# Patient Record
Sex: MALE | Race: WHITE | NOT HISPANIC OR LATINO | URBAN - METROPOLITAN AREA
[De-identification: names, ages, dates, MRNs, and addresses within clinical notes are randomized per-mention and may not be internally consistent; named-entity substitution may affect disease eponyms.]

---

## 2023-06-05 LAB — B. BURGDORFERI VLSE1/PEPC10 ABS, ELISA: 0.28 IV

## 2023-12-26 ENCOUNTER — LAB (OUTPATIENT)
Dept: LAB | Facility: LAB | Age: 48
End: 2023-12-26
Payer: COMMERCIAL

## 2023-12-26 DIAGNOSIS — M06.9 RHEUMATOID ARTHRITIS, UNSPECIFIED (MULTI): Primary | ICD-10-CM

## 2023-12-26 LAB
ALBUMIN SERPL BCP-MCNC: 4.6 G/DL (ref 3.4–5)
ALP SERPL-CCNC: 85 U/L (ref 33–120)
ALT SERPL W P-5'-P-CCNC: 10 U/L (ref 10–52)
ANION GAP SERPL CALC-SCNC: 12 MMOL/L (ref 10–20)
AST SERPL W P-5'-P-CCNC: 17 U/L (ref 9–39)
BILIRUB SERPL-MCNC: 0.4 MG/DL (ref 0–1.2)
BUN SERPL-MCNC: 13 MG/DL (ref 6–23)
CALCIUM SERPL-MCNC: 9.5 MG/DL (ref 8.6–10.3)
CHLORIDE SERPL-SCNC: 101 MMOL/L (ref 98–107)
CO2 SERPL-SCNC: 28 MMOL/L (ref 21–32)
CREAT SERPL-MCNC: 0.75 MG/DL (ref 0.5–1.3)
CRP SERPL-MCNC: 0.34 MG/DL
ERYTHROCYTE [DISTWIDTH] IN BLOOD BY AUTOMATED COUNT: 13.8 % (ref 11.5–14.5)
ERYTHROCYTE [SEDIMENTATION RATE] IN BLOOD BY WESTERGREN METHOD: 14 MM/H (ref 0–15)
GFR SERPL CREATININE-BSD FRML MDRD: >90 ML/MIN/1.73M*2
GLUCOSE SERPL-MCNC: 84 MG/DL (ref 74–99)
HCT VFR BLD AUTO: 42.7 % (ref 41–52)
HGB BLD-MCNC: 14.3 G/DL (ref 13.5–17.5)
MCH RBC QN AUTO: 32.1 PG (ref 26–34)
MCHC RBC AUTO-ENTMCNC: 33.5 G/DL (ref 32–36)
MCV RBC AUTO: 96 FL (ref 80–100)
NRBC BLD-RTO: 0 /100 WBCS (ref 0–0)
PLATELET # BLD AUTO: 294 X10*3/UL (ref 150–450)
POTASSIUM SERPL-SCNC: 4.7 MMOL/L (ref 3.5–5.3)
PROT SERPL-MCNC: 7.8 G/DL (ref 6.4–8.2)
RBC # BLD AUTO: 4.45 X10*6/UL (ref 4.5–5.9)
SODIUM SERPL-SCNC: 136 MMOL/L (ref 136–145)
WBC # BLD AUTO: 8.8 X10*3/UL (ref 4.4–11.3)

## 2023-12-26 PROCEDURE — 85027 COMPLETE CBC AUTOMATED: CPT

## 2023-12-26 PROCEDURE — 86140 C-REACTIVE PROTEIN: CPT

## 2023-12-26 PROCEDURE — 80053 COMPREHEN METABOLIC PANEL: CPT

## 2023-12-26 PROCEDURE — 85652 RBC SED RATE AUTOMATED: CPT

## 2023-12-26 PROCEDURE — 36415 COLL VENOUS BLD VENIPUNCTURE: CPT

## 2024-09-09 ENCOUNTER — HOSPITAL ENCOUNTER (INPATIENT)
Facility: HOSPITAL | Age: 49
LOS: 4 days | Discharge: ANOTHER HOSPITAL | End: 2024-09-14
Attending: STUDENT IN AN ORGANIZED HEALTH CARE EDUCATION/TRAINING PROGRAM | Admitting: STUDENT IN AN ORGANIZED HEALTH CARE EDUCATION/TRAINING PROGRAM
Payer: COMMERCIAL

## 2024-09-09 ENCOUNTER — APPOINTMENT (OUTPATIENT)
Dept: RADIOLOGY | Facility: HOSPITAL | Age: 49
End: 2024-09-09
Payer: COMMERCIAL

## 2024-09-09 DIAGNOSIS — M89.9 BONE LESION: Primary | ICD-10-CM

## 2024-09-09 DIAGNOSIS — G89.29 CHRONIC INTRACTABLE HEADACHE, UNSPECIFIED HEADACHE TYPE: ICD-10-CM

## 2024-09-09 DIAGNOSIS — R51.9 CHRONIC INTRACTABLE HEADACHE, UNSPECIFIED HEADACHE TYPE: ICD-10-CM

## 2024-09-09 DIAGNOSIS — R20.2 PARESTHESIA: ICD-10-CM

## 2024-09-09 LAB
ALBUMIN SERPL BCP-MCNC: 4 G/DL (ref 3.4–5)
ALP SERPL-CCNC: 100 U/L (ref 33–120)
ALT SERPL W P-5'-P-CCNC: 12 U/L (ref 10–52)
ANION GAP SERPL CALC-SCNC: 13 MMOL/L (ref 10–20)
APPEARANCE UR: CLEAR
AST SERPL W P-5'-P-CCNC: 11 U/L (ref 9–39)
BASOPHILS # BLD AUTO: 0.06 X10*3/UL (ref 0–0.1)
BASOPHILS NFR BLD AUTO: 0.5 %
BILIRUB SERPL-MCNC: 0.6 MG/DL (ref 0–1.2)
BILIRUB UR STRIP.AUTO-MCNC: NEGATIVE MG/DL
BUN SERPL-MCNC: 16 MG/DL (ref 6–23)
CALCIUM SERPL-MCNC: 9.6 MG/DL (ref 8.6–10.3)
CHLORIDE SERPL-SCNC: 99 MMOL/L (ref 98–107)
CO2 SERPL-SCNC: 26 MMOL/L (ref 21–32)
COLOR UR: YELLOW
CREAT SERPL-MCNC: 0.55 MG/DL (ref 0.5–1.3)
EGFRCR SERPLBLD CKD-EPI 2021: >90 ML/MIN/1.73M*2
EOSINOPHIL # BLD AUTO: 0.01 X10*3/UL (ref 0–0.7)
EOSINOPHIL NFR BLD AUTO: 0.1 %
ERYTHROCYTE [DISTWIDTH] IN BLOOD BY AUTOMATED COUNT: 14.6 % (ref 11.5–14.5)
ERYTHROCYTE [SEDIMENTATION RATE] IN BLOOD BY WESTERGREN METHOD: 18 MM/H (ref 0–15)
GLUCOSE SERPL-MCNC: 128 MG/DL (ref 74–99)
GLUCOSE UR STRIP.AUTO-MCNC: NORMAL MG/DL
HCT VFR BLD AUTO: 37.4 % (ref 41–52)
HGB BLD-MCNC: 13.1 G/DL (ref 13.5–17.5)
HOLD SPECIMEN: NORMAL
HOLD SPECIMEN: NORMAL
IMM GRANULOCYTES # BLD AUTO: 0.08 X10*3/UL (ref 0–0.7)
IMM GRANULOCYTES NFR BLD AUTO: 0.6 % (ref 0–0.9)
KETONES UR STRIP.AUTO-MCNC: NEGATIVE MG/DL
LACTATE SERPL-SCNC: 1.5 MMOL/L (ref 0.4–2)
LEUKOCYTE ESTERASE UR QL STRIP.AUTO: NEGATIVE
LIPASE SERPL-CCNC: 36 U/L (ref 9–82)
LYMPHOCYTES # BLD AUTO: 1.83 X10*3/UL (ref 1.2–4.8)
LYMPHOCYTES NFR BLD AUTO: 13.9 %
MCH RBC QN AUTO: 32 PG (ref 26–34)
MCHC RBC AUTO-ENTMCNC: 35 G/DL (ref 32–36)
MCV RBC AUTO: 91 FL (ref 80–100)
MONOCYTES # BLD AUTO: 0.73 X10*3/UL (ref 0.1–1)
MONOCYTES NFR BLD AUTO: 5.5 %
NEUTROPHILS # BLD AUTO: 10.5 X10*3/UL (ref 1.2–7.7)
NEUTROPHILS NFR BLD AUTO: 79.4 %
NITRITE UR QL STRIP.AUTO: NEGATIVE
NRBC BLD-RTO: 0 /100 WBCS (ref 0–0)
PH UR STRIP.AUTO: 6.5 [PH]
PLATELET # BLD AUTO: 324 X10*3/UL (ref 150–450)
POTASSIUM SERPL-SCNC: 3.6 MMOL/L (ref 3.5–5.3)
PROT SERPL-MCNC: 7.7 G/DL (ref 6.4–8.2)
PROT UR STRIP.AUTO-MCNC: NEGATIVE MG/DL
RBC # BLD AUTO: 4.1 X10*6/UL (ref 4.5–5.9)
RBC # UR STRIP.AUTO: NEGATIVE /UL
SARS-COV-2 RNA RESP QL NAA+PROBE: NOT DETECTED
SODIUM SERPL-SCNC: 134 MMOL/L (ref 136–145)
SP GR UR STRIP.AUTO: 1.02
UROBILINOGEN UR STRIP.AUTO-MCNC: NORMAL MG/DL
WBC # BLD AUTO: 13.2 X10*3/UL (ref 4.4–11.3)

## 2024-09-09 PROCEDURE — 70450 CT HEAD/BRAIN W/O DYE: CPT

## 2024-09-09 PROCEDURE — 85025 COMPLETE CBC W/AUTO DIFF WBC: CPT | Performed by: PHYSICIAN ASSISTANT

## 2024-09-09 PROCEDURE — 70450 CT HEAD/BRAIN W/O DYE: CPT | Performed by: RADIOLOGY

## 2024-09-09 PROCEDURE — 84132 ASSAY OF SERUM POTASSIUM: CPT | Performed by: PHYSICIAN ASSISTANT

## 2024-09-09 PROCEDURE — 85652 RBC SED RATE AUTOMATED: CPT | Performed by: PHYSICIAN ASSISTANT

## 2024-09-09 PROCEDURE — 84166 PROTEIN E-PHORESIS/URINE/CSF: CPT | Mod: ELYLAB | Performed by: STUDENT IN AN ORGANIZED HEALTH CARE EDUCATION/TRAINING PROGRAM

## 2024-09-09 PROCEDURE — 99285 EMERGENCY DEPT VISIT HI MDM: CPT

## 2024-09-09 PROCEDURE — S4991 NICOTINE PATCH NONLEGEND: HCPCS | Performed by: STUDENT IN AN ORGANIZED HEALTH CARE EDUCATION/TRAINING PROGRAM

## 2024-09-09 PROCEDURE — 81003 URINALYSIS AUTO W/O SCOPE: CPT | Performed by: PHYSICIAN ASSISTANT

## 2024-09-09 PROCEDURE — 2500000004 HC RX 250 GENERAL PHARMACY W/ HCPCS (ALT 636 FOR OP/ED): Performed by: STUDENT IN AN ORGANIZED HEALTH CARE EDUCATION/TRAINING PROGRAM

## 2024-09-09 PROCEDURE — 96365 THER/PROPH/DIAG IV INF INIT: CPT | Mod: 59

## 2024-09-09 PROCEDURE — 96375 TX/PRO/DX INJ NEW DRUG ADDON: CPT

## 2024-09-09 PROCEDURE — 2500000002 HC RX 250 W HCPCS SELF ADMINISTERED DRUGS (ALT 637 FOR MEDICARE OP, ALT 636 FOR OP/ED): Performed by: STUDENT IN AN ORGANIZED HEALTH CARE EDUCATION/TRAINING PROGRAM

## 2024-09-09 PROCEDURE — 87476 LYME DIS DNA AMP PROBE: CPT | Performed by: PHYSICIAN ASSISTANT

## 2024-09-09 PROCEDURE — 72125 CT NECK SPINE W/O DYE: CPT

## 2024-09-09 PROCEDURE — 72156 MRI NECK SPINE W/O & W/DYE: CPT | Performed by: RADIOLOGY

## 2024-09-09 PROCEDURE — 83690 ASSAY OF LIPASE: CPT | Performed by: PHYSICIAN ASSISTANT

## 2024-09-09 PROCEDURE — A9575 INJ GADOTERATE MEGLUMI 0.1ML: HCPCS | Performed by: PHYSICIAN ASSISTANT

## 2024-09-09 PROCEDURE — 83605 ASSAY OF LACTIC ACID: CPT | Performed by: PHYSICIAN ASSISTANT

## 2024-09-09 PROCEDURE — 36415 COLL VENOUS BLD VENIPUNCTURE: CPT | Performed by: PHYSICIAN ASSISTANT

## 2024-09-09 PROCEDURE — 70553 MRI BRAIN STEM W/O & W/DYE: CPT

## 2024-09-09 PROCEDURE — 87635 SARS-COV-2 COVID-19 AMP PRB: CPT | Performed by: PHYSICIAN ASSISTANT

## 2024-09-09 PROCEDURE — 70553 MRI BRAIN STEM W/O & W/DYE: CPT | Performed by: RADIOLOGY

## 2024-09-09 PROCEDURE — 2500000004 HC RX 250 GENERAL PHARMACY W/ HCPCS (ALT 636 FOR OP/ED): Performed by: PHYSICIAN ASSISTANT

## 2024-09-09 PROCEDURE — 72125 CT NECK SPINE W/O DYE: CPT | Performed by: RADIOLOGY

## 2024-09-09 PROCEDURE — 2550000001 HC RX 255 CONTRASTS: Performed by: PHYSICIAN ASSISTANT

## 2024-09-09 PROCEDURE — 72156 MRI NECK SPINE W/O & W/DYE: CPT

## 2024-09-09 PROCEDURE — 96374 THER/PROPH/DIAG INJ IV PUSH: CPT

## 2024-09-09 RX ORDER — DIPHENHYDRAMINE HYDROCHLORIDE 50 MG/ML
25 INJECTION INTRAMUSCULAR; INTRAVENOUS ONCE
Status: COMPLETED | OUTPATIENT
Start: 2024-09-09 | End: 2024-09-09

## 2024-09-09 RX ORDER — MORPHINE SULFATE 4 MG/ML
4 INJECTION, SOLUTION INTRAMUSCULAR; INTRAVENOUS ONCE
Status: COMPLETED | OUTPATIENT
Start: 2024-09-09 | End: 2024-09-09

## 2024-09-09 RX ORDER — GADOTERATE MEGLUMINE 376.9 MG/ML
0.2 INJECTION INTRAVENOUS
Status: COMPLETED | OUTPATIENT
Start: 2024-09-09 | End: 2024-09-09

## 2024-09-09 RX ORDER — METOCLOPRAMIDE HYDROCHLORIDE 5 MG/ML
10 INJECTION INTRAMUSCULAR; INTRAVENOUS ONCE
Status: COMPLETED | OUTPATIENT
Start: 2024-09-09 | End: 2024-09-09

## 2024-09-09 RX ORDER — MAGNESIUM SULFATE HEPTAHYDRATE 40 MG/ML
2 INJECTION, SOLUTION INTRAVENOUS ONCE
Status: COMPLETED | OUTPATIENT
Start: 2024-09-09 | End: 2024-09-09

## 2024-09-09 RX ORDER — HYDROMORPHONE HYDROCHLORIDE 1 MG/ML
1 INJECTION, SOLUTION INTRAMUSCULAR; INTRAVENOUS; SUBCUTANEOUS ONCE
Status: COMPLETED | OUTPATIENT
Start: 2024-09-09 | End: 2024-09-09

## 2024-09-09 RX ORDER — FENTANYL CITRATE 50 UG/ML
50 INJECTION, SOLUTION INTRAMUSCULAR; INTRAVENOUS ONCE
Status: COMPLETED | OUTPATIENT
Start: 2024-09-09 | End: 2024-09-09

## 2024-09-09 RX ORDER — ONDANSETRON HYDROCHLORIDE 2 MG/ML
4 INJECTION, SOLUTION INTRAVENOUS ONCE
Status: COMPLETED | OUTPATIENT
Start: 2024-09-09 | End: 2024-09-09

## 2024-09-09 RX ORDER — KETOROLAC TROMETHAMINE 30 MG/ML
15 INJECTION, SOLUTION INTRAMUSCULAR; INTRAVENOUS ONCE
Status: DISCONTINUED | OUTPATIENT
Start: 2024-09-09 | End: 2024-09-09

## 2024-09-09 RX ORDER — IBUPROFEN 200 MG
1 TABLET ORAL ONCE
Status: COMPLETED | OUTPATIENT
Start: 2024-09-09 | End: 2024-09-10

## 2024-09-09 ASSESSMENT — PAIN SCALES - GENERAL
PAINLEVEL_OUTOF10: 8
PAINLEVEL_OUTOF10: 5 - MODERATE PAIN
PAINLEVEL_OUTOF10: 7
PAINLEVEL_OUTOF10: 7
PAINLEVEL_OUTOF10: 10 - WORST POSSIBLE PAIN
PAINLEVEL_OUTOF10: 5 - MODERATE PAIN
PAINLEVEL_OUTOF10: 7

## 2024-09-09 ASSESSMENT — LIFESTYLE VARIABLES
EVER FELT BAD OR GUILTY ABOUT YOUR DRINKING: NO
HAVE YOU EVER FELT YOU SHOULD CUT DOWN ON YOUR DRINKING: NO
HAVE PEOPLE ANNOYED YOU BY CRITICIZING YOUR DRINKING: NO
TOTAL SCORE: 0
EVER HAD A DRINK FIRST THING IN THE MORNING TO STEADY YOUR NERVES TO GET RID OF A HANGOVER: NO

## 2024-09-09 ASSESSMENT — PAIN DESCRIPTION - PROGRESSION: CLINICAL_PROGRESSION: NOT CHANGED

## 2024-09-09 ASSESSMENT — PAIN DESCRIPTION - LOCATION
LOCATION: HEAD

## 2024-09-09 ASSESSMENT — COLUMBIA-SUICIDE SEVERITY RATING SCALE - C-SSRS
2. HAVE YOU ACTUALLY HAD ANY THOUGHTS OF KILLING YOURSELF?: NO
6. HAVE YOU EVER DONE ANYTHING, STARTED TO DO ANYTHING, OR PREPARED TO DO ANYTHING TO END YOUR LIFE?: NO
1. IN THE PAST MONTH, HAVE YOU WISHED YOU WERE DEAD OR WISHED YOU COULD GO TO SLEEP AND NOT WAKE UP?: NO

## 2024-09-09 ASSESSMENT — PAIN DESCRIPTION - DESCRIPTORS: DESCRIPTORS: ACHING

## 2024-09-09 ASSESSMENT — PAIN - FUNCTIONAL ASSESSMENT
PAIN_FUNCTIONAL_ASSESSMENT: 0-10
PAIN_FUNCTIONAL_ASSESSMENT: 0-10

## 2024-09-09 ASSESSMENT — PAIN DESCRIPTION - PAIN TYPE: TYPE: ACUTE PAIN

## 2024-09-09 NOTE — PROGRESS NOTES
Emergency Medicine Transition of Care Note.    I received Jovon Mccartney in signout from Dr. Gordon.  Please see the previous ED provider note for all HPI, PE and MDM up to the time of signout at 1930. This is in addition to the primary record.    In brief Jovon Mccartney is an 49 y.o. male presenting for   Chief Complaint   Patient presents with    Headache     Worsening headaches [id number]     At the time of signout we were awaiting: Call back from med/onc    ED Course as of 09/10/24 0753   Mon Sep 09, 2024   2015 Received callback from the transfer center.  Awaiting response from general medicine for report and possible acceptance for transfer. [NW]   2128 Patient accepted for transfer to [facility] under Dr. Madan Finch.  Patient updated.  Awaiting transport. [NW]      ED Course User Index  [NW] Carlos Ashford, DO         Diagnoses as of 09/10/24 0753   Bone lesion   Chronic intractable headache, unspecified headache type   Paresthesia       Medical Decision Making  Patient evaluated without concerning findings on neuroexam.  Patient did have some additional complaints of pain and fentanyl was ordered.  Patient was ultimately accepted for transfer to [facility] however no beds currently available.  Given the patient was currently ordered in the emergency department for 20 hours without a definitive ETA for bed availability at [facility] service was contacted for admission for further care.  Case discussed.  Patient was accepted for admission.  Patient was in agreement with his current care plan and all questions were answered.        Final diagnoses:   [M89.9] Bone lesion   [R51.9, G89.29] Chronic intractable headache, unspecified headache type   [R20.2] Paresthesia     Labs Reviewed   CBC WITH AUTO DIFFERENTIAL - Abnormal       Result Value    WBC 13.2 (*)     nRBC 0.0      RBC 4.10 (*)     Hemoglobin 13.1 (*)     Hematocrit 37.4 (*)     MCV 91      MCH 32.0      MCHC 35.0      RDW 14.6 (*)     Platelets 324       Neutrophils % 79.4      Immature Granulocytes %, Automated 0.6      Lymphocytes % 13.9      Monocytes % 5.5      Eosinophils % 0.1      Basophils % 0.5      Neutrophils Absolute 10.50 (*)     Immature Granulocytes Absolute, Automated 0.08      Lymphocytes Absolute 1.83      Monocytes Absolute 0.73      Eosinophils Absolute 0.01      Basophils Absolute 0.06     COMPREHENSIVE METABOLIC PANEL - Abnormal    Glucose 128 (*)     Sodium 134 (*)     Potassium 3.6      Chloride 99      Bicarbonate 26      Anion Gap 13      Urea Nitrogen 16      Creatinine 0.55      eGFR >90      Calcium 9.6      Albumin 4.0      Alkaline Phosphatase 100      Total Protein 7.7      AST 11      Bilirubin, Total 0.6      ALT 12     SEDIMENTATION RATE, AUTOMATED - Abnormal    Sedimentation Rate 18 (*)    LIPASE - Normal    Lipase 36      Narrative:     Venipuncture immediately after or during the administration of Metamizole may lead to falsely low results. Testing should be performed immediately prior to Metamizole dosing.   LACTATE - Normal    Lactate 1.5      Narrative:     Venipuncture immediately after or during the administration of Metamizole may lead to falsely low results. Testing should be performed immediately  prior to Metamizole dosing.   SARS-COV-2 PCR - Normal    Coronavirus 2019, PCR Not Detected      Narrative:     This assay has received FDA Emergency Use Authorization (EUA) and is only authorized for the duration of time that circumstances exist to justify the authorization of the emergency use of in vitro diagnostic tests for the detection of SARS-CoV-2 virus and/or diagnosis of COVID-19 infection under section 564(b)(1) of the Act, 21 U.S.C. 360bbb-3(b)(1). This assay is an in vitro diagnostic nucleic acid amplification test for the qualitative detection of SARS-CoV-2 from nasopharyngeal specimens and has been validated for use at [facility]. Negative results do not preclude COVID-19 infections  and should not be used as the sole basis for diagnosis, treatment, or other management decisions.     URINALYSIS WITH REFLEX CULTURE AND MICROSCOPIC - Normal    Color, Urine Yellow      Appearance, Urine Clear      Specific Gravity, Urine 1.022      pH, Urine 6.5      Protein, Urine NEGATIVE      Glucose, Urine Normal      Blood, Urine NEGATIVE      Ketones, Urine NEGATIVE      Bilirubin, Urine NEGATIVE      Urobilinogen, Urine Normal      Nitrite, Urine NEGATIVE      Leukocyte Esterase, Urine NEGATIVE     URINALYSIS WITH REFLEX CULTURE AND MICROSCOPIC    Narrative:     The following orders were created for panel order Urinalysis with Reflex Culture and Microscopic.  Procedure                               Abnormality         Status                     ---------                               -----------         ------                     Urinalysis with Reflex C...[[id number]]  Normal              Final result               Extra Urine Gray Tube[[id number]]                            Final result                 Please view results for these tests on the individual orders.   EXTRA URINE GRAY TUBE    Extra Tube Hold for add-ons.     LYME DISEASE (BORRELIA BURGDORFERI), PCR   PROTEIN ELECTROPHORESIS, URINE    Narrative:     The following orders were created for panel order Urine Protein Electrophoresis.  Procedure                               Abnormality         Status                     ---------                               -----------         ------                     Protein, Urine Random[[id number]]                            In process                 Urine Protein Electropho...[[id number]]                      In process                   Please view results for these tests on the individual orders.   PROTEIN ELECTROPHORESIS + IMMUNOFIXATION, SERUM    Narrative:     The following orders were created for panel order Serum Protein Electrophoresis + Immunofixation.  Procedure                                Abnormality         Status                     ---------                               -----------         ------                     Protein, Total[[id number]]                                   In process                 Serum Protein Electropho...[[id number]]                      In process                   Please view results for these tests on the individual orders.   KAPPA/LAMBDA FREE LIGHT CHAIN, SERUM   IGM   IGG   IGA   PROTEIN, TOTAL SPE   SERUM PROTEIN ELECTROPHORESIS + IMMUNOFIXATION   PROTEIN, URINE RANDOM UPE   URINE PROTEIN ELECTROPHORESIS     CT chest abdomen pelvis wo IV contrast   Final Result   There is evidence of stage IV malignancy.  Amorphous soft tissue   centered at the right hilum encompassing the right upper lobe bronchus   and inseparable from the right main pulmonary artery is suspicious for   bronchogenic carcinoma.  To confirm this impression, consider further   evaluation with PET CT and/or bronchoscopy.  There are mildly enlarged   mediastinal lymph nodes suspicious for metastatic disease.  Multiple   lytic lesions of the right scapula are likely metastatic.  There is   also likely involvement of each humeral head.   Spiculated opacities at each lung apex may represent scars.  These   could be further evaluated at the time of follow-up PET.   Bullous emphysema.   Moderate-sized pericardial effusion.   Nonspecific thickening of both adrenal glands suspicious for   metastatic disease.   There is a 3.5 x 3 x 1.6 cm fluid density mass centered in the lateral   aspect of the left gluteus lmain muscle.  This could simply be   sequela from a recent injection.  There is no internal air bubble.    Clinical correlation is recommended as regards to signs or symptoms of   infection/abscess.   No ascites, free air or bowel obstruction.   Signed by Narciso Alaniz MD      MR brain w and wo IV contrast   Final Result   MRI brain:        1. There is an ill-defined enhancing lesion centered within  the right   parieto-occipital bone, concerning for multiple myeloma or metastatic   disease. There is abutment and anterior displacement of the   underlying dura with mass effect upon the right occipital lobe. There   is no midline shift. There is thickening and enhancement of the   underlying and surrounding dura, pachymeningeal involvement can not   be entirely excluded. Otherwise, no evidence of abnormal   intraparenchymal metastatic disease to suggest neoplastic involvement.   2. Nonspecific moderate supratentorial and infratentorial white   matter signal abnormalities. Differential considerations include   demyelination or chronic microvascular ischemic disease amongst   others. Note, however this pattern is not specific for demyelination.        MRI cervical spine:        1. Ill-defined marrow replacing lesions within the C2 and C3   vertebral bodies corresponding to the lytic foci seen on the same-day   CT cervical spine. The constellation of findings are suggestive of   metastatic disease or multiple myeloma. There is no evidence of an   extraosseous soft tissue component.   2. Multilevel degenerative changes of the cervical spine.        I personally reviewed the images/study and I agree with the findings   as stated by Resident Niki Osorio. This study was interpreted at   [facility], [place], [place].        MACRO:   None        Signed by: Melinda Bernstein 9/9/2024 4:05 PM   Dictation workstation:   [id number]      MR cervical spine w and wo IV contrast   Final Result   MRI brain:        1. There is an ill-defined enhancing lesion centered within the right   parieto-occipital bone, concerning for multiple myeloma or metastatic   disease. There is abutment and anterior displacement of the   underlying dura with mass effect upon the right occipital lobe. There   is no midline shift. There is thickening and enhancement of the   underlying and surrounding dura, pachymeningeal  involvement can not   be entirely excluded. Otherwise, no evidence of abnormal   intraparenchymal metastatic disease to suggest neoplastic involvement.   2. Nonspecific moderate supratentorial and infratentorial white   matter signal abnormalities. Differential considerations include   demyelination or chronic microvascular ischemic disease amongst   others. Note, however this pattern is not specific for demyelination.        MRI cervical spine:        1. Ill-defined marrow replacing lesions within the C2 and C3   vertebral bodies corresponding to the lytic foci seen on the same-day   CT cervical spine. The constellation of findings are suggestive of   metastatic disease or multiple myeloma. There is no evidence of an   extraosseous soft tissue component.   2. Multilevel degenerative changes of the cervical spine.        I personally reviewed the images/study and I agree with the findings   as stated by Resident Niki Osorio. This study was interpreted at   [place], [place].        MACRO:   None        Signed by: Melinda Bernstein 9/9/2024 4:05 PM   Dictation workstation:   [id number]      CT head wo IV contrast   Final Result   No evidence of an acute intracranial process. Nonspecific white   matter disease. Lytic lesion involving the right parietal bone which   is nonspecific but malignancy is to be excluded (for example,   multiple myeloma or metastatic disease).        MACRO:   None.        Signed by: Brent Julian 9/9/2024 10:20 AM   Dictation workstation:   [id number]      CT cervical spine wo IV contrast   Final Result   No evidence of an acute fracture. Degenerative changes. Lucent   lesions within the spine which are suspicious for multiple myeloma or   metastatic disease. Emphysematous changes.        MACRO:   Critical Finding:  See findings. Notification was initiated on   9/9/2024 at 10:23 am by  Brent Julian.  (**-OCF-**)        Signed by: Brent Julian 9/9/2024 10:23  AM   Dictation workstation:   [id number]              Procedure  Procedures    Carlos Ashford, DO

## 2024-09-09 NOTE — ED PROVIDER NOTES
HPI   Chief Complaint   Patient presents with    Headache     Worsening headaches [id number]       49-year-old male patient comes in the emergency department today with complaints of headaches as well as intermittent neck pain over the last 2 months.  States is gradually been increasing in severity over that period of time.  He does get radiating pain into the right and left arms intermittently.  He states that sometimes he gets some tingling in the left hand.  He otherwise denies any other complaints this present time.  Current rates his head pain a 10 out of 10 on the pain scale.  Denies any associated fevers, chills, nausea, vomiting.  Does state that he is a smoker.  States he did see his PCP within the last week.  Was post to have some outpatient imaging done tomorrow but was told that his headache got worse to go ahead and go to the emergency department he states it did last night therefore he came to the ER today.              Patient History   No past medical history on file.  No past surgical history on file.  No family history on file.  Social History     Tobacco Use    Smoking status: Not on file    Smokeless tobacco: Not on file   Substance Use Topics    Alcohol use: Not on file    Drug use: Not on file       Physical Exam   ED Triage Vitals   Temperature Heart Rate Respirations BP   09/09/24 0825 09/09/24 0826 09/09/24 0826 09/09/24 0826   36.5 °C (97.7 °F) 99 18 139/87      Pulse Ox Temp Source Heart Rate Source Patient Position   09/09/24 0826 09/09/24 0825 -- 09/09/24 0826   98 % Temporal  Sitting      BP Location FiO2 (%)     09/09/24 0826 --     Right arm        Physical Exam  Constitutional:       General: He is in acute distress.      Appearance: Normal appearance. He is not ill-appearing.   HENT:      Head: Normocephalic and atraumatic.      Nose: Nose normal.   Eyes:      Extraocular Movements: Extraocular movements intact.      Conjunctiva/sclera: Conjunctivae normal.      Pupils: Pupils are  equal, round, and reactive to light.   Cardiovascular:      Rate and Rhythm: Normal rate and regular rhythm.   Pulmonary:      Effort: Pulmonary effort is normal. No respiratory distress.      Breath sounds: Normal breath sounds. No stridor. No wheezing.   Abdominal:      General: Bowel sounds are normal.      Palpations: Abdomen is soft.      Tenderness: There is no abdominal tenderness.   Musculoskeletal:         General: Normal range of motion.      Cervical back: Normal range of motion.   Skin:     General: Skin is warm and dry.   Neurological:      General: No focal deficit present.      Mental Status: He is alert and oriented to person, place, and time. Mental status is at baseline.      Sensory: No sensory deficit.      Motor: No weakness.   Psychiatric:         Mood and Affect: Mood normal.         Speech: Speech normal.           ED Course & MDM   Diagnoses as of 09/09/24 1809   Bone lesion   Chronic intractable headache, unspecified headache type   Paresthesia                 No data recorded     Hilda Coma Scale Score: 15 (09/09/24 1215 : Lennie Nugent RN)       NIH Stroke Scale: 0 (09/09/24 1215 : Lennie Nugent RN)                   Medical Decision Making  49-year-old male patient comes in the emergency department today with complaints of headaches as well as intermittent neck pain over the last 2 months.  States is gradually been increasing in severity over that period of time.  He does get radiating pain into the right and left arms intermittently.  He states that sometimes he gets some tingling in the left hand.  He otherwise denies any other complaints this present time.  Current rates his head pain a 10 out of 10 on the pain scale.  Denies any associated fevers, chills, nausea, vomiting.  Does state that he is a smoker.  States he did see his PCP within the last week.  Was post to have some outpatient imaging done tomorrow but was told that his headache got worse to go ahead and go to the emergency  department he states it did last night therefore he came to the ER today.    CT head, C-spine ordered to rule out any acute intracranial bleed, mass, edema or cervical spine injury.  Migraine cocktail ordered for the patient including IV magnesium dexamethasone, Benadryl, Reglan and IV fluids.    Basic laboratory studies ordered to rule out leukocytosis, left shift, electrolyte abnormality.    Patient and COVID-19 negative lactate negative lipase negative metabolic panel most within normal limits.  Sed rate elevated at 18 urinalysis negative.  Patient does have a mild leukocytosis at 13.2 absolute neutrophil count 10.5.  Unfortunate patient CT study of the head shows some lytic lesions involving the right parietal bone which could be nonspecific but malignancy is not excluded or multiple myeloma.  Patient also has lucent lesions within the spine which are suspicious for multiple myeloma or metastatic disease.    Attending evaluated the patient Dr. Gordon and is recommending we order an MRI of the brain and C-spine after he personally evaluated the patient..  These tests are ordered.  Reached out to radiologist Dr. Motta who approves getting MRI performed    Patient has lesions to the right parietal occipital region with some mass effect on the dura as well as lesions of the vertebral bodies of C2-C3.  I reached out and spoke to rad oncology Dr. Mcgrath who recommends the patient be transferred to [facility] to medical oncology for an expedited workup including a CT of the chest abdomen pelvis and tissue biopsy performed.    Handoff to attending Dr. Gordon to take this phone call        Labs Reviewed   CBC WITH AUTO DIFFERENTIAL - Abnormal       Result Value    WBC 13.2 (*)     nRBC 0.0      RBC 4.10 (*)     Hemoglobin 13.1 (*)     Hematocrit 37.4 (*)     MCV 91      MCH 32.0      MCHC 35.0      RDW 14.6 (*)     Platelets 324      Neutrophils % 79.4      Immature Granulocytes %, Automated 0.6      Lymphocytes % 13.9       Monocytes % 5.5      Eosinophils % 0.1      Basophils % 0.5      Neutrophils Absolute 10.50 (*)     Immature Granulocytes Absolute, Automated 0.08      Lymphocytes Absolute 1.83      Monocytes Absolute 0.73      Eosinophils Absolute 0.01      Basophils Absolute 0.06     COMPREHENSIVE METABOLIC PANEL - Abnormal    Glucose 128 (*)     Sodium 134 (*)     Potassium 3.6      Chloride 99      Bicarbonate 26      Anion Gap 13      Urea Nitrogen 16      Creatinine 0.55      eGFR >90      Calcium 9.6      Albumin 4.0      Alkaline Phosphatase 100      Total Protein 7.7      AST 11      Bilirubin, Total 0.6      ALT 12     SEDIMENTATION RATE, AUTOMATED - Abnormal    Sedimentation Rate 18 (*)    LIPASE - Normal    Lipase 36      Narrative:     Venipuncture immediately after or during the administration of Metamizole may lead to falsely low results. Testing should be performed immediately prior to Metamizole dosing.   LACTATE - Normal    Lactate 1.5      Narrative:     Venipuncture immediately after or during the administration of Metamizole may lead to falsely low results. Testing should be performed immediately  prior to Metamizole dosing.   SARS-COV-2 PCR - Normal    Coronavirus 2019, PCR Not Detected      Narrative:     This assay has received FDA Emergency Use Authorization (EUA) and is only authorized for the duration of time that circumstances exist to justify the authorization of the emergency use of in vitro diagnostic tests for the detection of SARS-CoV-2 virus and/or diagnosis of COVID-19 infection under section 564(b)(1) of the Act, 21 U.S.C. 360bbb-3(b)(1). This assay is an in vitro diagnostic nucleic acid amplification test for the qualitative detection of SARS-CoV-2 from nasopharyngeal specimens and has been validated for use at [facility]. Negative results do not preclude COVID-19 infections and should not be used as the sole basis for diagnosis, treatment, or other management  decisions.     URINALYSIS WITH REFLEX CULTURE AND MICROSCOPIC - Normal    Color, Urine Yellow      Appearance, Urine Clear      Specific Gravity, Urine 1.022      pH, Urine 6.5      Protein, Urine NEGATIVE      Glucose, Urine Normal      Blood, Urine NEGATIVE      Ketones, Urine NEGATIVE      Bilirubin, Urine NEGATIVE      Urobilinogen, Urine Normal      Nitrite, Urine NEGATIVE      Leukocyte Esterase, Urine NEGATIVE     URINALYSIS WITH REFLEX CULTURE AND MICROSCOPIC    Narrative:     The following orders were created for panel order Urinalysis with Reflex Culture and Microscopic.  Procedure                               Abnormality         Status                     ---------                               -----------         ------                     Urinalysis with Reflex C...[[id number]]  Normal              Final result               Extra Urine Gray Tube[[id number]]                            In process                   Please view results for these tests on the individual orders.   LYME DISEASE (BORRELIA BURGDORFERI), PCR   EXTRA URINE GRAY TUBE        MR brain w and wo IV contrast   Final Result   MRI brain:        1. There is an ill-defined enhancing lesion centered within the right   parieto-occipital bone, concerning for multiple myeloma or metastatic   disease. There is abutment and anterior displacement of the   underlying dura with mass effect upon the right occipital lobe. There   is no midline shift. There is thickening and enhancement of the   underlying and surrounding dura, pachymeningeal involvement can not   be entirely excluded. Otherwise, no evidence of abnormal   intraparenchymal metastatic disease to suggest neoplastic involvement.   2. Nonspecific moderate supratentorial and infratentorial white   matter signal abnormalities. Differential considerations include   demyelination or chronic microvascular ischemic disease amongst   others. Note, however this pattern is not specific for  demyelination.        MRI cervical spine:        1. Ill-defined marrow replacing lesions within the C2 and C3   vertebral bodies corresponding to the lytic foci seen on the same-day   CT cervical spine. The constellation of findings are suggestive of   metastatic disease or multiple myeloma. There is no evidence of an   extraosseous soft tissue component.   2. Multilevel degenerative changes of the cervical spine.        I personally reviewed the images/study and I agree with the findings   as stated by Resident Niki Osorio. This study was interpreted at   [facility], [place], [place].        MACRO:   None        Signed by: Melinda Bernstein 9/9/2024 4:05 PM   Dictation workstation:   [id number]      MR cervical spine w and wo IV contrast   Final Result   MRI brain:        1. There is an ill-defined enhancing lesion centered within the right   parieto-occipital bone, concerning for multiple myeloma or metastatic   disease. There is abutment and anterior displacement of the   underlying dura with mass effect upon the right occipital lobe. There   is no midline shift. There is thickening and enhancement of the   underlying and surrounding dura, pachymeningeal involvement can not   be entirely excluded. Otherwise, no evidence of abnormal   intraparenchymal metastatic disease to suggest neoplastic involvement.   2. Nonspecific moderate supratentorial and infratentorial white   matter signal abnormalities. Differential considerations include   demyelination or chronic microvascular ischemic disease amongst   others. Note, however this pattern is not specific for demyelination.        MRI cervical spine:        1. Ill-defined marrow replacing lesions within the C2 and C3   vertebral bodies corresponding to the lytic foci seen on the same-day   CT cervical spine. The constellation of findings are suggestive of   metastatic disease or multiple myeloma. There is no evidence of an   extraosseous  soft tissue component.   2. Multilevel degenerative changes of the cervical spine.        I personally reviewed the images/study and I agree with the findings   as stated by Resident Niki Osorio. This study was interpreted at   [place], [place].        MACRO:   None        Signed by: Melinda Bernstein 9/9/2024 4:05 PM   Dictation workstation:   [id number]      CT head wo IV contrast   Final Result   No evidence of an acute intracranial process. Nonspecific white   matter disease. Lytic lesion involving the right parietal bone which   is nonspecific but malignancy is to be excluded (for example,   multiple myeloma or metastatic disease).        MACRO:   None.        Signed by: Brent Julian 9/9/2024 10:20 AM   Dictation workstation:   [id number]      CT cervical spine wo IV contrast   Final Result   No evidence of an acute fracture. Degenerative changes. Lucent   lesions within the spine which are suspicious for multiple myeloma or   metastatic disease. Emphysematous changes.        MACRO:   Critical Finding:  See findings. Notification was initiated on   9/9/2024 at 10:23 am by  Brent Julian.  (**-OCF-**)        Signed by: Brent Julian 9/9/2024 10:23 AM   Dictation workstation:   [id number]          Procedure  Procedures     Nirmal Williamson PA-C  09/09/24 1811

## 2024-09-10 ENCOUNTER — APPOINTMENT (OUTPATIENT)
Dept: RADIOLOGY | Facility: HOSPITAL | Age: 49
End: 2024-09-10
Payer: COMMERCIAL

## 2024-09-10 PROBLEM — M89.9 BONE LESION: Status: ACTIVE | Noted: 2024-09-10

## 2024-09-10 PROBLEM — M89.9: Status: ACTIVE | Noted: 2024-09-10

## 2024-09-10 PROBLEM — R51.9 INTRACTABLE HEADACHE: Status: ACTIVE | Noted: 2024-09-10

## 2024-09-10 PROBLEM — M89.9 LESION OF BONE OF CERVICAL SPINE: Status: ACTIVE | Noted: 2024-09-10

## 2024-09-10 LAB
IGA SERPL-MCNC: 220 MG/DL (ref 70–400)
IGG SERPL-MCNC: 1280 MG/DL (ref 700–1600)
IGM SERPL-MCNC: 264 MG/DL (ref 40–230)
PROT SERPL-MCNC: 6.8 G/DL (ref 6.4–8.2)
PROT UR-ACNC: 16 MG/DL (ref 5–25)

## 2024-09-10 PROCEDURE — 71250 CT THORAX DX C-: CPT | Mod: FOREIGN READ | Performed by: RADIOLOGY

## 2024-09-10 PROCEDURE — 82784 ASSAY IGA/IGD/IGG/IGM EACH: CPT | Mod: ELYLAB | Performed by: STUDENT IN AN ORGANIZED HEALTH CARE EDUCATION/TRAINING PROGRAM

## 2024-09-10 PROCEDURE — 36415 COLL VENOUS BLD VENIPUNCTURE: CPT | Performed by: STUDENT IN AN ORGANIZED HEALTH CARE EDUCATION/TRAINING PROGRAM

## 2024-09-10 PROCEDURE — 2500000004 HC RX 250 GENERAL PHARMACY W/ HCPCS (ALT 636 FOR OP/ED): Performed by: STUDENT IN AN ORGANIZED HEALTH CARE EDUCATION/TRAINING PROGRAM

## 2024-09-10 PROCEDURE — 1210000001 HC SEMI-PRIVATE ROOM DAILY

## 2024-09-10 PROCEDURE — 99239 HOSP IP/OBS DSCHRG MGMT >30: CPT | Performed by: STUDENT IN AN ORGANIZED HEALTH CARE EDUCATION/TRAINING PROGRAM

## 2024-09-10 PROCEDURE — 74176 CT ABD & PELVIS W/O CONTRAST: CPT | Mod: FOREIGN READ | Performed by: RADIOLOGY

## 2024-09-10 PROCEDURE — 86334 IMMUNOFIX E-PHORESIS SERUM: CPT | Mod: ELYLAB | Performed by: STUDENT IN AN ORGANIZED HEALTH CARE EDUCATION/TRAINING PROGRAM

## 2024-09-10 PROCEDURE — 2500000001 HC RX 250 WO HCPCS SELF ADMINISTERED DRUGS (ALT 637 FOR MEDICARE OP): Performed by: NURSE PRACTITIONER

## 2024-09-10 PROCEDURE — 71250 CT THORAX DX C-: CPT

## 2024-09-10 PROCEDURE — 84155 ASSAY OF PROTEIN SERUM: CPT | Mod: ELYLAB | Performed by: STUDENT IN AN ORGANIZED HEALTH CARE EDUCATION/TRAINING PROGRAM

## 2024-09-10 PROCEDURE — 83521 IG LIGHT CHAINS FREE EACH: CPT | Mod: ELYLAB | Performed by: STUDENT IN AN ORGANIZED HEALTH CARE EDUCATION/TRAINING PROGRAM

## 2024-09-10 PROCEDURE — 96376 TX/PRO/DX INJ SAME DRUG ADON: CPT

## 2024-09-10 PROCEDURE — 99223 1ST HOSP IP/OBS HIGH 75: CPT | Performed by: STUDENT IN AN ORGANIZED HEALTH CARE EDUCATION/TRAINING PROGRAM

## 2024-09-10 RX ORDER — FENTANYL CITRATE 50 UG/ML
50 INJECTION, SOLUTION INTRAMUSCULAR; INTRAVENOUS ONCE
Status: COMPLETED | OUTPATIENT
Start: 2024-09-10 | End: 2024-09-10

## 2024-09-10 RX ORDER — HYDROMORPHONE HYDROCHLORIDE 1 MG/ML
0.6 INJECTION, SOLUTION INTRAMUSCULAR; INTRAVENOUS; SUBCUTANEOUS
Status: DISCONTINUED | OUTPATIENT
Start: 2024-09-10 | End: 2024-09-14 | Stop reason: HOSPADM

## 2024-09-10 RX ORDER — IBUPROFEN 400 MG/1
400 TABLET ORAL EVERY 8 HOURS PRN
Status: DISCONTINUED | OUTPATIENT
Start: 2024-09-10 | End: 2024-09-13

## 2024-09-10 RX ORDER — ACETAMINOPHEN 325 MG/1
650 TABLET ORAL EVERY 4 HOURS PRN
Status: DISCONTINUED | OUTPATIENT
Start: 2024-09-10 | End: 2024-09-12

## 2024-09-10 RX ORDER — ACETAMINOPHEN 160 MG/5ML
650 SOLUTION ORAL EVERY 4 HOURS PRN
Status: DISCONTINUED | OUTPATIENT
Start: 2024-09-10 | End: 2024-09-12

## 2024-09-10 RX ORDER — POLYETHYLENE GLYCOL 3350 17 G/17G
17 POWDER, FOR SOLUTION ORAL DAILY
Status: DISCONTINUED | OUTPATIENT
Start: 2024-09-10 | End: 2024-09-14 | Stop reason: HOSPADM

## 2024-09-10 RX ORDER — ONDANSETRON HYDROCHLORIDE 2 MG/ML
4 INJECTION, SOLUTION INTRAVENOUS EVERY 8 HOURS PRN
Status: DISCONTINUED | OUTPATIENT
Start: 2024-09-10 | End: 2024-09-14 | Stop reason: HOSPADM

## 2024-09-10 RX ORDER — ACETAMINOPHEN 650 MG/1
650 SUPPOSITORY RECTAL EVERY 4 HOURS PRN
Status: DISCONTINUED | OUTPATIENT
Start: 2024-09-10 | End: 2024-09-12

## 2024-09-10 RX ORDER — ONDANSETRON 4 MG/1
4 TABLET, FILM COATED ORAL EVERY 8 HOURS PRN
Status: DISCONTINUED | OUTPATIENT
Start: 2024-09-10 | End: 2024-09-14 | Stop reason: HOSPADM

## 2024-09-10 RX ORDER — TALC
3 POWDER (GRAM) TOPICAL NIGHTLY PRN
Status: DISCONTINUED | OUTPATIENT
Start: 2024-09-10 | End: 2024-09-14 | Stop reason: HOSPADM

## 2024-09-10 SDOH — SOCIAL STABILITY: SOCIAL INSECURITY: HAS ANYONE EVER THREATENED TO HURT YOUR FAMILY OR YOUR PETS?: NO

## 2024-09-10 SDOH — SOCIAL STABILITY: SOCIAL INSECURITY: DO YOU FEEL ANYONE HAS EXPLOITED OR TAKEN ADVANTAGE OF YOU FINANCIALLY OR OF YOUR PERSONAL PROPERTY?: NO

## 2024-09-10 SDOH — SOCIAL STABILITY: SOCIAL INSECURITY: ARE YOU OR HAVE YOU BEEN THREATENED OR ABUSED PHYSICALLY, EMOTIONALLY, OR SEXUALLY BY ANYONE?: NO

## 2024-09-10 SDOH — SOCIAL STABILITY: SOCIAL INSECURITY: ARE THERE ANY APPARENT SIGNS OF INJURIES/BEHAVIORS THAT COULD BE RELATED TO ABUSE/NEGLECT?: NO

## 2024-09-10 SDOH — SOCIAL STABILITY: SOCIAL INSECURITY: DO YOU FEEL UNSAFE GOING BACK TO THE PLACE WHERE YOU ARE LIVING?: NO

## 2024-09-10 SDOH — SOCIAL STABILITY: SOCIAL INSECURITY: ABUSE: ADULT

## 2024-09-10 SDOH — SOCIAL STABILITY: SOCIAL INSECURITY: DOES ANYONE TRY TO KEEP YOU FROM HAVING/CONTACTING OTHER FRIENDS OR DOING THINGS OUTSIDE YOUR HOME?: NO

## 2024-09-10 SDOH — SOCIAL STABILITY: SOCIAL INSECURITY: HAVE YOU HAD THOUGHTS OF HARMING ANYONE ELSE?: NO

## 2024-09-10 ASSESSMENT — LIFESTYLE VARIABLES
SKIP TO QUESTIONS 9-10: 1
AUDIT-C TOTAL SCORE: 1
HOW MANY STANDARD DRINKS CONTAINING ALCOHOL DO YOU HAVE ON A TYPICAL DAY: 1 OR 2
HOW OFTEN DO YOU HAVE A DRINK CONTAINING ALCOHOL: MONTHLY OR LESS
HOW OFTEN DO YOU HAVE 6 OR MORE DRINKS ON ONE OCCASION: NEVER
AUDIT-C TOTAL SCORE: 1

## 2024-09-10 ASSESSMENT — PATIENT HEALTH QUESTIONNAIRE - PHQ9
SUM OF ALL RESPONSES TO PHQ9 QUESTIONS 1 & 2: 0
2. FEELING DOWN, DEPRESSED OR HOPELESS: NOT AT ALL
1. LITTLE INTEREST OR PLEASURE IN DOING THINGS: NOT AT ALL

## 2024-09-10 ASSESSMENT — COGNITIVE AND FUNCTIONAL STATUS - GENERAL
MOBILITY SCORE: 24
PATIENT BASELINE BEDBOUND: NO
DAILY ACTIVITIY SCORE: 24

## 2024-09-10 ASSESSMENT — ACTIVITIES OF DAILY LIVING (ADL)
PATIENT'S MEMORY ADEQUATE TO SAFELY COMPLETE DAILY ACTIVITIES?: YES
HEARING - RIGHT EAR: FUNCTIONAL
GROOMING: INDEPENDENT
FEEDING YOURSELF: INDEPENDENT
DRESSING YOURSELF: INDEPENDENT
LACK_OF_TRANSPORTATION: NO
JUDGMENT_ADEQUATE_SAFELY_COMPLETE_DAILY_ACTIVITIES: YES
ADEQUATE_TO_COMPLETE_ADL: YES
HEARING - LEFT EAR: FUNCTIONAL
BATHING: INDEPENDENT
WALKS IN HOME: INDEPENDENT
TOILETING: INDEPENDENT

## 2024-09-10 ASSESSMENT — PAIN SCALES - GENERAL
PAINLEVEL_OUTOF10: 3
PAINLEVEL_OUTOF10: 3
PAINLEVEL_OUTOF10: 9
PAINLEVEL_OUTOF10: 10 - WORST POSSIBLE PAIN
PAINLEVEL_OUTOF10: 1
PAINLEVEL_OUTOF10: 3
PAINLEVEL_OUTOF10: 10 - WORST POSSIBLE PAIN
PAINLEVEL_OUTOF10: 3

## 2024-09-10 ASSESSMENT — PAIN DESCRIPTION - LOCATION
LOCATION: HEAD
LOCATION: HEAD

## 2024-09-10 NOTE — DISCHARGE SUMMARY
Discharge Diagnosis  Lesion of parietal bone    Issues Requiring Follow-Up      Discharge Meds     Medication List      You have not been prescribed any medications.       Test Results Pending At Discharge  Pending Labs       Order Current Status    IgA In process    IgG In process    IgM In process    Immunoglobulin free LT chains blood In process    Lyme disease, PCR In process    Protein, Total In process    Protein, Urine Random In process    Serum Protein Electrophoresis + Immunofixation In process    Serum Protein Electrophoresis + Immunofixation In process    Urine Protein Electrophoresis In process    Urine Protein Electrophoresis In process            Hospital Course   49-year-old male with past medical history of juvenile RA admitted with intractable headache, MRI showed parietal lytic bone lesions along with cervical lytic lesions.  These lesions are suspicious for multiple myeloma.  Case was discussed and patient was accepted at [facility] will be transferred as soon as a bed becomes available.  No evidence of hypercalcemia.  Continue pain control, supportive care.  CT abdomen pelvis was suggestive of stage IV malignancy soft tissue at the right heel region and right upper lobe bronchus suspicious for bronchogenic carcinoma.    Pertinent Physical Exam At Time of Discharge  Physical Exam  Gen: NAD, appears stated age  HEENT: EOM, MMM  CV: RRR, no murmurs rubs or gallops  Resp: Clear to auscultation bilaterally, normal effort  Abdomen: soft, NT,+BS  LE: No edema, no deformity  Neuro: A&Ox4, moving all extremities  Outpatient Follow-Up  Future Appointments   Date Time Provider Department Center   9/16/2024 10:15 AM [facility] CT 2 BETI Rowell MD

## 2024-09-10 NOTE — PROGRESS NOTES
Notified by hospitalist, Dr Rowell plan for pt to transfer to [facility] when bed available. + parietal lytic bone lesions along with cervical lytic lesions. These lesions are suspicious for multiple myeloma.

## 2024-09-10 NOTE — H&P
Medical Group History and Physical      ASSESSMENT & PLAN:     #.  Parietal lytic bone lesion  #.  Multiple cervical lytic lesions  #.  Intractable headache  -MRI brain/C-spine showed ill-defined enhancing lesion within right parietal occipital bone with dural thickening and mass effect upon right occipital lobe without midline shift as well as ill-defined marrow replacing lesions within C2 and C3 vertebral bodies, constellation of findings suggestive of metastatic disease or multiple myeloma  -He is mildly anemic however this dates back to 2019, no evidence of renal impairment or hypercalcemia  -Patient has been accepted for transfer by general medicine at [facility] however is pending bed availability  -Will initiate workup with CT C/A/P to evaluate for further lytic lesions or evidence of metastatic disease  -Will also send SPEP, UPEP, serum FLC, quantitative immunoglobulins  -If prolonged transfer time, could consider reaching out to IR to see if they are able to perform tissue biopsy    VTE PPX: Low risk, not indicated      Pradip Son MD    --Of note, this documentation is completed using the Dragon Dictation system (voice recognition software). There may be spelling and/or grammatical errors that were not corrected prior to final submission.--    HISTORY OF PRESENT ILLNESS:   Chief Complaint: Headache    Jovon Mccartney is a 49 y.o. male presenting with headache and neck pain.  Patient states his symptoms have been present for the last 2 months and have been gradually worsening.  Today his headache went up to a 10 out of 10 pain and he decided to come to the ER.  He denies any nausea or vomiting.  He does endorse intermittent tingling and radiating pain down his right and left arm.  Denies any muscle weakness.     ER Course: /87, HR 99, RR 18, T36.5.  Labs essentially unremarkable aside from WBC of 13.2 and hemoglobin of 13.1.  Urinalysis negative. MRI brain/C-spine showed ill-defined enhancing  lesion within right parietal occipital bone with dural thickening and mass effect upon right occipital lobe without midline shift as well as ill-defined marrow replacing lesions within C2 and C3 vertebral bodies, constellation of findings suggestive of metastatic disease or multiple myeloma.  [facility] [place] [facility] was contacted and patient was accepted for transfer however is pending bed availability.    ROS  10 point review of systems negative except per HPI     PAST HISTORIES:     Past Medical History  He has no past medical history on file.    Surgical History  He has no past surgical history on file.     Social History  He reports that he has been smoking cigarettes. He has never used smokeless tobacco. No history on file for alcohol use and drug use.    Family History  No family history on file.    Allergies:  Patient has no known allergies.      OBJECTIVE:      Last Recorded Vitals  /75   Pulse 81   Temp 36.9 °C (98.4 °F) (Temporal)   Resp 18   Wt 58.5 kg (129 lb)   SpO2 95%     Last I/O:  No intake/output data recorded.    Physical Exam   Gen: NAD, appears stated age  HEENT: EOM, MMM  CV: RRR, no murmurs rubs or gallops  Resp: Clear to auscultation bilaterally, normal effort  Abdomen: soft, NT,+BS  LE: No edema, no deformity  Neuro: A&Ox4, moving all extremities    LABS AND IMAGING:       Relevant Results  Labs Reviewed   CBC WITH AUTO DIFFERENTIAL - Abnormal       Result Value    WBC 13.2 (*)     nRBC 0.0      RBC 4.10 (*)     Hemoglobin 13.1 (*)     Hematocrit 37.4 (*)     MCV 91      MCH 32.0      MCHC 35.0      RDW 14.6 (*)     Platelets 324      Neutrophils % 79.4      Immature Granulocytes %, Automated 0.6      Lymphocytes % 13.9      Monocytes % 5.5      Eosinophils % 0.1      Basophils % 0.5      Neutrophils Absolute 10.50 (*)     Immature Granulocytes Absolute, Automated 0.08      Lymphocytes Absolute 1.83      Monocytes Absolute 0.73      Eosinophils Absolute 0.01      Basophils Absolute 0.06      COMPREHENSIVE METABOLIC PANEL - Abnormal    Glucose 128 (*)     Sodium 134 (*)     Potassium 3.6      Chloride 99      Bicarbonate 26      Anion Gap 13      Urea Nitrogen 16      Creatinine 0.55      eGFR >90      Calcium 9.6      Albumin 4.0      Alkaline Phosphatase 100      Total Protein 7.7      AST 11      Bilirubin, Total 0.6      ALT 12     SEDIMENTATION RATE, AUTOMATED - Abnormal    Sedimentation Rate 18 (*)    LIPASE - Normal    Lipase 36      Narrative:     Venipuncture immediately after or during the administration of Metamizole may lead to falsely low results. Testing should be performed immediately prior to Metamizole dosing.   LACTATE - Normal    Lactate 1.5      Narrative:     Venipuncture immediately after or during the administration of Metamizole may lead to falsely low results. Testing should be performed immediately  prior to Metamizole dosing.   SARS-COV-2 PCR - Normal    Coronavirus 2019, PCR Not Detected      Narrative:     This assay has received FDA Emergency Use Authorization (EUA) and is only authorized for the duration of time that circumstances exist to justify the authorization of the emergency use of in vitro diagnostic tests for the detection of SARS-CoV-2 virus and/or diagnosis of COVID-19 infection under section 564(b)(1) of the Act, 21 U.S.C. 360bbb-3(b)(1). This assay is an in vitro diagnostic nucleic acid amplification test for the qualitative detection of SARS-CoV-2 from nasopharyngeal specimens and has been validated for use at [facility]. Negative results do not preclude COVID-19 infections and should not be used as the sole basis for diagnosis, treatment, or other management decisions.     URINALYSIS WITH REFLEX CULTURE AND MICROSCOPIC - Normal    Color, Urine Yellow      Appearance, Urine Clear      Specific Gravity, Urine 1.022      pH, Urine 6.5      Protein, Urine NEGATIVE      Glucose, Urine Normal      Blood, Urine NEGATIVE      Ketones, Urine  NEGATIVE      Bilirubin, Urine NEGATIVE      Urobilinogen, Urine Normal      Nitrite, Urine NEGATIVE      Leukocyte Esterase, Urine NEGATIVE     URINALYSIS WITH REFLEX CULTURE AND MICROSCOPIC    Narrative:     The following orders were created for panel order Urinalysis with Reflex Culture and Microscopic.  Procedure                               Abnormality         Status                     ---------                               -----------         ------                     Urinalysis with Reflex C...[[id number]]  Normal              Final result               Extra Urine Gray Tube[[id number]]                            Final result                 Please view results for these tests on the individual orders.   EXTRA URINE GRAY TUBE    Extra Tube Hold for add-ons.     LYME DISEASE (BORRELIA BURGDORFERI), PCR     MR brain w and wo IV contrast   Final Result   MRI brain:        1. There is an ill-defined enhancing lesion centered within the right   parieto-occipital bone, concerning for multiple myeloma or metastatic   disease. There is abutment and anterior displacement of the   underlying dura with mass effect upon the right occipital lobe. There   is no midline shift. There is thickening and enhancement of the   underlying and surrounding dura, pachymeningeal involvement can not   be entirely excluded. Otherwise, no evidence of abnormal   intraparenchymal metastatic disease to suggest neoplastic involvement.   2. Nonspecific moderate supratentorial and infratentorial white   matter signal abnormalities. Differential considerations include   demyelination or chronic microvascular ischemic disease amongst   others. Note, however this pattern is not specific for demyelination.        MRI cervical spine:        1. Ill-defined marrow replacing lesions within the C2 and C3   vertebral bodies corresponding to the lytic foci seen on the same-day   CT cervical spine. The constellation of findings are suggestive of    metastatic disease or multiple myeloma. There is no evidence of an   extraosseous soft tissue component.   2. Multilevel degenerative changes of the cervical spine.        I personally reviewed the images/study and I agree with the findings   as stated by Resident Niki Osorio. This study was interpreted at   [facility], [place], [place].        MACRO:   None        Signed by: Melinda Bernstein 9/9/2024 4:05 PM   Dictation workstation:   [id number]      MR cervical spine w and wo IV contrast   Final Result   MRI brain:        1. There is an ill-defined enhancing lesion centered within the right   parieto-occipital bone, concerning for multiple myeloma or metastatic   disease. There is abutment and anterior displacement of the   underlying dura with mass effect upon the right occipital lobe. There   is no midline shift. There is thickening and enhancement of the   underlying and surrounding dura, pachymeningeal involvement can not   be entirely excluded. Otherwise, no evidence of abnormal   intraparenchymal metastatic disease to suggest neoplastic involvement.   2. Nonspecific moderate supratentorial and infratentorial white   matter signal abnormalities. Differential considerations include   demyelination or chronic microvascular ischemic disease amongst   others. Note, however this pattern is not specific for demyelination.        MRI cervical spine:        1. Ill-defined marrow replacing lesions within the C2 and C3   vertebral bodies corresponding to the lytic foci seen on the same-day   CT cervical spine. The constellation of findings are suggestive of   metastatic disease or multiple myeloma. There is no evidence of an   extraosseous soft tissue component.   2. Multilevel degenerative changes of the cervical spine.        I personally reviewed the images/study and I agree with the findings   as stated by Resident Niki Osorio. This study was interpreted at   [facility]  [place], [place].        MACRO:   None        Signed by: Melinda Bernstein 9/9/2024 4:05 PM   Dictation workstation:   [id number]      CT head wo IV contrast   Final Result   No evidence of an acute intracranial process. Nonspecific white   matter disease. Lytic lesion involving the right parietal bone which   is nonspecific but malignancy is to be excluded (for example,   multiple myeloma or metastatic disease).        MACRO:   None.        Signed by: Brent Julian 9/9/2024 10:20 AM   Dictation workstation:   [id number]      CT cervical spine wo IV contrast   Final Result   No evidence of an acute fracture. Degenerative changes. Lucent   lesions within the spine which are suspicious for multiple myeloma or   metastatic disease. Emphysematous changes.        MACRO:   Critical Finding:  See findings. Notification was initiated on   9/9/2024 at 10:23 am by  Brent Julian.  (**-OCF-**)        Signed by: Brent Julian 9/9/2024 10:23 AM   Dictation workstation:   [id number]

## 2024-09-11 LAB
KAPPA LC SERPL-MCNC: 2.41 MG/DL (ref 0.33–1.94)
KAPPA LC/LAMBDA SER: 1.18 {RATIO} (ref 0.26–1.65)
LAMBDA LC SERPL-MCNC: 2.04 MG/DL (ref 0.57–2.63)

## 2024-09-11 PROCEDURE — 2500000001 HC RX 250 WO HCPCS SELF ADMINISTERED DRUGS (ALT 637 FOR MEDICARE OP): Performed by: STUDENT IN AN ORGANIZED HEALTH CARE EDUCATION/TRAINING PROGRAM

## 2024-09-11 PROCEDURE — 99232 SBSQ HOSP IP/OBS MODERATE 35: CPT | Performed by: STUDENT IN AN ORGANIZED HEALTH CARE EDUCATION/TRAINING PROGRAM

## 2024-09-11 PROCEDURE — 1210000001 HC SEMI-PRIVATE ROOM DAILY

## 2024-09-11 PROCEDURE — 2500000001 HC RX 250 WO HCPCS SELF ADMINISTERED DRUGS (ALT 637 FOR MEDICARE OP): Performed by: NURSE PRACTITIONER

## 2024-09-11 RX ORDER — OXYCODONE HYDROCHLORIDE 5 MG/1
10 TABLET ORAL EVERY 6 HOURS PRN
Status: DISCONTINUED | OUTPATIENT
Start: 2024-09-11 | End: 2024-09-14 | Stop reason: HOSPADM

## 2024-09-11 RX ORDER — PREGABALIN 25 MG/1
25 CAPSULE ORAL 2 TIMES DAILY
Status: DISCONTINUED | OUTPATIENT
Start: 2024-09-11 | End: 2024-09-14 | Stop reason: HOSPADM

## 2024-09-11 ASSESSMENT — COGNITIVE AND FUNCTIONAL STATUS - GENERAL
MOBILITY SCORE: 24
DAILY ACTIVITIY SCORE: 24

## 2024-09-11 ASSESSMENT — PAIN SCALES - GENERAL
PAINLEVEL_OUTOF10: 3
PAINLEVEL_OUTOF10: 8
PAINLEVEL_OUTOF10: 3
PAINLEVEL_OUTOF10: 5 - MODERATE PAIN
PAINLEVEL_OUTOF10: 7
PAINLEVEL_OUTOF10: 4
PAINLEVEL_OUTOF10: 3
PAINLEVEL_OUTOF10: 3
PAINLEVEL_OUTOF10: 5 - MODERATE PAIN

## 2024-09-11 ASSESSMENT — PAIN DESCRIPTION - LOCATION
LOCATION: NECK
LOCATION: HEAD
LOCATION: OTHER (COMMENT)
LOCATION: HEAD

## 2024-09-11 ASSESSMENT — PAIN - FUNCTIONAL ASSESSMENT
PAIN_FUNCTIONAL_ASSESSMENT: 0-10

## 2024-09-11 NOTE — CARE PLAN
The patient's goals for the shift include Pain control and rest    The clinical goals for the shift include Pain control      Problem: Pain - Adult  Goal: Verbalizes/displays adequate comfort level or baseline comfort level  Outcome: Progressing     Problem: Safety - Adult  Goal: Free from fall injury  Outcome: Progressing     Problem: Discharge Planning  Goal: Discharge to home or other facility with appropriate resources  Outcome: Progressing     Problem: Chronic Conditions and Co-morbidities  Goal: Patient's chronic conditions and co-morbidity symptoms are monitored and maintained or improved  Outcome: Progressing

## 2024-09-11 NOTE — CARE PLAN
Problem: Pain - Adult  Goal: Verbalizes/displays adequate comfort level or baseline comfort level  Outcome: Progressing     Problem: Safety - Adult  Goal: Free from fall injury  Outcome: Progressing     Problem: Discharge Planning  Goal: Discharge to home or other facility with appropriate resources  Outcome: Progressing     Problem: Chronic Conditions and Co-morbidities  Goal: Patient's chronic conditions and co-morbidity symptoms are monitored and maintained or improved  Outcome: Progressing   The patient's goals for the shift include Pain control and rest    The clinical goals for the shift include pain will be controlled for shift

## 2024-09-11 NOTE — PROGRESS NOTES
"Jovon Mccartney is a 49 y.o. male on day 1 of admission presenting with Lesion of parietal bone.    Subjective   Patient seen and examined.  Questions answered regarding CAT scan findings.  Does complain of pain in his arms and shoulders which which is bothering him.  No shortness of breath no fevers or chills.       Objective     Physical Exam  Gen: NAD, appears stated age  HEENT: EOM, MMM  CV: RRR, no murmurs rubs or gallops  Resp: Clear to auscultation bilaterally, normal effort  Abdomen: soft, NT,+BS  LE: No edema, no deformity  Neuro: A&Ox4, moving all extremities  Last Recorded Vitals  Blood pressure 119/76, pulse 86, temperature 37 °C (98.6 °F), resp. rate 18, height 1.676 m (5' 6\"), weight 58.5 kg (129 lb), SpO2 95%.  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

## 2024-09-12 LAB
B BURGDOR DNA SPEC QL NAA+PROBE: NOT DETECTED
SPECIMEN SOURCE: NORMAL

## 2024-09-12 PROCEDURE — 2500000004 HC RX 250 GENERAL PHARMACY W/ HCPCS (ALT 636 FOR OP/ED): Performed by: STUDENT IN AN ORGANIZED HEALTH CARE EDUCATION/TRAINING PROGRAM

## 2024-09-12 PROCEDURE — 2500000001 HC RX 250 WO HCPCS SELF ADMINISTERED DRUGS (ALT 637 FOR MEDICARE OP): Performed by: NURSE PRACTITIONER

## 2024-09-12 PROCEDURE — 1210000001 HC SEMI-PRIVATE ROOM DAILY

## 2024-09-12 PROCEDURE — 2500000001 HC RX 250 WO HCPCS SELF ADMINISTERED DRUGS (ALT 637 FOR MEDICARE OP): Performed by: STUDENT IN AN ORGANIZED HEALTH CARE EDUCATION/TRAINING PROGRAM

## 2024-09-12 PROCEDURE — 99232 SBSQ HOSP IP/OBS MODERATE 35: CPT | Performed by: STUDENT IN AN ORGANIZED HEALTH CARE EDUCATION/TRAINING PROGRAM

## 2024-09-12 RX ORDER — ACETAMINOPHEN 325 MG/1
650 TABLET ORAL 4 TIMES DAILY
Status: DISCONTINUED | OUTPATIENT
Start: 2024-09-12 | End: 2024-09-13

## 2024-09-12 ASSESSMENT — COGNITIVE AND FUNCTIONAL STATUS - GENERAL
MOBILITY SCORE: 24
DAILY ACTIVITIY SCORE: 24
MOBILITY SCORE: 24
DAILY ACTIVITIY SCORE: 24

## 2024-09-12 ASSESSMENT — PAIN - FUNCTIONAL ASSESSMENT
PAIN_FUNCTIONAL_ASSESSMENT: 0-10

## 2024-09-12 ASSESSMENT — PAIN SCALES - GENERAL
PAINLEVEL_OUTOF10: 2
PAINLEVEL_OUTOF10: 4
PAINLEVEL_OUTOF10: 7
PAINLEVEL_OUTOF10: 5 - MODERATE PAIN
PAINLEVEL_OUTOF10: 6
PAINLEVEL_OUTOF10: 4
PAINLEVEL_OUTOF10: 7

## 2024-09-12 ASSESSMENT — PAIN DESCRIPTION - LOCATION
LOCATION: HEAD
LOCATION: HEAD

## 2024-09-12 NOTE — CARE PLAN
The patient's goals for the shift include no pain    The clinical goals for the shift include patients pain will be tolerable throughout shift    Over the shift, the patient did not make progress toward the following goals. Barriers to progression include; none. Recommendations to address these barriers include; continue current plan of care.      Problem: Pain - Adult  Goal: Verbalizes/displays adequate comfort level or baseline comfort level  Outcome: Progressing  Flowsheets (Taken 9/12/2024 1012)  Verbalizes/displays adequate comfort level or baseline comfort level:   Encourage patient to monitor pain and request assistance   Administer analgesics based on type and severity of pain and evaluate response   Consider cultural and social influences on pain and pain management   Assess pain using appropriate pain scale   Implement non-pharmacological measures as appropriate and evaluate response   Notify Licensed Independent Practitioner if interventions unsuccessful or patient reports new pain     Problem: Chronic Conditions and Co-morbidities  Goal: Patient's chronic conditions and co-morbidity symptoms are monitored and maintained or improved  Outcome: Progressing  Flowsheets (Taken 9/12/2024 1012)  Care Plan - Patient's Chronic Conditions and Co-Morbidity Symptoms are Monitored and Maintained or Improved:   Monitor and assess patient's chronic conditions and comorbid symptoms for stability, deterioration, or improvement   Collaborate with multidisciplinary team to address chronic and comorbid conditions and prevent exacerbation or deterioration   Update acute care plan with appropriate goals if chronic or comorbid symptoms are exacerbated and prevent overall improvement and discharge        79

## 2024-09-12 NOTE — PROGRESS NOTES
"Jovon Mccartney is a 49 y.o. male on day 2 of admission presenting with Lesion of parietal bone.    Subjective   Patient seen and examined.  Patient says that the oxycodone is not really helping the medication that are helping with his pain are Tylenol and ibuprofen I would like them to be standing if possible.     Objective     Physical Exam  Gen: NAD, appears stated age  HEENT: EOM, MMM  CV: RRR, no murmurs rubs or gallops  Resp: Clear to auscultation bilaterally, normal effort  Abdomen: soft, NT,+BS  LE: No edema, no deformity  Neuro: A&Ox4, moving all extremities  Last Recorded Vitals  Blood pressure 129/68, pulse 87, temperature 36.7 °C (98.1 °F), resp. rate 16, height 1.676 m (5' 6\"), weight 58.5 kg (129 lb), SpO2 95%.  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

## 2024-09-12 NOTE — CARE PLAN
The patient's goals for the shift include Pain control and rest    The clinical goals for the shift include Patient pain will be well-controlled throughout this shift    Problem: Pain - Adult  Goal: Verbalizes/displays adequate comfort level or baseline comfort level  Outcome: Progressing  Flowsheets (Taken 9/11/2024 2217)  Verbalizes/displays adequate comfort level or baseline comfort level:   Assess pain using appropriate pain scale   Administer analgesics based on type and severity of pain and evaluate response   Implement non-pharmacological measures as appropriate and evaluate response     Problem: Safety - Adult  Goal: Free from fall injury  Outcome: Progressing  Flowsheets (Taken 9/11/2024 2217)  Free from fall injury: Based on caregiver fall risk screen, instruct family/caregiver to ask for assistance with transferring infant if caregiver noted to have fall risk factors

## 2024-09-13 PROCEDURE — 1210000001 HC SEMI-PRIVATE ROOM DAILY

## 2024-09-13 PROCEDURE — 2500000001 HC RX 250 WO HCPCS SELF ADMINISTERED DRUGS (ALT 637 FOR MEDICARE OP): Performed by: STUDENT IN AN ORGANIZED HEALTH CARE EDUCATION/TRAINING PROGRAM

## 2024-09-13 PROCEDURE — 2500000001 HC RX 250 WO HCPCS SELF ADMINISTERED DRUGS (ALT 637 FOR MEDICARE OP): Performed by: NURSE PRACTITIONER

## 2024-09-13 PROCEDURE — 2500000004 HC RX 250 GENERAL PHARMACY W/ HCPCS (ALT 636 FOR OP/ED): Performed by: STUDENT IN AN ORGANIZED HEALTH CARE EDUCATION/TRAINING PROGRAM

## 2024-09-13 PROCEDURE — 99232 SBSQ HOSP IP/OBS MODERATE 35: CPT | Performed by: STUDENT IN AN ORGANIZED HEALTH CARE EDUCATION/TRAINING PROGRAM

## 2024-09-13 RX ORDER — IBUPROFEN 800 MG/1
800 TABLET ORAL EVERY 8 HOURS SCHEDULED
Status: DISCONTINUED | OUTPATIENT
Start: 2024-09-13 | End: 2024-09-14 | Stop reason: HOSPADM

## 2024-09-13 RX ORDER — ACETAMINOPHEN 325 MG/1
975 TABLET ORAL 4 TIMES DAILY
Status: DISCONTINUED | OUTPATIENT
Start: 2024-09-13 | End: 2024-09-14 | Stop reason: HOSPADM

## 2024-09-13 RX ORDER — IBUPROFEN 800 MG/1
800 TABLET ORAL EVERY 8 HOURS PRN
Status: DISCONTINUED | OUTPATIENT
Start: 2024-09-13 | End: 2024-09-13

## 2024-09-13 ASSESSMENT — PAIN - FUNCTIONAL ASSESSMENT
PAIN_FUNCTIONAL_ASSESSMENT: 0-10

## 2024-09-13 ASSESSMENT — PAIN SCALES - GENERAL
PAINLEVEL_OUTOF10: 6
PAINLEVEL_OUTOF10: 4
PAINLEVEL_OUTOF10: 7
PAINLEVEL_OUTOF10: 4
PAINLEVEL_OUTOF10: 5 - MODERATE PAIN
PAINLEVEL_OUTOF10: 3
PAINLEVEL_OUTOF10: 2

## 2024-09-13 ASSESSMENT — COGNITIVE AND FUNCTIONAL STATUS - GENERAL
MOBILITY SCORE: 24
DAILY ACTIVITIY SCORE: 24

## 2024-09-13 ASSESSMENT — PAIN DESCRIPTION - LOCATION: LOCATION: HEAD

## 2024-09-13 NOTE — CARE PLAN
The patient's goals for the shift include no pain    The clinical goals for the shift include Pts ppain will be tolerable throughout this shift.    Over the shift, the patient did not make progress toward the following goals. Barriers to progression include . Recommendations to address these barriers include .

## 2024-09-13 NOTE — CARE PLAN
The patient's goals for the shift include Pt wants to rest/sleep during this shift.    The clinical goals for the shift include patients pain will be tolerable throughout shift

## 2024-09-13 NOTE — PROGRESS NOTES
"Jovon Mccartney is a 49 y.o. male on day 3 of admission presenting with Lesion of parietal bone.    Subjective   Patient seen and examined.  Patient says that he is making Tylenol standing help, he was on buprenorphine to be metastatic as well, wants to take a look at his image findings.  Oxycodone is not working for him  Objective     Physical Exam  Gen: NAD, appears stated age  HEENT: EOM, MMM  CV: RRR, no murmurs rubs or gallops  Resp: Clear to auscultation bilaterally, normal effort  Abdomen: soft, NT,+BS  LE: No edema, no deformity  Neuro: A&Ox4, moving all extremities  Last Recorded Vitals  Blood pressure 130/71, pulse 76, temperature 36.4 °C (97.5 °F), resp. rate 20, height 1.676 m (5' 6\"), weight 58.5 kg (129 lb), SpO2 92%.  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, intractable pain    CT head and cervical spine showed lytic lesions  CT abdomen chest and pelvis showed a bronchogenic mass with lymphadenopathy which is most likely the primary  Patient is awaiting transfer to [facility]  Will be transferred as soon as a bed becomes available  Continues to complain of pain,   Make Tylenol and ibuprofen as standing doses also on oxycodone as needed, continue Lyrica   Patient is still here we will consult pulmonology for the bronchogenic mass  Continue rest of the medical management as he is on  DVT prophylaxis           Yves Rowell MD      "

## 2024-09-13 NOTE — CONSULTS
Reason For Consult  Evaluation of abnormal CT scan of the chest, bullous emphysema, COPD and nicotine dependency     Chief Complaint: Headache     Jovon Mccartney is a 49 y.o. male presenting with headache and neck pain.  Patient states his symptoms have been present for the last 2 months and have been gradually worsening.  Today his headache went up to a 10 out of 10 pain and he decided to come to the ER.  He denies any nausea or vomiting.  He does endorse intermittent tingling and radiating pain down his right and left arm.  Denies any muscle weakness.     ER Course: /87, HR 99, RR 18, T36.5.  Labs essentially unremarkable aside from WBC of 13.2 and hemoglobin of 13.1.  Urinalysis negative. MRI brain/C-spine showed ill-defined enhancing lesion within right parietal occipital bone with dural thickening and mass effect upon right occipital lobe without midline shift as well as ill-defined marrow replacing lesions within C2 and C3 vertebral bodies, constellation of findings suggestive of metastatic disease or multiple myeloma.  [facility] was contacted and patient was accepted for transfer however is pending bed availability.    I was asked to evaluate and follow from a pulmonary perspective.  Patient apparently smokes 1 to 2 pack a day most of his life and lately cut down to 1 pack a day.  He does not have any known COPD.  He denies any history of known COPD.  He denies significant exertional dyspnea chronic cough or sputum production wheezing or hemoptysis.  He comes mainly with headache pain over the left side of the scalp and tingling and radiation of the pain is down his right and left arm.     ROS  10 point review of systems negative except per HPI      Past Medical History  He has no past medical history on file.     Surgical History  He has no past surgical history on file.     Social History  He reports that he has been smoking cigarettes. He has never used smokeless tobacco. No history on file for alcohol use  and drug use.     Family History  Family History   No family history on file.        Allergies:  Patient has no known allergies.      Last Recorded Vitals  /75   Pulse 81   Temp 36.9 °C (98.4 °F) (Temporal)   Resp 18   Wt 58.5 kg (129 lb)   SpO2 95%      Last I/O:  No intake/output data recorded.     Physical Exam   Gen: NAD, appears stated age  HEENT: EOM, MMM  CV: RRR, no murmurs rubs or gallops  Resp: Clear to auscultation bilaterally, normal effort  Abdomen: soft, NT,+BS  LE: No edema, no deformity  Neuro: A&Ox4, moving all extremities        Relevant Results        Labs Reviewed   CBC WITH AUTO DIFFERENTIAL - Abnormal       Result Value      WBC 13.2 (*)       nRBC 0.0        RBC 4.10 (*)       Hemoglobin 13.1 (*)       Hematocrit 37.4 (*)       MCV 91        MCH 32.0        MCHC 35.0        RDW 14.6 (*)       Platelets 324        Neutrophils % 79.4        Immature Granulocytes %, Automated 0.6        Lymphocytes % 13.9        Monocytes % 5.5        Eosinophils % 0.1        Basophils % 0.5        Neutrophils Absolute 10.50 (*)       Immature Granulocytes Absolute, Automated 0.08        Lymphocytes Absolute 1.83        Monocytes Absolute 0.73        Eosinophils Absolute 0.01        Basophils Absolute 0.06      COMPREHENSIVE METABOLIC PANEL - Abnormal     Glucose 128 (*)       Sodium 134 (*)       Potassium 3.6        Chloride 99        Bicarbonate 26        Anion Gap 13        Urea Nitrogen 16        Creatinine 0.55        eGFR >90        Calcium 9.6        Albumin 4.0        Alkaline Phosphatase 100        Total Protein 7.7        AST 11        Bilirubin, Total 0.6        ALT 12      SEDIMENTATION RATE, AUTOMATED - Abnormal     Sedimentation Rate 18 (*)     LIPASE - Normal     Lipase 36        Narrative:      Venipuncture immediately after or during the administration of Metamizole may lead to falsely low results. Testing should be performed immediately prior to Metamizole dosing.   LACTATE - Normal      Lactate 1.5        Narrative:      Venipuncture immediately after or during the administration of Metamizole may lead to falsely low results. Testing should be performed immediately  prior to Metamizole dosing.   SARS-COV-2 PCR - Normal     Coronavirus 2019, PCR Not Detected        Narrative:      This assay has received FDA Emergency Use Authorization (EUA) and is only authorized for the duration of time that circumstances exist to justify the authorization of the emergency use of in vitro diagnostic tests for the detection of SARS-CoV-2 virus and/or diagnosis of COVID-19 infection under section 564(b)(1) of the Act, 21 U.S.C. 360bbb-3(b)(1). This assay is an in vitro diagnostic nucleic acid amplification test for the qualitative detection of SARS-CoV-2 from nasopharyngeal specimens and has been validated for use at [facility]. Negative results do not preclude COVID-19 infections and should not be used as the sole basis for diagnosis, treatment, or other management decisions.      URINALYSIS WITH REFLEX CULTURE AND MICROSCOPIC - Normal     Color, Urine Yellow        Appearance, Urine Clear        Specific Gravity, Urine 1.022        pH, Urine 6.5        Protein, Urine NEGATIVE        Glucose, Urine Normal        Blood, Urine NEGATIVE        Ketones, Urine NEGATIVE        Bilirubin, Urine NEGATIVE        Urobilinogen, Urine Normal        Nitrite, Urine NEGATIVE        Leukocyte Esterase, Urine NEGATIVE      URINALYSIS WITH REFLEX CULTURE AND MICROSCOPIC     Narrative:      The following orders were created for panel order Urinalysis with Reflex Culture and Microscopic.  Procedure                               Abnormality         Status                     ---------                               -----------         ------                     Urinalysis with Reflex C...[[id number]]  Normal              Final result               Extra Urine Gray Tube[[id number]]                             Final result                  Please view results for these tests on the individual orders.   EXTRA URINE GRAY TUBE     Extra Tube Hold for add-ons.      LYME DISEASE (BORRELIA BURGDORFERI), PCR      MR brain w and wo IV contrast   Final Result   MRI brain:        1. There is an ill-defined enhancing lesion centered within the right   parieto-occipital bone, concerning for multiple myeloma or metastatic   disease. There is abutment and anterior displacement of the   underlying dura with mass effect upon the right occipital lobe. There   is no midline shift. There is thickening and enhancement of the   underlying and surrounding dura, pachymeningeal involvement can not   be entirely excluded. Otherwise, no evidence of abnormal   intraparenchymal metastatic disease to suggest neoplastic involvement.   2. Nonspecific moderate supratentorial and infratentorial white   matter signal abnormalities. Differential considerations include   demyelination or chronic microvascular ischemic disease amongst   others. Note, however this pattern is not specific for demyelination.        MRI cervical spine:        1. Ill-defined marrow replacing lesions within the C2 and C3   vertebral bodies corresponding to the lytic foci seen on the same-day   CT cervical spine. The constellation of findings are suggestive of   metastatic disease or multiple myeloma. There is no evidence of an   extraosseous soft tissue component.   2. Multilevel degenerative changes of the cervical spine.        I personally reviewed the images/study and I agree with the findings   as stated by Resident Niki Osorio. This study was interpreted at   [facility], [place], [place].        MACRO:   None        Signed by: Melinda Bernstein 9/9/2024 4:05 PM   Dictation workstation:   [id number]       MR cervical spine w and wo IV contrast   Final Result   MRI brain:        1. There is an ill-defined enhancing lesion centered within the  right   parieto-occipital bone, concerning for multiple myeloma or metastatic   disease. There is abutment and anterior displacement of the   underlying dura with mass effect upon the right occipital lobe. There   is no midline shift. There is thickening and enhancement of the   underlying and surrounding dura, pachymeningeal involvement can not   be entirely excluded. Otherwise, no evidence of abnormal   intraparenchymal metastatic disease to suggest neoplastic involvement.   2. Nonspecific moderate supratentorial and infratentorial white   matter signal abnormalities. Differential considerations include   demyelination or chronic microvascular ischemic disease amongst   others. Note, however this pattern is not specific for demyelination.        MRI cervical spine:        1. Ill-defined marrow replacing lesions within the C2 and C3   vertebral bodies corresponding to the lytic foci seen on the same-day   CT cervical spine. The constellation of findings are suggestive of   metastatic disease or multiple myeloma. There is no evidence of an   extraosseous soft tissue component.   2. Multilevel degenerative changes of the cervical spine.        I personally reviewed the images/study and I agree with the findings   as stated by Resident Niki Osorio. This study was interpreted at   [facility], [place], [place].        MACRO:   None        Signed by: Melinda Bernstein 9/9/2024 4:05 PM   Dictation workstation:   [id number]       CT head wo IV contrast   Final Result   No evidence of an acute intracranial process. Nonspecific white   matter disease. Lytic lesion involving the right parietal bone which   is nonspecific but malignancy is to be excluded (for example,   multiple myeloma or metastatic disease).        MACRO:   None.        Signed by: Brent Julian 9/9/2024 10:20 AM   Dictation workstation:   [id number]       CT cervical spine wo IV contrast   Final Result   No evidence of an acute  fracture. Degenerative changes. Lucent   lesions within the spine which are suspicious for multiple myeloma or   metastatic disease. Emphysematous changes.        #.  Abnormal CT scan of the chest suggestive of possible lung mass in the mediastinum not well-seen on noncontrast CT.  #. Severe bullous/paraseptal emphysema/COPD without acute exacerbation.  #.  Parietal lytic bone lesion  #.  Multiple cervical lytic lesions  #.  Intractable headache  #.  Nicotine dependency  -MRI brain/C-spine showed ill-defined enhancing lesion within right parietal occipital bone with dural thickening and mass effect upon right occipital lobe without midline shift as well as ill-defined marrow replacing lesions within C2 and C3 vertebral bodies, constellation of findings suggestive of metastatic disease or multiple myeloma  -He is mildly anemic however this dates back to 2019, no evidence of renal impairment or hypercalcemia  -Patient has been accepted for transfer by general medicine at [facility] however is pending bed availability  -Will initiate workup with CT C/A/P to evaluate for further lytic lesions or evidence of metastatic disease  -Will also send SPEP, UPEP, serum FLC, quantitative immunoglobulins  -If prolonged transfer time, could consider reaching out to IR to see if they are able to perform tissue biopsy     VTE PPX: Low risk, not indicated     Pulmonary comments:-Patient seen, obtained further history from patient, chart reviewed, various labs x-rays and CT/MRI imaging were also reviewed.  Also reviewed CT chest as well as head CT with [facility] radiologist Dr. Mendoza.  Patient has mediastinal fullness suggestive of malignancy although it is not very clear due to non-contrast nature of the study.  There is no definite endobronchial lesion seen on the CT scan of the chest.  Patient will need repeat imaging after transfer to [facility] and might need an EBUS bronchoscopy which can be done at [facility] as well.  Patient has been  counseled to quit smoking cigarettes and educated on COPD.  ATS pamphlets on COPD and inhaler use were given.  At this time patient is not sufficiently symptomatic for COPD treatment.  Agree with transfer to [facility] as a skull biopsy/cervical spine biopsy cannot be done by our radiologist here.    I spent 55 minutes in the professional and overall care of this patient.      Alexei Lee MD

## 2024-09-14 ENCOUNTER — HOSPITAL ENCOUNTER (INPATIENT)
Facility: HOSPITAL | Age: 49
DRG: 181 | End: 2024-09-14
Attending: STUDENT IN AN ORGANIZED HEALTH CARE EDUCATION/TRAINING PROGRAM | Admitting: INTERNAL MEDICINE
Payer: COMMERCIAL

## 2024-09-14 ENCOUNTER — APPOINTMENT (OUTPATIENT)
Dept: RADIOLOGY | Facility: HOSPITAL | Age: 49
DRG: 181 | End: 2024-09-14
Payer: COMMERCIAL

## 2024-09-14 ENCOUNTER — APPOINTMENT (OUTPATIENT)
Dept: CARDIOLOGY | Facility: HOSPITAL | Age: 49
DRG: 181 | End: 2024-09-14
Payer: COMMERCIAL

## 2024-09-14 VITALS
RESPIRATION RATE: 18 BRPM | OXYGEN SATURATION: 96 % | TEMPERATURE: 97.9 F | WEIGHT: 129 LBS | BODY MASS INDEX: 20.73 KG/M2 | HEIGHT: 66 IN | HEART RATE: 81 BPM | DIASTOLIC BLOOD PRESSURE: 82 MMHG | SYSTOLIC BLOOD PRESSURE: 128 MMHG

## 2024-09-14 DIAGNOSIS — C79.31 LUNG CANCER METASTATIC TO BRAIN (MULTI): Primary | ICD-10-CM

## 2024-09-14 DIAGNOSIS — M08.00 JUVENILE RHEUMATOID ARTHRITIS (MULTI): ICD-10-CM

## 2024-09-14 DIAGNOSIS — M89.9 LESION OF BONE OF CERVICAL SPINE: ICD-10-CM

## 2024-09-14 DIAGNOSIS — Z79.620 ADALIMUMAB (HUMIRA) LONG-TERM USE: ICD-10-CM

## 2024-09-14 DIAGNOSIS — C34.90 LUNG CANCER METASTATIC TO BRAIN (MULTI): Primary | ICD-10-CM

## 2024-09-14 LAB
ABO GROUP (TYPE) IN BLOOD: NORMAL
ALBUMIN MFR UR ELPH: 22.2 %
ALBUMIN SERPL BCP-MCNC: 4.2 G/DL (ref 3.4–5)
ALBUMIN: 3.4 G/DL (ref 3.4–5)
ALP SERPL-CCNC: 114 U/L (ref 33–120)
ALPHA 1 GLOBULIN: 0.4 G/DL (ref 0.2–0.6)
ALPHA 2 GLOBULIN: 0.8 G/DL (ref 0.4–1.1)
ALPHA1 GLOB MFR UR ELPH: 15.4 %
ALPHA2 GLOB MFR UR ELPH: 24.8 %
ALT SERPL W P-5'-P-CCNC: 14 U/L (ref 10–52)
ANION GAP SERPL CALC-SCNC: 12 MMOL/L (ref 10–20)
ANTIBODY SCREEN: NORMAL
APTT PPP: 29 SECONDS (ref 27–38)
AST SERPL W P-5'-P-CCNC: 13 U/L (ref 9–39)
B-GLOBULIN MFR UR ELPH: 18.8 %
BASOPHILS # BLD AUTO: 0.1 X10*3/UL (ref 0–0.1)
BASOPHILS NFR BLD AUTO: 1.1 %
BETA GLOBULIN: 0.9 G/DL (ref 0.5–1.2)
BILIRUB DIRECT SERPL-MCNC: 0.1 MG/DL (ref 0–0.3)
BILIRUB SERPL-MCNC: 0.7 MG/DL (ref 0–1.2)
BUN SERPL-MCNC: 20 MG/DL (ref 6–23)
CALCIUM SERPL-MCNC: 9.5 MG/DL (ref 8.6–10.6)
CHLORIDE SERPL-SCNC: 100 MMOL/L (ref 98–107)
CO2 SERPL-SCNC: 25 MMOL/L (ref 21–32)
CREAT SERPL-MCNC: 0.46 MG/DL (ref 0.5–1.3)
EGFRCR SERPLBLD CKD-EPI 2021: >90 ML/MIN/1.73M*2
EOSINOPHIL # BLD AUTO: 0.13 X10*3/UL (ref 0–0.7)
EOSINOPHIL NFR BLD AUTO: 1.4 %
ERYTHROCYTE [DISTWIDTH] IN BLOOD BY AUTOMATED COUNT: 14.4 % (ref 11.5–14.5)
GAMMA GLOB MFR UR ELPH: 18.8 %
GAMMA GLOBULIN: 1.2 G/DL (ref 0.5–1.4)
GLUCOSE SERPL-MCNC: 89 MG/DL (ref 74–99)
HCT VFR BLD AUTO: 35.4 % (ref 41–52)
HGB BLD-MCNC: 12.2 G/DL (ref 13.5–17.5)
IMM GRANULOCYTES # BLD AUTO: 0.02 X10*3/UL (ref 0–0.7)
IMM GRANULOCYTES NFR BLD AUTO: 0.2 % (ref 0–0.9)
IMMUNOFIXATION COMMENT: NORMAL
INR PPP: 1 (ref 0.9–1.1)
LYMPHOCYTES # BLD AUTO: 2.98 X10*3/UL (ref 1.2–4.8)
LYMPHOCYTES NFR BLD AUTO: 32.6 %
MAGNESIUM SERPL-MCNC: 2.19 MG/DL (ref 1.6–2.4)
MCH RBC QN AUTO: 31.2 PG (ref 26–34)
MCHC RBC AUTO-ENTMCNC: 34.5 G/DL (ref 32–36)
MCV RBC AUTO: 91 FL (ref 80–100)
MONOCYTES # BLD AUTO: 0.91 X10*3/UL (ref 0.1–1)
MONOCYTES NFR BLD AUTO: 9.9 %
NEUTROPHILS # BLD AUTO: 5.01 X10*3/UL (ref 1.2–7.7)
NEUTROPHILS NFR BLD AUTO: 54.8 %
NRBC BLD-RTO: 0 /100 WBCS (ref 0–0)
PATH REVIEW - SERUM IMMUNOFIXATION: NORMAL
PATH REVIEW-SERUM PROTEIN ELECTROPHORESIS: NORMAL
PATH REVIEW-URINE PROTEIN ELECTROPHORESIS: NORMAL
PHOSPHATE SERPL-MCNC: 4.2 MG/DL (ref 2.5–4.9)
PLATELET # BLD AUTO: 257 X10*3/UL (ref 150–450)
POTASSIUM SERPL-SCNC: 4.3 MMOL/L (ref 3.5–5.3)
PROT SERPL-MCNC: 7.6 G/DL (ref 6.4–8.2)
PROTEIN ELECTROPHORESIS COMMENT: NORMAL
PROTHROMBIN TIME: 11.6 SECONDS (ref 9.8–12.8)
RBC # BLD AUTO: 3.91 X10*6/UL (ref 4.5–5.9)
RH FACTOR (ANTIGEN D): NORMAL
SODIUM SERPL-SCNC: 133 MMOL/L (ref 136–145)
URINE ELECTROPHORESIS COMMENT: NORMAL
WBC # BLD AUTO: 9.2 X10*3/UL (ref 4.4–11.3)

## 2024-09-14 PROCEDURE — 85025 COMPLETE CBC W/AUTO DIFF WBC: CPT

## 2024-09-14 PROCEDURE — 99222 1ST HOSP IP/OBS MODERATE 55: CPT | Performed by: NEUROLOGICAL SURGERY

## 2024-09-14 PROCEDURE — 80053 COMPREHEN METABOLIC PANEL: CPT

## 2024-09-14 PROCEDURE — 82248 BILIRUBIN DIRECT: CPT

## 2024-09-14 PROCEDURE — 2500000001 HC RX 250 WO HCPCS SELF ADMINISTERED DRUGS (ALT 637 FOR MEDICARE OP): Performed by: STUDENT IN AN ORGANIZED HEALTH CARE EDUCATION/TRAINING PROGRAM

## 2024-09-14 PROCEDURE — 36415 COLL VENOUS BLD VENIPUNCTURE: CPT

## 2024-09-14 PROCEDURE — 93005 ELECTROCARDIOGRAM TRACING: CPT

## 2024-09-14 PROCEDURE — 2500000004 HC RX 250 GENERAL PHARMACY W/ HCPCS (ALT 636 FOR OP/ED): Performed by: STUDENT IN AN ORGANIZED HEALTH CARE EDUCATION/TRAINING PROGRAM

## 2024-09-14 PROCEDURE — 86901 BLOOD TYPING SEROLOGIC RH(D): CPT

## 2024-09-14 PROCEDURE — 99222 1ST HOSP IP/OBS MODERATE 55: CPT | Performed by: INTERNAL MEDICINE

## 2024-09-14 PROCEDURE — 1210000001 HC SEMI-PRIVATE ROOM DAILY

## 2024-09-14 PROCEDURE — 99222 1ST HOSP IP/OBS MODERATE 55: CPT

## 2024-09-14 PROCEDURE — 2500000001 HC RX 250 WO HCPCS SELF ADMINISTERED DRUGS (ALT 637 FOR MEDICARE OP)

## 2024-09-14 PROCEDURE — 84100 ASSAY OF PHOSPHORUS: CPT

## 2024-09-14 PROCEDURE — 83735 ASSAY OF MAGNESIUM: CPT

## 2024-09-14 PROCEDURE — 2500000004 HC RX 250 GENERAL PHARMACY W/ HCPCS (ALT 636 FOR OP/ED)

## 2024-09-14 PROCEDURE — 85610 PROTHROMBIN TIME: CPT

## 2024-09-14 RX ORDER — IBUPROFEN 600 MG/1
600 TABLET ORAL EVERY 6 HOURS PRN
Status: DISCONTINUED | OUTPATIENT
Start: 2024-09-14 | End: 2024-09-15

## 2024-09-14 RX ORDER — POLYETHYLENE GLYCOL 3350 17 G/17G
17 POWDER, FOR SOLUTION ORAL DAILY
Status: DISCONTINUED | OUTPATIENT
Start: 2024-09-15 | End: 2024-09-15

## 2024-09-14 RX ORDER — ACETAMINOPHEN 325 MG/1
650 TABLET ORAL EVERY 6 HOURS PRN
Status: DISCONTINUED | OUTPATIENT
Start: 2024-09-14 | End: 2024-09-14

## 2024-09-14 RX ORDER — ENOXAPARIN SODIUM 100 MG/ML
40 INJECTION SUBCUTANEOUS EVERY 24 HOURS
Status: DISCONTINUED | OUTPATIENT
Start: 2024-09-14 | End: 2024-09-15

## 2024-09-14 RX ORDER — ACETAMINOPHEN 325 MG/1
975 TABLET ORAL EVERY 6 HOURS PRN
Status: DISCONTINUED | OUTPATIENT
Start: 2024-09-14 | End: 2024-09-15

## 2024-09-14 RX ORDER — KETOROLAC TROMETHAMINE 30 MG/ML
15 INJECTION, SOLUTION INTRAMUSCULAR; INTRAVENOUS EVERY 6 HOURS PRN
Status: DISCONTINUED | OUTPATIENT
Start: 2024-09-14 | End: 2024-09-14 | Stop reason: HOSPADM

## 2024-09-14 RX ADMIN — ENOXAPARIN SODIUM 40 MG: 100 INJECTION SUBCUTANEOUS at 16:24

## 2024-09-14 RX ADMIN — ACETAMINOPHEN 650 MG: 325 TABLET ORAL at 13:40

## 2024-09-14 RX ADMIN — ACETAMINOPHEN 650 MG: 325 TABLET ORAL at 20:06

## 2024-09-14 RX ADMIN — IBUPROFEN 600 MG: 600 TABLET, FILM COATED ORAL at 23:14

## 2024-09-14 RX ADMIN — IBUPROFEN 600 MG: 600 TABLET, FILM COATED ORAL at 16:24

## 2024-09-14 SDOH — SOCIAL STABILITY: SOCIAL INSECURITY: DO YOU FEEL ANYONE HAS EXPLOITED OR TAKEN ADVANTAGE OF YOU FINANCIALLY OR OF YOUR PERSONAL PROPERTY?: NO

## 2024-09-14 SDOH — SOCIAL STABILITY: SOCIAL INSECURITY: HAVE YOU HAD THOUGHTS OF HARMING ANYONE ELSE?: NO

## 2024-09-14 SDOH — SOCIAL STABILITY: SOCIAL INSECURITY: HAVE YOU HAD ANY THOUGHTS OF HARMING ANYONE ELSE?: NO

## 2024-09-14 SDOH — SOCIAL STABILITY: SOCIAL INSECURITY: HAS ANYONE EVER THREATENED TO HURT YOUR FAMILY OR YOUR PETS?: NO

## 2024-09-14 SDOH — SOCIAL STABILITY: SOCIAL INSECURITY: ABUSE: ADULT

## 2024-09-14 SDOH — SOCIAL STABILITY: SOCIAL INSECURITY: ARE YOU OR HAVE YOU BEEN THREATENED OR ABUSED PHYSICALLY, EMOTIONALLY, OR SEXUALLY BY ANYONE?: NO

## 2024-09-14 SDOH — SOCIAL STABILITY: SOCIAL INSECURITY: DOES ANYONE TRY TO KEEP YOU FROM HAVING/CONTACTING OTHER FRIENDS OR DOING THINGS OUTSIDE YOUR HOME?: NO

## 2024-09-14 SDOH — SOCIAL STABILITY: SOCIAL INSECURITY: DO YOU FEEL UNSAFE GOING BACK TO THE PLACE WHERE YOU ARE LIVING?: NO

## 2024-09-14 SDOH — SOCIAL STABILITY: SOCIAL INSECURITY: WERE YOU ABLE TO COMPLETE ALL THE BEHAVIORAL HEALTH SCREENINGS?: YES

## 2024-09-14 SDOH — SOCIAL STABILITY: SOCIAL INSECURITY: ARE THERE ANY APPARENT SIGNS OF INJURIES/BEHAVIORS THAT COULD BE RELATED TO ABUSE/NEGLECT?: NO

## 2024-09-14 ASSESSMENT — PAIN - FUNCTIONAL ASSESSMENT
PAIN_FUNCTIONAL_ASSESSMENT: 0-10

## 2024-09-14 ASSESSMENT — COGNITIVE AND FUNCTIONAL STATUS - GENERAL
DAILY ACTIVITIY SCORE: 24
DAILY ACTIVITIY SCORE: 24
MOBILITY SCORE: 24
DAILY ACTIVITIY SCORE: 24
PATIENT BASELINE BEDBOUND: NO
DAILY ACTIVITIY SCORE: 24
MOBILITY SCORE: 24

## 2024-09-14 ASSESSMENT — ENCOUNTER SYMPTOMS
FEVER: 0
CONSTIPATION: 1
NUMBNESS: 1
TREMORS: 0
POLYDIPSIA: 0
ABDOMINAL PAIN: 0
POLYPHAGIA: 0
SORE THROAT: 0
SEIZURES: 0
DIZZINESS: 0
RHINORRHEA: 0
WHEEZING: 0
COUGH: 1
LIGHT-HEADEDNESS: 0
NECK PAIN: 1
APPETITE CHANGE: 0
PALPITATIONS: 0
ABDOMINAL DISTENTION: 0
DIARRHEA: 0
ARTHRALGIAS: 0
SHORTNESS OF BREATH: 0
EYE PAIN: 0
CHILLS: 0
FATIGUE: 0
NECK STIFFNESS: 0
DYSURIA: 0
PHOTOPHOBIA: 1
VOMITING: 0

## 2024-09-14 ASSESSMENT — PAIN SCALES - PAIN ASSESSMENT IN ADVANCED DEMENTIA (PAINAD): TOTALSCORE: MEDICATION (SEE MAR)

## 2024-09-14 ASSESSMENT — COLUMBIA-SUICIDE SEVERITY RATING SCALE - C-SSRS
6. HAVE YOU EVER DONE ANYTHING, STARTED TO DO ANYTHING, OR PREPARED TO DO ANYTHING TO END YOUR LIFE?: NO
1. IN THE PAST MONTH, HAVE YOU WISHED YOU WERE DEAD OR WISHED YOU COULD GO TO SLEEP AND NOT WAKE UP?: NO
2. HAVE YOU ACTUALLY HAD ANY THOUGHTS OF KILLING YOURSELF?: NO

## 2024-09-14 ASSESSMENT — PATIENT HEALTH QUESTIONNAIRE - PHQ9
1. LITTLE INTEREST OR PLEASURE IN DOING THINGS: NOT AT ALL
2. FEELING DOWN, DEPRESSED OR HOPELESS: NOT AT ALL
SUM OF ALL RESPONSES TO PHQ9 QUESTIONS 1 & 2: 0

## 2024-09-14 ASSESSMENT — ACTIVITIES OF DAILY LIVING (ADL)
BATHING: INDEPENDENT
WALKS IN HOME: INDEPENDENT
ASSISTIVE_DEVICE: BRACE LLE;BRACE RLE
HEARING - LEFT EAR: FUNCTIONAL
FEEDING YOURSELF: INDEPENDENT
PATIENT'S MEMORY ADEQUATE TO SAFELY COMPLETE DAILY ACTIVITIES?: YES
HEARING - RIGHT EAR: FUNCTIONAL
JUDGMENT_ADEQUATE_SAFELY_COMPLETE_DAILY_ACTIVITIES: YES
TOILETING: INDEPENDENT
DRESSING YOURSELF: INDEPENDENT
GROOMING: INDEPENDENT
LACK_OF_TRANSPORTATION: NO
ADEQUATE_TO_COMPLETE_ADL: YES

## 2024-09-14 ASSESSMENT — PAIN SCALES - GENERAL
PAINLEVEL_OUTOF10: 5 - MODERATE PAIN
PAINLEVEL_OUTOF10: 8
PAINLEVEL_OUTOF10: 2
PAINLEVEL_OUTOF10: 7
PAINLEVEL_OUTOF10: 4
PAINLEVEL_OUTOF10: 7
PAINLEVEL_OUTOF10: 5 - MODERATE PAIN

## 2024-09-14 ASSESSMENT — PAIN DESCRIPTION - LOCATION
LOCATION: HEAD

## 2024-09-14 ASSESSMENT — PAIN DESCRIPTION - DESCRIPTORS: DESCRIPTORS: ACHING

## 2024-09-14 ASSESSMENT — LIFESTYLE VARIABLES
HOW OFTEN DO YOU HAVE 6 OR MORE DRINKS ON ONE OCCASION: NEVER
SKIP TO QUESTIONS 9-10: 1
HOW MANY STANDARD DRINKS CONTAINING ALCOHOL DO YOU HAVE ON A TYPICAL DAY: PATIENT DOES NOT DRINK
AUDIT-C TOTAL SCORE: 0
HOW OFTEN DO YOU HAVE A DRINK CONTAINING ALCOHOL: NEVER
AUDIT-C TOTAL SCORE: 0

## 2024-09-14 NOTE — H&P (VIEW-ONLY)
Reason For Consult  R-sided lung mass    History Of Present Illness  Mr. 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 Pulmonology consulted for findings on CT chest.    The patient initially had a headache for the past two months that is gradually worsening a/w tingling and radiating pains down his arms. He finally presented to the ED on 9/10, where MRI brain/c-spine showed ill-defined enhancing lesion within right parietal occipital bone with dural thickening and mass effect upon right occipital lobe without midline shift as well as ill-defined marrow replacing lesions within C2 and C3 vertebral bodies. CT C/A/P revealed possible bronchogenic mass w/ LAD. He was thus transferred to [facility] for further workup.    On interview, the patient is doing okay. He complains of a chronic nonproductive cough over the past couple years. He also confirms the presence of a headache. Denies fevers/chills, SOB, chest pain, TB exposures, special inhalational exposures. VS are stable. Labs are pending. Imaging as above.     39 pack-year smoker, quit this past week. On humira for JRA     Past Medical History  He has no past medical history on file.    Surgical History  He has no past surgical history on file.     Social History  He reports that he has been smoking cigarettes. He has never used smokeless tobacco. No history on file for alcohol use and drug use.  Smoking 39 pack-years    Family History  No family history on file.     Allergies  Patient has no known allergies.    Review of Systems  Please refer to HPI    Physical Exam  Gen: thin, alert and oriented  CV: no MRG  Pulm: CTA b/l, mild RLL insp crackles  Extremities: joint deformities  Neuro: AAOx3, no FNDs     Last Recorded Vitals  Blood pressure 129/70, pulse 85, temperature 37 °C (98.6 °F), SpO2  96%.    Relevant Results  CT CAP 9/10/2024: IMPRESSION:  There is evidence of stage IV malignancy.  Amorphous soft tissue  centered at the right hilum encompassing the right upper lobe bronchus  and inseparable from the right main pulmonary artery is suspicious for  bronchogenic carcinoma.  To confirm this impression, consider further  evaluation with PET CT and/or bronchoscopy.  There are mildly enlarged  mediastinal lymph nodes suspicious for metastatic disease.  Multiple  lytic lesions of the right scapula are likely metastatic.  There is  also likely involvement of each humeral head.  Spiculated opacities at each lung apex may represent scars.  These  could be further evaluated at the time of follow-up PET.  Bullous emphysema.  Moderate-sized pericardial effusion.  Nonspecific thickening of both adrenal glands suspicious for  metastatic disease.  There is a 3.5 x 3 x 1.6 cm fluid density mass centered in the lateral  aspect of the left gluteus lamin muscle.  This could simply be  sequela from a recent injection.  There is no internal air bubble.     MRI cervical spine 9/9/24:  1. Ill-defined marrow replacing lesions within the C2 and C3  vertebral bodies corresponding to the lytic foci seen on the same-day  CT cervical spine. The constellation of findings are suggestive of  metastatic disease or multiple myeloma. There is no evidence of an  extraosseous soft tissue component.  2. Multilevel degenerative changes of the cervical spine.    MRI brain 9/9/24:  1. There is an ill-defined enhancing lesion centered within the right  parieto-occipital bone, concerning for multiple myeloma or metastatic  disease. There is abutment and anterior displacement of the  underlying dura with mass effect upon the right occipital lobe. There  is no midline shift. There is thickening and enhancement of the  underlying and surrounding dura, pachymeningeal involvement can not  be entirely excluded. Otherwise, no evidence of  abnormal  intraparenchymal metastatic disease to suggest neoplastic involvement.  2. Nonspecific moderate supratentorial and infratentorial white  matter signal abnormalities. Differential considerations include  demyelination or chronic microvascular ischemic disease amongst  others. Note, however this pattern is not specific for demyelination.       Assessment/Plan     Mr. 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 Pulmonology consulted for findings on CT chest.    #R hilar LAD / Mass  Concerning for carcinoma, lymphoma. Still, multiple myeloma is in the differential and will await some of the pending workup; he has multiple bone lesions though they are not described to be lytic. Before pursuing bronchoscopy with biopsy, recommend PET scan.    UPEP wnl, SPEP wnl, K/L ratio wnl, Abs mostly normal    Recommendations:  - PET scan  - possible bronch after PET, pending results  - we will see the repeat CT chest w/ contrast    Discussed with attending physician, Dr. Biggs, who is in agreement with the plan.    Jaron Mims MD

## 2024-09-14 NOTE — CONSULTS
Date of Service:  9/14/2024 Attending Provider:  Toni Pickering MD MPH     Reason for Consultation:  Jovon Mccartney is being seen today for a consult requested by Toni Pickering MD MPH for bony lesions of skull and vertebral column.    Subjective   History of Present Illness:  Jovon is a 49 y.o. male with past medical history of Juvenile RA on Humera for 25 years who presented to [facility] on 9/9 with 10/10 headache and neck pain. Patient states that headache began 2 months ago as an area of focal tenderness in the R posterior region of the head. 1 month ago patient began having focal cervical neck pain with bilateral shooting pain to the level of his biceps. Patient also endorses paresthesias of the L forearm. Denies any significant extremity weakness or bowel/bladder incontinence.    MRI brain/C-spine showed ill-defined enhancing lesion within the R parieto-occipital bone and vertebral lesions of C2 and C3. Additional imaging included CT C/A/P showing possible bronchogenic mass. Differential includes bronchogenic carcinoma vs lymphoma vs multiple myeloma. Neurosurgery consulted upon admission to [facility].       Review of Systems   10 point ROS is obtained and negative except the ones mentioned in the HPI    Social History  He reports that he has been smoking cigarettes. He has never used smokeless tobacco. No history on file for alcohol use and drug use.    Medical History  JRA    Surgical History  No past surgical history on file.     Objective     Vitals:  Vitals:    09/14/24 1037   BP: 129/70   Pulse: 85   Temp: 37 °C (98.6 °F)   SpO2: 96%         Exam:  Constitutional: No acute distress, notable deformities of hands and feet  Resp: breathing comfortably  Cardio: well perfused  GI: nondistended  MSK: Range of motion mildly limited by deformity  Neuro:  Awake, A&Ox3  Cranial Nerves II-XII: PERRL, EOMI, Face symmetric, Facial SILT, Palate/Tongue midline and symmetric, shoulder shrugs symmetric, hearing  "intact to finger rubs bilaterally  Motor: 5/5, no dysmetria on finger to nose, no pronator drift  Sensation: SILT throughout all extremities  DTRS: No Hoffmans or Clonus  Psych: appropriate  Skin: no obvious lesions    Medications  No current outpatient medications     Diagnostic Results:    Lab Results   Component Value Date    WBC 13.2 (H) 09/09/2024    HGB 13.1 (L) 09/09/2024    HCT 37.4 (L) 09/09/2024    MCV 91 09/09/2024     09/09/2024     Lab Results   Component Value Date    CREATININE 0.55 09/09/2024    BUN 16 09/09/2024     (L) 09/09/2024    K 3.6 09/09/2024    CL 99 09/09/2024    CO2 26 09/09/2024     No results found for: \"INR\", \"PROTIME\"    Imaging Results:    No orders to display             Assessment/Plan   Assessment:  Jovon is a 49 y.o. male with past medical history of Juvenile RA on Humera for 25 years who presents with a bronchogenic mass and R parieto-occipital bone lesion and vertebral lesions of C2 and C3. Patient is currently neurologically intact.     Plan:  - Further recommendations pending biopsy of pulmonary mass  - Recommend med oncology and rad oncology consultation  - Please document RCRI and prognosis when able    Kehinde Terrazas MD    Note authored by resident on neurosurgery team, with all questions or to contact team please page at [phone]  Plan not finalized until note signed by attending    "

## 2024-09-14 NOTE — HOSPITAL COURSE
Jovon Mccartney is a 49-year-old male with PMHx of tobacco use (1-2 PPD/ and juvenile RA who presented to [place] ED on 9/9/24 with worsening headache and intermittent neck pain for 2 months, rated 10/10 at worst, radiating to both arms with left-hand tingling. Denies muscle weakness, fever, chills, nausea, cough, dyspnea, or hemoptysis. Recently saw PCP for this. Planned outpatient imaging was postponed due to worsening pain, leading to ED evaluation. Admitted to [place]. MRI brain/C-spine showed ill-defined enhancing lesion within right parietal occipital bone with dural thickening and mass effect upon right occipital lobe without midline shift as well as ill-defined marrow replacing lesions within C2 and C3 vertebral bodies, constellation of findings suggestive of metastatic disease or multiple myeloma. Evaluated by pulmonology, initiated workup with CT C/A/ that showed amorphous soft tissue centered at the right hilum encompassing the RUL bronchus and inseparable from the right main pulmonary artery suspicious for bronchogenic carcinoma, with mildly enlarged mediastinal lymph nodes, multiple lytic lesions of R scapula and bilateral humeral head. Pulmonologist noted that the mediastinal fullness was suggestive of malignancy although it was not very clear due to non-contrast nature of the study, and noted there was no definite endobronchial lesion seen on the CT chest. Thus pulmonology recommended repeat imaging after transfer to [facility] with possible EBUS bronchoscopy.    At [place] ED:  /87, HR 99, RR 18, T36.5. Labs essentially unremarkable aside from WBC of 13.2 (ANC 12.5) and hemoglobin of 13.1. Urinalysis negative. ESR 18 Lactate 1.5. UA WNL. Migraine cocktail including IV magnesium dexamethasone, Benadryl, Reglan and IV fluids were given.    Since then, he was transferred to [facility] for possible bronchoscopy versus PET scan. Heme-onc, neurosurgery, and pulmonology were consulted. A pain regimen was started  with Tylenol 975 mg 3 times a day, Toradol 15 mg every 6 hours as needed, and tramadol 15 mg every 6 hours as needed. Per pulmonology, a bronchoscopy with biopsy was also performed. Radiation oncology was also consulted. Patient continued to report the pain was not well-controlled by tramadol overnight, it was replaced by oxycodone 5 mg every 6 hours as needed. Will also held your Humira during your hospital stay. The biopsy yesterday was consistent with malignancy. Neurosurgery tumor board recommended no urgent radiation therapy, instead, you will have outpatient follow-up with possible palliative radiation therapy. Pulmonology recommended outpatient consultation to medical oncology. Supportive oncology recommended change of the pain regimen to Tylenol 975 mg 3 times daily, Toradol 50 mg every 6 hours as needed oxycodone 10 mg every 3 hour as needed and gabapentin 300 mg nightly. Upon discharge, pain control regimen will be gabapentin, oxycodone, acetaminophen, and ibuprofen.  Patient will be follow-up with rad oncology, pulmonology, rheumatology, and PCP as outpatient.

## 2024-09-14 NOTE — CONSULTS
Reason For Consult  R-sided lung mass    History Of Present Illness  Mr. 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 Pulmonology consulted for findings on CT chest.    The patient initially had a headache for the past two months that is gradually worsening a/w tingling and radiating pains down his arms. He finally presented to the ED on 9/10, where MRI brain/c-spine showed ill-defined enhancing lesion within right parietal occipital bone with dural thickening and mass effect upon right occipital lobe without midline shift as well as ill-defined marrow replacing lesions within C2 and C3 vertebral bodies. CT C/A/P revealed possible bronchogenic mass w/ LAD. He was thus transferred to [facility] for further workup.    On interview, the patient is doing okay. He complains of a chronic nonproductive cough over the past couple years. He also confirms the presence of a headache. Denies fevers/chills, SOB, chest pain, TB exposures, special inhalational exposures. VS are stable. Labs are pending. Imaging as above.     39 pack-year smoker, quit this past week. On humira for JRA     Past Medical History  He has no past medical history on file.    Surgical History  He has no past surgical history on file.     Social History  He reports that he has been smoking cigarettes. He has never used smokeless tobacco. No history on file for alcohol use and drug use.  Smoking 39 pack-years    Family History  No family history on file.     Allergies  Patient has no known allergies.    Review of Systems  Please refer to HPI    Physical Exam  Gen: thin, alert and oriented  CV: no MRG  Pulm: CTA b/l, mild RLL insp crackles  Extremities: joint deformities  Neuro: AAOx3, no FNDs     Last Recorded Vitals  Blood pressure 129/70, pulse 85, temperature 37 °C (98.6 °F), SpO2  96%.    Relevant Results  CT CAP 9/10/2024: IMPRESSION:  There is evidence of stage IV malignancy.  Amorphous soft tissue  centered at the right hilum encompassing the right upper lobe bronchus  and inseparable from the right main pulmonary artery is suspicious for  bronchogenic carcinoma.  To confirm this impression, consider further  evaluation with PET CT and/or bronchoscopy.  There are mildly enlarged  mediastinal lymph nodes suspicious for metastatic disease.  Multiple  lytic lesions of the right scapula are likely metastatic.  There is  also likely involvement of each humeral head.  Spiculated opacities at each lung apex may represent scars.  These  could be further evaluated at the time of follow-up PET.  Bullous emphysema.  Moderate-sized pericardial effusion.  Nonspecific thickening of both adrenal glands suspicious for  metastatic disease.  There is a 3.5 x 3 x 1.6 cm fluid density mass centered in the lateral  aspect of the left gluteus lamin muscle.  This could simply be  sequela from a recent injection.  There is no internal air bubble.     MRI cervical spine 9/9/24:  1. Ill-defined marrow replacing lesions within the C2 and C3  vertebral bodies corresponding to the lytic foci seen on the same-day  CT cervical spine. The constellation of findings are suggestive of  metastatic disease or multiple myeloma. There is no evidence of an  extraosseous soft tissue component.  2. Multilevel degenerative changes of the cervical spine.    MRI brain 9/9/24:  1. There is an ill-defined enhancing lesion centered within the right  parieto-occipital bone, concerning for multiple myeloma or metastatic  disease. There is abutment and anterior displacement of the  underlying dura with mass effect upon the right occipital lobe. There  is no midline shift. There is thickening and enhancement of the  underlying and surrounding dura, pachymeningeal involvement can not  be entirely excluded. Otherwise, no evidence of  abnormal  intraparenchymal metastatic disease to suggest neoplastic involvement.  2. Nonspecific moderate supratentorial and infratentorial white  matter signal abnormalities. Differential considerations include  demyelination or chronic microvascular ischemic disease amongst  others. Note, however this pattern is not specific for demyelination.       Assessment/Plan     Mr. 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 Pulmonology consulted for findings on CT chest.    #R hilar LAD / Mass  Concerning for carcinoma, lymphoma. Still, multiple myeloma is in the differential and will await some of the pending workup; he has multiple bone lesions though they are not described to be lytic. Before pursuing bronchoscopy with biopsy, recommend PET scan.    UPEP wnl, SPEP wnl, K/L ratio wnl, Abs mostly normal    Recommendations:  - PET scan  - possible bronch after PET, pending results  - we will see the repeat CT chest w/ contrast    Discussed with attending physician, Dr. Biggs, who is in agreement with the plan.    Jaron Mims MD

## 2024-09-14 NOTE — CARE PLAN
Problem: Pain - Adult  Goal: Verbalizes/displays adequate comfort level or baseline comfort level  9/14/2024 0921 by Doan Delgado RN  Outcome: Adequate for Discharge  9/14/2024 0833 by Dona Delgado RN  Flowsheets (Taken 9/14/2024 0833)  Verbalizes/displays adequate comfort level or baseline comfort level:   Encourage patient to monitor pain and request assistance   Assess pain using appropriate pain scale   Administer analgesics based on type and severity of pain and evaluate response   Implement non-pharmacological measures as appropriate and evaluate response   Consider cultural and social influences on pain and pain management   Notify Licensed Independent Practitioner if interventions unsuccessful or patient reports new pain     Problem: Safety - Adult  Goal: Free from fall injury  9/14/2024 0921 by Dona Delgado RN  Outcome: Adequate for Discharge  9/14/2024 0833 by Dona Delgado RN  Flowsheets (Taken 9/14/2024 0833)  Free from fall injury: Instruct family/caregiver on patient safety     Problem: Discharge Planning  Goal: Discharge to home or other facility with appropriate resources  9/14/2024 0921 by Dona Delgado RN  Outcome: Adequate for Discharge  9/14/2024 0833 by Dona Delgado RN  Flowsheets (Taken 9/14/2024 0833)  Discharge to home or other facility with appropriate resources:   Identify barriers to discharge with patient and caregiver   Arrange for needed discharge resources and transportation as appropriate   Identify discharge learning needs (meds, wound care, etc)   Refer to discharge planning if patient needs post-hospital services based on physician order or complex needs related to functional status, cognitive ability or social support system     Problem: Chronic Conditions and Co-morbidities  Goal: Patient's chronic conditions and co-morbidity symptoms are monitored and maintained or improved  9/14/2024 0921 by Dona Delgado RN  Outcome: Adequate for Discharge  9/14/2024  0833 by Dona Delgado RN  Flowsheets (Taken 9/14/2024 0833)  Care Plan - Patient's Chronic Conditions and Co-Morbidity Symptoms are Monitored and Maintained or Improved:   Monitor and assess patient's chronic conditions and comorbid symptoms for stability, deterioration, or improvement   Collaborate with multidisciplinary team to address chronic and comorbid conditions and prevent exacerbation or deterioration   Update acute care plan with appropriate goals if chronic or comorbid symptoms are exacerbated and prevent overall improvement and discharge     Problem: Pain  Goal: Takes deep breaths with improved pain control throughout the shift  Outcome: Adequate for Discharge  Goal: Turns in bed with improved pain control throughout the shift  Outcome: Adequate for Discharge  Goal: Walks with improved pain control throughout the shift  Outcome: Adequate for Discharge  Goal: Performs ADL's with improved pain control throughout shift  Outcome: Adequate for Discharge  Goal: Participates in PT with improved pain control throughout the shift  Outcome: Adequate for Discharge  Goal: Free from opioid side effects throughout the shift  Outcome: Adequate for Discharge  Goal: Free from acute confusion related to pain meds throughout the shift  Outcome: Adequate for Discharge     Problem: Fall/Injury  Goal: Not fall by end of shift  Outcome: Adequate for Discharge  Goal: Be free from injury by end of the shift  Outcome: Adequate for Discharge  Goal: Verbalize understanding of personal risk factors for fall in the hospital  Outcome: Adequate for Discharge  Goal: Verbalize understanding of risk factor reduction measures to prevent injury from fall in the home  Outcome: Adequate for Discharge  Goal: Use assistive devices by end of the shift  Outcome: Adequate for Discharge  Goal: Pace activities to prevent fatigue by end of the shift  Outcome: Adequate for Discharge   The patient's goals for the shift include no pain    The  clinical goals for the shift include Patient's pain will be managed to a tolerable level throughout this shift.    Over the shift, the patient did make progress adequate for transfer to [facility] toward care plan goals.

## 2024-09-14 NOTE — CARE PLAN
The patient's goals for the shift include no pain    The clinical goals for the shift include Safety    Problem: Pain - Adult  Goal: Verbalizes/displays adequate comfort level or baseline comfort level  Outcome: Progressing     Problem: Safety - Adult  Goal: Free from fall injury  Outcome: Progressing     Problem: Discharge Planning  Goal: Discharge to home or other facility with appropriate resources  Outcome: Progressing     Problem: Chronic Conditions and Co-morbidities  Goal: Patient's chronic conditions and co-morbidity symptoms are monitored and maintained or improved  Outcome: Progressing     Problem: Pain  Goal: Takes deep breaths with improved pain control throughout the shift  Outcome: Progressing  Goal: Turns in bed with improved pain control throughout the shift  Outcome: Progressing  Goal: Walks with improved pain control throughout the shift  Outcome: Progressing  Goal: Performs ADL's with improved pain control throughout shift  Outcome: Progressing  Goal: Participates in PT with improved pain control throughout the shift  Outcome: Progressing  Goal: Free from opioid side effects throughout the shift  Outcome: Progressing  Goal: Free from acute confusion related to pain meds throughout the shift  Outcome: Progressing     Problem: Fall/Injury  Goal: Not fall by end of shift  Outcome: Progressing  Goal: Be free from injury by end of the shift  Outcome: Progressing  Goal: Verbalize understanding of personal risk factors for fall in the hospital  Outcome: Progressing  Goal: Verbalize understanding of risk factor reduction measures to prevent injury from fall in the home  Outcome: Progressing  Goal: Use assistive devices by end of the shift  Outcome: Progressing  Goal: Pace activities to prevent fatigue by end of the shift  Outcome: Progressing

## 2024-09-14 NOTE — H&P
History Of Present Illness  Jovon Mccartney is a 49-year-old male with PMHx of tobacco use (1-2 PPD/ and juvenile RA (well controlled on humira) who presented to [facility] ED on 9/9/24 with worsening daily headache (pain level 10 out of 10) and intermittent neck pain for 2 months, radiating to both arms with left-hand numbness and tingling.  Patient reported his pain can be managed with Tylenol and ibuprofen.  He also complained photosensitivity secondary to headache, and intermittent shortness of breath.  Patient reported he has a chronic smoker's dry cough, and he denied hemoptysis.  He denied muscle weakness, night sweats, fever, chills, nausea, dyspnea, weight changes or hemoptysis. Recently saw PCP for this. Planned outpatient imaging was canceled due to worsening pain, instead, he came to the ED for evaluation.     Admitted to [facility]. MRI brain/C-spine showed ill-defined enhancing lesion within right parietal occipital bone with dural thickening and mass effect upon right occipital lobe without midline shift as well as ill-defined marrow replacing lesions within C2 and C3 vertebral bodies, constellation of findings suggestive of metastatic disease or multiple myeloma. Evaluated by pulmonology, initiated workup with CT C/A/ that showed amorphous soft tissue centered at the right hilum encompassing the RUL bronchus and inseparable from the right main pulmonary artery suspicious for bronchogenic carcinoma, with mildly enlarged mediastinal lymph nodes, multiple lytic lesions of R scapula and bilateral humeral head. Pulmonologist noted that the mediastinal fullness was suggestive of malignancy although it was not very clear due to non-contrast nature of the study, and noted there was no definite endobronchial lesion seen on the CT chest. Thus pulmonology recommended repeat imaging after transfer to [facility] with possible EBUS bronchoscopy.    At [facility] ED:  /87, HR 99, RR 18, T36.5. Labs essentially unremarkable aside  from WBC of 13.2 (ANC 12.5) and hemoglobin of 13.1. Urinalysis negative. ESR 18 Lactate 1.5. UA WNL. Migraine cocktail including IV magnesium dexamethasone, Benadryl, Reglan and IV fluids were given.     Past Medical History  Juvenile rheumatoid arthritis on Humira.    Surgical History  None      Social History  Patient smokes 1 pack a day for the past 39 years, drinks socially, and also uses medical marijuana depending on the pain level.  He declines nicotine patch.    Family History  Maternal grandmother has bone cancer, mother has breast cancer in remission, paternal grandfather had some kind of cancer patient does not recall, and his cousin also has bone cancer.     Allergies  NKDA    Review of Systems   Constitutional:  Negative for appetite change, chills, fatigue and fever.   HENT:  Negative for congestion, rhinorrhea and sore throat.    Eyes:  Positive for photophobia. Negative for pain and visual disturbance.   Respiratory:  Positive for cough. Negative for shortness of breath and wheezing.    Cardiovascular:  Negative for chest pain, palpitations and leg swelling.   Gastrointestinal:  Positive for constipation. Negative for abdominal distention, abdominal pain, diarrhea and vomiting.   Endocrine: Negative for polydipsia and polyphagia.   Genitourinary:  Negative for dysuria.   Musculoskeletal:  Positive for neck pain. Negative for arthralgias and neck stiffness.   Neurological:  Positive for numbness. Negative for dizziness, tremors, seizures and light-headedness.          Physical Exam  Constitutional:       General: He is not in acute distress.     Appearance: Normal appearance. He is normal weight.   HENT:      Head: Normocephalic and atraumatic.   Eyes:      Extraocular Movements: Extraocular movements intact.      Right eye: Nystagmus (Saccadic overshoot) present.      Left eye: Nystagmus (Saccadic overshoot) present.      Pupils: Pupils are equal, round, and reactive to light.   Cardiovascular:       Rate and Rhythm: Normal rate and regular rhythm.   Pulmonary:      Effort: No respiratory distress.      Breath sounds: Examination of the left-upper field reveals decreased breath sounds. Decreased breath sounds present. No wheezing or rales.   Chest:      Chest wall: No tenderness.   Abdominal:      General: Bowel sounds are normal. There is no distension.      Palpations: Abdomen is soft. There is no mass.      Tenderness: There is no abdominal tenderness. There is no guarding.   Musculoskeletal:         General: Deformity present.      Cervical back: Tenderness (worse on palpation) present.      Right lower leg: No edema.      Left lower leg: No edema.   Skin:     General: Skin is warm and dry.      Capillary Refill: Capillary refill takes less than 2 seconds.   Neurological:      General: No focal deficit present.      Mental Status: He is alert and oriented to person, place, and time.   Psychiatric:         Mood and Affect: Mood normal.         Behavior: Behavior normal.         Relevant Results      Scheduled medications  enoxaparin, 40 mg, subcutaneous, q24h  [START ON 9/15/2024] polyethylene glycol, 17 g, oral, Daily      Scheduled medications  enoxaparin, 40 mg, subcutaneous, q24h  [START ON 9/15/2024] polyethylene glycol, 17 g, oral, Daily      Continuous medications     PRN medications  PRN medications: acetaminophen       Assessment/Plan   Assessment & Plan      Jovon Mccartney is a 49-year-old male with PMHx of tobacco use (1 PPD x 39 years) and juvenile RA who presented to [place] ED on 9/9/24 with subacute intractable headaches with associated cervical pain who is found to have parietal lytic bone lesion and multiple cervical and appendicular lytic lesions on MRI with such findings concerning for multiple myeloma vs metastasis from unknown primary. CT chest for evaluation of lytic lesions and metastatic disease showed a RUL hilar/mediastinal mass not well visualized on noncontrasted imaging but suspicious  for possible bronchogenic carcinoma. Patient is transferred here for further evaluation including EBUS to evaluate lung mass.    # Possible RUL hilar mass not well-seen on noncontrast CT. Suspicious for bronchogenic carcinoma  # R Parietal lytic bone lesion c/f multiple myeloma  # C2-C3 and appendicular lytic lesions  # Intractable headache w/cervical pain  # Nicotine dependency  -He is mildly anemic however this dates back to 2019, no evidence of renal impairment or hypercalcemia. OSH sent for SPEP, UPEP, serum FLC, quantitative immunoglobulins  PLAN  -Consulted heme /onc, appreciate recs  -Consulted NSGY, appreciate recs  -Consulted pulmonology for Bronch w/biopsy, appreciate recs  -repeat CT Chest w/contrast, depending on Cr.  -Pain control: Tylenol and ibuprofen, pending Cr  -Follow up SPEP, UPEP, serum FLC, quantitative immunoglobulins  - on smoking cessation    # Incidental moderate-sized pericardial effusion on CT  HDS. Asymptomatic. Low threshold to obtain TTE if develops a change in clinical status suggestive of tamponade physiology (muffled heart sounds, distended neck veins, hypotension) or new onset dyspnea.  PLAN  -Monitor for now. Low threshold to obtain TTE.    # Severe bullous/paraseptal emphysema on CT/COPD without acute exacerbation.  -No PMHx of COPD or history of exacerbations. Active smoker.   PLAN  -Refer for OP pulmonology for COPD maintenance therapy  -Smoking cessation counseling    #Juvenile RA  -cont Humira home dose.     Fluids: Replete PRN  Electrolytes: Keep mg >2, phos >3  and K >4  Nutrition:  regular  Antimicrobials: n/a  DVT Prophylaxis: Lovenox   GI ppx: PPI  Bowel care: miralax  Code Status: Full code, confirmed on admission  NOK: Judith (wife) [phone]     Patient and plan discussed with attending physician Dr. Pickering.        Thom Corado MD

## 2024-09-14 NOTE — SIGNIFICANT EVENT
Senior Staffing Note  Mr. Mccartney is a 50 y/o man with PMH significant for 39 pack year smoking and RA on humira who initially presented after a 2month history of progressively worsening headache for which he initially sought care in [place]. OSH work up was remarkable for:    MRI brain/C-spine showed ill-defined enhancing lesion within right parietal occipital bone with dural thickening and mass effect upon right occipital lobe without midline shift as well as ill-defined marrow replacing lesions within C2 and C3 vertebral bodies, constellation of findings suggestive of metastatic disease or multiple myeloma. Evaluated by OSH pulmonology, initiated workup with CT C/A/P without contast that showed amorphous soft tissue centered at the right hilum encompassing the RUL bronchus and inseparable from the right main pulmonary artery suspicious for bronchogenic carcinoma, with mildly enlarged mediastinal lymph nodes, multiple lytic lesions of R scapula and bilateral humeral head.     Further testing at OSH including SPEP and UPEP returned unremarkable. Free light chains showed elevated Ig Kappa, but normal Ig lambda, and normal kappa/lambda ratio. IgM returned elevated at 264, IgG and IgA returned wnl.     Patient transferred to [facility] for further care given c/f bronchogenic carcinoma with multiple lytic bone lesions, and consideration of EBUS bronchoscopy. NSGY and pulmonology consulted on admission.    Further NSGY recs pending biopsy of pulmonary mass, recommended hemonc and rad onc consultations as well as RCRI. Pulm recs pending.    Touched base with hem onc and recommended CT CAP with IV contrast to better characterize the non-bone disease (mediastinal LAD, bronchus mass, adrenals), which has been ordered. Hem onc agrees biopsy of pulm mass would be best vs parietal bone lesion.     For now will treat headaches with alternating prn Tylenol and ibuprofen (patient reports this has worked well for him at home). Only home med  is humira which will be due this upcoming Tuesday. Will continue to follow consultant recs closely and await results of contrasted CT. Made NPO at [facility] and holding DVT ppx at [facility] in case of procedure tomorrow.    Please see H&P by Dr. Corado for further details.    Jennifer Fong MD

## 2024-09-15 ENCOUNTER — APPOINTMENT (OUTPATIENT)
Dept: RADIOLOGY | Facility: HOSPITAL | Age: 49
DRG: 181 | End: 2024-09-15
Payer: COMMERCIAL

## 2024-09-15 VITALS
TEMPERATURE: 99.1 F | HEIGHT: 66 IN | RESPIRATION RATE: 16 BRPM | HEART RATE: 105 BPM | SYSTOLIC BLOOD PRESSURE: 119 MMHG | OXYGEN SATURATION: 96 % | DIASTOLIC BLOOD PRESSURE: 71 MMHG | BODY MASS INDEX: 19.44 KG/M2 | WEIGHT: 121 LBS

## 2024-09-15 PROBLEM — M08.00 JUVENILE RHEUMATOID ARTHRITIS (MULTI): Status: ACTIVE | Noted: 2024-09-15

## 2024-09-15 PROBLEM — R76.8 HIGH TOTAL SERUM IGM: Status: ACTIVE | Noted: 2024-09-15

## 2024-09-15 PROBLEM — Z79.620 ADALIMUMAB (HUMIRA) LONG-TERM USE: Status: ACTIVE | Noted: 2024-09-15

## 2024-09-15 PROBLEM — R91.8 HILAR MASS: Status: ACTIVE | Noted: 2024-09-15

## 2024-09-15 LAB
ALBUMIN SERPL BCP-MCNC: 3.6 G/DL (ref 3.4–5)
ANION GAP SERPL CALC-SCNC: 11 MMOL/L (ref 10–20)
BASOPHILS # BLD AUTO: 0.08 X10*3/UL (ref 0–0.1)
BASOPHILS NFR BLD AUTO: 1.1 %
BUN SERPL-MCNC: 17 MG/DL (ref 6–23)
CALCIUM SERPL-MCNC: 9 MG/DL (ref 8.6–10.6)
CHLORIDE SERPL-SCNC: 100 MMOL/L (ref 98–107)
CO2 SERPL-SCNC: 28 MMOL/L (ref 21–32)
CREAT SERPL-MCNC: 0.64 MG/DL (ref 0.5–1.3)
EGFRCR SERPLBLD CKD-EPI 2021: >90 ML/MIN/1.73M*2
EOSINOPHIL # BLD AUTO: 0.11 X10*3/UL (ref 0–0.7)
EOSINOPHIL NFR BLD AUTO: 1.5 %
ERYTHROCYTE [DISTWIDTH] IN BLOOD BY AUTOMATED COUNT: 14.4 % (ref 11.5–14.5)
FOLATE SERPL-MCNC: 12 NG/ML
GLUCOSE SERPL-MCNC: 82 MG/DL (ref 74–99)
HCT VFR BLD AUTO: 31.5 % (ref 41–52)
HGB BLD-MCNC: 10.8 G/DL (ref 13.5–17.5)
IMM GRANULOCYTES # BLD AUTO: 0.03 X10*3/UL (ref 0–0.7)
IMM GRANULOCYTES NFR BLD AUTO: 0.4 % (ref 0–0.9)
IRON SATN MFR SERPL: 39 % (ref 25–45)
IRON SERPL-MCNC: 111 UG/DL (ref 35–150)
LYMPHOCYTES # BLD AUTO: 2.45 X10*3/UL (ref 1.2–4.8)
LYMPHOCYTES NFR BLD AUTO: 32.8 %
MAGNESIUM SERPL-MCNC: 2.15 MG/DL (ref 1.6–2.4)
MCH RBC QN AUTO: 30.8 PG (ref 26–34)
MCHC RBC AUTO-ENTMCNC: 34.3 G/DL (ref 32–36)
MCV RBC AUTO: 90 FL (ref 80–100)
MONOCYTES # BLD AUTO: 0.84 X10*3/UL (ref 0.1–1)
MONOCYTES NFR BLD AUTO: 11.2 %
NEUTROPHILS # BLD AUTO: 3.97 X10*3/UL (ref 1.2–7.7)
NEUTROPHILS NFR BLD AUTO: 53 %
NRBC BLD-RTO: 0 /100 WBCS (ref 0–0)
PHOSPHATE SERPL-MCNC: 3.7 MG/DL (ref 2.5–4.9)
PLATELET # BLD AUTO: 233 X10*3/UL (ref 150–450)
POTASSIUM SERPL-SCNC: 4.3 MMOL/L (ref 3.5–5.3)
RBC # BLD AUTO: 3.51 X10*6/UL (ref 4.5–5.9)
SODIUM SERPL-SCNC: 135 MMOL/L (ref 136–145)
TIBC SERPL-MCNC: 283 UG/DL (ref 240–445)
UIBC SERPL-MCNC: 172 UG/DL (ref 110–370)
VIT B12 SERPL-MCNC: 481 PG/ML (ref 211–911)
WBC # BLD AUTO: 7.5 X10*3/UL (ref 4.4–11.3)

## 2024-09-15 PROCEDURE — 2500000004 HC RX 250 GENERAL PHARMACY W/ HCPCS (ALT 636 FOR OP/ED)

## 2024-09-15 PROCEDURE — 71260 CT THORAX DX C+: CPT | Performed by: RADIOLOGY

## 2024-09-15 PROCEDURE — 83540 ASSAY OF IRON: CPT

## 2024-09-15 PROCEDURE — 82746 ASSAY OF FOLIC ACID SERUM: CPT

## 2024-09-15 PROCEDURE — 2550000001 HC RX 255 CONTRASTS: Performed by: INTERNAL MEDICINE

## 2024-09-15 PROCEDURE — 85025 COMPLETE CBC W/AUTO DIFF WBC: CPT

## 2024-09-15 PROCEDURE — 74177 CT ABD & PELVIS W/CONTRAST: CPT | Performed by: RADIOLOGY

## 2024-09-15 PROCEDURE — 36415 COLL VENOUS BLD VENIPUNCTURE: CPT

## 2024-09-15 PROCEDURE — 83735 ASSAY OF MAGNESIUM: CPT

## 2024-09-15 PROCEDURE — 82607 VITAMIN B-12: CPT

## 2024-09-15 PROCEDURE — 80069 RENAL FUNCTION PANEL: CPT

## 2024-09-15 PROCEDURE — 74177 CT ABD & PELVIS W/CONTRAST: CPT

## 2024-09-15 PROCEDURE — 2500000001 HC RX 250 WO HCPCS SELF ADMINISTERED DRUGS (ALT 637 FOR MEDICARE OP)

## 2024-09-15 PROCEDURE — 1210000001 HC SEMI-PRIVATE ROOM DAILY

## 2024-09-15 RX ORDER — KETOROLAC TROMETHAMINE 15 MG/ML
15 INJECTION, SOLUTION INTRAMUSCULAR; INTRAVENOUS EVERY 8 HOURS PRN
Status: DISCONTINUED | OUTPATIENT
Start: 2024-09-15 | End: 2024-09-15

## 2024-09-15 RX ORDER — KETOROLAC TROMETHAMINE 15 MG/ML
15 INJECTION, SOLUTION INTRAMUSCULAR; INTRAVENOUS EVERY 6 HOURS PRN
Status: COMPLETED | OUTPATIENT
Start: 2024-09-15 | End: 2024-09-16

## 2024-09-15 RX ORDER — ACETAMINOPHEN 325 MG/1
975 TABLET ORAL 3 TIMES DAILY
Status: DISCONTINUED | OUTPATIENT
Start: 2024-09-15 | End: 2024-09-19 | Stop reason: HOSPADM

## 2024-09-15 RX ORDER — TRAMADOL HYDROCHLORIDE 50 MG/1
50 TABLET ORAL EVERY 6 HOURS PRN
Status: DISCONTINUED | OUTPATIENT
Start: 2024-09-15 | End: 2024-09-17

## 2024-09-15 RX ORDER — AMOXICILLIN 250 MG
1 CAPSULE ORAL 2 TIMES DAILY
Status: DISCONTINUED | OUTPATIENT
Start: 2024-09-15 | End: 2024-09-17

## 2024-09-15 RX ORDER — ENOXAPARIN SODIUM 100 MG/ML
40 INJECTION SUBCUTANEOUS EVERY 24 HOURS
Status: COMPLETED | OUTPATIENT
Start: 2024-09-15 | End: 2024-09-16

## 2024-09-15 RX ORDER — ENOXAPARIN SODIUM 100 MG/ML
40 INJECTION SUBCUTANEOUS EVERY 24 HOURS
Status: DISCONTINUED | OUTPATIENT
Start: 2024-09-15 | End: 2024-09-15

## 2024-09-15 RX ORDER — POLYETHYLENE GLYCOL 3350 17 G/17G
17 POWDER, FOR SOLUTION ORAL 2 TIMES DAILY
Status: DISCONTINUED | OUTPATIENT
Start: 2024-09-15 | End: 2024-09-15

## 2024-09-15 RX ORDER — POLYETHYLENE GLYCOL 3350 17 G/17G
34 POWDER, FOR SOLUTION ORAL 2 TIMES DAILY
Status: DISCONTINUED | OUTPATIENT
Start: 2024-09-15 | End: 2024-09-17

## 2024-09-15 RX ORDER — PANTOPRAZOLE SODIUM 40 MG/1
40 TABLET, DELAYED RELEASE ORAL
Status: DISCONTINUED | OUTPATIENT
Start: 2024-09-16 | End: 2024-09-19 | Stop reason: HOSPADM

## 2024-09-15 RX ADMIN — POLYETHYLENE GLYCOL 3350 34 G: 17 POWDER, FOR SOLUTION ORAL at 21:21

## 2024-09-15 RX ADMIN — KETOROLAC TROMETHAMINE 15 MG: 15 INJECTION, SOLUTION INTRAMUSCULAR; INTRAVENOUS at 11:16

## 2024-09-15 RX ADMIN — SENNOSIDES AND DOCUSATE SODIUM 1 TABLET: 50; 8.6 TABLET ORAL at 21:22

## 2024-09-15 RX ADMIN — IOHEXOL 75 ML: 350 INJECTION, SOLUTION INTRAVENOUS at 05:32

## 2024-09-15 RX ADMIN — ACETAMINOPHEN 975 MG: 325 TABLET ORAL at 21:22

## 2024-09-15 RX ADMIN — ACETAMINOPHEN 975 MG: 325 TABLET ORAL at 07:57

## 2024-09-15 RX ADMIN — ACETAMINOPHEN 975 MG: 325 TABLET ORAL at 02:16

## 2024-09-15 RX ADMIN — KETOROLAC TROMETHAMINE 15 MG: 15 INJECTION, SOLUTION INTRAMUSCULAR; INTRAVENOUS at 19:12

## 2024-09-15 RX ADMIN — IBUPROFEN 600 MG: 600 TABLET, FILM COATED ORAL at 05:05

## 2024-09-15 RX ADMIN — SENNOSIDES AND DOCUSATE SODIUM 1 TABLET: 50; 8.6 TABLET ORAL at 11:17

## 2024-09-15 RX ADMIN — ACETAMINOPHEN 975 MG: 325 TABLET ORAL at 15:53

## 2024-09-15 RX ADMIN — ENOXAPARIN SODIUM 40 MG: 100 INJECTION SUBCUTANEOUS at 15:53

## 2024-09-15 ASSESSMENT — COGNITIVE AND FUNCTIONAL STATUS - GENERAL
DAILY ACTIVITIY SCORE: 24
MOBILITY SCORE: 24

## 2024-09-15 ASSESSMENT — PAIN SCALES - GENERAL
PAINLEVEL_OUTOF10: 5 - MODERATE PAIN
PAINLEVEL_OUTOF10: 6
PAINLEVEL_OUTOF10: 4
PAINLEVEL_OUTOF10: 0 - NO PAIN
PAINLEVEL_OUTOF10: 5 - MODERATE PAIN

## 2024-09-15 ASSESSMENT — PAIN DESCRIPTION - LOCATION
LOCATION: HEAD

## 2024-09-15 ASSESSMENT — PAIN DESCRIPTION - DESCRIPTORS
DESCRIPTORS: ACHING

## 2024-09-15 ASSESSMENT — PAIN - FUNCTIONAL ASSESSMENT
PAIN_FUNCTIONAL_ASSESSMENT: 0-10

## 2024-09-15 ASSESSMENT — PAIN SCALES - PAIN ASSESSMENT IN ADVANCED DEMENTIA (PAINAD)
TOTALSCORE: MEDICATION (SEE MAR)
TOTALSCORE: MEDICATION (SEE MAR)

## 2024-09-15 ASSESSMENT — ACTIVITIES OF DAILY LIVING (ADL): LACK_OF_TRANSPORTATION: NO

## 2024-09-15 NOTE — PROGRESS NOTES
Jovon Mccartney is a 49-year-old male with PMHx of tobacco use (1 PPD x 39 years) and juvenile RA who presented to [facility] ED on 9/9/24 with subacute intractable headaches with associated cervical pain who is found to have parietal lytic bone lesion and multiple cervical and appendicular lytic lesions on MRI with such findings concerning for multiple myeloma vs metastasis from unknown primary. CT chest for evaluation of lytic lesions and metastatic disease showed a RUL hilar/mediastinal mass not well visualized on noncontrasted imaging but suspicious for possible bronchogenic carcinoma. Patient is transferred here for further evaluation including EBUS to evaluate lung mass.       Subjective   No acute events overnight.  Patient request change of pain regimen overnight.  1 bowel movement overnight, patient denied chest pain, shortness of breath, abdominal pain.  Patient also reported that his headache and the neck pain is well-controlled with the current pain regimen.       Objective     Vital stable overnight.    Intake/Output Summary (Last 24 hours) at 9/15/2024 1215  Last data filed at 9/14/2024 1930  Gross per 24 hour   Intake 240 ml   Output --   Net 240 ml     Chest and abdomen CT with contrast    IMPRESSION:  1. Masslike enhancing soft tissue thickening within the right hilar  region which encases the right mainstem bronchus and abuts the right  main pulmonary artery, suspicious for neoplasm. This soft tissue  thickening additionally extends along the interlobular bronchials.  Further evaluation with tissue sampling or PET-CT is recommended.  2. Nodular thickening of the right pleura is suspicious for disease  with possible direct lymphangitic carcinomatosis as well in the right  infrahilar region.  3. Few prominent mediastinal lymph nodes are described above.  4. Permeative appearing lesion involving the right scapula is  suspicious for metastatic disease.  5. Abdominal findings include too small to characterize  hypodense  lesions in the liver as well as adrenal thickening, potentially  adenomas. Further characterization by abdominal MRI is recommended  given the thoracic findings.  6. Moderate pericardial effusion.  7. Severe paraseptal emphysematous changes predominantly affecting  the lung apices.    Physical Exam      Gen: NAD, alert, interactive  HEENT: NC/AT PERRL, MMM, saccadic overshoot nystagmus OU  Resp: normal respiratory effort, no wheezing or rhonchi, no rales, decreased breath sounds bilaterally.   CV: RRR, normal S1/S2  no m/r/g   Abdomen: Soft non distended, non tender, normal bowel sounds, no masses  Extremities: Normal tone, and ROM, lower extremity strength 5/5, no lower extremity edema, deformities present in both hands and feet due to RA  Neuro: Alert and Oriented x 3     Assessment/Plan          9/15 update  -pain regimen is Tylenol 976mg TID, Toradol 15mg q6 prn, tramadol 50mg 16h prn, can spot dose oxy 5mg for breakthrough pain.  -humira on hold  -Family updated by the bedside  -PET scan tomorrow for further workup for the lung mass.  -Chest and abdomen CT with contrast done yesterday, see results above.  # Possible RUL hilar mass not well-seen on noncontrast CT. Suspicious for bronchogenic carcinoma  # R Parietal lytic bone lesion c/f multiple myeloma  # C2-C3 and appendicular lytic lesions  # Intractable headache w/cervical pain  # Nicotine dependency  -He is mildly anemic however this dates back to 2019, no evidence of renal impairment or hypercalcemia. OSH sent for SPEP, UPEP, serum FLC, quantitative immunoglobulins  PLAN  -Consulted heme /onc, appreciate recs  -Consulted NSGY, appreciate recs  -Consulted pulmonology for Bronch w/biopsy, appreciate recs  -Follow up SPEP, UPEP, serum FLC, quantitative immunoglobulins  - on smoking cessation     # Incidental moderate-sized pericardial effusion on CT  HDS. Asymptomatic. Low threshold to obtain TTE if develops a change in clinical status  suggestive of tamponade physiology (muffled heart sounds, distended neck veins, hypotension) or new onset dyspnea.  PLAN  -Monitor for now. Low threshold to obtain TTE.     # Severe bullous/paraseptal emphysema on CT/COPD without acute exacerbation.  -No PMHx of COPD or history of exacerbations. Active smoker.   PLAN  -Refer for OP pulmonology for COPD maintenance therapy  -Smoking cessation counseling     #Juvenile RA  -cont Humira home dose.      Fluids: Replete PRN  Electrolytes: Keep mg >2, phos >3  and K >4  Nutrition:  regular  Antimicrobials: n/a  DVT Prophylaxis: Lovenox   GI ppx: PPI  Bowel care: miralax  Code Status: Full code, confirmed on admission  NOK: Judith (wife) [phone]     Patient and plan discussed with attending physician Dr. Pickering.

## 2024-09-15 NOTE — PROGRESS NOTES
09/15/24 1150   Discharge Planning   Living Arrangements Spouse/significant other   Support Systems Spouse/significant other   Assistance Needed Independent for adls   Type of Residence Private residence   Number of Stairs to Enter Residence 3   Number of Stairs Within Residence 11   Home or Post Acute Services None   Expected Discharge Disposition Home   Does the patient need discharge transport arranged? No   RoundTrip coordination needed? No   Financial Resource Strain   How hard is it for you to pay for the very basics like food, housing, medical care, and heating? Not hard   Housing Stability   In the last 12 months, was there a time when you were not able to pay the mortgage or rent on time? N   At any time in the past 12 months, were you homeless or living in a shelter (including now)? N   Transportation Needs   In the past 12 months, has lack of transportation kept you from medical appointments or from getting medications? no   In the past 12 months, has lack of transportation kept you from meetings, work, or from getting things needed for daily living? No     Transitional Care Coordination Progress Note:  Patient discussed during interdisciplinary rounds.   Team members present: MD, TCC  Plan per Medical/Surgical team: Lung CA with mets  Payor: [employer]  Discharge disposition: Home  Potential Barriers: none  ADOD: 3-4 days    Previous Home Care: NA  DME: [facility]  Pharmacy: [facility]  Falls: Denies  PCP:  Dr Manolo Ross; last visit 1-2 weeks ago  Met with patient at bedside, provided introduction of self and role. Patient states he lives at home with wife. Independent for adls; safe at home. Patient states he normally drives self to appointments. Patient states no concerns obtaining/affording medications; states no social/financial concerns. Patient states family to provide transport home at time of discharge. Will continue to monitor for discharge planning needs.     Malissa WOODSONN,  RN  Transitional Care Coordinator (TCC)  [phone]

## 2024-09-16 ENCOUNTER — APPOINTMENT (OUTPATIENT)
Dept: RADIOLOGY | Facility: HOSPITAL | Age: 49
End: 2024-09-16
Payer: COMMERCIAL

## 2024-09-16 LAB
ALBUMIN SERPL BCP-MCNC: 3.7 G/DL (ref 3.4–5)
ANION GAP SERPL CALC-SCNC: 14 MMOL/L (ref 10–20)
BASOPHILS # BLD AUTO: 0.12 X10*3/UL (ref 0–0.1)
BASOPHILS NFR BLD AUTO: 1.4 %
BUN SERPL-MCNC: 25 MG/DL (ref 6–23)
CALCIUM SERPL-MCNC: 8.9 MG/DL (ref 8.6–10.6)
CHLORIDE SERPL-SCNC: 102 MMOL/L (ref 98–107)
CO2 SERPL-SCNC: 25 MMOL/L (ref 21–32)
CREAT SERPL-MCNC: 0.56 MG/DL (ref 0.5–1.3)
EGFRCR SERPLBLD CKD-EPI 2021: >90 ML/MIN/1.73M*2
EOSINOPHIL # BLD AUTO: 0.14 X10*3/UL (ref 0–0.7)
EOSINOPHIL NFR BLD AUTO: 1.7 %
ERYTHROCYTE [DISTWIDTH] IN BLOOD BY AUTOMATED COUNT: 14 % (ref 11.5–14.5)
GLUCOSE SERPL-MCNC: 84 MG/DL (ref 74–99)
HCT VFR BLD AUTO: 31.6 % (ref 41–52)
HGB BLD-MCNC: 11.3 G/DL (ref 13.5–17.5)
IMM GRANULOCYTES # BLD AUTO: 0.03 X10*3/UL (ref 0–0.7)
IMM GRANULOCYTES NFR BLD AUTO: 0.4 % (ref 0–0.9)
LYMPHOCYTES # BLD AUTO: 3.21 X10*3/UL (ref 1.2–4.8)
LYMPHOCYTES NFR BLD AUTO: 38.3 %
MAGNESIUM SERPL-MCNC: 2.18 MG/DL (ref 1.6–2.4)
MCH RBC QN AUTO: 31.4 PG (ref 26–34)
MCHC RBC AUTO-ENTMCNC: 35.8 G/DL (ref 32–36)
MCV RBC AUTO: 88 FL (ref 80–100)
MONOCYTES # BLD AUTO: 0.78 X10*3/UL (ref 0.1–1)
MONOCYTES NFR BLD AUTO: 9.3 %
NEUTROPHILS # BLD AUTO: 4.11 X10*3/UL (ref 1.2–7.7)
NEUTROPHILS NFR BLD AUTO: 48.9 %
NRBC BLD-RTO: 0 /100 WBCS (ref 0–0)
PHOSPHATE SERPL-MCNC: 3.7 MG/DL (ref 2.5–4.9)
PLATELET # BLD AUTO: 256 X10*3/UL (ref 150–450)
POTASSIUM SERPL-SCNC: 4.5 MMOL/L (ref 3.5–5.3)
RBC # BLD AUTO: 3.6 X10*6/UL (ref 4.5–5.9)
SODIUM SERPL-SCNC: 136 MMOL/L (ref 136–145)
WBC # BLD AUTO: 8.4 X10*3/UL (ref 4.4–11.3)

## 2024-09-16 PROCEDURE — 85025 COMPLETE CBC W/AUTO DIFF WBC: CPT

## 2024-09-16 PROCEDURE — 36415 COLL VENOUS BLD VENIPUNCTURE: CPT

## 2024-09-16 PROCEDURE — 2500000001 HC RX 250 WO HCPCS SELF ADMINISTERED DRUGS (ALT 637 FOR MEDICARE OP)

## 2024-09-16 PROCEDURE — 99232 SBSQ HOSP IP/OBS MODERATE 35: CPT | Performed by: INTERNAL MEDICINE

## 2024-09-16 PROCEDURE — 99232 SBSQ HOSP IP/OBS MODERATE 35: CPT

## 2024-09-16 PROCEDURE — 83735 ASSAY OF MAGNESIUM: CPT

## 2024-09-16 PROCEDURE — 1210000001 HC SEMI-PRIVATE ROOM DAILY

## 2024-09-16 PROCEDURE — 2500000004 HC RX 250 GENERAL PHARMACY W/ HCPCS (ALT 636 FOR OP/ED)

## 2024-09-16 PROCEDURE — 80069 RENAL FUNCTION PANEL: CPT

## 2024-09-16 RX ORDER — IBUPROFEN 200 MG
200 TABLET ORAL EVERY 6 HOURS PRN
COMMUNITY

## 2024-09-16 RX ORDER — ACETAMINOPHEN 500 MG
500 TABLET ORAL EVERY 6 HOURS PRN
COMMUNITY

## 2024-09-16 RX ADMIN — ACETAMINOPHEN 975 MG: 325 TABLET ORAL at 08:57

## 2024-09-16 RX ADMIN — SENNOSIDES AND DOCUSATE SODIUM 1 TABLET: 50; 8.6 TABLET ORAL at 08:57

## 2024-09-16 RX ADMIN — POLYETHYLENE GLYCOL 3350 34 G: 17 POWDER, FOR SOLUTION ORAL at 08:57

## 2024-09-16 RX ADMIN — ENOXAPARIN SODIUM 40 MG: 100 INJECTION SUBCUTANEOUS at 15:33

## 2024-09-16 RX ADMIN — PANTOPRAZOLE SODIUM 40 MG: 40 TABLET, DELAYED RELEASE ORAL at 06:57

## 2024-09-16 RX ADMIN — KETOROLAC TROMETHAMINE 15 MG: 15 INJECTION, SOLUTION INTRAMUSCULAR; INTRAVENOUS at 03:21

## 2024-09-16 RX ADMIN — ACETAMINOPHEN 975 MG: 325 TABLET ORAL at 15:33

## 2024-09-16 RX ADMIN — KETOROLAC TROMETHAMINE 15 MG: 15 INJECTION, SOLUTION INTRAMUSCULAR; INTRAVENOUS at 18:38

## 2024-09-16 RX ADMIN — SENNOSIDES AND DOCUSATE SODIUM 1 TABLET: 50; 8.6 TABLET ORAL at 21:09

## 2024-09-16 RX ADMIN — TRAMADOL HYDROCHLORIDE 50 MG: 50 TABLET, COATED ORAL at 00:13

## 2024-09-16 RX ADMIN — TRAMADOL HYDROCHLORIDE 50 MG: 50 TABLET, COATED ORAL at 06:57

## 2024-09-16 RX ADMIN — ACETAMINOPHEN 975 MG: 325 TABLET ORAL at 21:09

## 2024-09-16 RX ADMIN — KETOROLAC TROMETHAMINE 15 MG: 15 INJECTION, SOLUTION INTRAMUSCULAR; INTRAVENOUS at 12:42

## 2024-09-16 ASSESSMENT — PAIN DESCRIPTION - DESCRIPTORS: DESCRIPTORS: ACHING

## 2024-09-16 ASSESSMENT — PAIN - FUNCTIONAL ASSESSMENT
PAIN_FUNCTIONAL_ASSESSMENT: 0-10

## 2024-09-16 ASSESSMENT — PAIN SCALES - GENERAL
PAINLEVEL_OUTOF10: 5 - MODERATE PAIN
PAINLEVEL_OUTOF10: 4
PAINLEVEL_OUTOF10: 0 - NO PAIN
PAINLEVEL_OUTOF10: 5 - MODERATE PAIN
PAINLEVEL_OUTOF10: 4
PAINLEVEL_OUTOF10: 2
PAINLEVEL_OUTOF10: 5 - MODERATE PAIN
PAINLEVEL_OUTOF10: 4
PAINLEVEL_OUTOF10: 6

## 2024-09-16 ASSESSMENT — PAIN DESCRIPTION - LOCATION
LOCATION: HEAD
LOCATION: HEAD

## 2024-09-16 NOTE — PROGRESS NOTES
"Pharmacy Medication History Review    Jovon Mccartney is a 49 y.o. male admitted for [facility]. Pharmacy reviewed the patient's [id number] medications and allergies for accuracy.    Medications ADDED:  Humira, tylenol, motrin  Medications CHANGED:  none  Medications REMOVED:   none     The list below reflects the updated PTA list.   Prior to Admission Medications   Prescriptions Last Dose Informant   acetaminophen (Tylenol) 500 mg tablet  Self   Sig: Take 1 tablet (500 mg) by mouth every 6 hours if needed for mild pain (1 - 3).   adalimumab (Humira Pen) 40 mg/0.8 mL pen injector kit pen-injector 9/3/2024 Self   Sig: Inject 1 Pen (40 mg) under the skin every 14 (fourteen) days.   ibuprofen 200 mg tablet  Self   Sig: Take 1 tablet (200 mg) by mouth every 6 hours if needed for mild pain (1 - 3).      Facility-Administered Medications: None        The list below reflects the updated allergy list. Please review each documented allergy for additional clarification and justification.  Allergies  Reviewed by Laura Ruiz RN on 9/14/2024   No Known Allergies         Patient declines M2B at discharge.     Sources:   -patient interview (good historian)  -outpatient pharmacy dispense history  -Care Everywhere medication lists  -OARRS        Additional Comments:  Prednisone 10mg taper-   12 day taper dispense on 9/3/24 to take: 4 tablets daily x 3 days, then 3 tablets daily x 3 days, then 2 tablets daily x 3 days, then 1 tablet daily x 3 days, then stop.  Patient states he was due to take his last dose (dose 3/3) of the 3 tablets daily before admission to [facility] on 9/9      Antolin Herbert, PharmD  Transitions of Care Pharmacist  09/16/24     Secure Chat preferred   If no response call [phone] or Vocera \"Med Rec\"    "

## 2024-09-16 NOTE — PROGRESS NOTES
"Jovon Mccartney is a 49 y.o. male on day 2 of admission presenting with Hilar mass.    Subjective   - HDS, no pulmonary symptoms       Objective     Physical Exam  Gen: thin, alert and oriented  CV: no MRG  Pulm: CTA b/l, mild RLL insp crackles  Extremities: joint deformities  Neuro: AAOx3, no FNDs  Last Recorded Vitals  Blood pressure 117/71, pulse 77, temperature 37 °C (98.6 °F), resp. rate 16, height 1.676 m (5' 6\"), weight 54.9 kg (121 lb), SpO2 96%.  Intake/Output last 3 Shifts:  I/O last 3 completed shifts:  In: 240 (4.4 mL/kg) [P.O.:240]  Out: 1 (0 mL/kg) [Stool:1]  Weight: 54.9 kg     Relevant Results                              Assessment/Plan   Assessment & Plan  Hilar mass    Lesion of parietal bone    Lesion of bone of cervical spine    Adalimumab (Humira) long-term use    Juvenile rheumatoid arthritis (Multi)    High total serum IgM    Mr. Mccartney is a 48 y/o male with a history of juvenile RA (on Humira) who initially presented to [facility] for HA/neck pain f/t/h multiple vertebral masses, enhancing lesion in R parietal/occipital bone, and R-sided lung mass. Pulmonology consulted for findings on CT chest.     Mr. Mccartney lung mass is highly concerning for malignancy. Will plan on bronchoscopy with transbronchial biopsy +/- lymph node staging while he is inpatient.    Recommendations:  - Patient is an add on for 9/17/2024 for bronchoscopy, please ensure he is NPO and hold all therapeutic anticoagulation. (Please hold enoxaparin ppx on 9/17)  - Will follow for the PET scan.             I spent 30 minutes in the professional and overall care of this patient.    Case discussed and seen with Dr. Mark Bell MD      "

## 2024-09-16 NOTE — PROGRESS NOTES
Transitional Care Coordination Progress Note:  Patient discussed during interdisciplinary rounds.   Team members present: DM, TCC  Plan per Medical/Surgical team: Juan Ramon Douglass  Payor: [facility]  Discharge disposition: home  Potential Barriers: none   ADOD: 1-2 days  Will continue to monitor for discharge planning needs.     Malissa HUDSON, RN  Transitional Care Coordinator (TCC)  [phone]

## 2024-09-16 NOTE — PROGRESS NOTES
Jovon Mccartney is a 49-year-old male with PMHx of tobacco use (1 PPD x 39 years) and juvenile RA who presented to [facility] ED on 9/9/24 with subacute intractable headaches with associated cervical pain who is found to have parietal lytic bone lesion and multiple cervical and appendicular lytic lesions on MRI with such findings concerning for multiple myeloma vs metastasis from unknown primary. CT chest for evaluation of lytic lesions and metastatic disease showed a RUL hilar/mediastinal mass not well visualized on noncontrasted imaging but suspicious for possible bronchogenic carcinoma. Patient is transferred here for further evaluation including EBUS to evaluate lung mass.       Subjective   No acute events overnight.  Patient request change of pain regimen overnight.  1 bowel movement overnight, patient denied chest pain, shortness of breath, abdominal pain.  Patient also reported that his headache and the neck pain is well-controlled with the current pain regimen.       Objective     Vital stable overnight.    Intake/Output Summary (Last 24 hours) at 9/16/2024 1310  Last data filed at 9/16/2024 0657  Gross per 24 hour   Intake --   Output 1 ml   Net -1 ml       Revised Cardiac Risk Index for Pre-Operative Risk    3.9 %  30-day risk of death, MI, or cardiac arrest.    Duke Activity Status Index (DASI)    25.2 points  The higher the score (maximum 58.2), the higher the functional status.  5.84 METs    Physical Exam      Gen: NAD, alert, interactive  HEENT: NC/AT PERRL, MMM, saccadic overshoot nystagmus OU  Resp: normal respiratory effort, no wheezing or rhonchi, no rales, decreased breath sounds bilaterally.   CV: RRR, normal S1/S2  no m/r/g   Abdomen: Soft non distended, non tender, normal bowel sounds, no masses  Extremities: Normal tone, and ROM, lower extremity strength 5/5, no lower extremity edema, deformities present in both hands and feet due to RA  Neuro: Alert and Oriented x 3     Assessment/Plan          9/16  update  -Patient is scheduled for PET scan tomorrow, possible lung biopsy afterwards.  -Consulted rad oncology today, appreciate recs.  -RCRI and DACI done per neurosurgery   -pain regimen is Tylenol 976mg TID, Toradol 15mg q6 prn, tramadol 50mg 16h prn, can spot dose oxy 5mg for breakthrough pain.  -humira on hold    # Possible RUL hilar mass not well-seen on noncontrast CT. Suspicious for bronchogenic carcinoma  # R Parietal lytic bone lesion c/f multiple myeloma  # C2-C3 and appendicular lytic lesions  # Intractable headache w/cervical pain  # Nicotine dependency  -He is mildly anemic however this dates back to 2019, no evidence of renal impairment or hypercalcemia. OSH sent for SPEP, UPEP, serum FLC, quantitative immunoglobulins  PLAN  -Consulted heme /onc, appreciate recs  -Consulted NSGY, appreciate recs  -Consulted pulmonology for Bronch w/biopsy, appreciate recs  -SPEP, UPEP, serum FLC, quantitative immunoglobulins all wnl except IgM and IgG Kappa FLC  - on smoking cessation     # Incidental moderate-sized pericardial effusion on CT  HDS. Asymptomatic. Low threshold to obtain TTE if develops a change in clinical status suggestive of tamponade physiology (muffled heart sounds, distended neck veins, hypotension) or new onset dyspnea.  PLAN  -Monitor for now. Low threshold to obtain TTE.     # Severe bullous/paraseptal emphysema on CT/COPD without acute exacerbation.  -No PMHx of COPD or history of exacerbations. Active smoker.   PLAN  -Refer for OP pulmonology for COPD maintenance therapy  -Smoking cessation counseling     #Juvenile RA  -cont Humira home dose, on hold      Fluids: Replete PRN  Electrolytes: Keep mg >2, phos >3  and K >4  Nutrition:  regular  Antimicrobials: n/a  DVT Prophylaxis: Lovenox   GI ppx: PPI  Bowel care: miralax  Code Status: Full code, confirmed on admission  NOK: Judith (wife) [phone]     Patient and plan discussed with attending physician Dr. Pickering.

## 2024-09-17 ENCOUNTER — ANESTHESIA (OUTPATIENT)
Dept: GASTROENTEROLOGY | Facility: HOSPITAL | Age: 49
End: 2024-09-17
Payer: COMMERCIAL

## 2024-09-17 ENCOUNTER — APPOINTMENT (OUTPATIENT)
Dept: GASTROENTEROLOGY | Facility: HOSPITAL | Age: 49
DRG: 181 | End: 2024-09-17
Payer: COMMERCIAL

## 2024-09-17 ENCOUNTER — ANESTHESIA EVENT (OUTPATIENT)
Dept: GASTROENTEROLOGY | Facility: HOSPITAL | Age: 49
End: 2024-09-17
Payer: COMMERCIAL

## 2024-09-17 LAB
ALBUMIN SERPL BCP-MCNC: 3.5 G/DL (ref 3.4–5)
ANION GAP SERPL CALC-SCNC: 13 MMOL/L (ref 10–20)
BASOPHILS # BLD AUTO: 0.11 X10*3/UL (ref 0–0.1)
BASOPHILS NFR BLD AUTO: 1.2 %
BUN SERPL-MCNC: 27 MG/DL (ref 6–23)
CALCIUM SERPL-MCNC: 9.1 MG/DL (ref 8.6–10.6)
CHLORIDE SERPL-SCNC: 103 MMOL/L (ref 98–107)
CO2 SERPL-SCNC: 26 MMOL/L (ref 21–32)
CREAT SERPL-MCNC: 0.64 MG/DL (ref 0.5–1.3)
EGFRCR SERPLBLD CKD-EPI 2021: >90 ML/MIN/1.73M*2
EOSINOPHIL # BLD AUTO: 0.09 X10*3/UL (ref 0–0.7)
EOSINOPHIL NFR BLD AUTO: 1 %
ERYTHROCYTE [DISTWIDTH] IN BLOOD BY AUTOMATED COUNT: 14.6 % (ref 11.5–14.5)
GLUCOSE SERPL-MCNC: 88 MG/DL (ref 74–99)
HCT VFR BLD AUTO: 31.1 % (ref 41–52)
HGB BLD-MCNC: 10.7 G/DL (ref 13.5–17.5)
IMM GRANULOCYTES # BLD AUTO: 0.02 X10*3/UL (ref 0–0.7)
IMM GRANULOCYTES NFR BLD AUTO: 0.2 % (ref 0–0.9)
LYMPHOCYTES # BLD AUTO: 2.79 X10*3/UL (ref 1.2–4.8)
LYMPHOCYTES NFR BLD AUTO: 29.6 %
MAGNESIUM SERPL-MCNC: 2.19 MG/DL (ref 1.6–2.4)
MCH RBC QN AUTO: 31.4 PG (ref 26–34)
MCHC RBC AUTO-ENTMCNC: 34.4 G/DL (ref 32–36)
MCV RBC AUTO: 91 FL (ref 80–100)
MONOCYTES # BLD AUTO: 0.89 X10*3/UL (ref 0.1–1)
MONOCYTES NFR BLD AUTO: 9.4 %
NEUTROPHILS # BLD AUTO: 5.54 X10*3/UL (ref 1.2–7.7)
NEUTROPHILS NFR BLD AUTO: 58.6 %
NRBC BLD-RTO: 0 /100 WBCS (ref 0–0)
PHOSPHATE SERPL-MCNC: 4.3 MG/DL (ref 2.5–4.9)
PLATELET # BLD AUTO: 241 X10*3/UL (ref 150–450)
POTASSIUM SERPL-SCNC: 4.6 MMOL/L (ref 3.5–5.3)
RBC # BLD AUTO: 3.41 X10*6/UL (ref 4.5–5.9)
SODIUM SERPL-SCNC: 137 MMOL/L (ref 136–145)
WBC # BLD AUTO: 9.4 X10*3/UL (ref 4.4–11.3)

## 2024-09-17 PROCEDURE — 88305 TISSUE EXAM BY PATHOLOGIST: CPT | Mod: TC,MCY | Performed by: STUDENT IN AN ORGANIZED HEALTH CARE EDUCATION/TRAINING PROGRAM

## 2024-09-17 PROCEDURE — 3700000002 HC GENERAL ANESTHESIA TIME - EACH INCREMENTAL 1 MINUTE

## 2024-09-17 PROCEDURE — 07D78ZX EXTRACTION OF THORAX LYMPHATIC, VIA NATURAL OR ARTIFICIAL OPENING ENDOSCOPIC, DIAGNOSTIC: ICD-10-PCS | Performed by: STUDENT IN AN ORGANIZED HEALTH CARE EDUCATION/TRAINING PROGRAM

## 2024-09-17 PROCEDURE — 31653 BRONCH EBUS SAMPLNG 3/> NODE: CPT | Performed by: STUDENT IN AN ORGANIZED HEALTH CARE EDUCATION/TRAINING PROGRAM

## 2024-09-17 PROCEDURE — 2500000005 HC RX 250 GENERAL PHARMACY W/O HCPCS: Performed by: ANESTHESIOLOGIST ASSISTANT

## 2024-09-17 PROCEDURE — 36415 COLL VENOUS BLD VENIPUNCTURE: CPT

## 2024-09-17 PROCEDURE — 2500000005 HC RX 250 GENERAL PHARMACY W/O HCPCS

## 2024-09-17 PROCEDURE — 83735 ASSAY OF MAGNESIUM: CPT

## 2024-09-17 PROCEDURE — 1210000001 HC SEMI-PRIVATE ROOM DAILY

## 2024-09-17 PROCEDURE — 2500000004 HC RX 250 GENERAL PHARMACY W/ HCPCS (ALT 636 FOR OP/ED): Performed by: ANESTHESIOLOGIST ASSISTANT

## 2024-09-17 PROCEDURE — 85025 COMPLETE CBC W/AUTO DIFF WBC: CPT

## 2024-09-17 PROCEDURE — 2500000004 HC RX 250 GENERAL PHARMACY W/ HCPCS (ALT 636 FOR OP/ED): Performed by: STUDENT IN AN ORGANIZED HEALTH CARE EDUCATION/TRAINING PROGRAM

## 2024-09-17 PROCEDURE — 31622 DX BRONCHOSCOPE/WASH: CPT | Performed by: STUDENT IN AN ORGANIZED HEALTH CARE EDUCATION/TRAINING PROGRAM

## 2024-09-17 PROCEDURE — 81458 SO GSAP DNA CPY NMBR&MCRSTL: CPT | Performed by: STUDENT IN AN ORGANIZED HEALTH CARE EDUCATION/TRAINING PROGRAM

## 2024-09-17 PROCEDURE — 2500000001 HC RX 250 WO HCPCS SELF ADMINISTERED DRUGS (ALT 637 FOR MEDICARE OP)

## 2024-09-17 PROCEDURE — 0BDK8ZX EXTRACTION OF RIGHT LUNG, VIA NATURAL OR ARTIFICIAL OPENING ENDOSCOPIC, DIAGNOSTIC: ICD-10-PCS | Performed by: STUDENT IN AN ORGANIZED HEALTH CARE EDUCATION/TRAINING PROGRAM

## 2024-09-17 PROCEDURE — 3700000001 HC GENERAL ANESTHESIA TIME - INITIAL BASE CHARGE

## 2024-09-17 PROCEDURE — 99232 SBSQ HOSP IP/OBS MODERATE 35: CPT

## 2024-09-17 PROCEDURE — 2720000007 HC OR 272 NO HCPCS

## 2024-09-17 PROCEDURE — 7100000002 HC RECOVERY ROOM TIME - EACH INCREMENTAL 1 MINUTE

## 2024-09-17 PROCEDURE — 7100000001 HC RECOVERY ROOM TIME - INITIAL BASE CHARGE

## 2024-09-17 PROCEDURE — 84100 ASSAY OF PHOSPHORUS: CPT

## 2024-09-17 PROCEDURE — 2500000004 HC RX 250 GENERAL PHARMACY W/ HCPCS (ALT 636 FOR OP/ED)

## 2024-09-17 RX ORDER — AMOXICILLIN 250 MG
1 CAPSULE ORAL NIGHTLY PRN
Status: DISCONTINUED | OUTPATIENT
Start: 2024-09-17 | End: 2024-09-19 | Stop reason: HOSPADM

## 2024-09-17 RX ORDER — SODIUM CHLORIDE, SODIUM LACTATE, POTASSIUM CHLORIDE, CALCIUM CHLORIDE 600; 310; 30; 20 MG/100ML; MG/100ML; MG/100ML; MG/100ML
INJECTION, SOLUTION INTRAVENOUS CONTINUOUS PRN
Status: DISCONTINUED | OUTPATIENT
Start: 2024-09-17 | End: 2024-09-17

## 2024-09-17 RX ORDER — SODIUM CHLORIDE, SODIUM LACTATE, POTASSIUM CHLORIDE, CALCIUM CHLORIDE 600; 310; 30; 20 MG/100ML; MG/100ML; MG/100ML; MG/100ML
100 INJECTION, SOLUTION INTRAVENOUS CONTINUOUS
Status: DISCONTINUED | OUTPATIENT
Start: 2024-09-17 | End: 2024-09-19 | Stop reason: HOSPADM

## 2024-09-17 RX ORDER — KETOROLAC TROMETHAMINE 15 MG/ML
15 INJECTION, SOLUTION INTRAMUSCULAR; INTRAVENOUS EVERY 6 HOURS PRN
Status: COMPLETED | OUTPATIENT
Start: 2024-09-17 | End: 2024-09-18

## 2024-09-17 RX ORDER — PROPOFOL 10 MG/ML
INJECTION, EMULSION INTRAVENOUS AS NEEDED
Status: DISCONTINUED | OUTPATIENT
Start: 2024-09-17 | End: 2024-09-17

## 2024-09-17 RX ORDER — ROCURONIUM BROMIDE 10 MG/ML
INJECTION, SOLUTION INTRAVENOUS AS NEEDED
Status: DISCONTINUED | OUTPATIENT
Start: 2024-09-17 | End: 2024-09-17

## 2024-09-17 RX ORDER — LIDOCAINE 560 MG/1
1 PATCH PERCUTANEOUS; TOPICAL; TRANSDERMAL DAILY
Status: DISCONTINUED | OUTPATIENT
Start: 2024-09-17 | End: 2024-09-19 | Stop reason: HOSPADM

## 2024-09-17 RX ORDER — LIDOCAINE 560 MG/1
1 PATCH PERCUTANEOUS; TOPICAL; TRANSDERMAL DAILY
Status: DISCONTINUED | OUTPATIENT
Start: 2024-09-17 | End: 2024-09-17

## 2024-09-17 RX ORDER — POLYETHYLENE GLYCOL 3350 17 G/17G
34 POWDER, FOR SOLUTION ORAL DAILY PRN
Status: DISCONTINUED | OUTPATIENT
Start: 2024-09-17 | End: 2024-09-19 | Stop reason: HOSPADM

## 2024-09-17 RX ORDER — ONDANSETRON HYDROCHLORIDE 2 MG/ML
4 INJECTION, SOLUTION INTRAVENOUS ONCE AS NEEDED
Status: DISCONTINUED | OUTPATIENT
Start: 2024-09-17 | End: 2024-09-19 | Stop reason: HOSPADM

## 2024-09-17 RX ORDER — OXYCODONE HYDROCHLORIDE 5 MG/1
5 TABLET ORAL EVERY 6 HOURS PRN
Status: DISCONTINUED | OUTPATIENT
Start: 2024-09-17 | End: 2024-09-18

## 2024-09-17 RX ORDER — MIDAZOLAM HYDROCHLORIDE 1 MG/ML
INJECTION INTRAMUSCULAR; INTRAVENOUS AS NEEDED
Status: DISCONTINUED | OUTPATIENT
Start: 2024-09-17 | End: 2024-09-17

## 2024-09-17 RX ORDER — ONDANSETRON HYDROCHLORIDE 2 MG/ML
INJECTION, SOLUTION INTRAVENOUS AS NEEDED
Status: DISCONTINUED | OUTPATIENT
Start: 2024-09-17 | End: 2024-09-17

## 2024-09-17 RX ADMIN — ACETAMINOPHEN 975 MG: 325 TABLET ORAL at 21:04

## 2024-09-17 RX ADMIN — KETOROLAC TROMETHAMINE 15 MG: 15 INJECTION, SOLUTION INTRAMUSCULAR; INTRAVENOUS at 18:01

## 2024-09-17 RX ADMIN — SODIUM CHLORIDE, POTASSIUM CHLORIDE, SODIUM LACTATE AND CALCIUM CHLORIDE 100 ML/HR: 600; 310; 30; 20 INJECTION, SOLUTION INTRAVENOUS at 14:08

## 2024-09-17 RX ADMIN — ACETAMINOPHEN 975 MG: 325 TABLET ORAL at 14:22

## 2024-09-17 RX ADMIN — TRAMADOL HYDROCHLORIDE 50 MG: 50 TABLET, COATED ORAL at 00:45

## 2024-09-17 RX ADMIN — LIDOCAINE 1 PATCH: 4 PATCH TOPICAL at 01:21

## 2024-09-17 RX ADMIN — PANTOPRAZOLE SODIUM 40 MG: 40 TABLET, DELAYED RELEASE ORAL at 06:49

## 2024-09-17 RX ADMIN — TRAMADOL HYDROCHLORIDE 50 MG: 50 TABLET, COATED ORAL at 06:50

## 2024-09-17 RX ADMIN — KETOROLAC TROMETHAMINE 15 MG: 15 INJECTION, SOLUTION INTRAMUSCULAR; INTRAVENOUS at 03:15

## 2024-09-17 ASSESSMENT — COGNITIVE AND FUNCTIONAL STATUS - GENERAL
DAILY ACTIVITIY SCORE: 24
MOBILITY SCORE: 23
CLIMB 3 TO 5 STEPS WITH RAILING: A LITTLE

## 2024-09-17 ASSESSMENT — PAIN DESCRIPTION - LOCATION
LOCATION: HEAD
LOCATION: HEAD

## 2024-09-17 ASSESSMENT — PAIN - FUNCTIONAL ASSESSMENT
PAIN_FUNCTIONAL_ASSESSMENT: 0-10

## 2024-09-17 ASSESSMENT — PAIN SCALES - GENERAL
PAINLEVEL_OUTOF10: 5 - MODERATE PAIN
PAINLEVEL_OUTOF10: 4
PAINLEVEL_OUTOF10: 0 - NO PAIN
PAINLEVEL_OUTOF10: 2
PAINLEVEL_OUTOF10: 0 - NO PAIN
PAINLEVEL_OUTOF10: 0 - NO PAIN
PAINLEVEL_OUTOF10: 4
PAIN_LEVEL: 0
PAINLEVEL_OUTOF10: 5 - MODERATE PAIN
PAINLEVEL_OUTOF10: 0 - NO PAIN
PAINLEVEL_OUTOF10: 6
PAINLEVEL_OUTOF10: 4

## 2024-09-17 NOTE — ANESTHESIA PROCEDURE NOTES
Airway  Date/Time: 9/17/2024 10:41 AM  Urgency: elective    Airway not difficult    Staffing  Performed: FERYN   Authorized by: Edmund Narvaez DO    Performed by: FERNY Serrato  Patient location during procedure: OR    Indications and Patient Condition  Indications for airway management: anesthesia  Spontaneous Ventilation: absent  Sedation level: deep  Preoxygenated: yes  MILS not maintained throughout  Mask difficulty assessment: 1 - vent by mask    Final Airway Details  Final airway type: supraglottic airway      Successful airway: Size 4     Number of attempts at approach: 1  Number of other approaches attempted: 0    Additional Comments  Igel 4

## 2024-09-17 NOTE — PROGRESS NOTES
Jovon Mccartney is a 49-year-old male with PMHx of tobacco use (1 PPD x 39 years) and juvenile RA who presented to [place] [facility] ED on 9/9/24 with subacute intractable headaches with associated cervical pain who is found to have parietal lytic bone lesion and multiple cervical and appendicular lytic lesions on MRI with such findings concerning for multiple myeloma vs metastasis from unknown primary. CT chest for evaluation of lytic lesions and metastatic disease showed a RUL hilar/mediastinal mass not well visualized on noncontrasted imaging but suspicious for possible bronchogenic carcinoma. Patient is transferred here for further evaluation including EBUS to evaluate lung mass.       Subjective     No acute events overnight.  Patient complained of right shoulder pain, which was resolved after lidocaine patch.  Patient also felt the tramadol did not help, and would like to switch to a different pain medication.  Patient decided to episodes of watery stool, and refused MiraLAX.  Denied chest pain, shortness of breath, abdominal pain.        Objective     Vital stable overnight.  1 episode of elevated pressure, 132/68.     No intake or output data in the 24 hours ending 09/17/24 0906      Revised Cardiac Risk Index for Pre-Operative Risk    3.9 %  30-day risk of death, MI, or cardiac arrest.    Duke Activity Status Index (DASI)    25.2 points  The higher the score (maximum 58.2), the higher the functional status.  5.84 METs    Physical Exam      Gen: NAD, alert, interactive  HEENT: NC/AT PERRL, MMM, saccadic overshoot nystagmus OU  Resp: normal respiratory effort, no wheezing or rhonchi, no rales, decreased breath sounds bilaterally.   CV: RRR, normal S1/S2  no m/r/g   Abdomen: Soft non distended, non tender, normal bowel sounds, no masses  Extremities: Normal tone, and ROM, lower extremity strength 5/5, no lower extremity edema, deformities present in both hands and feet due to RA  Neuro: Alert and Oriented x 3      Assessment/Plan          9/17 update  -Patient is scheduled for bronchoscopy today, lung biopsy is positive for malignancy, and canceled PET per pulm.   -Consulted rad oncology yesterday, recommended malignant hem consult, hold for now per attending.   -pending path result before hem/onc consult and recs  -pain regimen is adjusted to Tylenol 976mg TID, Toradol 15mg q6 prn, oxy 5mg q6hr prn.   -RCRI and DACI done per neurosurgery   -humira on hold    # Possible RUL hilar mass not well-seen on noncontrast CT. Suspicious for bronchogenic carcinoma  # R Parietal lytic bone lesion c/f multiple myeloma  # C2-C3 and appendicular lytic lesions  # Intractable headache w/cervical pain  # Nicotine dependency  -He is mildly anemic however this dates back to 2019, no evidence of renal impairment or hypercalcemia. OSH sent for SPEP, UPEP, serum FLC, quantitative immunoglobulins  PLAN  -Consulted heme /onc, appreciate recs  -Consulted NSGY, appreciate recs  -Consulted pulmonology for Bronch w/biopsy, appreciate recs  -SPEP, UPEP, serum FLC, quantitative immunoglobulins all wnl except IgM and IgG Kappa FLC  - on smoking cessation     # Incidental moderate-sized pericardial effusion on CT  HDS. Asymptomatic. Low threshold to obtain TTE if develops a change in clinical status suggestive of tamponade physiology (muffled heart sounds, distended neck veins, hypotension) or new onset dyspnea.  PLAN  -Monitor for now. Low threshold to obtain TTE.     # Severe bullous/paraseptal emphysema on CT/COPD without acute exacerbation.  -No PMHx of COPD or history of exacerbations. Active smoker.   PLAN  -Refer for OP pulmonology for COPD maintenance therapy  -Smoking cessation counseling     #Juvenile RA  -cont Humira home dose, on hold      Fluids: Replete PRN  Electrolytes: Keep mg >2, phos >3  and K >4  Nutrition:  regular  Antimicrobials: n/a  DVT Prophylaxis: Lovenox   GI ppx: PPI  Bowel care: miralax  Code Status: Full code,  confirmed on admission  NOK: Judith (wife) [phone]     Patient and plan discussed with attending physician Dr. Pickering.

## 2024-09-17 NOTE — DISCHARGE INSTRUCTIONS
Patient Name: Jovon Mccartney     REASON FOR ADMISSION & BRIEF DESCRIPTION OF HOSPITAL STAY:   Dear Mr. Mccartney,     You initially presented to [facility] with a chief complaint of worsening daily headache and intermittent neck pain radiating to both shoulders and left hand numbness and tingling. At [place] ED, brain and the C-spine MRI showed right parietal occipital bone with dural thickening that depressed occipital lobe.  There were also lesions on C2 and the C3 vertebral bodies, concerning for multiple myeloma versus metastatic cancer of unknown origin.  Chest/abdominal/pelvis CT showed a mass around right lung upper lobe.  You are then transferred to [facility] for possible bronchoscopy.  We consulted heme-onc, neurosurgery, and pulmonology.  Lab work was also done to rule out multiple myeloma.  We started a pain regimen of Tylenol 975 mg 3 times a day, Toradol 15 mg every 6 hours as needed, and tramadol 15 mg every 6 hours as needed.  Per pulmonology, a bronchoscopy with biopsy was also performed.  Radiation oncology was also consulted.  Because you reported the pain was not well-controlled by tramadol overnight, it was replaced by oxycodone 5 mg every 6 hours as needed.  Also held your Humira during your hospital stay.  The biopsy yesterday was consistent with malignancy.  Neurosurgery tumor board recommended no urgent radiation therapy, instead, you will have outpatient follow-up with possible palliative radiation therapy.  Pulmonology recommended outpatient consultation to medical oncology. Supportive oncology recommended change of the pain regimen to Tylenol 975 mg 3 times daily, Toradol 50 mg every 6 hours as needed, oxycodone 10 mg every 3 hour as needed and gabapentin 300 mg nightly during your inpatient stay.  Upon discharge, pain control regimen will be gabapentin, oxycodone, acetaminophen, and ibuprofen.    HOW TO TAKE CARE OF YOURSELF AFTER DISCHARGE:  -Please follow-up with all the doctors  appointment, and to take all medications as prescribed.      Medication Changes:   Medications started: Gabapentin, DuoNeb, oxycodone, pantoprazole, Miralax, sennosides-docusate  Medications stopped: Humira on hold  Medications continued: Tylenol, ibuprofen.      Appointments/Follow-up:  Future Appointments   Date Time Provider Department Center   9/23/2024  8:40 AM Melina Chacko, APRN-CNP [id number] [place]   9/26/2024 10:00 AM Anton Mcgrath MD PhD [id number] [place]   9/27/2024 10:30 AM Matilda Garcia APRN-CNP [id number] [place]   10/8/2024  3:40 PM Luis Miguel Krause MD [id number] None      Please follow up with:  -[facility] will reach out to you within a week regarding biopsy result and next steps.   -Rad oncology regarding possible radiation therapy, pending cytology result.  -Pulmonology regarding wheezing and emphysema noted on recent CT.   -PCP  -Rheumatology, Please call Dr. Ross's office regarding whether to hold or continue humira       The  Care Team!

## 2024-09-17 NOTE — ANESTHESIA PREPROCEDURE EVALUATION
Patient: Jovon Mccartney    Procedure Information       Anesthesia Start Date/Time: 09/17/24 1028    Scheduled providers: Laura Maldonado MD    Procedure: BRONCHOSCOPY    Location: [facility]            Relevant Problems   Anesthesia (within normal limits)       Clinical information reviewed:    Allergies  Meds               NPO/Void Status  Date of Last Liquid: 09/16/24  Time of Last Liquid: 0600 (sips with meds)  Date of Last Solid: 09/16/24  Time of Last Solid: 2000  Last Intake Type: Food           Past Medical History:   Diagnosis Date    Adalimumab (Humira) long-term use 09/15/2024    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  Social History     Tobacco Use    Smoking status: Every Day     Types: Cigarettes    Smokeless tobacco: Never      Current Outpatient Medications   Medication Instructions    acetaminophen (TYLENOL) 500 mg, oral, Every 6 hours PRN    adalimumab (HUMIRA PEN) 40 mg, subcutaneous, Every 14 days    ibuprofen 200 mg, oral, Every 6 hours PRN      No Known Allergies     Chemistry    Lab Results   Component Value Date/Time     09/17/2024 0548    K 4.6 09/17/2024 0548     09/17/2024 0548    CO2 26 09/17/2024 0548    BUN 27 (H) 09/17/2024 0548    CREATININE 0.64 09/17/2024 0548    Lab Results   Component Value Date/Time    CALCIUM 9.1 09/17/2024 0548    ALKPHOS 114 09/14/2024 1331    AST 13 09/14/2024 1331    ALT 14 09/14/2024 1331    BILITOT 0.7 09/14/2024 1331          Lab Results   Component Value Date/Time    WBC 9.4 09/17/2024 0548    HGB 10.7 (L) 09/17/2024 0548    HCT 31.1 (L) 09/17/2024 0548     09/17/2024 0548     Lab Results   Component Value Date/Time    PROTIME 11.6 09/14/2024 1331    INR 1.0 09/14/2024 1331     No results found for this or any previous visit (from the past 4464 hour(s)).  No results found for this or any previous visit from  "the past 1095 days.       Visit Vitals  /75   Pulse 81   Temp 36.7 °C (98.1 °F) (Temporal)   Resp 17   Ht 1.676 m (5' 6\")   Wt 54.9 kg (121 lb)   SpO2 94%   BMI 19.53 kg/m²   Smoking Status Every Day   BSA 1.6 m²        Anesthesia Evaluation      Airway   Mallampati: III  TM distance: >3 FB  Neck ROM: full  Dental      Pulmonary - normal exam   Cardiovascular - normal exam    Neuro/Psych      GI/Hepatic/Renal      Endo/Other    Abdominal  - normal exam                      Physical Exam    Airway  Mallampati: III  TM distance: >3 FB  Neck ROM: full     Cardiovascular - normal exam     Dental    Pulmonary - normal exam     Abdominal - normal exam              Anesthesia Plan    History of general anesthesia?: yes  History of complications of general anesthesia?: no    ASA 3     general     intravenous induction   Postoperative administration of opioids is intended.  Trial extubation is planned.  Anesthetic plan and risks discussed with patient.    Plan discussed with CAA.       "

## 2024-09-17 NOTE — INTERVAL H&P NOTE
H&P reviewed. The patient was examined and there are no changes to the H&P.  Denies dyspnea or other respiratory symptoms, has a history of a smokers cough that is typically dry and non productive, denies hemoptysis.

## 2024-09-17 NOTE — ANESTHESIA POSTPROCEDURE EVALUATION
Patient: Jovon Mccartney    Procedure Summary       Date: 09/17/24 Room / Location: [facility]    Anesthesia Start: 1028 Anesthesia Stop: 1221    Procedure: BRONCHOSCOPY Diagnosis: Lung cancer metastatic to brain (Multi)    Scheduled Providers: Laura Maldonado MD Responsible Provider: Edmund Narvaez DO    Anesthesia Type: general ASA Status: 3            Anesthesia Type: general    Vitals Value Taken Time   /64 09/17/24 1221   Temp 36.5 09/17/24 1221   Pulse 86 09/17/24 1221   Resp 16 09/17/24 1221   SpO2 99 09/17/24 1221       Anesthesia Post Evaluation    Patient location during evaluation: PACU  Patient participation: complete - patient participated  Level of consciousness: awake  Pain score: 0  Pain management: adequate  Airway patency: patent  Cardiovascular status: acceptable  Respiratory status: acceptable, face mask and spontaneous ventilation  Hydration status: acceptable  Postoperative Nausea and Vomiting: none        There were no known notable events for this encounter.

## 2024-09-18 ENCOUNTER — TUMOR BOARD CONFERENCE (OUTPATIENT)
Dept: HEMATOLOGY/ONCOLOGY | Facility: HOSPITAL | Age: 49
End: 2024-09-18
Payer: COMMERCIAL

## 2024-09-18 LAB
ALBUMIN SERPL BCP-MCNC: 3.4 G/DL (ref 3.4–5)
ANION GAP SERPL CALC-SCNC: 11 MMOL/L (ref 10–20)
BASOPHILS # BLD AUTO: 0.09 X10*3/UL (ref 0–0.1)
BASOPHILS NFR BLD AUTO: 0.8 %
BUN SERPL-MCNC: 24 MG/DL (ref 6–23)
CALCIUM SERPL-MCNC: 8.8 MG/DL (ref 8.6–10.6)
CHLORIDE SERPL-SCNC: 104 MMOL/L (ref 98–107)
CO2 SERPL-SCNC: 27 MMOL/L (ref 21–32)
CREAT SERPL-MCNC: 0.62 MG/DL (ref 0.5–1.3)
EGFRCR SERPLBLD CKD-EPI 2021: >90 ML/MIN/1.73M*2
EOSINOPHIL # BLD AUTO: 0.05 X10*3/UL (ref 0–0.7)
EOSINOPHIL NFR BLD AUTO: 0.4 %
ERYTHROCYTE [DISTWIDTH] IN BLOOD BY AUTOMATED COUNT: 14.6 % (ref 11.5–14.5)
GLUCOSE SERPL-MCNC: 84 MG/DL (ref 74–99)
HCT VFR BLD AUTO: 28.2 % (ref 41–52)
HGB BLD-MCNC: 9.5 G/DL (ref 13.5–17.5)
IMM GRANULOCYTES # BLD AUTO: 0.04 X10*3/UL (ref 0–0.7)
IMM GRANULOCYTES NFR BLD AUTO: 0.3 % (ref 0–0.9)
LYMPHOCYTES # BLD AUTO: 3.96 X10*3/UL (ref 1.2–4.8)
LYMPHOCYTES NFR BLD AUTO: 33.4 %
MAGNESIUM SERPL-MCNC: 2.07 MG/DL (ref 1.6–2.4)
MCH RBC QN AUTO: 31.5 PG (ref 26–34)
MCHC RBC AUTO-ENTMCNC: 33.7 G/DL (ref 32–36)
MCV RBC AUTO: 93 FL (ref 80–100)
MONOCYTES # BLD AUTO: 1.14 X10*3/UL (ref 0.1–1)
MONOCYTES NFR BLD AUTO: 9.6 %
NEUTROPHILS # BLD AUTO: 6.59 X10*3/UL (ref 1.2–7.7)
NEUTROPHILS NFR BLD AUTO: 55.5 %
NRBC BLD-RTO: 0 /100 WBCS (ref 0–0)
PHOSPHATE SERPL-MCNC: 3.3 MG/DL (ref 2.5–4.9)
PLATELET # BLD AUTO: 233 X10*3/UL (ref 150–450)
POTASSIUM SERPL-SCNC: 4.5 MMOL/L (ref 3.5–5.3)
RBC # BLD AUTO: 3.02 X10*6/UL (ref 4.5–5.9)
SODIUM SERPL-SCNC: 137 MMOL/L (ref 136–145)
WBC # BLD AUTO: 11.9 X10*3/UL (ref 4.4–11.3)

## 2024-09-18 PROCEDURE — 99497 ADVNCD CARE PLAN 30 MIN: CPT | Performed by: INTERNAL MEDICINE

## 2024-09-18 PROCEDURE — 2500000004 HC RX 250 GENERAL PHARMACY W/ HCPCS (ALT 636 FOR OP/ED): Performed by: STUDENT IN AN ORGANIZED HEALTH CARE EDUCATION/TRAINING PROGRAM

## 2024-09-18 PROCEDURE — 99232 SBSQ HOSP IP/OBS MODERATE 35: CPT

## 2024-09-18 PROCEDURE — 1210000001 HC SEMI-PRIVATE ROOM DAILY

## 2024-09-18 PROCEDURE — 36415 COLL VENOUS BLD VENIPUNCTURE: CPT

## 2024-09-18 PROCEDURE — 83735 ASSAY OF MAGNESIUM: CPT

## 2024-09-18 PROCEDURE — 2500000001 HC RX 250 WO HCPCS SELF ADMINISTERED DRUGS (ALT 637 FOR MEDICARE OP)

## 2024-09-18 PROCEDURE — 80069 RENAL FUNCTION PANEL: CPT

## 2024-09-18 PROCEDURE — 2500000004 HC RX 250 GENERAL PHARMACY W/ HCPCS (ALT 636 FOR OP/ED)

## 2024-09-18 PROCEDURE — 85025 COMPLETE CBC W/AUTO DIFF WBC: CPT

## 2024-09-18 PROCEDURE — 99222 1ST HOSP IP/OBS MODERATE 55: CPT | Performed by: PHYSICIAN ASSISTANT

## 2024-09-18 PROCEDURE — 2500000005 HC RX 250 GENERAL PHARMACY W/O HCPCS

## 2024-09-18 PROCEDURE — 99223 1ST HOSP IP/OBS HIGH 75: CPT | Performed by: INTERNAL MEDICINE

## 2024-09-18 PROCEDURE — 99232 SBSQ HOSP IP/OBS MODERATE 35: CPT | Performed by: INTERNAL MEDICINE

## 2024-09-18 RX ORDER — KETOROLAC TROMETHAMINE 15 MG/ML
15 INJECTION, SOLUTION INTRAMUSCULAR; INTRAVENOUS EVERY 6 HOURS PRN
Status: DISCONTINUED | OUTPATIENT
Start: 2024-09-18 | End: 2024-09-19 | Stop reason: HOSPADM

## 2024-09-18 RX ORDER — OXYCODONE HYDROCHLORIDE 5 MG/1
10 TABLET ORAL
Status: DISCONTINUED | OUTPATIENT
Start: 2024-09-18 | End: 2024-09-19 | Stop reason: HOSPADM

## 2024-09-18 RX ORDER — ENOXAPARIN SODIUM 100 MG/ML
40 INJECTION SUBCUTANEOUS DAILY
Status: DISCONTINUED | OUTPATIENT
Start: 2024-09-18 | End: 2024-09-19 | Stop reason: HOSPADM

## 2024-09-18 RX ORDER — GABAPENTIN 300 MG/1
300 CAPSULE ORAL NIGHTLY
Status: DISCONTINUED | OUTPATIENT
Start: 2024-09-18 | End: 2024-09-19 | Stop reason: HOSPADM

## 2024-09-18 RX ORDER — OXYCODONE HYDROCHLORIDE 5 MG/1
10 TABLET ORAL
Status: DISCONTINUED | OUTPATIENT
Start: 2024-09-18 | End: 2024-09-18

## 2024-09-18 RX ADMIN — KETOROLAC TROMETHAMINE 15 MG: 15 INJECTION, SOLUTION INTRAMUSCULAR; INTRAVENOUS at 03:34

## 2024-09-18 RX ADMIN — LIDOCAINE 1 PATCH: 4 PATCH TOPICAL at 09:36

## 2024-09-18 RX ADMIN — PANTOPRAZOLE SODIUM 40 MG: 40 TABLET, DELAYED RELEASE ORAL at 06:29

## 2024-09-18 RX ADMIN — OXYCODONE HYDROCHLORIDE 5 MG: 5 TABLET ORAL at 12:30

## 2024-09-18 RX ADMIN — KETOROLAC TROMETHAMINE 15 MG: 15 INJECTION, SOLUTION INTRAMUSCULAR; INTRAVENOUS at 09:38

## 2024-09-18 RX ADMIN — KETOROLAC TROMETHAMINE 15 MG: 15 INJECTION, SOLUTION INTRAMUSCULAR; INTRAVENOUS at 16:23

## 2024-09-18 RX ADMIN — ACETAMINOPHEN 975 MG: 325 TABLET ORAL at 12:30

## 2024-09-18 RX ADMIN — OXYCODONE HYDROCHLORIDE 5 MG: 5 TABLET ORAL at 00:10

## 2024-09-18 RX ADMIN — ENOXAPARIN SODIUM 40 MG: 100 INJECTION SUBCUTANEOUS at 18:25

## 2024-09-18 RX ADMIN — KETOROLAC TROMETHAMINE 15 MG: 15 INJECTION, SOLUTION INTRAMUSCULAR; INTRAVENOUS at 20:58

## 2024-09-18 RX ADMIN — GABAPENTIN 300 MG: 300 CAPSULE ORAL at 20:58

## 2024-09-18 RX ADMIN — SODIUM CHLORIDE, POTASSIUM CHLORIDE, SODIUM LACTATE AND CALCIUM CHLORIDE 100 ML/HR: 600; 310; 30; 20 INJECTION, SOLUTION INTRAVENOUS at 12:31

## 2024-09-18 RX ADMIN — OXYCODONE HYDROCHLORIDE 10 MG: 5 TABLET ORAL at 18:25

## 2024-09-18 RX ADMIN — OXYCODONE HYDROCHLORIDE 5 MG: 5 TABLET ORAL at 06:29

## 2024-09-18 RX ADMIN — ACETAMINOPHEN 975 MG: 325 TABLET ORAL at 20:58

## 2024-09-18 RX ADMIN — ACETAMINOPHEN 975 MG: 325 TABLET ORAL at 06:29

## 2024-09-18 RX ADMIN — SODIUM CHLORIDE, POTASSIUM CHLORIDE, SODIUM LACTATE AND CALCIUM CHLORIDE 100 ML/HR: 600; 310; 30; 20 INJECTION, SOLUTION INTRAVENOUS at 00:10

## 2024-09-18 RX ADMIN — SODIUM CHLORIDE, POTASSIUM CHLORIDE, SODIUM LACTATE AND CALCIUM CHLORIDE 100 ML/HR: 600; 310; 30; 20 INJECTION, SOLUTION INTRAVENOUS at 19:42

## 2024-09-18 ASSESSMENT — PAIN DESCRIPTION - ORIENTATION
ORIENTATION: POSTERIOR
ORIENTATION: MID;POSTERIOR

## 2024-09-18 ASSESSMENT — PAIN SCALES - GENERAL
PAINLEVEL_OUTOF10: 2
PAINLEVEL_OUTOF10: 0 - NO PAIN
PAINLEVEL_OUTOF10: 5 - MODERATE PAIN
PAINLEVEL_OUTOF10: 0 - NO PAIN
PAINLEVEL_OUTOF10: 4
PAINLEVEL_OUTOF10: 3
PAINLEVEL_OUTOF10: 6
PAINLEVEL_OUTOF10: 2
PAINLEVEL_OUTOF10: 6
PAINLEVEL_OUTOF10: 3
PAINLEVEL_OUTOF10: 5 - MODERATE PAIN
PAINLEVEL_OUTOF10: 4

## 2024-09-18 ASSESSMENT — PAIN SCALES - PAIN ASSESSMENT IN ADVANCED DEMENTIA (PAINAD)
TOTALSCORE: MEDICATION (SEE MAR)

## 2024-09-18 ASSESSMENT — PAIN - FUNCTIONAL ASSESSMENT
PAIN_FUNCTIONAL_ASSESSMENT: 0-10

## 2024-09-18 ASSESSMENT — PAIN DESCRIPTION - DESCRIPTORS: DESCRIPTORS: TIGHTNESS

## 2024-09-18 ASSESSMENT — COGNITIVE AND FUNCTIONAL STATUS - GENERAL
DAILY ACTIVITIY SCORE: 24
MOBILITY SCORE: 24

## 2024-09-18 ASSESSMENT — PAIN DESCRIPTION - LOCATION
LOCATION: NECK

## 2024-09-18 NOTE — CONSULTS
Radiation Oncology Inpatient Consult    Patient Name:  Jovon Mccartney  MRN:  [id number]  :  1975    Referring Provider: Toni Pickering MD  Primary Care Provider: Manolo Ross MD  Care Team: Patient Care Team:  Manolo Ross MD as PCP - General    Date of Service: 2024    SUBJECTIVE  History of Present Illness:  This is a 49-year-old male with a history of rheumatoid arthritis and tobacco dependence, that presents with 2-month history of progressive right occipital pain with radiation into the neck and shoulders.  MRI of the brain and cervical spine no findings of an ill-defined enhancing lesion centered in the right parieto-occipital bone, with mass effect on the right supra lobe.  There are ill-defined marrow replacing lesions within C2 and C3.  Follow-up imaging of the chest and abdomen identify additional abnormalities, including a right hilar soft tissue mass, encasing the right mainstem bronchus extending along the interlobular bronchioles.  Mediastinal adenopathy possible liver and adrenal metastases.  On  the patient underwent bronchoscopy with EBUS.  Pathology remains pending.  Radiation oncology has been asked to assist with potential care coordination.    Today, the patient reports an improvement in headaches with pain medications.  He again endorses pain over a period of 2, possibly 3 months.  He denies any focal deficits.  He has had some tingling in his fingers, more so on the right.  He has had some bicep pain on the right additionally has improved with pain medications.  No chest pain, no cough or difficulty with breathing.  No hemoptysis.  No abdominal pain.  No arrhythmias or dyspnea.  He is currently being considered for discharge.    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     Family History:  Cancer-related family history is not on file.    Social History:    Social History     Tobacco Use    Smoking status: Every Day     Types: Cigarettes    Smokeless tobacco: Never     Is smoked for 30 years, averaging 1 to 2 packs/day.  He is , has adult children, receives disability for rheumatoid arthritis, lives in [place]    Allergies:  No Known Allergies     Medications:    Current Facility-Administered Medications:     acetaminophen (Tylenol) tablet 975 mg, 975 mg, oral, TID, Jennifer Fong MD, 975 mg at 09/18/24 1230    ketorolac (Toradol) injection 15 mg, 15 mg, intravenous, q6h PRN, Nicole Romo MD, 15 mg at 09/18/24 0938    lactated Ringer's infusion, 100 mL/hr, intravenous, Continuous, Edmund Narvaez DO, Last Rate: 100 mL/hr at 09/18/24 1231, 100 mL/hr at 09/18/24 1231    lidocaine 4 % patch 1 patch, 1 patch, transdermal, Daily, Nicole Romo MD, 1 patch at 09/18/24 0936    ondansetron (Zofran) injection 4 mg, 4 mg, intravenous, Once PRN, Edmund Narvaez DO    oxyCODONE (Roxicodone) immediate release tablet 5 mg, 5 mg, oral, q6h PRN, Thom Corado MD, 5 mg at 09/18/24 1230    pantoprazole (ProtoNix) EC tablet 40 mg, 40 mg, oral, Daily before breakfast, Jennifer Fong MD, 40 mg at 09/18/24 0629    polyethylene glycol (Glycolax, Miralax) packet 34 g, 34 g, oral, Daily PRN, Thom Corado MD    sennosides-docusate sodium (Yoselin-Colace) 8.6-50 mg per tablet 1 tablet, 1 tablet, oral, Nightly PRN, Thom Corado MD      Review of Systems:    As per HPI, additionally negative for seizures or history of CVA CAD or cardiac disease, longtime smoker history of COPD, no abdominal complaints, no specific changes in bowel or urinary function, no abnormal bleeding.    Performance Status:  The Karnofsky performance scale today is 90, Able to carry on normal  "activity; minor signs or symptoms of disease (ECOG equivalent 0).        OBJECTIVE  Physical Exam:  /72   Pulse 79   Temp 36.8 °C (98.2 °F)   Resp 17   Ht 1.676 m (5' 6\")   Wt 54.9 kg (121 lb)   SpO2 94%   BMI 19.53 kg/m²    General: awake, alert, resting comfortably, well developed and well nourished in appearance  HEENT: pupils equal and round, no scleral icterus, firm right occipital skull mass without tenderness, skin intact  Pulmonary: Breathing comfortably at rest   Cardiac: regular rate  Abdomen: soft, nondistended, non tender to palpation   EXT: no peripheral edema, no asymmetry noted  MSK: Chronic rheumatoid changes to hands and fingers  Neuro: A&O x 3, cranial nerves II through XII grossly intact, sensation and strength intact  Psych: Normal affect     Laboratory Review:         9/18/24 0734  CBC and Auto Differential  Collected: 09/18/24 0542  Final result  Specimen: Blood, Venous    WBC 11.9 High  x10*3/uL Immature Granulocytes %, Automated 0.3 %    nRBC 0.0 /100 WBCs Lymphocytes % 33.4 %   RBC 3.02 Low  x10*6/uL Monocytes % 9.6 %   Hemoglobin 9.5 Low  g/dL Eosinophils % 0.4 %   Hematocrit 28.2 Low  % Basophils % 0.8 %   MCV 93 fL Neutrophils Absolute 6.59 x10*3/uL    MCH 31.5 pg Immature Granulocytes Absolute, Automated 0.04 x10*3/uL   MCHC 33.7 g/dL Lymphocytes Absolute 3.96 x10*3/uL   RDW 14.6 High  % Monocytes Absolute 1.14 High  x10*3/uL   Platelets 233 x10*3/uL Eosinophils Absolute 0.05 x10*3/uL   Neutrophils % 55.5 % Basophils Absolute 0.09 x10*3/uL              9/18/24 0725  Renal Function Panel  Collected: 09/18/24 0542  Final result  Specimen: Blood, Venous    Glucose 84 mg/dL Urea Nitrogen 24 High  mg/dL   Sodium 137 mmol/L Creatinine 0.62 mg/dL   Potassium 4.5 mmol/L eGFR >90 mL/min/1.73m*2    Chloride 104 mmol/L Calcium 8.8 mg/dL   Bicarbonate 27 mmol/L Phosphorus 3.3 mg/dL    Anion Gap 11 mmol/L Albumin 3.4 g/dL        Serum Protein Electrophoresis + Immunofixation  Order: " [id number] - Part of Panel Order [id number]   Status: Final result       Visible to patient: No (inaccessible in  MyChart)    0 Result Notes      Component  Ref Range & Units 8 d ago   Albumin  3.4 - 5.0 g/dL 3.4   Alpha 1 Globulin  0.2 - 0.6 g/dL 0.4   Alpha 2 Globulin  0.4 - 1.1 g/dL 0.8   Beta Globulin  0.5 - 1.2 g/dL 0.9   Gamma  0.5 - 1.4 g/dL 1.2   Protein Electrophoresis Comment Normal.   Immunofixation Comment No monoclonal protein detected by immunofixation.   Path Review - Serum Protein Electrophoresis Reviewed and approved by WILLA LÓPEZ on 9/14/24 at 1:07 PM.     Path Review - Serum Immunofixation Reviewed and approved by WILLA LÓPEZ on 9/14/24 at 1:07 PM.            Pathology Review:   Pathology from bronchoscopy on 9/17 is pending    Imaging:    IMPRESSION:  MRI brain:      1. There is an ill-defined enhancing lesion centered within the right  parieto-occipital bone, concerning for multiple myeloma or metastatic  disease. There is abutment and anterior displacement of the  underlying dura with mass effect upon the right occipital lobe. There  is no midline shift. There is thickening and enhancement of the  underlying and surrounding dura, pachymeningeal involvement can not  be entirely excluded. Otherwise, no evidence of abnormal  intraparenchymal metastatic disease to suggest neoplastic involvement.  2. Nonspecific moderate supratentorial and infratentorial white  matter signal abnormalities. Differential considerations include  demyelination or chronic microvascular ischemic disease amongst  others. Note, however this pattern is not specific for demyelination.      MRI cervical spine:      1. Ill-defined marrow replacing lesions within the C2 and C3  vertebral bodies corresponding to the lytic foci seen on the same-day  CT cervical spine. The constellation of findings are suggestive of  metastatic disease or multiple myeloma. There is no evidence of an  extraosseous soft tissue component.  2.  Multilevel degenerative changes of the cervical spine.    CT CAP w/ contrast  IMPRESSION:  1. Masslike enhancing soft tissue thickening within the right hilar  region which encases the right mainstem bronchus and abuts the right  main pulmonary artery, suspicious for neoplasm. This soft tissue  thickening additionally extends along the interlobular bronchials.  Further evaluation with tissue sampling or PET-CT is recommended.  2. Nodular thickening of the right pleura is suspicious for disease  with possible direct lymphangitic carcinomatosis as well in the right  infrahilar region.  3. Few prominent mediastinal lymph nodes are described above.  4. Permeative appearing lesion involving the right scapula is  suspicious for metastatic disease.  5. Abdominal findings include too small to characterize hypodense  lesions in the liver as well as adrenal thickening, potentially  adenomas. Further characterization by abdominal MRI is recommended  given the thoracic findings.  6. Moderate pericardial effusion.  7. Severe paraseptal emphysematous changes predominantly affecting  the lung apices.         ASSESSMENT:  This is a 49-year-old male with a history of rheumatoid arthritis and tobacco dependence, that presents with 2-month history of progressive right occipital pain with radiation into the neck and shoulders.  MRI of the brain and cervical spine no findings of an ill-defined enhancing lesion centered in the right parieto-occipital bone, with mass effect on the right supra lobe.  There are ill-defined marrow replacing lesions within C2 and C3.  Follow-up imaging of the chest and abdomen identify additional abnormalities, including a right hilar soft tissue mass, encasing the right mainstem bronchus extending along the interlobular bronchioles.  Mediastinal adenopathy possible liver and adrenal metastases.  On 9/17 the patient underwent bronchoscopy with EBUS.  Pathology remains pending.  Radiation oncology has been  asked to assist with potential care coordination.    PLAN:    The patient was discussed with my attending physician, Dr. Mcgrath.     Imaging results and indications for palliative radiotherapy were discussed with the patient.  The patient's pain has currently improved with pain medications.  He does not report experiencing any specific neurologic deficits.  No complaints of chest pain or difficulty breathing.  No hemoptysis.  Pathology is currently pending, and the patient is being considered for discharge to home.    MRI brain and spine were additionally reviewed at CNS tumor board earlier this morning.  The patient has been recommended for simple palliative radiotherapy to right occipital mass and possibly cervical spine.  He has requested follow-up at [facility].    -No plans for emergent RT  -Tentative plans for palliative RT to RO calvarial mass, +/- Cervical spine, possibly with SBRT  -Follow-up visit will be arranged with Dr. Mcgrath at [facility], in 1 to 2 weeks to review      I spent 65 minutes in the professional and overall care of this patient.

## 2024-09-18 NOTE — TUMOR BOARD NOTE
CNS Tumor Board Recommendations       Patient was presented by Dr. Evert Esparza at our CNS Tumor Board on 09/18/24 which included representatives from Radiation oncology, Surgical oncology, Neuro-oncology, Pathology, Radiology, Research, Neurosurgery, Social Work (Neurosurgery).     Current patient presents with history of the following treatment history: PMH Juvenile RA on Humera who presented with headache and neck pain. MRI brain and C spine with enhancing lesion within R parieto-occipital bone and vertebral lesion of C2 and C3 concerning for metastatic disease. CT chest with RUL hilar mass, mediastinal lymph node 1.1cm, subcarinal node 1.1cm, left axillary lymph node 1.4cm now s/p bronchoscopy with pathology pending.     The CNS Tumor Board tumor board considered available treatment options and made the following recommendations: Pending cytology but may be considered for palliative RT.     Clinical Trial Status: N/A     National site-specific guidelines were discussed with respect to the case.

## 2024-09-18 NOTE — PROGRESS NOTES
"Jovon Mccartney is a 49 y.o. male on day 4 of admission presenting with Hilar mass.    Subjective   - No current complaints or shortness of breath  - S/p EBUS on 9/17 with SHILOH concerning for malignancy       Objective     Physical Exam  Gen: thin, alert and oriented  CV: no MRG  Pulm: CTA b/l, mild RLL insp crackles  Extremities: joint deformities  Neuro: AAOx3, no FNDs  Last Recorded Vitals  Blood pressure 109/72, pulse 79, temperature 36.8 °C (98.2 °F), resp. rate 17, height 1.676 m (5' 6\"), weight 54.9 kg (121 lb), SpO2 94%.  Intake/Output last 3 Shifts:  I/O last 3 completed shifts:  In: 2203.3 (40.1 mL/kg) [P.O.:400; I.V.:1803.3 (32.9 mL/kg)]  Out: - (0 mL/kg)   Weight: 54.9 kg     Relevant Results                              Assessment/Plan   Assessment & Plan  Hilar mass    Lesion of parietal bone    Lesion of bone of cervical spine    Adalimumab (Humira) long-term use    Juvenile rheumatoid arthritis (Multi)    High total serum IgM    Mr. Mccartney is a 50 y/o male with a history of juvenile RA (on Humira) who initially presented to [facility] for HA/neck pain f/t/h multiple vertebral masses, enhancing lesion in R parietal/occipital bone, and R-sided lung mass. Pulmonology consulted for findings on CT chest.      Mr. Mccartney lung mass is highly concerning for malignancy. He is now s/p bronchoscopy with transbronchial FNA of R hilar mass and lymph node staging w/ preliminary results concerning for malignancy in 4L and 11L.      Recommendations:  - Okay for discharge from pulmonary stand point.  - Would recommend outpatient consultation to medical oncology  - Pulmonary will sign off, please reach back with any questions or concerns.           I spent 30 minutes in the professional and overall care of this patient.    Case seen and discussed with Dr. Mark Bell MD      "

## 2024-09-18 NOTE — PROGRESS NOTES
Jovon Mccartney is a 49-year-old male with PMHx of tobacco use (1 PPD x 39 years) and juvenile RA who presented to [facility] ED on 9/9/24 with subacute intractable headaches with associated cervical pain who is found to have parietal lytic bone lesion and multiple cervical and appendicular lytic lesions on MRI with such findings concerning for multiple myeloma vs metastasis from unknown primary. CT chest for evaluation of lytic lesions and metastatic disease showed a RUL hilar/mediastinal mass not well visualized on noncontrasted imaging but suspicious for possible bronchogenic carcinoma. Patient is transferred here for further evaluation including bronchoscopy to evaluate lung mass.       Subjective     No acute events overnight.  Patient complained of headache and radiating shoulder pain. Patient also felt oxy did not help either, and would like to switch to a different pain medication.  Patient had one BM overnight. Denied chest pain, shortness of breath, abdominal pain, diarrhea or constipation.         Objective     Vital stable overnight.        Intake/Output Summary (Last 24 hours) at 9/18/2024 0921  Last data filed at 9/18/2024 0010  Gross per 24 hour   Intake 2203.33 ml   Output --   Net 2203.33 ml         Revised Cardiac Risk Index for Pre-Operative Risk    3.9 %  30-day risk of death, MI, or cardiac arrest.    [facility] Activity Status Index (DASI)    25.2 points  The higher the score (maximum 58.2), the higher the functional status.  5.84 METs    Physical Exam      Gen: NAD, alert, interactive  HEENT: NC/AT PERRL, MMM, saccadic overshoot nystagmus both eyes, right parietal pain.   Resp: normal respiratory effort, no wheezing or rhonchi, no rales, decreased breath sounds bilaterally.   CV: RRR, normal S1/S2  no m/r/g   Abdomen: Soft non distended, non tender, normal bowel sounds, no masses  Extremities: Normal tone, and ROM, lower extremity strength 5/5, no lower extremity edema, deformities present in both hands and  feet due to RA  Neuro: Alert and Oriented x 3     Assessment/Plan          9/18 update    -Patient had bronchoscopy/biopsy yesterday, and the tissue was consistent with malignancy.  -consulted NSGY, tumor board meeting is okay with outpatient follow-up, pending cytology, can consider palliative radiation therapy.  -Consulted pulm, okay to discharge and recommend outpatient consultation to medical oncology.  -Consult supportive onc for pain management, recs oxy 10 mg Q3 hr, also add gabapentin 300 mg POQ HS, cont tylenol and Toradol   -Hgb (9.5) today, downtrending, CTM  -Consulted rad oncology, recommended malignant hem consult, hold for now per attending.   -current pain regimen is adjusted to Tylenol 975mg TID, Toradol 15mg q6 prn, oxy 10 mg Q3 hr prn, gabapentin 300 mg PO qhs  -RCRI and DACI done per neurosurgery   -humira on hold    # Possible RUL hilar mass not well-seen on noncontrast CT. Suspicious for bronchogenic carcinoma  # R Parietal lytic bone lesion c/f multiple myeloma  # C2-C3 and appendicular lytic lesions  # Intractable headache w/cervical pain  # Nicotine dependency  -He is mildly anemic however this dates back to 2019, no evidence of renal impairment or hypercalcemia. OSH sent for SPEP, UPEP, serum FLC, quantitative immunoglobulins  PLAN  -Consulted heme /onc, appreciate recs  -Consulted NSGY, appreciate recs  -Consulted pulmonology for Bronch w/biopsy, appreciate recs  -SPEP, UPEP, serum FLC, quantitative immunoglobulins all wnl except IgM and IgG Kappa FLC  - on smoking cessation     # Incidental moderate-sized pericardial effusion on CT  HDS. Asymptomatic. Low threshold to obtain TTE if develops a change in clinical status suggestive of tamponade physiology (muffled heart sounds, distended neck veins, hypotension) or new onset dyspnea.  PLAN  -Monitor for now. Low threshold to obtain TTE.     # Severe bullous/paraseptal emphysema on CT/COPD without acute exacerbation.  -No PMHx of COPD  or history of exacerbations. Active smoker.   PLAN  -Refer for OP pulmonology for COPD maintenance therapy  -Smoking cessation counseling     #Juvenile RA  -cont Humira home dose, on hold      Fluids: Replete PRN  Electrolytes: Keep mg >2, phos >3  and K >4  Nutrition:  regular  Antimicrobials: n/a  DVT Prophylaxis: Lovenox   GI ppx: PPI  Bowel care: miralax  Code Status: Full code, confirmed on admission  NOK: Judith (wife) [phone]     Patient and plan discussed with attending physician Dr. Pickering.

## 2024-09-18 NOTE — SIGNIFICANT EVENT
Neurosurgery will sign off at this time. Tumor board recommended radiation pending pathology. No neurosurgical intervention. Please page [phone] with any questions or concerns

## 2024-09-18 NOTE — CONSULTS
SUPPORTIVE AND PALLIATIVE ONCOLOGY CONSULT    Inpatient consult to [facility] Adult Supportive Oncology  Consult performed by: Parisa Holguin MD  Consult ordered by: Toni Pickering MD MPH        SERVICE DATE: 9/18/2024      PALLIATIVE MEDICINE OUTPATIENT PROVIDER:  None  CURRENT ATTENDING PROVIDER: Toni Pickering MD *     Medical Oncologist: No care team member to display   Radiation Oncologist: No care team member to display  Primary Physician: Manolo Ross  [phone]    REASON FOR CONSULT/CHIEF CONSULT COMPLAINT: pain management    Subjective   HISTORY OF PRESENT ILLNESS: Jovon Mccartney is a 49 y.o. male with history of juvenile rheumatoid arthritis, tobacco use presented to [facility] ED on 9/9/24 with intractable headaches with associated cervical pain and found to have parietal lytic bone lesion and multiple cervical and appendicular lytic lesions, right upper lung hilar/mediastinal mass suspicious for bronchogenic carcinoma and patient transferred to [facility] for further evaluation including bronchoscopy.  Patient had bronchoscopy with biopsy on 9/17 with preliminary consistent with malignancy pending pathology.  Plan outpatient oncology and radiation oncology visit.  Supportive oncology consulted for pain management    Pain Assessment:  Complaints of pain upper neck X 2 months 10/10 sharp radiating to bilateral shoulders right upper arm left lower forearm with numbness tingling in the forearm.  At home he was taking ibuprofen and Tylenol with partial relief.  In the hospital Toradol and Tylenol alternately helped and decreased to 5-6/10 and feels oxycodone 5 mg IR dose does not help at all.  Onset: Months  Location:  As above  Duration: Constant  Characteristics:   Rating: 10 without medications   descriptors: sharp   Aggravating: movement    Relieving:  Meds as above   Intolerances:Jovon Mccartney has No Known Allergies.   Personal Pain Goal: 4    Interference with Function: A little   Coping Strategies:  none   Emotional Response: None   Barriers to Pain Management: None    Opioid Use  Past 24 h opioid use:   Oxycodone IR 5 mg p.o. every 3 hours as needed.  Used 2 doses/24 hours= 12.5 mg OME  Total 24h OME use: 12.5 mg OME    Note: OME calculations based on equianalgesic table below. Please note this table is based on best available evidence but conversions are still approximate. These are NOT opioid DOSES for individual patient use; this is equivalency information.  Drug Parenteral Enteral   Morphine 10 25   Oxycodone N/A 20   Hydromorphone 2 5   Fentanyl 0.15 N/A   Tramadol N/A 120   Citation: Lucia BRYANT. Demystifying opioid conversion calculations: A guide for effective dosing, Second edition. [place]: American Society of Health-System Pharmacists, 2018.    OARRS/PDMP reviewed  no aberrant behavior noted.    Symptom Assessment:  Pain: As above  Headache: somewhat  Dizziness:none  Lack of energy: a little  Difficulty sleeping: none  Worrying: none  Anxiety: a little  Depression: none  Pain in mouth/swallowing: none  Dry mouth: none  Taste changes: none  Shortness of breath: none  Lack of appetite: a little   Nausea: none  Vomiting: none  Constipation: none  Diarrhea: none  Sore muscles: none  Numbness or tingling in hands/feet/other: a little  Weight loss: a little  Other: none        Information obtained from: chart review, interview of patient, and discussion with primary team  ______________________________________________________________________     Oncology History    No history exists.       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  No family history on file.     SOCIAL HISTORY:  Social History:  reports that he has been smoking cigarettes. He has never used smokeless tobacco.    REVIEW OF SYSTEMS:  Review of  systems negative unless noted in HPI.       Objective       Lab Results   Component Value Date    WBC 11.9 (H) 09/18/2024    HGB 9.5 (L) 09/18/2024    HCT 28.2 (L) 09/18/2024    MCV 93 09/18/2024     09/18/2024      Lab Results   Component Value Date    GLUCOSE 84 09/18/2024    CALCIUM 8.8 09/18/2024     09/18/2024    K 4.5 09/18/2024    CO2 27 09/18/2024     09/18/2024    BUN 24 (H) 09/18/2024    CREATININE 0.62 09/18/2024     Lab Results   Component Value Date    ALT 14 09/14/2024    AST 13 09/14/2024    ALKPHOS 114 09/14/2024    BILITOT 0.7 09/14/2024     Estimated Creatinine Clearance: 110.7 mL/min (by C-G formula based on SCr of 0.62 mg/dL).     CT chest abdomen pelvis w IV contrast   Final Result   1. Masslike enhancing soft tissue thickening within the right hilar   region which encases the right mainstem bronchus and abuts the right   main pulmonary artery, suspicious for neoplasm. This soft tissue   thickening additionally extends along the interlobular bronchials.   Further evaluation with tissue sampling or PET-CT is recommended.   2. Nodular thickening of the right pleura is suspicious for disease   with possible direct lymphangitic carcinomatosis as well in the right   infrahilar region.   3. Few prominent mediastinal lymph nodes are described above.   4. Permeative appearing lesion involving the right scapula is   suspicious for metastatic disease.   5. Abdominal findings include too small to characterize hypodense   lesions in the liver as well as adrenal thickening, potentially   adenomas. Further characterization by abdominal MRI is recommended   given the thoracic findings.   6. Moderate pericardial effusion.   7. Severe paraseptal emphysematous changes predominantly affecting   the lung apices.             I personally reviewed the image(s)/study and resident interpretation.   I agree with the findings as stated by resident Jer Tamez.   Data analyzed and images  interpreted at [facility], [place], [place].        MACRO:   None        Signed by: Chicho Paige 9/15/2024 8:42 AM   Dictation workstation:   [id number]            No results found for this or any previous visit (from the past 4464 hour(s)).  Wt Readings from Last 5 Encounters:   09/14/24 54.9 kg (121 lb)   09/09/24 58.5 kg (129 lb)       Current Outpatient Medications   Medication Instructions    acetaminophen (TYLENOL) 500 mg, oral, Every 6 hours PRN    adalimumab (HUMIRA PEN) 40 mg, subcutaneous, Every 14 days    ibuprofen 200 mg, oral, Every 6 hours PRN     Scheduled medications   acetaminophen, 975 mg, oral, TID  lidocaine, 1 patch, transdermal, Daily  pantoprazole, 40 mg, oral, Daily before breakfast      Continuous medications  lactated Ringer's, 100 mL/hr, Last Rate: 100 mL/hr (09/18/24 1231)      PRN medications  ketorolac, 15 mg, q6h PRN  ondansetron, 4 mg, Once PRN  oxyCODONE, 5 mg, q6h PRN  polyethylene glycol, 34 g, Daily PRN  sennosides-docusate sodium, 1 tablet, Nightly PRN         Allergies: No Known Allergies             PHYSICAL EXAMINATION:  Vital Signs:   Vital signs reviewed  Vitals:    09/18/24 1434   BP: 118/74   Pulse: 75   Resp:    Temp: 37 °C (98.6 °F)   SpO2: 95%     Pain Score: 5 - Moderate pain     Physical Exam  Constitutional:       Appearance: Normal appearance.   HENT:      Head: Normocephalic and atraumatic.      Right Ear: Tympanic membrane normal.      Left Ear: Tympanic membrane normal.      Nose: Nose normal.      Mouth/Throat:      Mouth: Mucous membranes are moist.   Eyes:      Extraocular Movements: Extraocular movements intact.      Conjunctiva/sclera: Conjunctivae normal.      Pupils: Pupils are equal, round, and reactive to light.   Cardiovascular:      Rate and Rhythm: Normal rate and regular rhythm.      Heart sounds: Normal heart sounds.   Pulmonary:      Effort: Pulmonary effort is normal.      Breath sounds: Normal breath  sounds.   Abdominal:      General: Abdomen is flat.      Palpations: Abdomen is soft.   Musculoskeletal:         General: Normal range of motion.      Cervical back: Normal range of motion and neck supple.      Comments: RA deformity hands   Skin:     General: Skin is warm and dry.   Neurological:      General: No focal deficit present.      Mental Status: He is alert.   Psychiatric:         Mood and Affect: Mood normal.       ASSESSMENT/PLAN:  49 y.o. male with history of juvenile rheumatoid arthritis, tobacco use presented to [facility] ED on 9/9/24 with intractable headaches with associated cervical pain and found to have parietal lytic bone lesion and multiple cervical and appendicular lytic lesions, right upper lung hilar/mediastinal mass suspicious for bronchogenic carcinoma and patient transferred to [facility] for further evaluation including bronchoscopy.  Patient had bronchoscopy with biopsy on 9/17 with preliminary consistent with malignancy pending pathology.  Plan outpatient oncology and radiation oncology visit.  Supportive oncology consulted for pain management.    Chronic neoplasm related pain.  Somatic, neuropathic, visceral.  Uncontrolled  CT chest abdomen: Mass right hilar region encasing the right mainstem bronchus and extends along the interlobular bronchioles suspicious for neoplasm, nodular thickening right pleura with possible lymphangitic carcinomatosis, mediastinal lymphadenopathy right scapula mets, possible liver and adrenal adenomas, moderate pericardial effusion.    MRI brain and cervical spine: Right parieto-occipital bone lesion, C2-C3 mets, multilevel DJD cervical spine  OARRS/PDMP reviewed no aberrant behavior noted.consistent with  prescriptions/records and patient history   Pain is: cancer related pain  Type: visceral, somatic, and neuropathic  Pain control: sub-optimally controlled  Home regimen:    Tylenol and ibuprofen alternate  Intolerances/previously tried: None  Personalized pain  goal: 3  Total OME usage for the past 24 hours: 12.5    Recommend gabapentin 300 mg p.o. nightly starting tonight.  Creatinine clearance 98 ml/min  Recommend increasing oxycodone IR from 5 mg to 10 mg every 3 hours as needed for pain since oxycodone 5 mg is not helping at all.  Received 2 doses of oxycodone IR 5 mg / 24 hours  Recommend ibuprofen 600 mg p.o. 3 times daily scheduled in anticipation of discharge  Continue lidocaine patch  Continue ketorolac 15 mg IV every 6 hours as needed.  Can be stopped tomorrow  Continue to monitor pain scores and administer PRN medications as appropriate  Continue/initiate nonpharmacologic pain management strategies including ice/heat therapy, distraction techniques, deep breathing/relaxation techniques, calming music, and repositioning  Continue to monitor for signs of opioid efficacy (pain scores, improved functionality) and toxicity (pinpoint pupils, excess sedation/drowsiness/confusion, respiratory depression, etc.)    At risk for nausea without vomiting related to opioids Well-controlled  Home regimen: None  Continue PPI  Monitor    At risk for constipation related to opioids, currently not constipated  Usual bowel pattern: every other day  Home regimen: none  LBM today  Continue MiraLAX 34 g p.o. daily as needed  Continue senna 1 tab p.o. nightly as needed  Warm water  Prune juice  Encourage mobility as tolerated, PT/OT following   Monitor BM frequency, adjust regimen as needed  Goal to have BM without straining q48-72h    Medical Decision Making/Goals of Care/Advance Care Planning:  Introduction to Supportive and Palliative Oncology:  Introduced the role and philosophy of Supportive and Palliative oncology in the evaluation and management of symptoms during cancer treatment  Palliative care was introduced as a service for patients with serious illness to help with symptoms, assist with goals of care conversations, navigate complex decision making, improve quality of life  for patients, and provide support both patients and families.  Patient seemed to appreciate the extra layer of support.     The patient and/or family consented to a voluntary Advance Care Planning conversation. Individuals present for the conversation: Patient    Summary of the conversation: Patient's current clinical condition, including diagnosis, prognosis, and management plan, and goals of care were discussed.     Life limiting disease: metastatic malignancy  Family: Has 2 adult children  Performance status: Independent with ADLs and IADLs  Joys/meaning/strength: Family and outdoors  Understanding of health: Understands that he was diagnosed with cancer and plan is to follow-up with oncology as outpatient and receive further cancer directed treatment  Information:Wants full disclosure  Goals: symptom control and cancer directed therapy  Worries and fears now and future: Many things  Minimum acceptable outcome/QOL: Intact cognition, communication, independence  Code status discussion:  We specifically discussed code status.  I explained that I have this conversation with all my patients so that their wishes can be respected in case of emergency and also to ease the burden on caregivers to make those decisions in the emergency.I explained that since he is in the hospital, it is important to discuss wishes for care during times when he is unable to tell us, so that we can provide the care that he would want geno in the circumstance that he were to become very sick and his heart were to stop.  We discussed his wishes regarding intubation/CPR in case of cardiopulmonary arrest. We discussed risks of CPR.  He stated that she wanted to be full code for now but doesn't want to be in a vegetative state with brain not functioning, not being aware of surroundings and not being able to communicate. This wouldn't be quality of life and would only cause suffering. Pt agreed to undergo short term ventilation and  expressed that  he would not want long term mechanical ventilation as this would greatly affect his quality of life.    Advance Directives  Existence of Advance Directives:No - not interested  Decision maker: Surrogate decision maker is wife  Code Status: Full code with limitations as above    Outcome of the conversation and documents completed: Full code with limitations established as above    Supportive Interventions: Will involve interdisciplinary team as needed    Disposition:  Please  start the process of having prior authorization with meds to beds deliver medications to patient prior to discharge via [facility] pharmacy. Prescriptions will need to be sent 48-72 hours prior to discharge so that a prior authorization can be completed.     Discharge date: unknown pending acute issues and pain control  Will request an appointment with Outpatient Supportive Oncology as appropriate    SIGNATURE AND BILLING:    High Complexity   Today, I am treating the patient for acute on chronic pain which is in severe exacerbation    Extensive DATA   Reviewed records from the following unique sources:  providers from oncology services  Diagnostic tests and information reviewed for today's visit:  Most recent labs and imaging results, Medications  Independently interpreted test CBC, CMP, CT chest abdomen, MRI brain and cervical spine which shows leukocytosis, anemia.,  Mass right hilar region encasing the right mainstem bronchus and extends along the interlobular bronchioles suspicious for neoplasm, nodular thickening right pleura with possible lymphangitic carcinomatosis, mediastinal lymphadenopathy right scapula mets, possible liver and adrenal adenomas, moderate pericardial effusion.  Right parieto-occipital bone lesion, C2-C3 mets, multilevel DJD cervical spine  Discussed plan of Care/management of pain with: Provider and Patient via shared electronic medical record/secure chat/email or face-to-face.    Changes to plan indicated in bold.      Thank you for asking Supportive and Palliative Oncology to assist with care of this patient. Recommendations will be communicated back to the consulting service by way of shared electronic medical record/secure chat/email or face-to-face.  We will continue to follow.  Please contact us for additional questions or concerns.    Medical complexity was high level due to due to complexity of problems, extensive data review, and high risk of management/treatment.     I spent 30 minutes providing separately identifiable ACP services/goals of care discussion with the patient and/or surrogate decision maker in a voluntary, in-person conversation discussing the patient's wishes and goals (discussing pt's beliefs, values and goals/wishes for aggressive medical care, desire for hospice vs palliative care), counseling including explainaition as detailed in the above note.    SIGNATURE: Parisa Holguin MD   PAGER/CONTACT:  Contact information:  Supportive and Palliative Oncology  Monday-Friday 8 AM-5 PM  Epic Secure chat or pager [phone].  After hours and weekends:  pager [phone]

## 2024-09-19 ENCOUNTER — PHARMACY VISIT (OUTPATIENT)
Dept: PHARMACY | Facility: CLINIC | Age: 49
End: 2024-09-19
Payer: COMMERCIAL

## 2024-09-19 VITALS
RESPIRATION RATE: 16 BRPM | BODY MASS INDEX: 19.44 KG/M2 | HEIGHT: 66 IN | TEMPERATURE: 98.2 F | HEART RATE: 94 BPM | OXYGEN SATURATION: 94 % | WEIGHT: 121 LBS | DIASTOLIC BLOOD PRESSURE: 74 MMHG | SYSTOLIC BLOOD PRESSURE: 129 MMHG

## 2024-09-19 LAB
BASOPHILS # BLD AUTO: 0.12 X10*3/UL (ref 0–0.1)
BASOPHILS NFR BLD AUTO: 1.1 %
EOSINOPHIL # BLD AUTO: 0.11 X10*3/UL (ref 0–0.7)
EOSINOPHIL NFR BLD AUTO: 1 %
ERYTHROCYTE [DISTWIDTH] IN BLOOD BY AUTOMATED COUNT: 14.8 % (ref 11.5–14.5)
HCT VFR BLD AUTO: 28.2 % (ref 41–52)
HGB BLD-MCNC: 9.6 G/DL (ref 13.5–17.5)
IMM GRANULOCYTES # BLD AUTO: 0.03 X10*3/UL (ref 0–0.7)
IMM GRANULOCYTES NFR BLD AUTO: 0.3 % (ref 0–0.9)
LYMPHOCYTES # BLD AUTO: 3.05 X10*3/UL (ref 1.2–4.8)
LYMPHOCYTES NFR BLD AUTO: 27 %
MCH RBC QN AUTO: 31.5 PG (ref 26–34)
MCHC RBC AUTO-ENTMCNC: 34 G/DL (ref 32–36)
MCV RBC AUTO: 93 FL (ref 80–100)
MONOCYTES # BLD AUTO: 0.98 X10*3/UL (ref 0.1–1)
MONOCYTES NFR BLD AUTO: 8.7 %
NEUTROPHILS # BLD AUTO: 7.02 X10*3/UL (ref 1.2–7.7)
NEUTROPHILS NFR BLD AUTO: 61.9 %
NRBC BLD-RTO: 0 /100 WBCS (ref 0–0)
PLATELET # BLD AUTO: 241 X10*3/UL (ref 150–450)
RBC # BLD AUTO: 3.05 X10*6/UL (ref 4.5–5.9)
WBC # BLD AUTO: 11.3 X10*3/UL (ref 4.4–11.3)

## 2024-09-19 PROCEDURE — 2500000001 HC RX 250 WO HCPCS SELF ADMINISTERED DRUGS (ALT 637 FOR MEDICARE OP)

## 2024-09-19 PROCEDURE — RXMED WILLOW AMBULATORY MEDICATION CHARGE

## 2024-09-19 PROCEDURE — 99239 HOSP IP/OBS DSCHRG MGMT >30: CPT

## 2024-09-19 PROCEDURE — 2500000004 HC RX 250 GENERAL PHARMACY W/ HCPCS (ALT 636 FOR OP/ED)

## 2024-09-19 PROCEDURE — 36415 COLL VENOUS BLD VENIPUNCTURE: CPT

## 2024-09-19 PROCEDURE — 2500000004 HC RX 250 GENERAL PHARMACY W/ HCPCS (ALT 636 FOR OP/ED): Performed by: STUDENT IN AN ORGANIZED HEALTH CARE EDUCATION/TRAINING PROGRAM

## 2024-09-19 PROCEDURE — 2500000005 HC RX 250 GENERAL PHARMACY W/O HCPCS

## 2024-09-19 PROCEDURE — 85025 COMPLETE CBC W/AUTO DIFF WBC: CPT

## 2024-09-19 RX ORDER — IPRATROPIUM BROMIDE AND ALBUTEROL SULFATE 2.5; .5 MG/3ML; MG/3ML
3 SOLUTION RESPIRATORY (INHALATION) EVERY 6 HOURS PRN
Qty: 90 ML | Refills: 11 | Status: SHIPPED | OUTPATIENT
Start: 2024-09-19 | End: 2024-09-19 | Stop reason: HOSPADM

## 2024-09-19 RX ORDER — ALBUTEROL SULFATE 90 UG/1
2 INHALANT RESPIRATORY (INHALATION) EVERY 6 HOURS PRN
Qty: 18 G | Refills: 3 | Status: CANCELLED | OUTPATIENT
Start: 2024-09-19 | End: 2024-10-19

## 2024-09-19 RX ORDER — AMOXICILLIN 250 MG
1 CAPSULE ORAL NIGHTLY PRN
Qty: 30 TABLET | Refills: 3 | Status: SHIPPED | OUTPATIENT
Start: 2024-09-19

## 2024-09-19 RX ORDER — GABAPENTIN 300 MG/1
300 CAPSULE ORAL NIGHTLY
Qty: 30 CAPSULE | Refills: 3 | Status: SHIPPED | OUTPATIENT
Start: 2024-09-19

## 2024-09-19 RX ORDER — ALBUTEROL SULFATE 90 UG/1
2 INHALANT RESPIRATORY (INHALATION) EVERY 6 HOURS PRN
Status: DISCONTINUED | OUTPATIENT
Start: 2024-09-19 | End: 2024-09-19 | Stop reason: HOSPADM

## 2024-09-19 RX ORDER — POLYETHYLENE GLYCOL 3350 17 G/17G
34 POWDER, FOR SOLUTION ORAL DAILY
Qty: 3060 G | Refills: 0 | Status: SHIPPED | OUTPATIENT
Start: 2024-09-19

## 2024-09-19 RX ORDER — PANTOPRAZOLE SODIUM 40 MG/1
40 TABLET, DELAYED RELEASE ORAL
Qty: 30 TABLET | Refills: 2 | Status: SHIPPED | OUTPATIENT
Start: 2024-09-20

## 2024-09-19 RX ORDER — OXYCODONE HYDROCHLORIDE 5 MG/1
5 TABLET ORAL
Qty: 42 TABLET | Refills: 0 | Status: SHIPPED | OUTPATIENT
Start: 2024-09-19 | End: 2024-09-27 | Stop reason: SDUPTHER

## 2024-09-19 RX ORDER — ACETAMINOPHEN 325 MG/1
975 TABLET ORAL 3 TIMES DAILY
Qty: 90 TABLET | Refills: 2 | Status: CANCELLED | OUTPATIENT
Start: 2024-09-19

## 2024-09-19 RX ORDER — OXYCODONE HYDROCHLORIDE 5 MG/1
5 TABLET ORAL
Qty: 80 TABLET | Refills: 0 | Status: SHIPPED | OUTPATIENT
Start: 2024-09-19 | End: 2024-09-19

## 2024-09-19 RX ADMIN — ACETAMINOPHEN 975 MG: 325 TABLET ORAL at 12:44

## 2024-09-19 RX ADMIN — ACETAMINOPHEN 975 MG: 325 TABLET ORAL at 06:04

## 2024-09-19 RX ADMIN — OXYCODONE HYDROCHLORIDE 10 MG: 5 TABLET ORAL at 09:15

## 2024-09-19 RX ADMIN — ENOXAPARIN SODIUM 40 MG: 100 INJECTION SUBCUTANEOUS at 09:10

## 2024-09-19 RX ADMIN — LIDOCAINE 1 PATCH: 4 PATCH TOPICAL at 09:10

## 2024-09-19 RX ADMIN — KETOROLAC TROMETHAMINE 15 MG: 15 INJECTION, SOLUTION INTRAMUSCULAR; INTRAVENOUS at 17:00

## 2024-09-19 RX ADMIN — KETOROLAC TROMETHAMINE 15 MG: 15 INJECTION, SOLUTION INTRAMUSCULAR; INTRAVENOUS at 10:51

## 2024-09-19 RX ADMIN — OXYCODONE HYDROCHLORIDE 10 MG: 5 TABLET ORAL at 16:07

## 2024-09-19 RX ADMIN — KETOROLAC TROMETHAMINE 15 MG: 15 INJECTION, SOLUTION INTRAMUSCULAR; INTRAVENOUS at 03:44

## 2024-09-19 RX ADMIN — PANTOPRAZOLE SODIUM 40 MG: 40 TABLET, DELAYED RELEASE ORAL at 06:04

## 2024-09-19 RX ADMIN — SODIUM CHLORIDE, POTASSIUM CHLORIDE, SODIUM LACTATE AND CALCIUM CHLORIDE 100 ML/HR: 600; 310; 30; 20 INJECTION, SOLUTION INTRAVENOUS at 06:04

## 2024-09-19 RX ADMIN — OXYCODONE HYDROCHLORIDE 10 MG: 5 TABLET ORAL at 00:23

## 2024-09-19 ASSESSMENT — PAIN SCALES - GENERAL
PAINLEVEL_OUTOF10: 6
PAINLEVEL_OUTOF10: 7
PAINLEVEL_OUTOF10: 3
PAINLEVEL_OUTOF10: 7
PAINLEVEL_OUTOF10: 7
PAINLEVEL_OUTOF10: 10 - WORST POSSIBLE PAIN
PAINLEVEL_OUTOF10: 0 - NO PAIN
PAINLEVEL_OUTOF10: 7

## 2024-09-19 ASSESSMENT — COGNITIVE AND FUNCTIONAL STATUS - GENERAL
MOBILITY SCORE: 24
DAILY ACTIVITIY SCORE: 24

## 2024-09-19 ASSESSMENT — PAIN DESCRIPTION - ORIENTATION
ORIENTATION: MID;POSTERIOR
ORIENTATION: MID;POSTERIOR

## 2024-09-19 ASSESSMENT — PAIN DESCRIPTION - LOCATION
LOCATION: NECK

## 2024-09-19 ASSESSMENT — PAIN SCALES - WONG BAKER
WONGBAKER_NUMERICALRESPONSE: HURTS LITTLE MORE
WONGBAKER_NUMERICALRESPONSE: NO HURT
WONGBAKER_NUMERICALRESPONSE: HURTS LITTLE MORE

## 2024-09-19 ASSESSMENT — PAIN - FUNCTIONAL ASSESSMENT
PAIN_FUNCTIONAL_ASSESSMENT: 0-10

## 2024-09-19 NOTE — PROGRESS NOTES
Medication Education     Medication education for Jovon Mccartney was provided to the patient  for the following medication(s):  Combivent Respimat       Medication education provided by a Pharmacist:  How to take and what to do if a dose is missed Refilling the medication     Identified potential barriers to education:  None    Method(s) of Education:  Verbal Demonstration    An opportunity to ask questions and receive answers was provided.     Assessment of understanding the patient :  Teach back and Return demonstration    Additional Notes (if applicable):   Patient was shown how to use inhaler using a Demo-inhaler. He was provided with instructions on assembling the inhaler, how/when to prime the inhaler, expiration dating, administration (TOP acronym), and inhaler care. The Teachback method was used to assess the patient's understanding; he used to the Demo-inhaler.     AUR[place]A HILL, Pharmacy Student

## 2024-09-19 NOTE — DISCHARGE SUMMARY
Discharge Diagnosis  Metastatic malignancy, mediastinal mass. Concern for bronchogenic carcinoma.  Severe emphysema  RA    Issues Requiring Follow-Up  Rheumatology regarding whether to continue Humira for juvenile RA  radiation oncology pending cytology  pulmonology regarding recent lung biopsy malignancy and emphysema  PCP    Discharge Meds     Medication List      START taking these medications     gabapentin 300 mg capsule; Commonly known as: Neurontin; Take 1 capsule   (300 mg) by mouth once daily at bedtime.   ipratropium-albuteroL  mcg/actuation inhaler; Commonly known as:   Combivent Respimat; Inhale 2 puffs 4 times a day.   oxyCODONE 5 mg immediate release tablet; Commonly known as: Roxicodone;   Take 1 tablet (5 mg) by mouth every 3 hours if needed for moderate pain (4   - 6) (1-2 tablet) for up to 7 days.   pantoprazole 40 mg EC tablet; Commonly known as: ProtoNix; Take 1 tablet   (40 mg) by mouth once daily in the morning. Take before meals. Do not   crush, chew, or split.; Start taking on: September 20, 2024   polyethylene glycol 17 gram/dose powder; Commonly known as: Glycolax,   Miralax; Take 34 g by mouth once daily.   sennosides-docusate sodium 8.6-50 mg tablet; Commonly known as:   Yoselin-Colace; Take 1 tablet by mouth as needed at bedtime for constipation.     CONTINUE taking these medications     acetaminophen 500 mg tablet; Commonly known as: Tylenol   adalimumab 40 mg/0.8 mL pen injector kit pen-injector; Commonly known   as: Humira Pen   ibuprofen 200 mg tablet       Test Results Pending At Discharge  Pending Labs       Order Current Status    Magnesium Collected (09/19/24 0553)    Renal Function Panel Collected (09/19/24 0553)    Cytology Consultation (Non-Gynecologic) In process            Hospital Course  Jovon Mccartney is a 49-year-old male with PMHx of tobacco use (1-2 PPD/ and juvenile RA who presented to [place] ED on 9/9/24 with worsening headache and intermittent neck pain for 2  months, rated 10/10 at worst, radiating to both arms with left-hand tingling. Denies muscle weakness, fever, chills, nausea, cough, dyspnea, or hemoptysis. Recently saw PCP for this. Planned outpatient imaging was postponed due to worsening pain, leading to ED evaluation. Admitted to [facility]. MRI brain/C-spine showed ill-defined enhancing lesion within right parietal occipital bone with dural thickening and mass effect upon right occipital lobe without midline shift as well as ill-defined marrow replacing lesions within C2 and C3 vertebral bodies, constellation of findings suggestive of metastatic disease or multiple myeloma. Evaluated by pulmonology, initiated workup with CT C/A/ that showed amorphous soft tissue centered at the right hilum encompassing the RUL bronchus and inseparable from the right main pulmonary artery suspicious for bronchogenic carcinoma, with mildly enlarged mediastinal lymph nodes, multiple lytic lesions of R scapula and bilateral humeral head. Pulmonologist noted that the mediastinal fullness was suggestive of malignancy although it was not very clear due to non-contrast nature of the study, and noted there was no definite endobronchial lesion seen on the CT chest. Thus pulmonology recommended repeat imaging after transfer to [facility] with possible EBUS bronchoscopy.    At [facility] ED:  /87, HR 99, RR 18, T36.5. Labs essentially unremarkable aside from WBC of 13.2 (ANC 12.5) and hemoglobin of 13.1. Urinalysis negative. ESR 18 Lactate 1.5. UA WNL. Migraine cocktail including IV magnesium dexamethasone, Benadryl, Reglan and IV fluids were given.    Since then, he was transferred to [facility] for possible bronchoscopy versus PET scan. Heme-onc, neurosurgery, and pulmonology were consulted. A pain regimen was started with Tylenol 975 mg 3 times a day, Toradol 15 mg every 6 hours as needed, and tramadol 15 mg every 6 hours as needed. Per pulmonology, a bronchoscopy with biopsy was also performed.  Radiation oncology was also consulted. Patient continued to report the pain was not well-controlled by tramadol overnight, it was replaced by oxycodone 5 mg every 6 hours as needed. Will also held your Humira during your hospital stay. The biopsy yesterday was consistent with malignancy. Neurosurgery tumor board recommended no urgent radiation therapy, instead, you will have outpatient follow-up with possible palliative radiation therapy. Pulmonology recommended outpatient consultation to medical oncology. Supportive oncology recommended change of the pain regimen to Tylenol 975 mg 3 times daily, Toradol 50 mg every 6 hours as needed oxycodone 10 mg every 3 hour as needed and gabapentin 300 mg nightly. Upon discharge, pain control regimen will be gabapentin, oxycodone, acetaminophen, and ibuprofen.  Patient will be follow-up with rad oncology, pulmonology, rheumatology, and PCP as outpatient.        Pertinent Physical Exam At Time of Discharge    Gen: NAD, alert, interactive  HEENT: NC/AT PERRL, MMM, saccadic overshoot nystagmus both eyes, right parietal pain.  Bilateral shoulder/arm pain.   Resp: normal respiratory effort, mild wheezing on right lung LL, decreased breath sounds bilaterally.   CV: RRR, normal S1/S2  no m/r/g   Abdomen: Soft non distended, non tender, normal bowel sounds, no masses  Extremities: Normal tone, and ROM, lower extremity strength 5/5, no lower extremity edema, deformities present in both hands and feet due to RA  Neuro: Alert and Oriented x 3     Outpatient Follow-Up  Future Appointments   Date Time Provider Department Center   9/23/2024  8:40 AM MUNDO Whitehead-CNP [id number] [place]   9/26/2024 10:00 AM Anton Mcgrath MD PhD [id number] [place]   9/27/2024 10:30 AM MUNDO Hernandez-CNP [id number] [place]   10/8/2024  3:40 PM Luis Miguel Krause MD [id number] None         Thom Corado MD

## 2024-09-19 NOTE — NURSING NOTE
Patient discharging home w/o HHC, JAIROS reviewed with patient as well as all follow up  appointments, patient in stable condition, transported by friend in private vehicle.

## 2024-09-19 NOTE — CARE PLAN
Problem: Pain  Goal: Takes deep breaths with improved pain control throughout the shift  Outcome: Progressing  Goal: Turns in bed with improved pain control throughout the shift  Outcome: Progressing  Goal: Walks with improved pain control throughout the shift  Outcome: Progressing  Goal: Performs ADL's with improved pain control throughout shift  Outcome: Progressing  Goal: Free from opioid side effects throughout the shift  Outcome: Progressing  Goal: Free from acute confusion related to pain meds throughout the shift  Outcome: Progressing     Problem: Pain - Adult  Goal: Verbalizes/displays adequate comfort level or baseline comfort level  Outcome: Progressing     Problem: Safety - Adult  Goal: Free from fall injury  Outcome: Progressing     Problem: Discharge Planning  Goal: Discharge to home or other facility with appropriate resources  Outcome: Progressing     Problem: Chronic Conditions and Co-morbidities  Goal: Patient's chronic conditions and co-morbidity symptoms are monitored and maintained or improved  Outcome: Progressing   The patient's goals for the shift include      The clinical goals for the shift include pts pain will be controlled through shift      
The clinical goals for the shift include Pain will be managed throughout shift    Problem: Pain  Goal: Takes deep breaths with improved pain control throughout the shift  Outcome: Progressing  Goal: Turns in bed with improved pain control throughout the shift  Outcome: Progressing  Goal: Walks with improved pain control throughout the shift  Outcome: Progressing  Goal: Performs ADL's with improved pain control throughout shift  Outcome: Progressing  Goal: Participates in PT with improved pain control throughout the shift  Outcome: Progressing  Goal: Free from opioid side effects throughout the shift  Outcome: Progressing  Goal: Free from acute confusion related to pain meds throughout the shift  Outcome: Progressing     Problem: Pain - Adult  Goal: Verbalizes/displays adequate comfort level or baseline comfort level  Outcome: Progressing     Problem: Safety - Adult  Goal: Free from fall injury  Outcome: Progressing     Problem: Discharge Planning  Goal: Discharge to home or other facility with appropriate resources  Outcome: Progressing     Problem: Chronic Conditions and Co-morbidities  Goal: Patient's chronic conditions and co-morbidity symptoms are monitored and maintained or improved  Outcome: Progressing     Problem: Acute Head Injury  Goal: Ansence or control of seizures  Outcome: Progressing  Goal: Absence or control of storming symptoms  Outcome: Progressing  Goal: ICP/CPP within goal  Outcome: Progressing  Goal: Neuro status stable or improved  Outcome: Progressing  Goal: No signs of respiratory compromise  Outcome: Progressing  Goal: Stabilization of hemodynamic status  Outcome: Progressing  Goal: Tolerates invasive procedures w/o compromise  Outcome: Progressing  Goal: Tolerates osmotherapy  Outcome: Progressing     
The clinical goals for the shift include Pt's pain will be managed throughout shift    Problem: Pain  Goal: Takes deep breaths with improved pain control throughout the shift  Outcome: Progressing  Goal: Turns in bed with improved pain control throughout the shift  Outcome: Progressing  Goal: Walks with improved pain control throughout the shift  Outcome: Progressing  Goal: Performs ADL's with improved pain control throughout shift  Outcome: Progressing  Goal: Participates in PT with improved pain control throughout the shift  Outcome: Progressing  Goal: Free from opioid side effects throughout the shift  Outcome: Progressing  Goal: Free from acute confusion related to pain meds throughout the shift  Outcome: Progressing     Problem: Pain - Adult  Goal: Verbalizes/displays adequate comfort level or baseline comfort level  Outcome: Progressing     Problem: Safety - Adult  Goal: Free from fall injury  Outcome: Progressing     Problem: Discharge Planning  Goal: Discharge to home or other facility with appropriate resources  Outcome: Progressing     Problem: Chronic Conditions and Co-morbidities  Goal: Patient's chronic conditions and co-morbidity symptoms are monitored and maintained or improved  Outcome: Progressing     Problem: Acute Head Injury  Goal: Ansence or control of seizures  Outcome: Progressing  Goal: Absence or control of storming symptoms  Outcome: Progressing  Goal: ICP/CPP within goal  Outcome: Progressing  Goal: Neuro status stable or improved  Outcome: Progressing  Goal: No signs of respiratory compromise  Outcome: Progressing  Goal: Stabilization of hemodynamic status  Outcome: Progressing  Goal: Tolerates invasive procedures w/o compromise  Outcome: Progressing  Goal: Tolerates osmotherapy  Outcome: Progressing     
The patient's goals for the shift include      The clinical goals for the shift include Pain will be managed throughout shift      Problem: Pain  Goal: Takes deep breaths with improved pain control throughout the shift  Outcome: Progressing  Goal: Turns in bed with improved pain control throughout the shift  Outcome: Progressing  Goal: Walks with improved pain control throughout the shift  Outcome: Progressing  Goal: Performs ADL's with improved pain control throughout shift  Outcome: Progressing  Goal: Participates in PT with improved pain control throughout the shift  Outcome: Progressing  Goal: Free from opioid side effects throughout the shift  Outcome: Progressing  Goal: Free from acute confusion related to pain meds throughout the shift  Outcome: Progressing     Problem: Pain - Adult  Goal: Verbalizes/displays adequate comfort level or baseline comfort level  Outcome: Progressing     Problem: Safety - Adult  Goal: Free from fall injury  Outcome: Progressing     Problem: Discharge Planning  Goal: Discharge to home or other facility with appropriate resources  Outcome: Progressing     Problem: Chronic Conditions and Co-morbidities  Goal: Patient's chronic conditions and co-morbidity symptoms are monitored and maintained or improved  Outcome: Progressing     Problem: Acute Head Injury  Goal: Ansence or control of seizures  Outcome: Progressing  Goal: Absence or control of storming symptoms  Outcome: Progressing  Goal: ICP/CPP within goal  Outcome: Progressing  Goal: Neuro status stable or improved  Outcome: Progressing  Goal: No signs of respiratory compromise  Outcome: Progressing  Goal: Stabilization of hemodynamic status  Outcome: Progressing  Goal: Tolerates invasive procedures w/o compromise  Outcome: Progressing  Goal: Tolerates osmotherapy  Outcome: Progressing       
The patient's goals for the shift include      The clinical goals for the shift include Pt's pain will be managed throughout shift      Problem: Pain  Goal: Takes deep breaths with improved pain control throughout the shift  Outcome: Progressing  Goal: Turns in bed with improved pain control throughout the shift  Outcome: Not Progressing  Goal: Walks with improved pain control throughout the shift  Outcome: Progressing  Goal: Performs ADL's with improved pain control throughout shift  Outcome: Progressing  Goal: Participates in PT with improved pain control throughout the shift  Outcome: Progressing  Goal: Free from opioid side effects throughout the shift  Outcome: Progressing  Goal: Free from acute confusion related to pain meds throughout the shift  Outcome: Progressing     Problem: Pain - Adult  Goal: Verbalizes/displays adequate comfort level or baseline comfort level  Outcome: Progressing     Problem: Safety - Adult  Goal: Free from fall injury  Outcome: Progressing     Problem: Safety - Adult  Goal: Free from fall injury  Outcome: Progressing     Problem: Discharge Planning  Goal: Discharge to home or other facility with appropriate resources  Outcome: Progressing       Problem: Pain  Goal: Turns in bed with improved pain control throughout the shift  Outcome: Not Progressing     
The patient's goals for the shift include      The clinical goals for the shift include pain management      Problem: Pain  Goal: Takes deep breaths with improved pain control throughout the shift  Outcome: Progressing  Goal: Turns in bed with improved pain control throughout the shift  Outcome: Progressing  Goal: Walks with improved pain control throughout the shift  Outcome: Progressing  Goal: Performs ADL's with improved pain control throughout shift  Outcome: Progressing  Goal: Participates in PT with improved pain control throughout the shift  Outcome: Progressing  Goal: Free from opioid side effects throughout the shift  Outcome: Progressing  Goal: Free from acute confusion related to pain meds throughout the shift  Outcome: Progressing       
The patient's goals for the shift include      The clinical goals for the shift include safe and fall free      Problem: Pain  Goal: Takes deep breaths with improved pain control throughout the shift  Outcome: Progressing  Goal: Turns in bed with improved pain control throughout the shift  Outcome: Progressing  Goal: Walks with improved pain control throughout the shift  Outcome: Progressing  Goal: Performs ADL's with improved pain control throughout shift  Outcome: Progressing  Goal: Participates in PT with improved pain control throughout the shift  Outcome: Progressing  Goal: Free from opioid side effects throughout the shift  Outcome: Progressing  Goal: Free from acute confusion related to pain meds throughout the shift  Outcome: Progressing     Problem: Pain - Adult  Goal: Verbalizes/displays adequate comfort level or baseline comfort level  Outcome: Progressing     Problem: Safety - Adult  Goal: Free from fall injury  Outcome: Progressing     Problem: Discharge Planning  Goal: Discharge to home or other facility with appropriate resources  Outcome: Progressing     Problem: Chronic Conditions and Co-morbidities  Goal: Patient's chronic conditions and co-morbidity symptoms are monitored and maintained or improved  Outcome: Progressing     Problem: Acute Head Injury  Goal: Ansence or control of seizures  Outcome: Progressing  Goal: Absence or control of storming symptoms  Outcome: Progressing  Goal: ICP/CPP within goal  Outcome: Progressing  Goal: Neuro status stable or improved  Outcome: Progressing  Goal: No signs of respiratory compromise  Outcome: Progressing  Goal: Stabilization of hemodynamic status  Outcome: Progressing  Goal: Tolerates invasive procedures w/o compromise  Outcome: Progressing  Goal: Tolerates osmotherapy  Outcome: Progressing     
The patient's goals for the shift include Patient will remain safe and free.    The clinical goals for the shift include manage pain        
The patient's goals for the shift include Patient will remain safe and free.    The clinical goals for the shift include patient pain will be managed    Problem: Pain  Goal: Takes deep breaths with improved pain control throughout the shift  Outcome: Progressing  Goal: Turns in bed with improved pain control throughout the shift  Outcome: Progressing  Goal: Walks with improved pain control throughout the shift  Outcome: Progressing  Goal: Performs ADL's with improved pain control throughout shift  Outcome: Progressing  Goal: Participates in PT with improved pain control throughout the shift  Outcome: Progressing  Goal: Free from opioid side effects throughout the shift  Outcome: Progressing  Goal: Free from acute confusion related to pain meds throughout the shift  Outcome: Progressing     Problem: Pain - Adult  Goal: Verbalizes/displays adequate comfort level or baseline comfort level  Outcome: Progressing     Problem: Safety - Adult  Goal: Free from fall injury  Outcome: Progressing     Problem: Discharge Planning  Goal: Discharge to home or other facility with appropriate resources  Outcome: Progressing     Problem: Chronic Conditions and Co-morbidities  Goal: Patient's chronic conditions and co-morbidity symptoms are monitored and maintained or improved  Outcome: Progressing     Problem: Acute Head Injury  Goal: Ansence or control of seizures  Outcome: Progressing  Goal: Absence or control of storming symptoms  Outcome: Progressing  Goal: ICP/CPP within goal  Outcome: Progressing  Goal: Neuro status stable or improved  Outcome: Progressing  Goal: No signs of respiratory compromise  Outcome: Progressing  Goal: Stabilization of hemodynamic status  Outcome: Progressing  Goal: Tolerates invasive procedures w/o compromise  Outcome: Progressing  Goal: Tolerates osmotherapy  Outcome: Progressing     
The patient's goals for the shift include pain management.    The clinical goals for the shift include pain management.    Over the shift, the patient did not make progress toward the following goals. Barriers to progression include n/a. Recommendations to address these barriers include n/a.    
Patient

## 2024-09-20 ENCOUNTER — TELEPHONE (OUTPATIENT)
Dept: HEMATOLOGY/ONCOLOGY | Facility: HOSPITAL | Age: 49
End: 2024-09-20
Payer: COMMERCIAL

## 2024-09-20 DIAGNOSIS — R91.8 LUNG MASS: Primary | ICD-10-CM

## 2024-09-20 DIAGNOSIS — C79.51 MALIGNANT NEOPLASM METASTATIC TO BONE (MULTI): ICD-10-CM

## 2024-09-20 LAB
LAB AP ASR DISCLAIMER: NORMAL
LABORATORY COMMENT REPORT: NORMAL
LABORATORY COMMENT REPORT: NORMAL
PATH REPORT.FINAL DX SPEC: NORMAL
PATH REPORT.GROSS SPEC: NORMAL
PATH REPORT.INTRAOP OBS SPEC DOC: NORMAL
PATH REPORT.RELEVANT HX SPEC: NORMAL
PATH REPORT.TOTAL CANCER: NORMAL
RESIDENT REVIEW: NORMAL

## 2024-09-20 NOTE — TELEPHONE ENCOUNTER
Referral placed to [facility] by inpatient medical team, to follow-up pathology results & arrange oncology care. I called the patient to discuss the referral and arrange VV next week. No answer, I left  requesting return call to office.  Helen Fleming APRN.CNP  [facility]     9/23/2024 @ 1035:  Patient called, stating he's returning call from ADELINA Fajardo from Fri., 9/20/2024.   Advised that Helen is currently seeing patients.  Explained purpose of Helen's call to the patient, per his request, was to discuss next steps r/t referral placed, to our [facility], by the patient's inpatient team, for follow up on path results and to assist with facilitating oncology care.  Patient states he met with ADELINA Varma this morning; she explained his pathology results to him, as well as the plan for upcoming radiation oncology and medical oncology consultations.  Patient inquired about having his med onc NPV at [facility] vs at  [facility], as currently scheduled, r/t proximity to his home.  States he prefers to avoid driving to our [facility] location.  Advised patient that Dr. Vargas also sees patients at our [facility] location; however, her soonest NPV appointment was at our [facility] location.  Explained that our goal is to have patients see our medical oncology and radiation oncology providers as soon as possible, which is why his NPV was scheduled at our [facility] location; he should be able to arrange his future appts at our [facility] location.   Advised patient that we will monitor for potential appointment openings with Dr. Vargas; we will also reach out to her to ask if he could move to the [place] location, should she learn of a cancellation at [facility].  Patient voiced appreciation  and agreement with this plan.  Advised that I will contact him if we can move his NPV to the [facility] location and that I will update ADELINA Fajardo re: his return call  today.  Patient denies further questions at this time and voiced understanding to contact us for any additional questions and concerns; our contact information was provided.    Update sent to Dr. Vargas and to ADELINA Fajardo.    Natalie Garcia RN  [facility] - RN Care Coordinator  Office:  [phone]    9/23/2024:  Received message from Dr. Alicia craig to move patient's NPV to Mon. 9/30/2024 @ 2:40p at [facility].  Updated orders placed; STAT request sent to our [facility] Scheduling Team.

## 2024-09-21 NOTE — PROGRESS NOTES
Patient: Jovon Mccartney    [id number]  : 1975 -- AGE 49 y.o.    Provider: ADELINA Whitehead     Location [facility]   Service Date: 2024              [facility]  New Visit Note      HISTORY OF PRESENT ILLNESS     The patient's referring provider is: Ines Miles APRN-CNP    HISTORY OF PRESENT ILLNESS   Jovon Mccartney is a 49 y.o. male who presents to a [facility] for a pulmonary evaluation with NSCLC with bone involvement.  He is a former smoker. I have independently interviewed and examined the patient in the office and reviewed available records.    Current History        Patient was recently admitted early this month with worsening headache and intermittent neck pain for 2 months. , MRI brain/C-spine showed ill-defined enhancing lesion within right parietal occipital bone with dural thickening and mass effect upon right occipital lobe without midline shift as well as ill-defined marrow replacing lesions within C2 and C3 vertebral bodies, constellation of findings suggestive of metastatic disease or multiple myeloma.  CT  showed amorphous soft tissue centered at the right hilum encompassing the RUL bronchus and inseparable from the right main pulmonary artery suspicious for bronchogenic carcinoma, with mildly enlarged mediastinal lymph nodes, multiple lytic lesions of R scapula and bilateral humeral head. S/p bronchoscopy on 2024 for Lung Mass with brain and bone metastasis which revealed Airway stenosis, of bronchus intermedius and RLL and RML segments. Pathology positive for adenocarcinoma with the malignant cells are positive for TTF-1 and napsin A, and negative for p40     Smoked 1 ppd x 39 years, stopped when he was admitted 2024     On today's visit, the patient reports has dyspnea on exertion, but none at rest. Symptoms started many years ago, but has mostly been stable. He is only troubled  by breathlessness except on strenuous exercise (mMRC 0).  He is active as he can with juvenile arthritis, since discharge he is feeling weaker. Has shoulder and bicep pain.    Denies orthopnea, pnd, or mat.  Weight has been mostly stable.  Relates  chronic dry cough, C/o wheezing, and no sputum. No night cough. No hemoptysis. No fever or shivering chills. Has throat clearing. Denies chest pain or heartburn.     Previous pulmonary history:  no history of recurrent infections, or lung disease as a child.  No previous lung hx, never on oxygen or inhaler therapy. He was hospitalized with asthmatic bronchitis as a child, then resolved     Inhalers/nebulized medications: combivent - has used twice     Hospitalization History: Not been hospitalized over the last year for breathing related problem.    Sleep history:  Complains of snoring, apnea, feeling tired during the day, and taking naps during the day.     ALLERGIES AND MEDICATIONS     ALLERGIES  No Known Allergies    MEDICATIONS  Current Outpatient Medications   Medication Sig Dispense Refill    acetaminophen (Tylenol) 500 mg tablet Take 1 tablet (500 mg) by mouth every 6 hours if needed for mild pain (1 - 3).      adalimumab (Humira Pen) 40 mg/0.8 mL pen injector kit pen-injector Inject 1 Pen (40 mg) under the skin every 14 (fourteen) days.      gabapentin (Neurontin) 300 mg capsule Take 1 capsule (300 mg) by mouth once daily at bedtime. 30 capsule 3    ibuprofen 200 mg tablet Take 1 tablet (200 mg) by mouth every 6 hours if needed for mild pain (1 - 3).      ipratropium-albuteroL (Combivent Respimat)  mcg/actuation inhaler Inhale 2 puffs 4 times a day. 12 g 11    oxyCODONE (Roxicodone) 5 mg immediate release tablet Take 1 tablet (5 mg) by mouth every 3 hours if needed for moderate pain (4 - 6) (1-2 tablet) for up to 7 days. 42 tablet 0    pantoprazole (ProtoNix) 40 mg EC tablet Take 1 tablet (40 mg) by mouth once daily in the morning. Take before meals. Do not  crush, chew, or split. 30 tablet 2    polyethylene glycol (Glycolax, Miralax) 17 gram/dose powder Take 34 g by mouth once daily. 3060 g 0    sennosides-docusate sodium (Yoselin-Colace) 8.6-50 mg tablet Take 1 tablet by mouth as needed at bedtime for constipation. 30 tablet 3     No current facility-administered medications for this visit.         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     PAST SURGICAL HISTORY  No past surgical history on file.    IMMUNIZATION HISTORY    There is no immunization history on file for this patient.    SOCIAL HISTORY  He  reports that he has been smoking cigarettes. He has never used smokeless tobacco. No history on file for alcohol use and drug use. He Patient  Smoked 1 ppd x 39 years, stopped when he was admitted 9/9/2024     OCCUPATIONAL/ENVIRONMENTAL HISTORY  Currently works as: disabled   DOES/DOES NOT EC: does have possible known exposure to asbestos, but not to silica, beryllium or inhaled metals. Exposure to a board that had asbestos   DOES/DOES NOT EC: does have exposure to birds or exotic animals. Had  cockatoo x 14 years     FAMILY HISTORY  No family history on file.  DOES/DOES NOT EC: does not have a family history of pulmonary disease.  DOES/DOES NOT EC: does have a family history of cancer. Maternal grandmother - bone cancer, mother breast cancer, paternal cousin lung cancer   DOES/DOES NOT EC: does have a family history of autoimmune disorders. He has RA    RESULTS/DATA     Pulmonary Function Test Results     Failed to redirect to the Timeline version of the REVFS SmartLink.      Chest Radiograph     No results found for this or any previous visit from the past 2000 days.      Chest CT Scan     9/15/2024 CT chest    IMPRESSION:  1. Masslike enhancing soft tissue thickening within  "the right hilar  region which encases the right mainstem bronchus and abuts the right  main pulmonary artery, suspicious for neoplasm. This soft tissue  thickening additionally extends along the interlobular bronchials.  Further evaluation with tissue sampling or PET-CT is recommended.  2. Nodular thickening of the right pleura is suspicious for disease  with possible direct lymphangitic carcinomatosis as well in the right  infrahilar region.  3. Few prominent mediastinal lymph nodes are described above.  4. Permeative appearing lesion involving the right scapula is  suspicious for metastatic disease.  5. Abdominal findings include too small to characterize hypodense  lesions in the liver as well as adrenal thickening, potentially  adenomas. Further characterization by abdominal MRI is recommended  given the thoracic findings.  6. Moderate pericardial effusion.  7. Severe paraseptal emphysematous changes predominantly affecting  the lung apices.        Echocardiogram     No results found for this or any previous visit from the past 365 days.         REVIEW OF SYSTEMS     REVIEW OF SYSTEMS  Review of Systems   Respiratory:  Positive for shortness of breath and wheezing.    Musculoskeletal:  Positive for arthralgias, gait problem and myalgias.         PHYSICAL EXAM     VITAL SIGNS: /75   Pulse 95   Temp 37.4 °C (99.3 °F)   Resp 18   Ht 1.676 m (5' 6\")   Wt 57 kg (125 lb 9.6 oz)   SpO2 (!) 89%   BMI 20.27 kg/m²      CURRENT WEIGHT: [unfilled]  BMI: [unfilled]  PREVIOUS WEIGHTS:  Wt Readings from Last 3 Encounters:   09/23/24 57 kg (125 lb 9.6 oz)   09/14/24 54.9 kg (121 lb)   09/09/24 58.5 kg (129 lb)       Physical Exam  Constitutional:       Appearance: Normal appearance.   HENT:      Head: Normocephalic and atraumatic.      Right Ear: External ear normal.      Left Ear: External ear normal.      Nose: Nose normal.      Mouth/Throat:      Mouth: Mucous membranes are moist.      Pharynx: Oropharynx is clear. "   Eyes:      Extraocular Movements: Extraocular movements intact.      Conjunctiva/sclera: Conjunctivae normal.      Pupils: Pupils are equal, round, and reactive to light.   Cardiovascular:      Rate and Rhythm: Normal rate and regular rhythm.      Pulses: Normal pulses.      Heart sounds: Normal heart sounds.   Pulmonary:      Effort: Pulmonary effort is normal.      Breath sounds: Normal breath sounds.   Abdominal:      General: Bowel sounds are normal.      Palpations: Abdomen is soft.   Musculoskeletal:         General: Normal range of motion.      Cervical back: Normal range of motion and neck supple.      Comments: Bilateral lower leg braces    Skin:     General: Skin is warm and dry.   Neurological:      General: No focal deficit present.      Mental Status: He is alert and oriented to person, place, and time. Mental status is at baseline.   Psychiatric:         Mood and Affect: Mood normal.         Behavior: Behavior normal.         Thought Content: Thought content normal.         Judgment: Judgment normal.         ASSESSMENT/PLAN     Mr. Mccartney is a 49 y.o. 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 He was referred to the [facility] for evaluation of NSCLC with bone involve[ment     Problem List and Orders      Assessment and Plan / Recommendations:  Problem List Items Addressed This Visit    None  Visit Diagnoses       Lung cancer metastatic to brain (Multi)                    COPD likely with complaints of wheezing   - Obtain pulmonary function test, FENO and 6 minute walk test   - start ICS/LABA/LAMA breztri 2 puffs twice a day, rinse after use   - cont combivent   - albuterol hfa 2 puffs or albuterol nebs every 4-6 hours as needed      Hypoxia: Presents to clinic today with O2 sat __92_% on room air.  Oxywalk  completed. _93___% ambulating on room air    Oncology - NSCLC - has followup with Oncology and Radiation Oncology     Thank you for visiting the Pulmonary clinic today!     Return to clinic after 4-6 weeks and after PFTs  or sooner if needed   Melina Chacko CNP  My office number is [phone] -     Call to schedule  for radiology - CT scans/PFTs etc at  [phone]  General scheduling  [phone]     Best way to get a hold of me is to call my office --> Please do not send me follow my health messages  Any test results will be discussed at next visit -- please make sure to make a follow up appt after testing.

## 2024-09-23 ENCOUNTER — APPOINTMENT (OUTPATIENT)
Dept: PULMONOLOGY | Facility: CLINIC | Age: 49
End: 2024-09-23
Payer: COMMERCIAL

## 2024-09-23 VITALS
BODY MASS INDEX: 20.18 KG/M2 | SYSTOLIC BLOOD PRESSURE: 126 MMHG | WEIGHT: 125.6 LBS | HEART RATE: 95 BPM | OXYGEN SATURATION: 89 % | RESPIRATION RATE: 18 BRPM | DIASTOLIC BLOOD PRESSURE: 75 MMHG | TEMPERATURE: 99.3 F | HEIGHT: 66 IN

## 2024-09-23 DIAGNOSIS — C79.31 LUNG CANCER METASTATIC TO BRAIN (MULTI): ICD-10-CM

## 2024-09-23 DIAGNOSIS — J44.9 CHRONIC OBSTRUCTIVE PULMONARY DISEASE, UNSPECIFIED COPD TYPE (MULTI): ICD-10-CM

## 2024-09-23 DIAGNOSIS — C34.90 NSCLC METASTATIC TO BRAIN (MULTI): Primary | ICD-10-CM

## 2024-09-23 DIAGNOSIS — C34.90 LUNG CANCER METASTATIC TO BRAIN (MULTI): ICD-10-CM

## 2024-09-23 DIAGNOSIS — C79.31 NSCLC METASTATIC TO BRAIN (MULTI): Primary | ICD-10-CM

## 2024-09-23 PROCEDURE — 3008F BODY MASS INDEX DOCD: CPT | Performed by: NURSE PRACTITIONER

## 2024-09-23 PROCEDURE — 99215 OFFICE O/P EST HI 40 MIN: CPT | Performed by: NURSE PRACTITIONER

## 2024-09-23 ASSESSMENT — ENCOUNTER SYMPTOMS
MYALGIAS: 1
SHORTNESS OF BREATH: 1
OCCASIONAL FEELINGS OF UNSTEADINESS: 0
LOSS OF SENSATION IN FEET: 0
DEPRESSION: 0
WHEEZING: 1
ARTHRALGIAS: 1

## 2024-09-23 ASSESSMENT — PATIENT HEALTH QUESTIONNAIRE - PHQ9
SUM OF ALL RESPONSES TO PHQ9 QUESTIONS 1 AND 2: 0
2. FEELING DOWN, DEPRESSED OR HOPELESS: NOT AT ALL
1. LITTLE INTEREST OR PLEASURE IN DOING THINGS: NOT AT ALL

## 2024-09-23 ASSESSMENT — COLUMBIA-SUICIDE SEVERITY RATING SCALE - C-SSRS
2. HAVE YOU ACTUALLY HAD ANY THOUGHTS OF KILLING YOURSELF?: NO
1. IN THE PAST MONTH, HAVE YOU WISHED YOU WERE DEAD OR WISHED YOU COULD GO TO SLEEP AND NOT WAKE UP?: NO
6. HAVE YOU EVER DONE ANYTHING, STARTED TO DO ANYTHING, OR PREPARED TO DO ANYTHING TO END YOUR LIFE?: NO

## 2024-09-23 NOTE — PATIENT INSTRUCTIONS
COPD likely   - Obtain pulmonary function test, FENO and 6 minute walk test   - start ICS/LABA/LAMA breztri 2 puffs twice a day, rinse after use   - albuterol hfa 2 puffs or albuterol nebs every 4-6 hours as needed      Hypoxia: Presents to clinic today with O2 sat __92_% on room air.  Oxywalk completed. _93___% ambulating on room air    Thank you for visiting the Pulmonary clinic today!     Return to clinic after 4-6 weeks and after PFTs  or sooner if needed   Melina Chacko CNP  My office number is [phone] -     Call to schedule  for radiology - CT scans/PFTs etc at  [phone]  General scheduling  [phone]     Best way to get a hold of me is to call my office --> Please do not send me follow my health messages  Any test results will be discussed at next visit -- please make sure to make a follow up appt after testing.

## 2024-09-25 NOTE — PROGRESS NOTES
Radiation Oncology Outpatient Consult    Patient Name:  Jovon Mccartney  MRN:  [id number]  :  1975    Referring Provider: No ref. provider found  Primary Care Provider: Manolo Ross MD  Care Team: Patient Care Team:  Manolo Ross MD as PCP - General    Date of Service: 2024     SUBJECTIVE  History of Present Illness:  Jovon Mccartney is a 49 y.o. male with a PMHx of tobacco use (1-2 PPD/ and juvenile RA) and a recent diagnosis of Stage IVB (cT4: great vessels involvement, pN3: 4L and 11 L involvement, M1c), NSCLC, adenocarcinoma, who is being referred to the radiation oncology clinic for consideration of palliative RT.    Per chart review, the patient presented to [facility] emergency department on 2024 with worsening headache associated with intermittent neck pain of 2-month duration.  The patient underwent a CT of the spine and of the head without contrast that revealed a lytic lesion involving the right parietal bone, and lucent lesions within the cervical spine which were suspicious for multiple myeloma or metastatic disease.  The patient got admitted to the inpatient service at [facility] and then underwent on the same day and MRI of the brain and of the cervical spine with and without IV gadolinium.  MRI of the brain revealed an ill-defined enhancing lesion centered within the right parieto-occipital bone, with abutment and anterior displacement of the underlying dura, and the mass effect upon the right occipital lobe without mediastinal shift.  As per the MRI of the cervical spine, it showed an ill-defined marrow replacing lesion within the C2 and C3 vertebral bodies concerning for lytic foci.  The patient then underwent a CT of the chest abdomen and pelvis on 2024 that showed an amorphous soft tissue lesion centered around the right helium, encompassing the right upper lobe bronchus and inseparable from the right main pulmonary artery.  There was also enlarged lymph nodes,  and multiple lytic lesions involving the right scapula and the bilateral humeral head.  As such patient was transferred for [facility] for further evaluation.    At [facility].  Pulmonology were consulted and the patient underwent an EBUS bronze Koska P with sampling of the hilar mass, 4L and 11L stations the primary pathology of which came back with adenocarcinoma.  The service of neurosurgery was also consulted and no neurosurgical intervention was recommended.  The patient was planned in the CNS tumor board, and consensus was to consider palliative radiation.    During today's interview, patient reported increased fatigue over the last couple of weeks.  He has been on adalimumab for around 25 days for his juvenile rheumatoid arthritis, and he has stopped it recently anticipating the fact that he will need to start systemic therapy.  He has some stable shortness of breath, and he denies any shortness of breath at rest.  He denies any chest pain there is no fever or chills.  He does report pain in the bilateral shoulders, that is more severe on the right side.  He also reports the appearance of a new lump at the mid lateral aspect of the right arm.       Prior Radiotherapy:  No radiation treatments to show. 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     Family History:  Cancer-related family history is not on file.    Social History:    Social History     Tobacco Use    Smoking status: Every Day     Types: Cigarettes    Smokeless tobacco: Never       Allergies:  No Known Allergies     Medications:    Current Outpatient Medications:     acetaminophen (Tylenol) 500 mg tablet, Take 1 tablet (500 mg) by mouth every 6  hours if needed for mild pain (1 - 3)., Disp: , Rfl:     adalimumab (Humira Pen) 40 mg/0.8 mL pen injector kit pen-injector, Inject 1 Pen (40 mg) under the skin every 14 (fourteen) days., Disp: , Rfl:     budesonide-glycopyr-formoterol (BREZTRI) 160-9-4.8 mcg/actuation HFA aerosol inhaler, Inhale 2 puffs 2 times a day., Disp: 10.7 g, Rfl: 3    gabapentin (Neurontin) 300 mg capsule, Take 1 capsule (300 mg) by mouth once daily at bedtime., Disp: 30 capsule, Rfl: 3    ibuprofen 200 mg tablet, Take 1 tablet (200 mg) by mouth every 6 hours if needed for mild pain (1 - 3)., Disp: , Rfl:     ipratropium-albuteroL (Combivent Respimat)  mcg/actuation inhaler, Inhale 2 puffs 4 times a day., Disp: 12 g, Rfl: 11    oxyCODONE (Roxicodone) 5 mg immediate release tablet, Take 1 tablet (5 mg) by mouth every 3 hours if needed for moderate pain (4 - 6) (1-2 tablet) for up to 7 days., Disp: 42 tablet, Rfl: 0    pantoprazole (ProtoNix) 40 mg EC tablet, Take 1 tablet (40 mg) by mouth once daily in the morning. Take before meals. Do not crush, chew, or split., Disp: 30 tablet, Rfl: 2    polyethylene glycol (Glycolax, Miralax) 17 gram/dose powder, Take 34 g by mouth once daily., Disp: 3060 g, Rfl: 0    sennosides-docusate sodium (Yoselin-Colace) 8.6-50 mg tablet, Take 1 tablet by mouth as needed at bedtime for constipation., Disp: 30 tablet, Rfl: 3      Review of Systems:      History of Autoimmune or Connective Tissue Disorders:  Yes, describe: Juvenile RA     Pain: The patient's current pain level was assessed.  They report currently having a pain of 8 out of 10.  They feel their pain is not under control with the use of pain medications of 10mg oxycodone, ibuprofen and gabapentin.     Review of Systems:  Review of Systems   Constitutional:  Positive for fatigue. Negative for appetite change, chills, diaphoresis, fever and unexpected weight change.   HENT:   Positive for lump/mass, nosebleeds and tinnitus. Negative for hearing loss,  mouth sores, sore throat, trouble swallowing and voice change.         Right upper arm new lump felt starting last week.  Nosebleed x1 this week and able to stop on his own.   Eyes: Negative.    Respiratory:  Positive for cough, shortness of breath and wheezing. Negative for chest tightness and hemoptysis.         Clear productive intermittent cough. SOB with exertion and wheezing in the mornings.  Has inhalers, using with relief.   Cardiovascular:  Positive for leg swelling. Negative for chest pain and palpitations.   Gastrointestinal:  Positive for constipation. Negative for abdominal distention, abdominal pain, blood in stool, diarrhea, nausea, rectal pain and vomiting.        Taking miralax. Last BM 9/25   Endocrine: Negative.    Genitourinary:  Positive for frequency and nocturia. Negative for bladder incontinence, difficulty urinating, dyspareunia, dysuria, hematuria, pelvic pain and penile discharge.         Nocturia 3x/night.   Musculoskeletal:  Positive for arthralgias, back pain, gait problem, neck pain and neck stiffness. Negative for flank pain and myalgias.        Pain 8/10 from right posterior mid head down thru right neck and right upper arm.   Skin: Negative.    Neurological:  Positive for extremity weakness, gait problem, headaches and numbness. Negative for dizziness, light-headedness, seizures and speech difficulty.        Intermittent numbness and tingling to left arm.   Hematological: Negative.    Psychiatric/Behavioral:  Positive for confusion and decreased concentration. Negative for depression, sleep disturbance and suicidal ideas. The patient is nervous/anxious.         Patient states dealing with a lot from a medical stand point producing anxiety and stress related confusion and decreased concentration.        Performance Status:  The Karnofsky performance scale today is 80, Normal activity with effort; some signs or symptoms of disease (ECOG equivalent 1).        OBJECTIVE  There were no  vitals taken for this visit.   Physical Exam  HENT:      Head: Normocephalic and atraumatic.   Eyes:      Pupils: Pupils are equal, round, and reactive to light.   Cardiovascular:      Rate and Rhythm: Normal rate and regular rhythm.   Pulmonary:      Effort: Pulmonary effort is normal.      Breath sounds: Wheezing and rhonchi present.   Abdominal:      General: Abdomen is flat.      Palpations: Abdomen is soft.   Musculoskeletal:         General: Swelling, tenderness and deformity present.      Cervical back: Rigidity present.      Comments: Braces in bilateral lower extremities, with external rotation.  Boutonniere, ulnar deviation, and swan-neck deformities in the bilateral hands.  Right shoulder tenderness.  Lump over the mid-lateral forearm.     Skin:     General: Skin is warm.   Neurological:      General: No focal deficit present.      Mental Status: He is alert. Mental status is at baseline.          Laboratory Review:  There are no laboratory contraindications to radiation therapy.    The pertinent lab results were reviewed and discussed with the patient.  Notably,     Lab Results   Component Value Date    WBC 11.3 09/19/2024    HGB 9.6 (L) 09/19/2024    HCT 28.2 (L) 09/19/2024    MCV 93 09/19/2024     09/19/2024      Lab Results   Component Value Date    GLUCOSE 84 09/18/2024    CALCIUM 8.8 09/18/2024     09/18/2024    K 4.5 09/18/2024    CO2 27 09/18/2024     09/18/2024    BUN 24 (H) 09/18/2024    CREATININE 0.62 09/18/2024          Pathology Review:  The pertinent pathology results were reviewed and discussed with the patient.  Notably,       Cytology Consultation (Non-Gynecologic): [id number]  Order: [id number]   Collected 9/17/2024 10:56       Status: Final result       Visible to patient: Yes (not seen)       Dx: Lung cancer metastatic to brain (Multi)    0 Result Notes      Component    Final Cytological Interpretation      A. LUNG FINE NEEDLE ASPIRATION RIGHT UPPER LOBE- right hilar  mass                                                Malignant cells derived from adenocarcinoma, see note.     Note: The malignant cells are positive for TTF-1 and napsin A, and negative for p40     Molecular testing has been ordered and results will be issued in a separate report.  The cell block contains low tumor cellularity representing roughly 20% of all nucleated cells.                                                      B. LYMPH NODE 11 L PULMONARY FINE NEEDLE ASPIRATION                                                 Malignant cells derived from adenocarcinoma                                                        C. LYMPH NODE 4 L PULMONARY FINE NEEDLE ASPIRATION                                                 Malignant cells derived from adenocarcinoma          Imaging:  The pertinent imaging results were reviewed and discussed with the patient.  Notably,    Bronchoscopy Diagnostic, w EBUS  Table formatting from the original result was not included.  Images from the original result were not included.    Bronchoscopy Operative Report  [facility]    Date of procedure: 09/17/24  Patient: Jovon Mccartney  MRN: [id number]   YOB: 1975  Gender: male  Referring provider/physician:     PRE-PROCEDURE DIAGNOSIS:  Lung Mass with brain and bone metastasis       PRE-PROCEDURE EVALUATION: A history and physical has been performed,   and patient medication allergies have been reviewed. The patient's   tolerance of previous anesthesia has been reviewed. The risks and benefits   of the procedure and the sedation options and risks were discussed with   the patient or their designee. All questions were answered and informed   consent obtained.     INDICATION: Obtain diagnosis and Staging    BRONCHOSCOPIST:                Laura Maldonado MD  First Assistant:  Ofe Cowan MD    PROCEDURES PERFORMED:  Flexible Bronchoscopy    POST-PROCEDURE DIAGNOSIS:  Airway stenosis, of bronchus  intermedius and RLL and RML segments  Same as pre-operative diagnoses    ANESTHESIA:  GETA. See separate anesthesia provider documentation. This procedure was   performed using standard monitoring procedures in [facility]'s   [facility].    PROCEDURE SUMMARY:    After adequate local and intravenous anesthesia, bronchoscopy was   performed via an LMA placed by anesthesia. The epiglottis, supraglottic   structures, and vocal cords appeared normal. The posterior commissure   appeared normal. The subglottic space and trachea appeared normal.   Inspection of RIGHT bronchial tree to the segmental level appeared  abnormal: bronchus intermedius with stenosis due to external compression   for the hilar mass, RML and RLL were not able to be traversed due to   erythematous and edematous mucosa . Inspection of LEFT bronchial tree to   the segmental level appeared normal. Copious thick, tenacious secretions   were present throughout the large airways, left-sided large airways, and   right-sided large airways.    After the airway examination was completed, the scope was then replaced by   EBUS scope. Lymph node sizing was performed via endobronchial ultrasound.   Sampling by transbronchial needle   aspiration was also performed using an Olympus ViziShot 22 gauge needle   and sent for routine cytology.     Rapid On-Site Evaluation (SHILOH) was available               - The 4R (lower paratracheal) node was  6.8 mm in size. Sampling was   not done as it was not clinically indicated.     -  The 7 (subcarinal) node was  9.2 mm in size. Sampling was not done as   it was not clinically indicated.        - The 4L (lower paratracheal) node was  9 mm in size. Sampling was   done, 7 samples with the needle were obtained.       - The 11L (interlobar) node was  11.7 mm in size. Sampling was done, 4   samples with the needle were obtained.       - The right hilar mass was  18.4 mm in size. Sampling was done, 8   samples with  the needle were obtained.     After diagnostic/therapeutic maneuvers, the airway was examined for   evidence of bleeding. None was noted. The bronchoscope was removed from   the patient's airway and the airway was handed back over to my colleagues   from anesthesiology.     SPECIMENS:   ID Type Source Tests Collected by Time   A : right hilar mass Non-Gynecologic Cytology LUNG FINE NEEDLE ASPIRATION   RIGHT UPPER LOBE CYTOLOGY CONSULTATION (NON-GYNECOLOGIC) Ofe Cowan MD   9/17/2024 1056   B :  Non-Gynecologic Cytology LYMPH NODE 11 L PULMONARY FINE NEEDLE   ASPIRATION CYTOLOGY CONSULTATION (NON-GYNECOLOGIC) Laura Maldonado MD   9/17/2024 1120   C :  Non-Gynecologic Cytology LYMPH NODE 4 L PULMONARY FINE NEEDLE   ASPIRATION CYTOLOGY CONSULTATION (NON-GYNECOLOGIC) Laura Maldonado MD   9/17/2024 1144     RAPID ONSITE EVALUATION (R.O.S.E.):  Right hilar mass TBNA inadequate for immediate malignant diagnosis  11L  TBNA showed lymphocytes to be present confirming ceferino sampling  4L  TBNA adequate for immediate malignant diagnosis    COMPLICATIONS: None.       EBL: Minimal, <5 ml       POST PROCEDURE CHEST RADIOGRAPH: Not needed       FINDINGS:  Malignancy in the 4L station lymph node  Hilar Mass also sampled, SHILOH did not call malignancy but sent for further   review  11L is suspicious for malignancy     RECOMMENDATIONS:  Follow up with pathology results  Recommend oncology and radiation oncology consultation    The patient was transported to the recovery area/PACU in stable condition.    ILaura MD, was personally present throughout this procedure,   including all key and non-key portions.    Laura Maldonado MD    Interventional Pulmonology    Date: 09/17/24 Time: 12:55 PM  (Images obtained during this procedure, including ultrasonographic images   if performed, can be found in within the electronic medical record and/or   PACS.)    Events  Procedure Events   Event Event Time   ENDO SCOPE IN TIME  9/17/2024 10:47 AM   ENDO SCOPE OUT TIME 9/17/2024 12:07 PM     Procedure Location  [facility] [address]  [address]  [facility] [address]  [phone]    Referring Provider  Ofe Cowan MD    Procedure Provider  Laura Maldonado MD        IMPRESSION:  MRI brain:      1. There is an ill-defined enhancing lesion centered within the right  parieto-occipital bone, concerning for multiple myeloma or metastatic  disease. There is abutment and anterior displacement of the  underlying dura with mass effect upon the right occipital lobe. There  is no midline shift. There is thickening and enhancement of the  underlying and surrounding dura, pachymeningeal involvement can not  be entirely excluded. Otherwise, no evidence of abnormal  intraparenchymal metastatic disease to suggest neoplastic involvement.  2. Nonspecific moderate supratentorial and infratentorial white  matter signal abnormalities. Differential considerations include  demyelination or chronic microvascular ischemic disease amongst  others. Note, however this pattern is not specific for demyelination.      MRI cervical spine:      1. Ill-defined marrow replacing lesions within the C2 and C3  vertebral bodies corresponding to the lytic foci seen on the same-day  CT cervical spine. The constellation of findings are suggestive of  metastatic disease or multiple myeloma. There is no evidence of an  extraosseous soft tissue component.  2. Multilevel degenerative changes of the cervical spine.      I personally reviewed the images/study and I agree with the findings  as stated by Resident Niki Osorio. This study was interpreted at  [facility], [place], [place].     MACRO:  None      Signed by: Melinda Bernstein 9/9/2024 4:05 PM    ASSESSMENT:   Jovon Mccartney is a 49 y.o. male with a PMHx of tobacco use (1-2 PPD/ and juvenile RA) and a recent diagnosis of Stage IVB (cT4: great vessels  involvement, pN3: 4L and 11 L involvement, M1c), NSCLC, adenocarcinoma, who is being referred to the radiation oncology clinic for consideration of palliative RT.    The patient over his current presentation and I reviewed the radiology images with him.  I explained that given the presence of widespread bone metastases, the patient is considered as stage IVb, and the mainstay of treatment in the situation is systemic treatment, and the role of RT is for palliation of symptoms.  We then discussed the fact that he has metastasis related pain mainly in the occipital region, cervical spine, and right shoulder.  While there is evidence of airway involvemen on the CT of the chest, the patient did not report any recent increase in his dose of breath above his usual baseline.  I then explained that we would need to complete the staging with PET/CT which was ordered to be done next Tuesday, October 1, 2024.    Given the above, I have recommended palliative RT to the occipital lesion given the associated mass effect on the brain and the pain, the cervical spine given the associated pain and the risk of progression and possible impingement on the spinal cord, and to the right shoulder given the tenderness on physical exam.  Since the disease  seems to be widespread, and given the patient's comorbidities, and the need to initiate systemic therapy within 2 weeks we would consider palliative RT with 20 Gray in 5 fractions to each of the sites.  Meanwhile, we will wait on the results of the PET/CT, to have an accurate delineation of the disease sites and to finalize our radiation the plan according.    I then went in general over the logistics of radiation therapy. These would include the acquisition of a CT simulation for treatment planning.  We then went over the side effects of radiation therapy.  These can include acutely fatigue, nausea, sore throat, and the risk of esophagitis, and increased cough, shortness of breath, and  hemoptysis.  We also went over the long term side effects of radiation therapy that can include chest wall pain, damage to underlying lung including radiation pneumonitis, hemoptysis, rib, or heart, spinal cord neuritis, bone fracture, brain necrosis, and secondary malignancies in the long-term. All questions were answered to the best of our ability and informed consent was obtained.        IDENTIFYING DATA:  Cancer Staging   NSCLC metastatic to bone (Multi)  Staging form: Lung, AJCC 8th Edition  - Clinical stage from 9/26/2024: Stage IVB (cT4, cN3, cM1c) - Signed by Anton Mcgrath MD PhD on 9/26/2024    Problem List Items Addressed This Visit       Lesion of parietal bone    Relevant Orders    NM PET CT lung CA initial diagnosis    Referral to Palliative Care    Lesion of bone of cervical spine - Primary    Relevant Orders    NM PET CT lung CA initial diagnosis    Referral to Palliative Care    NSCLC metastatic to bone (Multi)    Relevant Orders    NM PET CT lung CA initial diagnosis    Referral to Palliative Care    Acute pain of both shoulders         PLAN:       [ ] Workup complete by ordering a PET CT.  [ ] Consent obtained today for RT to the brain, cervical spine, and right shoulder.  [ ] Plan for CT SIM next week.  [ ] If PET/CT is suggestive of other foci of spine metastatic involvement, then we will obtain a dedicated MRI of the thoracolumbar sacral spine.  [ ] Will coordinate the above plan with medical oncology.  [  ] We will keep on following the mass of the chest since for now it is not causing an acute increase in shortness of breath.  We can consider palliative RT to the chest as needed depending on the patient's response to systemic therapy and/or his clinical symptoms  [  ] Refer to palliative care  [  ] Smoking Cessation    NCCN Guidelines were applicable to guide this patients treatment plan.   Anton Mcgrath MD PhD

## 2024-09-25 NOTE — DOCUMENTATION CLARIFICATION NOTE
"    PATIENT:               YADIEL SHEPHERD  ACCT #:                  [id number]  MRN:                       [id number]  :                       1975  ADMIT DATE:       2024 10:21 AM  DISCH DATE:        2024 5:30 PM  RESPONDING PROVIDER #:        [id number]          PROVIDER RESPONSE TEXT:    I concur with the pathology report findings and they are clinically significant    CDI QUERY TEXT:    Clarification    Instruction:    Based on your assessment of the patient and the clinical information, please provide the requested documentation by clicking on the appropriate radio button and enter any additional information if prompted.    Question: Please document whether you concur or do not concur with the pathology report findings on 24 cytological interpretation    When answering this query, please exercise your independent professional judgment. The fact that a question is being asked, does not imply that any particular answer is desired or expected.    The patient's clinical indicators include:  Clinical Information: 49 YOM w/PMH s/f tobacco use, juvenile RA, and emphysema presented to OSH w/HA and neck pain. Transferred to [facility]  for possible EBUS bronchoscopy to investigate OSH imaging suspicious for bronchogenic carcinoma w/mildly enlarged mediastinal LNs. Underwent EBUS bronchoscopy on .    Clinical Indicators and Pathology Findings:   10:56 - \"Final Cytological Interpretation    A. LUNG FINE NEEDLE ASPIRATION RIGHT UPPER LOBE- right hilar mass  Malignant cells derived from adenocarcinoma, see note.    Note: The malignant cells are positive for TTF-1 and napsin A, and negative for p40  Molecular testing has been ordered and results will be issued in a separate report.  The cell block contains low tumor cellularity representing roughly 20 percent of all nucleated cells.    B. LYMPH NODE 11 L PULMONARY FINE NEEDLE ASPIRATION  Malignant cells derived from adenocarcinoma    C. LYMPH NODE 4 L " "PULMONARY FINE NEEDLE ASPIRATION  Malignant cells derived from adenocarcinoma\"    Treatment: EBUS bronchoscopy on 9/17. Consulted pulmonology, neurosurgery, radiation oncology, and palliative oncology.    Risk Factors: Smoker.  Options provided:  -- I concur with the pathology report findings and they are clinically significant  -- I do not concur with the pathology report findings  -- Other - I will add my own diagnosis  -- Refer to Clinical Documentation Reviewer    Query created by: Samantha Metz on 9/25/2024 9:51 AM      Electronically signed by:  KAMILLA PIERRE MD 9/25/2024 10:00 AM          "

## 2024-09-26 ENCOUNTER — HOSPITAL ENCOUNTER (OUTPATIENT)
Dept: RADIATION ONCOLOGY | Facility: CLINIC | Age: 49
Setting detail: RADIATION/ONCOLOGY SERIES
Discharge: HOME | End: 2024-09-26
Payer: COMMERCIAL

## 2024-09-26 VITALS
SYSTOLIC BLOOD PRESSURE: 146 MMHG | DIASTOLIC BLOOD PRESSURE: 77 MMHG | WEIGHT: 117.28 LBS | HEIGHT: 65 IN | HEART RATE: 86 BPM | BODY MASS INDEX: 19.54 KG/M2 | RESPIRATION RATE: 18 BRPM | OXYGEN SATURATION: 96 % | TEMPERATURE: 97.5 F

## 2024-09-26 DIAGNOSIS — C79.51 NSCLC METASTATIC TO BONE (MULTI): ICD-10-CM

## 2024-09-26 DIAGNOSIS — M89.9 LESION OF PARIETAL BONE: ICD-10-CM

## 2024-09-26 DIAGNOSIS — C79.51 NSCLC METASTATIC TO BONE (MULTI): Primary | ICD-10-CM

## 2024-09-26 DIAGNOSIS — M25.511 ACUTE PAIN OF BOTH SHOULDERS: ICD-10-CM

## 2024-09-26 DIAGNOSIS — C34.90 NSCLC METASTATIC TO BONE (MULTI): Primary | ICD-10-CM

## 2024-09-26 DIAGNOSIS — C34.90 NSCLC METASTATIC TO BONE (MULTI): ICD-10-CM

## 2024-09-26 DIAGNOSIS — M25.512 ACUTE PAIN OF BOTH SHOULDERS: ICD-10-CM

## 2024-09-26 DIAGNOSIS — M89.9 LESION OF BONE OF CERVICAL SPINE: Primary | ICD-10-CM

## 2024-09-26 LAB — TEST COMMENT - SURGICAL SENDOUT REQUEST: NORMAL

## 2024-09-26 PROCEDURE — 99205 OFFICE O/P NEW HI 60 MIN: CPT | Performed by: INTERNAL MEDICINE

## 2024-09-26 PROCEDURE — 99215 OFFICE O/P EST HI 40 MIN: CPT | Performed by: INTERNAL MEDICINE

## 2024-09-26 PROCEDURE — 99417 PROLNG OP E/M EACH 15 MIN: CPT | Performed by: INTERNAL MEDICINE

## 2024-09-26 ASSESSMENT — ENCOUNTER SYMPTOMS
RECTAL PAIN: 0
HEADACHES: 1
SEIZURES: 0
TROUBLE SWALLOWING: 0
DECREASED CONCENTRATION: 1
WHEEZING: 1
NUMBNESS: 1
HEMOPTYSIS: 0
UNEXPECTED WEIGHT CHANGE: 0
SHORTNESS OF BREATH: 1
VOMITING: 0
CHEST TIGHTNESS: 0
VOICE CHANGE: 0
HEMATURIA: 0
CHILLS: 0
NECK PAIN: 1
ABDOMINAL DISTENTION: 0
LOSS OF SENSATION IN FEET: 0
APPETITE CHANGE: 0
NECK STIFFNESS: 1
COUGH: 1
LIGHT-HEADEDNESS: 0
PALPITATIONS: 0
DIZZINESS: 0
ARTHRALGIAS: 1
DYSURIA: 0
SLEEP DISTURBANCE: 0
FLANK PAIN: 0
NERVOUS/ANXIOUS: 1
DIFFICULTY URINATING: 0
NAUSEA: 0
DEPRESSION: 0
DIARRHEA: 0
CONFUSION: 1
CONSTIPATION: 1
SORE THROAT: 0
FEVER: 0
LEG SWELLING: 1
ENDOCRINE NEGATIVE: 1
MYALGIAS: 0
BACK PAIN: 1
FATIGUE: 1
EYES NEGATIVE: 1
EXTREMITY WEAKNESS: 1
OCCASIONAL FEELINGS OF UNSTEADINESS: 1
SPEECH DIFFICULTY: 0
ABDOMINAL PAIN: 0
DIAPHORESIS: 0
BLOOD IN STOOL: 0
FREQUENCY: 1
HEMATOLOGIC/LYMPHATIC NEGATIVE: 1

## 2024-09-26 ASSESSMENT — PAIN SCALES - GENERAL: PAINLEVEL: 8

## 2024-09-26 ASSESSMENT — PATIENT HEALTH QUESTIONNAIRE - PHQ9
SUM OF ALL RESPONSES TO PHQ9 QUESTIONS 1 AND 2: 0
1. LITTLE INTEREST OR PLEASURE IN DOING THINGS: NOT AT ALL
2. FEELING DOWN, DEPRESSED OR HOPELESS: NOT AT ALL

## 2024-09-26 NOTE — PROGRESS NOTES
Radiation Oncology Nursing Note    Prior Radiotherapy:  No  No radiation treatments to show. (Treatments may have been administered in another system.)     Current Systemic Treatment:  No     Presence of Pacemaker or ICD:  No    History of Autoimmune or Connective Tissue Disorders:  Yes, describe: Juvenile RA    Pain: The patient's current pain level was assessed.  They report currently having a pain of 8 out of 10.  They feel their pain is not under control with the use of pain medications of 10mg oxycodone, ibuprofen and gabapentin.    Review of Systems:  Review of Systems   Constitutional:  Positive for fatigue. Negative for appetite change, chills, diaphoresis, fever and unexpected weight change.   HENT:   Positive for lump/mass, nosebleeds and tinnitus. Negative for hearing loss, mouth sores, sore throat, trouble swallowing and voice change.         Right upper arm new lump felt starting last week.  Nosebleed x1 this week and able to stop on his own.   Eyes: Negative.    Respiratory:  Positive for cough, shortness of breath and wheezing. Negative for chest tightness and hemoptysis.         Clear productive intermittent cough. SOB with exertion and wheezing in the mornings.  Has inhalers, using with relief.   Cardiovascular:  Positive for leg swelling. Negative for chest pain and palpitations.   Gastrointestinal:  Positive for constipation. Negative for abdominal distention, abdominal pain, blood in stool, diarrhea, nausea, rectal pain and vomiting.        Taking miralax. Last BM 9/25   Endocrine: Negative.    Genitourinary:  Positive for frequency and nocturia. Negative for bladder incontinence, difficulty urinating, dyspareunia, dysuria, hematuria, pelvic pain and penile discharge.         Nocturia 3x/night.   Musculoskeletal:  Positive for arthralgias, back pain, gait problem, neck pain and neck stiffness. Negative for flank pain and myalgias.        Pain 8/10 from right posterior mid head down thru right  neck and right upper arm.   Skin: Negative.    Neurological:  Positive for extremity weakness, gait problem, headaches and numbness. Negative for dizziness, light-headedness, seizures and speech difficulty.        Intermittent numbness and tingling to left arm.   Hematological: Negative.    Psychiatric/Behavioral:  Positive for confusion and decreased concentration. Negative for depression, sleep disturbance and suicidal ideas. The patient is nervous/anxious.         Patient states dealing with a lot from a medical stand point producing anxiety and stress related confusion and decreased concentration.

## 2024-09-27 ENCOUNTER — APPOINTMENT (OUTPATIENT)
Dept: PRIMARY CARE | Facility: CLINIC | Age: 49
End: 2024-09-27
Payer: COMMERCIAL

## 2024-09-27 VITALS
HEART RATE: 70 BPM | WEIGHT: 125.4 LBS | OXYGEN SATURATION: 90 % | DIASTOLIC BLOOD PRESSURE: 64 MMHG | RESPIRATION RATE: 18 BRPM | SYSTOLIC BLOOD PRESSURE: 120 MMHG | BODY MASS INDEX: 20.89 KG/M2 | HEIGHT: 65 IN

## 2024-09-27 DIAGNOSIS — C79.51 NSCLC METASTATIC TO BONE (MULTI): ICD-10-CM

## 2024-09-27 DIAGNOSIS — G89.29 CHRONIC PAIN OF BOTH SHOULDERS: ICD-10-CM

## 2024-09-27 DIAGNOSIS — F11.90 OPIATE USE: ICD-10-CM

## 2024-09-27 DIAGNOSIS — M89.9 LESION OF PARIETAL BONE: ICD-10-CM

## 2024-09-27 DIAGNOSIS — C34.90 NSCLC METASTATIC TO BONE (MULTI): ICD-10-CM

## 2024-09-27 DIAGNOSIS — M54.9 CHRONIC UPPER BACK PAIN: ICD-10-CM

## 2024-09-27 DIAGNOSIS — M25.511 CHRONIC PAIN OF BOTH SHOULDERS: ICD-10-CM

## 2024-09-27 DIAGNOSIS — R91.8 HILAR MASS: ICD-10-CM

## 2024-09-27 DIAGNOSIS — C34.90 NSCLC METASTATIC TO BONE (MULTI): Primary | ICD-10-CM

## 2024-09-27 DIAGNOSIS — Z79.620 ADALIMUMAB (HUMIRA) LONG-TERM USE: ICD-10-CM

## 2024-09-27 DIAGNOSIS — M25.512 ACUTE PAIN OF BOTH SHOULDERS: ICD-10-CM

## 2024-09-27 DIAGNOSIS — M25.511 ACUTE PAIN OF BOTH SHOULDERS: ICD-10-CM

## 2024-09-27 DIAGNOSIS — C79.51 NSCLC METASTATIC TO BONE (MULTI): Primary | ICD-10-CM

## 2024-09-27 DIAGNOSIS — M08.00 JUVENILE RHEUMATOID ARTHRITIS (MULTI): ICD-10-CM

## 2024-09-27 DIAGNOSIS — M89.9 LESION OF BONE OF CERVICAL SPINE: ICD-10-CM

## 2024-09-27 DIAGNOSIS — M25.512 CHRONIC PAIN OF BOTH SHOULDERS: ICD-10-CM

## 2024-09-27 DIAGNOSIS — J43.8 OTHER EMPHYSEMA (MULTI): ICD-10-CM

## 2024-09-27 DIAGNOSIS — M89.9 LESION OF PARIETAL BONE: Primary | ICD-10-CM

## 2024-09-27 DIAGNOSIS — G89.29 CHRONIC UPPER BACK PAIN: ICD-10-CM

## 2024-09-27 PROBLEM — C79.31 NSCLC METASTATIC TO BRAIN (MULTI): Status: ACTIVE | Noted: 2024-09-27

## 2024-09-27 PROBLEM — Z87.891 FORMER SMOKER: Status: ACTIVE | Noted: 2024-09-27

## 2024-09-27 LAB
ELECTRONICALLY SIGNED BY: NORMAL
FOCUSED SOLID TUMOR DNA/RNA RESULTS: NORMAL

## 2024-09-27 PROCEDURE — 99214 OFFICE O/P EST MOD 30 MIN: CPT | Performed by: NURSE PRACTITIONER

## 2024-09-27 RX ORDER — OXYCODONE HYDROCHLORIDE 5 MG/1
TABLET ORAL
Qty: 240 TABLET | Refills: 0 | Status: SHIPPED | OUTPATIENT
Start: 2024-09-27 | End: 2024-10-01 | Stop reason: SDUPTHER

## 2024-09-27 RX ORDER — OXYCODONE HYDROCHLORIDE 5 MG/1
TABLET ORAL
Qty: 240 TABLET | Refills: 0 | Status: SHIPPED | OUTPATIENT
Start: 2024-09-27 | End: 2024-09-27

## 2024-09-27 RX ORDER — NALOXONE HYDROCHLORIDE 4 MG/.1ML
4 SPRAY NASAL AS NEEDED
Qty: 2 EACH | Refills: 0 | Status: SHIPPED | OUTPATIENT
Start: 2024-09-27 | End: 2024-09-27

## 2024-09-27 RX ORDER — NALOXONE HYDROCHLORIDE 4 MG/.1ML
4 SPRAY NASAL AS NEEDED
Qty: 2 EACH | Refills: 3 | Status: SHIPPED | OUTPATIENT
Start: 2024-09-27

## 2024-09-27 RX ORDER — PREDNISONE 10 MG/1
10 TABLET ORAL DAILY
COMMUNITY
Start: 2024-09-03 | End: 2024-09-27 | Stop reason: ALTCHOICE

## 2024-09-27 ASSESSMENT — ENCOUNTER SYMPTOMS
HEADACHES: 1
SLEEP DISTURBANCE: 1

## 2024-09-27 NOTE — PROGRESS NOTES
Subjective   Jovon Mccartney is a 49 y.o. male who presents for Hospital Follow-up (Patient at [facility], 9/14/2024-09/19/2024 for c/o intractable headache and intermittent pain in back of neck radiating to both arms with numbness in left hand. CT C/A/P and CT head concerning for lung CA metastatic to bone, including skull, right scapula, C2 and C3 vertebrae, hilar mass, and lymphadenopathy. Biopsy shows adenocarcinoma of lung. Pt. Had consultation with radiation oncology 9/27 and will see hem/onc Monday. ) and Establish Care (Pt. Wants to establish with a new PCP.).  HPI The patient's wife, Judith, accompanied him to the appointment.  Review of Systems   Neurological:  Positive for headaches (severe, intractable).   Psychiatric/Behavioral:  Positive for sleep disturbance (wakes around midnight d/t severe pain, usually falls  aslep after about 3 hours).      Objective   Physical Exam  Vitals reviewed.   Constitutional:       General: He is not in acute distress.     Appearance: Normal appearance. He is underweight. He is ill-appearing.   HENT:      Head: Normocephalic.   Eyes:      Conjunctiva/sclera: Conjunctivae normal.   Cardiovascular:      Rate and Rhythm: Normal rate and regular rhythm.      Pulses: Normal pulses.      Heart sounds: No murmur heard.  Pulmonary:      Effort: Pulmonary effort is normal.      Breath sounds: Normal breath sounds.   Abdominal:      General: Bowel sounds are normal.      Palpations: Abdomen is soft.   Musculoskeletal:      Cervical back: Neck supple.      Right lower leg: No edema.      Left lower leg: No edema.      Comments: Wears braces on parminder lower legs    Skin:     General: Skin is warm and dry.   Neurological:      General: No focal deficit present.      Mental Status: He is alert and oriented to person, place, and time.   Psychiatric:         Mood and Affect: Mood normal.         Thought Content: Thought content normal.       /64   Pulse 70   Resp 18   " Ht 1.651 m (5' 5\")   Wt 56.9 kg (125 lb 6.4 oz)   SpO2 90%   BMI 20.87 kg/m²   Assessment/Plan   Problem List Items Addressed This Visit       Lesion of parietal bone    Overview     Right parietal bone lytic lesion noted on 9/9/24 CT head.         Lesion of bone of cervical spine    Relevant Medications    oxyCODONE (Roxicodone) 5 mg immediate release tablet    Hilar mass    Adalimumab (Humira) long-term use    Juvenile rheumatoid arthritis (Multi)    Overview     Wears braces to BLE.  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 Will follow with Dr. Liset Vargas as outpatient on 9/30/2024  Pain meds to include gabapentin, Tylenol, ibuprofen, and oxycodone.         Relevant Orders    Follow Up In [facility] - PCP - Established    Chronic pain of both shoulders    Relevant Orders    Follow Up In [facility] - PCP - Established    Chronic upper back pain    Centrilobular emphysema (Multi)    Overview     Severe, parminder apices.  On Breztri and PRN Combivent.          Other Visit Diagnoses       Opiate use        Relevant Medications    naloxone (Narcan) 4 mg/0.1 mL nasal spray    Other Relevant Orders    Follow Up In [facility] - PCP - Established                  "

## 2024-09-28 NOTE — PROGRESS NOTES
[facility] - Medical Oncology New Patient Visit    Patient ID: Jovon Mccartney is a 49 y.o. male.  Referring Physician: Helen Fleming, APRN-CNP  [address] [place] [address]  Primary Care Provider: Matilda Garcia, MUNDO-CNP       DIAGNOSIS  NSCLC adenocarcinoma     STAGING  cT4 pN3 M1c      CURRENT SITES OF DISEASE  R hilar mass, 4L, 11L, scapula,  right  parieto-occipital bone, C2, C3, ?lymphatic carcinomatosis     MOLECULAR GENOMICS  KRAS p.G12C ([id number] c.34G>T)      PRIOR THERAPY        CURRENT THERAPY           CURRENT ONCOLOGICAL PROBLEMS           HISTORY OF PRESENT ILLNESS  Mr. Mccartney is a 48 yo with PMH significant for RA who present to the [place] ER on 9/9/24 with 2 months of progressively worsening headaches and neck pain with occasional radiation to the R and L arms. A lytic lesion was noted on CT head 9/9/24 of the parietal bone, and CT cervical spine was concerning for lucent lesions C2 and C3. Brain MRI and MR cervical spine confirmed marrow-replacing lesions fo the C2 and C3 vertebral bodies. CT C/A/P w/o contrast on 9/10/24 was concerning for soft tissue mass of the R hilum, lytic lesions of the R scapula, and 3.5 cm mass in the L gluteus. [place] [facility] [place] C/A/P w/IV contrast on 9/15/24 confirmed the lung mass encasing the R mainstem bronchus abutting the R main pulmonary artery and extending to the lung periphery, as well as R perihilar lesion and lymphangitic carcinomatosis, prominent mediastinal nodes, L axillary node, nodular thickening of bilateral adrenal glands, likely lesion of the R scapula, small hepatic lesions, moderate pericardial effusion. EBUS on 9/17/24 with pathology of R hilar mass with adenocarcinoma, KRAS G12C, PD-L1 pending, and positive lymph nodes 11L and 4L. He met Dr. Mcgrath and discussed palliative radiation to the occipital lesion and cervical spine. PET/CT scheduled for 10/1/24.     He is here today with  his wife. He continues to have pain and soreness of the headaches, neck, mainly the back R side of his skull into his hsoulder and bicep, the R am is worse but still on the left as well. He is oxycodone and gabapentin right now. He is not sleeping through the night, his worst hours are over night midnight to 3 am. The oxycodone of 5 mg is not helping much, his PCP increased his oxy to 10 mg. He has stuff for constipation, already takes a pill daily for that and a powder like miralax. He has been losing weight - at least recently, but typically averages around 125-130 lbs. His energy level has gone really down since being in the hospital. Before, he was fine. If his arms weren't hurting as much, he thinks he'd be all right, but the headaches and the pain is terrible. He has a little bit of a cough and wheeze, and some dyspnea on exertion which is progressing. All these symptoms started with the bronchoscopy. No pain with breathing. Has not been doing much of anything because of the pain    He was born with juvenile RA, usually his feet and knees feel good compared to everything. He used to walk with a crutch, since the hospital he has been walking without it. He follows with a rheumatologist at [facility]. It's fairly managed he says, has  been on humira for 20-25 years, right when it first came out. Kept the fluid off his knees and helped him better than anything else. He was on all kinds of drugs he said before including steroids, methotrexate, others. Typically with the humira he doesn't really get flares, sometimes in his wrists. He's been off for about a month, so thinks he'd have a flare in about a month.      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 Maybe had one exposure to asbestos. 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 Lives at home with his wife and a dog, 3 cats, a bird and fish. They have 2  sons and 3 grandchildren.  Tob: quit smoking 9/9/24, smoked 39 years, 1-1.5 ppd  EtOH: occasional  Illicits: previous smoking marijuana, only edibles now for pain     FAMILY HISTORY  Mother - breast cancer dx 56 yo  Maternal grandmother - bone cancer  Paternal cousin - bone and/or lung cancer  Paternal grandmother - unknown cancer  No other family members with autoimmune diseases    Meds (Current):    Current Outpatient Medications:     acetaminophen (Tylenol) 500 mg tablet, Take 1 tablet (500 mg) by mouth every 6 hours if needed for mild pain (1 - 3)., Disp: , Rfl:     adalimumab (Humira Pen) 40 mg/0.8 mL pen injector kit pen-injector, Inject 1 Pen (40 mg) under the skin every 14 (fourteen) days., Disp: , Rfl:     budesonide-glycopyr-formoterol (BREZTRI) 160-9-4.8 mcg/actuation HFA aerosol inhaler, Inhale 2 puffs 2 times a day., Disp: 10.7 g, Rfl: 3    gabapentin (Neurontin) 300 mg capsule, Take 1 capsule (300 mg) by mouth once daily at bedtime., Disp: 30 capsule, Rfl: 3    ibuprofen 200 mg tablet, Take 1 tablet (200 mg) by mouth every 6 hours if needed for mild pain (1 - 3)., Disp: , Rfl:     ipratropium-albuteroL (Combivent Respimat)  mcg/actuation inhaler, Inhale 2 puffs 4 times a day., Disp: 12 g, Rfl: 11    naloxone (Narcan) 4 mg/0.1 mL nasal spray, Administer 1 spray (4 mg) into affected nostril(s) if needed for opioid reversal. May repeat every 2-3 minutes if needed, alternating nostrils, until medical assistance becomes available., Disp: 2 each, Rfl: 3    oxyCODONE (Roxicodone) 5 mg immediate release tablet, May take 1 tablet (5 mg) by mouth every 6 hours if needed for moderate pain (4 - 6) or severe pain (7 - 10) (1-2 tablet). May also take 2 tablets (10 mg) every 6 hours if needed for moderate pain (4 - 6) or severe pain (7 - 10) (1-2 tablet)., Disp: 240 tablet, Rfl: 0    pantoprazole (ProtoNix) 40 mg EC tablet, Take 1 tablet (40 mg) by mouth once daily in the morning. Take before meals. Do not  crush, chew, or split., Disp: 30 tablet, Rfl: 2    polyethylene glycol (Glycolax, Miralax) 17 gram/dose powder, Take 34 g by mouth once daily., Disp: 3060 g, Rfl: 0    sennosides-docusate sodium (Yoselin-Colace) 8.6-50 mg tablet, Take 1 tablet by mouth as needed at bedtime for constipation. (Patient taking differently: Take 1 tablet by mouth once daily.), Disp: 30 tablet, Rfl: 3    No Known Allergies    Review of Systems   All other systems reviewed and are negative.       Objective   BSA: There is no height or weight on file to calculate BSA.  Wt Readings from Last 5 Encounters:   09/27/24 56.9 kg (125 lb 6.4 oz)   09/26/24 53.2 kg (117 lb 4.6 oz)   09/23/24 57 kg (125 lb 9.6 oz)   09/14/24 54.9 kg (121 lb)   09/09/24 58.5 kg (129 lb)     /81 (BP Location: Left arm, Patient Position: Sitting)   Pulse 86   Temp 36.4 °C (97.5 °F) (Temporal)   Resp 16   Wt 55.7 kg (122 lb 12.8 oz)   SpO2 94%   BMI 20.43 kg/m²     ECOG Score: 1- Restricted in physically strenuous activity.  Carries out light duty.      Physical Exam  Vitals reviewed.   Constitutional:       General: He is not in acute distress.  HENT:      Head: Normocephalic and atraumatic.      Mouth/Throat:      Mouth: Mucous membranes are moist.   Eyes:      Pupils: Pupils are equal, round, and reactive to light.   Cardiovascular:      Rate and Rhythm: Normal rate and regular rhythm.      Heart sounds: No murmur heard.  Pulmonary:      Effort: Pulmonary effort is normal. No respiratory distress.      Breath sounds: Normal breath sounds.   Abdominal:      General: Bowel sounds are normal.      Palpations: Abdomen is soft.   Skin:     General: Skin is warm and dry.   Neurological:      General: No focal deficit present.      Mental Status: He is alert and oriented to person, place, and time.   Psychiatric:         Mood and Affect: Mood normal.         Behavior: Behavior normal.         Thought Content: Thought content normal.          Results:  Labs:  Lab  Results   Component Value Date    WBC 11.3 09/19/2024    HGB 9.6 (L) 09/19/2024    HCT 28.2 (L) 09/19/2024    MCV 93 09/19/2024     09/19/2024      Lab Results   Component Value Date    NEUTROABS 7.02 09/19/2024      Lab Results   Component Value Date    GLUCOSE 84 09/18/2024    CALCIUM 8.8 09/18/2024     09/18/2024    K 4.5 09/18/2024    CO2 27 09/18/2024     09/18/2024    BUN 24 (H) 09/18/2024    CREATININE 0.62 09/18/2024     Lab Results   Component Value Date    ALT 14 09/14/2024    AST 13 09/14/2024    ALKPHOS 114 09/14/2024    BILITOT 0.7 09/14/2024        Imaging:  I have personally reviewed the below imaging and concur with the reported findings unless otherwise stated:    === Results for orders placed during the hospital encounter of 09/14/24 ===    CT chest abdomen pelvis w IV contrast [[id number]] 09/15/2024    Status: Normal  1. Masslike enhancing soft tissue thickening within the right hilar  region which encases the right mainstem bronchus and abuts the right  main pulmonary artery, suspicious for neoplasm. This soft tissue  thickening additionally extends along the interlobular bronchials.  Further evaluation with tissue sampling or PET-CT is recommended.  2. Nodular thickening of the right pleura is suspicious for disease  with possible direct lymphangitic carcinomatosis as well in the right  infrahilar region.  3. Few prominent mediastinal lymph nodes are described above.  4. Permeative appearing lesion involving the right scapula is  suspicious for metastatic disease.  5. Abdominal findings include too small to characterize hypodense  lesions in the liver as well as adrenal thickening, potentially  adenomas. Further characterization by abdominal MRI is recommended  given the thoracic findings.  6. Moderate pericardial effusion.  7. Severe paraseptal emphysematous changes predominantly affecting  the lung apices.      I personally reviewed the image(s)/study and resident  interpretation.  I agree with the findings as stated by resident Jer Tamez.  Data analyzed and images interpreted at [facility], [place], [place].    MACRO:  None    Signed by: Chicho Gibson 9/15/2024 8:42 AM  Dictation workstation:   [id number]      === Results for orders placed during the hospital encounter of 09/09/24 ===    CT chest abdomen pelvis wo IV contrast [[id number]] 09/10/2024    Status: Normal  There is evidence of stage IV malignancy.  Amorphous soft tissue  centered at the right hilum encompassing the right upper lobe bronchus  and inseparable from the right main pulmonary artery is suspicious for  bronchogenic carcinoma.  To confirm this impression, consider further  evaluation with PET CT and/or bronchoscopy.  There are mildly enlarged  mediastinal lymph nodes suspicious for metastatic disease.  Multiple  lytic lesions of the right scapula are likely metastatic.  There is  also likely involvement of each humeral head.  Spiculated opacities at each lung apex may represent scars.  These  could be further evaluated at the time of follow-up PET.  Bullous emphysema.  Moderate-sized pericardial effusion.  Nonspecific thickening of both adrenal glands suspicious for  metastatic disease.  There is a 3.5 x 3 x 1.6 cm fluid density mass centered in the lateral  aspect of the left gluteus lamin muscle.  This could simply be  sequela from a recent injection.  There is no internal air bubble.  Clinical correlation is recommended as regards to signs or symptoms of  infection/abscess.  No ascites, free air or bowel obstruction.  Signed by Narciso Alaniz MD    ___________________________________________________________________________    CT cervical spine wo IV contrast [[id number]] 09/09/2024    Status: Normal  No evidence of an acute fracture. Degenerative changes. Lucent  lesions within the spine which are suspicious for multiple myeloma or  metastatic disease.  Emphysematous changes.    MACRO:  Critical Finding:  See findings. Notification was initiated on  9/9/2024 at 10:23 am by  Brent Julian.  (**-OCF-**)    Signed by: Brent Julian 9/9/2024 10:23 AM  Dictation workstation:   [id number]    ___________________________________________________________________________    CT head wo IV contrast [[id number]] 09/09/2024    Status: Normal  No evidence of an acute intracranial process. Nonspecific white  matter disease. Lytic lesion involving the right parietal bone which  is nonspecific but malignancy is to be excluded (for example,  multiple myeloma or metastatic disease).    MACRO:  None.    Signed by: Brent Julian 9/9/2024 10:20 AM  Dictation workstation:   [id number]  === Results for orders placed during the hospital encounter of 09/09/24 ===    MR cervical spine w and wo IV contrast [[id number]] 09/09/2024    Status: Normal  MRI brain:    1. There is an ill-defined enhancing lesion centered within the right  parieto-occipital bone, concerning for multiple myeloma or metastatic  disease. There is abutment and anterior displacement of the  underlying dura with mass effect upon the right occipital lobe. There  is no midline shift. There is thickening and enhancement of the  underlying and surrounding dura, pachymeningeal involvement can not  be entirely excluded. Otherwise, no evidence of abnormal  intraparenchymal metastatic disease to suggest neoplastic involvement.  2. Nonspecific moderate supratentorial and infratentorial white  matter signal abnormalities. Differential considerations include  demyelination or chronic microvascular ischemic disease amongst  others. Note, however this pattern is not specific for demyelination.    MRI cervical spine:    1. Ill-defined marrow replacing lesions within the C2 and C3  vertebral bodies corresponding to the lytic foci seen on the same-day  CT cervical spine. The constellation of findings are suggestive of  metastatic disease or multiple  myeloma. There is no evidence of an  extraosseous soft tissue component.  2. Multilevel degenerative changes of the cervical spine.    I personally reviewed the images/study and I agree with the findings  as stated by Resident Niki Osorio. This study was interpreted at  [facility], [place], [place].    MACRO:  None    Signed by: Melinda Bernstein 9/9/2024 4:05 PM  Dictation workstation:   [id number]    ___________________________________________________________________________    MR brain w and wo IV contrast [[id number]] 09/09/2024    Status: Normal  MRI brain:    1. There is an ill-defined enhancing lesion centered within the right  parieto-occipital bone, concerning for multiple myeloma or metastatic  disease. There is abutment and anterior displacement of the  underlying dura with mass effect upon the right occipital lobe. There  is no midline shift. There is thickening and enhancement of the  underlying and surrounding dura, pachymeningeal involvement can not  be entirely excluded. Otherwise, no evidence of abnormal  intraparenchymal metastatic disease to suggest neoplastic involvement.  2. Nonspecific moderate supratentorial and infratentorial white  matter signal abnormalities. Differential considerations include  demyelination or chronic microvascular ischemic disease amongst  others. Note, however this pattern is not specific for demyelination.    MRI cervical spine:    1. Ill-defined marrow replacing lesions within the C2 and C3  vertebral bodies corresponding to the lytic foci seen on the same-day  CT cervical spine. The constellation of findings are suggestive of  metastatic disease or multiple myeloma. There is no evidence of an  extraosseous soft tissue component.  2. Multilevel degenerative changes of the cervical spine.    I personally reviewed the images/study and I agree with the findings  as stated by Resident Niki Osorio. This study was interpreted at  [facility]  [place], [place].    MACRO:  None    Signed by: Melinda Bernstein 9/9/2024 4:05 PM  Dictation workstation:   [id number]  No results found for this or any previous visit.  No results found for this or any previous visit.  No results found for this or any previous visit.  No results found for this or any previous visit.    Pathology:    Lab Results   Component Value Date    FINALINTERP  09/17/2024       A. LUNG FINE NEEDLE ASPIRATION RIGHT UPPER LOBE- right hilar mass         Malignant cells derived from adenocarcinoma, see note.    Note: The malignant cells are positive for TTF-1 and napsin A, and negative for p40    Molecular testing has been ordered and results will be issued in a separate report.  The cell block contains low tumor cellularity representing roughly 20% of all nucleated cells.              B. LYMPH NODE 11 L PULMONARY FINE NEEDLE ASPIRATION          Malignant cells derived from adenocarcinoma               C. LYMPH NODE 4 L PULMONARY FINE NEEDLE ASPIRATION          Malignant cells derived from adenocarcinoma                  Assessment/Plan      Jovon Mccartney is a 49 y.o. male here for recommendations and to establish care for NSCLC adenocarcinoma.    # NSCLC adenocarcinoma  - [id number]  - pending PET/CT  - KRAS G12C, pending PD-L1  - will send liquid tempus today as well  - discussed that SoC therapies include chemotherapy+ immunotherapy vs immunotherapy alone, pending PD-L1. Given longstanding juvenile RA, would like input from his rheumatologist regarding safety of immunotherapy in this setting. Discussed if no immunotherapy, would recommend chemotherapy alone.    - discussed KRAS G12C targeted therapies in the second-line setting  - will await PD-L1 and patient will see rheumatologist ideally week of 10/14 when he returns    # Neoplasm related pain  - already saw Dr. Mcgrath and plan for palliative radiation  - will uptitrate gabapentin to 300 TID and his PCP  already increased oxycodone to 10 mg PRN; he sees supportive oncology next week for further titration    # Advanced care planning  - discussed incurable but treatable nature of metastatic disease, with goal of treatment to improve or maintain quality of life and prolong life.    Liset Vargas MD

## 2024-09-30 ENCOUNTER — LAB (OUTPATIENT)
Dept: LAB | Facility: CLINIC | Age: 49
End: 2024-09-30
Payer: COMMERCIAL

## 2024-09-30 ENCOUNTER — OFFICE VISIT (OUTPATIENT)
Dept: HEMATOLOGY/ONCOLOGY | Facility: CLINIC | Age: 49
End: 2024-09-30
Payer: COMMERCIAL

## 2024-09-30 VITALS
DIASTOLIC BLOOD PRESSURE: 81 MMHG | OXYGEN SATURATION: 94 % | SYSTOLIC BLOOD PRESSURE: 124 MMHG | RESPIRATION RATE: 16 BRPM | WEIGHT: 122.8 LBS | BODY MASS INDEX: 20.43 KG/M2 | TEMPERATURE: 97.5 F | HEART RATE: 86 BPM

## 2024-09-30 DIAGNOSIS — C34.90 LUNG CANCER METASTATIC TO BRAIN (MULTI): ICD-10-CM

## 2024-09-30 DIAGNOSIS — C79.51 MALIGNANT NEOPLASM METASTATIC TO BONE (MULTI): ICD-10-CM

## 2024-09-30 DIAGNOSIS — C79.31 LUNG CANCER METASTATIC TO BRAIN (MULTI): ICD-10-CM

## 2024-09-30 DIAGNOSIS — C34.90 NSCLC METASTATIC TO BONE (MULTI): ICD-10-CM

## 2024-09-30 DIAGNOSIS — M89.9 LESION OF BONE OF CERVICAL SPINE: ICD-10-CM

## 2024-09-30 DIAGNOSIS — C79.51 NSCLC METASTATIC TO BONE (MULTI): ICD-10-CM

## 2024-09-30 DIAGNOSIS — R91.8 LUNG MASS: ICD-10-CM

## 2024-09-30 PROBLEM — J43.2 CENTRILOBULAR EMPHYSEMA (MULTI): Status: ACTIVE | Noted: 2024-09-30

## 2024-09-30 PROBLEM — J43.8 OTHER EMPHYSEMA (MULTI): Status: ACTIVE | Noted: 2024-09-30

## 2024-09-30 PROCEDURE — 36415 COLL VENOUS BLD VENIPUNCTURE: CPT

## 2024-09-30 PROCEDURE — 99215 OFFICE O/P EST HI 40 MIN: CPT | Performed by: STUDENT IN AN ORGANIZED HEALTH CARE EDUCATION/TRAINING PROGRAM

## 2024-09-30 PROCEDURE — 84075 ASSAY ALKALINE PHOSPHATASE: CPT

## 2024-09-30 RX ORDER — GABAPENTIN 300 MG/1
300 CAPSULE ORAL 3 TIMES DAILY
Qty: 30 CAPSULE | Refills: 0 | Status: SHIPPED | OUTPATIENT
Start: 2024-09-30

## 2024-09-30 ASSESSMENT — ENCOUNTER SYMPTOMS
DEPRESSION: 0
LOSS OF SENSATION IN FEET: 0
OCCASIONAL FEELINGS OF UNSTEADINESS: 0

## 2024-09-30 ASSESSMENT — PAIN SCALES - GENERAL: PAINLEVEL: 0-NO PAIN

## 2024-09-30 NOTE — ASSESSMENT & PLAN NOTE
Pt. Will follow with Dr. Liset Vargas as outpatient on 9/30/2024  Pain meds to include gabapentin, Tylenol, ibuprofen, and oxycodone.

## 2024-10-01 ENCOUNTER — HOSPITAL ENCOUNTER (OUTPATIENT)
Dept: RADIOLOGY | Facility: CLINIC | Age: 49
Discharge: HOME | End: 2024-10-01
Payer: COMMERCIAL

## 2024-10-01 ENCOUNTER — APPOINTMENT (OUTPATIENT)
Dept: HEMATOLOGY/ONCOLOGY | Facility: HOSPITAL | Age: 49
End: 2024-10-01
Payer: COMMERCIAL

## 2024-10-01 ENCOUNTER — TELEPHONE (OUTPATIENT)
Dept: PRIMARY CARE | Facility: CLINIC | Age: 49
End: 2024-10-01
Payer: COMMERCIAL

## 2024-10-01 ENCOUNTER — APPOINTMENT (OUTPATIENT)
Dept: RADIATION ONCOLOGY | Facility: CLINIC | Age: 49
End: 2024-10-01
Payer: COMMERCIAL

## 2024-10-01 DIAGNOSIS — C34.90 NSCLC METASTATIC TO BONE (MULTI): ICD-10-CM

## 2024-10-01 DIAGNOSIS — C79.51 NSCLC METASTATIC TO BONE (MULTI): ICD-10-CM

## 2024-10-01 DIAGNOSIS — M89.9 LESION OF BONE OF CERVICAL SPINE: ICD-10-CM

## 2024-10-01 DIAGNOSIS — M89.9 LESION OF PARIETAL BONE: ICD-10-CM

## 2024-10-01 DIAGNOSIS — C79.51 NSCLC METASTATIC TO BONE (MULTI): Primary | ICD-10-CM

## 2024-10-01 DIAGNOSIS — C34.90 NSCLC METASTATIC TO BONE (MULTI): Primary | ICD-10-CM

## 2024-10-01 LAB
ALBUMIN SERPL BCP-MCNC: 3.8 G/DL (ref 3.4–5)
ALP SERPL-CCNC: 115 U/L (ref 33–120)
ALT SERPL W P-5'-P-CCNC: 17 U/L (ref 10–52)
ANION GAP SERPL CALC-SCNC: 17 MMOL/L (ref 10–20)
AST SERPL W P-5'-P-CCNC: 16 U/L (ref 9–39)
BILIRUB SERPL-MCNC: 0.6 MG/DL (ref 0–1.2)
BUN SERPL-MCNC: 20 MG/DL (ref 6–23)
CALCIUM SERPL-MCNC: 9.5 MG/DL (ref 8.6–10.6)
CHLORIDE SERPL-SCNC: 102 MMOL/L (ref 98–107)
CO2 SERPL-SCNC: 23 MMOL/L (ref 21–32)
CREAT SERPL-MCNC: 0.57 MG/DL (ref 0.5–1.3)
EGFRCR SERPLBLD CKD-EPI 2021: >90 ML/MIN/1.73M*2
GLUCOSE SERPL-MCNC: 95 MG/DL (ref 74–99)
LAB AP ASR DISCLAIMER: NORMAL
LABORATORY COMMENT REPORT: NORMAL
LABORATORY COMMENT REPORT: NORMAL
PATH REPORT.ADDENDUM SPEC: NORMAL
PATH REPORT.FINAL DX SPEC: NORMAL
PATH REPORT.GROSS SPEC: NORMAL
PATH REPORT.INTRAOP OBS SPEC DOC: NORMAL
PATH REPORT.RELEVANT HX SPEC: NORMAL
PATH REPORT.TOTAL CANCER: NORMAL
POTASSIUM SERPL-SCNC: 4.6 MMOL/L (ref 3.5–5.3)
PROT SERPL-MCNC: 7.5 G/DL (ref 6.4–8.2)
RESIDENT REVIEW: NORMAL
SODIUM SERPL-SCNC: 137 MMOL/L (ref 136–145)

## 2024-10-01 PROCEDURE — A9552 F18 FDG: HCPCS | Performed by: INTERNAL MEDICINE

## 2024-10-01 PROCEDURE — 3430000001 HC RX 343 DIAGNOSTIC RADIOPHARMACEUTICALS: Performed by: INTERNAL MEDICINE

## 2024-10-01 PROCEDURE — 78815 PET IMAGE W/CT SKULL-THIGH: CPT | Mod: PET TUMOR INIT TX STRAT | Performed by: NUCLEAR MEDICINE

## 2024-10-01 PROCEDURE — 78815 PET IMAGE W/CT SKULL-THIGH: CPT | Mod: PI

## 2024-10-01 RX ORDER — OXYCODONE HYDROCHLORIDE 5 MG/1
TABLET ORAL
Qty: 180 TABLET | Refills: 0 | Status: SHIPPED | OUTPATIENT
Start: 2024-10-01 | End: 2024-10-31

## 2024-10-01 RX ORDER — FLUDEOXYGLUCOSE F 18 200 MCI/ML
11.7 INJECTION, SOLUTION INTRAVENOUS
Status: COMPLETED | OUTPATIENT
Start: 2024-10-01 | End: 2024-10-01

## 2024-10-01 NOTE — TELEPHONE ENCOUNTER
Pharmacy calling about the Oxy 5. You sent for #[id number] which would be 8 a day. Ins will only cover 6 a day. Pharm thinking if you dont want to change to 6 a day you can try sending in again with a cancer diagnosis instead of just the disorder of bone diagnosis to see if it will be covered at 8 a day.. Pt only has 1 pill left though.

## 2024-10-03 ENCOUNTER — HOSPITAL ENCOUNTER (OUTPATIENT)
Dept: RADIATION ONCOLOGY | Facility: CLINIC | Age: 49
Setting detail: RADIATION/ONCOLOGY SERIES
Discharge: HOME | End: 2024-10-03
Payer: COMMERCIAL

## 2024-10-03 ENCOUNTER — HOSPITAL ENCOUNTER (OUTPATIENT)
Dept: RADIOLOGY | Facility: EXTERNAL LOCATION | Age: 49
Discharge: HOME | End: 2024-10-03

## 2024-10-03 VITALS
TEMPERATURE: 97.5 F | WEIGHT: 124.78 LBS | BODY MASS INDEX: 20.76 KG/M2 | OXYGEN SATURATION: 95 % | RESPIRATION RATE: 18 BRPM | SYSTOLIC BLOOD PRESSURE: 122 MMHG | DIASTOLIC BLOOD PRESSURE: 69 MMHG | HEART RATE: 83 BPM

## 2024-10-03 DIAGNOSIS — C79.51 METASTASIS TO BONE (MULTI): ICD-10-CM

## 2024-10-03 PROCEDURE — 77334 RADIATION TREATMENT AID(S): CPT | Performed by: INTERNAL MEDICINE

## 2024-10-03 PROCEDURE — 99213 OFFICE O/P EST LOW 20 MIN: CPT | Performed by: INTERNAL MEDICINE

## 2024-10-03 PROCEDURE — 77290 THER RAD SIMULAJ FIELD CPLX: CPT | Performed by: INTERNAL MEDICINE

## 2024-10-03 PROCEDURE — 99213 OFFICE O/P EST LOW 20 MIN: CPT | Mod: 25 | Performed by: INTERNAL MEDICINE

## 2024-10-03 ASSESSMENT — ENCOUNTER SYMPTOMS
DYSURIA: 0
BACK PAIN: 0
VOICE CHANGE: 0
DIAPHORESIS: 0
DIFFICULTY URINATING: 0
HOT FLASHES: 0
SLEEP DISTURBANCE: 1
DIARRHEA: 0
SPEECH DIFFICULTY: 0
CONFUSION: 1
LOSS OF SENSATION IN FEET: 0
FREQUENCY: 0
NECK STIFFNESS: 0
DECREASED CONCENTRATION: 1
EYES NEGATIVE: 1
NUMBNESS: 1
COUGH: 0
HEADACHES: 0
CHEST TIGHTNESS: 0
HEMOPTYSIS: 0
VOMITING: 0
ARTHRALGIAS: 1
SEIZURES: 0
ABDOMINAL PAIN: 0
APPETITE CHANGE: 1
ABDOMINAL DISTENTION: 0
HEMATURIA: 0
NAUSEA: 0
HEMATOLOGIC/LYMPHATIC NEGATIVE: 1
NECK PAIN: 1
FEVER: 0
SORE THROAT: 0
TROUBLE SWALLOWING: 0
DIZZINESS: 0
FLANK PAIN: 0
CONSTIPATION: 1
OCCASIONAL FEELINGS OF UNSTEADINESS: 1
FATIGUE: 0
DEPRESSION: 0
CARDIOVASCULAR NEGATIVE: 1
UNEXPECTED WEIGHT CHANGE: 0
BLOOD IN STOOL: 0
RECTAL PAIN: 0
SHORTNESS OF BREATH: 0
WHEEZING: 1
NERVOUS/ANXIOUS: 1
MYALGIAS: 0
EXTREMITY WEAKNESS: 0
CHILLS: 0
LIGHT-HEADEDNESS: 0

## 2024-10-03 ASSESSMENT — PAIN SCALES - GENERAL: PAINLEVEL: 7

## 2024-10-03 NOTE — PROGRESS NOTES
I have met with the patient and his son today prior to the CT SIM.  We went over the PET CT findings.  I have explained that the PET/CT shows in addition to the skull C-spine and right shoulder area, and uptake in the left shoulder a symmetrical uptake in the bilateral humerus, and uptakes in various other areas.  The patient during this visit reported more pain over the right shoulder than the left shoulder.  He denied any new weakness, numbness, urinary or stool incontinence.    We will proceed therefore by palliative radiation to the skull, to the C-spine, and to the right shoulder.  We will keep on monitoring the other areas of uptake after the start of the systemic therapy.    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

## 2024-10-03 NOTE — PROGRESS NOTES
Radiation Oncology Nursing Note    Pain: The patient's current pain level was assessed.  They report currently having a pain of 6 out of 10.  They feel their pain is under control with the use of pain medications on Oxycodone and Gabapentin.    Review of Systems:  Review of Systems   Constitutional:  Positive for appetite change (easy to be full). Negative for chills, diaphoresis, fatigue, fever and unexpected weight change.   HENT:   Positive for nosebleeds. Negative for hearing loss, lump/mass, mouth sores, sore throat, tinnitus, trouble swallowing and voice change.    Eyes: Negative.    Respiratory:  Positive for wheezing. Negative for chest tightness, cough, hemoptysis and shortness of breath.    Cardiovascular: Negative.    Gastrointestinal:  Positive for constipation (right now every 2-3 days, on regimen). Negative for abdominal distention, abdominal pain, blood in stool, diarrhea, nausea, rectal pain and vomiting.   Endocrine: Negative for hot flashes.   Genitourinary:  Positive for nocturia (every 3 hrs). Negative for bladder incontinence, difficulty urinating, dyspareunia, dysuria, frequency, hematuria, pelvic pain and penile discharge.    Musculoskeletal:  Positive for arthralgias (yes with JRA), gait problem (bilat LE braces) and neck pain (back head and radiates down arms/bilat). Negative for back pain, flank pain, myalgias and neck stiffness.   Skin: Negative.    Neurological:  Positive for gait problem (bilat LE braces) and numbness (L arm between elbow and wrist). Negative for dizziness, extremity weakness, headaches, light-headedness, seizures and speech difficulty.   Hematological: Negative.    Psychiatric/Behavioral:  Positive for confusion, decreased concentration and sleep disturbance (with pain and urination). Negative for depression and suicidal ideas. The patient is nervous/anxious.         Overall situation and so much information

## 2024-10-07 ENCOUNTER — TUMOR BOARD CONFERENCE (OUTPATIENT)
Dept: HEMATOLOGY/ONCOLOGY | Facility: CLINIC | Age: 49
End: 2024-10-07
Payer: COMMERCIAL

## 2024-10-07 NOTE — TUMOR BOARD NOTE
Patient Name: YADIEL SHEPHERD  MRN: [id number]  Physician: Liset Vargas  Date of Collection: 09-  Report Date: 10.1.2024  Primary Location of Tumor: Right Hilar  Histology: Adenocarcinoma  Stage: IV  Location of Metastasis: Bone  PDL 1: <1%  Actionable Alteration:  KRAS p.G12C    Disease Relevant Alterations: KRAS p.G12C ([id number] c.34G>T)      Recommendations:  adagrasib,sotorasib    Standard of Care:Chemotherapy+Immunotherapy    Clinical Trials (First line):   [id number]):A Phase 1b, Multicenter, 2-Part, Open-Label Study of Datopotamab Deruxtecan (Data-DXd) in Combination With Durvalumab With or Without Council Chemotherapy in Subjects With Advanced or Metastatic Non-Small Cell Lung Cancer (TROPION-Lung04)

## 2024-10-08 ENCOUNTER — OFFICE VISIT (OUTPATIENT)
Dept: PALLIATIVE MEDICINE | Facility: CLINIC | Age: 49
End: 2024-10-08
Payer: COMMERCIAL

## 2024-10-08 ENCOUNTER — HOSPITAL ENCOUNTER (OUTPATIENT)
Dept: RADIATION ONCOLOGY | Facility: CLINIC | Age: 49
Setting detail: RADIATION/ONCOLOGY SERIES
Discharge: HOME | End: 2024-10-08
Payer: COMMERCIAL

## 2024-10-08 ENCOUNTER — DOCUMENTATION (OUTPATIENT)
Dept: PALLIATIVE MEDICINE | Facility: HOSPITAL | Age: 49
End: 2024-10-08

## 2024-10-08 ENCOUNTER — APPOINTMENT (OUTPATIENT)
Dept: RHEUMATOLOGY | Facility: CLINIC | Age: 49
End: 2024-10-08
Payer: COMMERCIAL

## 2024-10-08 VITALS
SYSTOLIC BLOOD PRESSURE: 128 MMHG | HEART RATE: 83 BPM | BODY MASS INDEX: 20.62 KG/M2 | WEIGHT: 123.9 LBS | TEMPERATURE: 98.1 F | RESPIRATION RATE: 16 BRPM | DIASTOLIC BLOOD PRESSURE: 75 MMHG | OXYGEN SATURATION: 96 %

## 2024-10-08 DIAGNOSIS — Z51.81 ENCOUNTER FOR MONITORING OPIOID MAINTENANCE THERAPY: Primary | ICD-10-CM

## 2024-10-08 DIAGNOSIS — G89.3 CANCER RELATED PAIN: ICD-10-CM

## 2024-10-08 DIAGNOSIS — M89.9 LESION OF BONE OF CERVICAL SPINE: ICD-10-CM

## 2024-10-08 DIAGNOSIS — M89.9 LESION OF PARIETAL BONE: ICD-10-CM

## 2024-10-08 DIAGNOSIS — C34.90 NSCLC METASTATIC TO BONE (MULTI): ICD-10-CM

## 2024-10-08 DIAGNOSIS — R63.0 DECREASED APPETITE: ICD-10-CM

## 2024-10-08 DIAGNOSIS — K59.00 CONSTIPATION, UNSPECIFIED CONSTIPATION TYPE: ICD-10-CM

## 2024-10-08 DIAGNOSIS — Z79.891 ENCOUNTER FOR MONITORING OPIOID MAINTENANCE THERAPY: Primary | ICD-10-CM

## 2024-10-08 DIAGNOSIS — C79.51 NSCLC METASTATIC TO BONE (MULTI): ICD-10-CM

## 2024-10-08 LAB
DNA RANGE(S) EXAMINED NAR: NORMAL
GENE DIS ANL INTERP-IMP: POSITIVE
GENE DIS ASSESSED: NORMAL
REASON FOR STUDY: NORMAL
TEMPUS BLOOD TUMOR MUTATIONAL BURDEN: 13 M/MB
TEMPUS LCA: NORMAL
TEMPUS MSI NOTE: NORMAL
TEMPUS PORTAL: NORMAL
TEMPUS THERAPY1: NORMAL
TEMPUS THERAPY2: NORMAL
TEMPUS THERAPYCOUNT: 2
TEMPUS TRIALCOUNT: 3
TEMPUS TRIALMATCHES1: NORMAL
TEMPUS TRIALMATCHES2: NORMAL
TEMPUS TRIALMATCHES3: NORMAL

## 2024-10-08 PROCEDURE — 77334 RADIATION TREATMENT AID(S): CPT | Performed by: INTERNAL MEDICINE

## 2024-10-08 PROCEDURE — 77300 RADIATION THERAPY DOSE PLAN: CPT | Performed by: INTERNAL MEDICINE

## 2024-10-08 PROCEDURE — 99215 OFFICE O/P EST HI 40 MIN: CPT

## 2024-10-08 PROCEDURE — 99417 PROLNG OP E/M EACH 15 MIN: CPT

## 2024-10-08 PROCEDURE — 80307 DRUG TEST PRSMV CHEM ANLYZR: CPT

## 2024-10-08 PROCEDURE — 77295 3-D RADIOTHERAPY PLAN: CPT | Performed by: INTERNAL MEDICINE

## 2024-10-08 PROCEDURE — 80349 CANNABINOIDS NATURAL: CPT

## 2024-10-08 PROCEDURE — 80365 DRUG SCREENING OXYCODONE: CPT

## 2024-10-08 ASSESSMENT — PAIN SCALES - GENERAL: PAINLEVEL: 0-NO PAIN

## 2024-10-08 NOTE — PROGRESS NOTES
SUPPORTIVE AND PALLIATIVE ONCOLOGY CONSULT - OUTPATIENT      SERVICE DATE: 10/8/2024    Referred by:  Anton Mcgrath MD  Medical Oncologist: No care team member to display   Radiation Oncologist: No care team member to display  Primary Physician: Matilda Garcia  [phone]    REASON FOR CONSULT/CHIEF CONSULT COMPLAINT: pain management and Introduction to Supportive and Palliative Oncology Services    Subjective   HISTORY OF PRESENT ILLNESS: Jovon Mccartney is a 49 y.o. male who presents with a PMH of juvenile RA and newly diagnosed NSCLC with metastatic disease to the right scapula, parieto-occipital bone, C2, C3, bilateral humeral and right anterior acetabular osteolytic lesions. Plan for RT to the skull, c-spine and right shoulder. Awaiting further treatment planning.     Pain Assessment:  Pain Score: 8/10  Location: cervical spine, right arm    Symptom Assessment:  Pain:very much  constant, aching pain in his neck, right shoulder, and arm. The pain is a 10/10 at its worst and rarely improves to a 5/10. His goal is for a pain level below 5/10. He has been taking oxycodone 10 mg 3 times per day. This provides minimal relief. It does take the edge off.   Headache: none  Dizziness:none  Lack of energy: somewhat due to pain   Difficulty sleeping: somewhat due to pain  Worrying: none  Anxiety: none  Depression: a little  Pain in mouth/swallowing: none  Dry mouth: none  Taste changes: none  Shortness of breath: none  Lack of appetite: a little   Nausea: none  Vomiting: none  Constipation: none  Diarrhea: none  Sore muscles: none  Numbness or tingling in hands/feet/other: none  Weight loss: a little  Other: none      Information obtained from: chart review, interview of patient, and interview of family  ______________________________________________________________________     Oncology History   NSCLC metastatic to bone (Multi)   9/26/2024 Initial Diagnosis    NSCLC metastatic to bone (Multi)     9/26/2024 Cancer  Staged    Staging form: Lung, AJCC 8th Edition, Clinical stage from 9/26/2024: Stage IVB (cT4, cN3, cM1c) - Signed by Anton Mcgrath MD PhD on 9/26/2024         Past Medical History:   Diagnosis Date    Adalimumab (Humira) long-term use 09/15/2024    Arthritis 1974    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    Hilar mass 09/15/2024    Juvenile rheumatoid arthritis (Multi) 09/15/2024    Lung cancer (Multi) 09 17 2024    Rheumatoid arthritis 10 1974     Past Surgical History:   Procedure Laterality Date    BRONCHOSCOPY  9 16 2024    LUNG BIOPSY  9 16 2024     Family History   Problem Relation Name Age of Onset    Breast cancer Mother JORDY CHURCH     Cancer Mother JORDY CHURCH     Miscarriages / Stillbirths Mother JORDY CHURCH     Cancer Maternal Grandfather HUBER PAL     Stroke Maternal Grandfather HUBER PAL         SOCIAL HISTORY  Children 2, Grandchildren 3, Support system wife and kids, Employment on disability since 17 years old, and Hobbies working on cars and 4 wheeling.    Social History:  reports that he quit smoking about 4 weeks ago. His smoking use included cigarettes. He has a 58.5 pack-year smoking history. He has been exposed to tobacco smoke. He has never used smokeless tobacco. He reports that he does not currently use alcohol. He reports that he does not currently use drugs.  Quit smoking September 2024.     REVIEW OF SYSTEMS  Review of systems negative unless noted in HPI.       Objective     Current Outpatient Medications   Medication Instructions    acetaminophen (TYLENOL) 500 mg, oral, Every 6 hours PRN    adalimumab (HUMIRA PEN) 40 mg, subcutaneous, Every 14 days    budesonide-glycopyr-formoterol (BREZTRI) 160-9-4.8 mcg/actuation HFA aerosol inhaler 2 puffs, inhalation, 2 times daily    gabapentin (NEURONTIN) 300 mg, oral, 3 times daily    ibuprofen 200 mg, oral, Every 6 hours PRN    ipratropium-albuteroL (Combivent Respimat)  mcg/actuation inhaler 2 puffs,  inhalation, 4 times daily RT    naloxone (NARCAN) 4 mg, nasal, As needed, May repeat every 2-3 minutes if needed, alternating nostrils, until medical assistance becomes available.    oxyCODONE (Roxicodone) 5 mg immediate release tablet May take 1 tablet (5 mg) by mouth every 6 hours if needed for moderate pain (4 - 6) or severe pain (7 - 10) (1-2 tablet). May also take 2 tablets (10 mg) every 6 hours if needed for moderate pain (4 - 6) or severe pain (7 - 10) (1-2 tablet).    pantoprazole (PROTONIX) 40 mg, oral, Daily before breakfast, Do not crush, chew, or split.    polyethylene glycol (GLYCOLAX, MIRALAX) 34 g, oral, Daily    sennosides-docusate sodium (Yoselin-Colace) 8.6-50 mg tablet 1 tablet, oral, Nightly PRN       Allergies: No Known Allergies    NM PET CT lung CA initial diagnosis    Result Date: 10/1/2024  Interpreted By:  Reggie Rodas and Dervishi Mario STUDY: NM PET CT LUNG CA  INITIAL DIAGNOSIS;  10/1/2024 12:10 pm   INDICATION: Signs/Symptoms:Newly Diagnosed NSCLC, PET/CT needed for staging. 49-year-old male with newly diagnosed stage IV non-small-cell lung cancer. PET-CT for further evaluation   COMPARISON: MRI brain and cervical spine: 09/09/2024. CT chest abdomen and pelvis: 09/15/2024..   ACCESSION NUMBER(S): [id number]   ORDERING CLINICIAN: ADÁN CHUNG   TECHNIQUE: DIVISION OF NUCLEAR MEDICINE POSITRON EMISSION TOMOGRAPHY (PET-CT)   The patient received an intravenous dose of 11.7 mCi of Fluorine-18 fluorodeoxyglucose (FDG). Positron emission tomographic (PET) images from mid-thigh to skull base were then acquired after a one hour delay. Also acquired was a contemporaneous low dose non-contrast CT scan performed for attenuation correction of PET images and anatomic localization.  The PET and CT images were digitally fused for display.  All images were acquired on a combined PET-CT scanner unit. Some areas of FDG accumulation may be described in standardized uptake value (SUV) units.    CODING: Initial Treatment Strategy (PI)   CALIBRATION: Dose Injection-to-Scan Interval (mins): 93 min Mediastinal bloodpool SUV (normal 1.5-2.5): 1.7 Blood glucose: 1.2 mg/dL   FINDINGS: NECK: No focal hypermetabolic soft tissue lesion is seen in the neck. No hypermetabolic cervical lymphadenopathy is present.   CHEST: A confluence right hilar/central mediastinal soft tissue mass is noted corresponding to findings seen on dedicated cross-sectional imaging demonstrating mild hypermetabolic activity (max SUV of 4). Corresponding to known lung cancer lesion. Additionally there is diffuse mild confluent hypermetabolic activity along side the upper and middle lobe bronchopulmonary vasculature/airways which is nonspecific and may represent inflammatory changes versus lymphangitic carcinomatosis. There is severe bilateral upper lung predominant emphysematous changes of lung with a spiculated mildly FDG avid nodularity noted in the right upper lobe (max SUV of 2). Mild FDG-avid right hilar lymph nodes are noted (max SUV of 3). Significant left hilar or mediastinal hypermetabolic lymphadenopathy. A moderate size pericardial effusion is again noted with metabolic activity.   ABDOMEN AND PELVIS: No hypermetabolic soft tissue lesion is present in the abdomen and pelvis. No evidence of hypermetabolic lymphadenopathy. Physiologic radiotracer uptake is present in the liver and spleen with excretion into the bowel and the urinary tract.   MUSCULOSKELETAL: Partially visualized skull base demonstrates a lytic lesions involving the right occipital parietal region which is not covered on PET imaging and better assessed on MRI of the brain. Abnormal hypermetabolic osseous lesions are also noted at the dense and C3 vertebral. Permeative markedly hypermetabolic osteolytic lesion is noted predominantly involving the coracoid process, and the glenoid with smaller additional osteolytic lesions in the scapular body (max SUV of 11). Additional  abnormal hypermetabolic lesions are also noted in the bilateral mid humeri in the left glenoid. Furthermore, an abnormal hypermetabolic lytic lesion is also seen in the anterior right acetabulum.       1. Abnormally hypermetabolic confluent, right hilar/middle mediastinal soft tissue mass with right hilar hypermetabolic activity consistent with known lung cancer. 2. Mildly FDG-avid, nonspecific activity along the upper and middle lobe segmental bronchopulmonary bronchi/vasculature which may represent inflammatory changes versus lymphangitic carcinomatosis. 3. Severe bilateral panlobular upper lung predominant emphysematous changes with a nonspecific mildly FDG-avid distorted nodularity in the right upper lobe. 4. Markedly hypermetabolic bilateral scapular, bilateral humeral and right anterior acetabular osteolytic lesions consistent with metastatic disease. Additionally the lytic lesions involving the right occipital parietal calvarium are also noted however not covered by PET imaging and are better assessed on dedicated MRI of the brain.     I personally reviewed the image(s) / study and agree with the findings and interpretation as stated. This study was interpreted at [facility].   MACRO: None.   Signed by: Reggie Rodas 10/1/2024 4:21 PM Dictation workstation:   [id number]    MR brain w and wo IV contrast    Result Date: 9/9/2024  Interpreted By:  Melinda Bernstein and Ebai Jerky STUDY: MR BRAIN W AND WO IV CONTRAST; MR CERVICAL SPINE W AND WO IV CONTRAST;  9/9/2024 3:02 pm   INDICATION: Signs/Symptoms:Concerning lesions, headaches x 2 months; Signs/Symptoms:Concerning lesions, paresthesias upper extremities.     COMPARISON: CT head and cervical spine 09/09/2024.   ACCESSION NUMBER(S): [id number]; [id number]   ORDERING CLINICIAN: MARGA GRESHAM   TECHNIQUE: Axial T2, FLAIR, DWI, gradient echo T2 and sagittal and coronal T1 weighted images of brain were acquired. Post  contrast T1 weighted images were acquired. Sagittal T1, T2, STIR, axial T1 and gradient echo weighted images of the cervical spine were acquired. Postcontrast T1 weighted images were obtained.   Contrast: 11 mL of intravenous gadolinium was administered.   FINDINGS: MRI head:   There is an ill-defined T1 and T2 intermediate, enhancing ill-lesion centered within the right parieto-occipital bone. The largest component measures 2.1 x 1.7 x 3.5 cm (series 26, image 44). There is abutment and anterior displacement of the underlying dura with mass effect upon the right occipital lobe. The underlying and surrounding dura appears asymmetrically thickened measuring up to 2-3 mm in maximum diameter. There are several smaller scattered lesions within the right parietal and right occipital lobe. These lesions corresponds to the lytic lesion seen on the same-day CT head.   The gray-white differentiation is intact without evidence of an acute large territory infarct. Moderate periventricular, subcortical and pontine FLAIR hyperintensities are nonspecific. There is no abnormal intracranial hemorrhage. Mild mass effect upon the right occipital lobe with sulci effacement as described above. No abnormal intracranial enhancement.   The ventricles, sulci and basal cisterns are otherwise within normal limits. No evidence of hydrocephalus. No extra-axial fluid collection.   Mild scattered ethmoid mucosal thickening. Otherwise, the visualized paranasal sinuses are clear. Trace nonspecific fluid within the inferior right mastoid. The left mastoid is clear.     MRI CERVICAL SPINE:   Alignment: The vertebral alignment is within normal limits.   Vertebrae/Intervertebral Discs:   The marrow signal is heterogeneous with with predominant T1 hypointensity enhancement throughout majority of the cervical and visualized upper thoracic vertebral bodies. There are multiple ill-defined marrow replacing enhancing lesions which corresponds to the lytic  lesion seen on the corresponding CT cervical spine. The lesions are noted to involve the majority of the C2 and C3 vertebral body.   There is ill-defined STIR hyperintense signal and enhancement within the C4, C5, C6, C7 T1 and partially visualized T2 vertebral bodies without a focal lesion.   The vertebral bodies demonstrate expected height. No evidence of an acute fracture. Mild to moderate C6-C7 disc desiccation and disc height loss with endplate degenerative changes and osteophytic spurring. There is a most mild disc height loss at the other levels.   Cord: Mild flattening of the spinal cord at C6-C7. No abnormal spinal cord signal or enhancement.   C1-C2: The cervicomedullary junction appears unremarkable. Moderate degenerative changes of the atlantodental interval. There is no central canal stenosis.   C2-C3: There is no posterior disc contour abnormality. Uncovertebral joint hypertrophy and facet osteoarthrosis results in mild right neural foraminal stenosis no significant left neural foraminal stenosis.   C3-C4: There is no posterior disc contour abnormality. Uncovertebral joint hypertrophy and facet osteoarthrosis results in mild right neural foraminal stenosis.   C4-C5: Mild central disc herniation effaces the ventral CSF space without significant spinal canal stenosis. No significant neural foraminal narrowing.   C5-C6: Small central disc herniation mildly effaces the ventral CSF space without significant spinal canal stenosis. Uncovertebral joint hypertrophy and facet osteoarthrosis results in mild right neural foraminal narrowing.   C6-C7: Disc osteophyte complex contributes to effacement of the ventral and dorsal CSF space with flattening of the ventral and dorsal aspect of the spinal cord. There is moderate spinal canal stenosis. There is no abnormal spinal cord signal at this level. Uncovertebral joint hypertrophy and facet osteoarthrosis results in moderate bilateral neural foraminal stenosis    C7-T1: There is no posterior disc contour abnormality. There is no significant central canal or neural foraminal stenosis.   The prevertebral and posterior paraspinous soft tissues are within normal limits.       MRI brain:   1. There is an ill-defined enhancing lesion centered within the right parieto-occipital bone, concerning for multiple myeloma or metastatic disease. There is abutment and anterior displacement of the underlying dura with mass effect upon the right occipital lobe. There is no midline shift. There is thickening and enhancement of the underlying and surrounding dura, pachymeningeal involvement can not be entirely excluded. Otherwise, no evidence of abnormal intraparenchymal metastatic disease to suggest neoplastic involvement. 2. Nonspecific moderate supratentorial and infratentorial white matter signal abnormalities. Differential considerations include demyelination or chronic microvascular ischemic disease amongst others. Note, however this pattern is not specific for demyelination.   MRI cervical spine:   1. Ill-defined marrow replacing lesions within the C2 and C3 vertebral bodies corresponding to the lytic foci seen on the same-day CT cervical spine. The constellation of findings are suggestive of metastatic disease or multiple myeloma. There is no evidence of an extraosseous soft tissue component. 2. Multilevel degenerative changes of the cervical spine.   I personally reviewed the images/study and I agree with the findings as stated by Resident Niki Osorio. This study was interpreted at [facility], [place], [place].   MACRO: None   Signed by: Melinda Bernstein 9/9/2024 4:05 PM Dictation workstation:   [id number]    Lab on 09/30/2024   Component Date Value Ref Range Status    Glucose 09/30/2024 95  74 - 99 mg/dL Final    Sodium 09/30/2024 137  136 - 145 mmol/L Final    Potassium 09/30/2024 4.6  3.5 - 5.3 mmol/L Final    Chloride 09/30/2024 102  98 - 107 mmol/L  Final    Bicarbonate 09/30/2024 23  21 - 32 mmol/L Final    Anion Gap 09/30/2024 17  10 - 20 mmol/L Final    Urea Nitrogen 09/30/2024 20  6 - 23 mg/dL Final    Creatinine 09/30/2024 0.57  0.50 - 1.30 mg/dL Final    eGFR 09/30/2024 >90  >60 mL/min/1.73m*2 Final    Calculations of estimated GFR are performed using the 2021 CKD-EPI Study Refit equation without the race variable for the IDMS-Traceable creatinine methods.  https://jasn.asnjournals.org/content/early/2021/09/22/ASN.5165397789    Calcium 09/30/2024 9.5  8.6 - 10.6 mg/dL Final    Albumin 09/30/2024 3.8  3.4 - 5.0 g/dL Final    Alkaline Phosphatase 09/30/2024 115  33 - 120 U/L Final    Total Protein 09/30/2024 7.5  6.4 - 8.2 g/dL Final    AST 09/30/2024 16  9 - 39 U/L Final    Bilirubin, Total 09/30/2024 0.6  0.0 - 1.2 mg/dL Final    ALT 09/30/2024 17  10 - 52 U/L Final    Patients treated with Sulfasalazine may generate falsely decreased results for ALT.   Orders Only on 09/26/2024   Component Date Value Ref Range Status    Test Comment 09/26/2024 The test request has been received by the lab and will be reviewed.   Final   No results displayed because visit has over 200 results.      Admission on 09/09/2024, Discharged on 09/14/2024   Component Date Value Ref Range Status    WBC 09/09/2024 13.2 (H)  4.4 - 11.3 x10*3/uL Final    nRBC 09/09/2024 0.0  0.0 - 0.0 /100 WBCs Final    RBC 09/09/2024 4.10 (L)  4.50 - 5.90 x10*6/uL Final    Hemoglobin 09/09/2024 13.1 (L)  13.5 - 17.5 g/dL Final    Hematocrit 09/09/2024 37.4 (L)  41.0 - 52.0 % Final    MCV 09/09/2024 91  80 - 100 fL Final    MCH 09/09/2024 32.0  26.0 - 34.0 pg Final    MCHC 09/09/2024 35.0  32.0 - 36.0 g/dL Final    RDW 09/09/2024 14.6 (H)  11.5 - 14.5 % Final    Platelets 09/09/2024 324  150 - 450 x10*3/uL Final    Neutrophils % 09/09/2024 79.4  40.0 - 80.0 % Final    Immature Granulocytes %, Automated 09/09/2024 0.6  0.0 - 0.9 % Final    Immature Granulocyte Count (IG) includes promyelocytes,  myelocytes and metamyelocytes but does not include bands. Percent differential counts (%) should be interpreted in the context of the absolute cell counts (cells/UL).    Lymphocytes % 09/09/2024 13.9  13.0 - 44.0 % Final    Monocytes % 09/09/2024 5.5  2.0 - 10.0 % Final    Eosinophils % 09/09/2024 0.1  0.0 - 6.0 % Final    Basophils % 09/09/2024 0.5  0.0 - 2.0 % Final    Neutrophils Absolute 09/09/2024 10.50 (H)  1.20 - 7.70 x10*3/uL Final    Percent differential counts (%) should be interpreted in the context of the absolute cell counts (cells/uL).    Immature Granulocytes Absolute, Au* 09/09/2024 0.08  0.00 - 0.70 x10*3/uL Final    Lymphocytes Absolute 09/09/2024 1.83  1.20 - 4.80 x10*3/uL Final    Monocytes Absolute 09/09/2024 0.73  0.10 - 1.00 x10*3/uL Final    Eosinophils Absolute 09/09/2024 0.01  0.00 - 0.70 x10*3/uL Final    Basophils Absolute 09/09/2024 0.06  0.00 - 0.10 x10*3/uL Final    Glucose 09/09/2024 128 (H)  74 - 99 mg/dL Final    Sodium 09/09/2024 134 (L)  136 - 145 mmol/L Final    Potassium 09/09/2024 3.6  3.5 - 5.3 mmol/L Final    Chloride 09/09/2024 99  98 - 107 mmol/L Final    Bicarbonate 09/09/2024 26  21 - 32 mmol/L Final    Anion Gap 09/09/2024 13  10 - 20 mmol/L Final    Urea Nitrogen 09/09/2024 16  6 - 23 mg/dL Final    Creatinine 09/09/2024 0.55  0.50 - 1.30 mg/dL Final    eGFR 09/09/2024 >90  >60 mL/min/1.73m*2 Final    Calculations of estimated GFR are performed using the 2021 CKD-EPI Study Refit equation without the race variable for the IDMS-Traceable creatinine methods.  https://jasn.asnjournals.org/content/early/2021/09/22/ASN.0113087896    Calcium 09/09/2024 9.6  8.6 - 10.3 mg/dL Final    Albumin 09/09/2024 4.0  3.4 - 5.0 g/dL Final    Alkaline Phosphatase 09/09/2024 100  33 - 120 U/L Final    Total Protein 09/09/2024 7.7  6.4 - 8.2 g/dL Final    AST 09/09/2024 11  9 - 39 U/L Final    Bilirubin, Total 09/09/2024 0.6  0.0 - 1.2 mg/dL Final    ALT 09/09/2024 12  10 - 52 U/L Final     Patients treated with Sulfasalazine may generate falsely decreased results for ALT.    Lipase 09/09/2024 36  9 - 82 U/L Final    Lactate 09/09/2024 1.5  0.4 - 2.0 mmol/L Final    Sedimentation Rate 09/09/2024 18 (H)  0 - 15 mm/h Final    Coronavirus 2019, PCR 09/09/2024 Not Detected  Not Detected Final    Lyme Disease (Borrelia burgdorferi* 09/09/2024 Not Detected   Final    NOT DETECTED - A negative result does not rule out the   presence of PCR inhibitors in the patient specimen or   assay specific nucleic acid in concentrations below the   level of detection by the assay.     Blood and CSF specimens have poor clinical sensitivity for   detection of Borrelia burgdorferi by PCR.  INTERPRETIVE INFORMATION: Borrelia Species DNA Detection by PCR    This test was developed and its performance characteristics   determined by [facility] It has not been cleared or   approved by the US Food and Drug Administration. This test was   performed in a CLIA certified laboratory and is intended for   clinical purposes.  Performed By: [facility]  [address] [place] [address]  : Umang Huggins MD, PhD  CLIA Number: [id number]    Lyme Source 09/09/2024 Blood   Final    Color, Urine 09/09/2024 Yellow  Light-Yellow, Yellow, Dark-Yellow Final    Appearance, Urine 09/09/2024 Clear  Clear Final    Specific Gravity, Urine 09/09/2024 1.022  1.005 - 1.035 Final    pH, Urine 09/09/2024 6.5  5.0, 5.5, 6.0, 6.5, 7.0, 7.5, 8.0 Final    Protein, Urine 09/09/2024 NEGATIVE  NEGATIVE, 10 (TRACE), 20 (TRACE) mg/dL Final    Glucose, Urine 09/09/2024 Normal  Normal mg/dL Final    Blood, Urine 09/09/2024 NEGATIVE  NEGATIVE Final    Ketones, Urine 09/09/2024 NEGATIVE  NEGATIVE mg/dL Final    Bilirubin, Urine 09/09/2024 NEGATIVE  NEGATIVE Final    Urobilinogen, Urine 09/09/2024 Normal  Normal mg/dL Final    Nitrite, Urine 09/09/2024 NEGATIVE  NEGATIVE Final    Leukocyte Esterase, Urine 09/09/2024  NEGATIVE  NEGATIVE Final    Extra Tube 09/09/2024 Hold for add-ons.   Final    Auto resulted.    Extra Tube 09/09/2024 Hold for add-ons.   Final    Auto resulted.    Ig [place] Free Light Chain 09/10/2024 2.41 (H)  0.33 - 1.94 mg/dL Final    Ig Lambda Free Light Chain 09/10/2024 2.04  0.57 - 2.63 mg/dL Final    Kappa/Lambda Ratio 09/10/2024 1.18  0.26 - 1.65 Final    IgM 09/10/2024 264 (H)  40 - 230 mg/dL Final    MONOCLONAL PROTEINS MAY CAUSE FALSELY LOW  RESULTS IN THIS ASSAY. SERUM PROTEIN  ELECTROPHORESIS SHOULD BE DONE AS THE  FIRST TEST TO EVALUATE MONOCLONAL GAMMOPATHY.      IgG 09/10/2024 1,280  700 - 1,600 mg/dL Final    MONOCLONAL PROTEINS MAY CAUSE FALSELY LOW  RESULTS IN THIS ASSAY. SERUM PROTEIN  ELECTROPHORESIS SHOULD BE DONE AS THE  FIRST TEST TO EVALUATE MONOCLONAL GAMMOPATHY.      IgA 09/10/2024 220  70 - 400 mg/dL Final    MONOCLONAL PROTEINS MAY CAUSE FALSELY LOW  RESULTS IN THIS ASSAY. SERUM PROTEIN  ELECTROPHORESIS SHOULD BE DONE AS THE  FIRST TEST TO EVALUATE MONOCLONAL GAMMOPATHY.      Total Protein 09/10/2024 6.8  6.4 - 8.2 g/dL Final    Albumin 09/10/2024 3.4  3.4 - 5.0 g/dL Final    Alpha 1 Globulin 09/10/2024 0.4  0.2 - 0.6 g/dL Final    Alpha 2 Globulin 09/10/2024 0.8  0.4 - 1.1 g/dL Final    Beta Globulin 09/10/2024 0.9  0.5 - 1.2 g/dL Final    Gamma 09/10/2024 1.2  0.5 - 1.4 g/dL Final    Protein Electrophoresis Comment 09/10/2024 Normal.   Final    Immunofixation Comment 09/10/2024 No monoclonal protein detected by immunofixation.   Final    Path Review - Serum Protein Electr* 09/10/2024 Reviewed and approved by WILLA LÓPEZ on 9/14/24 at 1:07 PM.       Final    Path Review - Serum Immunofixation 09/10/2024 Reviewed and approved by WILLA LÓPEZ on 9/14/24 at 1:07 PM.       Final    Total Protein, Urine Random 09/09/2024 16  5 - 25 mg/dL Final    Albumin % 09/09/2024 22.2  % Final    Alpha 1 Globulin % 09/09/2024 15.4  % Final    Alpha 2 Globulin % 09/09/2024 24.8  % Final    Beta  Globulin % 09/09/2024 18.8  % Final    Gamma Globulin % 09/09/2024 18.8  % Final    Urine Electrophoresis Comment 09/09/2024 Normal.      Final    Path Review-Urine Protein Electrop* 09/09/2024 Reviewed and approved by WILLA LÓPEZ on 9/14/24 at 10:01 AM.       Final        PHYSICAL EXAMINATION  Vital Signs:   Vitals:    10/08/24 1327   BP: 128/75   Pulse: 83   Resp: 16   Temp: 36.7 °C (98.1 °F)   SpO2: 96%   Vital signs reviewed       Physical Exam  Constitutional:       General: He is in acute distress.   HENT:      Head: Normocephalic.   Eyes:      Pupils: Pupils are equal, round, and reactive to light.   Pulmonary:      Effort: Pulmonary effort is normal.   Neurological:      Mental Status: He is alert and oriented to person, place, and time.   Psychiatric:         Mood and Affect: Mood normal.         Behavior: Behavior normal.       ASSESSMENT/PLAN    Pain  Pain is: cancer related pain  Type: somatic  Pain control: sub-optimally controlled  Home regimen:   - Increase Oxycodone to 15 mg every 4 hours as needed  - Continue Tylenol 1000 mg every 8 hours as needed  - Continue Ibuprofen 600 mg every 6 hours as needed   - May consider MSContin in the future  - Plans to start RT in the next ~1 week - this may also assist with pain    Opioid Use  Medication Management:   - OARRS report reviewed with no aberrant behavior; consistent with  prescriptions/records and patient history  - MED 45.  Overdose Risk Score 430.   This has been discussed with patient.   - We will continue to closely monitor the patient for signs of prescription misuse including UDS, OARRS review and subjective reports at each visit.  - No concurrent benzodiazepine use   - I am a provider who either is or has consulted and collaborated with a provider certified in Hospice and Palliative Medicine and have conducted a face-face visit and examination for this patient.  - Routine Urine Drug Screen complete 10/8/24 appropriately positive for opioids  and negative for illicit substances  - Controlled Substance Agreement completed 10/8/24  - Specifically discussed that controlled substance prescriptions will only be provided by our group as outlined in the completed agreement  - Prescribed naloxone prescribed 9/27/24 - patient has at home  - Red Flags: none    Constipation   At risk for constipation related to opioids,  currently not constipated   Usual bowel pattern: every 1-2 days   Current regimen:   - Increase Senna to 2 tabs BID  - Start Miralax 17 g daily if needed  - If no BM within 48-72 hours, start MOM 8% every 6 hours     Sleeping Difficulty:  Impaired sleep related to pain  Current regimen:    - Increase pain medication as described above    Decreased appetite  Related to malignancy  Nutrition consult - may consider at next visit  Current regimen:    - Encouraged smaller, more frequent meals  - encouraged ensure/boost 1-2 times per day    Advance Directives  Existence of Advance Directives:Yes, documentation or copy in medical record  Decision maker: PADMINI Mccartney  Code Status: Full code    Next Follow-Up Visit:  Return to clinic in 1 week     Signature and billing  Thank you for allowing us to participate in the care of this patient. Recommendations will be communicated back to the consulting service by way of shared electronic medical record or face-to-face.    Medical complexity was high level due to due to complexity of problems, extensive data review, and high risk of management/treatment.  Time was spent on the following: Prep Time, Time Directly with Patient/Family/Caregiver, Documentation Time. Total time spent: 60      DATA   Diagnostic tests and information reviewed for today's visit:  Most recent labs, Most recent imaging, Medications       Some elements copied from Oncology note on 9/30/24, the elements have been updated and all reflect current decision making from today, 10/8/2024.      Plan of Care discussed with: Patient and  Family/Significant Other: wife      SIGNATURE: MUNDO Jacboo-CNP    Contact information:  [facility] Pall[facility]  Monday-Friday 8 AM-5 PM  Phone:  [phone], press option #5, then option #1.   Or Epic Secure Chat

## 2024-10-08 NOTE — PROGRESS NOTES
Controlled Substance Agreement for Palliative Care reviewed and completed with patient. Education provided to outline both the expectations of the provider and the patient about the proper and safe use of controlled substances.

## 2024-10-09 DIAGNOSIS — C79.51 NSCLC METASTATIC TO BONE (MULTI): Primary | ICD-10-CM

## 2024-10-09 DIAGNOSIS — C34.90 NSCLC METASTATIC TO BONE (MULTI): Primary | ICD-10-CM

## 2024-10-09 LAB
AMPHETAMINES UR QL SCN: ABNORMAL
BARBITURATES UR QL SCN: ABNORMAL
BENZODIAZ UR QL SCN: ABNORMAL
BZE UR QL SCN: ABNORMAL
CANNABINOIDS UR QL SCN: ABNORMAL
FENTANYL+NORFENTANYL UR QL SCN: ABNORMAL
METHADONE UR QL SCN: ABNORMAL
OPIATES UR QL SCN: ABNORMAL
OXYCODONE+OXYMORPHONE UR QL SCN: ABNORMAL
PCP UR QL SCN: ABNORMAL

## 2024-10-10 ENCOUNTER — HOSPITAL ENCOUNTER (OUTPATIENT)
Dept: RADIATION ONCOLOGY | Facility: CLINIC | Age: 49
Setting detail: RADIATION/ONCOLOGY SERIES
Discharge: HOME | End: 2024-10-10
Payer: COMMERCIAL

## 2024-10-10 ENCOUNTER — RADIATION ONCOLOGY OTV (OUTPATIENT)
Dept: RADIATION ONCOLOGY | Facility: CLINIC | Age: 49
End: 2024-10-10
Payer: COMMERCIAL

## 2024-10-10 ENCOUNTER — TELEPHONE (OUTPATIENT)
Dept: PALLIATIVE MEDICINE | Facility: HOSPITAL | Age: 49
End: 2024-10-10
Payer: COMMERCIAL

## 2024-10-10 VITALS — WEIGHT: 125 LBS | BODY MASS INDEX: 20.8 KG/M2 | TEMPERATURE: 97.3 F

## 2024-10-10 DIAGNOSIS — C79.51 SECONDARY MALIGNANT NEOPLASM OF BONE (MULTI): Primary | ICD-10-CM

## 2024-10-10 DIAGNOSIS — C79.51 SECONDARY MALIGNANT NEOPLASM OF BONE (MULTI): ICD-10-CM

## 2024-10-10 LAB
RAD ONC MSQ ACTUAL FRACTIONS DELIVERED: 1
RAD ONC MSQ ACTUAL SESSION DELIVERED DOSE: 400 CGRAY
RAD ONC MSQ ACTUAL TOTAL DOSE: 400 CGRAY
RAD ONC MSQ ELAPSED DAYS: 0
RAD ONC MSQ LAST DATE: NORMAL
RAD ONC MSQ PRESCRIBED FRACTIONAL DOSE: 400 CGRAY
RAD ONC MSQ PRESCRIBED NUMBER OF FRACTIONS: 5
RAD ONC MSQ PRESCRIBED TECHNIQUE: NORMAL
RAD ONC MSQ PRESCRIBED TOTAL DOSE: 2000 CGRAY
RAD ONC MSQ PRESCRIPTION PATTERN COMMENT: NORMAL
RAD ONC MSQ START DATE: NORMAL
RAD ONC MSQ TREATMENT COURSE NUMBER: 1
RAD ONC MSQ TREATMENT SITE: NORMAL

## 2024-10-10 PROCEDURE — 77412 RADIATION TX DELIVERY LVL 3: CPT | Performed by: INTERNAL MEDICINE

## 2024-10-10 PROCEDURE — 77280 THER RAD SIMULAJ FIELD SMPL: CPT | Performed by: INTERNAL MEDICINE

## 2024-10-10 NOTE — TELEPHONE ENCOUNTER
Mr. Mccartney returned my call. He states that he has been taking 15 mg of Oxycodone 4 times per day. Reviewed that per Provider  Shelley's notes from  his visit on Tuesday, he can take 15 mg every 4 hours which is 6 times per day.  He states that he is keeping a journal. He will consider taking the Oxycodone more frequently. He states that prior to Tuesday his pain was 7-8/10 and with increase in Oxycodone dose/frequency it is 5-6/10. He is aware of the phone appointment with Tati Rosa NP on 10/15/24. He moved his bowels last yesterday and is aware to  be mindful of   the risk of constipation.

## 2024-10-10 NOTE — PROGRESS NOTES
Radiation Oncology On Treatment Visit    Patient Name:  Jovon Mccartney  MRN:  [id number]  :  1975    Referring Provider: No ref. 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 Baseline fatigue and decreased appeitite. Right sided pain rating 6/10. Taking oxycodone 15 mg with relief.      OBJECTIVE:   Vital Signs:  Temp 36.3 °C (97.3 °F) (Temporal)   Wt 56.7 kg (125 lb)   BMI 20.80 kg/m²     Other Pertinent Findings:     Toxicity Assessment          10/10/2024    16:43   Toxicity Assessment   Adverse Events Reviewed (WDL) Yes (Within Defined Limits)   Treatment Site Bone   Anorexia Grade 1       decreased appeitite.   Dehydration Grade 0   Dermatitis Radiation Grade 0   Fatigue Grade 1       baseline fatigue   Pain Grade 1       right sided pain 6/10. Oxycodone 15mg.   Bone Pain Grade 1   Pain of Skin Grade 0   Pruritus Grade 0        Assessment / Plan:  The patient is tolerating radiation therapy as anticipated.  Continue per current treatment plan.       Patient will finish his palliative RT sessions on next Wednesday, 10/15/2024.  He will follow-up in the medical oncology clinic for initiation of chemotherapy and consideration of immunotherapy.  The patient will follow-up in addition oncology clinic in 2 to 3  months with a repeat MRI of the C-spine and of the brain.    Anton Mcgrath MD PhD

## 2024-10-10 NOTE — TELEPHONE ENCOUNTER
Planned phone follow up to assess Oxycodone use and effectiveness.   Recall that Provider Shelley increased dose and frequency of Oxycodone to 15 mg every 4 hours as needed for pain. Mr. Mccartney did  not answer. Requested that he return the call .

## 2024-10-11 ENCOUNTER — HOSPITAL ENCOUNTER (OUTPATIENT)
Dept: RADIATION ONCOLOGY | Facility: CLINIC | Age: 49
Setting detail: RADIATION/ONCOLOGY SERIES
Discharge: HOME | End: 2024-10-11
Payer: COMMERCIAL

## 2024-10-11 DIAGNOSIS — C79.51 SECONDARY MALIGNANT NEOPLASM OF BONE (MULTI): ICD-10-CM

## 2024-10-11 DIAGNOSIS — Z51.0 ENCOUNTER FOR ANTINEOPLASTIC RADIATION THERAPY: ICD-10-CM

## 2024-10-11 LAB
CARBOXYTHC UR-MCNC: >500 NG/ML
RAD ONC MSQ ACTUAL FRACTIONS DELIVERED: 2
RAD ONC MSQ ACTUAL SESSION DELIVERED DOSE: 400 CGRAY
RAD ONC MSQ ACTUAL TOTAL DOSE: 800 CGRAY
RAD ONC MSQ ELAPSED DAYS: 1
RAD ONC MSQ LAST DATE: NORMAL
RAD ONC MSQ PRESCRIBED FRACTIONAL DOSE: 400 CGRAY
RAD ONC MSQ PRESCRIBED NUMBER OF FRACTIONS: 5
RAD ONC MSQ PRESCRIBED TECHNIQUE: NORMAL
RAD ONC MSQ PRESCRIBED TOTAL DOSE: 2000 CGRAY
RAD ONC MSQ PRESCRIPTION PATTERN COMMENT: NORMAL
RAD ONC MSQ START DATE: NORMAL
RAD ONC MSQ TREATMENT COURSE NUMBER: 1
RAD ONC MSQ TREATMENT SITE: NORMAL

## 2024-10-11 PROCEDURE — 77412 RADIATION TX DELIVERY LVL 3: CPT | Performed by: INTERNAL MEDICINE

## 2024-10-11 PROCEDURE — 77387 GUIDANCE FOR RADJ TX DLVR: CPT | Performed by: INTERNAL MEDICINE

## 2024-10-12 LAB
6MAM UR CFM-MCNC: <25 NG/ML
CODEINE UR CFM-MCNC: <50 NG/ML
HYDROCODONE CTO UR CFM-MCNC: <25 NG/ML
HYDROMORPHONE UR CFM-MCNC: <25 NG/ML
MORPHINE UR CFM-MCNC: <50 NG/ML
NORHYDROCODONE UR CFM-MCNC: <25 NG/ML
NOROXYCODONE UR CFM-MCNC: >1000 NG/ML
OXYCODONE UR CFM-MCNC: 1041 NG/ML
OXYMORPHONE UR CFM-MCNC: 979 NG/ML

## 2024-10-14 ENCOUNTER — OFFICE VISIT (OUTPATIENT)
Dept: HEMATOLOGY/ONCOLOGY | Facility: CLINIC | Age: 49
End: 2024-10-14
Payer: COMMERCIAL

## 2024-10-14 ENCOUNTER — HOSPITAL ENCOUNTER (OUTPATIENT)
Dept: RADIATION ONCOLOGY | Facility: CLINIC | Age: 49
Setting detail: RADIATION/ONCOLOGY SERIES
Discharge: HOME | End: 2024-10-14
Payer: COMMERCIAL

## 2024-10-14 VITALS
DIASTOLIC BLOOD PRESSURE: 93 MMHG | RESPIRATION RATE: 16 BRPM | BODY MASS INDEX: 20.55 KG/M2 | OXYGEN SATURATION: 97 % | TEMPERATURE: 98.4 F | WEIGHT: 123.5 LBS | HEART RATE: 89 BPM | SYSTOLIC BLOOD PRESSURE: 149 MMHG

## 2024-10-14 DIAGNOSIS — C79.51 NSCLC METASTATIC TO BONE (MULTI): ICD-10-CM

## 2024-10-14 DIAGNOSIS — M89.9 LESION OF BONE OF CERVICAL SPINE: ICD-10-CM

## 2024-10-14 DIAGNOSIS — Z51.0 ENCOUNTER FOR ANTINEOPLASTIC RADIATION THERAPY: ICD-10-CM

## 2024-10-14 DIAGNOSIS — C34.90 NSCLC METASTATIC TO BONE (MULTI): ICD-10-CM

## 2024-10-14 DIAGNOSIS — C79.51 SECONDARY MALIGNANT NEOPLASM OF BONE (MULTI): ICD-10-CM

## 2024-10-14 LAB
RAD ONC MSQ ACTUAL FRACTIONS DELIVERED: 3
RAD ONC MSQ ACTUAL SESSION DELIVERED DOSE: 400 CGRAY
RAD ONC MSQ ACTUAL TOTAL DOSE: 1200 CGRAY
RAD ONC MSQ ELAPSED DAYS: 4
RAD ONC MSQ LAST DATE: NORMAL
RAD ONC MSQ PRESCRIBED FRACTIONAL DOSE: 400 CGRAY
RAD ONC MSQ PRESCRIBED NUMBER OF FRACTIONS: 5
RAD ONC MSQ PRESCRIBED TECHNIQUE: NORMAL
RAD ONC MSQ PRESCRIBED TOTAL DOSE: 2000 CGRAY
RAD ONC MSQ PRESCRIPTION PATTERN COMMENT: NORMAL
RAD ONC MSQ START DATE: NORMAL
RAD ONC MSQ TREATMENT COURSE NUMBER: 1
RAD ONC MSQ TREATMENT SITE: NORMAL

## 2024-10-14 PROCEDURE — 99215 OFFICE O/P EST HI 40 MIN: CPT | Performed by: STUDENT IN AN ORGANIZED HEALTH CARE EDUCATION/TRAINING PROGRAM

## 2024-10-14 PROCEDURE — 77412 RADIATION TX DELIVERY LVL 3: CPT | Performed by: INTERNAL MEDICINE

## 2024-10-14 PROCEDURE — 77387 GUIDANCE FOR RADJ TX DLVR: CPT | Performed by: INTERNAL MEDICINE

## 2024-10-14 RX ORDER — AMOXICILLIN 250 MG
1 CAPSULE ORAL NIGHTLY PRN
Qty: 30 TABLET | Refills: 3 | Status: SHIPPED | OUTPATIENT
Start: 2024-10-14

## 2024-10-14 ASSESSMENT — PAIN SCALES - GENERAL: PAINLEVEL: 7

## 2024-10-14 NOTE — PROGRESS NOTES
[facility] - Medical Oncology Follow-Up Visit    Patient ID: Jovon Mccartney is a 49 y.o. male with NSCLC adenocarcinoma     Current therapy: [No matching plan found]     Chief Concern: next steps    Oncologic History:     DIAGNOSIS  NSCLC adenocarcinoma      STAGING  cT4 pN3 M1c      CURRENT SITES OF DISEASE  R hilar mass, 4L, 11L, scapula,  right  parieto-occipital bone, C2, C3, ?lymphatic carcinomatosis     MOLECULAR GENOMICS  KRAS p.G12C ([id number] c.34G>T)   PD-L1 <1%     PRIOR THERAPY        CURRENT THERAPY  Palliative XRT skull, C-spine, R shoulder 10/10/24 - 10/16/24        CURRENT ONCOLOGICAL PROBLEMS           HISTORY OF PRESENT ILLNESS  Mr. Mccartney is a 50 yo with PMH significant for RA who present to the [facility] on 9/9/24 with 2 months of progressively worsening headaches and neck pain with occasional radiation to the R and L arms. A lytic lesion was noted on CT head 9/9/24 of the parietal bone, and CT cervical spine was concerning for lucent lesions C2 and C3. Brain MRI and MR cervical spine confirmed marrow-replacing lesions fo the C2 and C3 vertebral bodies. CT C/A/P w/o contrast on 9/10/24 was concerning for soft tissue mass of the R hilum, lytic lesions of the R scapula, and 3.5 cm mass in the L gluteus. He was transferred to [facility] on 9/10/24 CT C/A/P w/IV contrast on 9/15/24 confirmed the lung mass encasing the R mainstem bronchus abutting the R main pulmonary artery and extending to the lung periphery, as well as R perihilar lesion and lymphangitic carcinomatosis, prominent mediastinal nodes, L axillary node, nodular thickening of bilateral adrenal glands, likely lesion of the R scapula, small hepatic lesions, moderate pericardial effusion. EBUS on 9/17/24 with pathology of R hilar mass with adenocarcinoma, KRAS G12C, PD-L1 pending, and positive lymph nodes 11L and 4L. He met Dr. Mcgrath and discussed palliative radiation to the occipital lesion and cervical spine.  PET/CT on 10/1/24 with FDG avid confluent R hilar/mediastinal mass, mild FDG avidity along upper and middle lobe, bilateral scapular, humeral, R anterior acetabular osseous metastases. He started palliative radiation 10/10/24-10/16/24.      He was born with juvenile RA, usually his feet and knees feel good compared to everything. He used to walk with a crutch, since the hospital he has been walking without it. He follows with a rheumatologist at [facility]. It's fairly managed he says, has  been on humira for 20-25 years, right when it first came out. Kept the fluid off his knees and helped him better than anything else. He was on all kinds of drugs he said before including steroids, methotrexate, others. Typically with the humira he doesn't really get flares, sometimes in his wrists. He's been off for about a month, so thinks he'd have a flare in about a month.      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 Maybe had one exposure to asbestos. 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 Lives at home with his wife and a dog, 3 cats, a bird and fish. They have 2 sons and 3 grandchildren.  Tob: quit smoking 9/9/24, smoked 39 years, 1-1.5 ppd  EtOH: occasional  Illicits: previous smoking marijuana, only edibles now for pain     FAMILY HISTORY  Mother - breast cancer dx 56 yo  Maternal grandmother - bone cancer  Paternal cousin - bone and/or lung cancer  Paternal grandmother - unknown cancer  No other family members with autoimmune diseases    HPI   He is here today with his wife. He started radiation and is tolerating well. Is feeling tired. He will reach out to rheumatology today.      Meds (Current):    Current Outpatient Medications:     acetaminophen (Tylenol) 500 mg tablet, Take 1 tablet (500 mg) by mouth every 6 hours if needed for mild pain (1 - 3)., Disp: , Rfl:     adalimumab (Humira Pen) 40 mg/0.8 mL  pen injector kit pen-injector, Inject 1 Pen (40 mg) under the skin every 14 (fourteen) days., Disp: , Rfl:     budesonide-glycopyr-formoterol (BREZTRI) 160-9-4.8 mcg/actuation HFA aerosol inhaler, Inhale 2 puffs 2 times a day., Disp: 10.7 g, Rfl: 3    gabapentin (Neurontin) 300 mg capsule, Take 1 capsule (300 mg) by mouth 3 times a day., Disp: 30 capsule, Rfl: 0    ibuprofen 200 mg tablet, Take 1 tablet (200 mg) by mouth every 6 hours if needed for mild pain (1 - 3)., Disp: , Rfl:     ipratropium-albuteroL (Combivent Respimat)  mcg/actuation inhaler, Inhale 2 puffs 4 times a day., Disp: 12 g, Rfl: 11    naloxone (Narcan) 4 mg/0.1 mL nasal spray, Administer 1 spray (4 mg) into affected nostril(s) if needed for opioid reversal. May repeat every 2-3 minutes if needed, alternating nostrils, until medical assistance becomes available., Disp: 2 each, Rfl: 3    oxyCODONE (Roxicodone) 5 mg immediate release tablet, May take 1 tablet (5 mg) by mouth every 6 hours if needed for moderate pain (4 - 6) or severe pain (7 - 10) (1-2 tablet). May also take 2 tablets (10 mg) every 6 hours if needed for moderate pain (4 - 6) or severe pain (7 - 10) (1-2 tablet)., Disp: 180 tablet, Rfl: 0    pantoprazole (ProtoNix) 40 mg EC tablet, Take 1 tablet (40 mg) by mouth once daily in the morning. Take before meals. Do not crush, chew, or split., Disp: 30 tablet, Rfl: 2    polyethylene glycol (Glycolax, Miralax) 17 gram/dose powder, Take 34 g by mouth once daily., Disp: 3060 g, Rfl: 0    sennosides-docusate sodium (Yoselin-Colace) 8.6-50 mg tablet, Take 1 tablet by mouth as needed at bedtime for constipation. (Patient taking differently: Take 1 tablet by mouth once daily.), Disp: 30 tablet, Rfl: 3    Review of Systems   All other systems reviewed and are negative.       Objective   BSA: 1.6 meters squared  Wt Readings from Last 5 Encounters:   10/14/24 56 kg (123 lb 8 oz)   10/10/24 56.7 kg (125 lb)   10/08/24 56.2 kg (123 lb 14.4 oz)    10/03/24 56.6 kg (124 lb 12.5 oz)   09/30/24 55.7 kg (122 lb 12.8 oz)     BP (!) 149/93 (BP Location: Left arm, Patient Position: Sitting)   Pulse 89   Temp 36.9 °C (98.4 °F) (Temporal)   Resp 16   Wt 56 kg (123 lb 8 oz)   SpO2 97%   BMI 20.55 kg/m²     ECOG Score: 1- Restricted in physically strenuous activity.  Carries out light duty.      Physical Exam  Vitals reviewed.   Constitutional:       General: He is not in acute distress.  HENT:      Head: Normocephalic and atraumatic.      Mouth/Throat:      Mouth: Mucous membranes are moist.   Eyes:      Extraocular Movements: Extraocular movements intact.      Pupils: Pupils are equal, round, and reactive to light.   Cardiovascular:      Rate and Rhythm: Normal rate and regular rhythm.      Heart sounds: No murmur heard.  Pulmonary:      Effort: Pulmonary effort is normal. No respiratory distress.      Breath sounds: Normal breath sounds.   Abdominal:      General: There is no distension.      Palpations: Abdomen is soft.   Skin:     General: Skin is warm and dry.   Neurological:      General: No focal deficit present.      Mental Status: He is oriented to person, place, and time. Mental status is at baseline.   Psychiatric:         Mood and Affect: Mood normal.         Behavior: Behavior normal.         Thought Content: Thought content normal.          Results:  Labs:  Lab Results   Component Value Date    WBC 11.3 09/19/2024    HGB 9.6 (L) 09/19/2024    HCT 28.2 (L) 09/19/2024    MCV 93 09/19/2024     09/19/2024      Lab Results   Component Value Date    NEUTROABS 7.02 09/19/2024      Lab Results   Component Value Date    GLUCOSE 95 09/30/2024    CALCIUM 9.5 09/30/2024     09/30/2024    K 4.6 09/30/2024    CO2 23 09/30/2024     09/30/2024    BUN 20 09/30/2024    CREATININE 0.57 09/30/2024    MG 2.07 09/18/2024     Lab Results   Component Value Date    ALT 17 09/30/2024    AST 16 09/30/2024    ALKPHOS 115 09/30/2024    BILITOT 0.6  "09/30/2024    BILIDIR 0.1 09/14/2024      No results found for: \"ACTH\", \"CORTISOL\", \"TSH\", \"FREET4\"    Imaging:  I have personally reviewed the below imaging and concur with the reported findings unless otherwise stated:    PET/CT 10/1/24  IMPRESSION:  1. Abnormally hypermetabolic confluent, right hilar/middle  mediastinal soft tissue mass with right hilar hypermetabolic activity  consistent with known lung cancer.  2. Mildly FDG-avid, nonspecific activity along the upper and middle  lobe segmental bronchopulmonary bronchi/vasculature which may  represent inflammatory changes versus lymphangitic carcinomatosis.  3. Severe bilateral panlobular upper lung predominant emphysematous  changes with a nonspecific mildly FDG-avid distorted nodularity in  the right upper lobe.  4. Markedly hypermetabolic bilateral scapular, bilateral humeral and  right anterior acetabular osteolytic lesions consistent with  metastatic disease. Additionally the lytic lesions involving the  right occipital parietal calvarium are also noted however not covered  by PET imaging and are better assessed on dedicated MRI of the brain.          === Results for orders placed during the hospital encounter of 09/09/24 ===    MR cervical spine w and wo IV contrast [[id number]] 09/09/2024    Status: Normal  MRI brain:    1. There is an ill-defined enhancing lesion centered within the right  parieto-occipital bone, concerning for multiple myeloma or metastatic  disease. There is abutment and anterior displacement of the  underlying dura with mass effect upon the right occipital lobe. There  is no midline shift. There is thickening and enhancement of the  underlying and surrounding dura, pachymeningeal involvement can not  be entirely excluded. Otherwise, no evidence of abnormal  intraparenchymal metastatic disease to suggest neoplastic involvement.  2. Nonspecific moderate supratentorial and infratentorial white  matter signal abnormalities. Differential " considerations include  demyelination or chronic microvascular ischemic disease amongst  others. Note, however this pattern is not specific for demyelination.    MRI cervical spine:    1. Ill-defined marrow replacing lesions within the C2 and C3  vertebral bodies corresponding to the lytic foci seen on the same-day  CT cervical spine. The constellation of findings are suggestive of  metastatic disease or multiple myeloma. There is no evidence of an  extraosseous soft tissue component.  2. Multilevel degenerative changes of the cervical spine.    I personally reviewed the images/study and I agree with the findings  as stated by Resident Niki Osorio. This study was interpreted at  [place], [place].    MACRO:  None    Signed by: Melinda Bernstein 9/9/2024 4:05 PM  Dictation workstation:   [id number]      Assessment/Plan      Jovon Mccartney is a 49 y.o. male here for follow up of NSCLC adenocarcinoma.     # NSCLC adenocarcinoma  - [id number]  - KRAS G12C, PD-L1 <1%  - discussed that SoC therapies include chemotherapy+ immunotherapy. Given longstanding juvenile RA, would like input from his rheumatologist regarding safety of immunotherapy in this setting. Discussed if no immunotherapy, would recommend chemotherapy alone vs KRAS G12C targeted therapy frontline, which typically is given in the second-line setting.  - provided information on carboplatin/pemetrexed/pembrolizumab vs adagrasib   - patient will reach out to his rheumatologist for discussion regarding immunothreapy     # Neoplasm related pain  - already saw Dr. Mcgrath, receiving palliative radiation  - seeing supportive onc     # Advanced care planning  - discussed incurable but treatable nature of metastatic disease, with goal of treatment to improve or maintain quality of life and prolong life.      Liset Vargas MD  [facility]

## 2024-10-15 ENCOUNTER — TELEPHONE (OUTPATIENT)
Dept: PALLIATIVE MEDICINE | Facility: HOSPITAL | Age: 49
End: 2024-10-15

## 2024-10-15 ENCOUNTER — TELEMEDICINE (OUTPATIENT)
Dept: PALLIATIVE MEDICINE | Facility: CLINIC | Age: 49
End: 2024-10-15
Payer: COMMERCIAL

## 2024-10-15 ENCOUNTER — HOSPITAL ENCOUNTER (OUTPATIENT)
Dept: RADIATION ONCOLOGY | Facility: CLINIC | Age: 49
Setting detail: RADIATION/ONCOLOGY SERIES
Discharge: HOME | End: 2024-10-15
Payer: COMMERCIAL

## 2024-10-15 DIAGNOSIS — C79.51 SECONDARY MALIGNANT NEOPLASM OF BONE (MULTI): ICD-10-CM

## 2024-10-15 DIAGNOSIS — Z51.5 PALLIATIVE CARE ENCOUNTER: ICD-10-CM

## 2024-10-15 DIAGNOSIS — G89.3 CANCER RELATED PAIN: ICD-10-CM

## 2024-10-15 DIAGNOSIS — R11.2 NAUSEA AND VOMITING, UNSPECIFIED VOMITING TYPE: Primary | ICD-10-CM

## 2024-10-15 DIAGNOSIS — K59.00 CONSTIPATION, UNSPECIFIED CONSTIPATION TYPE: ICD-10-CM

## 2024-10-15 DIAGNOSIS — R63.0 DECREASED APPETITE: ICD-10-CM

## 2024-10-15 DIAGNOSIS — Z51.0 ENCOUNTER FOR ANTINEOPLASTIC RADIATION THERAPY: ICD-10-CM

## 2024-10-15 LAB
RAD ONC MSQ ACTUAL FRACTIONS DELIVERED: 4
RAD ONC MSQ ACTUAL SESSION DELIVERED DOSE: 400 CGRAY
RAD ONC MSQ ACTUAL TOTAL DOSE: 1600 CGRAY
RAD ONC MSQ ELAPSED DAYS: 5
RAD ONC MSQ LAST DATE: NORMAL
RAD ONC MSQ PRESCRIBED FRACTIONAL DOSE: 400 CGRAY
RAD ONC MSQ PRESCRIBED NUMBER OF FRACTIONS: 5
RAD ONC MSQ PRESCRIBED TECHNIQUE: NORMAL
RAD ONC MSQ PRESCRIBED TOTAL DOSE: 2000 CGRAY
RAD ONC MSQ PRESCRIPTION PATTERN COMMENT: NORMAL
RAD ONC MSQ START DATE: NORMAL
RAD ONC MSQ TREATMENT COURSE NUMBER: 1
RAD ONC MSQ TREATMENT SITE: NORMAL

## 2024-10-15 PROCEDURE — 77387 GUIDANCE FOR RADJ TX DLVR: CPT | Performed by: INTERNAL MEDICINE

## 2024-10-15 PROCEDURE — 99213 OFFICE O/P EST LOW 20 MIN: CPT

## 2024-10-15 PROCEDURE — 77412 RADIATION TX DELIVERY LVL 3: CPT | Performed by: INTERNAL MEDICINE

## 2024-10-15 RX ORDER — ONDANSETRON HYDROCHLORIDE 8 MG/1
8 TABLET, FILM COATED ORAL EVERY 8 HOURS PRN
Qty: 90 TABLET | Refills: 3 | Status: SHIPPED | OUTPATIENT
Start: 2024-10-15 | End: 2024-11-14

## 2024-10-15 RX ORDER — MORPHINE SULFATE 30 MG/1
30 TABLET, FILM COATED, EXTENDED RELEASE ORAL 2 TIMES DAILY
Qty: 60 TABLET | Refills: 0 | Status: SHIPPED | OUTPATIENT
Start: 2024-10-15 | End: 2024-11-14

## 2024-10-15 NOTE — TELEPHONE ENCOUNTER
PA is needed for the MS CONTIN. Request sent to [facility]. Awaiting response. Mr. Mccartney updated via [facility] Secure Message.

## 2024-10-15 NOTE — PROGRESS NOTES
SUPPORTIVE AND PALLIATIVE ONCOLOGY FOLLOW-UP - OUTPATIENT      SERVICE DATE: 10/15/2024    Virtual or Telephone Consent    A telephone visit (audio only) between the patient (at the originating site) and the provider (at the distant site) was utilized to provide this telehealth service.   Verbal consent was requested and obtained from Jovon Mccartney on this date, 10/15/24 for a telehealth visit.     Referred by:  Anton Mcgrath MD  Medical Oncologist: No care team member to display   Radiation Oncologist: No care team member to display  Primary Physician: Matilda Garcia  [phone]    REASON FOR CONSULT/CHIEF CONSULT COMPLAINT: pain management and Introduction to Supportive and Palliative Oncology Services    Subjective   HISTORY OF PRESENT ILLNESS: Jovon Mccartney is a 49 y.o. male who presents with a PMH of juvenile RA and newly diagnosed NSCLC with metastatic disease to the right scapula, parieto-occipital bone, C2, C3, bilateral humeral and right anterior acetabular osteolytic lesions. Completing RT to the right scapula and right scalp. Awaiting further treatment planning.     Pain Assessment:  Pain Score: 8/10  Location: neck and shoulder    Symptom Assessment:  Pain:very much  constant, aching and throbbing pain. Reports no change in pain with the increase in oxycodone. He is taking oxycodone 15 mg 4 times per day. This brings his pain level down from an 8/10 to a 5/10. He is taking tylenol 1000 mg every 8 hours as well. He reports increase in pain at bedtime since starting radiation.    Headache: none  Dizziness:none  Lack of energy: somewhat due to pain and radiation  Difficulty sleeping: somewhat due to pain. Feels his pain is worse at bedtime now since starting radiation.   Worrying: none  Anxiety: none  Depression: a little  Pain in mouth/swallowing: none  Dry mouth: none  Taste changes: none  Shortness of breath: none  Lack of appetite: a little. Worse recently. States that everything tastes salty. He  is drinking 1 boost per day. He is eating small meals.    Nausea: a little reports that this is questionable recently.   Vomiting: none  Constipation: none  Diarrhea: none  Sore muscles: none  Numbness or tingling in hands/feet/other: none  Weight loss: a little  Other: none      Information obtained from: chart review, interview of patient, and interview of family  ______________________________________________________________________     Oncology History   NSCLC metastatic to bone (Multi)   9/26/2024 Initial Diagnosis    NSCLC metastatic to bone (Multi)     9/26/2024 Cancer Staged    Staging form: Lung, AJCC 8th Edition, Clinical stage from 9/26/2024: Stage IVB (cT4, cN3, cM1c) - Signed by Anton Mcgrath MD PhD on 9/26/2024         Past Medical History:   Diagnosis Date    Adalimumab (Humira) long-term use 09/15/2024    Arthritis 1974    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    Hilar mass 09/15/2024    Juvenile rheumatoid arthritis (Multi) 09/15/2024    Lung cancer (Multi) 09 17 2024    Rheumatoid arthritis 10 1974     Past Surgical History:   Procedure Laterality Date    BRONCHOSCOPY  9 16 2024    LUNG BIOPSY  9 16 2024     Family History   Problem Relation Name Age of Onset    Breast cancer Mother JORDY CHURCH     Cancer Mother JORDY CHURCH     Miscarriages / Stillbirths Mother JORDY CHURCH     Cancer Maternal Grandfather HUBER PAL     Stroke Maternal Grandfather HUBER PAL         SOCIAL HISTORY  Children 2, Grandchildren 3, Support system wife and kids, Employment on disability since 17 years old, and Hobbies working on cars and 4 wheeling.    Social History:  reports that he quit smoking about 5 weeks ago. His smoking use included cigarettes. He has a 58.5 pack-year smoking history. He has been exposed to tobacco smoke. He has never used smokeless tobacco. He reports that he does not currently use alcohol. He reports that he does not currently use drugs.  Quit smoking September  2024.     REVIEW OF SYSTEMS  Review of systems negative unless noted in HPI.       Objective     Current Outpatient Medications   Medication Instructions    acetaminophen (TYLENOL) 500 mg, oral, Every 6 hours PRN    adalimumab (HUMIRA PEN) 40 mg, subcutaneous, Every 14 days    budesonide-glycopyr-formoterol (BREZTRI) 160-9-4.8 mcg/actuation HFA aerosol inhaler 2 puffs, inhalation, 2 times daily    gabapentin (NEURONTIN) 300 mg, oral, 3 times daily    ibuprofen 200 mg, oral, Every 6 hours PRN    ipratropium-albuteroL (Combivent Respimat)  mcg/actuation inhaler 2 puffs, inhalation, 4 times daily RT    naloxone (NARCAN) 4 mg, nasal, As needed, May repeat every 2-3 minutes if needed, alternating nostrils, until medical assistance becomes available.    oxyCODONE (Roxicodone) 5 mg immediate release tablet May take 1 tablet (5 mg) by mouth every 6 hours if needed for moderate pain (4 - 6) or severe pain (7 - 10) (1-2 tablet). May also take 2 tablets (10 mg) every 6 hours if needed for moderate pain (4 - 6) or severe pain (7 - 10) (1-2 tablet).    pantoprazole (PROTONIX) 40 mg, oral, Daily before breakfast, Do not crush, chew, or split.    polyethylene glycol (GLYCOLAX, MIRALAX) 34 g, oral, Daily    sennosides-docusate sodium (Yoselin-Colace) 8.6-50 mg tablet 1 tablet, oral, Nightly PRN       Allergies: No Known Allergies      Hospital Outpatient Visit on 10/14/2024   Component Date Value Ref Range Status    Treatment Site 10/14/2024 R_Scalp   Final    Course Number 10/14/2024 1   Final    Prescribed Fractional Dose 10/14/2024 400  cGray Final    Prescribed Total Dose 10/14/2024 2,000  cGray Final    Actual Fractions Delivered 10/14/2024 3   Final    Prescription Pattern Comment 10/14/2024 Daily CBCT.   Final    Actual Session Delivered Dose 10/14/2024 400  cGray Final    Actual Total Dose 10/14/2024 1,200  cGray Final    Prescribed Technique 10/14/2024 3D   Final    Elapsed Days 10/14/2024 4   Final    Start Date  10/14/2024 10/10/2024   Final    Last Date 10/14/2024 10/14/2024   Final    Prescribed Number of Fractions 10/14/2024 5   Final    Treatment Site 10/14/2024 C_Spine   Final    Course Number 10/14/2024 1   Final    Prescribed Fractional Dose 10/14/2024 400  cGray Final    Prescribed Total Dose 10/14/2024 2,000  cGray Final    Actual Fractions Delivered 10/14/2024 3   Final    Prescription Pattern Comment 10/14/2024 Daily CBCT.   Final    Actual Session Delivered Dose 10/14/2024 400  cGray Final    Actual Total Dose 10/14/2024 1,200  cGray Final    Prescribed Technique 10/14/2024 3D   Final    Elapsed Days 10/14/2024 4   Final    Start Date 10/14/2024 10/10/2024   Final    Last Date 10/14/2024 10/14/2024   Final    Prescribed Number of Fractions 10/14/2024 5   Final    Treatment Site 10/14/2024 R_Shoulder   Final    Course Number 10/14/2024 1   Final    Prescribed Fractional Dose 10/14/2024 400  cGray Final    Prescribed Total Dose 10/14/2024 2,000  cGray Final    Actual Fractions Delivered 10/14/2024 3   Final    Prescription Pattern Comment 10/14/2024 Daily CBCT.   Final    Actual Session Delivered Dose 10/14/2024 400  cGray Final    Actual Total Dose 10/14/2024 1,200  cGray Final    Prescribed Technique 10/14/2024 3D   Final    Elapsed Days 10/14/2024 4   Final    Start Date 10/14/2024 10/10/2024   Final    Last Date 10/14/2024 10/14/2024   Final    Prescribed Number of Fractions 10/14/2024 5   Final   Hospital Outpatient Visit on 10/11/2024   Component Date Value Ref Range Status    Treatment Site 10/11/2024 R_Shoulder   Final    Course Number 10/11/2024 1   Final    Prescribed Fractional Dose 10/11/2024 400  cGray Final    Prescribed Total Dose 10/11/2024 2,000  cGray Final    Actual Fractions Delivered 10/11/2024 2   Final    Prescription Pattern Comment 10/11/2024 Daily CBCT.   Final    Actual Session Delivered Dose 10/11/2024 400  cGray Final    Actual Total Dose 10/11/2024 800  cGray Final    Prescribed  Technique 10/11/2024 3D   Final    Elapsed Days 10/11/2024 1   Final    Start Date 10/11/2024 10/10/2024   Final    Last Date 10/11/2024 10/11/2024   Final    Prescribed Number of Fractions 10/11/2024 5   Final   Hospital Outpatient Visit on 10/10/2024   Component Date Value Ref Range Status    Treatment Site 10/10/2024 R_Shoulder   Final    Course Number 10/10/2024 1   Final    Prescribed Fractional Dose 10/10/2024 400  cGray Final    Prescribed Total Dose 10/10/2024 2,000  cGray Final    Actual Fractions Delivered 10/10/2024 1   Final    Prescription Pattern Comment 10/10/2024 Daily CBCT.   Final    Actual Session Delivered Dose 10/10/2024 400  cGray Final    Actual Total Dose 10/10/2024 400  cGray Final    Prescribed Technique 10/10/2024 3D   Final    Elapsed Days 10/10/2024 0   Final    Start Date 10/10/2024 10/10/2024   Final    Last Date 10/10/2024 10/10/2024   Final    Prescribed Number of Fractions 10/10/2024 5   Final   Office Visit on 10/08/2024   Component Date Value Ref Range Status    Amphetamine Screen, Urine 10/08/2024 Presumptive Negative  Presumptive Negative Final    CUTOFF LEVEL: 500 NG/ML   Cross-reactivity has been reported with high concentrations   of the following drugs: buproprion, chloroquine, chlorpromazine,   ephedrine, mephentermine, fenfluramine, phentermine,   phenylpropanolamine, pseudoephedrine, and propranolol.    Barbiturate Screen, Urine 10/08/2024 Presumptive Negative  Presumptive Negative Final    CUTOFF LEVEL: 200 NG/ML    Benzodiazepines Screen, Urine 10/08/2024 Presumptive Negative  Presumptive Negative Final    CUTOFF LEVEL: 200 NG/ML    Cannabinoid Screen, Urine 10/08/2024 Presumptive Positive (A)  Presumptive Negative Final    CUTOFF LEVEL: 50 NG/ML    Cocaine Metabolite Screen, Urine 10/08/2024 Presumptive Negative  Presumptive Negative Final    CUTOFF LEVEL: 150 NG/ML    Fentanyl Screen, Urine 10/08/2024 Presumptive Negative  Presumptive Negative Final    CUTOFF LEVEL: 5  NG/ML    Opiate Screen, Urine 10/08/2024 Presumptive Negative  Presumptive Negative Final    CUTOFF LEVEL: 300 NG/ML  The opiate screen does not detect fentanyl, meperidine, or   tramadol. Oxycodone is not consistently detected (refer to  Oxycodone Screen, Urine result).    Oxycodone Screen, Urine 10/08/2024 Presumptive Positive (A)  Presumptive Negative Final    CUTOFF LEVEL: 100 NG/ML  This test will accurately detect both oxycodone and oxymorphone.    PCP Screen, Urine 10/08/2024 Presumptive Negative  Presumptive Negative Final    CUTOFF LEVEL:  25 NG/ML  Cross-reactivity has been reported with dextromethorphan.    Methadone Screen, Urine 10/08/2024 Presumptive Negative  Presumptive Negative Final    CUTOFF LEVEL: 150 NG/ML  The metabolite L-alpha-acetylmethadol (LAAM) is not  detected by this method in concentrations that would  be found in the urine of patients on LAAM therapy.    6-Acetylmorphine 10/08/2024 <25  <25 ng/mL Final    Codeine 10/08/2024 <50  <50 ng/mL Final    Hydrocodone 10/08/2024 <25  <25 ng/mL Final    Hydromorphone 10/08/2024 <25  <25 ng/mL Final    Morphine  10/08/2024 <50  <50 ng/mL Final    Norhydrocodone 10/08/2024 <25  <25 ng/mL Final    Noroxycodone 10/08/2024 >1,000 (H)  <25 ng/mL Final    Noroxycodone is a metabolite of oxycodone; consistent with use of a drug containing oxycodone.    Oxycodone 10/08/2024 1,041 (H)  <25 ng/mL Final    Consistent with use of drug containing oxycodone.    Oxymorphone 10/08/2024 979 (H)  <25 ng/mL Final    Consistent with metabolism of oxycodone. May also reflect independent use of oxymorphone.    42-Auw-4-carboxy-THC, Urn, Quant 10/08/2024 >500  ng/mL Final    Comment: INTERPRETIVE INFORMATION: THC Metabolite, Urine,                             Quantitative    Methodology: Quantitative Liquid Chromatography-Tandem Mass   Spectrometry  Positive cutoff: 15 ng/mL  For medical purposes only; not valid for forensic use.  The drug analyte detected in this  assay, 9-carboxy THC, is a   metabolite of delta-9-tetrahydrocannabinol (THC). Detection of   9-carboxy THC suggests use of, or exposure to, a product   containing THC.  This test cannot distinguish between prescribed   or non-prescribed forms of THC, nor can it distinguish between   active or passive use. The 9-carboxy THC metabolite can be   detected in urine for several weeks. Normalization of results to   creatinine concentration can help document elimination or suggest   recent use, when specimens are collected at least one week apart.    This test was developed and its performance characteristics   determined by [facility]. It has not been cleared or   approved by the [place] Food and Drug                            Administration. This test was   performed in a CLIA certified laboratory and is intended for   clinical purposes.  Performed By: [facility]  [address] [place] [id number]  : Umang Huggins MD, PhD  CLIA Number: [id number]   Lab on 09/30/2024   Component Date Value Ref Range Status    Glucose 09/30/2024 95  74 - 99 mg/dL Final    Sodium 09/30/2024 137  136 - 145 mmol/L Final    Potassium 09/30/2024 4.6  3.5 - 5.3 mmol/L Final    Chloride 09/30/2024 102  98 - 107 mmol/L Final    Bicarbonate 09/30/2024 23  21 - 32 mmol/L Final    Anion Gap 09/30/2024 17  10 - 20 mmol/L Final    Urea Nitrogen 09/30/2024 20  6 - 23 mg/dL Final    Creatinine 09/30/2024 0.57  0.50 - 1.30 mg/dL Final    eGFR 09/30/2024 >90  >60 mL/min/1.73m*2 Final    Calculations of estimated GFR are performed using the 2021 CKD-EPI Study Refit equation without the race variable for the IDMS-Traceable creatinine methods.  https://jasn.asnjournals.org/content/early/2021/09/22/ASN.9511618956    Calcium 09/30/2024 9.5  8.6 - 10.6 mg/dL Final    Albumin 09/30/2024 3.8  3.4 - 5.0 g/dL Final    Alkaline Phosphatase 09/30/2024 115  33 - 120 U/L Final    Total Protein 09/30/2024 7.5  6.4 - 8.2 g/dL  Final    AST 09/30/2024 16  9 - 39 U/L Final    Bilirubin, Total 09/30/2024 0.6  0.0 - 1.2 mg/dL Final    ALT 09/30/2024 17  10 - 52 U/L Final    Patients treated with Sulfasalazine may generate falsely decreased results for ALT.   Office Visit on 09/30/2024   Component Date Value Ref Range Status    Reason for Study 09/30/2024 To identify mutations relevant to patient's cancer.   Final    Genetic Diseases Assessed 09/30/2024 Cancer   Final    Description of Ranges of DNA Seque* 09/30/2024 105 gene liquid biopsy   Final    Overall Interpretation 09/30/2024 positive   Final    [facility] 09/30/2024 [email or web address]   Final    [facility] link    Low Coverage Regions 09/30/2024 ERRFI1, JAK1, TERT   Final    Therapy Count 09/30/2024 2   Final    [facility]: Potential Therapy 1 09/30/2024    Final                    Value:Gene: [id number]^KRAS^HGNC  Variant: p.G12C  Match Type: snvIndel  Match Type Description: KRAS p.G12C  Agent: Adagrasib  Drug Class: KRAS G12C Inhibitor  Tissue: Non-Small Cell Lung Cancer  Association: Response  Evidence Status: Consensus  Evidence ID: NCCN  KDB Variant: G12C - GOF  Label: FDA Off Label  FDA Approved?: Yes  On label?: No    [facility]: Potential Therapy 2 09/30/2024    Final                    Value:Gene: [id number]^KRAS^HGNC  Variant: p.G12C  Match Type: snvIndel  Match Type Description: KRAS p.G12C  Agent: Sotorasib  Drug Class: KRAS G12C Inhibitor  Tissue: Non-Small Cell Lung Cancer  Association: Response  Evidence Status: Consensus  Evidence ID: NCCN  KDB Variant: G12C - GOF  Label: FDA Off Label  FDA Approved?: Yes  On label?: No    Trial Count 09/30/2024 3   Final    Trial 1: Matched criteria 09/30/2024    Final                    Value:Clinical Trial NCT ID: [id number]  Clinical Trial Title: Lung-MAP: A Master Screening Protocol for Previously-Treated Non-Small Cell Lung  Cancer  Clinical Trial URL: https://clinicaltrials.gov/ct2/show/FDD16924641  Clinical Phase: Phase 2/Phase 3  Clinical Trial Matches: KRAS p.G12C mutation  Clinical Trial Distance and Location: [address] [place], [place]    Trial 2: Matched criteria 09/30/2024    Final                    Value:Clinical Trial NCT ID: [id number]  Clinical Trial Title: A Phase 1/2 Study of [id number] in Patients With Solid Tumors  Clinical Trial URL: https://clinicaltrials.gov/ct2/show/MDL79593543  Clinical Phase: Phase 1/Phase 2  Clinical Trial Matches: KRAS p.G12C mutation, KEAP1 p.S102L mutation  Clinical Trial Distance and Location: [address] [place], [place]    Trial 3: Matched criteria 09/30/2024    Final                    Value:Clinical Trial NCT ID: [id number]  Clinical Trial Title: Study of JANIA(ON) Inhibitor Combinations in Patients with Advanced JANIA-mutated NSCLC  Clinical Trial URL: https://clinicaltrials.gov/ct2/show/ZHQ31607835  Clinical Phase: Phase 1/Phase 2  Clinical Trial Matches: KRAS p.G12C mutation  Clinical Trial Distance and Location: [address] [place] [address]    Tumor Mutational Matthews 09/30/2024 13.0  m/MB Final    Microsatellite Instability Note 09/30/2024 MSI-High not detected   Final   Orders Only on 09/26/2024   Component Date Value Ref Range Status    Test Comment 09/26/2024 The test request has been received by the lab and will be reviewed.   Final   No results displayed because visit has over 200 results.           PHYSICAL EXAMINATION  Vital Signs: not completed due to virtual visit   There were no vitals filed for this visit.  Vital signs reviewed       Physical Exam not completed due to virtual visit    ASSESSMENT/PLAN    Pain  Pain is: cancer related pain  Type: somatic  Pain control: sub-optimally controlled  Home regimen:   - Continue Oxycodone 15 mg every 4 hours as needed  - Start MSContin 30 mg BID.   - Continue Tylenol 1000 mg every 8 hours as needed  - Continue Ibuprofen 600 mg every 6 hours as needed   - Continue  with RT    Opioid Use  Medication Management:   - OARRS report reviewed with no aberrant behavior; consistent with  prescriptions/records and patient history  - MED 45.  Overdose Risk Score 430.   This has been discussed with patient.   - We will continue to closely monitor the patient for signs of prescription misuse including UDS, OARRS review and subjective reports at each visit.  - No concurrent benzodiazepine use   - I am a provider who either is or has consulted and collaborated with a provider certified in Hospice and Palliative Medicine and have conducted a face-face visit and examination for this patient.  - Routine Urine Drug Screen complete 10/8/24 appropriately positive for opioids and negative for illicit substances  - Controlled Substance Agreement completed 10/8/24  - Specifically discussed that controlled substance prescriptions will only be provided by our group as outlined in the completed agreement  - Prescribed naloxone prescribed 9/27/24 - patient has at home  - Red Flags: none    Constipation   At risk for constipation related to opioids,  currently not constipated   Usual bowel pattern: every 1-2 days   Current regimen:   - Continue Senna to 2 tabs BID  - Start Miralax 17 g daily   - If no BM within 48-72 hours, start MOM 8% every 6 hours     Sleeping Difficulty:  Impaired sleep related to pain  Current regimen:    - Increase pain medication as described above    Decreased appetite  Related to malignancy  Nutrition consult - may consider at next visit  Current regimen:    - Encouraged smaller, more frequent meals  - encouraged ensure/boost 1-2 times per day    Advance Directives  Existence of Advance Directives:Yes, documentation or copy in medical record  Decision maker: PADMINI Mccartney  Code Status: Full code    Next Follow-Up Visit:  Return to clinic in 1 week     Signature and billing  Thank you for allowing us to participate in the care of this patient. Recommendations will be  communicated back to the consulting service by way of shared electronic medical record or face-to-face.    Medical complexity was high level due to due to complexity of problems, extensive data review, and high risk of management/treatment.  Time was spent on the following: Prep Time, Time Directly with Patient/Family/Caregiver, Documentation Time. Total time spent: 60      DATA   Diagnostic tests and information reviewed for today's visit:  Most recent labs, Most recent imaging, Medications       Some elements copied from Oncology note on 9/30/24, the elements have been updated and all reflect current decision making from today, 10/15/2024.      Plan of Care discussed with: Patient and Family/Significant Other: wife      SIGNATURE: ADELINA Jacobo    Contact information:  Supportive and Palliative Oncology  Monday-Friday 8 AM-5 PM  Phone:  [phone], press option #5, then option #1.   Or Epic Secure Chat

## 2024-10-16 ENCOUNTER — HOSPITAL ENCOUNTER (OUTPATIENT)
Dept: RESPIRATORY THERAPY | Facility: HOSPITAL | Age: 49
Discharge: HOME | End: 2024-10-16
Payer: COMMERCIAL

## 2024-10-16 ENCOUNTER — TELEPHONE (OUTPATIENT)
Dept: PALLIATIVE MEDICINE | Facility: HOSPITAL | Age: 49
End: 2024-10-16
Payer: COMMERCIAL

## 2024-10-16 ENCOUNTER — APPOINTMENT (OUTPATIENT)
Dept: RADIATION ONCOLOGY | Facility: CLINIC | Age: 49
End: 2024-10-16
Payer: COMMERCIAL

## 2024-10-16 DIAGNOSIS — C34.90 LUNG CANCER METASTATIC TO BRAIN (MULTI): ICD-10-CM

## 2024-10-16 DIAGNOSIS — J44.9 CHRONIC OBSTRUCTIVE PULMONARY DISEASE, UNSPECIFIED COPD TYPE (MULTI): ICD-10-CM

## 2024-10-16 DIAGNOSIS — C79.31 LUNG CANCER METASTATIC TO BRAIN (MULTI): ICD-10-CM

## 2024-10-16 LAB
MGC ASCENT PFT - FEV1 - POST: 2.1
MGC ASCENT PFT - FEV1 - POST: 2.1
MGC ASCENT PFT - FEV1 - PRE: 1.88
MGC ASCENT PFT - FEV1 - PRE: 1.88
MGC ASCENT PFT - FEV1 - PREDICTED: 3.08
MGC ASCENT PFT - FEV1 - PREDICTED: 3.08
MGC ASCENT PFT - FVC - POST: 3.95
MGC ASCENT PFT - FVC - POST: 3.95
MGC ASCENT PFT - FVC - PRE: 3.64
MGC ASCENT PFT - FVC - PRE: 3.64
MGC ASCENT PFT - FVC - PREDICTED: 3.79
MGC ASCENT PFT - FVC - PREDICTED: 3.79

## 2024-10-16 PROCEDURE — 94726 PLETHYSMOGRAPHY LUNG VOLUMES: CPT

## 2024-10-16 PROCEDURE — 95012 NITRIC OXIDE EXP GAS DETER: CPT

## 2024-10-16 PROCEDURE — 94618 PULMONARY STRESS TESTING: CPT

## 2024-10-16 NOTE — TELEPHONE ENCOUNTER
PA obtained per [facility]. Pharmacy and patient updated. Medication does need to be ordered and should be available on Friday per pharmacy tech.

## 2024-10-17 ENCOUNTER — HOSPITAL ENCOUNTER (OUTPATIENT)
Dept: RADIATION ONCOLOGY | Facility: CLINIC | Age: 49
Setting detail: RADIATION/ONCOLOGY SERIES
Discharge: HOME | End: 2024-10-17
Payer: COMMERCIAL

## 2024-10-17 DIAGNOSIS — Z51.0 ENCOUNTER FOR ANTINEOPLASTIC RADIATION THERAPY: ICD-10-CM

## 2024-10-17 DIAGNOSIS — C79.51 SECONDARY MALIGNANT NEOPLASM OF BONE (MULTI): ICD-10-CM

## 2024-10-17 LAB
RAD ONC MSQ ACTUAL FRACTIONS DELIVERED: 5
RAD ONC MSQ ACTUAL SESSION DELIVERED DOSE: 400 CGRAY
RAD ONC MSQ ACTUAL TOTAL DOSE: 2000 CGRAY
RAD ONC MSQ ELAPSED DAYS: 7
RAD ONC MSQ LAST DATE: NORMAL
RAD ONC MSQ PRESCRIBED FRACTIONAL DOSE: 400 CGRAY
RAD ONC MSQ PRESCRIBED NUMBER OF FRACTIONS: 5
RAD ONC MSQ PRESCRIBED TECHNIQUE: NORMAL
RAD ONC MSQ PRESCRIBED TOTAL DOSE: 2000 CGRAY
RAD ONC MSQ PRESCRIPTION PATTERN COMMENT: NORMAL
RAD ONC MSQ START DATE: NORMAL
RAD ONC MSQ TREATMENT COURSE NUMBER: 1
RAD ONC MSQ TREATMENT SITE: NORMAL

## 2024-10-17 PROCEDURE — 77412 RADIATION TX DELIVERY LVL 3: CPT | Performed by: INTERNAL MEDICINE

## 2024-10-17 PROCEDURE — 77387 GUIDANCE FOR RADJ TX DLVR: CPT | Performed by: INTERNAL MEDICINE

## 2024-10-18 ENCOUNTER — DOCUMENTATION (OUTPATIENT)
Dept: RADIATION ONCOLOGY | Facility: CLINIC | Age: 49
End: 2024-10-18

## 2024-10-18 ENCOUNTER — TELEPHONE (OUTPATIENT)
Dept: PALLIATIVE MEDICINE | Facility: CLINIC | Age: 49
End: 2024-10-18

## 2024-10-18 DIAGNOSIS — M89.9 LESION OF BONE OF CERVICAL SPINE: ICD-10-CM

## 2024-10-18 RX ORDER — OXYCODONE HYDROCHLORIDE 10 MG/1
10-15 TABLET ORAL EVERY 4 HOURS PRN
Qty: 126 TABLET | Refills: 0 | Status: SHIPPED | OUTPATIENT
Start: 2024-10-18 | End: 2024-10-21 | Stop reason: SDUPTHER

## 2024-10-18 ASSESSMENT — ENCOUNTER SYMPTOMS
MYALGIAS: 1
ARTHRALGIAS: 1
WHEEZING: 1
SHORTNESS OF BREATH: 1

## 2024-10-18 NOTE — TELEPHONE ENCOUNTER
Call reviewed with provider who states patient takes oxycodone 15mg every 4 hours so the 10/1 fill was not expected to last 30 days. Provider will send over Oxycodone 10mg tabs to tab 1-1.5 tabs every 4 hours as needed for 14 days.

## 2024-10-18 NOTE — TELEPHONE ENCOUNTER
Per OARRS, patient picked up oxycodone 5mg 30 day supply on 10/1 and should be able to  the MS Contin today at [facility]. I left him a VM advising to call back shortly as our office and phone lines are closing for the day.

## 2024-10-18 NOTE — PROGRESS NOTES
Patient: Jovon Mccartney    [id number]  : 1975 -- AGE 49 y.o.    Provider: ADELINA Whitehead     Location [facility]   Service Date: 10/23/2024              [facility]  New Visit Note      HISTORY OF PRESENT ILLNESS     The patient's referring provider is: No ref. provider found    HISTORY OF PRESENT ILLNESS   Jovon Mccartney is a 49 y.o. male who presents to a [facility] for a pulmonary evaluation with NSCLC with bone involvement.  He is a former smoker. I have independently interviewed and examined the patient in the office and reviewed available records.    Current History        Patient was recently admitted early this month with worsening headache and intermittent neck pain for 2 months. , MRI brain/C-spine showed ill-defined enhancing lesion within right parietal occipital bone with dural thickening and mass effect upon right occipital lobe without midline shift as well as ill-defined marrow replacing lesions within C2 and C3 vertebral bodies, constellation of findings suggestive of metastatic disease or multiple myeloma.  CT  showed amorphous soft tissue centered at the right hilum encompassing the RUL bronchus and inseparable from the right main pulmonary artery suspicious for bronchogenic carcinoma, with mildly enlarged mediastinal lymph nodes, multiple lytic lesions of R scapula and bilateral humeral head. S/p bronchoscopy on 2024 for Lung Mass with brain and bone metastasis which revealed Airway stenosis, of bronchus intermedius and RLL and RML segments. Pathology positive for adenocarcinoma with the malignant cells are positive for TTF-1 and napsin A, and negative for p40     Smoked 1 ppd x 39 years, stopped when he was admitted 2024     On today's visit, the patient reports has dyspnea on exertion, but none at rest. Symptoms started many years ago, but has mostly been stable. He is only troubled  by breathlessness except on strenuous exercise (mMRC 0).  He is active as he can with juvenile arthritis, since discharge he is feeling weaker. Has shoulder and bicep pain.    Denies orthopnea, pnd, or mat.  Weight has been mostly stable.  Relates  chronic dry cough, C/o wheezing, and no sputum. No night cough. No hemoptysis. No fever or shivering chills. Has throat clearing. Denies chest pain or heartburn.     Previous pulmonary history:  no history of recurrent infections, or lung disease as a child.  No previous lung hx, never on oxygen or inhaler therapy. He was hospitalized with asthmatic bronchitis as a child, then resolved     Inhalers/nebulized medications: combivent - has used twice     Hospitalization History: Not been hospitalized over the last year for breathing related problem.    Sleep history:  Complains of snoring, apnea, feeling tired during the day, and taking naps during the day.     Current History 10/23/2024    On today's visit, the patient reports Occ having shortness of breath with walking in the hospital after a couple minutes,  In the am has cough clears clear sputum. C/o wheezing. He is using Breztri once a day.   He was using PRN. No night time cough. Not using combivent .   Denies fever/chills, chest pain or GERD   No ER visits   Completed radiation therapy - starting chemotherapy soon.     ALLERGIES AND MEDICATIONS     ALLERGIES  No Known Allergies    MEDICATIONS  Current Outpatient Medications   Medication Sig Dispense Refill    acetaminophen (Tylenol) 500 mg tablet Take 1 tablet (500 mg) by mouth every 6 hours if needed for mild pain (1 - 3).      adalimumab (Humira Pen) 40 mg/0.8 mL pen injector kit pen-injector Inject 1 Pen (40 mg) under the skin every 14 (fourteen) days.      budesonide-glycopyr-formoterol (BREZTRI) 160-9-4.8 mcg/actuation HFA aerosol inhaler Inhale 2 puffs 2 times a day. 10.7 g 3    [START ON 10/27/2024] dexAMETHasone (Decadron) 4 mg tablet Take 4 mg (1 tablet) by  mouth twice daily the day before treatment, once the evening of treatment, and twice daily the day after treatment. Do not take before October 27, 2024. 5 tablet 5    [START ON 10/27/2024] dexAMETHasone (Decadron) 4 mg tablet Take 4 mg (1 tablet) by mouth twice daily the day before treatment, once the evening of treatment, and twice daily the day after treatment. Do not fill before October 27, 2024. 5 tablet 5    [START ON 10/27/2024] folic acid (Folvite) 1 mg tablet Take 1 tablet (1,000 mcg) by mouth once daily. Do not start before October 27, 2024. 30 tablet 11    [START ON 10/27/2024] folic acid (Folvite) 1 mg tablet Take 1 tablet (1,000 mcg) by mouth once daily. Do not fill before October 27, 2024. 30 tablet 11    gabapentin (Neurontin) 300 mg capsule Take 1 capsule (300 mg) by mouth 3 times a day. 30 capsule 0    ibuprofen 200 mg tablet Take 1 tablet (200 mg) by mouth every 6 hours if needed for mild pain (1 - 3).      ipratropium-albuteroL (Combivent Respimat)  mcg/actuation inhaler Inhale 2 puffs 4 times a day. 12 g 11    morphine CR (MS Contin) 30 mg 12 hr tablet Take 1 tablet (30 mg) by mouth 2 times a day. Do not crush, chew, or split. 60 tablet 0    naloxone (Narcan) 4 mg/0.1 mL nasal spray Administer 1 spray (4 mg) into affected nostril(s) if needed for opioid reversal. May repeat every 2-3 minutes if needed, alternating nostrils, until medical assistance becomes available. 2 each 3    [START ON 10/27/2024] OLANZapine (ZyPREXA) 5 mg tablet Take 1 tablet (5 mg) by mouth once daily at bedtime for 4 days starting the evening of treatment. Do not start before October 27, 2024. 4 tablet 3    [START ON 10/27/2024] OLANZapine (ZyPREXA) 5 mg tablet Take 1 tablet (5 mg) by mouth once daily at bedtime. For 4 days starting the evening of treatment Do not fill before October 27, 2024. 4 tablet 3    ondansetron (Zofran) 8 mg tablet Take 1 tablet (8 mg) by mouth every 8 hours if needed for nausea or vomiting. 90  tablet 3    [START ON 10/27/2024] ondansetron (Zofran) 8 mg tablet Take 1 tablet (8 mg) by mouth every 8 hours if needed for nausea or vomiting. Do not start before October 27, 2024. 30 tablet 5    [START ON 10/27/2024] ondansetron (Zofran) 8 mg tablet Take 1 tablet (8 mg) by mouth every 8 hours if needed for nausea or vomiting. Do not fill before October 27, 2024. 30 tablet 5    oxyCODONE (Roxicodone) 10 mg immediate release tablet Take 1-1.5 tablets (10-15 mg) by mouth every 4 hours if needed for moderate pain (4 - 6) or severe pain (7 - 10) for up to 14 days. MAX 9 TABS PER  tablet 0    pantoprazole (ProtoNix) 40 mg EC tablet Take 1 tablet (40 mg) by mouth once daily in the morning. Take before meals. Do not crush, chew, or split. 30 tablet 2    polyethylene glycol (Glycolax, Miralax) 17 gram/dose powder Take 34 g by mouth once daily. 3060 g 0    [START ON 10/27/2024] prochlorperazine (Compazine) 10 mg tablet Take 1 tablet (10 mg) by mouth every 6 hours if needed for nausea or vomiting. Do not start before October 27, 2024. 30 tablet 5    [START ON 10/27/2024] prochlorperazine (Compazine) 10 mg tablet Take 1 tablet (10 mg) by mouth every 6 hours if needed for nausea or vomiting. Do not fill before October 27, 2024. 30 tablet 5    sennosides-docusate sodium (Yoselin-Colace) 8.6-50 mg tablet Take 1 tablet by mouth as needed at bedtime for constipation. 30 tablet 3     No current facility-administered medications for this visit.         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     PAST SURGICAL HISTORY  Past Surgical History:   Procedure Laterality Date    BRONCHOSCOPY  9 16 2024    LUNG BIOPSY  9 16 2024       IMMUNIZATION  HISTORY    There is no immunization history on file for this patient.    SOCIAL HISTORY  He  reports that he quit smoking about 6 weeks ago. His smoking use included cigarettes. He has a 58.5 pack-year smoking history. He has been exposed to tobacco smoke. He has never used smokeless tobacco. He reports that he does not currently use alcohol. He reports that he does not currently use drugs. He Patient  Smoked 1 ppd x 39 years, stopped when he was admitted 9/9/2024     OCCUPATIONAL/ENVIRONMENTAL HISTORY  Currently works as: disabled   DOES/DOES NOT EC: does have possible known exposure to asbestos, but not to silica, beryllium or inhaled metals. Exposure to a board that had asbestos   DOES/DOES NOT EC: does have exposure to birds or exotic animals. Had  cockatoo x 14 years     FAMILY HISTORY  Family History   Problem Relation Name Age of Onset    Breast cancer Mother JORDY CHURCH     Cancer Mother JORDY CHURCH     Miscarriages / Stillbirths Mother JORDY CHURCH     Cancer Maternal Grandfather HUBER PAL     Stroke Maternal Grandfather HUBER PAL      DOES/DOES NOT EC: does not have a family history of pulmonary disease.  DOES/DOES NOT EC: does have a family history of cancer. Maternal grandmother - bone cancer, mother breast cancer, paternal cousin lung cancer   DOES/DOES NOT EC: does have a family history of autoimmune disorders. He has RA    RESULTS/DATA     Pulmonary Function Test Results         Complete Pulmonary Function Test Pre/Post Bronchodialator (Spirometry Pre/Post/DLCO/Lung Volumes) 10/16/2024  Status: Final result     Study Result    Narrative & Impression   The FEV1/FVC (0.51) is reduced. The FEV1 (1.88L/60%) is moderately reduced. The FVC is normal. Following administration of bronchodilators, there is no significant response. The TLC by body plethysmography is normal. The DLCO (47%)  is severely reduced; however, the diffusing capacity was not corrected for the patient's hemoglobin.  The airways resistance is normal. FeNO was 21 ppb.  Conclusion: Reduced FEV1/FVC with a normal FVC indicates a moderate airflow obstructive defect. Lung volumes are consistent with hyperinflation. DLCO values are consistent with pulmonary vascular impairment, emphysema with preserved lung volume or anemia.     Scans on Order [id number]      Pulmonary Function Test - Scan on 10/16/2024 10:58 PM                            Chest Radiograph     No testing done     Chest CT Scan     9/15/2024 CT chest    IMPRESSION:  1. Masslike enhancing soft tissue thickening within the right hilar  region which encases the right mainstem bronchus and abuts the right  main pulmonary artery, suspicious for neoplasm. This soft tissue  thickening additionally extends along the interlobular bronchials.  Further evaluation with tissue sampling or PET-CT is recommended.  2. Nodular thickening of the right pleura is suspicious for disease  with possible direct lymphangitic carcinomatosis as well in the right  infrahilar region.  3. Few prominent mediastinal lymph nodes are described above.  4. Permeative appearing lesion involving the right scapula is  suspicious for metastatic disease.  5. Abdominal findings include too small to characterize hypodense  lesions in the liver as well as adrenal thickening, potentially  adenomas. Further characterization by abdominal MRI is recommended  given the thoracic findings.  6. Moderate pericardial effusion.  7. Severe paraseptal emphysematous changes predominantly affecting  the lung apices.        Echocardiogram     No testing done          REVIEW OF SYSTEMS     REVIEW OF SYSTEMS  Review of Systems   Respiratory:  Positive for shortness of breath and wheezing.    Musculoskeletal:  Positive for arthralgias, gait problem and myalgias.         PHYSICAL EXAM     VITAL SIGNS: There were no vitals taken for this visit. /70   Pulse 77   Resp 18   Wt 56.8 kg (125 lb 3.2 oz)   SpO2 90%   BMI 20.83  kg/m²      CURRENT WEIGHT: [unfilled]  BMI: [unfilled]  PREVIOUS WEIGHTS:  Wt Readings from Last 3 Encounters:   10/21/24 56.2 kg (124 lb)   10/14/24 56 kg (123 lb 8 oz)   10/10/24 56.7 kg (125 lb)       Physical Exam  Constitutional:       Appearance: Normal appearance.   HENT:      Head: Normocephalic and atraumatic.      Right Ear: External ear normal.      Left Ear: External ear normal.      Nose: Nose normal.      Mouth/Throat:      Mouth: Mucous membranes are moist.      Pharynx: Oropharynx is clear.   Eyes:      Extraocular Movements: Extraocular movements intact.      Conjunctiva/sclera: Conjunctivae normal.      Pupils: Pupils are equal, round, and reactive to light.   Cardiovascular:      Rate and Rhythm: Normal rate and regular rhythm.      Pulses: Normal pulses.      Heart sounds: Normal heart sounds.   Pulmonary:      Effort: Pulmonary effort is normal.      Breath sounds: Normal breath sounds.   Abdominal:      General: Bowel sounds are normal.      Palpations: Abdomen is soft.   Musculoskeletal:         General: Normal range of motion.      Cervical back: Normal range of motion and neck supple.      Comments: Bilateral lower leg braces    Skin:     General: Skin is warm and dry.   Neurological:      General: No focal deficit present.      Mental Status: He is alert and oriented to person, place, and time. Mental status is at baseline.   Psychiatric:         Mood and Affect: Mood normal.         Behavior: Behavior normal.         Thought Content: Thought content normal.         Judgment: Judgment normal.         ASSESSMENT/PLAN     Mr. Mccartney is a 49 y.o. 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 He was referred to the [facility] for evaluation of NSCLC with bone involve[ment     Problem List and Orders      Assessment and Plan / Recommendations:  Problem List Items Addressed This Visit    None            COPD likely with complaints of wheezing   - Obtain pulmonary function test, FENO and 6 minute walk test - FeNO was 21 ppb. Conclusion: Reduced FEV1/FVC with a normal FVC indicates a moderate airflow obstructive defect. Lung volumes are consistent with hyperinflation. DLCO values are consistent with pulmonary vascular impairment, emphysema with preserved lung volume or anemia. FEV1 60% predicted, Z score 2.56   - start ICS/LABA/LAMA breztri 2 puffs twice a day, rinse after use, discussed using twice a day daily   - cont combivent   - albuterol hfa 2 puffs or albuterol nebs every 4-6 hours as needed        Hypoxia multifactorial with anemia and COPD: Presents to clinic today with O2 sat 92___% on room air. Placed on ___L O2. Oxywalk completed. _88___% after 2 min of walking ambulating on room air.  _2__L O2 applied and  was _94___%.     Will order __2__ L nasal cannular  Contacted DME to bring O2 tank for patient.   Patient needs home Oxygen concentrator  Needs portable O2 concentrator with conserving device  Diagnosis hypoxia  discussed keeping pulse ox >90%   Oncology - NSCLC - has followup with Oncology and Radiation Oncology     Thank you for visiting the Pulmonary clinic today!     Return to clinic after 4-6 weeks and after PFTs  or sooner if needed   Melina Chacko CNP  My office number is [phone] -     Call to schedule  for radiology - CT scans/PFTs etc at  [phone]  General scheduling  [phone]     Best way to get a hold of me is to call my office --> Please do not send me follow my health messages  Any test results will be discussed at next visit -- please make sure to make a follow up appt after testing.

## 2024-10-21 ENCOUNTER — TELEPHONE (OUTPATIENT)
Dept: PALLIATIVE MEDICINE | Facility: CLINIC | Age: 49
End: 2024-10-21

## 2024-10-21 ENCOUNTER — OFFICE VISIT (OUTPATIENT)
Dept: HEMATOLOGY/ONCOLOGY | Facility: CLINIC | Age: 49
End: 2024-10-21
Payer: COMMERCIAL

## 2024-10-21 ENCOUNTER — TELEPHONE (OUTPATIENT)
Dept: PALLIATIVE MEDICINE | Facility: HOSPITAL | Age: 49
End: 2024-10-21
Payer: COMMERCIAL

## 2024-10-21 ENCOUNTER — PHARMACY VISIT (OUTPATIENT)
Dept: PHARMACY | Facility: CLINIC | Age: 49
End: 2024-10-21
Payer: COMMERCIAL

## 2024-10-21 ENCOUNTER — DOCUMENTATION (OUTPATIENT)
Dept: PALLIATIVE MEDICINE | Facility: HOSPITAL | Age: 49
End: 2024-10-21

## 2024-10-21 VITALS
TEMPERATURE: 98.1 F | BODY MASS INDEX: 20.63 KG/M2 | DIASTOLIC BLOOD PRESSURE: 70 MMHG | RESPIRATION RATE: 18 BRPM | WEIGHT: 124 LBS | OXYGEN SATURATION: 94 % | HEART RATE: 90 BPM | SYSTOLIC BLOOD PRESSURE: 128 MMHG

## 2024-10-21 DIAGNOSIS — G89.3 CANCER RELATED PAIN: ICD-10-CM

## 2024-10-21 DIAGNOSIS — M89.9 LESION OF BONE OF CERVICAL SPINE: ICD-10-CM

## 2024-10-21 DIAGNOSIS — C34.90 NSCLC METASTATIC TO BONE (MULTI): Primary | ICD-10-CM

## 2024-10-21 DIAGNOSIS — C79.51 NSCLC METASTATIC TO BONE (MULTI): Primary | ICD-10-CM

## 2024-10-21 PROCEDURE — RXMED WILLOW AMBULATORY MEDICATION CHARGE

## 2024-10-21 PROCEDURE — 99215 OFFICE O/P EST HI 40 MIN: CPT | Performed by: STUDENT IN AN ORGANIZED HEALTH CARE EDUCATION/TRAINING PROGRAM

## 2024-10-21 PROCEDURE — G2211 COMPLEX E/M VISIT ADD ON: HCPCS | Performed by: STUDENT IN AN ORGANIZED HEALTH CARE EDUCATION/TRAINING PROGRAM

## 2024-10-21 RX ORDER — PROCHLORPERAZINE MALEATE 10 MG
10 TABLET ORAL EVERY 6 HOURS PRN
Qty: 30 TABLET | Refills: 5 | Status: SHIPPED | OUTPATIENT
Start: 2024-10-27

## 2024-10-21 RX ORDER — ONDANSETRON HYDROCHLORIDE 8 MG/1
8 TABLET, FILM COATED ORAL EVERY 8 HOURS PRN
Qty: 30 TABLET | Refills: 5 | Status: SHIPPED | OUTPATIENT
Start: 2024-10-27

## 2024-10-21 RX ORDER — FOLIC ACID 1 MG/1
1000 TABLET ORAL DAILY
Qty: 30 TABLET | Refills: 11 | Status: SHIPPED | OUTPATIENT
Start: 2024-10-27

## 2024-10-21 RX ORDER — HEPARIN SODIUM,PORCINE/PF 10 UNIT/ML
50 SYRINGE (ML) INTRAVENOUS AS NEEDED
OUTPATIENT
Start: 2024-10-21

## 2024-10-21 RX ORDER — OLANZAPINE 5 MG/1
5 TABLET ORAL NIGHTLY
Qty: 4 TABLET | Refills: 3 | Status: SHIPPED | OUTPATIENT
Start: 2024-10-27

## 2024-10-21 RX ORDER — DEXAMETHASONE 4 MG/1
TABLET ORAL
Qty: 5 TABLET | Refills: 5 | Status: SHIPPED | OUTPATIENT
Start: 2024-10-27

## 2024-10-21 RX ORDER — MORPHINE SULFATE 30 MG/1
30 TABLET, FILM COATED, EXTENDED RELEASE ORAL 2 TIMES DAILY
Qty: 60 TABLET | Refills: 0 | Status: SHIPPED | OUTPATIENT
Start: 2024-10-21 | End: 2024-11-20

## 2024-10-21 RX ORDER — OXYCODONE HYDROCHLORIDE 10 MG/1
10-15 TABLET ORAL EVERY 4 HOURS PRN
Qty: 126 TABLET | Refills: 0 | Status: SHIPPED | OUTPATIENT
Start: 2024-10-21 | End: 2024-11-04

## 2024-10-21 RX ORDER — HEPARIN 100 UNIT/ML
500 SYRINGE INTRAVENOUS AS NEEDED
OUTPATIENT
Start: 2024-10-21

## 2024-10-21 ASSESSMENT — PAIN SCALES - GENERAL: PAINLEVEL_OUTOF10: 0-NO PAIN

## 2024-10-21 NOTE — TELEPHONE ENCOUNTER
Spoke with Shruti at [facility]  pharmacy who inquired about PA. Reviewed that PA was sent to [facility] today and that I am awaiting response.

## 2024-10-21 NOTE — PROGRESS NOTES
Per [facility]  PA is needed for increased Oxycodone  dose. Request sent to [facility]. Patient updated.

## 2024-10-21 NOTE — PROGRESS NOTES
Radiation Oncology Treatment Summary    Patient Name:  Jovon Mccartney  MRN:  [id number]  :  1975    Referring Provider: No ref. provider found  Primary Care Provider: ADELINA Hernandez    Brief History: Jovon Mccartney is a 49 y.o. male with NSCLC metastatic to bone (Multi), Clinical: Stage IVB (cT4, cN3, cM1c).  The patient completed radiotherapy as outlined below.    Radiation Treatment Summary:    3D CRT: Right Scalp, Right Scapula, Not Applicable Cervical spine    Treatment Period Technique Fraction Dose Fractions Total Dose   Course 1 10/10/2024-10/17/2024  (days elapsed: 7)         C_Spine 10/10/2024-10/17/2024 3D 400 / 400 cGy 5 /  2000 / 2,000 cGy         R_Scalp 10/10/2024-10/17/2024 3D 400 / 400 cGy 5 /  2000 / 2,000 cGy         R_Shoulder 10/10/2024-10/17/2024 3D 400 / 400 cGy 5 /  2000 / 2,000 cGy       Please see the patient's [facility] chart for further details regarding the radiation plan, including beam energy.    Concurrent Chemotherapy:  Treatment Plans       No treatment plans exist          CTCAE Toxicity Overview:   Toxicity Assessment          10/10/2024    16:43   Toxicity Assessment   Adverse Events Reviewed (WDL) Yes (Within Defined Limits)   Treatment Site Bone   Anorexia Grade 1       decreased appeitite.   Dehydration Grade 0   Dermatitis Radiation Grade 0   Fatigue Grade 1       baseline fatigue   Pain Grade 1       right sided pain 6/10. Oxycodone 15mg.   Bone Pain Grade 1   Pain of Skin Grade 0   Pruritus Grade 0     Patient Disposition: The patient will follow-up in addition oncology clinic in 2 to 3 months with a repeat MRI of the C-spine and of the brain. Scheduled with Dr. Mcgrath on 25.

## 2024-10-21 NOTE — PROGRESS NOTES
[facility] - Medical Oncology Follow-Up Visit    Patient ID: Jovon Mccartney is a 49 y.o. male with NSCLC adenocarcinoma     Current therapy: [No matching plan found]     Chief Concern: next steps    Oncologic History:     DIAGNOSIS  NSCLC adenocarcinoma      STAGING  cT4 pN3 M1c      CURRENT SITES OF DISEASE  R hilar mass, 4L, 11L, scapula,  right  parieto-occipital bone, C2, C3, ?lymphatic carcinomatosis     MOLECULAR GENOMICS  KRAS p.G12C ([id number] c.34G>T)   PD-L1 <1%     PRIOR THERAPY        CURRENT THERAPY  Palliative XRT skull, C-spine, R shoulder 10/10/24 - 10/16/24        CURRENT ONCOLOGICAL PROBLEMS           HISTORY OF PRESENT ILLNESS  Mr. Mccartney is a 48 yo with PMH significant for RA who present to the [place] [facility] on 9/9/24 with 2 months of progressively worsening headaches and neck pain with occasional radiation to the R and L arms. A lytic lesion was noted on CT head 9/9/24 of the parietal bone, and CT cervical spine was concerning for lucent lesions C2 and C3. Brain MRI and MR cervical spine confirmed marrow-replacing lesions fo the C2 and C3 vertebral bodies. CT C/A/P w/o contrast on 9/10/24 was concerning for soft tissue mass of the R hilum, lytic lesions of the R scapula, and 3.5 cm mass in the L gluteus. He was transferred to [facility] on 9/10/24 CT C/A/P w/IV contrast on 9/15/24 confirmed the lung mass encasing the R mainstem bronchus abutting the R main pulmonary artery and extending to the lung periphery, as well as R perihilar lesion and lymphangitic carcinomatosis, prominent mediastinal nodes, L axillary node, nodular thickening of bilateral adrenal glands, likely lesion of the R scapula, small hepatic lesions, moderate pericardial effusion. EBUS on 9/17/24 with pathology of R hilar mass with adenocarcinoma, KRAS G12C, PD-L1 pending, and positive lymph nodes 11L and 4L. He met Dr. Mcgrath and discussed palliative radiation to the occipital lesion and cervical spine.  PET/CT on 10/1/24 with FDG avid confluent R hilar/mediastinal mass, mild FDG avidity along upper and middle lobe, bilateral scapular, humeral, R anterior acetabular osseous metastases. He started palliative radiation 10/10/24-10/16/24.      He was born with juvenile RA, usually his feet and knees feel good compared to everything. He used to walk with a crutch, since the hospital he has been walking without it. He follows with a rheumatologist at [facility]. It's fairly managed he says, has  been on humira for 20-25 years, right when it first came out. Kept the fluid off his knees and helped him better than anything else. He was on all kinds of drugs he said before including steroids, methotrexate, others. Typically with the humira he doesn't really get flares, sometimes in his wrists. He's been off for about a month, so thinks he'd have a flare in about a month.      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 Maybe had one exposure to asbestos. 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 Lives at home with his wife and a dog, 3 cats, a bird and fish. They have 2 sons and 3 grandchildren.  Tob: quit smoking 9/9/24, smoked 39 years, 1-1.5 ppd  EtOH: occasional  Illicits: previous smoking marijuana, only edibles now for pain     FAMILY HISTORY  Mother - breast cancer dx 54 yo  Maternal grandmother - bone cancer  Paternal cousin - bone and/or lung cancer  Paternal grandmother - unknown cancer  No other family members with autoimmune diseases    HPI   He is here today with his son. He is feeling about the same. He spoke with his rheumatologist who said it was ok for him to receive immunotherapy. He is coming off humira and will be on low dose steroids.       Meds (Current):    Current Outpatient Medications:     acetaminophen (Tylenol) 500 mg tablet, Take 1 tablet (500 mg) by mouth every 6 hours if needed for mild  pain (1 - 3)., Disp: , Rfl:     adalimumab (Humira Pen) 40 mg/0.8 mL pen injector kit pen-injector, Inject 1 Pen (40 mg) under the skin every 14 (fourteen) days., Disp: , Rfl:     budesonide-glycopyr-formoterol (BREZTRI) 160-9-4.8 mcg/actuation HFA aerosol inhaler, Inhale 2 puffs 2 times a day., Disp: 10.7 g, Rfl: 3    gabapentin (Neurontin) 300 mg capsule, Take 1 capsule (300 mg) by mouth 3 times a day., Disp: 30 capsule, Rfl: 0    ibuprofen 200 mg tablet, Take 1 tablet (200 mg) by mouth every 6 hours if needed for mild pain (1 - 3)., Disp: , Rfl:     ipratropium-albuteroL (Combivent Respimat)  mcg/actuation inhaler, Inhale 2 puffs 4 times a day., Disp: 12 g, Rfl: 11    morphine CR (MS Contin) 30 mg 12 hr tablet, Take 1 tablet (30 mg) by mouth 2 times a day. Do not crush, chew, or split., Disp: 60 tablet, Rfl: 0    naloxone (Narcan) 4 mg/0.1 mL nasal spray, Administer 1 spray (4 mg) into affected nostril(s) if needed for opioid reversal. May repeat every 2-3 minutes if needed, alternating nostrils, until medical assistance becomes available., Disp: 2 each, Rfl: 3    ondansetron (Zofran) 8 mg tablet, Take 1 tablet (8 mg) by mouth every 8 hours if needed for nausea or vomiting., Disp: 90 tablet, Rfl: 3    oxyCODONE (Roxicodone) 10 mg immediate release tablet, Take 1-1.5 tablets (10-15 mg) by mouth every 4 hours if needed for moderate pain (4 - 6) or severe pain (7 - 10) for up to 14 days. MAX 9 TABS PER DAY, Disp: 126 tablet, Rfl: 0    pantoprazole (ProtoNix) 40 mg EC tablet, Take 1 tablet (40 mg) by mouth once daily in the morning. Take before meals. Do not crush, chew, or split., Disp: 30 tablet, Rfl: 2    polyethylene glycol (Glycolax, Miralax) 17 gram/dose powder, Take 34 g by mouth once daily., Disp: 3060 g, Rfl: 0    sennosides-docusate sodium (Yoselin-Colace) 8.6-50 mg tablet, Take 1 tablet by mouth as needed at bedtime for constipation., Disp: 30 tablet, Rfl: 3    Review of Systems   All other systems  reviewed and are negative.       Objective   BSA: 1.61 meters squared  Wt Readings from Last 5 Encounters:   10/21/24 56.2 kg (124 lb)   10/14/24 56 kg (123 lb 8 oz)   10/10/24 56.7 kg (125 lb)   10/08/24 56.2 kg (123 lb 14.4 oz)   10/03/24 56.6 kg (124 lb 12.5 oz)     /70 (BP Location: Left arm, Patient Position: Sitting)   Pulse 90   Temp 36.7 °C (98.1 °F) (Temporal)   Resp 18   Wt 56.2 kg (124 lb)   SpO2 94%   BMI 20.63 kg/m²     ECOG Score: 1- Restricted in physically strenuous activity.  Carries out light duty.      Physical Exam  Vitals reviewed.   Constitutional:       General: He is not in acute distress.  HENT:      Head: Normocephalic and atraumatic.      Mouth/Throat:      Mouth: Mucous membranes are moist.   Eyes:      Extraocular Movements: Extraocular movements intact.      Pupils: Pupils are equal, round, and reactive to light.   Cardiovascular:      Rate and Rhythm: Normal rate and regular rhythm.      Heart sounds: No murmur heard.  Pulmonary:      Effort: Pulmonary effort is normal. No respiratory distress.      Breath sounds: Normal breath sounds.   Abdominal:      General: There is no distension.      Palpations: Abdomen is soft.   Skin:     General: Skin is warm and dry.   Neurological:      General: No focal deficit present.      Mental Status: He is oriented to person, place, and time. Mental status is at baseline.   Psychiatric:         Mood and Affect: Mood normal.         Behavior: Behavior normal.         Thought Content: Thought content normal.          Results:  Labs:  Lab Results   Component Value Date    WBC 11.3 09/19/2024    HGB 9.6 (L) 09/19/2024    HCT 28.2 (L) 09/19/2024    MCV 93 09/19/2024     09/19/2024      Lab Results   Component Value Date    NEUTROABS 7.02 09/19/2024      Lab Results   Component Value Date    GLUCOSE 95 09/30/2024    CALCIUM 9.5 09/30/2024     09/30/2024    K 4.6 09/30/2024    CO2 23 09/30/2024     09/30/2024    BUN 20  "09/30/2024    CREATININE 0.57 09/30/2024    MG 2.07 09/18/2024     Lab Results   Component Value Date    ALT 17 09/30/2024    AST 16 09/30/2024    ALKPHOS 115 09/30/2024    BILITOT 0.6 09/30/2024    BILIDIR 0.1 09/14/2024      No results found for: \"ACTH\", \"CORTISOL\", \"TSH\", \"FREET4\"    Imaging:  I have personally reviewed the below imaging and concur with the reported findings unless otherwise stated:      === Results for orders placed during the hospital encounter of 09/09/24 ===    MR cervical spine w and wo IV contrast [[id number]] 09/09/2024    Status: Normal  MRI brain:    1. There is an ill-defined enhancing lesion centered within the right  parieto-occipital bone, concerning for multiple myeloma or metastatic  disease. There is abutment and anterior displacement of the  underlying dura with mass effect upon the right occipital lobe. There  is no midline shift. There is thickening and enhancement of the  underlying and surrounding dura, pachymeningeal involvement can not  be entirely excluded. Otherwise, no evidence of abnormal  intraparenchymal metastatic disease to suggest neoplastic involvement.  2. Nonspecific moderate supratentorial and infratentorial white  matter signal abnormalities. Differential considerations include  demyelination or chronic microvascular ischemic disease amongst  others. Note, however this pattern is not specific for demyelination.    MRI cervical spine:    1. Ill-defined marrow replacing lesions within the C2 and C3  vertebral bodies corresponding to the lytic foci seen on the same-day  CT cervical spine. The constellation of findings are suggestive of  metastatic disease or multiple myeloma. There is no evidence of an  extraosseous soft tissue component.  2. Multilevel degenerative changes of the cervical spine.    I personally reviewed the images/study and I agree with the findings  as stated by Resident Niki Osorio. This study was interpreted at  [facility] " [place], [place].    MACRO:  None    Signed by: Melinda Bernstein 9/9/2024 4:05 PM  Dictation workstation:   [id number]      Assessment/Plan      Jovon Mccartney is a 49 y.o. male here for follow up of NSCLC adenocarcinoma.     # NSCLC adenocarcinoma  - [id number]  - KRAS G12C, PD-L1 <1%  - discussed that SoC therapies include chemotherapy+ immunotherapy. Given longstanding juvenile RA, would like input from his rheumatologist regarding safety of immunotherapy in this setting. Discussed if no immunotherapy, would recommend chemotherapy alone vs KRAS G12C targeted therapy frontline, which typically is given in the second-line setting.  - provided information on carboplatin/pemetrexed/pembrolizumab vs adagrasib   - patient discussed with his rheumatologist regarding concern for flaring RA on immunotherapy. His rheumatologist will monitor closely, stop humira, and maintain him on low-dose steroids  - consented for carboplatin/pemetrexed/pembrolizumab to start 10/28/24  - will plan on utilizing adagrasib in the 2nd line setting if needed  - will plan on restaging scans after C4 (not yet ordered)     # Neoplasm related pain  - already saw Dr. Mcgrath, receiving palliative radiation  - seeing supportive onc     # Advanced care planning  - discussed incurable but treatable nature of metastatic disease, with goal of treatment to improve or maintain quality of life and prolong life.      Liset Vargas MD  [facility]

## 2024-10-21 NOTE — TELEPHONE ENCOUNTER
"Spoke with Mr. Mccartney as Pharmacist at [facility] stated that Mr. Mccartney is out of Oxycodone. Reveiwed that he may opt to pay cash for a short supply of medication.  Mr. Sherrill cole questioned why he has been unable to  the MS CONTIN. Call placed to [facility] again- this medication is on back order.  Pharmacist states that he is trying to order from   a different . Call placed to [facility] in [place]- \"no stores within 50 miles\" have this medication. Call placed to [facility] on [address] in [place] who also does not have the medication. Call placed to [facility] who does have the medication in stock. Spoke with Aby at [facility] - patient's insurance and verified that [facility] is in network and is preferred. Script sent to [facility]. Oxycodone script also sent to [facility]. Mr. Mccartney updated.     "

## 2024-10-22 ENCOUNTER — TELEPHONE (OUTPATIENT)
Dept: PALLIATIVE MEDICINE | Facility: HOSPITAL | Age: 49
End: 2024-10-22
Payer: COMMERCIAL

## 2024-10-22 ENCOUNTER — APPOINTMENT (OUTPATIENT)
Dept: PALLIATIVE MEDICINE | Facility: CLINIC | Age: 49
End: 2024-10-22
Payer: COMMERCIAL

## 2024-10-22 NOTE — TELEPHONE ENCOUNTER
Call placed per Provider Shelley's request to cancel today's phone appointment as Mr. Mccartney has not yet started the MS CONTIN due to supply issues. He was able to  the medications at [facility] yesterday. He is aware that Provider Shelley will see him in infusion on 10/28/24.

## 2024-10-23 ENCOUNTER — APPOINTMENT (OUTPATIENT)
Dept: PULMONOLOGY | Facility: CLINIC | Age: 49
End: 2024-10-23
Payer: COMMERCIAL

## 2024-10-23 VITALS
OXYGEN SATURATION: 90 % | RESPIRATION RATE: 18 BRPM | WEIGHT: 125.2 LBS | DIASTOLIC BLOOD PRESSURE: 70 MMHG | BODY MASS INDEX: 20.83 KG/M2 | HEART RATE: 77 BPM | SYSTOLIC BLOOD PRESSURE: 108 MMHG

## 2024-10-23 DIAGNOSIS — C34.90 NSCLC METASTATIC TO BRAIN (MULTI): ICD-10-CM

## 2024-10-23 DIAGNOSIS — J44.9 CHRONIC OBSTRUCTIVE PULMONARY DISEASE, UNSPECIFIED COPD TYPE (MULTI): ICD-10-CM

## 2024-10-23 DIAGNOSIS — C79.31 LUNG CANCER METASTATIC TO BRAIN (MULTI): Primary | ICD-10-CM

## 2024-10-23 DIAGNOSIS — R09.02 HYPOXIA: ICD-10-CM

## 2024-10-23 DIAGNOSIS — C34.90 LUNG CANCER METASTATIC TO BRAIN (MULTI): Primary | ICD-10-CM

## 2024-10-23 DIAGNOSIS — C79.31 NSCLC METASTATIC TO BRAIN (MULTI): ICD-10-CM

## 2024-10-23 PROCEDURE — 99215 OFFICE O/P EST HI 40 MIN: CPT | Performed by: NURSE PRACTITIONER

## 2024-10-23 RX ORDER — PALONOSETRON 0.05 MG/ML
0.25 INJECTION, SOLUTION INTRAVENOUS ONCE
OUTPATIENT
Start: 2024-10-28

## 2024-10-23 RX ORDER — CYANOCOBALAMIN 1000 UG/ML
1000 INJECTION, SOLUTION INTRAMUSCULAR; SUBCUTANEOUS ONCE
OUTPATIENT
Start: 2024-10-28

## 2024-10-23 RX ORDER — ALBUTEROL SULFATE 0.83 MG/ML
3 SOLUTION RESPIRATORY (INHALATION) AS NEEDED
OUTPATIENT
Start: 2024-10-28

## 2024-10-23 RX ORDER — EPINEPHRINE 0.3 MG/.3ML
0.3 INJECTION SUBCUTANEOUS EVERY 5 MIN PRN
OUTPATIENT
Start: 2024-10-28

## 2024-10-23 RX ORDER — PROCHLORPERAZINE MALEATE 5 MG
10 TABLET ORAL EVERY 6 HOURS PRN
OUTPATIENT
Start: 2024-10-28

## 2024-10-23 RX ORDER — DEXAMETHASONE 6 MG/1
12 TABLET ORAL ONCE
OUTPATIENT
Start: 2024-10-28

## 2024-10-23 RX ORDER — FAMOTIDINE 10 MG/ML
20 INJECTION INTRAVENOUS ONCE AS NEEDED
OUTPATIENT
Start: 2024-10-28

## 2024-10-23 RX ORDER — PROCHLORPERAZINE EDISYLATE 5 MG/ML
10 INJECTION INTRAMUSCULAR; INTRAVENOUS EVERY 6 HOURS PRN
OUTPATIENT
Start: 2024-10-28

## 2024-10-23 RX ORDER — DIPHENHYDRAMINE HYDROCHLORIDE 50 MG/ML
50 INJECTION INTRAMUSCULAR; INTRAVENOUS AS NEEDED
OUTPATIENT
Start: 2024-10-28

## 2024-10-23 ASSESSMENT — ENCOUNTER SYMPTOMS
LOSS OF SENSATION IN FEET: 0
DEPRESSION: 0
OCCASIONAL FEELINGS OF UNSTEADINESS: 1

## 2024-10-23 NOTE — PATIENT INSTRUCTIONS
COPD likely with complaints of wheezing   - Obtain pulmonary function test, FENO and 6 minute walk test - FeNO was 21 ppb. Conclusion: Reduced FEV1/FVC with a normal FVC indicates a moderate airflow obstructive defect. Lung volumes are consistent with hyperinflation. DLCO values are consistent with pulmonary vascular impairment, emphysema with preserved lung volume or anemia. FEV1 60% predicted, Z score 2.56   - start ICS/LABA/LAMA breztri 2 puffs twice a day, rinse after use, discussed using twice a day daily   - cont combivent   - albuterol hfa 2 puffs or albuterol nebs every 4-6 hours as needed        Hypoxia multifactorial with anemia and COPD: Presents to clinic today with O2 sat 92___% on room air. Placed on ___L O2. Oxywalk completed. _88___% after 2 min of walking ambulating on room air.  _2__L O2 applied and  was _94___%.     Will order __2__ L nasal cannula  Contacted DME to bring O2 tank for patient.   Patient needs home Oxygen concentrator  Needs portable O2 concentrator with conserving device  Diagnosis hypoxia    Oncology - NSCLC - has followup with Oncology and Radiation Oncology     Thank you for visiting the Pulmonary clinic today!     Return to clinic after 4-6 weeks and after PFTs  or sooner if needed   Melina Chacko CNP  My office number is [phone] -     Call to schedule  for radiology - CT scans/PFTs etc at  [phone]  General scheduling  [phone]     Best way to get a hold of me is to call my office --> Please do not send me follow my health messages  Any test results will be discussed at next visit -- please make sure to make a follow up appt after testing.

## 2024-10-25 ENCOUNTER — TELEPHONE (OUTPATIENT)
Dept: PALLIATIVE MEDICINE | Facility: CLINIC | Age: 49
End: 2024-10-25
Payer: COMMERCIAL

## 2024-10-25 ENCOUNTER — PHARMACY VISIT (OUTPATIENT)
Dept: PHARMACY | Facility: CLINIC | Age: 49
End: 2024-10-25

## 2024-10-25 DIAGNOSIS — M89.9 LESION OF BONE OF CERVICAL SPINE: ICD-10-CM

## 2024-10-25 RX ORDER — GABAPENTIN 300 MG/1
300 CAPSULE ORAL 3 TIMES DAILY
Qty: 90 CAPSULE | Refills: 3 | Status: SHIPPED | OUTPATIENT
Start: 2024-10-25 | End: 2024-11-24

## 2024-10-25 NOTE — TELEPHONE ENCOUNTER
Patient needs to discuss when to start medications for his upcoming chemo treatment. Patient also needs a refill for the following medication;gabapentin.

## 2024-10-25 NOTE — TELEPHONE ENCOUNTER
Refill pended to provider for gabapentin 300 mg TID 90/30.  OARRS reviewed.  Patient to follow up with Tati Rosa NP on 10/28.  Message sent to oncology teams to contact patient.

## 2024-10-28 ENCOUNTER — NUTRITION (OUTPATIENT)
Dept: HEMATOLOGY/ONCOLOGY | Facility: CLINIC | Age: 49
End: 2024-10-28

## 2024-10-28 ENCOUNTER — SOCIAL WORK (OUTPATIENT)
Dept: CASE MANAGEMENT | Facility: HOSPITAL | Age: 49
End: 2024-10-28

## 2024-10-28 ENCOUNTER — OFFICE VISIT (OUTPATIENT)
Dept: PALLIATIVE MEDICINE | Facility: CLINIC | Age: 49
End: 2024-10-28
Payer: COMMERCIAL

## 2024-10-28 ENCOUNTER — INFUSION (OUTPATIENT)
Dept: HEMATOLOGY/ONCOLOGY | Facility: CLINIC | Age: 49
End: 2024-10-28
Payer: COMMERCIAL

## 2024-10-28 VITALS
RESPIRATION RATE: 18 BRPM | HEART RATE: 72 BPM | BODY MASS INDEX: 19.45 KG/M2 | TEMPERATURE: 97.9 F | OXYGEN SATURATION: 93 % | HEIGHT: 65 IN | DIASTOLIC BLOOD PRESSURE: 77 MMHG | SYSTOLIC BLOOD PRESSURE: 136 MMHG | WEIGHT: 116.73 LBS

## 2024-10-28 VITALS — HEIGHT: 65 IN | BODY MASS INDEX: 19.45 KG/M2 | WEIGHT: 116.73 LBS

## 2024-10-28 DIAGNOSIS — C34.90 NSCLC METASTATIC TO BONE (MULTI): ICD-10-CM

## 2024-10-28 DIAGNOSIS — Z51.5 PALLIATIVE CARE ENCOUNTER: Primary | ICD-10-CM

## 2024-10-28 DIAGNOSIS — C79.51 NSCLC METASTATIC TO BONE (MULTI): ICD-10-CM

## 2024-10-28 DIAGNOSIS — C34.90 NSCLC METASTATIC TO BONE (MULTI): Primary | ICD-10-CM

## 2024-10-28 DIAGNOSIS — K59.00 CONSTIPATION, UNSPECIFIED CONSTIPATION TYPE: ICD-10-CM

## 2024-10-28 DIAGNOSIS — G89.3 CANCER RELATED PAIN: ICD-10-CM

## 2024-10-28 DIAGNOSIS — R63.0 DECREASED APPETITE: ICD-10-CM

## 2024-10-28 DIAGNOSIS — C79.51 NSCLC METASTATIC TO BONE (MULTI): Primary | ICD-10-CM

## 2024-10-28 LAB
ALBUMIN SERPL BCP-MCNC: 3 G/DL (ref 3.4–5)
ALP SERPL-CCNC: 87 U/L (ref 33–120)
ALT SERPL W P-5'-P-CCNC: 7 U/L (ref 10–52)
ANION GAP SERPL CALC-SCNC: 10 MMOL/L (ref 10–20)
AST SERPL W P-5'-P-CCNC: 9 U/L (ref 9–39)
BASOPHILS # BLD AUTO: 0.02 X10*3/UL (ref 0–0.1)
BASOPHILS NFR BLD AUTO: 0.3 %
BILIRUB SERPL-MCNC: 0.7 MG/DL (ref 0–1.2)
BUN SERPL-MCNC: 13 MG/DL (ref 6–23)
CALCIUM SERPL-MCNC: 7.6 MG/DL (ref 8.6–10.3)
CHLORIDE SERPL-SCNC: 103 MMOL/L (ref 98–107)
CO2 SERPL-SCNC: 23 MMOL/L (ref 21–32)
CREAT SERPL-MCNC: 0.39 MG/DL (ref 0.5–1.3)
EGFRCR SERPLBLD CKD-EPI 2021: >90 ML/MIN/1.73M*2
EOSINOPHIL # BLD AUTO: 0.01 X10*3/UL (ref 0–0.7)
EOSINOPHIL NFR BLD AUTO: 0.2 %
ERYTHROCYTE [DISTWIDTH] IN BLOOD BY AUTOMATED COUNT: 15.3 % (ref 11.5–14.5)
GLUCOSE SERPL-MCNC: 106 MG/DL (ref 74–99)
HCT VFR BLD AUTO: 30.9 % (ref 41–52)
HGB BLD-MCNC: 10.3 G/DL (ref 13.5–17.5)
IMM GRANULOCYTES # BLD AUTO: 0.01 X10*3/UL (ref 0–0.7)
IMM GRANULOCYTES NFR BLD AUTO: 0.2 % (ref 0–0.9)
LYMPHOCYTES # BLD AUTO: 1.8 X10*3/UL (ref 1.2–4.8)
LYMPHOCYTES NFR BLD AUTO: 27.4 %
MCH RBC QN AUTO: 31.1 PG (ref 26–34)
MCHC RBC AUTO-ENTMCNC: 33.3 G/DL (ref 32–36)
MCV RBC AUTO: 93 FL (ref 80–100)
MONOCYTES # BLD AUTO: 0.7 X10*3/UL (ref 0.1–1)
MONOCYTES NFR BLD AUTO: 10.7 %
NEUTROPHILS # BLD AUTO: 4.03 X10*3/UL (ref 1.2–7.7)
NEUTROPHILS NFR BLD AUTO: 61.2 %
NRBC BLD-RTO: ABNORMAL /100{WBCS}
PLATELET # BLD AUTO: 306 X10*3/UL (ref 150–450)
POTASSIUM SERPL-SCNC: 3.1 MMOL/L (ref 3.5–5.3)
PROT SERPL-MCNC: 5.9 G/DL (ref 6.4–8.2)
RBC # BLD AUTO: 3.31 X10*6/UL (ref 4.5–5.9)
SODIUM SERPL-SCNC: 133 MMOL/L (ref 136–145)
WBC # BLD AUTO: 6.6 X10*3/UL (ref 4.4–11.3)

## 2024-10-28 PROCEDURE — 96417 CHEMO IV INFUS EACH ADDL SEQ: CPT

## 2024-10-28 PROCEDURE — 96367 TX/PROPH/DG ADDL SEQ IV INF: CPT

## 2024-10-28 PROCEDURE — 86704 HEP B CORE ANTIBODY TOTAL: CPT

## 2024-10-28 PROCEDURE — 96413 CHEMO IV INFUSION 1 HR: CPT

## 2024-10-28 PROCEDURE — 82024 ASSAY OF ACTH: CPT

## 2024-10-28 PROCEDURE — 96411 CHEMO IV PUSH ADDL DRUG: CPT

## 2024-10-28 PROCEDURE — 84443 ASSAY THYROID STIM HORMONE: CPT

## 2024-10-28 PROCEDURE — 2500000004 HC RX 250 GENERAL PHARMACY W/ HCPCS (ALT 636 FOR OP/ED): Performed by: STUDENT IN AN ORGANIZED HEALTH CARE EDUCATION/TRAINING PROGRAM

## 2024-10-28 PROCEDURE — 96375 TX/PRO/DX INJ NEW DRUG ADDON: CPT | Mod: INF

## 2024-10-28 PROCEDURE — 82533 TOTAL CORTISOL: CPT

## 2024-10-28 PROCEDURE — 84520 ASSAY OF UREA NITROGEN: CPT

## 2024-10-28 PROCEDURE — 87340 HEPATITIS B SURFACE AG IA: CPT

## 2024-10-28 PROCEDURE — 99214 OFFICE O/P EST MOD 30 MIN: CPT

## 2024-10-28 PROCEDURE — 86706 HEP B SURFACE ANTIBODY: CPT

## 2024-10-28 PROCEDURE — 85025 COMPLETE CBC W/AUTO DIFF WBC: CPT

## 2024-10-28 PROCEDURE — 96372 THER/PROPH/DIAG INJ SC/IM: CPT | Mod: 59

## 2024-10-28 PROCEDURE — 2500000002 HC RX 250 W HCPCS SELF ADMINISTERED DRUGS (ALT 637 FOR MEDICARE OP, ALT 636 FOR OP/ED): Mod: MUE | Performed by: STUDENT IN AN ORGANIZED HEALTH CARE EDUCATION/TRAINING PROGRAM

## 2024-10-28 PROCEDURE — 99214 OFFICE O/P EST MOD 30 MIN: CPT | Mod: 25

## 2024-10-28 RX ORDER — FAMOTIDINE 10 MG/ML
20 INJECTION INTRAVENOUS ONCE AS NEEDED
Status: DISCONTINUED | OUTPATIENT
Start: 2024-10-28 | End: 2024-10-28 | Stop reason: HOSPADM

## 2024-10-28 RX ORDER — EPINEPHRINE 0.3 MG/.3ML
0.3 INJECTION SUBCUTANEOUS EVERY 5 MIN PRN
Status: DISCONTINUED | OUTPATIENT
Start: 2024-10-28 | End: 2024-10-28 | Stop reason: HOSPADM

## 2024-10-28 RX ORDER — DIPHENHYDRAMINE HYDROCHLORIDE 50 MG/ML
50 INJECTION INTRAMUSCULAR; INTRAVENOUS AS NEEDED
Status: DISCONTINUED | OUTPATIENT
Start: 2024-10-28 | End: 2024-10-28 | Stop reason: HOSPADM

## 2024-10-28 RX ORDER — PALONOSETRON 0.05 MG/ML
0.25 INJECTION, SOLUTION INTRAVENOUS ONCE
Status: COMPLETED | OUTPATIENT
Start: 2024-10-28 | End: 2024-10-28

## 2024-10-28 RX ORDER — PROCHLORPERAZINE MALEATE 10 MG
10 TABLET ORAL EVERY 6 HOURS PRN
Status: DISCONTINUED | OUTPATIENT
Start: 2024-10-28 | End: 2024-10-28 | Stop reason: HOSPADM

## 2024-10-28 RX ORDER — ALBUTEROL SULFATE 0.83 MG/ML
3 SOLUTION RESPIRATORY (INHALATION) AS NEEDED
Status: DISCONTINUED | OUTPATIENT
Start: 2024-10-28 | End: 2024-10-28 | Stop reason: HOSPADM

## 2024-10-28 RX ORDER — POTASSIUM CHLORIDE 20 MEQ/1
40 TABLET, EXTENDED RELEASE ORAL ONCE
Status: CANCELLED | OUTPATIENT
Start: 2024-10-28 | End: 2024-10-28

## 2024-10-28 RX ORDER — CYANOCOBALAMIN 1000 UG/ML
1000 INJECTION, SOLUTION INTRAMUSCULAR; SUBCUTANEOUS ONCE
Status: COMPLETED | OUTPATIENT
Start: 2024-10-28 | End: 2024-10-28

## 2024-10-28 RX ORDER — PROCHLORPERAZINE EDISYLATE 5 MG/ML
10 INJECTION INTRAMUSCULAR; INTRAVENOUS EVERY 6 HOURS PRN
Status: DISCONTINUED | OUTPATIENT
Start: 2024-10-28 | End: 2024-10-28 | Stop reason: HOSPADM

## 2024-10-28 RX ORDER — POTASSIUM CHLORIDE 20 MEQ/1
40 TABLET, EXTENDED RELEASE ORAL ONCE
Status: COMPLETED | OUTPATIENT
Start: 2024-10-28 | End: 2024-10-28

## 2024-10-28 RX ORDER — DEXAMETHASONE 6 MG/1
12 TABLET ORAL ONCE
Status: COMPLETED | OUTPATIENT
Start: 2024-10-28 | End: 2024-10-28

## 2024-10-28 ASSESSMENT — PAIN SCALES - GENERAL: PAINLEVEL_OUTOF10: 4

## 2024-10-28 NOTE — TELEPHONE ENCOUNTER
Called patient to review medications as requested. No answer,  was left with callback number/instructions. Will touch-base with patient in infusion today if no callback.

## 2024-10-29 LAB
CORTIS AM PEAK SERPL-MSCNC: 3.7 UG/DL (ref 5–20)
HBV CORE AB SER QL: NONREACTIVE
HBV SURFACE AB SER-ACNC: <3.1 MIU/ML
HBV SURFACE AG SERPL QL IA: NONREACTIVE
TSH SERPL-ACNC: 1.54 MIU/L (ref 0.44–3.98)

## 2024-10-30 LAB — ACTH PLAS-MCNC: 8.5 PG/ML (ref 7.2–63.3)

## 2024-11-04 ENCOUNTER — LAB (OUTPATIENT)
Dept: LAB | Facility: LAB | Age: 49
End: 2024-11-04
Payer: COMMERCIAL

## 2024-11-04 ENCOUNTER — OFFICE VISIT (OUTPATIENT)
Dept: HEMATOLOGY/ONCOLOGY | Facility: CLINIC | Age: 49
End: 2024-11-04
Payer: COMMERCIAL

## 2024-11-04 ENCOUNTER — OFFICE VISIT (OUTPATIENT)
Dept: URGENT CARE | Age: 49
End: 2024-11-04
Payer: COMMERCIAL

## 2024-11-04 ENCOUNTER — LAB (OUTPATIENT)
Dept: LAB | Facility: CLINIC | Age: 49
End: 2024-11-04
Payer: COMMERCIAL

## 2024-11-04 ENCOUNTER — APPOINTMENT (OUTPATIENT)
Dept: LAB | Facility: LAB | Age: 49
End: 2024-11-04
Payer: COMMERCIAL

## 2024-11-04 VITALS
HEART RATE: 97 BPM | BODY MASS INDEX: 18.32 KG/M2 | WEIGHT: 114 LBS | TEMPERATURE: 99.1 F | DIASTOLIC BLOOD PRESSURE: 66 MMHG | HEIGHT: 66 IN | RESPIRATION RATE: 16 BRPM | OXYGEN SATURATION: 92 % | SYSTOLIC BLOOD PRESSURE: 125 MMHG

## 2024-11-04 VITALS
DIASTOLIC BLOOD PRESSURE: 70 MMHG | RESPIRATION RATE: 18 BRPM | HEART RATE: 99 BPM | OXYGEN SATURATION: 94 % | BODY MASS INDEX: 18.82 KG/M2 | WEIGHT: 114.2 LBS | SYSTOLIC BLOOD PRESSURE: 118 MMHG | TEMPERATURE: 100.2 F

## 2024-11-04 DIAGNOSIS — E87.6 HYPOKALEMIA: Primary | ICD-10-CM

## 2024-11-04 DIAGNOSIS — C79.51 NSCLC METASTATIC TO BONE (MULTI): ICD-10-CM

## 2024-11-04 DIAGNOSIS — E83.51 HYPOCALCEMIA: ICD-10-CM

## 2024-11-04 DIAGNOSIS — C34.90 NSCLC METASTATIC TO BONE (MULTI): ICD-10-CM

## 2024-11-04 DIAGNOSIS — M89.9 LESION OF BONE OF CERVICAL SPINE: ICD-10-CM

## 2024-11-04 DIAGNOSIS — D61.818 PANCYTOPENIA: ICD-10-CM

## 2024-11-04 DIAGNOSIS — E87.1 HYPONATREMIA: ICD-10-CM

## 2024-11-04 DIAGNOSIS — R50.9 FUO (FEVER OF UNKNOWN ORIGIN): Primary | ICD-10-CM

## 2024-11-04 DIAGNOSIS — E87.6 HYPOKALEMIA: ICD-10-CM

## 2024-11-04 LAB
ALBUMIN SERPL BCP-MCNC: 3.5 G/DL (ref 3.4–5)
ALP SERPL-CCNC: 96 U/L (ref 33–120)
ALT SERPL W P-5'-P-CCNC: 11 U/L (ref 10–52)
ANION GAP SERPL CALC-SCNC: 13 MMOL/L (ref 10–20)
AST SERPL W P-5'-P-CCNC: 13 U/L (ref 9–39)
BASOPHILS # BLD MANUAL: 0.03 X10*3/UL (ref 0–0.1)
BASOPHILS NFR BLD MANUAL: 1 %
BILIRUB SERPL-MCNC: 0.8 MG/DL (ref 0–1.2)
BUN SERPL-MCNC: 16 MG/DL (ref 6–23)
CALCIUM SERPL-MCNC: 9 MG/DL (ref 8.6–10.3)
CHLORIDE SERPL-SCNC: 91 MMOL/L (ref 98–107)
CO2 SERPL-SCNC: 27 MMOL/L (ref 21–32)
CREAT SERPL-MCNC: 0.46 MG/DL (ref 0.5–1.3)
EGFRCR SERPLBLD CKD-EPI 2021: >90 ML/MIN/1.73M*2
EOSINOPHIL # BLD MANUAL: 0 X10*3/UL (ref 0–0.7)
EOSINOPHIL NFR BLD MANUAL: 0 %
ERYTHROCYTE [DISTWIDTH] IN BLOOD BY AUTOMATED COUNT: 14.2 % (ref 11.5–14.5)
GLUCOSE SERPL-MCNC: 110 MG/DL (ref 74–99)
HCT VFR BLD AUTO: 23.3 % (ref 41–52)
HGB BLD-MCNC: 8 G/DL (ref 13.5–17.5)
HYPOCHROMIA BLD QL SMEAR: NORMAL
IMM GRANULOCYTES # BLD AUTO: 0.02 X10*3/UL (ref 0–0.7)
IMM GRANULOCYTES NFR BLD AUTO: 0.6 % (ref 0–0.9)
LYMPHOCYTES # BLD MANUAL: 1.7 X10*3/UL (ref 1.2–4.8)
LYMPHOCYTES NFR BLD MANUAL: 53 %
MCH RBC QN AUTO: 30.7 PG (ref 26–34)
MCHC RBC AUTO-ENTMCNC: 34.3 G/DL (ref 32–36)
MCV RBC AUTO: 89 FL (ref 80–100)
MONOCYTES # BLD MANUAL: 0.1 X10*3/UL (ref 0.1–1)
MONOCYTES NFR BLD MANUAL: 3 %
NEUTROPHILS # BLD MANUAL: 1.31 X10*3/UL (ref 1.2–7.7)
NEUTS BAND # BLD MANUAL: 0.03 X10*3/UL (ref 0–0.7)
NEUTS BAND NFR BLD MANUAL: 1 %
NEUTS SEG # BLD MANUAL: 1.28 X10*3/UL (ref 1.2–7)
NEUTS SEG NFR BLD MANUAL: 40 %
NRBC BLD-RTO: 0 /100 WBCS (ref 0–0)
PLATELET # BLD AUTO: 106 X10*3/UL (ref 150–450)
POTASSIUM SERPL-SCNC: 4.1 MMOL/L (ref 3.5–5.3)
PROT SERPL-MCNC: 6.8 G/DL (ref 6.4–8.2)
RBC # BLD AUTO: 2.61 X10*6/UL (ref 4.5–5.9)
RBC MORPH BLD: NORMAL
SODIUM SERPL-SCNC: 127 MMOL/L (ref 136–145)
TOTAL CELLS COUNTED BLD: 100
VARIANT LYMPHS # BLD MANUAL: 0.06 X10*3/UL (ref 0–0.5)
VARIANT LYMPHS NFR BLD: 2 %
WBC # BLD AUTO: 3.2 X10*3/UL (ref 4.4–11.3)

## 2024-11-04 PROCEDURE — 99215 OFFICE O/P EST HI 40 MIN: CPT | Performed by: NURSE PRACTITIONER

## 2024-11-04 PROCEDURE — 87636 SARSCOV2 & INF A&B AMP PRB: CPT

## 2024-11-04 PROCEDURE — 36415 COLL VENOUS BLD VENIPUNCTURE: CPT

## 2024-11-04 PROCEDURE — 87634 RSV DNA/RNA AMP PROBE: CPT

## 2024-11-04 PROCEDURE — 85007 BL SMEAR W/DIFF WBC COUNT: CPT

## 2024-11-04 PROCEDURE — G2211 COMPLEX E/M VISIT ADD ON: HCPCS | Performed by: NURSE PRACTITIONER

## 2024-11-04 PROCEDURE — 99203 OFFICE O/P NEW LOW 30 MIN: CPT | Performed by: FAMILY MEDICINE

## 2024-11-04 PROCEDURE — 80053 COMPREHEN METABOLIC PANEL: CPT

## 2024-11-04 PROCEDURE — 3008F BODY MASS INDEX DOCD: CPT | Performed by: FAMILY MEDICINE

## 2024-11-04 PROCEDURE — 85027 COMPLETE CBC AUTOMATED: CPT

## 2024-11-04 RX ORDER — SODIUM CHLORIDE 1000 MG
1 TABLET, SOLUBLE MISCELLANEOUS 3 TIMES DAILY
Qty: 21 TABLET | Refills: 0 | Status: SHIPPED | OUTPATIENT
Start: 2024-11-04 | End: 2024-11-11

## 2024-11-04 RX ORDER — CEFDINIR 300 MG/1
300 CAPSULE ORAL 2 TIMES DAILY
Qty: 20 CAPSULE | Refills: 0 | Status: SHIPPED | OUTPATIENT
Start: 2024-11-04 | End: 2024-11-14

## 2024-11-04 RX ORDER — AMOXICILLIN 250 MG
1 CAPSULE ORAL NIGHTLY PRN
Qty: 30 TABLET | Refills: 3 | Status: SHIPPED | OUTPATIENT
Start: 2024-11-04

## 2024-11-04 ASSESSMENT — ENCOUNTER SYMPTOMS
CHEST TIGHTNESS: 0
EYE REDNESS: 0
WHEEZING: 0
SORE THROAT: 0
SHORTNESS OF BREATH: 0
EYE DISCHARGE: 0
COUGH: 0
EYE PAIN: 0
CHILLS: 1
FEVER: 1
SINUS PRESSURE: 0
RHINORRHEA: 0
SINUS PAIN: 0

## 2024-11-04 ASSESSMENT — PAIN SCALES - GENERAL: PAINLEVEL_OUTOF10: 7

## 2024-11-04 NOTE — PROGRESS NOTES
Subjective   Patient ID: Jovon Mccartney is a 49 y.o. male. They present today with a chief complaint of Fever (One day).    History of Present Illness  Patient reports that he has both bone and lung cancer and has developed a fever of unknown origin that began 11/2/24.  Patient states that the Tmax was 100.4 and he had chills.  Patient states that he was recently taken off 600 mg Ibuprofen and 1000 mg Tylenol both tid.  Patient denies any new onset symptoms beyond the fever.      History provided by:  Patient   used: No    Fever   This is a new problem. The current episode started in the past 7 days. The maximum temperature noted was 100 to 100.9 F. Pertinent negatives include no chest pain, congestion, coughing, ear pain, sore throat or wheezing. He has tried nothing for the symptoms.       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 His smoking use included cigarettes. He has a 58.5 pack-year smoking history. He has been exposed to tobacco smoke. He has never used smokeless tobacco. He reports that he does not currently use alcohol. He reports that he does not currently use drugs.    Review of Systems  Review of Systems   Constitutional:  Positive for chills and fever.   HENT:  Positive for nosebleeds. Negative for congestion, ear pain, postnasal drip, rhinorrhea, sinus pressure, sinus pain and sore throat.    Eyes:  Negative for pain, discharge and redness.   Respiratory:   "Negative for cough, chest tightness, shortness of breath and wheezing.    Cardiovascular:  Negative for chest pain.                                  Objective    Vitals:    11/04/24 1420   BP: 125/66   BP Location: Left arm   Patient Position: Sitting   BP Cuff Size: Adult   Pulse: 97   Resp: 16   Temp: 37.3 °C (99.1 °F)   TempSrc: Oral   SpO2: 92%   Weight: 51.7 kg (114 lb)   Height: 1.676 m (5' 6\")     No LMP for male patient.    Physical Exam  Vitals reviewed.   Constitutional:       General: He is not in acute distress.     Appearance: He is normal weight.   HENT:      Head: Atraumatic.      Right Ear: Tympanic membrane and ear canal normal.      Left Ear: Tympanic membrane and ear canal normal.      Nose:      Right Nostril: Epistaxis present.      Left Nostril: No epistaxis.      Right Sinus: No maxillary sinus tenderness or frontal sinus tenderness.      Left Sinus: No maxillary sinus tenderness or frontal sinus tenderness.      Comments: Epistaxis has resolved, but dried blood remains.     Mouth/Throat:      Mouth: Mucous membranes are moist.      Pharynx: No posterior oropharyngeal erythema.   Eyes:      Extraocular Movements: Extraocular movements intact.      Pupils: Pupils are equal, round, and reactive to light.   Cardiovascular:      Rate and Rhythm: Normal rate and regular rhythm.      Heart sounds: No murmur heard.     No friction rub.   Pulmonary:      Effort: Pulmonary effort is normal. No respiratory distress.      Breath sounds: No wheezing, rhonchi or rales.   Musculoskeletal:      Cervical back: No rigidity or tenderness.   Lymphadenopathy:      Cervical: No cervical adenopathy.   Neurological:      Mental Status: He is alert.         Procedures    Point of Care Test & Imaging Results from this visit  No results found for this visit on 11/04/24.   No results found.    Diagnostic study results (if any) were reviewed by Vinh Jeffers DO.    Assessment/Plan   Allergies, medications, history, and " pertinent labs/EKGs/Imaging reviewed by Vinh Jeffers DO.     Orders and Diagnoses  There are no diagnoses linked to this encounter.    Medical Admin Record      Patient disposition: Home    Electronically signed by Vinh Jeffers DO  2:31 PM

## 2024-11-04 NOTE — PROGRESS NOTES
[facility] - Medical Oncology Follow-Up Visit    Patient ID: Jovon Mccartney is a 49 y.o. male with NSCLC adenocarcinoma     Current therapy: cyanocobalamin (Vitamin B-12) injection 1,000 mcg, 1,000 mcg, intramuscular, Once, 1 of 2 cycles    Administration: 1,000 mcg (10/28/2024)        fosaprepitant (Emend) 150 mg in sodium chloride 0.9% 150 mL IV, 150 mg, intravenous, Once, 1 of 4 cycles    Administration: 150 mg (10/28/2024)        CARBOplatin (Paraplatin) 627 mg in sodium chloride 0.9% 172.7 mL IV, 627 mg, intravenous, Once, 1 of 4 cycles    Administration: 627 mg (10/28/2024)        methylPREDNISolone sod succinate (SOLU-Medrol) 40 mg/mL injection 40 mg, 40 mg, intravenous, As needed, 1 of 4 cycles        palonosetron (Aloxi) injection 250 mcg, 250 mcg, intravenous, Once, 1 of 4 cycles    Administration: 250 mcg (10/28/2024)        pembrolizumab (Keytruda) 200 mg in sodium chloride 0.9% 118 mL IV, 200 mg, intravenous, Once, 1 of 4 cycles    Administration: 200 mg (10/28/2024)        PEMEtrexed disodium (Alimta) 800 mg in sodium chloride 0.9% 142 mL IV, 500 mg/m2 = 800 mg, intravenous, Once, 1 of 4 cycles    Administration: 800 mg (10/28/2024)       Chief Concern: Tox check visit    Oncologic History:     DIAGNOSIS  NSCLC adenocarcinoma      STAGING  cT4 pN3 M1c      CURRENT SITES OF DISEASE  R hilar mass, 4L, 11L, scapula,  right  parieto-occipital bone, C2, C3, ?lymphatic carcinomatosis     MOLECULAR GENOMICS  KRAS p.G12C ([id number] c.34G>T)   PD-L1 <1%     PRIOR THERAPY        CURRENT THERAPY  Palliative XRT skull, C-spine, R shoulder 10/10/24 - 10/16/24        CURRENT ONCOLOGICAL PROBLEMS  Hemoptysis/epistaxis          HISTORY OF PRESENT ILLNESS  Mr. Mccartney is a 50 yo with PMH significant for RA who present to the [place] ER on 9/9/24 with 2 months of progressively worsening headaches and neck pain with occasional radiation to the R and L arms. A lytic lesion was noted on CT head  9/9/24 of the parietal bone, and CT cervical spine was concerning for lucent lesions C2 and C3. Brain MRI and MR cervical spine confirmed marrow-replacing lesions fo the C2 and C3 vertebral bodies. CT C/A/P w/o contrast on 9/10/24 was concerning for soft tissue mass of the R hilum, lytic lesions of the R scapula, and 3.5 cm mass in the L gluteus. He was transferred to [facility] on 9/10/24 CT C/A/P w/IV contrast on 9/15/24 confirmed the lung mass encasing the R mainstem bronchus abutting the R main pulmonary artery and extending to the lung periphery, as well as R perihilar lesion and lymphangitic carcinomatosis, prominent mediastinal nodes, L axillary node, nodular thickening of bilateral adrenal glands, likely lesion of the R scapula, small hepatic lesions, moderate pericardial effusion. EBUS on 9/17/24 with pathology of R hilar mass with adenocarcinoma, KRAS G12C, PD-L1 pending, and positive lymph nodes 11L and 4L. He met Dr. Mcgrath and discussed palliative radiation to the occipital lesion and cervical spine. PET/CT on 10/1/24 with FDG avid confluent R hilar/mediastinal mass, mild FDG avidity along upper and middle lobe, bilateral scapular, humeral, R anterior acetabular osseous metastases. He started palliative radiation 10/10/24-10/16/24.      He was born with juvenile RA, usually his feet and knees feel good compared to everything. He used to walk with a crutch, since the hospital he has been walking without it. He follows with a rheumatologist at [place]. It's fairly managed he says, has  been on humira for 20-25 years, right when it first came out. Kept the fluid off his knees and helped him better than anything else. He was on all kinds of drugs he said before including steroids, methotrexate, others. Typically with the humira he doesn't really get flares, sometimes in his wrists. He's been off for about a month, so thinks he'd have a flare in about a month.      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 Maybe had one exposure to asbestos. 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 Lives at home with his wife and a dog, 3 cats, a bird and fish. They have 2 sons and 3 grandchildren.  Tob: quit smoking 9/9/24, smoked 39 years, 1-1.5 ppd  EtOH: occasional  Illicits: previous smoking marijuana, only edibles now for pain     FAMILY HISTORY  Mother - breast cancer dx 56 yo  Maternal grandmother - bone cancer  Paternal cousin - bone and/or lung cancer  Paternal grandmother - unknown cancer  No other family members with autoimmune diseases    HPI   He is here today with his son.  Treatment 10/28/24.  Tuesday felt good.  Wednesday - back to where he was.  Breathing \"got worse\" last night.  Olden like he couldn't breath.  Spo2 dropped to 82%.   Oxygen applied - SpO2 improved back to upper 80's.  PRN oxygen use - 2L.  New hemoptysis - daily to every other day.  Small clots - smaller than an eraser on a pencil.  Also epistaxis - daily.  Once clear bleeding stops.  Mainly when he wakes up.  He attributes both to his oxygen use.  Increased fatigue (g2). Majority of day is spent on the couch sleeping.  Sleep scheduled around pain med admin.  Morphine taken TID along with Oxycodone Q3H.  Also taking ibuprofen 600mg QID, and acetaminophen 1000mg QID. Poor appetite.  Everything tastes salty. N/v prior to chemo - emesis x1. None after chemo.  BM Q2days.  Managed with miralax and senna.  Voiding fine.  Low-grade temps - highest 100.2.      Meds (Current):    Current Outpatient Medications:     acetaminophen (Tylenol) 500 mg tablet, Take 1 tablet (500 mg) by mouth every 6 hours if needed for mild pain (1 - 3)., Disp: , Rfl:     adalimumab (Humira Pen) 40 mg/0.8 mL pen injector kit pen-injector, Inject 1 Pen (40 mg) under the skin every 14 (fourteen) days., Disp: , Rfl:     budesonide-glycopyr-formoterol (BREZTRI) 160-9-4.8 " mcg/actuation HFA aerosol inhaler, Inhale 2 puffs 2 times a day., Disp: 10.7 g, Rfl: 3    dexAMETHasone (Decadron) 4 mg tablet, Take 4 mg (1 tablet) by mouth twice daily the day before treatment, once the evening of treatment, and twice daily the day after treatment. Do not take before October 27, 2024., Disp: 5 tablet, Rfl: 5    dexAMETHasone (Decadron) 4 mg tablet, Take 4 mg (1 tablet) by mouth twice daily the day before treatment, once the evening of treatment, and twice daily the day after treatment. Do not fill before October 27, 2024., Disp: 5 tablet, Rfl: 5    folic acid (Folvite) 1 mg tablet, Take 1 tablet (1,000 mcg) by mouth once daily. Do not start before October 27, 2024., Disp: 30 tablet, Rfl: 11    folic acid (Folvite) 1 mg tablet, Take 1 tablet (1,000 mcg) by mouth once daily. Do not fill before October 27, 2024., Disp: 30 tablet, Rfl: 11    gabapentin (Neurontin) 300 mg capsule, Take 1 capsule (300 mg) by mouth 3 times a day., Disp: 90 capsule, Rfl: 3    ibuprofen 200 mg tablet, Take 1 tablet (200 mg) by mouth every 6 hours if needed for mild pain (1 - 3)., Disp: , Rfl:     ipratropium-albuteroL (Combivent Respimat)  mcg/actuation inhaler, Inhale 2 puffs 4 times a day., Disp: 12 g, Rfl: 11    naloxone (Narcan) 4 mg/0.1 mL nasal spray, Administer 1 spray (4 mg) into affected nostril(s) if needed for opioid reversal. May repeat every 2-3 minutes if needed, alternating nostrils, until medical assistance becomes available., Disp: 2 each, Rfl: 3    OLANZapine (ZyPREXA) 5 mg tablet, Take 1 tablet (5 mg) by mouth once daily at bedtime for 4 days starting the evening of treatment. Do not start before October 27, 2024., Disp: 4 tablet, Rfl: 3    OLANZapine (ZyPREXA) 5 mg tablet, Take 1 tablet (5 mg) by mouth once daily at bedtime. For 4 days starting the evening of treatment Do not fill before October 27, 2024., Disp: 4 tablet, Rfl: 3    ondansetron (Zofran) 8 mg tablet, Take 1 tablet (8 mg) by mouth  every 8 hours if needed for nausea or vomiting., Disp: 90 tablet, Rfl: 3    ondansetron (Zofran) 8 mg tablet, Take 1 tablet (8 mg) by mouth every 8 hours if needed for nausea or vomiting. Do not start before October 27, 2024., Disp: 30 tablet, Rfl: 5    ondansetron (Zofran) 8 mg tablet, Take 1 tablet (8 mg) by mouth every 8 hours if needed for nausea or vomiting. Do not fill before October 27, 2024., Disp: 30 tablet, Rfl: 5    pantoprazole (ProtoNix) 40 mg EC tablet, Take 1 tablet (40 mg) by mouth once daily in the morning. Take before meals. Do not crush, chew, or split., Disp: 30 tablet, Rfl: 2    polyethylene glycol (Glycolax, Miralax) 17 gram/dose powder, Take 34 g by mouth once daily., Disp: 3060 g, Rfl: 0    prochlorperazine (Compazine) 10 mg tablet, Take 1 tablet (10 mg) by mouth every 6 hours if needed for nausea or vomiting. Do not start before October 27, 2024., Disp: 30 tablet, Rfl: 5    prochlorperazine (Compazine) 10 mg tablet, Take 1 tablet (10 mg) by mouth every 6 hours if needed for nausea or vomiting. Do not fill before October 27, 2024., Disp: 30 tablet, Rfl: 5    cefdinir (Omnicef) 300 mg capsule, Take 1 capsule (300 mg) by mouth 2 times a day for 10 days., Disp: 20 capsule, Rfl: 0    morphine CR (MS Contin) 30 mg 12 hr tablet, Take 1 tablet (30 mg) by mouth 3 times a day. Do not crush, chew, or split., Disp: 90 tablet, Rfl: 0    sennosides-docusate sodium (Yoselin-Colace) 8.6-50 mg tablet, Take 1 tablet by mouth as needed at bedtime for constipation., Disp: 30 tablet, Rfl: 3    sodium chloride 1,000 mg tablet, Take 1 tablet (1 g) by mouth 3 times a day for 7 days., Disp: 21 tablet, Rfl: 0    Review of Systems   Constitutional:  Positive for appetite change, fatigue, fever and unexpected weight change.   HENT:   Positive for nosebleeds.    Respiratory:  Positive for hemoptysis and shortness of breath.    Cardiovascular: Negative.    Gastrointestinal:  Positive for constipation, nausea and vomiting.    Musculoskeletal:  Positive for back pain and myalgias.   All other systems reviewed and are negative.       Objective   BSA: 1.55 meters squared  Wt Readings from Last 5 Encounters:   11/04/24 51.7 kg (114 lb)   11/04/24 51.8 kg (114 lb 3.2 oz)   10/28/24 53 kg (116 lb 11.7 oz)   10/28/24 53 kg (116 lb 11.7 oz)   10/23/24 56.8 kg (125 lb 3.2 oz)     /70 (BP Location: Right arm, Patient Position: Sitting, BP Cuff Size: Adult)   Pulse 99   Temp 37.9 °C (100.2 °F) (Temporal) Comment: notified provider  Resp 18   Wt 51.8 kg (114 lb 3.2 oz)   SpO2 94%   BMI 18.82 kg/m²     ECOG Score:   Performance Status (ECOG): 3    ECOG   Definition  0          Fully active; no performance restrictions.  1          Strenuous physical activity restricted; fully ambulatory and able to carry out light work.  2          Capable of all self-care but unable to carry out any work activities. Up and about >50% of waking hours.  3          Capable of only limited self-care; confined to bed or chair >50% of waking hours.  4          Completely disabled; cannot carry out any self-care; totally confined to bed or chair.    Physical Exam  Vitals reviewed.   Constitutional:       General: He is not in acute distress.     Appearance: He is ill-appearing.      Comments: Frail, cachectic     HENT:      Head: Normocephalic and atraumatic.      Mouth/Throat:      Mouth: Mucous membranes are moist.   Eyes:      Extraocular Movements: Extraocular movements intact.      Pupils: Pupils are equal, round, and reactive to light.   Cardiovascular:      Rate and Rhythm: Normal rate and regular rhythm.      Heart sounds: Normal heart sounds. No murmur heard.  Pulmonary:      Effort: Pulmonary effort is normal. No respiratory distress.      Breath sounds: Normal breath sounds.      Comments: Diminished throughout   Abdominal:      General: Bowel sounds are normal. There is no distension.      Palpations: Abdomen is soft.   Musculoskeletal:       Cervical back: Normal range of motion.      Right lower leg: Edema (ankles 2/2 RA) present.      Left lower leg: Edema (ankles 2/2 RA) present.      Comments: Assist x1 to stand, ambulated independently    Skin:     General: Skin is warm and dry.   Neurological:      General: No focal deficit present.      Mental Status: He is alert and oriented to person, place, and time. Mental status is at baseline.   Psychiatric:         Mood and Affect: Mood normal.         Behavior: Behavior normal.         Thought Content: Thought content normal.          Results:  Labs:  Lab Results   Component Value Date    WBC 3.2 (L) 11/04/2024    HGB 8.0 (L) 11/04/2024    HCT 23.3 (L) 11/04/2024    MCV 89 11/04/2024     (L) 11/04/2024      Lab Results   Component Value Date    NEUTROABS 4.03 10/28/2024      Lab Results   Component Value Date    GLUCOSE 110 (H) 11/04/2024    CALCIUM 9.0 11/04/2024     (L) 11/04/2024    K 4.1 11/04/2024    CO2 27 11/04/2024    CL 91 (L) 11/04/2024    BUN 16 11/04/2024    CREATININE 0.46 (L) 11/04/2024    MG 2.07 09/18/2024     Lab Results   Component Value Date    ALT 11 11/04/2024    AST 13 11/04/2024    ALKPHOS 96 11/04/2024    BILITOT 0.8 11/04/2024    BILIDIR 0.1 09/14/2024      Lab Results   Component Value Date    ACTH 8.5 10/28/2024    CORTISOL 3.7 (L) 10/28/2024    TSH 1.54 10/28/2024       Imaging:  No new imaging.      Assessment/Plan      Jovon Mccartney is a 49 y.o. male here for follow up of NSCLC adenocarcinoma.     # NSCLC adenocarcinoma  - [id number]  - KRAS G12C, PD-L1 <1%  - discussed that SoC therapies include chemotherapy+ immunotherapy. Given longstanding juvenile RA, would like input from his rheumatologist regarding safety of immunotherapy in this setting. Discussed if no immunotherapy, would recommend chemotherapy alone vs KRAS G12C targeted therapy frontline, which typically is given in the second-line setting.  - provided information on carboplatin/pemetrexed/pembrolizumab  vs adagrasib   - patient discussed with his rheumatologist regarding concern for flaring RA on immunotherapy. His rheumatologist will monitor closely, stop humira, and maintain him on low-dose steroids  - consented for carboplatin/pemetrexed/pembrolizumab to start 10/28/24  - will plan on utilizing adagrasib in the 2nd line setting if needed  - will plan on restaging scans after C4 (not yet ordered)  - RTC in 1 week for visit and labs      # Neoplasm related pain  - already saw Dr. Mcgrath, receiving palliative radiation  - seeing supportive onc - started pt on ER Morphine and PRN Oxycodone.    - Pt also taking Ibuprofen 600mg QID and 1000mg Acetaminophen QID.  Recommended pt reduce both doses x1 to start.  Pt will cut back to TID x1 week and see if his pain/RA is tolerable.      # Hypoxia/low-grade temps  - Discussed pt borderline to ED recommendation 2/2 low-grade temps and hypoxic episode at home.  Pt agreeable to urgent care visit for nasal swabs to r/o COVID, influenza and RSV.  All (-).      # Hemoptysis/Epistaxis  - intermittent   - will continue to monitor    # Hyponatremia - 127 11/4/24  - Rx sent for NaCl tabs - 1 tab TID x7 days  - Repeat labs 11/11/24     # Thrombocytopenia  - 11/4/24   - Repeat labs 11/11/24     # Leukopenia  - WBC 3.2 11/4/24   - no clinical s/sx's infection  - Denies dysuria   - post-visit urgent care visit - Flu A/B, RSV, and COVID nasal swabs (-).       # Advanced care planning  - discussed incurable but treatable nature of metastatic disease, with goal of treatment to improve or maintain quality of life and prolong life.

## 2024-11-05 LAB
FLUAV RNA RESP QL NAA+PROBE: NOT DETECTED
FLUBV RNA RESP QL NAA+PROBE: NOT DETECTED
RSV RNA RESP QL NAA+PROBE: NOT DETECTED
SARS-COV-2 RNA RESP QL NAA+PROBE: NOT DETECTED

## 2024-11-06 DIAGNOSIS — G89.3 CANCER RELATED PAIN: ICD-10-CM

## 2024-11-06 PROBLEM — E87.1 HYPONATREMIA: Status: ACTIVE | Noted: 2024-11-06

## 2024-11-06 PROBLEM — D61.818 PANCYTOPENIA: Status: ACTIVE | Noted: 2024-11-06

## 2024-11-06 PROBLEM — E83.51 HYPOCALCEMIA: Status: ACTIVE | Noted: 2024-11-06

## 2024-11-06 PROBLEM — E87.6 HYPOKALEMIA: Status: ACTIVE | Noted: 2024-11-06

## 2024-11-06 RX ORDER — MORPHINE SULFATE 30 MG/1
30 TABLET, FILM COATED, EXTENDED RELEASE ORAL 3 TIMES DAILY
Qty: 90 TABLET | Refills: 0 | Status: SHIPPED | OUTPATIENT
Start: 2024-11-06 | End: 2024-12-06

## 2024-11-06 ASSESSMENT — ENCOUNTER SYMPTOMS
MYALGIAS: 1
APPETITE CHANGE: 1
SHORTNESS OF BREATH: 1
CONSTIPATION: 1
SHORTNESS OF BREATH: 1
UNEXPECTED WEIGHT CHANGE: 1
HEMOPTYSIS: 1
VOMITING: 1
FEVER: 1
BACK PAIN: 1
NAUSEA: 1
CARDIOVASCULAR NEGATIVE: 1
ARTHRALGIAS: 1
FATIGUE: 1
MYALGIAS: 1
WHEEZING: 1

## 2024-11-06 NOTE — PROGRESS NOTES
Patient: Jovon Mccartney    [id number]  : 1975 -- AGE 49 y.o.    Provider: ADELINA Whitehead     Location [facility]   Service Date: 2024              [facility]  New Visit Note      HISTORY OF PRESENT ILLNESS     The patient's referring provider is: No ref. provider found    HISTORY OF PRESENT ILLNESS   Jovon Mccartney is a 49 y.o. male who presents to a [facility] for a pulmonary evaluation with NSCLC with bone involvement.  He is a former smoker. I have independently interviewed and examined the patient in the office and reviewed available records.    Current History     Patient was recently admitted early this month with worsening headache and intermittent neck pain for 2 months. , MRI brain/C-spine showed ill-defined enhancing lesion within right parietal occipital bone with dural thickening and mass effect upon right occipital lobe without midline shift as well as ill-defined marrow replacing lesions within C2 and C3 vertebral bodies, constellation of findings suggestive of metastatic disease or multiple myeloma.  CT  showed amorphous soft tissue centered at the right hilum encompassing the RUL bronchus and inseparable from the right main pulmonary artery suspicious for bronchogenic carcinoma, with mildly enlarged mediastinal lymph nodes, multiple lytic lesions of R scapula and bilateral humeral head. S/p bronchoscopy on 2024 for Lung Mass with brain and bone metastasis which revealed Airway stenosis, of bronchus intermedius and RLL and RML segments. Pathology positive for adenocarcinoma with the malignant cells are positive for TTF-1 and napsin A, and negative for p40     Smoked 1 ppd x 39 years, stopped when he was admitted 2024     On today's visit, the patient reports has dyspnea on exertion, but none at rest. Symptoms started many years ago, but has mostly been stable. He is only troubled by  breathlessness except on strenuous exercise (mMRC 0).  He is active as he can with juvenile arthritis, since discharge he is feeling weaker. Has shoulder and bicep pain.    Denies orthopnea, pnd, or mat.  Weight has been mostly stable.  Relates  chronic dry cough, C/o wheezing, and no sputum. No night cough. No hemoptysis. No fever or shivering chills. Has throat clearing. Denies chest pain or heartburn.     Previous pulmonary history:  no history of recurrent infections, or lung disease as a child.  No previous lung hx, never on oxygen or inhaler therapy. He was hospitalized with asthmatic bronchitis as a child, then resolved     Inhalers/nebulized medications: combivent - has used twice     Hospitalization History: Not been hospitalized over the last year for breathing related problem.    Sleep history:  Complains of snoring, apnea, feeling tired during the day, and taking naps during the day.     Current History 10/23/2024    On today's visit, the patient reports Occ having shortness of breath with walking in the hospital after a couple minutes,  In the am has cough clears clear sputum. C/o wheezing. He is using Breztri once a day.   He was using PRN. No night time cough. Not using combivent .   Denies fever/chills, chest pain or GERD   No ER visits   Completed radiation therapy - starting chemotherapy soon.     Current History 11/20/2024    On today's visit, the patient reports he started chemo and ready to start round #2. He had some anemia.  Felt better than he previously felt. Denies cough or phlegm, no hemoptysis. He is using O2 at 2 L and sleeping with O2 at night.  His pulse at home is 94% with 2L   He is short of breath with 1 flight of stairs.  He is SANDERSON with 10-15 min.    Denies Fevers/chills or chest pain  Denies GERD  Denies runny nose or post nasal drip   Using Breztri daily. Has not used albuterol at all.     Denies Night time cough         ALLERGIES AND MEDICATIONS     ALLERGIES  No Known  Allergies    MEDICATIONS  Current Outpatient Medications   Medication Sig Dispense Refill    acetaminophen (Tylenol) 500 mg tablet Take 1 tablet (500 mg) by mouth every 6 hours if needed for mild pain (1 - 3).      adalimumab (Humira Pen) 40 mg/0.8 mL pen injector kit pen-injector Inject 1 Pen (40 mg) under the skin every 14 (fourteen) days.      budesonide-glycopyr-formoterol (BREZTRI) 160-9-4.8 mcg/actuation HFA aerosol inhaler Inhale 2 puffs 2 times a day. 10.7 g 3    dexAMETHasone (Decadron) 4 mg tablet Take 4 mg (1 tablet) by mouth twice daily the day before treatment, once the evening of treatment, and twice daily the day after treatment. Do not take before October 27, 2024. 5 tablet 5    dexAMETHasone (Decadron) 4 mg tablet Take 4 mg (1 tablet) by mouth twice daily the day before treatment, once the evening of treatment, and twice daily the day after treatment. Do not fill before October 27, 2024. 5 tablet 5    folic acid (Folvite) 1 mg tablet Take 1 tablet (1,000 mcg) by mouth once daily. Do not start before October 27, 2024. 30 tablet 11    folic acid (Folvite) 1 mg tablet Take 1 tablet (1,000 mcg) by mouth once daily. Do not fill before October 27, 2024. 30 tablet 11    gabapentin (Neurontin) 300 mg capsule Take 1 capsule (300 mg) by mouth 3 times a day. 90 capsule 3    ibuprofen 200 mg tablet Take 1 tablet (200 mg) by mouth every 6 hours if needed for mild pain (1 - 3).      ipratropium-albuteroL (Combivent Respimat)  mcg/actuation inhaler Inhale 2 puffs 4 times a day. 12 g 11    morphine CR (MS Contin) 30 mg 12 hr tablet Take 1 tablet (30 mg) by mouth 3 times a day. Do not crush, chew, or split. 90 tablet 0    naloxone (Narcan) 4 mg/0.1 mL nasal spray Administer 1 spray (4 mg) into affected nostril(s) if needed for opioid reversal. May repeat every 2-3 minutes if needed, alternating nostrils, until medical assistance becomes available. 2 each 3    OLANZapine (ZyPREXA) 5 mg tablet Take 1 tablet (5  mg) by mouth once daily at bedtime for 4 days starting the evening of treatment. Do not start before October 27, 2024. 4 tablet 3    OLANZapine (ZyPREXA) 5 mg tablet Take 1 tablet (5 mg) by mouth once daily at bedtime. For 4 days starting the evening of treatment Do not fill before October 27, 2024. 4 tablet 3    ondansetron (Zofran) 8 mg tablet Take 1 tablet (8 mg) by mouth every 8 hours if needed for nausea or vomiting. Do not start before October 27, 2024. 30 tablet 5    ondansetron (Zofran) 8 mg tablet Take 1 tablet (8 mg) by mouth every 8 hours if needed for nausea or vomiting. Do not fill before October 27, 2024. 30 tablet 5    oxyCODONE (Roxicodone) 10 mg immediate release tablet Take 1-1.5 tablets (10-15 mg) by mouth every 4 hours if needed for moderate pain (4 - 6) or severe pain (7 - 10). MAX 9 TABS PER  tablet 0    pantoprazole (ProtoNix) 40 mg EC tablet Take 1 tablet (40 mg) by mouth once daily in the morning. Take before meals. Do not crush, chew, or split. 30 tablet 2    polyethylene glycol (Glycolax, Miralax) 17 gram/dose powder Take 34 g by mouth once daily. 3060 g 0    prochlorperazine (Compazine) 10 mg tablet Take 1 tablet (10 mg) by mouth every 6 hours if needed for nausea or vomiting. Do not start before October 27, 2024. 30 tablet 5    prochlorperazine (Compazine) 10 mg tablet Take 1 tablet (10 mg) by mouth every 6 hours if needed for nausea or vomiting. Do not fill before October 27, 2024. 30 tablet 5    sennosides-docusate sodium (Yoselin-Colace) 8.6-50 mg tablet Take 1 tablet by mouth as needed at bedtime for constipation. 30 tablet 3     No current facility-administered medications for this visit.         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     PAST SURGICAL HISTORY  Past Surgical History:   Procedure Laterality Date    BRONCHOSCOPY  9 16 2024    LUNG BIOPSY  9 16 2024       IMMUNIZATION HISTORY    There is no immunization history on file for this patient.    SOCIAL HISTORY  He  reports that he quit smoking about 2 months ago. His smoking use included cigarettes. He has a 58.5 pack-year smoking history. He has been exposed to tobacco smoke. He has never used smokeless tobacco. He reports that he does not currently use alcohol. He reports that he does not currently use drugs. He Patient  Smoked 1 ppd x 39 years, stopped when he was admitted 9/9/2024     OCCUPATIONAL/ENVIRONMENTAL HISTORY  Currently works as: disabled   DOES/DOES NOT EC: does have possible known exposure to asbestos, but not to silica, beryllium or inhaled metals. Exposure to a board that had asbestos   DOES/DOES NOT EC: does have exposure to birds or exotic animals. Had  cockatoo x 14 years     FAMILY HISTORY  Family History   Problem Relation Name Age of Onset    Breast cancer Mother JORDY CHURCH     Cancer Mother JORDY CHURCH     Miscarriages / Stillbirths Mother JORDY CHURCH     Cancer Maternal Grandfather HUBER PAL     Stroke Maternal Grandfather HUBER PAL      DOES/DOES NOT EC: does not have a family history of pulmonary disease.  DOES/DOES NOT EC: does have a family history of cancer. Maternal grandmother - bone cancer, mother breast cancer, paternal cousin lung cancer   DOES/DOES NOT EC: does have a family history of autoimmune disorders. He has RA    RESULTS/DATA     Pulmonary Function Test Results         Complete Pulmonary Function Test Pre/Post Bronchodialator (Spirometry Pre/Post/DLCO/Lung Volumes) 10/16/2024  Status: Final result     Study Result    Narrative & Impression   The FEV1/FVC (0.51) is reduced. The FEV1 (1.88L/60%) is moderately reduced. The FVC is normal. Following administration of  bronchodilators, there is no significant response. The TLC by body plethysmography is normal. The DLCO (47%)  is severely reduced; however, the diffusing capacity was not corrected for the patient's hemoglobin. The airways resistance is normal. FeNO was 21 ppb.  Conclusion: Reduced FEV1/FVC with a normal FVC indicates a moderate airflow obstructive defect. Lung volumes are consistent with hyperinflation. DLCO values are consistent with pulmonary vascular impairment, emphysema with preserved lung volume or anemia.     Scans on Order [id number]      Pulmonary Function Test - Scan on 10/16/2024 10:58 PM                            Chest Radiograph     No testing done     Chest CT Scan     9/15/2024 CT chest    IMPRESSION:  1. Masslike enhancing soft tissue thickening within the right hilar  region which encases the right mainstem bronchus and abuts the right  main pulmonary artery, suspicious for neoplasm. This soft tissue  thickening additionally extends along the interlobular bronchials.  Further evaluation with tissue sampling or PET-CT is recommended.  2. Nodular thickening of the right pleura is suspicious for disease  with possible direct lymphangitic carcinomatosis as well in the right  infrahilar region.  3. Few prominent mediastinal lymph nodes are described above.  4. Permeative appearing lesion involving the right scapula is  suspicious for metastatic disease.  5. Abdominal findings include too small to characterize hypodense  lesions in the liver as well as adrenal thickening, potentially  adenomas. Further characterization by abdominal MRI is recommended  given the thoracic findings.  6. Moderate pericardial effusion.  7. Severe paraseptal emphysematous changes predominantly affecting  the lung apices.        Echocardiogram     No testing done          REVIEW OF SYSTEMS     REVIEW OF SYSTEMS  Review of Systems   Respiratory:  Positive for shortness of breath and wheezing.    Musculoskeletal:  Positive for  "arthralgias, gait problem and myalgias.         PHYSICAL EXAM     VITAL SIGNS: /67   Pulse 86   Resp 18   Ht 1.676 m (5' 6\")   Wt 54 kg (119 lb)   SpO2 92% Comment: 2L  BMI 19.21 kg/m²  /67   Pulse 86   Resp 18   Ht 1.676 m (5' 6\")   Wt 54 kg (119 lb)   SpO2 92% Comment: 2L  BMI 19.21 kg/m²      CURRENT WEIGHT: [unfilled]  BMI: [unfilled]  PREVIOUS WEIGHTS:  Wt Readings from Last 3 Encounters:   11/20/24 54 kg (119 lb)   11/18/24 54.2 kg (119 lb 8 oz)   11/11/24 55.8 kg (123 lb)       Physical Exam  Constitutional:       Appearance: Normal appearance.   HENT:      Head: Normocephalic and atraumatic.      Right Ear: External ear normal.      Left Ear: External ear normal.      Nose: Nose normal.      Mouth/Throat:      Mouth: Mucous membranes are moist.      Pharynx: Oropharynx is clear.   Eyes:      Extraocular Movements: Extraocular movements intact.      Conjunctiva/sclera: Conjunctivae normal.      Pupils: Pupils are equal, round, and reactive to light.   Cardiovascular:      Rate and Rhythm: Normal rate and regular rhythm.      Pulses: Normal pulses.      Heart sounds: Normal heart sounds.   Pulmonary:      Effort: Pulmonary effort is normal.      Breath sounds: Normal breath sounds.   Abdominal:      General: Bowel sounds are normal.      Palpations: Abdomen is soft.   Musculoskeletal:         General: Normal range of motion.      Cervical back: Normal range of motion and neck supple.      Comments: Bilateral lower leg braces    Skin:     General: Skin is warm and dry.   Neurological:      General: No focal deficit present.      Mental Status: He is alert and oriented to person, place, and time. Mental status is at baseline.   Psychiatric:         Mood and Affect: Mood normal.         Behavior: Behavior normal.         Thought Content: Thought content normal.         Judgment: Judgment normal.         ASSESSMENT/PLAN     Mr. Mccartney is a 49 y.o. 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 He was referred to the [facility] for evaluation of NSCLC with bone involve[ment and COPD    Problem List and Orders      Assessment and Plan / Recommendations:  Problem List Items Addressed This Visit    None          COPD   PFTs with moderate airflow obstruction, lung volumes with hyperinflation, diffusion capacity reduced FEV1 60%    cont  ICS/LABA/LAMA breztri 2 puffs twice a day, rinse after use, discussed using twice a day daily   - cont combivent   - albuterol hfa 2 puffs or albuterol nebs every 4-6 hours as needed        Hypoxia multifactorial with anemia and COPD: Patient maintained on 2L     Oncology - NSCLC - has followup with Oncology and Radiation Oncology     Thank you for visiting the Pulmonary clinic today!     Return to clinic after 4-6 weeks and after PFTs  or sooner if needed   Melina Chacko CNP  My office number is [phone] -     Call to schedule  for radiology - CT scans/PFTs etc at  [phone]  General scheduling  [phone]     Best way to get a hold of me is to call my office --> Please do not send me follow my health messages  Any test results will be discussed at next visit -- please make sure to make a follow up appt after testing.

## 2024-11-11 ENCOUNTER — LAB (OUTPATIENT)
Dept: LAB | Facility: CLINIC | Age: 49
End: 2024-11-11
Payer: COMMERCIAL

## 2024-11-11 ENCOUNTER — OFFICE VISIT (OUTPATIENT)
Dept: HEMATOLOGY/ONCOLOGY | Facility: CLINIC | Age: 49
End: 2024-11-11
Payer: COMMERCIAL

## 2024-11-11 ENCOUNTER — PATIENT MESSAGE (OUTPATIENT)
Dept: PALLIATIVE MEDICINE | Facility: CLINIC | Age: 49
End: 2024-11-11
Payer: COMMERCIAL

## 2024-11-11 ENCOUNTER — APPOINTMENT (OUTPATIENT)
Dept: LAB | Facility: CLINIC | Age: 49
End: 2024-11-11
Payer: COMMERCIAL

## 2024-11-11 VITALS
BODY MASS INDEX: 19.85 KG/M2 | DIASTOLIC BLOOD PRESSURE: 72 MMHG | RESPIRATION RATE: 16 BRPM | SYSTOLIC BLOOD PRESSURE: 103 MMHG | WEIGHT: 123 LBS | HEART RATE: 86 BPM | TEMPERATURE: 98.4 F | OXYGEN SATURATION: 93 %

## 2024-11-11 DIAGNOSIS — C34.90 NSCLC METASTATIC TO BONE (MULTI): Primary | ICD-10-CM

## 2024-11-11 DIAGNOSIS — D61.810 ANTINEOPLASTIC CHEMOTHERAPY INDUCED PANCYTOPENIA (CMS-HCC): ICD-10-CM

## 2024-11-11 DIAGNOSIS — C79.51 NSCLC METASTATIC TO BONE (MULTI): Primary | ICD-10-CM

## 2024-11-11 DIAGNOSIS — T45.1X5A ANTINEOPLASTIC CHEMOTHERAPY INDUCED PANCYTOPENIA (CMS-HCC): ICD-10-CM

## 2024-11-11 DIAGNOSIS — E87.6 HYPOKALEMIA: ICD-10-CM

## 2024-11-11 DIAGNOSIS — C34.90 NSCLC METASTATIC TO BONE (MULTI): ICD-10-CM

## 2024-11-11 DIAGNOSIS — D72.819 LEUKOPENIA, UNSPECIFIED TYPE: ICD-10-CM

## 2024-11-11 DIAGNOSIS — M89.9 LESION OF BONE OF CERVICAL SPINE: ICD-10-CM

## 2024-11-11 DIAGNOSIS — D61.818 PANCYTOPENIA: ICD-10-CM

## 2024-11-11 DIAGNOSIS — E87.1 HYPONATREMIA: ICD-10-CM

## 2024-11-11 DIAGNOSIS — C79.51 NSCLC METASTATIC TO BONE (MULTI): ICD-10-CM

## 2024-11-11 LAB
ALBUMIN SERPL BCP-MCNC: 3.2 G/DL (ref 3.4–5)
ALP SERPL-CCNC: 101 U/L (ref 33–120)
ALT SERPL W P-5'-P-CCNC: 12 U/L (ref 10–52)
ANION GAP SERPL CALC-SCNC: 12 MMOL/L (ref 10–20)
AST SERPL W P-5'-P-CCNC: 12 U/L (ref 9–39)
BASOPHILS # BLD AUTO: 0 X10*3/UL (ref 0–0.1)
BASOPHILS NFR BLD AUTO: 0 %
BILIRUB SERPL-MCNC: 0.7 MG/DL (ref 0–1.2)
BUN SERPL-MCNC: 11 MG/DL (ref 6–23)
CALCIUM SERPL-MCNC: 8.8 MG/DL (ref 8.6–10.3)
CHLORIDE SERPL-SCNC: 95 MMOL/L (ref 98–107)
CO2 SERPL-SCNC: 27 MMOL/L (ref 21–32)
CREAT SERPL-MCNC: 0.39 MG/DL (ref 0.5–1.3)
EGFRCR SERPLBLD CKD-EPI 2021: >90 ML/MIN/1.73M*2
EOSINOPHIL # BLD AUTO: 0.01 X10*3/UL (ref 0–0.7)
EOSINOPHIL NFR BLD AUTO: 0.4 %
ERYTHROCYTE [DISTWIDTH] IN BLOOD BY AUTOMATED COUNT: 14.2 % (ref 11.5–14.5)
GLUCOSE SERPL-MCNC: 107 MG/DL (ref 74–99)
HCT VFR BLD AUTO: 20.7 % (ref 41–52)
HGB BLD-MCNC: 7 G/DL (ref 13.5–17.5)
IMM GRANULOCYTES # BLD AUTO: 0 X10*3/UL (ref 0–0.7)
IMM GRANULOCYTES NFR BLD AUTO: 0 % (ref 0–0.9)
LYMPHOCYTES # BLD AUTO: 1.48 X10*3/UL (ref 1.2–4.8)
LYMPHOCYTES NFR BLD AUTO: 62.4 %
MCH RBC QN AUTO: 30.4 PG (ref 26–34)
MCHC RBC AUTO-ENTMCNC: 33.8 G/DL (ref 32–36)
MCV RBC AUTO: 90 FL (ref 80–100)
MONOCYTES # BLD AUTO: 0.39 X10*3/UL (ref 0.1–1)
MONOCYTES NFR BLD AUTO: 16.5 %
NEUTROPHILS # BLD AUTO: 0.49 X10*3/UL (ref 1.2–7.7)
NEUTROPHILS NFR BLD AUTO: 20.7 %
NRBC BLD-RTO: ABNORMAL /100{WBCS}
OVALOCYTES BLD QL SMEAR: NORMAL
PLATELET # BLD AUTO: 42 X10*3/UL (ref 150–450)
POLYCHROMASIA BLD QL SMEAR: NORMAL
POTASSIUM SERPL-SCNC: 4 MMOL/L (ref 3.5–5.3)
PROT SERPL-MCNC: 6.4 G/DL (ref 6.4–8.2)
RBC # BLD AUTO: 2.3 X10*6/UL (ref 4.5–5.9)
RBC MORPH BLD: NORMAL
SCHISTOCYTES BLD QL SMEAR: NORMAL
SODIUM SERPL-SCNC: 130 MMOL/L (ref 136–145)
WBC # BLD AUTO: 2.4 X10*3/UL (ref 4.4–11.3)

## 2024-11-11 PROCEDURE — 99214 OFFICE O/P EST MOD 30 MIN: CPT | Performed by: NURSE PRACTITIONER

## 2024-11-11 PROCEDURE — 85025 COMPLETE CBC W/AUTO DIFF WBC: CPT

## 2024-11-11 PROCEDURE — 36415 COLL VENOUS BLD VENIPUNCTURE: CPT

## 2024-11-11 PROCEDURE — 82435 ASSAY OF BLOOD CHLORIDE: CPT

## 2024-11-11 PROCEDURE — 84075 ASSAY ALKALINE PHOSPHATASE: CPT

## 2024-11-11 PROCEDURE — 99417 PROLNG OP E/M EACH 15 MIN: CPT | Performed by: NURSE PRACTITIONER

## 2024-11-11 RX ORDER — OXYCODONE HYDROCHLORIDE 10 MG/1
10-15 TABLET ORAL EVERY 4 HOURS PRN
Qty: 270 TABLET | Refills: 0 | Status: SHIPPED | OUTPATIENT
Start: 2024-11-11 | End: 2024-12-11

## 2024-11-11 ASSESSMENT — PAIN SCALES - GENERAL: PAINLEVEL_OUTOF10: 5

## 2024-11-11 ASSESSMENT — ENCOUNTER SYMPTOMS
APPETITE CHANGE: 1
MYALGIAS: 1
BACK PAIN: 1
CARDIOVASCULAR NEGATIVE: 1
SHORTNESS OF BREATH: 1
FATIGUE: 1

## 2024-11-11 NOTE — PATIENT COMMUNICATION
Patient last seen by MUNDO Rosa on 10/28 with plan to continue oxycodone 15mg q4h PRN. Follow up visit is scheduled for 11/18. OARRS reviewed and no aberrancy noted. Patient last filled oxycodone 15mg #112/13 days on 10/21. Prescription pended to provider to approve.

## 2024-11-11 NOTE — PROGRESS NOTES
[facility] - Medical Oncology Follow-Up Visit    Patient ID: Jovon Mccartney is a 49 y.o. male with NSCLC adenocarcinoma     Current therapy: cyanocobalamin (Vitamin B-12) injection 1,000 mcg, 1,000 mcg, intramuscular, Once, 1 of 2 cycles    Administration: 1,000 mcg (10/28/2024)        fosaprepitant (Emend) 150 mg in sodium chloride 0.9% 150 mL IV, 150 mg, intravenous, Once, 1 of 4 cycles    Administration: 150 mg (10/28/2024)        CARBOplatin (Paraplatin) 627 mg in sodium chloride 0.9% 172.7 mL IV, 627 mg, intravenous, Once, 1 of 4 cycles    Administration: 627 mg (10/28/2024)        methylPREDNISolone sod succinate (SOLU-Medrol) 40 mg/mL injection 40 mg, 40 mg, intravenous, As needed, 1 of 4 cycles        palonosetron (Aloxi) injection 250 mcg, 250 mcg, intravenous, Once, 1 of 4 cycles    Administration: 250 mcg (10/28/2024)        pembrolizumab (Keytruda) 200 mg in sodium chloride 0.9% 118 mL IV, 200 mg, intravenous, Once, 1 of 4 cycles    Administration: 200 mg (10/28/2024)        PEMEtrexed disodium (Alimta) 800 mg in sodium chloride 0.9% 142 mL IV, 500 mg/m2 = 800 mg, intravenous, Once, 1 of 4 cycles    Dose modification: 400 mg/m2 (original dose 500 mg/m2, Cycle 2)    Administration: 800 mg (10/28/2024)       Chief Concern: Tox check visit    Oncologic History:     DIAGNOSIS  NSCLC adenocarcinoma      STAGING  cT4 pN3 M1c      CURRENT SITES OF DISEASE  R hilar mass, 4L, 11L, scapula,  right  parieto-occipital bone, C2, C3, ?lymphatic carcinomatosis     MOLECULAR GENOMICS  KRAS p.G12C ([id number] c.34G>T)   PD-L1 <1%     PRIOR THERAPY        CURRENT THERAPY  Palliative XRT skull, C-spine, R shoulder 10/10/24 - 10/16/24        CURRENT ONCOLOGICAL PROBLEMS  Hemoptysis/epistaxis - resolved       HISTORY OF PRESENT ILLNESS  Mr. Mccartney is a 48 yo with PMH significant for RA who present to the [facility] ER on 9/9/24 with 2 months of progressively worsening headaches and neck pain with  occasional radiation to the R and L arms. A lytic lesion was noted on CT head 9/9/24 of the parietal bone, and CT cervical spine was concerning for lucent lesions C2 and C3. Brain MRI and MR cervical spine confirmed marrow-replacing lesions fo the C2 and C3 vertebral bodies. CT C/A/P w/o contrast on 9/10/24 was concerning for soft tissue mass of the R hilum, lytic lesions of the R scapula, and 3.5 cm mass in the L gluteus. He was transferred to [facility] on 9/10/24 CT C/A/P w/IV contrast on 9/15/24 confirmed the lung mass encasing the R mainstem bronchus abutting the R main pulmonary artery and extending to the lung periphery, as well as R perihilar lesion and lymphangitic carcinomatosis, prominent mediastinal nodes, L axillary node, nodular thickening of bilateral adrenal glands, likely lesion of the R scapula, small hepatic lesions, moderate pericardial effusion. EBUS on 9/17/24 with pathology of R hilar mass with adenocarcinoma, KRAS G12C, PD-L1 pending, and positive lymph nodes 11L and 4L. He met Dr. Mcgrath and discussed palliative radiation to the occipital lesion and cervical spine. PET/CT on 10/1/24 with FDG avid confluent R hilar/mediastinal mass, mild FDG avidity along upper and middle lobe, bilateral scapular, humeral, R anterior acetabular osseous metastases. He started palliative radiation 10/10/24-10/16/24.      He was born with juvenile RA, usually his feet and knees feel good compared to everything. He used to walk with a crutch, since the hospital he has been walking without it. He follows with a rheumatologist at [facility]. It's fairly managed he says, has  been on humira for 20-25 years, right when it first came out. Kept the fluid off his knees and helped him better than anything else. He was on all kinds of drugs he said before including steroids, methotrexate, others. Typically with the humira he doesn't really get flares, sometimes in his wrists. He's been off for about a month, so thinks he'd have a  flare in about a month.      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 Maybe had one exposure to asbestos. 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 Lives at home with his wife and a dog, 3 cats, a bird and fish. They have 2 sons and 3 grandchildren.  Tob: quit smoking 9/9/24, smoked 39 years, 1-1.5 ppd  EtOH: occasional  Illicits: previous smoking marijuana, only edibles now for pain     FAMILY HISTORY  Mother - breast cancer dx 56 yo  Maternal grandmother - bone cancer  Paternal cousin - bone and/or lung cancer  Paternal grandmother - unknown cancer  No other family members with autoimmune diseases    HPI   He is here today with his spouse.  Feeling better today compared to last week.  Breathing is stable.  Nasal swabs (Flu A/B, RSV, and COVID) completed at 11/4/24 urgent care visit all (-).  PRN and HS oxygen use.  SPO2 checked at home - runs 87 to mid-90's.  Oxygen applied when SPO2 is low.  Saline spray helping with hemoptysis.  No further episodes.  Cough - no longer productive.   No further temps. Nothing above 100. Appetite - Boost/Equate - quantity varies/day.  Appetite - reduced  yesterday.  Noted wt gain d/t #8 leg braces.  Wt is stable.  Denies mucositis.  Denies n/v/d/c.  +Flatus.  BM Q2days.  Staying hydrated.  Pain managed with current regimen.  Highest 7/10.  Increased activities on Friday and Saturday.   No other concerns today.      Meds (Current):    Current Outpatient Medications:     acetaminophen (Tylenol) 500 mg tablet, Take 1 tablet (500 mg) by mouth every 6 hours if needed for mild pain (1 - 3)., Disp: , Rfl:     adalimumab (Humira Pen) 40 mg/0.8 mL pen injector kit pen-injector, Inject 1 Pen (40 mg) under the skin every 14 (fourteen) days., Disp: , Rfl:     budesonide-glycopyr-formoterol (BREZTRI) 160-9-4.8 mcg/actuation HFA aerosol inhaler, Inhale 2 puffs 2 times a day., Disp:  10.7 g, Rfl: 3    cefdinir (Omnicef) 300 mg capsule, Take 1 capsule (300 mg) by mouth 2 times a day for 10 days., Disp: 20 capsule, Rfl: 0    dexAMETHasone (Decadron) 4 mg tablet, Take 4 mg (1 tablet) by mouth twice daily the day before treatment, once the evening of treatment, and twice daily the day after treatment. Do not take before October 27, 2024., Disp: 5 tablet, Rfl: 5    dexAMETHasone (Decadron) 4 mg tablet, Take 4 mg (1 tablet) by mouth twice daily the day before treatment, once the evening of treatment, and twice daily the day after treatment. Do not fill before October 27, 2024., Disp: 5 tablet, Rfl: 5    folic acid (Folvite) 1 mg tablet, Take 1 tablet (1,000 mcg) by mouth once daily. Do not start before October 27, 2024., Disp: 30 tablet, Rfl: 11    folic acid (Folvite) 1 mg tablet, Take 1 tablet (1,000 mcg) by mouth once daily. Do not fill before October 27, 2024., Disp: 30 tablet, Rfl: 11    gabapentin (Neurontin) 300 mg capsule, Take 1 capsule (300 mg) by mouth 3 times a day., Disp: 90 capsule, Rfl: 3    ibuprofen 200 mg tablet, Take 1 tablet (200 mg) by mouth every 6 hours if needed for mild pain (1 - 3)., Disp: , Rfl:     ipratropium-albuteroL (Combivent Respimat)  mcg/actuation inhaler, Inhale 2 puffs 4 times a day., Disp: 12 g, Rfl: 11    morphine CR (MS Contin) 30 mg 12 hr tablet, Take 1 tablet (30 mg) by mouth 3 times a day. Do not crush, chew, or split., Disp: 90 tablet, Rfl: 0    naloxone (Narcan) 4 mg/0.1 mL nasal spray, Administer 1 spray (4 mg) into affected nostril(s) if needed for opioid reversal. May repeat every 2-3 minutes if needed, alternating nostrils, until medical assistance becomes available., Disp: 2 each, Rfl: 3    OLANZapine (ZyPREXA) 5 mg tablet, Take 1 tablet (5 mg) by mouth once daily at bedtime for 4 days starting the evening of treatment. Do not start before October 27, 2024., Disp: 4 tablet, Rfl: 3    OLANZapine (ZyPREXA) 5 mg tablet, Take 1 tablet (5 mg) by  mouth once daily at bedtime. For 4 days starting the evening of treatment Do not fill before October 27, 2024., Disp: 4 tablet, Rfl: 3    ondansetron (Zofran) 8 mg tablet, Take 1 tablet (8 mg) by mouth every 8 hours if needed for nausea or vomiting., Disp: 90 tablet, Rfl: 3    ondansetron (Zofran) 8 mg tablet, Take 1 tablet (8 mg) by mouth every 8 hours if needed for nausea or vomiting. Do not start before October 27, 2024., Disp: 30 tablet, Rfl: 5    ondansetron (Zofran) 8 mg tablet, Take 1 tablet (8 mg) by mouth every 8 hours if needed for nausea or vomiting. Do not fill before October 27, 2024., Disp: 30 tablet, Rfl: 5    pantoprazole (ProtoNix) 40 mg EC tablet, Take 1 tablet (40 mg) by mouth once daily in the morning. Take before meals. Do not crush, chew, or split., Disp: 30 tablet, Rfl: 2    polyethylene glycol (Glycolax, Miralax) 17 gram/dose powder, Take 34 g by mouth once daily., Disp: 3060 g, Rfl: 0    prochlorperazine (Compazine) 10 mg tablet, Take 1 tablet (10 mg) by mouth every 6 hours if needed for nausea or vomiting. Do not start before October 27, 2024., Disp: 30 tablet, Rfl: 5    prochlorperazine (Compazine) 10 mg tablet, Take 1 tablet (10 mg) by mouth every 6 hours if needed for nausea or vomiting. Do not fill before October 27, 2024., Disp: 30 tablet, Rfl: 5    sennosides-docusate sodium (Yoselin-Colace) 8.6-50 mg tablet, Take 1 tablet by mouth as needed at bedtime for constipation., Disp: 30 tablet, Rfl: 3    oxyCODONE (Roxicodone) 10 mg immediate release tablet, Take 1-1.5 tablets (10-15 mg) by mouth every 4 hours if needed for moderate pain (4 - 6) or severe pain (7 - 10). MAX 9 TABS PER DAY, Disp: 270 tablet, Rfl: 0    Review of Systems   Constitutional:  Positive for appetite change and fatigue. Negative for fever and unexpected weight change.   HENT:  Negative.  Negative for nosebleeds.    Respiratory:  Positive for shortness of breath. Negative for hemoptysis.    Cardiovascular: Negative.     Gastrointestinal: Negative.  Negative for constipation, nausea and vomiting.   Musculoskeletal:  Positive for back pain and myalgias.   All other systems reviewed and are negative.       Objective   BSA: 1.61 meters squared  Wt Readings from Last 5 Encounters:   11/11/24 55.8 kg (123 lb)   11/04/24 51.7 kg (114 lb)   11/04/24 51.8 kg (114 lb 3.2 oz)   10/28/24 53 kg (116 lb 11.7 oz)   10/28/24 53 kg (116 lb 11.7 oz)     /72 (BP Location: Left arm, Patient Position: Sitting)   Pulse 86   Temp 36.9 °C (98.4 °F) (Temporal)   Resp 16   Wt 55.8 kg (123 lb)   SpO2 93%   BMI 19.85 kg/m²     ECOG Score:   Performance Status (ECOG): 2    ECOG   Definition  0          Fully active; no performance restrictions.  1          Strenuous physical activity restricted; fully ambulatory and able to carry out light work.  2          Capable of all self-care but unable to carry out any work activities. Up and about >50% of waking hours.  3          Capable of only limited self-care; confined to bed or chair >50% of waking hours.  4          Completely disabled; cannot carry out any self-care; totally confined to bed or chair.    Physical Exam  Vitals reviewed.   Constitutional:       General: He is not in acute distress.     Appearance: He is not ill-appearing.      Comments: Frail, cachectic     HENT:      Head: Normocephalic and atraumatic.      Mouth/Throat:      Mouth: Mucous membranes are moist.   Eyes:      Extraocular Movements: Extraocular movements intact.      Pupils: Pupils are equal, round, and reactive to light.   Cardiovascular:      Rate and Rhythm: Normal rate and regular rhythm.      Heart sounds: Normal heart sounds. No murmur heard.  Pulmonary:      Effort: Pulmonary effort is normal. No respiratory distress.      Breath sounds: Normal breath sounds.      Comments: Diminished throughout   Abdominal:      General: Bowel sounds are normal. There is no distension.      Palpations: Abdomen is soft.    Musculoskeletal:      Cervical back: Normal range of motion.      Right lower leg: Edema (ankles 2/2 RA) present.      Left lower leg: Edema (ankles 2/2 RA) present.      Comments: Assist x1 to stand, ambulated independently   Chronic swelling to TINY ankles and left wrist/hand 2/2 RA.   Skin:     General: Skin is warm and dry.   Neurological:      General: No focal deficit present.      Mental Status: He is alert and oriented to person, place, and time. Mental status is at baseline.   Psychiatric:         Mood and Affect: Mood normal.         Behavior: Behavior normal.         Thought Content: Thought content normal.          Results:  Labs:  Lab Results   Component Value Date    WBC 2.4 (L) 11/11/2024    HGB 7.0 (L) 11/11/2024    HCT 20.7 (L) 11/11/2024    MCV 90 11/11/2024    PLT 42 (L) 11/11/2024      Lab Results   Component Value Date    NEUTROABS 0.49 (L) 11/11/2024      Lab Results   Component Value Date    GLUCOSE 107 (H) 11/11/2024    CALCIUM 8.8 11/11/2024     (L) 11/11/2024    K 4.0 11/11/2024    CO2 27 11/11/2024    CL 95 (L) 11/11/2024    BUN 11 11/11/2024    CREATININE 0.39 (L) 11/11/2024    MG 2.07 09/18/2024     Lab Results   Component Value Date    ALT 12 11/11/2024    AST 12 11/11/2024    ALKPHOS 101 11/11/2024    BILITOT 0.7 11/11/2024    BILIDIR 0.1 09/14/2024      Lab Results   Component Value Date    ACTH 8.5 10/28/2024    CORTISOL 3.7 (L) 10/28/2024    TSH 1.54 10/28/2024       Imaging:  No new imaging.      Assessment/Plan      Jovon Mccartney is a 49 y.o. male here for follow up of NSCLC adenocarcinoma.     # NSCLC adenocarcinoma  - [id number]  - KRAS G12C, PD-L1 <1%  - discussed that SoC therapies include chemotherapy+ immunotherapy. Given longstanding juvenile RA, would like input from his rheumatologist regarding safety of immunotherapy in this setting. Discussed if no immunotherapy, would recommend chemotherapy alone vs KRAS G12C targeted therapy frontline, which typically is given in  the second-line setting.  - provided information on carboplatin/pemetrexed/pembrolizumab vs adagrasib   - patient discussed with his rheumatologist regarding concern for flaring RA on immunotherapy. His rheumatologist will monitor closely, stop humira, and maintain him on low-dose steroids  - consented for carboplatin/pemetrexed/pembrolizumab to start 10/28/24  - will plan on utilizing adagrasib in the 2nd line setting if needed  - will plan on restaging scans after C4 (not yet ordered)  - RTC in 1 week for visit, labs, and C2 if appropriate      # Neoplasm related pain  - already saw Dr. Mcgrath, receiving palliative radiation  - seeing supportive onc - started pt on ER Morphine and PRN Oxycodone.    - Pt also taking Ibuprofen 600mg QID and 1000mg Acetaminophen QID.  Recommended pt reduce both doses x1 to start.  Pt will cut back to TID x1 week and see if his pain/RA is tolerable.      # Hypoxia/low-grade temps  - Discussed pt borderline to ED recommendation 2/2 low-grade temps and hypoxic episode at home.  Pt agreeable to urgent care visit for nasal swabs to r/o COVID, Flu A/B, and RSV.  All (-).  - stable, PRN & HS oxygen use.  No further temps.      # Hemoptysis/Epistaxis  - intermittent   - resolved with saline nasal spray  - will continue to monitor    # Hyponatremia - 127 11/4/24  - Rx sent for NaCl tabs - 1 tab TID x7 days  - Repeat labs 11/11/24   - improved to 130 11/11/24.   Pt not able to tolerate TID admin, taking 1-2x/day.      # Thrombocytopenia  - 11/4/24   - 11/11/24 PLT 42   - No s/sx's bleeding  - Pt educate to bleeding precautions/interventions  - Repeat labs 11/18/24      # Leukopenia  - WBC 3.2 11/4/24   - no clinical s/sx's infection  - Denies dysuria   - post-visit urgent care visit - Flu A/B, RSV, and COVID nasal swabs (-).    - WBC 2.4 11/11/24 - pt plans to avoid crowds/large gatherings.     # Anemia Hgb 7.0 11/11/24  - Repeat labs 11/18/24, will consent for blood transfusion if  necessary     # Advanced care planning  - discussed incurable but treatable nature of metastatic disease, with goal of treatment to improve or maintain quality of life and prolong life.

## 2024-11-13 PROBLEM — D61.810 ANTINEOPLASTIC CHEMOTHERAPY INDUCED PANCYTOPENIA (CMS-HCC): Status: ACTIVE | Noted: 2024-11-13

## 2024-11-13 PROBLEM — T45.1X5A ANTINEOPLASTIC CHEMOTHERAPY INDUCED PANCYTOPENIA (CMS-HCC): Status: ACTIVE | Noted: 2024-11-13

## 2024-11-13 ASSESSMENT — ENCOUNTER SYMPTOMS
UNEXPECTED WEIGHT CHANGE: 0
CONSTIPATION: 0
GASTROINTESTINAL NEGATIVE: 1
VOMITING: 0
NAUSEA: 0
HEMOPTYSIS: 0
FEVER: 0

## 2024-11-18 ENCOUNTER — OFFICE VISIT (OUTPATIENT)
Dept: PALLIATIVE MEDICINE | Facility: CLINIC | Age: 49
End: 2024-11-18
Payer: COMMERCIAL

## 2024-11-18 ENCOUNTER — APPOINTMENT (OUTPATIENT)
Dept: HEMATOLOGY/ONCOLOGY | Facility: CLINIC | Age: 49
End: 2024-11-18
Payer: COMMERCIAL

## 2024-11-18 ENCOUNTER — LAB (OUTPATIENT)
Dept: LAB | Facility: CLINIC | Age: 49
End: 2024-11-18
Payer: COMMERCIAL

## 2024-11-18 ENCOUNTER — INFUSION (OUTPATIENT)
Dept: HEMATOLOGY/ONCOLOGY | Facility: CLINIC | Age: 49
End: 2024-11-18
Payer: COMMERCIAL

## 2024-11-18 ENCOUNTER — OFFICE VISIT (OUTPATIENT)
Dept: HEMATOLOGY/ONCOLOGY | Facility: CLINIC | Age: 49
End: 2024-11-18
Payer: COMMERCIAL

## 2024-11-18 VITALS
TEMPERATURE: 97.9 F | WEIGHT: 119.5 LBS | HEART RATE: 91 BPM | BODY MASS INDEX: 19.29 KG/M2 | DIASTOLIC BLOOD PRESSURE: 66 MMHG | RESPIRATION RATE: 16 BRPM | SYSTOLIC BLOOD PRESSURE: 109 MMHG | OXYGEN SATURATION: 91 %

## 2024-11-18 DIAGNOSIS — C34.90 NSCLC METASTATIC TO BONE (MULTI): ICD-10-CM

## 2024-11-18 DIAGNOSIS — R63.0 DECREASED APPETITE: ICD-10-CM

## 2024-11-18 DIAGNOSIS — C79.51 NSCLC METASTATIC TO BONE (MULTI): ICD-10-CM

## 2024-11-18 DIAGNOSIS — Z51.11 ENCOUNTER FOR ANTINEOPLASTIC CHEMOTHERAPY AND IMMUNOTHERAPY: ICD-10-CM

## 2024-11-18 DIAGNOSIS — G89.3 CANCER RELATED PAIN: Primary | ICD-10-CM

## 2024-11-18 DIAGNOSIS — R04.0 EPISTAXIS: ICD-10-CM

## 2024-11-18 DIAGNOSIS — R09.02 HYPOXIA: ICD-10-CM

## 2024-11-18 DIAGNOSIS — Z51.5 PALLIATIVE CARE ENCOUNTER: ICD-10-CM

## 2024-11-18 DIAGNOSIS — K59.00 CONSTIPATION, UNSPECIFIED CONSTIPATION TYPE: ICD-10-CM

## 2024-11-18 DIAGNOSIS — Z51.12 ENCOUNTER FOR ANTINEOPLASTIC CHEMOTHERAPY AND IMMUNOTHERAPY: ICD-10-CM

## 2024-11-18 DIAGNOSIS — D64.81 ANEMIA DUE TO ANTINEOPLASTIC CHEMOTHERAPY: Primary | ICD-10-CM

## 2024-11-18 DIAGNOSIS — T45.1X5A ANEMIA DUE TO ANTINEOPLASTIC CHEMOTHERAPY: Primary | ICD-10-CM

## 2024-11-18 LAB
ALBUMIN SERPL BCP-MCNC: 3.3 G/DL (ref 3.4–5)
ALP SERPL-CCNC: 105 U/L (ref 33–120)
ALT SERPL W P-5'-P-CCNC: 14 U/L (ref 10–52)
ANION GAP SERPL CALC-SCNC: 11 MMOL/L (ref 10–20)
AST SERPL W P-5'-P-CCNC: 14 U/L (ref 9–39)
BASOPHILS # BLD AUTO: 0.02 X10*3/UL (ref 0–0.1)
BASOPHILS NFR BLD AUTO: 0.3 %
BILIRUB SERPL-MCNC: 0.5 MG/DL (ref 0–1.2)
BUN SERPL-MCNC: 11 MG/DL (ref 6–23)
CALCIUM SERPL-MCNC: 9.1 MG/DL (ref 8.6–10.3)
CHLORIDE SERPL-SCNC: 98 MMOL/L (ref 98–107)
CO2 SERPL-SCNC: 28 MMOL/L (ref 21–32)
CREAT SERPL-MCNC: 0.41 MG/DL (ref 0.5–1.3)
EGFRCR SERPLBLD CKD-EPI 2021: >90 ML/MIN/1.73M*2
EOSINOPHIL # BLD AUTO: 0.01 X10*3/UL (ref 0–0.7)
EOSINOPHIL NFR BLD AUTO: 0.2 %
ERYTHROCYTE [DISTWIDTH] IN BLOOD BY AUTOMATED COUNT: 17.4 % (ref 11.5–14.5)
GLUCOSE SERPL-MCNC: 88 MG/DL (ref 74–99)
HCT VFR BLD AUTO: 21.2 % (ref 41–52)
HGB BLD-MCNC: 7.1 G/DL (ref 13.5–17.5)
IMM GRANULOCYTES # BLD AUTO: 0.07 X10*3/UL (ref 0–0.7)
IMM GRANULOCYTES NFR BLD AUTO: 1.2 % (ref 0–0.9)
LYMPHOCYTES # BLD AUTO: 1.89 X10*3/UL (ref 1.2–4.8)
LYMPHOCYTES NFR BLD AUTO: 31.1 %
MCH RBC QN AUTO: 31.4 PG (ref 26–34)
MCHC RBC AUTO-ENTMCNC: 33.5 G/DL (ref 32–36)
MCV RBC AUTO: 94 FL (ref 80–100)
MONOCYTES # BLD AUTO: 1.13 X10*3/UL (ref 0.1–1)
MONOCYTES NFR BLD AUTO: 18.6 %
NEUTROPHILS # BLD AUTO: 2.96 X10*3/UL (ref 1.2–7.7)
NEUTROPHILS NFR BLD AUTO: 48.6 %
NRBC BLD-RTO: ABNORMAL /100{WBCS}
PLATELET # BLD AUTO: 325 X10*3/UL (ref 150–450)
POTASSIUM SERPL-SCNC: 4.3 MMOL/L (ref 3.5–5.3)
PROT SERPL-MCNC: 6.8 G/DL (ref 6.4–8.2)
RBC # BLD AUTO: 2.26 X10*6/UL (ref 4.5–5.9)
SODIUM SERPL-SCNC: 133 MMOL/L (ref 136–145)
WBC # BLD AUTO: 6.1 X10*3/UL (ref 4.4–11.3)

## 2024-11-18 PROCEDURE — 85025 COMPLETE CBC W/AUTO DIFF WBC: CPT

## 2024-11-18 PROCEDURE — 99417 PROLNG OP E/M EACH 15 MIN: CPT | Performed by: NURSE PRACTITIONER

## 2024-11-18 PROCEDURE — 99215 OFFICE O/P EST HI 40 MIN: CPT | Performed by: NURSE PRACTITIONER

## 2024-11-18 PROCEDURE — 99214 OFFICE O/P EST MOD 30 MIN: CPT

## 2024-11-18 PROCEDURE — 96367 TX/PROPH/DG ADDL SEQ IV INF: CPT

## 2024-11-18 PROCEDURE — 99214 OFFICE O/P EST MOD 30 MIN: CPT | Mod: 25

## 2024-11-18 PROCEDURE — 96375 TX/PRO/DX INJ NEW DRUG ADDON: CPT | Mod: INF

## 2024-11-18 PROCEDURE — 96413 CHEMO IV INFUSION 1 HR: CPT

## 2024-11-18 PROCEDURE — 96417 CHEMO IV INFUS EACH ADDL SEQ: CPT

## 2024-11-18 PROCEDURE — 99215 OFFICE O/P EST HI 40 MIN: CPT | Mod: 25 | Performed by: NURSE PRACTITIONER

## 2024-11-18 PROCEDURE — 80053 COMPREHEN METABOLIC PANEL: CPT

## 2024-11-18 PROCEDURE — 96411 CHEMO IV PUSH ADDL DRUG: CPT

## 2024-11-18 PROCEDURE — 36415 COLL VENOUS BLD VENIPUNCTURE: CPT

## 2024-11-18 PROCEDURE — 2500000004 HC RX 250 GENERAL PHARMACY W/ HCPCS (ALT 636 FOR OP/ED): Mod: JZ,JG | Performed by: STUDENT IN AN ORGANIZED HEALTH CARE EDUCATION/TRAINING PROGRAM

## 2024-11-18 PROCEDURE — 2500000004 HC RX 250 GENERAL PHARMACY W/ HCPCS (ALT 636 FOR OP/ED): Performed by: STUDENT IN AN ORGANIZED HEALTH CARE EDUCATION/TRAINING PROGRAM

## 2024-11-18 RX ORDER — HEPARIN 100 UNIT/ML
500 SYRINGE INTRAVENOUS AS NEEDED
Status: DISCONTINUED | OUTPATIENT
Start: 2024-11-18 | End: 2024-11-18 | Stop reason: HOSPADM

## 2024-11-18 RX ORDER — HEPARIN SODIUM,PORCINE/PF 10 UNIT/ML
50 SYRINGE (ML) INTRAVENOUS AS NEEDED
OUTPATIENT
Start: 2024-11-18

## 2024-11-18 RX ORDER — HEPARIN SODIUM,PORCINE/PF 10 UNIT/ML
50 SYRINGE (ML) INTRAVENOUS AS NEEDED
Status: DISCONTINUED | OUTPATIENT
Start: 2024-11-18 | End: 2024-11-18 | Stop reason: HOSPADM

## 2024-11-18 RX ORDER — PALONOSETRON 0.05 MG/ML
0.25 INJECTION, SOLUTION INTRAVENOUS ONCE
Status: CANCELLED | OUTPATIENT
Start: 2024-11-18

## 2024-11-18 RX ORDER — DIPHENHYDRAMINE HYDROCHLORIDE 50 MG/ML
50 INJECTION INTRAMUSCULAR; INTRAVENOUS AS NEEDED
Status: DISCONTINUED | OUTPATIENT
Start: 2024-11-18 | End: 2024-11-18 | Stop reason: HOSPADM

## 2024-11-18 RX ORDER — PROCHLORPERAZINE MALEATE 5 MG
10 TABLET ORAL EVERY 6 HOURS PRN
Status: CANCELLED | OUTPATIENT
Start: 2024-11-18

## 2024-11-18 RX ORDER — HEPARIN 100 UNIT/ML
500 SYRINGE INTRAVENOUS AS NEEDED
OUTPATIENT
Start: 2024-11-18

## 2024-11-18 RX ORDER — DEXAMETHASONE 6 MG/1
12 TABLET ORAL ONCE
Status: COMPLETED | OUTPATIENT
Start: 2024-11-18 | End: 2024-11-18

## 2024-11-18 RX ORDER — ALBUTEROL SULFATE 0.83 MG/ML
3 SOLUTION RESPIRATORY (INHALATION) AS NEEDED
Status: CANCELLED | OUTPATIENT
Start: 2024-11-18

## 2024-11-18 RX ORDER — PROCHLORPERAZINE EDISYLATE 5 MG/ML
10 INJECTION INTRAMUSCULAR; INTRAVENOUS EVERY 6 HOURS PRN
Status: DISCONTINUED | OUTPATIENT
Start: 2024-11-18 | End: 2024-11-18 | Stop reason: HOSPADM

## 2024-11-18 RX ORDER — ALBUTEROL SULFATE 0.83 MG/ML
3 SOLUTION RESPIRATORY (INHALATION) AS NEEDED
Status: DISCONTINUED | OUTPATIENT
Start: 2024-11-18 | End: 2024-11-18 | Stop reason: HOSPADM

## 2024-11-18 RX ORDER — PROCHLORPERAZINE EDISYLATE 5 MG/ML
10 INJECTION INTRAMUSCULAR; INTRAVENOUS EVERY 6 HOURS PRN
Status: CANCELLED | OUTPATIENT
Start: 2024-11-18

## 2024-11-18 RX ORDER — EPINEPHRINE 0.3 MG/.3ML
0.3 INJECTION SUBCUTANEOUS EVERY 5 MIN PRN
OUTPATIENT
Start: 2024-11-18

## 2024-11-18 RX ORDER — DIPHENHYDRAMINE HYDROCHLORIDE 50 MG/ML
50 INJECTION INTRAMUSCULAR; INTRAVENOUS AS NEEDED
OUTPATIENT
Start: 2024-11-18

## 2024-11-18 RX ORDER — PALONOSETRON 0.05 MG/ML
0.25 INJECTION, SOLUTION INTRAVENOUS ONCE
Status: COMPLETED | OUTPATIENT
Start: 2024-11-18 | End: 2024-11-18

## 2024-11-18 RX ORDER — FAMOTIDINE 10 MG/ML
20 INJECTION INTRAVENOUS ONCE AS NEEDED
OUTPATIENT
Start: 2024-11-18

## 2024-11-18 RX ORDER — EPINEPHRINE 0.3 MG/.3ML
0.3 INJECTION SUBCUTANEOUS EVERY 5 MIN PRN
Status: CANCELLED | OUTPATIENT
Start: 2024-11-18

## 2024-11-18 RX ORDER — DIPHENHYDRAMINE HYDROCHLORIDE 50 MG/ML
50 INJECTION INTRAMUSCULAR; INTRAVENOUS AS NEEDED
Status: CANCELLED | OUTPATIENT
Start: 2024-11-18

## 2024-11-18 RX ORDER — EPINEPHRINE 0.3 MG/.3ML
0.3 INJECTION SUBCUTANEOUS EVERY 5 MIN PRN
Status: DISCONTINUED | OUTPATIENT
Start: 2024-11-18 | End: 2024-11-18 | Stop reason: HOSPADM

## 2024-11-18 RX ORDER — DEXAMETHASONE 6 MG/1
12 TABLET ORAL ONCE
Status: CANCELLED | OUTPATIENT
Start: 2024-11-18

## 2024-11-18 RX ORDER — FAMOTIDINE 10 MG/ML
20 INJECTION INTRAVENOUS ONCE AS NEEDED
Status: DISCONTINUED | OUTPATIENT
Start: 2024-11-18 | End: 2024-11-18 | Stop reason: HOSPADM

## 2024-11-18 RX ORDER — ALBUTEROL SULFATE 0.83 MG/ML
3 SOLUTION RESPIRATORY (INHALATION) AS NEEDED
OUTPATIENT
Start: 2024-11-18

## 2024-11-18 RX ORDER — PROCHLORPERAZINE MALEATE 10 MG
10 TABLET ORAL EVERY 6 HOURS PRN
Status: DISCONTINUED | OUTPATIENT
Start: 2024-11-18 | End: 2024-11-18 | Stop reason: HOSPADM

## 2024-11-18 RX ORDER — FAMOTIDINE 10 MG/ML
20 INJECTION INTRAVENOUS ONCE AS NEEDED
Status: CANCELLED | OUTPATIENT
Start: 2024-11-18

## 2024-11-18 ASSESSMENT — ENCOUNTER SYMPTOMS
NAUSEA: 0
APPETITE CHANGE: 0
CARDIOVASCULAR NEGATIVE: 1
SHORTNESS OF BREATH: 1
VOMITING: 0
FEVER: 0
UNEXPECTED WEIGHT CHANGE: 0
MYALGIAS: 1
HEMOPTYSIS: 0
CONSTIPATION: 1
FATIGUE: 1
BACK PAIN: 1

## 2024-11-18 ASSESSMENT — PAIN SCALES - GENERAL: PAINLEVEL_OUTOF10: 5

## 2024-11-18 NOTE — PROGRESS NOTES
SUPPORTIVE AND PALLIATIVE ONCOLOGY FOLLOW-UP - OUTPATIENT      SERVICE DATE: 11/18/2024    Referred by:  Anton Mcgrath MD  Medical Oncologist: No care team member to display   Radiation Oncologist: No care team member to display  Primary Physician: Matilda Garcia  [phone]    REASON FOR CONSULT/CHIEF CONSULT COMPLAINT: pain management and Introduction to Supportive and Palliative Oncology Services    Subjective   HISTORY OF PRESENT ILLNESS: Jovon Mccartney is a 49 y.o. male who presents with a PMH of juvenile RA and newly diagnosed NSCLC with metastatic disease to the right scapula, parieto-occipital bone, C2, C3, bilateral humeral and right anterior acetabular osteolytic lesions. Completing RT to the right scapula and right scalp. Started Carbo/pem/pem 10/28/24.     Pain Assessment:  Pain Score: 5/10  Location: neck and shoulder    Symptom Assessment:  Pain:very much  constant, aching and throbbing pain. In his shoulders and biceps. Reports that the pain can be intermittently shooting. He has had twitching in his biceps that causes an increase in pain. His pain has been a 7/10 at its worst recently and improves to a 5/10 with oxycodone. He has been taking oxycodone 15 mg 4 times per day. He does feel that his pain is more manageable now with the increase in MSContin.   Headache: none  Dizziness:none  Lack of energy: somewhat feels this has improved because he has more awake time during the day now  Difficulty sleeping: somewhat due to pain. 3 hours at a time. One night where he slept for 4 hours straight.   Worrying: none  Anxiety: none  Depression: a little  Pain in mouth/swallowing: none  Dry mouth: none  Taste changes: none  Shortness of breath: none  Lack of appetite: a little. Drinking high protein shakes ~3 per day and boost. Eating fruits and veggies. Improvement in appetite overall.     Nausea: none  Vomiting: none   Constipation: a little had 2 days where he had to manually disimpact himself. He has  been taking Senna and Miralax.   Diarrhea: none  Sore muscles: none  Numbness or tingling in hands/feet/other: none  Weight loss: a little  Other: none      Information obtained from: chart review, interview of patient, and interview of family  ______________________________________________________________________     Oncology History   NSCLC metastatic to bone (Multi)   9/26/2024 Initial Diagnosis    NSCLC metastatic to bone (Multi)     9/26/2024 Cancer Staged    Staging form: Lung, AJCC 8th Edition, Clinical stage from 9/26/2024: Stage IVB (cT4, cN3, cM1c) - Signed by Anton Mcgrath MD PhD on 9/26/2024     10/28/2024 -  Chemotherapy    Pembrolizumab + PEMEtrexed / CARBOplatin, 21 Day Cycles         Past Medical History:   Diagnosis Date    Adalimumab (Humira) long-term use 09/15/2024    Arthritis 1974    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    Hilar mass 09/15/2024    Juvenile rheumatoid arthritis (Multi) 09/15/2024    Lung cancer (Multi) 09 17 2024    Rheumatoid arthritis 10 1974     Past Surgical History:   Procedure Laterality Date    BRONCHOSCOPY  9 16 2024    LUNG BIOPSY  9 16 2024     Family History   Problem Relation Name Age of Onset    Breast cancer Mother JORDY CHURCH     Cancer Mother JORDY CHURCH     Miscarriages / Stillbirths Mother JORDY CHURCH     Cancer Maternal Grandfather HUBER DEMARCO     Stroke Maternal Grandfather HUBER PAL         SOCIAL HISTORY  Children 2, Grandchildren 3, Support system wife and kids, Employment on disability since 17 years old, and Hobbies working on cars and 4 wheeling.    Social History:  reports that he quit smoking about 2 months ago. His smoking use included cigarettes. He has a 58.5 pack-year smoking history. He has been exposed to tobacco smoke. He has never used smokeless tobacco. He reports that he does not currently use alcohol. He reports that he does not currently use drugs.  Quit smoking September 2024.     REVIEW OF SYSTEMS  Review  of systems negative unless noted in HPI.       Objective     Current Outpatient Medications   Medication Instructions    acetaminophen (TYLENOL) 500 mg, Every 6 hours PRN    adalimumab (HUMIRA PEN) 40 mg, Every 14 days    budesonide-glycopyr-formoterol (BREZTRI) 160-9-4.8 mcg/actuation HFA aerosol inhaler 2 puffs, inhalation, 2 times daily    dexAMETHasone (Decadron) 4 mg tablet Take 4 mg (1 tablet) by mouth twice daily the day before treatment, once the evening of treatment, and twice daily the day after treatment. Do not take before October 27, 2024.    dexAMETHasone (Decadron) 4 mg tablet Take 4 mg (1 tablet) by mouth twice daily the day before treatment, once the evening of treatment, and twice daily the day after treatment.    folic acid (Folvite) 1 mg tablet Take 1 tablet (1,000 mcg) by mouth once daily. Do not start before October 27, 2024.    folic acid (FOLVITE) 1,000 mcg, oral, Daily    gabapentin (NEURONTIN) 300 mg, oral, 3 times daily    ibuprofen 200 mg, Every 6 hours PRN    ipratropium-albuteroL (Combivent Respimat)  mcg/actuation inhaler 2 puffs, inhalation, 4 times daily RT    morphine CR (MS CONTIN) 30 mg, oral, 3 times daily, Do not crush, chew, or split.    naloxone (NARCAN) 4 mg, nasal, As needed, May repeat every 2-3 minutes if needed, alternating nostrils, until medical assistance becomes available.    OLANZapine (ZyPREXA) 5 mg tablet Take 1 tablet (5 mg) by mouth once daily at bedtime for 4 days starting the evening of treatment. Do not start before October 27, 2024.    OLANZapine (ZYPREXA) 5 mg, oral, Nightly, For 4 days starting the evening of treatment    ondansetron (Zofran) 8 mg tablet Take 1 tablet (8 mg) by mouth every 8 hours if needed for nausea or vomiting. Do not start before October 27, 2024.    ondansetron (ZOFRAN) 8 mg, oral, Every 8 hours PRN    oxyCODONE (ROXICODONE) 10-15 mg, oral, Every 4 hours PRN, MAX 9 TABS PER DAY    pantoprazole (PROTONIX) 40 mg, oral, Daily before  breakfast, Do not crush, chew, or split.    polyethylene glycol (GLYCOLAX, MIRALAX) 34 g, oral, Daily    prochlorperazine (Compazine) 10 mg tablet Take 1 tablet (10 mg) by mouth every 6 hours if needed for nausea or vomiting. Do not start before October 27, 2024.    prochlorperazine (COMPAZINE) 10 mg, oral, Every 6 hours PRN    sennosides-docusate sodium (Yoselin-Colace) 8.6-50 mg tablet 1 tablet, oral, Nightly PRN       Allergies: No Known Allergies      Lab on 11/18/2024   Component Date Value Ref Range Status    Glucose 11/18/2024 88  74 - 99 mg/dL Final    Sodium 11/18/2024 133 (L)  136 - 145 mmol/L Final    Potassium 11/18/2024 4.3  3.5 - 5.3 mmol/L Final    Chloride 11/18/2024 98  98 - 107 mmol/L Final    Bicarbonate 11/18/2024 28  21 - 32 mmol/L Final    Anion Gap 11/18/2024 11  10 - 20 mmol/L Final    Urea Nitrogen 11/18/2024 11  6 - 23 mg/dL Final    Creatinine 11/18/2024 0.41 (L)  0.50 - 1.30 mg/dL Final    eGFR 11/18/2024 >90  >60 mL/min/1.73m*2 Final    Calculations of estimated GFR are performed using the 2021 CKD-EPI Study Refit equation without the race variable for the IDMS-Traceable creatinine methods.  https://jasn.asnjournals.org/content/early/2021/09/22/ASN.6709418196    Calcium 11/18/2024 9.1  8.6 - 10.3 mg/dL Final    Albumin 11/18/2024 3.3 (L)  3.4 - 5.0 g/dL Final    Alkaline Phosphatase 11/18/2024 105  33 - 120 U/L Final    Total Protein 11/18/2024 6.8  6.4 - 8.2 g/dL Final    AST 11/18/2024 14  9 - 39 U/L Final    Bilirubin, Total 11/18/2024 0.5  0.0 - 1.2 mg/dL Final    ALT 11/18/2024 14  10 - 52 U/L Final    Patients treated with Sulfasalazine may generate falsely decreased results for ALT.    WBC 11/18/2024 6.1  4.4 - 11.3 x10*3/uL Final    nRBC 11/18/2024    Final    Not Measured    RBC 11/18/2024 2.26 (L)  4.50 - 5.90 x10*6/uL Final    Hemoglobin 11/18/2024 7.1 (L)  13.5 - 17.5 g/dL Final    Hematocrit 11/18/2024 21.2 (L)  41.0 - 52.0 % Final    MCV 11/18/2024 94  80 - 100 fL Final    MCH  11/18/2024 31.4  26.0 - 34.0 pg Final    MCHC 11/18/2024 33.5  32.0 - 36.0 g/dL Final    RDW 11/18/2024 17.4 (H)  11.5 - 14.5 % Final    Platelets 11/18/2024 325  150 - 450 x10*3/uL Final    Neutrophils % 11/18/2024 48.6  40.0 - 80.0 % Final    Immature Granulocytes %, Automated 11/18/2024 1.2 (H)  0.0 - 0.9 % Final    Immature Granulocyte Count (IG) includes promyelocytes, myelocytes and metamyelocytes but does not include bands. Percent differential counts (%) should be interpreted in the context of the absolute cell counts (cells/UL).    Lymphocytes % 11/18/2024 31.1  13.0 - 44.0 % Final    Monocytes % 11/18/2024 18.6  2.0 - 10.0 % Final    Eosinophils % 11/18/2024 0.2  0.0 - 6.0 % Final    Basophils % 11/18/2024 0.3  0.0 - 2.0 % Final    Neutrophils Absolute 11/18/2024 2.96  1.20 - 7.70 x10*3/uL Final    Percent differential counts (%) should be interpreted in the context of the absolute cell counts (cells/uL).    Immature Granulocytes Absolute, Au* 11/18/2024 0.07  0.00 - 0.70 x10*3/uL Final    Lymphocytes Absolute 11/18/2024 1.89  1.20 - 4.80 x10*3/uL Final    Monocytes Absolute 11/18/2024 1.13 (H)  0.10 - 1.00 x10*3/uL Final    Eosinophils Absolute 11/18/2024 0.01  0.00 - 0.70 x10*3/uL Final    Basophils Absolute 11/18/2024 0.02  0.00 - 0.10 x10*3/uL Final   Lab on 11/11/2024   Component Date Value Ref Range Status    Glucose 11/11/2024 107 (H)  74 - 99 mg/dL Final    Sodium 11/11/2024 130 (L)  136 - 145 mmol/L Final    Potassium 11/11/2024 4.0  3.5 - 5.3 mmol/L Final    Chloride 11/11/2024 95 (L)  98 - 107 mmol/L Final    Bicarbonate 11/11/2024 27  21 - 32 mmol/L Final    Anion Gap 11/11/2024 12  10 - 20 mmol/L Final    Urea Nitrogen 11/11/2024 11  6 - 23 mg/dL Final    Creatinine 11/11/2024 0.39 (L)  0.50 - 1.30 mg/dL Final    eGFR 11/11/2024 >90  >60 mL/min/1.73m*2 Final    Calculations of estimated GFR are performed using the 2021 CKD-EPI Study Refit equation without the race variable for the  IDMS-Traceable creatinine methods.  https://jasn.asnjournals.org/content/early/2021/09/22/ASN.8113673677    Calcium 11/11/2024 8.8  8.6 - 10.3 mg/dL Final    Albumin 11/11/2024 3.2 (L)  3.4 - 5.0 g/dL Final    Alkaline Phosphatase 11/11/2024 101  33 - 120 U/L Final    Total Protein 11/11/2024 6.4  6.4 - 8.2 g/dL Final    AST 11/11/2024 12  9 - 39 U/L Final    Bilirubin, Total 11/11/2024 0.7  0.0 - 1.2 mg/dL Final    ALT 11/11/2024 12  10 - 52 U/L Final    Patients treated with Sulfasalazine may generate falsely decreased results for ALT.    WBC 11/11/2024 2.4 (L)  4.4 - 11.3 x10*3/uL Final    nRBC 11/11/2024    Final    Not Measured    RBC 11/11/2024 2.30 (L)  4.50 - 5.90 x10*6/uL Final    Hemoglobin 11/11/2024 7.0 (L)  13.5 - 17.5 g/dL Final    Hematocrit 11/11/2024 20.7 (L)  41.0 - 52.0 % Final    MCV 11/11/2024 90  80 - 100 fL Final    MCH 11/11/2024 30.4  26.0 - 34.0 pg Final    MCHC 11/11/2024 33.8  32.0 - 36.0 g/dL Final    RDW 11/11/2024 14.2  11.5 - 14.5 % Final    Platelets 11/11/2024 42 (L)  150 - 450 x10*3/uL Final    Platelet count verified by smear review.    Neutrophils % 11/11/2024 20.7  40.0 - 80.0 % Final    Immature Granulocytes %, Automated 11/11/2024 0.0  0.0 - 0.9 % Final    Immature Granulocyte Count (IG) includes promyelocytes, myelocytes and metamyelocytes but does not include bands. Percent differential counts (%) should be interpreted in the context of the absolute cell counts (cells/UL).    Lymphocytes % 11/11/2024 62.4  13.0 - 44.0 % Final    Monocytes % 11/11/2024 16.5  2.0 - 10.0 % Final    Eosinophils % 11/11/2024 0.4  0.0 - 6.0 % Final    Basophils % 11/11/2024 0.0  0.0 - 2.0 % Final    Neutrophils Absolute 11/11/2024 0.49 (L)  1.20 - 7.70 x10*3/uL Final    Percent differential counts (%) should be interpreted in the context of the absolute cell counts (cells/uL).    Immature Granulocytes Absolute, Au* 11/11/2024 0.00  0.00 - 0.70 x10*3/uL Final    Lymphocytes Absolute 11/11/2024 1.48   1.20 - 4.80 x10*3/uL Final    Monocytes Absolute 11/11/2024 0.39  0.10 - 1.00 x10*3/uL Final    Eosinophils Absolute 11/11/2024 0.01  0.00 - 0.70 x10*3/uL Final    Basophils Absolute 11/11/2024 0.00  0.00 - 0.10 x10*3/uL Final    Automated WBC differential has been confirmed by manual smear.    RBC Morphology 11/11/2024 See Below   Final    Polychromasia 11/11/2024 Mild   Final    RBC Fragments 11/11/2024 Few   Final    Ovalocytes 11/11/2024 Few   Final   Office Visit on 11/04/2024   Component Date Value Ref Range Status    Coronavirus 2019, PCR 11/04/2024 Not Detected  Not Detected Final    Flu A Result 11/04/2024 Not Detected  Not Detected Final    Flu B Result 11/04/2024 Not Detected  Not Detected Final    RSV PCR 11/04/2024 Not Detected  Not Detected Final   Lab on 11/04/2024   Component Date Value Ref Range Status    WBC 11/04/2024 3.2 (L)  4.4 - 11.3 x10*3/uL Final    nRBC 11/04/2024 0.0  0.0 - 0.0 /100 WBCs Final    RBC 11/04/2024 2.61 (L)  4.50 - 5.90 x10*6/uL Final    Hemoglobin 11/04/2024 8.0 (L)  13.5 - 17.5 g/dL Final    Hematocrit 11/04/2024 23.3 (L)  41.0 - 52.0 % Final    MCV 11/04/2024 89  80 - 100 fL Final    MCH 11/04/2024 30.7  26.0 - 34.0 pg Final    MCHC 11/04/2024 34.3  32.0 - 36.0 g/dL Final    RDW 11/04/2024 14.2  11.5 - 14.5 % Final    Platelets 11/04/2024 106 (L)  150 - 450 x10*3/uL Final    Immature Granulocytes %, Automated 11/04/2024 0.6  0.0 - 0.9 % Final    Immature Granulocyte Count (IG) includes promyelocytes, myelocytes and metamyelocytes but does not include bands. Percent differential counts (%) should be interpreted in the context of the absolute cell counts (cells/UL).    Immature Granulocytes Absolute, Au* 11/04/2024 0.02  0.00 - 0.70 x10*3/uL Final    Neutrophils %, Manual 11/04/2024 40.0  40.0 - 80.0 % Final    Percent differential counts (%) should be interpreted in the context of the absolute cell counts (cells/uL).    Bands %, Manual 11/04/2024 1.0  0.0 - 5.0 % Final     Lymphocytes %, Manual 11/04/2024 53.0  13.0 - 44.0 % Final    Monocytes %, Manual 11/04/2024 3.0  2.0 - 10.0 % Final    Eosinophils %, Manual 11/04/2024 0.0  0.0 - 6.0 % Final    Basophils %, Manual 11/04/2024 1.0  0.0 - 2.0 % Final    Atypical Lymphocytes %, Manual 11/04/2024 2.0  0.0 - 2.0 % Final    Seg Neutrophils Absolute, Manual 11/04/2024 1.28  1.20 - 7.00 x10*3/uL Final    Bands Absolute, Manual 11/04/2024 0.03  0.00 - 0.70 x10*3/uL Final    Lymphocytes Absolute, Manual 11/04/2024 1.70  1.20 - 4.80 x10*3/uL Final    Monocytes Absolute, Manual 11/04/2024 0.10  0.10 - 1.00 x10*3/uL Final    Eosinophils Absolute, Manual 11/04/2024 0.00  0.00 - 0.70 x10*3/uL Final    Basophils Absolute, Manual 11/04/2024 0.03  0.00 - 0.10 x10*3/uL Final    Atypical Lymphs Absolute, Manual 11/04/2024 0.06  0.00 - 0.50 x10*3/uL Final    Total Cells Counted 11/04/2024 100   Final    Neutrophils Absolute, Manual 11/04/2024 1.31  1.20 - 7.70 x10*3/uL Final    RBC Morphology 11/04/2024 See Below   Final    Hypochromia 11/04/2024 Mild   Final   Lab on 11/04/2024   Component Date Value Ref Range Status    Glucose 11/04/2024 110 (H)  74 - 99 mg/dL Final    Sodium 11/04/2024 127 (L)  136 - 145 mmol/L Final    Potassium 11/04/2024 4.1  3.5 - 5.3 mmol/L Final    Chloride 11/04/2024 91 (L)  98 - 107 mmol/L Final    Bicarbonate 11/04/2024 27  21 - 32 mmol/L Final    Anion Gap 11/04/2024 13  10 - 20 mmol/L Final    Urea Nitrogen 11/04/2024 16  6 - 23 mg/dL Final    Creatinine 11/04/2024 0.46 (L)  0.50 - 1.30 mg/dL Final    eGFR 11/04/2024 >90  >60 mL/min/1.73m*2 Final    Calculations of estimated GFR are performed using the 2021 CKD-EPI Study Refit equation without the race variable for the IDMS-Traceable creatinine methods.  https://jasn.asnjournals.org/content/early/2021/09/22/ASN.4480183541    Calcium 11/04/2024 9.0  8.6 - 10.3 mg/dL Final    Albumin 11/04/2024 3.5  3.4 - 5.0 g/dL Final    Alkaline Phosphatase 11/04/2024 96  33 - 120 U/L  Final    Total Protein 11/04/2024 6.8  6.4 - 8.2 g/dL Final    AST 11/04/2024 13  9 - 39 U/L Final    Bilirubin, Total 11/04/2024 0.8  0.0 - 1.2 mg/dL Final    ALT 11/04/2024 11  10 - 52 U/L Final    Patients treated with Sulfasalazine may generate falsely decreased results for ALT.   Infusion on 10/28/2024   Component Date Value Ref Range Status    Thyroid Stimulating Hormone 10/28/2024 1.54  0.44 - 3.98 mIU/L Final    Cortisol  A.M. 10/28/2024 3.7 (L)  5.0 - 20.0 ug/dL Final    Adrenocorticotropic Hormone (ACTH) 10/28/2024 8.5  7.2 - 63.3 pg/mL Final    INTERPRETIVE INFORMATION: Adrenocorticotropic Hormone    Reference interval based on samples collected between 7 a.m. and   10 a.m.  No reference intervals established for p.m. collections.    Pediatric reference values are the same as adults (Acta Paediatr   Scand 1981;70:341-345).  This assay measures intact ACTH 1-39;   some types of synthetic ACTH and ACTH fragments are not detected   by this assay.  Performed By: [facility]  [address] [place] [id number]  : Umang Huggins MD, PhD  CLIA Number: [id number]    Hepatitis B Surface AB 10/28/2024 <3.1  <10.0 mIU/mL Final    Interpretive Criteria:                         <10 mIU/mL    Nonreactive                          >=10 mIU/mL    Reactive     Biotin interference may cause falsely decreased results. Patients taking a Biotin dose of up to 5 mg/day should refrain from taking Biotin for 24 hours before sample collection. Providers may contact their local laboratory for further information.    Hepatitis B Core AB- Total 10/28/2024 Nonreactive  Nonreactive Final    Hepatitis B Surface AG 10/28/2024 Nonreactive  Nonreactive Final    Biotin interference may cause falsely decreased results. Patients taking a Biotin dose of up to 5 mg/day should refrain from taking Biotin for 24 hours before sample collection. Providers may contact their local laboratory for  further  information.    Glucose 10/28/2024 106 (H)  74 - 99 mg/dL Final    Sodium 10/28/2024 133 (L)  136 - 145 mmol/L Final    Potassium 10/28/2024 3.1 (L)  3.5 - 5.3 mmol/L Final    Chloride 10/28/2024 103  98 - 107 mmol/L Final    Bicarbonate 10/28/2024 23  21 - 32 mmol/L Final    Anion Gap 10/28/2024 10  10 - 20 mmol/L Final    Urea Nitrogen 10/28/2024 13  6 - 23 mg/dL Final    Creatinine 10/28/2024 0.39 (L)  0.50 - 1.30 mg/dL Final    eGFR 10/28/2024 >90  >60 mL/min/1.73m*2 Final    Calculations of estimated GFR are performed using the 2021 CKD-EPI Study Refit equation without the race variable for the IDMS-Traceable creatinine methods.  https://jasn.asnjournals.org/content/early/2021/09/22/ASN.8012746113    Calcium 10/28/2024 7.6 (L)  8.6 - 10.3 mg/dL Final    Albumin 10/28/2024 3.0 (L)  3.4 - 5.0 g/dL Final    Alkaline Phosphatase 10/28/2024 87  33 - 120 U/L Final    Total Protein 10/28/2024 5.9 (L)  6.4 - 8.2 g/dL Final    AST 10/28/2024 9  9 - 39 U/L Final    Bilirubin, Total 10/28/2024 0.7  0.0 - 1.2 mg/dL Final    ALT 10/28/2024 7 (L)  10 - 52 U/L Final    Patients treated with Sulfasalazine may generate falsely decreased results for ALT.    WBC 10/28/2024 6.6  4.4 - 11.3 x10*3/uL Final    nRBC 10/28/2024    Final    Not Measured    RBC 10/28/2024 3.31 (L)  4.50 - 5.90 x10*6/uL Final    Hemoglobin 10/28/2024 10.3 (L)  13.5 - 17.5 g/dL Final    Hematocrit 10/28/2024 30.9 (L)  41.0 - 52.0 % Final    MCV 10/28/2024 93  80 - 100 fL Final    MCH 10/28/2024 31.1  26.0 - 34.0 pg Final    MCHC 10/28/2024 33.3  32.0 - 36.0 g/dL Final    RDW 10/28/2024 15.3 (H)  11.5 - 14.5 % Final    Platelets 10/28/2024 306  150 - 450 x10*3/uL Final    Neutrophils % 10/28/2024 61.2  40.0 - 80.0 % Final    Immature Granulocytes %, Automated 10/28/2024 0.2  0.0 - 0.9 % Final    Immature Granulocyte Count (IG) includes promyelocytes, myelocytes and metamyelocytes but does not include bands. Percent differential counts (%) should be  "interpreted in the context of the absolute cell counts (cells/UL).    Lymphocytes % 10/28/2024 27.4  13.0 - 44.0 % Final    Monocytes % 10/28/2024 10.7  2.0 - 10.0 % Final    Eosinophils % 10/28/2024 0.2  0.0 - 6.0 % Final    Basophils % 10/28/2024 0.3  0.0 - 2.0 % Final    Neutrophils Absolute 10/28/2024 4.03  1.20 - 7.70 x10*3/uL Final    Percent differential counts (%) should be interpreted in the context of the absolute cell counts (cells/uL).    Immature Granulocytes Absolute, Au* 10/28/2024 0.01  0.00 - 0.70 x10*3/uL Final    Lymphocytes Absolute 10/28/2024 1.80  1.20 - 4.80 x10*3/uL Final    Monocytes Absolute 10/28/2024 0.70  0.10 - 1.00 x10*3/uL Final    Eosinophils Absolute 10/28/2024 0.01  0.00 - 0.70 x10*3/uL Final    Basophils Absolute 10/28/2024 0.02  0.00 - 0.10 x10*3/uL Final        PHYSICAL EXAMINATION  Vital Signs:       10/28/2024    11:00 AM 10/28/2024     3:00 PM 11/4/2024     9:22 AM 11/4/2024    10:58 AM 11/4/2024     2:20 PM 11/11/2024    10:05 AM 11/18/2024     8:44 AM   Vitals   Systolic 136  118  125 103 109   Diastolic 77  70  66 72 66   Heart Rate 72  99  97 86 91   Temp 36.6 °C (97.9 °F)  37.7 °C (99.9 °F) 37.9 °C (100.2 °F) 37.3 °C (99.1 °F) 36.9 °C (98.4 °F) 36.6 °C (97.9 °F)   Resp 18  18  16 16 16   Height (in) 1.659 m (5' 5.32\") 1.659 m (5' 5.32\")   1.676 m (5' 6\")     Weight (lb) 116.73 116.73 114.2  114 123 119.5   BMI 19.24 kg/m2 19.24 kg/m2 18.82 kg/m2  18.4 kg/m2 19.85 kg/m2 19.29 kg/m2   BSA (m2) 1.56 m2 1.56 m2 1.55 m2  1.55 m2 1.61 m2 1.59 m2   Visit Report Report Report Report    Report Report    Report Report    Report Report Report     Vital signs reviewed       Physical Exam  Constitutional:       Appearance: He is underweight.   HENT:      Head: Normocephalic.   Eyes:      Pupils: Pupils are equal, round, and reactive to light.   Pulmonary:      Effort: Pulmonary effort is normal.   Musculoskeletal:         General: Normal range of motion.      Right hand: Swelling " present.      Left hand: Swelling present.      Right foot: Swelling present.      Left foot: Swelling present.   Neurological:      Mental Status: He is oriented to person, place, and time.   Psychiatric:         Mood and Affect: Mood normal.         Behavior: Behavior normal.          ASSESSMENT/PLAN    Pain  Pain is: cancer related pain  Type: somatic  Pain control: sub-optimally controlled  Home regimen:   - Continue Oxycodone 15 mg every 4 hours as needed  - Continue MSContin 30 mg TID.   - Continue Tylenol 1000 mg every 8 hours as needed  - Continue Ibuprofen 600 mg every 6 hours as needed   - Continue with RT    Opioid Use  Medication Management:   - OARRS report reviewed with no aberrant behavior; consistent with  prescriptions/records and patient history  - .  Overdose Risk Score 430.   This has been discussed with patient.   - We will continue to closely monitor the patient for signs of prescription misuse including UDS, OARRS review and subjective reports at each visit.  - No concurrent benzodiazepine use   - I am a provider who either is or has consulted and collaborated with a provider certified in Hospice and Palliative Medicine and have conducted a face-face visit and examination for this patient.  - Routine Urine Drug Screen complete 10/8/24 appropriately positive for opioids and negative for illicit substances  - Controlled Substance Agreement completed 10/8/24  - Specifically discussed that controlled substance prescriptions will only be provided by our group as outlined in the completed agreement  - Prescribed naloxone prescribed 9/27/24 - patient has at home  - Red Flags: none    Constipation   At risk for constipation related to opioids,  currently not constipated   Usual bowel pattern: every 1-2 days   Current regimen:   - Continue Senna 2 tabs BID. May increase to 3 tabs BID if needed.   - Continue Miralax 17 g daily. Increase to 2 times per day if constipation continues  - If no BM  within 48-72 hours, start MOM 8% every 6 hours     Sleeping Difficulty:  Impaired sleep related to pain  Current regimen:    - Continue pain medication as described above    Decreased appetite  Related to malignancy  Nutrition consult - may consider at next visit  Current regimen:    - Encouraged smaller, more frequent meals  - encouraged ensure/boost 1-2 times per day  - Drinking high calorie protein smoothies   - Met with Nena Felder    Advance Directives  Existence of Advance Directives:Yes, documentation or copy in medical record  Decision maker: HCPOA is Judith Sherrill  Code Status: Full code    Next Follow-Up Visit:  Return to clinic in 6 weeks     Signature and billing  Thank you for allowing us to participate in the care of this patient. Recommendations will be communicated back to the consulting service by way of shared electronic medical record or face-to-face.    Medical complexity was high level due to due to complexity of problems, extensive data review, and high risk of management/treatment.  Time was spent on the following: Prep Time, Time Directly with Patient/Family/Caregiver, Documentation Time. Total time spent: 30      DATA   Diagnostic tests and information reviewed for today's visit:  Most recent labs, Most recent imaging, Medications       Some elements copied from Oncology note on 10/28/24, the elements have been updated and all reflect current decision making from today, 11/18/2024.      Plan of Care discussed with: Patient and Family/Significant Other: wife      SIGNATURE: ADELINA Jacobo    Contact information:  Supportive and Palliative Oncology  Monday-Friday 8 AM-5 PM  Phone:  [phone], press option #5, then option #1.   Or Epic Secure Chat

## 2024-11-18 NOTE — PROGRESS NOTES
[facility] - Medical Oncology Follow-Up Visit    Patient ID: Jovon Mccartney is a 49 y.o. male with NSCLC adenocarcinoma     Current therapy: cyanocobalamin (Vitamin B-12) injection 1,000 mcg, 1,000 mcg, intramuscular, Once, 1 of 2 cycles    Administration: 1,000 mcg (10/28/2024)        fosaprepitant (Emend) 150 mg in sodium chloride 0.9% 150 mL IV, 150 mg, intravenous, Once, 1 of 4 cycles    Administration: 150 mg (10/28/2024)        CARBOplatin (Paraplatin) 627 mg in sodium chloride 0.9% 172.7 mL IV, 627 mg, intravenous, Once, 1 of 4 cycles    Administration: 627 mg (10/28/2024)        methylPREDNISolone sod succinate (SOLU-Medrol) 40 mg/mL injection 40 mg, 40 mg, intravenous, As needed, 1 of 4 cycles        palonosetron (Aloxi) injection 250 mcg, 250 mcg, intravenous, Once, 1 of 4 cycles    Administration: 250 mcg (10/28/2024)        pembrolizumab (Keytruda) 200 mg in sodium chloride 0.9% 118 mL IV, 200 mg, intravenous, Once, 1 of 4 cycles    Administration: 200 mg (10/28/2024)        PEMEtrexed disodium (Alimta) 800 mg in sodium chloride 0.9% 142 mL IV, 500 mg/m2 = 800 mg, intravenous, Once, 1 of 4 cycles    Dose modification: 400 mg/m2 (original dose 500 mg/m2, Cycle 2)    Administration: 800 mg (10/28/2024)       Chief Concern: Tox check visit    Oncologic History:     DIAGNOSIS  NSCLC adenocarcinoma      STAGING  cT4 pN3 M1c      CURRENT SITES OF DISEASE  R hilar mass, 4L, 11L, scapula,  right  parieto-occipital bone, C2, C3, ?lymphatic carcinomatosis     MOLECULAR GENOMICS  KRAS p.G12C ([id number] c.34G>T)   PD-L1 <1%     PRIOR THERAPY        CURRENT THERAPY  Palliative XRT skull, C-spine, R shoulder 10/10/24 - 10/16/24  Carboplatin (AUC 5), Pemetrexed (500mg/m2), & Pembrolizumab 10/28/24 -   - Pemetrexed dose reduced 400mg/m2 - C2 11/18/24      CURRENT ONCOLOGICAL PROBLEMS  Hemoptysis - resolved  Epistaxis - intermittent        HISTORY OF PRESENT ILLNESS  Mr. Mccartney is a 48 yo  with PMH significant for RA who present to the [place] ER on 9/9/24 with 2 months of progressively worsening headaches and neck pain with occasional radiation to the R and L arms. A lytic lesion was noted on CT head 9/9/24 of the parietal bone, and CT cervical spine was concerning for lucent lesions C2 and C3. Brain MRI and MR cervical spine confirmed marrow-replacing lesions fo the C2 and C3 vertebral bodies. CT C/A/P w/o contrast on 9/10/24 was concerning for soft tissue mass of the R hilum, lytic lesions of the R scapula, and 3.5 cm mass in the L gluteus. He was transferred to [facility] on 9/10/24 CT C/A/P w/IV contrast on 9/15/24 confirmed the lung mass encasing the R mainstem bronchus abutting the R main pulmonary artery and extending to the lung periphery, as well as R perihilar lesion and lymphangitic carcinomatosis, prominent mediastinal nodes, L axillary node, nodular thickening of bilateral adrenal glands, likely lesion of the R scapula, small hepatic lesions, moderate pericardial effusion. EBUS on 9/17/24 with pathology of R hilar mass with adenocarcinoma, KRAS G12C, PD-L1 pending, and positive lymph nodes 11L and 4L. He met Dr. Mcgrath and discussed palliative radiation to the occipital lesion and cervical spine. PET/CT on 10/1/24 with FDG avid confluent R hilar/mediastinal mass, mild FDG avidity along upper and middle lobe, bilateral scapular, humeral, R anterior acetabular osseous metastases. He started palliative radiation 10/10/24-10/16/24.      He was born with juvenile RA, usually his feet and knees feel good compared to everything. He used to walk with a crutch, since the hospital he has been walking without it. He follows with a rheumatologist at [place]. It's fairly managed he says, has  been on humira for 20-25 years, right when it first came out. Kept the fluid off his knees and helped him better than anything else. He was on all kinds of drugs he said before including steroids, methotrexate, others.  "Typically with the humira he doesn't really get flares, sometimes in his wrists. He's been off for about a month, so thinks he'd have a flare in about a month.      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 Maybe had one exposure to asbestos. 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 Lives at home with his wife and a dog, 3 cats, a bird and fish. They have 2 sons and 3 grandchildren.  Tob: quit smoking 9/9/24, smoked 39 years, 1-1.5 ppd  EtOH: occasional  Illicits: previous smoking marijuana, only edibles now for pain     FAMILY HISTORY  Mother - breast cancer dx 56 yo  Maternal grandmother - bone cancer  Paternal cousin - bone and/or lung cancer  Paternal grandmother - unknown cancer  No other family members with autoimmune diseases    HPI   He is here today with his son.  Breathing feels good.  Using oxygen all the time now.   Spo2 at home mid-80's on RA.  2L - SpO2 90-95%.    Discussed increasing to 4L with exertion.  Nose bleed yesterday.  Scratched his nose to release scab/dried blood.  Stopped quickly.  Feeling good.  Highest temps 99.2.   Appetite - good to fair.  Routine protein shake(s).  Eating softer foods.  Wt gain noted.  Staying hydrated.  Voiding fine.  +Constipation - resolved.  BM yesterday - \"rough.\"  Increased miralax dose to BID.  Denies n/v.  Pain managed with morphine, oxy, and gabapentin.  Pain 5/10.   Hgb 7.1 today.  Discussed with Dr. Alicia suarez to treat today.  Will repeat labs in one week.  No other concerns today.      Meds (Current):    Current Outpatient Medications:     acetaminophen (Tylenol) 500 mg tablet, Take 1 tablet (500 mg) by mouth every 6 hours if needed for mild pain (1 - 3)., Disp: , Rfl:     adalimumab (Humira Pen) 40 mg/0.8 mL pen injector kit pen-injector, Inject 1 Pen (40 mg) under the skin every 14 (fourteen) days., Disp: , Rfl:     " budesonide-glycopyr-formoterol (BREZTRI) 160-9-4.8 mcg/actuation HFA aerosol inhaler, Inhale 2 puffs 2 times a day., Disp: 10.7 g, Rfl: 3    dexAMETHasone (Decadron) 4 mg tablet, Take 4 mg (1 tablet) by mouth twice daily the day before treatment, once the evening of treatment, and twice daily the day after treatment. Do not take before October 27, 2024., Disp: 5 tablet, Rfl: 5    dexAMETHasone (Decadron) 4 mg tablet, Take 4 mg (1 tablet) by mouth twice daily the day before treatment, once the evening of treatment, and twice daily the day after treatment. Do not fill before October 27, 2024., Disp: 5 tablet, Rfl: 5    folic acid (Folvite) 1 mg tablet, Take 1 tablet (1,000 mcg) by mouth once daily. Do not start before October 27, 2024., Disp: 30 tablet, Rfl: 11    folic acid (Folvite) 1 mg tablet, Take 1 tablet (1,000 mcg) by mouth once daily. Do not fill before October 27, 2024., Disp: 30 tablet, Rfl: 11    gabapentin (Neurontin) 300 mg capsule, Take 1 capsule (300 mg) by mouth 3 times a day., Disp: 90 capsule, Rfl: 3    ibuprofen 200 mg tablet, Take 1 tablet (200 mg) by mouth every 6 hours if needed for mild pain (1 - 3)., Disp: , Rfl:     ipratropium-albuteroL (Combivent Respimat)  mcg/actuation inhaler, Inhale 2 puffs 4 times a day., Disp: 12 g, Rfl: 11    morphine CR (MS Contin) 30 mg 12 hr tablet, Take 1 tablet (30 mg) by mouth 3 times a day. Do not crush, chew, or split., Disp: 90 tablet, Rfl: 0    naloxone (Narcan) 4 mg/0.1 mL nasal spray, Administer 1 spray (4 mg) into affected nostril(s) if needed for opioid reversal. May repeat every 2-3 minutes if needed, alternating nostrils, until medical assistance becomes available., Disp: 2 each, Rfl: 3    OLANZapine (ZyPREXA) 5 mg tablet, Take 1 tablet (5 mg) by mouth once daily at bedtime for 4 days starting the evening of treatment. Do not start before October 27, 2024., Disp: 4 tablet, Rfl: 3    OLANZapine (ZyPREXA) 5 mg tablet, Take 1 tablet (5 mg) by mouth  once daily at bedtime. For 4 days starting the evening of treatment Do not fill before October 27, 2024., Disp: 4 tablet, Rfl: 3    ondansetron (Zofran) 8 mg tablet, Take 1 tablet (8 mg) by mouth every 8 hours if needed for nausea or vomiting. Do not start before October 27, 2024., Disp: 30 tablet, Rfl: 5    ondansetron (Zofran) 8 mg tablet, Take 1 tablet (8 mg) by mouth every 8 hours if needed for nausea or vomiting. Do not fill before October 27, 2024., Disp: 30 tablet, Rfl: 5    oxyCODONE (Roxicodone) 10 mg immediate release tablet, Take 1-1.5 tablets (10-15 mg) by mouth every 4 hours if needed for moderate pain (4 - 6) or severe pain (7 - 10). MAX 9 TABS PER DAY, Disp: 270 tablet, Rfl: 0    pantoprazole (ProtoNix) 40 mg EC tablet, Take 1 tablet (40 mg) by mouth once daily in the morning. Take before meals. Do not crush, chew, or split., Disp: 30 tablet, Rfl: 2    polyethylene glycol (Glycolax, Miralax) 17 gram/dose powder, Take 34 g by mouth once daily., Disp: 3060 g, Rfl: 0    prochlorperazine (Compazine) 10 mg tablet, Take 1 tablet (10 mg) by mouth every 6 hours if needed for nausea or vomiting. Do not start before October 27, 2024., Disp: 30 tablet, Rfl: 5    prochlorperazine (Compazine) 10 mg tablet, Take 1 tablet (10 mg) by mouth every 6 hours if needed for nausea or vomiting. Do not fill before October 27, 2024., Disp: 30 tablet, Rfl: 5    sennosides-docusate sodium (Yoselin-Colace) 8.6-50 mg tablet, Take 1 tablet by mouth as needed at bedtime for constipation., Disp: 30 tablet, Rfl: 3    Review of Systems   Constitutional:  Positive for fatigue. Negative for appetite change, fever and unexpected weight change.   HENT:   Positive for nosebleeds.    Respiratory:  Positive for shortness of breath. Negative for hemoptysis.    Cardiovascular: Negative.    Gastrointestinal:  Positive for constipation. Negative for nausea and vomiting.   Musculoskeletal:  Positive for back pain and myalgias.   All other systems  reviewed and are negative.       Objective   BSA: 1.59 meters squared  Wt Readings from Last 5 Encounters:   11/18/24 54.2 kg (119 lb 8 oz)   11/11/24 55.8 kg (123 lb)   11/04/24 51.7 kg (114 lb)   11/04/24 51.8 kg (114 lb 3.2 oz)   10/28/24 53 kg (116 lb 11.7 oz)     11/11/24 wt #123 - leg braces worn (8#)    /66 (BP Location: Left arm, Patient Position: Sitting)   Pulse 91   Temp 36.6 °C (97.9 °F) (Temporal)   Resp 16   Wt 54.2 kg (119 lb 8 oz)   SpO2 91% Comment: 2L nasal cannula  BMI 19.29 kg/m²     ECOG Score:   Performance Status (ECOG): 2    ECOG   Definition  0          Fully active; no performance restrictions.  1          Strenuous physical activity restricted; fully ambulatory and able to carry out light work.  2          Capable of all self-care but unable to carry out any work activities. Up and about >50% of waking hours.  3          Capable of only limited self-care; confined to bed or chair >50% of waking hours.  4          Completely disabled; cannot carry out any self-care; totally confined to bed or chair.    Physical Exam  Vitals reviewed.   Constitutional:       General: He is not in acute distress.     Appearance: He is not ill-appearing.      Comments: Frail, cachectic     HENT:      Head: Normocephalic and atraumatic.      Mouth/Throat:      Mouth: Mucous membranes are moist.   Eyes:      Extraocular Movements: Extraocular movements intact.      Pupils: Pupils are equal, round, and reactive to light.   Cardiovascular:      Rate and Rhythm: Normal rate and regular rhythm.      Heart sounds: Normal heart sounds. No murmur heard.  Pulmonary:      Effort: Pulmonary effort is normal. No respiratory distress.      Breath sounds: Normal breath sounds.      Comments: Diminished on right.  Abdominal:      General: Bowel sounds are normal. There is no distension.      Palpations: Abdomen is soft.   Musculoskeletal:      Cervical back: Normal range of motion.      Right lower leg: Edema  (ankles 2/2 RA) present.      Left lower leg: Edema (ankles 2/2 RA) present.      Comments: Assist x1 to stand, ambulated independently   Chronic swelling to TINY ankles and left wrist/hand 2/2 RA.   Skin:     General: Skin is warm and dry.   Neurological:      General: No focal deficit present.      Mental Status: He is alert and oriented to person, place, and time. Mental status is at baseline.   Psychiatric:         Mood and Affect: Mood normal.         Behavior: Behavior normal.         Thought Content: Thought content normal.          Results:  Labs:  Lab Results   Component Value Date    WBC 6.1 11/18/2024    HGB 7.1 (L) 11/18/2024    HCT 21.2 (L) 11/18/2024    MCV 94 11/18/2024     11/18/2024      Lab Results   Component Value Date    NEUTROABS 2.96 11/18/2024      Lab Results   Component Value Date    GLUCOSE 88 11/18/2024    CALCIUM 9.1 11/18/2024     (L) 11/18/2024    K 4.3 11/18/2024    CO2 28 11/18/2024    CL 98 11/18/2024    BUN 11 11/18/2024    CREATININE 0.41 (L) 11/18/2024    MG 2.07 09/18/2024     Lab Results   Component Value Date    ALT 14 11/18/2024    AST 14 11/18/2024    ALKPHOS 105 11/18/2024    BILITOT 0.5 11/18/2024    BILIDIR 0.1 09/14/2024      Lab Results   Component Value Date    ACTH 8.5 10/28/2024    CORTISOL 3.7 (L) 10/28/2024    TSH 1.54 10/28/2024       Imaging:  No new imaging.      Assessment/Plan      Jovon Mccartney is a 49 y.o. male here for follow up of NSCLC adenocarcinoma.     # NSCLC adenocarcinoma  - [id number]  - KRAS G12C, PD-L1 <1%  - discussed that SoC therapies include chemotherapy+ immunotherapy. Given longstanding juvenile RA, would like input from his rheumatologist regarding safety of immunotherapy in this setting. Discussed if no immunotherapy, would recommend chemotherapy alone vs KRAS G12C targeted therapy frontline, which typically is given in the second-line setting.  - provided information on carboplatin/pemetrexed/pembrolizumab vs adagrasib   -  patient discussed with his rheumatologist regarding concern for flaring RA on immunotherapy. His rheumatologist will monitor closely, stop humira, and maintain him on low-dose steroids  - consented for carboplatin/pemetrexed/pembrolizumab to start 10/28/24  - will plan on utilizing adagrasib in the 2nd line setting if needed  - will plan on restaging scans after C4 (not yet ordered)  - C2 11/18/24, Pemetrexed dose reduced 400mg/m2  - RTC in 1 week for repeat labs, +T&S, ABO  - RTC in 3 weeks for C3.       # Neoplasm related pain  - already saw Dr. Mcgrath, receiving palliative radiation  - seeing supportive onc - started pt on ER Morphine and PRN Oxycodone.    - Pt also taking Ibuprofen 600mg QID and 1000mg Acetaminophen QID.  Recommended pt reduce both doses x1 to start.  Pt will cut back to TID x1 week and see if his pain/RA is tolerable.    - 11/18/24 - current TID Acetaminophen and Ibuprofen daily, plus Morphine, Oxycodone and Gabapentin.      # Hypoxia/low-grade temps  - Discussed pt borderline to ED recommendation 2/2 low-grade temps and hypoxic episode at home.  Pt agreeable to urgent care visit for nasal swabs to r/o COVID, Flu A/B, and RSV.  All (-).  - stable, PRN & HS oxygen use.  No further temps.    - Recommended routine oxygen use - 2L, increase to 4L with exertion.      # Hemoptysis/Epistaxis  - intermittent   - resolved with saline nasal spray  - 11/17/24 - epistaxis, brief episode  - will continue to monitor    - Anemia Hgb 7.1  - Pt consented today for blood transfusion (11/18/24)  - Discussed with Dr. Vargas, will repeat labs on 11/25/24.   Repeat labs, plus T&S and ABO ordered for 11/25/24.  Discussed he likely will need a transfusion. Recommended ED visit for repeat hemoptysis/epistaxis episodes.      # Hyponatremia - 127 11/4/24  - Rx sent for NaCl tabs - 1 tab TID x7 days  - Pt not able to tolerate TID admin, taking 1-2x/day.    - Na 133 11/18/24, stable.      # Thrombocytopenia   - 11/4/24 PLT  106  - 11/11/24 PLT 42   - No s/sx's bleeding  - Pt educate to bleeding precautions/interventions  - 11/18/24      # Leukopenia  - WBC 3.2 11/4/24   - no clinical s/sx's infection  - Denies dysuria   - post-visit urgent care visit - Flu A/B, RSV, and COVID nasal swabs (-).    - WBC 2.4 11/11/24 - pt plans to avoid crowds/large gatherings.  - WBC 6.1 11/18/24      # Anemia Hgb 7.0 11/11/24  - 11/18/24 Hgb 7.1. Discussed with Dr. Vargas, will repeat labs 11/25/24.  Pt consented for blood transfusion.  Routine labs, T&S, and ABO scheduled 11/25/24.      # Advanced care planning  - discussed incurable but treatable nature of metastatic disease, with goal of treatment to improve or maintain quality of life and prolong life.

## 2024-11-18 NOTE — SIGNIFICANT EVENT
11/18/24 0948   Prechemo Checklist   Has the patient been in the hospital, ED, or urgent care since last date of service No   Chemo/Immuno Consent Completed and Signed Yes   Protocol/Indications Verified Yes   Confirmed to previous date/time of medication Yes   Compared to previous dose Yes  (Pemetrexed dose reduced)   All medications are dated accurately Yes   Pregnancy Test Negative Not applicable   Parameters Met Yes   BSA/Weight-Height Verified Yes   Dose Calculations Verified (current, total, cumulative) Yes

## 2024-11-18 NOTE — PROGRESS NOTES
Patient was seen and assessed by MD/NP. Patient denies any chest pain or side effects from treatment. Patient knows when to return to the clinic for their next appointment.

## 2024-11-19 ENCOUNTER — SOCIAL WORK (OUTPATIENT)
Dept: CASE MANAGEMENT | Facility: HOSPITAL | Age: 49
End: 2024-11-19
Payer: COMMERCIAL

## 2024-11-19 NOTE — PROGRESS NOTES
Social Work Note  11/19/2024 NOAH met with patient and son in infusion yesterday.  NOAH had a form for hospital assistance from [employer] ([employer]) for patient to sign.  Form and documents emailed to [employer].  Today NOAH contacted patient with Billing Customer Service number so he can request paper bills.  Patient appreciative of the assistance.    NOAH will remain available to assist patient.  MAHENDRA Enriquez, W  [phone]

## 2024-11-20 ENCOUNTER — APPOINTMENT (OUTPATIENT)
Dept: PULMONOLOGY | Facility: CLINIC | Age: 49
End: 2024-11-20
Payer: COMMERCIAL

## 2024-11-20 VITALS
SYSTOLIC BLOOD PRESSURE: 113 MMHG | OXYGEN SATURATION: 92 % | RESPIRATION RATE: 18 BRPM | DIASTOLIC BLOOD PRESSURE: 67 MMHG | HEIGHT: 66 IN | BODY MASS INDEX: 19.13 KG/M2 | WEIGHT: 119 LBS | HEART RATE: 86 BPM

## 2024-11-20 DIAGNOSIS — C34.90 LUNG CANCER METASTATIC TO BRAIN (MULTI): Primary | ICD-10-CM

## 2024-11-20 DIAGNOSIS — C79.31 NSCLC METASTATIC TO BRAIN (MULTI): ICD-10-CM

## 2024-11-20 DIAGNOSIS — C34.90 NSCLC METASTATIC TO BRAIN (MULTI): ICD-10-CM

## 2024-11-20 DIAGNOSIS — C79.31 LUNG CANCER METASTATIC TO BRAIN (MULTI): Primary | ICD-10-CM

## 2024-11-20 DIAGNOSIS — R09.02 HYPOXIA: ICD-10-CM

## 2024-11-20 DIAGNOSIS — J44.9 CHRONIC OBSTRUCTIVE PULMONARY DISEASE, UNSPECIFIED COPD TYPE (MULTI): ICD-10-CM

## 2024-11-20 PROCEDURE — 3008F BODY MASS INDEX DOCD: CPT | Performed by: NURSE PRACTITIONER

## 2024-11-20 PROCEDURE — 99214 OFFICE O/P EST MOD 30 MIN: CPT | Performed by: NURSE PRACTITIONER

## 2024-11-20 ASSESSMENT — PATIENT HEALTH QUESTIONNAIRE - PHQ9
1. LITTLE INTEREST OR PLEASURE IN DOING THINGS: NOT AT ALL
SUM OF ALL RESPONSES TO PHQ9 QUESTIONS 1 AND 2: 0
2. FEELING DOWN, DEPRESSED OR HOPELESS: NOT AT ALL

## 2024-11-20 ASSESSMENT — ENCOUNTER SYMPTOMS
LOSS OF SENSATION IN FEET: 0
OCCASIONAL FEELINGS OF UNSTEADINESS: 1
DEPRESSION: 0

## 2024-11-20 ASSESSMENT — COLUMBIA-SUICIDE SEVERITY RATING SCALE - C-SSRS
2. HAVE YOU ACTUALLY HAD ANY THOUGHTS OF KILLING YOURSELF?: NO
1. IN THE PAST MONTH, HAVE YOU WISHED YOU WERE DEAD OR WISHED YOU COULD GO TO SLEEP AND NOT WAKE UP?: NO

## 2024-11-25 ENCOUNTER — DOCUMENTATION (OUTPATIENT)
Dept: HEMATOLOGY/ONCOLOGY | Facility: HOSPITAL | Age: 49
End: 2024-11-25
Payer: COMMERCIAL

## 2024-11-25 ENCOUNTER — SOCIAL WORK (OUTPATIENT)
Dept: CASE MANAGEMENT | Facility: HOSPITAL | Age: 49
End: 2024-11-25
Payer: COMMERCIAL

## 2024-11-25 ENCOUNTER — HOSPITAL ENCOUNTER (INPATIENT)
Facility: HOSPITAL | Age: 49
LOS: 1 days | Discharge: HOME | DRG: 809 | End: 2024-11-26
Attending: STUDENT IN AN ORGANIZED HEALTH CARE EDUCATION/TRAINING PROGRAM | Admitting: STUDENT IN AN ORGANIZED HEALTH CARE EDUCATION/TRAINING PROGRAM
Payer: COMMERCIAL

## 2024-11-25 ENCOUNTER — TELEPHONE (OUTPATIENT)
Dept: HEMATOLOGY/ONCOLOGY | Facility: CLINIC | Age: 49
End: 2024-11-25
Payer: COMMERCIAL

## 2024-11-25 ENCOUNTER — LAB (OUTPATIENT)
Dept: LAB | Facility: CLINIC | Age: 49
End: 2024-11-25
Payer: COMMERCIAL

## 2024-11-25 DIAGNOSIS — D64.81 ANEMIA DUE TO ANTINEOPLASTIC CHEMOTHERAPY: ICD-10-CM

## 2024-11-25 DIAGNOSIS — C34.90 NSCLC METASTATIC TO BONE (MULTI): ICD-10-CM

## 2024-11-25 DIAGNOSIS — T45.1X5A ANEMIA DUE TO ANTINEOPLASTIC CHEMOTHERAPY: Primary | ICD-10-CM

## 2024-11-25 DIAGNOSIS — R09.02 HYPOXIA: ICD-10-CM

## 2024-11-25 DIAGNOSIS — D64.81 ANEMIA DUE TO ANTINEOPLASTIC CHEMOTHERAPY: Primary | ICD-10-CM

## 2024-11-25 DIAGNOSIS — C79.51 NSCLC METASTATIC TO BONE (MULTI): ICD-10-CM

## 2024-11-25 DIAGNOSIS — T45.1X5A ANEMIA DUE TO ANTINEOPLASTIC CHEMOTHERAPY: ICD-10-CM

## 2024-11-25 DIAGNOSIS — C79.31 LUNG CANCER METASTATIC TO BRAIN (MULTI): ICD-10-CM

## 2024-11-25 DIAGNOSIS — M89.9 LESION OF BONE OF CERVICAL SPINE: ICD-10-CM

## 2024-11-25 DIAGNOSIS — C34.90 LUNG CANCER METASTATIC TO BRAIN (MULTI): ICD-10-CM

## 2024-11-25 LAB
ABO GROUP (TYPE) IN BLOOD: NORMAL
ALBUMIN SERPL BCP-MCNC: 3.2 G/DL (ref 3.4–5)
ALP SERPL-CCNC: 109 U/L (ref 33–120)
ALT SERPL W P-5'-P-CCNC: 26 U/L (ref 10–52)
ANION GAP SERPL CALC-SCNC: 13 MMOL/L (ref 10–20)
ANTIBODY SCREEN: NORMAL
ANTIBODY SCREEN: NORMAL
AST SERPL W P-5'-P-CCNC: 15 U/L (ref 9–39)
BASOPHILS # BLD AUTO: 0.02 X10*3/UL (ref 0–0.1)
BASOPHILS # BLD MANUAL: 0 X10*3/UL (ref 0–0.1)
BASOPHILS NFR BLD AUTO: 0.7 %
BASOPHILS NFR BLD MANUAL: 0 %
BILIRUB SERPL-MCNC: 0.5 MG/DL (ref 0–1.2)
BUN SERPL-MCNC: 18 MG/DL (ref 6–23)
CALCIUM SERPL-MCNC: 8.9 MG/DL (ref 8.6–10.3)
CHLORIDE SERPL-SCNC: 93 MMOL/L (ref 98–107)
CO2 SERPL-SCNC: 27 MMOL/L (ref 21–32)
CREAT SERPL-MCNC: 0.39 MG/DL (ref 0.5–1.3)
EGFRCR SERPLBLD CKD-EPI 2021: >90 ML/MIN/1.73M*2
EOSINOPHIL # BLD AUTO: 0 X10*3/UL (ref 0–0.7)
EOSINOPHIL # BLD MANUAL: 0 X10*3/UL (ref 0–0.7)
EOSINOPHIL NFR BLD AUTO: 0 %
EOSINOPHIL NFR BLD MANUAL: 0 %
ERYTHROCYTE [DISTWIDTH] IN BLOOD BY AUTOMATED COUNT: 16 % (ref 11.5–14.5)
ERYTHROCYTE [DISTWIDTH] IN BLOOD BY AUTOMATED COUNT: 16.2 % (ref 11.5–14.5)
FERRITIN SERPL-MCNC: 1675 NG/ML (ref 20–300)
GLUCOSE SERPL-MCNC: 125 MG/DL (ref 74–99)
HCT VFR BLD AUTO: 18.6 % (ref 41–52)
HCT VFR BLD AUTO: 19.3 % (ref 41–52)
HGB BLD-MCNC: 6.2 G/DL (ref 13.5–17.5)
HGB BLD-MCNC: 6.5 G/DL (ref 13.5–17.5)
HGB RETIC QN: 34 PG (ref 28–38)
HOLD SPECIMEN: NORMAL
IMM GRANULOCYTES # BLD AUTO: 0.02 X10*3/UL (ref 0–0.7)
IMM GRANULOCYTES # BLD AUTO: 0.02 X10*3/UL (ref 0–0.7)
IMM GRANULOCYTES NFR BLD AUTO: 0.7 % (ref 0–0.9)
IMM GRANULOCYTES NFR BLD AUTO: 0.7 % (ref 0–0.9)
IMMATURE RETIC FRACTION: 1.1 %
IRON SATN MFR SERPL: 49 % (ref 25–45)
IRON SERPL-MCNC: 105 UG/DL (ref 35–150)
LDH SERPL L TO P-CCNC: 177 U/L (ref 84–246)
LYMPHOCYTES # BLD AUTO: 0.96 X10*3/UL (ref 1.2–4.8)
LYMPHOCYTES # BLD MANUAL: 1.35 X10*3/UL (ref 1.2–4.8)
LYMPHOCYTES NFR BLD AUTO: 34.5 %
LYMPHOCYTES NFR BLD MANUAL: 45 %
MCH RBC QN AUTO: 30.2 PG (ref 26–34)
MCH RBC QN AUTO: 30.8 PG (ref 26–34)
MCHC RBC AUTO-ENTMCNC: 33.3 G/DL (ref 32–36)
MCHC RBC AUTO-ENTMCNC: 33.7 G/DL (ref 32–36)
MCV RBC AUTO: 91 FL (ref 80–100)
MCV RBC AUTO: 92 FL (ref 80–100)
MONOCYTES # BLD AUTO: 0.16 X10*3/UL (ref 0.1–1)
MONOCYTES # BLD MANUAL: 0.09 X10*3/UL (ref 0.1–1)
MONOCYTES NFR BLD AUTO: 5.8 %
MONOCYTES NFR BLD MANUAL: 3 %
NEUTROPHILS # BLD AUTO: 1.62 X10*3/UL (ref 1.2–7.7)
NEUTROPHILS # BLD MANUAL: 1.56 X10*3/UL (ref 1.2–7.7)
NEUTROPHILS NFR BLD AUTO: 58.3 %
NEUTS BAND # BLD MANUAL: 0.03 X10*3/UL (ref 0–0.7)
NEUTS BAND NFR BLD MANUAL: 1 %
NEUTS SEG # BLD MANUAL: 1.53 X10*3/UL (ref 1.2–7)
NEUTS SEG NFR BLD MANUAL: 51 %
NRBC BLD-RTO: 0 /100 WBCS (ref 0–0)
NRBC BLD-RTO: ABNORMAL /100{WBCS}
OVALOCYTES BLD QL SMEAR: ABNORMAL
PLATELET # BLD AUTO: 180 X10*3/UL (ref 150–450)
PLATELET # BLD AUTO: 190 X10*3/UL (ref 150–450)
POTASSIUM SERPL-SCNC: 3.7 MMOL/L (ref 3.5–5.3)
PROT SERPL-MCNC: 6.9 G/DL (ref 6.4–8.2)
RBC # BLD AUTO: 2.05 X10*6/UL (ref 4.5–5.9)
RBC # BLD AUTO: 2.11 X10*6/UL (ref 4.5–5.9)
RBC MORPH BLD: ABNORMAL
RETICS #: 0.01 X10*6/UL (ref 0.02–0.12)
RETICS/RBC NFR AUTO: 0.3 % (ref 0.5–2)
RH FACTOR (ANTIGEN D): NORMAL
SCHISTOCYTES BLD QL SMEAR: ABNORMAL
SODIUM SERPL-SCNC: 129 MMOL/L (ref 136–145)
STOMATOCYTES BLD QL SMEAR: ABNORMAL
TIBC SERPL-MCNC: 214 UG/DL (ref 240–445)
TOTAL CELLS COUNTED BLD: 100
UIBC SERPL-MCNC: 109 UG/DL (ref 110–370)
WBC # BLD AUTO: 2.8 X10*3/UL (ref 4.4–11.3)
WBC # BLD AUTO: 3 X10*3/UL (ref 4.4–11.3)

## 2024-11-25 PROCEDURE — 86850 RBC ANTIBODY SCREEN: CPT

## 2024-11-25 PROCEDURE — 1200000002 HC GENERAL ROOM WITH TELEMETRY DAILY

## 2024-11-25 PROCEDURE — 85027 COMPLETE CBC AUTOMATED: CPT | Performed by: EMERGENCY MEDICINE

## 2024-11-25 PROCEDURE — 99285 EMERGENCY DEPT VISIT HI MDM: CPT | Mod: 25

## 2024-11-25 PROCEDURE — 83615 LACTATE (LD) (LDH) ENZYME: CPT | Performed by: STUDENT IN AN ORGANIZED HEALTH CARE EDUCATION/TRAINING PROGRAM

## 2024-11-25 PROCEDURE — 86901 BLOOD TYPING SEROLOGIC RH(D): CPT | Performed by: EMERGENCY MEDICINE

## 2024-11-25 PROCEDURE — 85025 COMPLETE CBC W/AUTO DIFF WBC: CPT

## 2024-11-25 PROCEDURE — 85007 BL SMEAR W/DIFF WBC COUNT: CPT | Performed by: EMERGENCY MEDICINE

## 2024-11-25 PROCEDURE — 2500000005 HC RX 250 GENERAL PHARMACY W/O HCPCS: Performed by: EMERGENCY MEDICINE

## 2024-11-25 PROCEDURE — 36415 COLL VENOUS BLD VENIPUNCTURE: CPT

## 2024-11-25 PROCEDURE — 2500000001 HC RX 250 WO HCPCS SELF ADMINISTERED DRUGS (ALT 637 FOR MEDICARE OP)

## 2024-11-25 PROCEDURE — 80053 COMPREHEN METABOLIC PANEL: CPT

## 2024-11-25 PROCEDURE — 86920 COMPATIBILITY TEST SPIN: CPT

## 2024-11-25 PROCEDURE — 2500000004 HC RX 250 GENERAL PHARMACY W/ HCPCS (ALT 636 FOR OP/ED)

## 2024-11-25 PROCEDURE — 36415 COLL VENOUS BLD VENIPUNCTURE: CPT | Performed by: EMERGENCY MEDICINE

## 2024-11-25 PROCEDURE — 83540 ASSAY OF IRON: CPT | Performed by: STUDENT IN AN ORGANIZED HEALTH CARE EDUCATION/TRAINING PROGRAM

## 2024-11-25 PROCEDURE — 36430 TRANSFUSION BLD/BLD COMPNT: CPT

## 2024-11-25 PROCEDURE — 99285 EMERGENCY DEPT VISIT HI MDM: CPT | Performed by: STUDENT IN AN ORGANIZED HEALTH CARE EDUCATION/TRAINING PROGRAM

## 2024-11-25 PROCEDURE — 82728 ASSAY OF FERRITIN: CPT | Performed by: STUDENT IN AN ORGANIZED HEALTH CARE EDUCATION/TRAINING PROGRAM

## 2024-11-25 PROCEDURE — 85045 AUTOMATED RETICULOCYTE COUNT: CPT | Performed by: STUDENT IN AN ORGANIZED HEALTH CARE EDUCATION/TRAINING PROGRAM

## 2024-11-25 PROCEDURE — P9040 RBC LEUKOREDUCED IRRADIATED: HCPCS

## 2024-11-25 RX ORDER — FOLIC ACID 1 MG/1
1000 TABLET ORAL DAILY
Status: DISCONTINUED | OUTPATIENT
Start: 2024-11-25 | End: 2024-11-25

## 2024-11-25 RX ORDER — ENOXAPARIN SODIUM 100 MG/ML
40 INJECTION SUBCUTANEOUS EVERY 24 HOURS
Status: DISCONTINUED | OUTPATIENT
Start: 2024-11-25 | End: 2024-11-26 | Stop reason: HOSPADM

## 2024-11-25 RX ORDER — GABAPENTIN 300 MG/1
300 CAPSULE ORAL 3 TIMES DAILY
Status: DISCONTINUED | OUTPATIENT
Start: 2024-11-25 | End: 2024-11-26 | Stop reason: HOSPADM

## 2024-11-25 RX ORDER — PROCHLORPERAZINE MALEATE 10 MG
10 TABLET ORAL EVERY 6 HOURS PRN
Status: DISCONTINUED | OUTPATIENT
Start: 2024-11-25 | End: 2024-11-26 | Stop reason: HOSPADM

## 2024-11-25 RX ORDER — MORPHINE SULFATE 30 MG/1
30 TABLET, FILM COATED, EXTENDED RELEASE ORAL 3 TIMES DAILY
Status: DISCONTINUED | OUTPATIENT
Start: 2024-11-25 | End: 2024-11-26 | Stop reason: HOSPADM

## 2024-11-25 RX ORDER — POLYETHYLENE GLYCOL 3350 17 G/17G
17 POWDER, FOR SOLUTION ORAL DAILY
Status: DISCONTINUED | OUTPATIENT
Start: 2024-11-25 | End: 2024-11-26 | Stop reason: HOSPADM

## 2024-11-25 RX ORDER — ACETAMINOPHEN 325 MG/1
500 TABLET ORAL EVERY 6 HOURS PRN
Status: DISCONTINUED | OUTPATIENT
Start: 2024-11-25 | End: 2024-11-26 | Stop reason: HOSPADM

## 2024-11-25 RX ORDER — PANTOPRAZOLE SODIUM 40 MG/1
40 TABLET, DELAYED RELEASE ORAL
Status: DISCONTINUED | OUTPATIENT
Start: 2024-11-26 | End: 2024-11-26 | Stop reason: HOSPADM

## 2024-11-25 RX ORDER — FOLIC ACID 1 MG/1
1000 TABLET ORAL DAILY
Status: DISCONTINUED | OUTPATIENT
Start: 2024-11-25 | End: 2024-11-26 | Stop reason: HOSPADM

## 2024-11-25 RX ORDER — IBUPROFEN 400 MG/1
400 TABLET ORAL EVERY 6 HOURS PRN
Status: DISCONTINUED | OUTPATIENT
Start: 2024-11-25 | End: 2024-11-26 | Stop reason: HOSPADM

## 2024-11-25 RX ORDER — ONDANSETRON 4 MG/1
8 TABLET, FILM COATED ORAL EVERY 8 HOURS PRN
Status: DISCONTINUED | OUTPATIENT
Start: 2024-11-25 | End: 2024-11-26 | Stop reason: HOSPADM

## 2024-11-25 RX ORDER — OXYCODONE HYDROCHLORIDE 10 MG/1
10 TABLET ORAL EVERY 4 HOURS PRN
Status: DISCONTINUED | OUTPATIENT
Start: 2024-11-25 | End: 2024-11-25

## 2024-11-25 SDOH — SOCIAL STABILITY: SOCIAL INSECURITY: WERE YOU ABLE TO COMPLETE ALL THE BEHAVIORAL HEALTH SCREENINGS?: YES

## 2024-11-25 SDOH — SOCIAL STABILITY: SOCIAL INSECURITY: WITHIN THE LAST YEAR, HAVE YOU BEEN HUMILIATED OR EMOTIONALLY ABUSED IN OTHER WAYS BY YOUR PARTNER OR EX-PARTNER?: NO

## 2024-11-25 SDOH — ECONOMIC STABILITY: HOUSING INSECURITY: AT ANY TIME IN THE PAST 12 MONTHS, WERE YOU HOMELESS OR LIVING IN A SHELTER (INCLUDING NOW)?: PATIENT DECLINED

## 2024-11-25 SDOH — SOCIAL STABILITY: SOCIAL INSECURITY: HAS ANYONE EVER THREATENED TO HURT YOUR FAMILY OR YOUR PETS?: NO

## 2024-11-25 SDOH — SOCIAL STABILITY: SOCIAL INSECURITY: ABUSE: ADULT

## 2024-11-25 SDOH — SOCIAL STABILITY: SOCIAL INSECURITY: DO YOU FEEL ANYONE HAS EXPLOITED OR TAKEN ADVANTAGE OF YOU FINANCIALLY OR OF YOUR PERSONAL PROPERTY?: NO

## 2024-11-25 SDOH — SOCIAL STABILITY: SOCIAL INSECURITY
WITHIN THE LAST YEAR, HAVE YOU BEEN RAPED OR FORCED TO HAVE ANY KIND OF SEXUAL ACTIVITY BY YOUR PARTNER OR EX-PARTNER?: NO

## 2024-11-25 SDOH — SOCIAL STABILITY: SOCIAL INSECURITY: WITHIN THE LAST YEAR, HAVE YOU BEEN AFRAID OF YOUR PARTNER OR EX-PARTNER?: NO

## 2024-11-25 SDOH — SOCIAL STABILITY: SOCIAL INSECURITY: DO YOU FEEL UNSAFE GOING BACK TO THE PLACE WHERE YOU ARE LIVING?: NO

## 2024-11-25 SDOH — HEALTH STABILITY: PHYSICAL HEALTH: ON AVERAGE, HOW MANY MINUTES DO YOU ENGAGE IN EXERCISE AT THIS LEVEL?: PATIENT DECLINED

## 2024-11-25 SDOH — SOCIAL STABILITY: SOCIAL INSECURITY: ARE YOU OR HAVE YOU BEEN THREATENED OR ABUSED PHYSICALLY, EMOTIONALLY, OR SEXUALLY BY ANYONE?: NO

## 2024-11-25 SDOH — ECONOMIC STABILITY: INCOME INSECURITY
IN THE PAST 12 MONTHS HAS THE ELECTRIC, GAS, OIL, OR WATER COMPANY THREATENED TO SHUT OFF SERVICES IN YOUR HOME?: PATIENT DECLINED

## 2024-11-25 SDOH — ECONOMIC STABILITY: FOOD INSECURITY: HOW HARD IS IT FOR YOU TO PAY FOR THE VERY BASICS LIKE FOOD, HOUSING, MEDICAL CARE, AND HEATING?: PATIENT DECLINED

## 2024-11-25 SDOH — SOCIAL STABILITY: SOCIAL INSECURITY: DOES ANYONE TRY TO KEEP YOU FROM HAVING/CONTACTING OTHER FRIENDS OR DOING THINGS OUTSIDE YOUR HOME?: NO

## 2024-11-25 SDOH — HEALTH STABILITY: PHYSICAL HEALTH
ON AVERAGE, HOW MANY DAYS PER WEEK DO YOU ENGAGE IN MODERATE TO STRENUOUS EXERCISE (LIKE A BRISK WALK)?: PATIENT DECLINED

## 2024-11-25 SDOH — SOCIAL STABILITY: SOCIAL INSECURITY: HAVE YOU HAD ANY THOUGHTS OF HARMING ANYONE ELSE?: NO

## 2024-11-25 SDOH — SOCIAL STABILITY: SOCIAL INSECURITY: ARE THERE ANY APPARENT SIGNS OF INJURIES/BEHAVIORS THAT COULD BE RELATED TO ABUSE/NEGLECT?: NO

## 2024-11-25 SDOH — ECONOMIC STABILITY: HOUSING INSECURITY: IN THE LAST 12 MONTHS, WAS THERE A TIME WHEN YOU WERE NOT ABLE TO PAY THE MORTGAGE OR RENT ON TIME?: NO

## 2024-11-25 SDOH — SOCIAL STABILITY: SOCIAL INSECURITY
WITHIN THE LAST YEAR, HAVE YOU BEEN KICKED, HIT, SLAPPED, OR OTHERWISE PHYSICALLY HURT BY YOUR PARTNER OR EX-PARTNER?: NO

## 2024-11-25 SDOH — ECONOMIC STABILITY: FOOD INSECURITY: WITHIN THE PAST 12 MONTHS, THE FOOD YOU BOUGHT JUST DIDN'T LAST AND YOU DIDN'T HAVE MONEY TO GET MORE.: PATIENT DECLINED

## 2024-11-25 SDOH — SOCIAL STABILITY: SOCIAL INSECURITY: HAVE YOU HAD THOUGHTS OF HARMING ANYONE ELSE?: NO

## 2024-11-25 SDOH — ECONOMIC STABILITY: HOUSING INSECURITY: IN THE PAST 12 MONTHS, HOW MANY TIMES HAVE YOU MOVED WHERE YOU WERE LIVING?: 1

## 2024-11-25 SDOH — ECONOMIC STABILITY: TRANSPORTATION INSECURITY
IN THE PAST 12 MONTHS, HAS LACK OF TRANSPORTATION KEPT YOU FROM MEDICAL APPOINTMENTS OR FROM GETTING MEDICATIONS?: PATIENT DECLINED

## 2024-11-25 SDOH — ECONOMIC STABILITY: FOOD INSECURITY
WITHIN THE PAST 12 MONTHS, YOU WORRIED THAT YOUR FOOD WOULD RUN OUT BEFORE YOU GOT THE MONEY TO BUY MORE.: PATIENT DECLINED

## 2024-11-25 ASSESSMENT — ACTIVITIES OF DAILY LIVING (ADL)
JUDGMENT_ADEQUATE_SAFELY_COMPLETE_DAILY_ACTIVITIES: YES
TOILETING: NEEDS ASSISTANCE
LACK_OF_TRANSPORTATION: PATIENT DECLINED
WALKS IN HOME: NEEDS ASSISTANCE
LACK_OF_TRANSPORTATION: PATIENT DECLINED
LACK_OF_TRANSPORTATION: PATIENT DECLINED
ADEQUATE_TO_COMPLETE_ADL: YES
ASSISTIVE_DEVICE: CRUTCHES
GROOMING: INDEPENDENT
FEEDING YOURSELF: INDEPENDENT
DRESSING YOURSELF: NEEDS ASSISTANCE
BATHING: NEEDS ASSISTANCE
FEEDING YOURSELF: INDEPENDENT
HEARING - RIGHT EAR: FUNCTIONAL
DRESSING YOURSELF: NEEDS ASSISTANCE
ADEQUATE_TO_COMPLETE_ADL: YES
JUDGMENT_ADEQUATE_SAFELY_COMPLETE_DAILY_ACTIVITIES: YES
PATIENT'S MEMORY ADEQUATE TO SAFELY COMPLETE DAILY ACTIVITIES?: YES
WALKS IN HOME: INDEPENDENT
PATIENT'S MEMORY ADEQUATE TO SAFELY COMPLETE DAILY ACTIVITIES?: YES
HEARING - LEFT EAR: FUNCTIONAL
HEARING - LEFT EAR: FUNCTIONAL
HEARING - RIGHT EAR: FUNCTIONAL
TOILETING: NEEDS ASSISTANCE
GROOMING: NEEDS ASSISTANCE

## 2024-11-25 ASSESSMENT — COGNITIVE AND FUNCTIONAL STATUS - GENERAL
STANDING UP FROM CHAIR USING ARMS: A LITTLE
TOILETING: A LITTLE
CLIMB 3 TO 5 STEPS WITH RAILING: A LITTLE
DAILY ACTIVITIY SCORE: 23
DAILY ACTIVITIY SCORE: 18
MOVING FROM LYING ON BACK TO SITTING ON SIDE OF FLAT BED WITH BEDRAILS: A LITTLE
TURNING FROM BACK TO SIDE WHILE IN FLAT BAD: A LITTLE
MOVING TO AND FROM BED TO CHAIR: A LITTLE
PATIENT BASELINE BEDBOUND: NO
MOVING TO AND FROM BED TO CHAIR: A LITTLE
DRESSING REGULAR LOWER BODY CLOTHING: A LITTLE
WALKING IN HOSPITAL ROOM: A LITTLE
EATING MEALS: A LITTLE
STANDING UP FROM CHAIR USING ARMS: A LITTLE
MOVING FROM LYING ON BACK TO SITTING ON SIDE OF FLAT BED WITH BEDRAILS: A LITTLE
PERSONAL GROOMING: A LITTLE
MOBILITY SCORE: 16
TOILETING: A LITTLE
HELP NEEDED FOR BATHING: A LITTLE
CLIMB 3 TO 5 STEPS WITH RAILING: A LOT
DRESSING REGULAR UPPER BODY CLOTHING: A LITTLE
MOBILITY SCORE: 18
WALKING IN HOSPITAL ROOM: A LOT
TURNING FROM BACK TO SIDE WHILE IN FLAT BAD: A LITTLE

## 2024-11-25 ASSESSMENT — PAIN - FUNCTIONAL ASSESSMENT: PAIN_FUNCTIONAL_ASSESSMENT: 0-10

## 2024-11-25 ASSESSMENT — PAIN SCALES - GENERAL
PAINLEVEL_OUTOF10: 0 - NO PAIN
PAINLEVEL_OUTOF10: 5 - MODERATE PAIN

## 2024-11-25 ASSESSMENT — LIFESTYLE VARIABLES
HOW MANY STANDARD DRINKS CONTAINING ALCOHOL DO YOU HAVE ON A TYPICAL DAY: PATIENT DOES NOT DRINK
HOW OFTEN DO YOU HAVE A DRINK CONTAINING ALCOHOL: NEVER
HOW OFTEN DO YOU HAVE 6 OR MORE DRINKS ON ONE OCCASION: NEVER
SKIP TO QUESTIONS 9-10: 1
AUDIT-C TOTAL SCORE: 0
AUDIT-C TOTAL SCORE: 0

## 2024-11-25 ASSESSMENT — PATIENT HEALTH QUESTIONNAIRE - PHQ9
SUM OF ALL RESPONSES TO PHQ9 QUESTIONS 1 & 2: 0
1. LITTLE INTEREST OR PLEASURE IN DOING THINGS: NOT AT ALL
2. FEELING DOWN, DEPRESSED OR HOPELESS: NOT AT ALL

## 2024-11-25 NOTE — TELEPHONE ENCOUNTER
TC to pt with Hgb results 6.5.  Call dropped during call with pt.  Return calls go directed to .  Message left on pt's VM that we recommended he go to nearest ED to be transfused today.    Calls x3 - go to .    Call to pt's spouse go to .

## 2024-11-25 NOTE — H&P
History Of Present Illness  Jovon Mccartney is a 49 y.o. male with PMH significant for NSCLC metastatic to bone (stage IVb, cT4, cN3, CM 1c) (oncarboplatin/pemetrexed/pembrolizumab last infusion 10/28/2024), centrilobular emphysema (on 2 L via NC), antineoplastic chemotherapy induced pancytopenia, juvenile rheumatoid arthritis.  Patient presents today from home due to outpatient laboratory values with hemoglobin of 6.4.  He was referred to the emergency department by his oncologist for blood transfusion.  Patient denies all symptomatology pertaining to a 12 point ROS at time of my evaluation.  He stated that if not called by the provider he would not have presented to the emergency department today.    In the ED:  -Vitals: Temp 99F, HR 87, RR 28, /65, SpO2 95% on 2 L via NC  -Labs: CMP: Glucose 125, sodium 129, chloride 93, ferritin 1675, TIBC 214, UIBC 109, percent saturation 49%             CBC 1: WBC 2.8, hemoglobin 6 5             CBC 2: WBC 3.0, hemoglobin 6.2  -Imaging: None  -Intervention: 2U PRBC ordered    PMH:NSCLC metastatic to bone (stage IVb, cT4, cN3, CM 1c) (oncarboplatin/pemetrexed/pembrolizumab last infusion 10/28/2024), centrilobular emphysema (on 2 L via NC), antineoplastic chemotherapy induced pancytopenia, juvenile rheumatoid arthritis.  Allergies: NKA  FH: Noncontributory to this presentation  SX H: Bronchoscopy and lung biopsy  SH: Former smoker, roughly 50-pack-year history, endorses occasional EtOH use has not had any since his cancer diagnosis, occasional THC gummy usage.  Lives at home with wife.    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 His smoking use included cigarettes. He has a 58.5 pack-year smoking history. He has been exposed to tobacco smoke. He has never used smokeless tobacco. He reports that he does not currently use alcohol. He reports that he does not currently use drugs.    Family History  Family History   Problem Relation Name Age of Onset    Breast cancer Mother JORDY CHURCH     Cancer Mother JORDY CHURCH     Miscarriages / Stillbirths Mother JORDY CHURCH     Cancer Maternal Grandfather HUBER PAL     Stroke Maternal Grandfather HUBER PAL         Allergies  Patient has no known allergies.    Review of Systems  Patient denies all symptomatology pertaining to 12 point ROS with exception of those listed above HPI.    Physical Exam  General: Not in acute distress, A&O x 3, alert, cooperative, well-developed  HEENT: Normocephalic, atraumatic, EOMI, moist mucous membranes, NC in place  Neck: Neck supple, trachea midline, no evidence of trauma  Cardiovascular: RRR, S1 and S2 appreciated, no murmurs rubs gallops appreciated, distal pulses 2+ bilaterally (radial and dorsalis pedis)  Respiratory: Vesicular breath sounds appreciated bilaterally, no adventitious sounds appreciated, no increased work of breathing, on 2 L via NC  GI: Abdomen soft, nondistended, nontender to palpation, bowel sounds present  Extremities: No edema appreciated in lower extremities bilaterally, no cyanosis, rheumatoid deformities of extremities and clubbing noted of distal phalanges  Neuro: A&O X3, no focal deficits, strength and sensation intact bilaterally  Skin: Warm and dry, without lesions or rashes    Last Recorded Vitals  Blood pressure 100/60, pulse 80, temperature 37.3 °C (99.1 °F), temperature source Temporal, resp. rate 20, height 1.676 m (5' 6\"), weight 54.2 kg (119 lb 8 oz), " SpO2 96%.    Relevant Results       Assessment/Plan   Patient is a 49-year-old male with PMH significant for NSCLC metastatic to bone (stage IVb, cT4, cN3, CM 1c) (oncarboplatin/pemetrexed/pembrolizumab last infusion 10/28/2024), centrilobular emphysema (on 2 L via NC), antineoplastic chemotherapy induced pancytopenia, juvenile rheumatoid arthritis.  Patient presents today from home due to outpatient laboratory values with hemoglobin of 6.4.  He was referred to the emergency department by his oncologist for blood transfusion.  Patient denies all symptomatology pertaining to a 12 point ROS at time of my evaluation.  He stated that if not called by the provider he would not have presented to the emergency department today.  Patient was admitted for anemia requiring blood transfusion to make sure incrementation was appropriate after 2 units PRBC.    #Acute on chronic anemia 2/2 pancytopenia from chemotherapy regimen  Patient presented today due to low outpatient hemoglobin on lab work.  He was referred in by his oncologist, however if he was not called he would not have presented to the ED today.  -Hemoglobin on presentation 6.5-6.2  -Patient is completely asymptomatic at time of evaluation  Plan:  -2 units PRBC ordered in the emergency department to be transfused  -Obtain CBC after second unit of PRBC transfusion to evaluate incrementation  -Transfuse if hemoglobin less than 7  -Continue to monitor vitals for transfusion reaction  -Telemetry ordered    NSCLC  Centrilobular emphysema  Juvenile rheumatoid arthritis  -Continue home medications as appropriate including pain management medications.    IVF: None at this time  Diet: Regular  DVT prophylaxis: Lovenox  Dispo: Anticipate discharge within 24 hours  Consults: None at this time    CODE STATUS: Full code    Patient to be staffed with attending physician.    Nirmal Salguero MD  Internal medicine PGY 2

## 2024-11-25 NOTE — PROGRESS NOTES
Social Work Note  11/25/2024 NOAH met with patient and wife to review his [employer] assistance form.  We talked about his hospital stay and the start of his cancer dx.  NOAH emailed the form to financial counselor on this date.   NOAH will remain available to assist patient.  MAHENDRA Enriquez, [phone]

## 2024-11-25 NOTE — ED PROVIDER NOTES
EMERGENCY DEPARTMENT ENCOUNTER      Pt Name: Jovon Mccartney  MRN: [id number]  Birthdate 1975  Date of evaluation: 11/25/2024    HISTORY OF PRESENT ILLNESS    Jovon Mccartney is an 49 y.o. male with history including NSCLC with metastasis to bone, antineoplastic chemotherapy induced pancytopenia, anemia due to antineoplastic chemotherapy, emphysema, former smoker presenting to the emergency department for anemia.  Patient was sent in by his oncologist for having a hemoglobin of 6.5.  They have been monitoring his anemia secondary to his chemotherapy treatment.  He states that he always feels fatigue secondary to chemo and has not noticed any worsening symptoms.  He denies any nausea, vomiting, melena, hematochezia or bleeding from any other area.  Patient is not on any blood thinners.      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      budesonide-glycopyr-formoterol (BREZTRI) 160-9-4.8 mcg/actuation HFA aerosol inhaler Inhale 2 puffs 2 times a day.  Qty: 10.7 g, Refills: 3    Associated Diagnoses: Chronic obstructive pulmonary disease, unspecified COPD type (Multi)      gabapentin (Neurontin) 300 mg capsule Take 1 capsule (300 mg) by mouth 3 times a day.  Qty: 90 capsule, Refills: 3    Associated Diagnoses: Lesion of bone of cervical spine      ibuprofen 200 mg  tablet Take 2 tablets (400 mg) by mouth every 6 hours if needed for mild pain (1 - 3).      ipratropium-albuteroL (Combivent Respimat)  mcg/actuation inhaler Inhale 2 puffs 4 times a day.  Qty: 12 g, Refills: 11    Comments: Meds to [address] 9/19  Associated Diagnoses: Lung cancer metastatic to brain (Multi)      morphine CR (MS Contin) 30 mg 12 hr tablet Take 1 tablet (30 mg) by mouth 3 times a day. Do not crush, chew, or split.  Qty: 90 tablet, Refills: 0    Comments: Early fill due to dose increase  Associated Diagnoses: Cancer related pain      oxyCODONE (Roxicodone) 10 mg immediate release tablet Take 1-1.5 tablets (10-15 mg) by mouth every 4 hours if needed for moderate pain (4 - 6) or severe pain (7 - 10). MAX 9 TABS PER DAY  Qty: 270 tablet, Refills: 0    Associated Diagnoses: Lesion of bone of cervical spine      pantoprazole (ProtoNix) 40 mg EC tablet Take 1 tablet (40 mg) by mouth once daily in the morning. Take before meals. Do not crush, chew, or split.  Qty: 30 tablet, Refills: 2    Comments: Meds to [address] 9/19  Associated Diagnoses: Lesion of bone of cervical spine      polyethylene glycol (Glycolax, Miralax) 17 gram/dose powder Take 34 g by mouth once daily.  Qty: 3060 g, Refills: 0    Comments: Meds to [address] 9/19  Associated Diagnoses: Lesion of bone of cervical spine      !! dexAMETHasone (Decadron) 4 mg tablet Take 4 mg (1 tablet) by mouth twice daily the day before treatment, once the evening of treatment, and twice daily the day after treatment. Do not take before October 27, 2024.  Qty: 5 tablet, Refills: 5    Associated Diagnoses: NSCLC metastatic to bone (Multi)      !! dexAMETHasone (Decadron) 4 mg tablet Take 4 mg (1 tablet) by mouth twice daily the day before treatment, once the evening of treatment, and twice daily the day after treatment. Do not fill before October 27, 2024.  Qty: 5 tablet, Refills: 5    Associated Diagnoses: NSCLC metastatic to  bone (Multi)      !! folic acid (Folvite) 1 mg tablet Take 1 tablet (1,000 mcg) by mouth once daily. Do not start before October 27, 2024.  Qty: 30 tablet, Refills: 11    Associated Diagnoses: NSCLC metastatic to bone (Multi)      !! folic acid (Folvite) 1 mg tablet Take 1 tablet (1,000 mcg) by mouth once daily. Do not fill before October 27, 2024.  Qty: 30 tablet, Refills: 11    Associated Diagnoses: NSCLC metastatic to bone (Multi)      naloxone (Narcan) 4 mg/0.1 mL nasal spray Administer 1 spray (4 mg) into affected nostril(s) if needed for opioid reversal. May repeat every 2-3 minutes if needed, alternating nostrils, until medical assistance becomes available.  Qty: 2 each, Refills: 3    Associated Diagnoses: Opiate use      !! OLANZapine (ZyPREXA) 5 mg tablet Take 1 tablet (5 mg) by mouth once daily at bedtime for 4 days starting the evening of treatment. Do not start before October 27, 2024.  Qty: 4 tablet, Refills: 3    Associated Diagnoses: NSCLC metastatic to bone (Multi)      !! OLANZapine (ZyPREXA) 5 mg tablet Take 1 tablet (5 mg) by mouth once daily at bedtime. For 4 days starting the evening of treatment Do not fill before October 27, 2024.  Qty: 4 tablet, Refills: 3    Associated Diagnoses: NSCLC metastatic to bone (Multi)      !! ondansetron (Zofran) 8 mg tablet Take 1 tablet (8 mg) by mouth every 8 hours if needed for nausea or vomiting. Do not start before October 27, 2024.  Qty: 30 tablet, Refills: 5    Associated Diagnoses: NSCLC metastatic to bone (Multi)      !! ondansetron (Zofran) 8 mg tablet Take 1 tablet (8 mg) by mouth every 8 hours if needed for nausea or vomiting. Do not fill before October 27, 2024.  Qty: 30 tablet, Refills: 5    Associated Diagnoses: NSCLC metastatic to bone (Multi)      !! prochlorperazine (Compazine) 10 mg tablet Take 1 tablet (10 mg) by mouth every 6 hours if needed for nausea or vomiting. Do not start before October 27, 2024.  Qty: 30 tablet, Refills: 5     Associated Diagnoses: NSCLC metastatic to bone (Multi)      !! prochlorperazine (Compazine) 10 mg tablet Take 1 tablet (10 mg) by mouth every 6 hours if needed for nausea or vomiting. Do not fill before 2024.  Qty: 30 tablet, Refills: 5    Associated Diagnoses: NSCLC metastatic to bone (Multi)      sennosides-docusate sodium (Yoselin-Colace) 8.6-50 mg tablet Take 1 tablet by mouth as needed at bedtime for constipation.  Qty: 30 tablet, Refills: 3    Comments: Meds to beds -P ADOD   Associated Diagnoses: Lesion of bone of cervical spine       !! - Potential duplicate medications found. Please discuss with provider.          ALLERGIES     Patient has no known allergies.    FAMILY HISTORY       Family History   Problem Relation Name Age of Onset    Breast cancer Mother JORDY CHURCH     Cancer Mother JORDY CHURCH     Miscarriages / Stillbirths Mother JORDY CHURCH     Cancer Maternal Grandfather HUBER ROLANDWELL     Stroke Maternal Grandfather HUBER ROLANDWELL         SOCIAL HISTORY       Social History     Socioeconomic History    Marital status:    Tobacco Use    Smoking status: Former     Current packs/day: 0.00     Average packs/day: 1.5 packs/day for 39.0 years (58.5 ttl pk-yrs)     Types: Cigarettes     Quit date: 2024     Years since quittin.2     Passive exposure: Past    Smokeless tobacco: Never   Substance and Sexual Activity    Alcohol use: Not Currently    Drug use: Not Currently     Social Drivers of Health     Financial Resource Strain: Patient Declined (2024)    Overall Financial Resource Strain (CARDIA)     Difficulty of Paying Living Expenses: Patient declined   Food Insecurity: Patient Declined (2024)    Hunger Vital Sign     Worried About Running Out of Food in the Last Year: Patient declined     Ran Out of Food in the Last Year: Patient declined   Transportation Needs: Patient Declined (2024)    PRAPARE - Transportation     Lack of  Transportation (Medical): Patient declined     Lack of Transportation (Non-Medical): Patient declined   Physical Activity: Patient Declined (11/25/2024)    Exercise Vital Sign     Days of Exercise per Week: Patient declined     Minutes of Exercise per Session: Patient declined   Intimate Partner Violence: Not At Risk (11/25/2024)    Humiliation, Afraid, Rape, and Kick questionnaire     Fear of Current or Ex-Partner: No     Emotionally Abused: No     Physically Abused: No     Sexually Abused: No   Housing Stability: Unknown (11/25/2024)    Housing Stability Vital Sign     Unable to Pay for Housing in the Last Year: No     Number of Times Moved in the Last Year: 1     Homeless in the Last Year: Patient declined       PHYSICAL EXAM       ED Triage Vitals [11/25/24 1327]   Temperature Heart Rate Respirations BP   37.2 °C (99 °F) 87 (!) 28 100/65      Pulse Ox Temp Source Heart Rate Source Patient Position   95 % Temporal -- --      BP Location FiO2 (%)     -- --       Physical Exam  Vitals and nursing note reviewed.   Constitutional:       Appearance: He is well-developed. He is ill-appearing.      Comments: Underweight   HENT:      Head: Normocephalic and atraumatic.   Eyes:      Conjunctiva/sclera: Conjunctivae normal.   Cardiovascular:      Rate and Rhythm: Normal rate and regular rhythm.      Heart sounds: No murmur heard.  Pulmonary:      Effort: Pulmonary effort is normal. No respiratory distress.      Breath sounds: Normal breath sounds.   Abdominal:      Palpations: Abdomen is soft.      Tenderness: There is no abdominal tenderness.   Skin:     General: Skin is warm and dry.   Neurological:      General: No focal deficit present.      Mental Status: He is alert and oriented to person, place, and time.          DIAGNOSTIC RESULTS     LABS:  Labs Reviewed   CBC WITH AUTO DIFFERENTIAL - Abnormal       Result Value    WBC 3.0 (*)     nRBC 0.0      RBC 2.05 (*)     Hemoglobin 6.2 (*)     Hematocrit 18.6 (*)     MCV  91      MCH 30.2      MCHC 33.3      RDW 16.0 (*)     Platelets 180      Immature Granulocytes %, Automated 0.7      Immature Granulocytes Absolute, Automated 0.02      Narrative:     The previously reported component Neutrophils % is no longer being reported.  The previously reported component Lymphocytes % is no longer being reported.  The previously reported component Monocytes % is no longer being reported.  The previously   reported component Eosinophils % is no longer being reported.  The previously reported component Basophils % is no longer being reported.  The previously reported component Absolute Neutrophils is no longer being reported.  The previously reported   component Absolute Lymphocytes is no longer being reported.  The previously reported component Absolute Monocytes is no longer being reported.  The previously reported component Absolute Eosinophils is no longer being reported.  The previously reported   component Absolute Basophils is no longer being reported.   RETICULOCYTES - Abnormal    Retic % 0.3 (*)     Retic Absolute 0.006 (*)     Reticulocyte Hemoglobin 34      Immature Retic fraction 1.1     FERRITIN - Abnormal    Ferritin 1,675 (*)    IRON AND TIBC - Abnormal    Iron 105      UIBC 109 (*)     TIBC 214 (*)     % Saturation 49 (*)    MANUAL DIFFERENTIAL - Abnormal    Neutrophils %, Manual 51.0      Bands %, Manual 1.0      Lymphocytes %, Manual 45.0      Monocytes %, Manual 3.0      Eosinophils %, Manual 0.0      Basophils %, Manual 0.0      Seg Neutrophils Absolute, Manual 1.53      Bands Absolute, Manual 0.03      Lymphocytes Absolute, Manual 1.35      Monocytes Absolute, Manual 0.09 (*)     Eosinophils Absolute, Manual 0.00      Basophils Absolute, Manual 0.00      Total Cells Counted 100      Neutrophils Absolute, Manual 1.56      RBC Morphology See Below      RBC Fragments Few      Ovalocytes Few      Stomatocytes Few     LACTATE DEHYDROGENASE - Normal         TYPE AND SCREEN     ABO TYPE B      Rh TYPE POS      ANTIBODY SCREEN NEG     VERAB/VERIFY ABORH    ABO TYPE B      Rh TYPE POS     RENAL FUNCTION PANEL   MAGNESIUM   CBC   PREPARE RBC    PRODUCT CODE [id number]      Unit Number [id number]      Unit ABO O      Unit RH POS      XM INTEP COMP      Dispense Status TR      Blood Expiration Date 12/6/2024 11:59:00 PM EST      PRODUCT BLOOD TYPE 5100      UNIT VOLUME 350      PRODUCT CODE [id number]      Unit Number [id number]      Unit ABO O      Unit RH POS      XM INTEP COMP      Dispense Status IS      Blood Expiration Date 12/6/2024 11:59:00 PM EST      PRODUCT BLOOD TYPE 5100      UNIT VOLUME 350         All other labs were within normal range or not returned as of this dictation.    Imaging  No orders to display        Procedures  Procedures     EMERGENCY DEPARTMENT COURSE/MDM:   Medical Decision Making  Jovon Mccartney is an 49 y.o. male with history including NSCLC with metastasis to bone, antineoplastic chemotherapy induced pancytopenia, anemia due to antineoplastic chemotherapy, emphysema, former smoker presenting to the emergency department for anemia.  Patient's repeat lab workup here shows a hemoglobin of 6.2.  Given patient's continuing downtrend of anemia secondary to chemotherapy to units of packed red blood cells ordered to be transfused over 4 hours total.  Because of the patient's first time receiving blood transfusion and unknown possible reaction patient will be admitted for observation and monitoring posttransfusion.  Consent obtained.  Patient admitted to the medical team for continued management.  Additionally anemia labs ordered.Lab workup is notable for increase in ferritin but decrease in percent of reticulocyte count.  Patient does have an elevated red blood cell distribution consistent with attempt for new blood cell formation and consistent with chemotherapy destruction.  Admitting team will follow-up on these results.            Diagnoses as of 11/26/24  0113   Anemia due to antineoplastic chemotherapy        External records reviewed: recent inpatient, clinic, and prior ED notes  Labs and Diagnostic imaging independently reviewed/interpreted by me.    Patient plan, care, lab results and imaging were all discussed with attending.    ED Medications administered this visit:    Medications   oxygen (O2) therapy (2 L/min inhalation Start 11/25/24 1320)   acetaminophen (Tylenol) tablet 487.5 mg (has no administration in time range)   folic acid (Folvite) tablet 1,000 mcg (1,000 mcg oral Not Given 11/25/24 2023)   gabapentin (Neurontin) capsule 300 mg (300 mg oral Given 11/25/24 2020)   ibuprofen tablet 400 mg (400 mg oral Given 11/26/24 0018)   morphine CR (MS Contin) 12 hr tablet 30 mg (30 mg oral Given 11/25/24 2020)   ondansetron (Zofran) tablet 8 mg (has no administration in time range)   pantoprazole (ProtoNix) EC tablet 40 mg (has no administration in time range)   polyethylene glycol (Glycolax, Miralax) powder 17 g (17 g oral Not Given 11/25/24 2023)   prochlorperazine (Compazine) tablet 10 mg (has no administration in time range)   enoxaparin (Lovenox) syringe 40 mg (40 mg subcutaneous Given 11/25/24 2020)   oxyCODONE (Roxicodone) immediate release tablet 15 mg (15 mg oral Given 11/26/24 0018)     New Prescriptions from this visit:    Current Discharge Medication List          (Please note that portions of this note were completed with a voice recognition program.  Efforts were made to edit the dictations but occasionally words are mis-transcribed.)     Sheila Padron, DO  Resident  11/26/24 0113

## 2024-11-26 VITALS
TEMPERATURE: 97.2 F | SYSTOLIC BLOOD PRESSURE: 108 MMHG | RESPIRATION RATE: 18 BRPM | OXYGEN SATURATION: 93 % | WEIGHT: 115 LBS | HEART RATE: 75 BPM | DIASTOLIC BLOOD PRESSURE: 70 MMHG | BODY MASS INDEX: 18.48 KG/M2 | HEIGHT: 66 IN

## 2024-11-26 LAB
ALBUMIN SERPL BCP-MCNC: 3 G/DL (ref 3.4–5)
ANION GAP SERPL CALC-SCNC: 13 MMOL/L (ref 10–20)
BLOOD EXPIRATION DATE: NORMAL
BLOOD EXPIRATION DATE: NORMAL
BUN SERPL-MCNC: 15 MG/DL (ref 6–23)
CALCIUM SERPL-MCNC: 8.7 MG/DL (ref 8.6–10.3)
CHLORIDE SERPL-SCNC: 93 MMOL/L (ref 98–107)
CO2 SERPL-SCNC: 27 MMOL/L (ref 21–32)
CREAT SERPL-MCNC: 0.35 MG/DL (ref 0.5–1.3)
DISPENSE STATUS: NORMAL
DISPENSE STATUS: NORMAL
EGFRCR SERPLBLD CKD-EPI 2021: >90 ML/MIN/1.73M*2
ERYTHROCYTE [DISTWIDTH] IN BLOOD BY AUTOMATED COUNT: 15.4 % (ref 11.5–14.5)
GLUCOSE SERPL-MCNC: 101 MG/DL (ref 74–99)
HCT VFR BLD AUTO: 25.8 % (ref 41–52)
HGB BLD-MCNC: 8.9 G/DL (ref 13.5–17.5)
MAGNESIUM SERPL-MCNC: 1.66 MG/DL (ref 1.6–2.4)
MCH RBC QN AUTO: 30.2 PG (ref 26–34)
MCHC RBC AUTO-ENTMCNC: 34.5 G/DL (ref 32–36)
MCV RBC AUTO: 88 FL (ref 80–100)
NRBC BLD-RTO: 0 /100 WBCS (ref 0–0)
PHOSPHATE SERPL-MCNC: 3.7 MG/DL (ref 2.5–4.9)
PLATELET # BLD AUTO: 166 X10*3/UL (ref 150–450)
POTASSIUM SERPL-SCNC: 3.8 MMOL/L (ref 3.5–5.3)
PRODUCT BLOOD TYPE: 5100
PRODUCT BLOOD TYPE: 5100
PRODUCT CODE: NORMAL
PRODUCT CODE: NORMAL
RBC # BLD AUTO: 2.95 X10*6/UL (ref 4.5–5.9)
SODIUM SERPL-SCNC: 129 MMOL/L (ref 136–145)
UNIT ABO: NORMAL
UNIT ABO: NORMAL
UNIT NUMBER: NORMAL
UNIT NUMBER: NORMAL
UNIT RH: NORMAL
UNIT RH: NORMAL
UNIT VOLUME: 350
UNIT VOLUME: 350
WBC # BLD AUTO: 3.9 X10*3/UL (ref 4.4–11.3)
XM INTEP: NORMAL
XM INTEP: NORMAL

## 2024-11-26 PROCEDURE — 99234 HOSP IP/OBS SM DT SF/LOW 45: CPT

## 2024-11-26 PROCEDURE — 85027 COMPLETE CBC AUTOMATED: CPT

## 2024-11-26 PROCEDURE — 80069 RENAL FUNCTION PANEL: CPT

## 2024-11-26 PROCEDURE — 83735 ASSAY OF MAGNESIUM: CPT

## 2024-11-26 PROCEDURE — 36415 COLL VENOUS BLD VENIPUNCTURE: CPT

## 2024-11-26 PROCEDURE — 2500000004 HC RX 250 GENERAL PHARMACY W/ HCPCS (ALT 636 FOR OP/ED): Performed by: STUDENT IN AN ORGANIZED HEALTH CARE EDUCATION/TRAINING PROGRAM

## 2024-11-26 PROCEDURE — 2500000001 HC RX 250 WO HCPCS SELF ADMINISTERED DRUGS (ALT 637 FOR MEDICARE OP)

## 2024-11-26 RX ORDER — POLYETHYLENE GLYCOL 3350 17 G/17G
17 POWDER, FOR SOLUTION ORAL DAILY
Start: 2024-11-26

## 2024-11-26 RX ORDER — LANOLIN ALCOHOL/MO/W.PET/CERES
800 CREAM (GRAM) TOPICAL ONCE
Status: COMPLETED | OUTPATIENT
Start: 2024-11-26 | End: 2024-11-26

## 2024-11-26 RX ORDER — MAGNESIUM SULFATE HEPTAHYDRATE 40 MG/ML
2 INJECTION, SOLUTION INTRAVENOUS ONCE
Status: DISCONTINUED | OUTPATIENT
Start: 2024-11-26 | End: 2024-11-26

## 2024-11-26 ASSESSMENT — PAIN SCALES - GENERAL
PAINLEVEL_OUTOF10: 4
PAINLEVEL_OUTOF10: 8

## 2024-11-26 ASSESSMENT — COGNITIVE AND FUNCTIONAL STATUS - GENERAL
EATING MEALS: A LITTLE
DAILY ACTIVITIY SCORE: 18
DRESSING REGULAR LOWER BODY CLOTHING: A LITTLE
TOILETING: A LITTLE
STANDING UP FROM CHAIR USING ARMS: A LITTLE
TURNING FROM BACK TO SIDE WHILE IN FLAT BAD: A LITTLE
WALKING IN HOSPITAL ROOM: A LOT
CLIMB 3 TO 5 STEPS WITH RAILING: A LOT
MOBILITY SCORE: 16
HELP NEEDED FOR BATHING: A LITTLE
MOVING FROM LYING ON BACK TO SITTING ON SIDE OF FLAT BED WITH BEDRAILS: A LITTLE
MOVING TO AND FROM BED TO CHAIR: A LITTLE
DRESSING REGULAR UPPER BODY CLOTHING: A LITTLE
PERSONAL GROOMING: A LITTLE

## 2024-11-26 ASSESSMENT — PAIN - FUNCTIONAL ASSESSMENT: PAIN_FUNCTIONAL_ASSESSMENT: 0-10

## 2024-11-26 ASSESSMENT — PAIN DESCRIPTION - LOCATION: LOCATION: ARM

## 2024-11-26 NOTE — DISCHARGE INSTRUCTIONS
You are admitted to the hospital with low blood counts related to your chemotherapy.  You did not have any evidence of active bleeding.  You received a blood transfusion with 2 units and your blood counts incremented appropriately.  In the future, if you need additional blood transfusions, your oncologist can likely set this up for you at the [facility] clinic as an outpatient.    It was a pleasure taking care of you in the hospital.

## 2024-11-26 NOTE — NURSING NOTE
ADOD: 11/26.   Destination: home  Transportation Provided by: Friend  Patient feels updated by provider(s) and involved in POC.   Patient has been advised to defer to [facility] for new medications and follow-up visits.   Patient is active with [facility].  Patient's Pharmacy [facility] in [place].  Appointments will be made by patient.  Patient has been notified about a survey and call back is to be expected 1-2 weeks post discharge.   Notified patient that DC Navigator is available everyday throughout their stay if any assistance is needed.     Chronic 2L NC  Sept 2024 Lung Ca dx    Mr. Mccartney is very pleasant 50 yo male who verbalized he had no needs at time of discharge.

## 2024-11-26 NOTE — NURSING NOTE
"0942: Pt. Discharged home at this time with son. Pt. Had no acute changes this shift. Pt state he feels \"very good this morning\". Iv removed.Tele removed. All belongings taken with pt. At this time. Discharge paperwork reviewed with pt. And son at this time with pt. Reporting no questions or concerns.    "

## 2024-11-26 NOTE — DISCHARGE SUMMARY
Discharge Diagnosis  Anemia due to antineoplastic chemotherapy    Issues Requiring Follow-Up  Acute on chronic anemia  -Please follow-up with your oncologist within 7 days of hospital stay  -Please follow-up with your primary care provider within 7 days of hospital stay.    Discharge Meds     Medication List      START taking these medications     oxygen gas therapy; Commonly known as: O2; Inhale 1 each once every 24   hours.     CHANGE how you take these medications     dexAMETHasone 4 mg tablet; Commonly known as: Decadron; Take 4 mg (1   tablet) by mouth twice daily the day before treatment, once the evening of   treatment, and twice daily the day after treatment. Do not take before   October 27, 2024.; What changed: Another medication with the same name was   removed. Continue taking this medication, and follow the directions you   see here.   folic acid 1 mg tablet; Commonly known as: Folvite; Take 1 tablet (1,000   mcg) by mouth once daily. Do not start before October 27, 2024.; What   changed: Another medication with the same name was removed. Continue   taking this medication, and follow the directions you see here.   ipratropium-albuteroL  mcg/actuation inhaler; Commonly known as:   Combivent Respimat; Inhale 2 puffs 4 times a day as needed for shortness   of breath or wheezing.; What changed: when to take this, reasons to take   this   ondansetron 8 mg tablet; Commonly known as: Zofran; Take 1 tablet (8 mg)   by mouth every 8 hours if needed for nausea or vomiting. Do not fill   before October 27, 2024.; What changed: Another medication with the same   name was removed. Continue taking this medication, and follow the   directions you see here.   prochlorperazine 10 mg tablet; Commonly known as: Compazine; Take 1   tablet (10 mg) by mouth every 6 hours if needed for nausea or vomiting. Do   not fill before October 27, 2024.; What changed: Another medication with   the same name was removed. Continue  taking this medication, and follow the   directions you see here.     CONTINUE taking these medications     acetaminophen 500 mg tablet; Commonly known as: Tylenol   budesonide-glycopyr-formoterol 160-9-4.8 mcg/actuation HFA aerosol   inhaler; Commonly known as: BREZTRI; Inhale 2 puffs 2 times a day.   gabapentin 300 mg capsule; Commonly known as: Neurontin; Take 1 capsule   (300 mg) by mouth 3 times a day.   ibuprofen 200 mg tablet   morphine CR 30 mg 12 hr tablet; Commonly known as: MS Contin; Take 1   tablet (30 mg) by mouth 3 times a day. Do not crush, chew, or split.   naloxone 4 mg/0.1 mL nasal spray; Commonly known as: Narcan; Administer   1 spray (4 mg) into affected nostril(s) if needed for opioid reversal. May   repeat every 2-3 minutes if needed, alternating nostrils, until medical   assistance becomes available.   oxyCODONE 10 mg immediate release tablet; Commonly known as: Roxicodone;   Take 1-1.5 tablets (10-15 mg) by mouth every 4 hours if needed for   moderate pain (4 - 6) or severe pain (7 - 10). MAX 9 TABS PER DAY   pantoprazole 40 mg EC tablet; Commonly known as: ProtoNix; Take 1 tablet   (40 mg) by mouth once daily in the morning. Take before meals. Do not   crush, chew, or split.   polyethylene glycol 17 gram/dose powder; Commonly known as: Glycolax,   Miralax; Mix 17 g of powder and drink once daily.     STOP taking these medications     OLANZapine 5 mg tablet; Commonly known as: ZyPREXA   sennosides-docusate sodium 8.6-50 mg tablet; Commonly known as:   Yoselin-Colace       Test Results Pending At Discharge  Pending Labs       No current pending labs.            Hospital Course   Jovon Mccartney is a 49 y.o. male with PMH significant for NSCLC metastatic to bone (stage IVb, cT4, cN3, CM 1c) (oncarboplatin/pemetrexed/pembrolizumab last infusion 10/28/2024), centrilobular emphysema (on 2 L via NC), antineoplastic chemotherapy induced pancytopenia, juvenile rheumatoid arthritis.  Patient presents  today from home due to outpatient laboratory values with hemoglobin of 6.4.  He was referred to the emergency department by his oncologist for blood transfusion.  Patient denies all symptomatology pertaining to a 12 point ROS at time of my evaluation.  He stated that if not called by the provider he would not have presented to the emergency department.  Overnight patient was transfused 2 units PRBC and hemoglobin globin incremented appropriately to 8.9.  Patient remained vitally and hemodynamically stable throughout the entirety of his hospital course, and at this time he is deemed a good candidate for discharge from medicine perspective.  Patient will be discharged home with no new medications added to his medication regimen.  He is to follow-up with his primary care provider and oncologist within 7 days of hospital stay.    Pertinent Physical Exam At Time of Discharge  Physical Exam  General: Not in acute distress, A&O x 3, alert, cooperative, well-developed  HEENT: Normocephalic, atraumatic, EOMI, moist mucous membranes, NC in place  Neck: Neck supple, trachea midline, no evidence of trauma  Cardiovascular: RRR, S1 and S2 appreciated, no murmurs rubs gallops appreciated, distal pulses 2+ bilaterally (radial and dorsalis pedis)  Respiratory: Vesicular breath sounds appreciated bilaterally, no adventitious sounds appreciated, no increased work of breathing, on 2 L via NC  GI: Abdomen soft, nondistended, nontender to palpation, bowel sounds present  Extremities: No edema appreciated in lower extremities bilaterally, no cyanosis, rheumatoid deformities of extremities and clubbing noted of distal phalanges  Neuro: A&O X3, no focal deficits, strength and sensation intact bilaterally  Skin: Warm and dry, without lesions or rashes    Outpatient Follow-Up  Future Appointments   Date Time Provider Department [place]   12/9/2024 11:00 AM MUNDO Agrawal-CNP [id number] [place]   12/9/2024 12:00 PM INF 10 EFRAIN [id number]  [place]   12/19/2024  1:00 PM Matilda Garcia, APRN-CNP [id number] [place]   12/30/2024 10:30 AM Catarina Brooks, APRN-CNP [id number] [place]   12/30/2024 11:00 AM INF 14 EFRAIN [id number] [place]   12/30/2024 11:30 AM Tati Rosa, APRN-CNP [id number] [place]   1/23/2025 11:00 AM ELY [id number] [id number] [place]    1/23/2025 11:45 AM ELY [id number] MRI1 [id number] [place]    1/23/2025  1:00 PM Anton Mcgrath MD PhD [id number] [place]   2/12/2025  3:40 PM Melina Chacko APRN-CNP [id number] [place]   3/27/2025  1:00 PM Matilda Garcia APRN-CNP [id number] [place]   6/26/2025  1:00 PM Matilda Garcia, APRN-CNP [id number] [place]   9/25/2025  1:00 PM Matilda Garcia, APRN-CNP [id number] [place]         Nirmal Salguero MD  Internal medicine PGY 2

## 2024-11-27 ENCOUNTER — PATIENT OUTREACH (OUTPATIENT)
Dept: PRIMARY CARE | Facility: CLINIC | Age: 49
End: 2024-11-27
Payer: COMMERCIAL

## 2024-11-27 ENCOUNTER — PATIENT MESSAGE (OUTPATIENT)
Dept: PALLIATIVE MEDICINE | Facility: CLINIC | Age: 49
End: 2024-11-27
Payer: COMMERCIAL

## 2024-11-27 DIAGNOSIS — K59.00 CONSTIPATION, UNSPECIFIED CONSTIPATION TYPE: Primary | ICD-10-CM

## 2024-11-27 RX ORDER — DOCUSATE SODIUM 100 MG/1
100 CAPSULE, LIQUID FILLED ORAL 2 TIMES DAILY PRN
Qty: 60 CAPSULE | Refills: 3 | Status: SHIPPED | OUTPATIENT
Start: 2024-11-27 | End: 2024-12-27

## 2024-11-27 NOTE — PATIENT COMMUNICATION
"Per last visit with Tati Rosa CNP on 11/18 \"Continue Senna 2 tabs BID. May increase to 3 tabs BID if needed. Continue Miralax 17 g daily. Increase to 2 times per day if constipation continues.\" Patient was discharged from hospital yesterday and discharge says to continue miralax daily and stop Senna-S. Per Tati Rosa CNP okay to send Colace BID PRN. Script pended to provider.    I called both [facility] and [facility] and confirmed Folic Acid script from Dr. Vargas can be transferred.     Patient updated via [employer].  "

## 2024-11-27 NOTE — PROGRESS NOTES
Discharge Facility: [facility]  Discharge Diagnosis: Anemia due to antineoplastic chemotherapy   Admission Date: 11/25/24  Discharge Date: 11/26/24    PCP Appointment Date: Declined  Specialist Appointment Date:   - Oncology  Hospital Encounter and Summary Linked: Yes  See discharge assessment below for further details    Medications  Medications reviewed with patient/caregiver?: Yes (new/changes only) (11/27/2024 10:52 AM)  Is the patient having any side effects they believe may be caused by any medication additions or changes?: No (11/27/2024 10:52 AM)  Does the patient have all medications ordered at discharge?: Yes (11/27/2024 10:52 AM)  Care Management Interventions: No intervention needed (11/27/2024 10:52 AM)  Prescription Comments: START: oxygen  CHANGE: dexamethasone  STOP: olanzapine, pericolace (11/27/2024 10:52 AM)  Is the patient taking all medications as directed (includes completed medication regime)?: Yes (11/27/2024 10:52 AM)  Care Management Interventions: Provided patient education (11/27/2024 10:52 AM)    Appointments  Does the patient have a primary care provider?: Yes (11/27/2024 10:52 AM)  Care Management Interventions: Advised patient to make appointment (11/27/2024 10:52 AM)    Self Management  Has home health visited the patient within 72 hours of discharge?: Not applicable (11/27/2024 10:52 AM)  What Durable Medical Equipment (DME) was ordered?: oxygen - Had PTA (11/27/2024 10:52 AM)  Has all Durable Medical Equipment (DME) been delivered?: Yes (11/27/2024 10:52 AM)    Patient Teaching  Does the patient have access to their discharge instructions?: Yes (11/27/2024 10:52 AM)  Care Management Interventions: Reviewed instructions with patient (11/27/2024 10:52 AM)  What is the patient's perception of their health status since discharge?: Improving (11/27/2024 10:52 AM)  Is the patient/caregiver able to teach back the hierarchy of who to call/visit for symptoms/problems? PCP, Specialist, Home  Health nurse, Urgent Care, ED, 911: Yes (11/27/2024 10:52 AM)  Patient/Caregiver Education Comments: Patient reports he is feeling a bit better today; his breathing is pretty good and he has more energy. Reviewed medicaiton changes. Pt reports he is not sure why his oncologist stopped his pericolace. Advised to follow up with her on this. Declines follow up with PCP as he will be seeing his oncologist. Denies furthe questions/concerns at this time. (11/27/2024 10:52 AM)

## 2024-12-03 DIAGNOSIS — C34.90 NSCLC METASTATIC TO BONE (MULTI): ICD-10-CM

## 2024-12-03 DIAGNOSIS — C79.51 NSCLC METASTATIC TO BONE (MULTI): ICD-10-CM

## 2024-12-03 RX ORDER — OLANZAPINE 5 MG/1
TABLET ORAL
Qty: 4 TABLET | Refills: 2 | Status: SHIPPED | OUTPATIENT
Start: 2024-12-03

## 2024-12-03 RX ORDER — DEXAMETHASONE 4 MG/1
TABLET ORAL
Qty: 5 TABLET | Refills: 5 | Status: SHIPPED | OUTPATIENT
Start: 2024-12-03

## 2024-12-04 DIAGNOSIS — J43.2 CENTRILOBULAR EMPHYSEMA (MULTI): Primary | ICD-10-CM

## 2024-12-09 ENCOUNTER — LAB (OUTPATIENT)
Dept: LAB | Facility: CLINIC | Age: 49
End: 2024-12-09
Payer: COMMERCIAL

## 2024-12-09 ENCOUNTER — OFFICE VISIT (OUTPATIENT)
Dept: HEMATOLOGY/ONCOLOGY | Facility: CLINIC | Age: 49
End: 2024-12-09
Payer: COMMERCIAL

## 2024-12-09 ENCOUNTER — INFUSION (OUTPATIENT)
Dept: HEMATOLOGY/ONCOLOGY | Facility: CLINIC | Age: 49
End: 2024-12-09
Payer: COMMERCIAL

## 2024-12-09 VITALS
RESPIRATION RATE: 16 BRPM | SYSTOLIC BLOOD PRESSURE: 113 MMHG | TEMPERATURE: 99.3 F | BODY MASS INDEX: 17.84 KG/M2 | HEART RATE: 85 BPM | OXYGEN SATURATION: 95 % | DIASTOLIC BLOOD PRESSURE: 65 MMHG | WEIGHT: 110.5 LBS

## 2024-12-09 DIAGNOSIS — T45.1X5A ANEMIA DUE TO ANTINEOPLASTIC CHEMOTHERAPY: Primary | ICD-10-CM

## 2024-12-09 DIAGNOSIS — C34.90 NSCLC METASTATIC TO BONE (MULTI): ICD-10-CM

## 2024-12-09 DIAGNOSIS — D64.81 ANEMIA DUE TO ANTINEOPLASTIC CHEMOTHERAPY: Primary | ICD-10-CM

## 2024-12-09 DIAGNOSIS — C79.51 NSCLC METASTATIC TO BONE (MULTI): ICD-10-CM

## 2024-12-09 DIAGNOSIS — Z51.11 ENCOUNTER FOR ANTINEOPLASTIC CHEMOTHERAPY AND IMMUNOTHERAPY: ICD-10-CM

## 2024-12-09 DIAGNOSIS — Z51.12 ENCOUNTER FOR ANTINEOPLASTIC CHEMOTHERAPY AND IMMUNOTHERAPY: ICD-10-CM

## 2024-12-09 LAB
ALBUMIN SERPL BCP-MCNC: 3.3 G/DL (ref 3.4–5)
ALP SERPL-CCNC: 110 U/L (ref 33–120)
ALT SERPL W P-5'-P-CCNC: 16 U/L (ref 10–52)
ANION GAP SERPL CALC-SCNC: 13 MMOL/L (ref 10–20)
AST SERPL W P-5'-P-CCNC: 15 U/L (ref 9–39)
BASOPHILS # BLD AUTO: 0.01 X10*3/UL (ref 0–0.1)
BASOPHILS NFR BLD AUTO: 0.2 %
BILIRUB SERPL-MCNC: 0.4 MG/DL (ref 0–1.2)
BUN SERPL-MCNC: 14 MG/DL (ref 6–23)
CALCIUM SERPL-MCNC: 8.9 MG/DL (ref 8.6–10.3)
CHLORIDE SERPL-SCNC: 100 MMOL/L (ref 98–107)
CO2 SERPL-SCNC: 26 MMOL/L (ref 21–32)
CORTIS AM PEAK SERPL-MSCNC: 2.3 UG/DL (ref 5–20)
CREAT SERPL-MCNC: 0.43 MG/DL (ref 0.5–1.3)
EGFRCR SERPLBLD CKD-EPI 2021: >90 ML/MIN/1.73M*2
EOSINOPHIL # BLD AUTO: 0 X10*3/UL (ref 0–0.7)
EOSINOPHIL NFR BLD AUTO: 0 %
ERYTHROCYTE [DISTWIDTH] IN BLOOD BY AUTOMATED COUNT: 17.6 % (ref 11.5–14.5)
GLUCOSE SERPL-MCNC: 175 MG/DL (ref 74–99)
HCT VFR BLD AUTO: 24.7 % (ref 41–52)
HGB BLD-MCNC: 8.1 G/DL (ref 13.5–17.5)
IMM GRANULOCYTES # BLD AUTO: 0.05 X10*3/UL (ref 0–0.7)
IMM GRANULOCYTES NFR BLD AUTO: 0.9 % (ref 0–0.9)
LYMPHOCYTES # BLD AUTO: 1.29 X10*3/UL (ref 1.2–4.8)
LYMPHOCYTES NFR BLD AUTO: 22.8 %
MCH RBC QN AUTO: 30.2 PG (ref 26–34)
MCHC RBC AUTO-ENTMCNC: 32.8 G/DL (ref 32–36)
MCV RBC AUTO: 92 FL (ref 80–100)
MONOCYTES # BLD AUTO: 0.58 X10*3/UL (ref 0.1–1)
MONOCYTES NFR BLD AUTO: 10.2 %
NEUTROPHILS # BLD AUTO: 3.74 X10*3/UL (ref 1.2–7.7)
NEUTROPHILS NFR BLD AUTO: 65.9 %
NRBC BLD-RTO: ABNORMAL /100{WBCS}
PLATELET # BLD AUTO: 483 X10*3/UL (ref 150–450)
POTASSIUM SERPL-SCNC: 3.8 MMOL/L (ref 3.5–5.3)
PROT SERPL-MCNC: 6.8 G/DL (ref 6.4–8.2)
RBC # BLD AUTO: 2.68 X10*6/UL (ref 4.5–5.9)
SODIUM SERPL-SCNC: 135 MMOL/L (ref 136–145)
TSH SERPL-ACNC: 0.84 MIU/L (ref 0.44–3.98)
WBC # BLD AUTO: 5.7 X10*3/UL (ref 4.4–11.3)

## 2024-12-09 PROCEDURE — 96367 TX/PROPH/DG ADDL SEQ IV INF: CPT

## 2024-12-09 PROCEDURE — 2500000004 HC RX 250 GENERAL PHARMACY W/ HCPCS (ALT 636 FOR OP/ED): Performed by: NURSE PRACTITIONER

## 2024-12-09 PROCEDURE — 84443 ASSAY THYROID STIM HORMONE: CPT

## 2024-12-09 PROCEDURE — 96413 CHEMO IV INFUSION 1 HR: CPT

## 2024-12-09 PROCEDURE — 36415 COLL VENOUS BLD VENIPUNCTURE: CPT

## 2024-12-09 PROCEDURE — 2500000004 HC RX 250 GENERAL PHARMACY W/ HCPCS (ALT 636 FOR OP/ED): Mod: JZ,JG | Performed by: NURSE PRACTITIONER

## 2024-12-09 PROCEDURE — 82533 TOTAL CORTISOL: CPT

## 2024-12-09 PROCEDURE — 85025 COMPLETE CBC W/AUTO DIFF WBC: CPT

## 2024-12-09 PROCEDURE — 96375 TX/PRO/DX INJ NEW DRUG ADDON: CPT | Mod: INF

## 2024-12-09 PROCEDURE — G2211 COMPLEX E/M VISIT ADD ON: HCPCS | Performed by: NURSE PRACTITIONER

## 2024-12-09 PROCEDURE — 96411 CHEMO IV PUSH ADDL DRUG: CPT

## 2024-12-09 PROCEDURE — 80053 COMPREHEN METABOLIC PANEL: CPT

## 2024-12-09 PROCEDURE — 99215 OFFICE O/P EST HI 40 MIN: CPT | Mod: 25 | Performed by: NURSE PRACTITIONER

## 2024-12-09 PROCEDURE — 99215 OFFICE O/P EST HI 40 MIN: CPT | Performed by: NURSE PRACTITIONER

## 2024-12-09 PROCEDURE — 96417 CHEMO IV INFUS EACH ADDL SEQ: CPT

## 2024-12-09 RX ORDER — EPINEPHRINE 0.3 MG/.3ML
0.3 INJECTION SUBCUTANEOUS EVERY 5 MIN PRN
Status: DISCONTINUED | OUTPATIENT
Start: 2024-12-09 | End: 2024-12-09 | Stop reason: HOSPADM

## 2024-12-09 RX ORDER — PROCHLORPERAZINE EDISYLATE 5 MG/ML
10 INJECTION INTRAMUSCULAR; INTRAVENOUS EVERY 6 HOURS PRN
Status: DISCONTINUED | OUTPATIENT
Start: 2024-12-09 | End: 2024-12-09 | Stop reason: HOSPADM

## 2024-12-09 RX ORDER — PROCHLORPERAZINE MALEATE 10 MG
10 TABLET ORAL EVERY 6 HOURS PRN
Status: DISCONTINUED | OUTPATIENT
Start: 2024-12-09 | End: 2024-12-09 | Stop reason: HOSPADM

## 2024-12-09 RX ORDER — PROCHLORPERAZINE MALEATE 5 MG
10 TABLET ORAL EVERY 6 HOURS PRN
Status: CANCELLED | OUTPATIENT
Start: 2024-12-09

## 2024-12-09 RX ORDER — DIPHENHYDRAMINE HYDROCHLORIDE 50 MG/ML
50 INJECTION INTRAMUSCULAR; INTRAVENOUS AS NEEDED
Status: DISCONTINUED | OUTPATIENT
Start: 2024-12-09 | End: 2024-12-09 | Stop reason: HOSPADM

## 2024-12-09 RX ORDER — PALONOSETRON 0.05 MG/ML
0.25 INJECTION, SOLUTION INTRAVENOUS ONCE
Status: CANCELLED | OUTPATIENT
Start: 2024-12-09

## 2024-12-09 RX ORDER — DEXAMETHASONE 6 MG/1
12 TABLET ORAL ONCE
Status: CANCELLED | OUTPATIENT
Start: 2024-12-09

## 2024-12-09 RX ORDER — EPINEPHRINE 0.3 MG/.3ML
0.3 INJECTION SUBCUTANEOUS EVERY 5 MIN PRN
Status: CANCELLED | OUTPATIENT
Start: 2024-12-09

## 2024-12-09 RX ORDER — FAMOTIDINE 10 MG/ML
20 INJECTION INTRAVENOUS ONCE AS NEEDED
Status: DISCONTINUED | OUTPATIENT
Start: 2024-12-09 | End: 2024-12-09 | Stop reason: HOSPADM

## 2024-12-09 RX ORDER — ALBUTEROL SULFATE 0.83 MG/ML
3 SOLUTION RESPIRATORY (INHALATION) AS NEEDED
Status: CANCELLED | OUTPATIENT
Start: 2024-12-09

## 2024-12-09 RX ORDER — DEXAMETHASONE 6 MG/1
12 TABLET ORAL ONCE
Status: COMPLETED | OUTPATIENT
Start: 2024-12-09 | End: 2024-12-09

## 2024-12-09 RX ORDER — DIPHENHYDRAMINE HYDROCHLORIDE 50 MG/ML
50 INJECTION INTRAMUSCULAR; INTRAVENOUS AS NEEDED
Status: CANCELLED | OUTPATIENT
Start: 2024-12-09

## 2024-12-09 RX ORDER — ALBUTEROL SULFATE 0.83 MG/ML
3 SOLUTION RESPIRATORY (INHALATION) AS NEEDED
Status: DISCONTINUED | OUTPATIENT
Start: 2024-12-09 | End: 2024-12-09 | Stop reason: HOSPADM

## 2024-12-09 RX ORDER — PALONOSETRON 0.05 MG/ML
0.25 INJECTION, SOLUTION INTRAVENOUS ONCE
Status: COMPLETED | OUTPATIENT
Start: 2024-12-09 | End: 2024-12-09

## 2024-12-09 RX ORDER — FAMOTIDINE 10 MG/ML
20 INJECTION INTRAVENOUS ONCE AS NEEDED
Status: CANCELLED | OUTPATIENT
Start: 2024-12-09

## 2024-12-09 RX ORDER — PROCHLORPERAZINE EDISYLATE 5 MG/ML
10 INJECTION INTRAMUSCULAR; INTRAVENOUS EVERY 6 HOURS PRN
Status: CANCELLED | OUTPATIENT
Start: 2024-12-09

## 2024-12-09 ASSESSMENT — ENCOUNTER SYMPTOMS
CONSTIPATION: 1
FATIGUE: 1
UNEXPECTED WEIGHT CHANGE: 0
MYALGIAS: 1
BACK PAIN: 1
FEVER: 0
NAUSEA: 0
HEMOPTYSIS: 0
SHORTNESS OF BREATH: 1
APPETITE CHANGE: 0
CARDIOVASCULAR NEGATIVE: 1
VOMITING: 0

## 2024-12-09 ASSESSMENT — PAIN SCALES - GENERAL: PAINLEVEL_OUTOF10: 0-NO PAIN

## 2024-12-09 NOTE — SIGNIFICANT EVENT

## 2024-12-09 NOTE — PROGRESS NOTES
05/31/19 1405   Discharge Assessment   Assessment Type Discharge Planning Assessment   Confirmed/corrected address and phone number on facesheet? Yes   Assessment information obtained from? Patient   Expected Length of Stay (days) 3   Communicated expected length of stay with patient/caregiver yes   Prior to hospitilization cognitive status: Alert/Oriented   Prior to hospitalization functional status: Independent   Current cognitive status: Alert/Oriented   Current Functional Status: Independent   Lives With significant other;child(yousif), dependent;other relative(s)   Able to Return to Prior Arrangements yes   Is patient able to care for self after discharge? Yes   Who are your caregiver(s) and their phone number(s)? Rochelle Andino (S/o) [phone]   Patient's perception of discharge disposition home or selfcare   Readmission Within the Last 30 Days no previous admission in last 30 days   Patient currently being followed by outpatient case management? No   Patient currently receives home health services? No   Patient currently receives any other outside agency services? No   Equipment Currently Used at Home none   Do you have any problems affording any of your prescribed medications? No   Is the patient taking medications as prescribed? yes   Does the patient have transportation home? Yes   Transportation Anticipated family or friend will provide   Does the patient receive services at the [facility]? No   Discharge Plan A Home   Discharge Plan B Home   DME Needed Upon Discharge  none   Patient/Family in Agreement with Plan yes   Does the patient have transportation to healthcare appointments? Yes         Discharge/ My Health Packet Folder Given to patient/family:      yes      PCP:  Lidia Soria MD        Pharmacy:    [facility] [address] [place] [address] [address] AT [address] [place] [address]  Phone: [phone] Fax: [phone]    Diplomat  [facility] - Medical Oncology Follow-Up Visit    Patient ID: Jovon Mccartney is a 49 y.o. male with NSCLC adenocarcinoma     Current therapy: cyanocobalamin (Vitamin B-12) injection 1,000 mcg, 1,000 mcg, intramuscular, Once, 1 of 2 cycles    Administration: 1,000 mcg (10/28/2024)        fosaprepitant (Emend) 150 mg in sodium chloride 0.9% 150 mL IV, 150 mg, intravenous, Once, 2 of 4 cycles    Administration: 150 mg (10/28/2024), 150 mg (11/18/2024)        CARBOplatin (Paraplatin) 627 mg in sodium chloride 0.9% 172.7 mL IV, 627 mg, intravenous, Once, 2 of 4 cycles    Administration: 627 mg (10/28/2024), 627 mg (11/18/2024)        methylPREDNISolone sod succinate (SOLU-Medrol) 40 mg/mL injection 40 mg, 40 mg, intravenous, As needed, 2 of 4 cycles        palonosetron (Aloxi) injection 250 mcg, 250 mcg, intravenous, Once, 2 of 4 cycles    Administration: 250 mcg (10/28/2024), 250 mcg (11/18/2024)        pembrolizumab (Keytruda) 200 mg in sodium chloride 0.9% 118 mL IV, 200 mg, intravenous, Once, 2 of 4 cycles    Administration: 200 mg (10/28/2024), 200 mg (11/18/2024)        PEMEtrexed disodium (Alimta) 800 mg in sodium chloride 0.9% 142 mL IV, 500 mg/m2 = 800 mg, intravenous, Once, 2 of 4 cycles    Dose modification: 400 mg/m2 (original dose 500 mg/m2, Cycle 2)    Administration: 800 mg (10/28/2024), 645 mg (11/18/2024)       Chief Concern: readiness to treat visit    Oncologic History:     DIAGNOSIS  NSCLC adenocarcinoma      STAGING  cT4 pN3 M1c      CURRENT SITES OF DISEASE  R hilar mass, 4L, 11L, scapula,  right  parieto-occipital bone, C2, C3, ?lymphatic carcinomatosis     MOLECULAR GENOMICS  KRAS p.G12C ([id number] c.34G>T)   PD-L1 <1%     PRIOR THERAPY        CURRENT THERAPY  Palliative XRT skull, C-spine, R shoulder 10/10/24 - 10/16/24  Carboplatin (AUC 5), Pemetrexed (500mg/m2), & Pembrolizumab 10/28/24 -   - Pemetrexed dose reduced 400mg/m2 - C2 11/18/24   - Carboplatin dose  Specialty Pharmacy - [place], [place] - [address] [place] [address] D  [facility] [facility] [address]  Phone: [phone] Fax: [phone]        Emergency Contacts:  Extended Emergency Contact Information  Primary Emergency Contact: Rochelle Noel   [employer]  Mobile Phone: [phone]  Relation: Friend      Insurance:  Payor: MEDICAID / Plan: HEALTHY BLUE ([place] / Product Type: Managed Medicaid /     Edel Lau RN, CCRN-K, [facility]  Neuro-Critical Care   X [id number]               reduced (AUC 4) - C3 12/9/24      CURRENT ONCOLOGICAL PROBLEMS  Hemoptysis - resolved  Epistaxis - intermittent        HISTORY OF PRESENT ILLNESS  Mr. Mccartney is a 50 yo with PMH significant for RA who present to the [facility] ER on 9/9/24 with 2 months of progressively worsening headaches and neck pain with occasional radiation to the R and L arms. A lytic lesion was noted on CT head 9/9/24 of the parietal bone, and CT cervical spine was concerning for lucent lesions C2 and C3. Brain MRI and MR cervical spine confirmed marrow-replacing lesions fo the C2 and C3 vertebral bodies. CT C/A/P w/o contrast on 9/10/24 was concerning for soft tissue mass of the R hilum, lytic lesions of the R scapula, and 3.5 cm mass in the L gluteus. He was transferred to [facility] on 9/10/24 CT C/A/P w/IV contrast on 9/15/24 confirmed the lung mass encasing the R mainstem bronchus abutting the R main pulmonary artery and extending to the lung periphery, as well as R perihilar lesion and lymphangitic carcinomatosis, prominent mediastinal nodes, L axillary node, nodular thickening of bilateral adrenal glands, likely lesion of the R scapula, small hepatic lesions, moderate pericardial effusion. EBUS on 9/17/24 with pathology of R hilar mass with adenocarcinoma, KRAS G12C, PD-L1 pending, and positive lymph nodes 11L and 4L. He met Dr. Mcgrath and discussed palliative radiation to the occipital lesion and cervical spine. PET/CT on 10/1/24 with FDG avid confluent R hilar/mediastinal mass, mild FDG avidity along upper and middle lobe, bilateral scapular, humeral, R anterior acetabular osseous metastases. He started palliative radiation 10/10/24-10/16/24.      He was born with juvenile RA, usually his feet and knees feel good compared to everything. He used to walk with a crutch, since the hospital he has been walking without it. He follows with a rheumatologist at [facility]. It's fairly managed he says, has  been on humira for 20-25 years, right when it first  "came out. Kept the fluid off his knees and helped him better than anything else. He was on all kinds of drugs he said before including steroids, methotrexate, others. Typically with the humira he doesn't really get flares, sometimes in his wrists. He's been off for about a month, so thinks he'd have a flare in about a month.      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 Maybe had one exposure to asbestos. 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 Lives at home with his wife and a dog, 3 cats, a bird and fish. They have 2 sons and 3 grandchildren.  Tob: quit smoking 9/9/24, smoked 39 years, 1-1.5 ppd  EtOH: occasional  Illicits: previous smoking marijuana, only edibles now for pain     FAMILY HISTORY  Mother - breast cancer dx 56 yo  Maternal grandmother - bone cancer  Paternal cousin - bone and/or lung cancer  Paternal grandmother - unknown cancer  No other family members with autoimmune diseases    HPI   He is here today with his wife.  ED visit 11/25-11/26 2/2 anemia.  Pt received 2U PRBC's.  Reports he did not notice increased energy following transfusion.  Breathing \"isn't bad.\"  Worse in the morning.  SpO2 85-86 w/o oxygen. With oxygen up to 95%.  Wear oxygen continuously.  Will remove for brief 20-minute periods while eating.  No further temps.  Appetite - eating smaller portions.  Wt loss noted.  Reports increased intake. +Boost, supplemental shakes - 2/3 per day.  Denies n/v/d.  BM every other day.  Last BM this morning. Has bowel meds at home. Voiding fine.  Continues to work on sodium tab admin.  Tolerating one tab/day.  Sodium stable.  Follows with supportive onc for pain management.  Labs WNL. Feels up for treatment today.      Meds (Current):    Current Outpatient Medications:     acetaminophen (Tylenol) 500 mg tablet, Take 1 tablet (500 mg) by mouth every 6 hours if needed for mild pain (1 " - 3)., Disp: , Rfl:     budesonide-glycopyr-formoterol (BREZTRI) 160-9-4.8 mcg/actuation HFA aerosol inhaler, Inhale 2 puffs 2 times a day., Disp: 10.7 g, Rfl: 3    dexAMETHasone (Decadron) 4 mg tablet, Take 4 mg (1 tablet) by mouth twice daily the day before treatment, once the evening of treatment, and twice daily the day after treatment., Disp: 5 tablet, Rfl: 5    docusate sodium (Colace) 100 mg capsule, Take 1 capsule (100 mg) by mouth 2 times a day as needed for constipation., Disp: 60 capsule, Rfl: 3    folic acid (Folvite) 1 mg tablet, Take 1 tablet (1,000 mcg) by mouth once daily. Do not start before October 27, 2024., Disp: 30 tablet, Rfl: 11    gabapentin (Neurontin) 300 mg capsule, Take 1 capsule (300 mg) by mouth 3 times a day., Disp: 90 capsule, Rfl: 3    ibuprofen 200 mg tablet, Take 2 tablets (400 mg) by mouth every 6 hours if needed for mild pain (1 - 3)., Disp: , Rfl:     ipratropium-albuteroL (Combivent Respimat)  mcg/actuation inhaler, Inhale 2 puffs 4 times a day as needed for shortness of breath or wheezing., Disp: , Rfl:     naloxone (Narcan) 4 mg/0.1 mL nasal spray, Administer 1 spray (4 mg) into affected nostril(s) if needed for opioid reversal. May repeat every 2-3 minutes if needed, alternating nostrils, until medical assistance becomes available., Disp: 2 each, Rfl: 3    OLANZapine (ZyPREXA) 5 mg tablet, Take 1 tablet (5 mg) by mouth nightly for 4 nights starting the evening of treatment, Disp: 4 tablet, Rfl: 2    ondansetron (Zofran) 8 mg tablet, Take 1 tablet (8 mg) by mouth every 8 hours if needed for nausea or vomiting. Do not fill before October 27, 2024., Disp: 30 tablet, Rfl: 5    oxyCODONE (Roxicodone) 10 mg immediate release tablet, Take 1-1.5 tablets (10-15 mg) by mouth every 4 hours if needed for moderate pain (4 - 6) or severe pain (7 - 10). MAX 9 TABS PER DAY, Disp: 270 tablet, Rfl: 0    oxygen (O2) gas therapy, Inhale 1 each once every 24 hours., Disp: , Rfl:      pantoprazole (ProtoNix) 40 mg EC tablet, Take 1 tablet (40 mg) by mouth once daily in the morning. Take before meals. Do not crush, chew, or split., Disp: 30 tablet, Rfl: 2    polyethylene glycol (Glycolax, Miralax) 17 gram/dose powder, Mix 17 g of powder and drink once daily., Disp: , Rfl:     prochlorperazine (Compazine) 10 mg tablet, Take 1 tablet (10 mg) by mouth every 6 hours if needed for nausea or vomiting. Do not fill before October 27, 2024., Disp: 30 tablet, Rfl: 5    Review of Systems   Constitutional:  Positive for fatigue. Negative for appetite change, fever and unexpected weight change.   HENT:  Negative.  Negative for nosebleeds.    Respiratory:  Positive for shortness of breath. Negative for hemoptysis.    Cardiovascular: Negative.    Gastrointestinal:  Positive for constipation. Negative for nausea and vomiting.   Musculoskeletal:  Positive for back pain and myalgias.        Objective   BSA: 1.53 meters squared  Wt Readings from Last 5 Encounters:   12/09/24 50.1 kg (110 lb 8 oz)   11/25/24 52.2 kg (115 lb)   11/20/24 54 kg (119 lb)   11/18/24 54.2 kg (119 lb 8 oz)   11/11/24 55.8 kg (123 lb)     11/11/24 wt #123 - leg braces worn (8#)    /65 (BP Location: Left arm, Patient Position: Sitting)   Pulse 85   Temp 37.4 °C (99.3 °F) (Temporal)   Resp 16   Wt 50.1 kg (110 lb 8 oz)   SpO2 95%   BMI 17.84 kg/m²     ECOG Score:   Performance Status (ECOG): 2    ECOG   Definition  0          Fully active; no performance restrictions.  1          Strenuous physical activity restricted; fully ambulatory and able to carry out light work.  2          Capable of all self-care but unable to carry out any work activities. Up and about >50% of waking hours.  3          Capable of only limited self-care; confined to bed or chair >50% of waking hours.  4          Completely disabled; cannot carry out any self-care; totally confined to bed or chair.    Physical Exam  Vitals reviewed.   Constitutional:        General: He is not in acute distress.     Appearance: He is not ill-appearing.      Comments: Frail, cachectic     HENT:      Head: Normocephalic and atraumatic.      Mouth/Throat:      Mouth: Mucous membranes are moist.   Eyes:      Extraocular Movements: Extraocular movements intact.      Pupils: Pupils are equal, round, and reactive to light.   Cardiovascular:      Rate and Rhythm: Normal rate and regular rhythm.      Heart sounds: Normal heart sounds. No murmur heard.  Pulmonary:      Effort: Pulmonary effort is normal. No respiratory distress.      Breath sounds: Normal breath sounds.      Comments: Diminished on right.  Abdominal:      General: Bowel sounds are normal. There is no distension.      Palpations: Abdomen is soft.   Musculoskeletal:      Cervical back: Normal range of motion.      Right lower leg: Edema (ankles 2/2 RA) present.      Left lower leg: Edema (ankles 2/2 RA) present.      Comments: Present in w/c today.  Gait not assessed, MAEx4.   Chronic swelling to TINY ankles and left wrist/hand 2/2 RA.   Skin:     General: Skin is warm and dry.   Neurological:      General: No focal deficit present.      Mental Status: He is alert and oriented to person, place, and time. Mental status is at baseline.   Psychiatric:         Mood and Affect: Mood normal.         Behavior: Behavior normal.         Thought Content: Thought content normal.          Results:  Labs:  Lab Results   Component Value Date    WBC 5.7 12/09/2024    HGB 8.1 (L) 12/09/2024    HCT 24.7 (L) 12/09/2024    MCV 92 12/09/2024     (H) 12/09/2024      Lab Results   Component Value Date    NEUTROABS 3.74 12/09/2024      Lab Results   Component Value Date    GLUCOSE 175 (H) 12/09/2024    CALCIUM 8.9 12/09/2024     (L) 12/09/2024    K 3.8 12/09/2024    CO2 26 12/09/2024     12/09/2024    BUN 14 12/09/2024    CREATININE 0.43 (L) 12/09/2024    MG 1.66 11/26/2024     Lab Results   Component Value Date    ALT 16 12/09/2024     AST 15 12/09/2024    ALKPHOS 110 12/09/2024    BILITOT 0.4 12/09/2024    BILIDIR 0.1 09/14/2024      Lab Results   Component Value Date    ACTH 8.5 10/28/2024    CORTISOL 3.7 (L) 10/28/2024    TSH 1.54 10/28/2024       Imaging:  No new imaging.      Assessment/Plan      Jovon Mccartney is a 49 y.o. male here for follow up of NSCLC adenocarcinoma.     # NSCLC adenocarcinoma  - [id number]  - KRAS G12C, PD-L1 <1%  - discussed that SoC therapies include chemotherapy+ immunotherapy. Given longstanding juvenile RA, would like input from his rheumatologist regarding safety of immunotherapy in this setting. Discussed if no immunotherapy, would recommend chemotherapy alone vs KRAS G12C targeted therapy frontline, which typically is given in the second-line setting.  - provided information on carboplatin/pemetrexed/pembrolizumab vs adagrasib   - patient discussed with his rheumatologist regarding concern for flaring RA on immunotherapy. His rheumatologist will monitor closely, stop humira, and maintain him on low-dose steroids  - consented for carboplatin/pemetrexed/pembrolizumab to start 10/28/24  - will plan on utilizing adagrasib in the 2nd line setting if needed  - will plan on restaging scans after C4 (not yet ordered)  - C2 11/18/24, Pemetrexed dose reduced 400mg/m2  - Hospitalization 11/25-11/26/24 2U PRBC's  - C3 12/9/24, Carboplatin dose reduced AUC4  - RTC in 1 week for repeat CBC w/diff and T&S  - RTC in 3 weeks for C4.       # Neoplasm related pain  - already saw Dr. Mcgrath, receiving palliative radiation  - seeing supportive onc - started pt on ER Morphine and PRN Oxycodone.    - Pt also taking Ibuprofen 600mg QID and 1000mg Acetaminophen QID.  Recommended pt reduce both doses x1 to start.  Pt will cut back to TID x1 week and see if his pain/RA is tolerable.    - 11/18/24 - current TID Acetaminophen and Ibuprofen daily, plus Morphine, Oxycodone and Gabapentin.    - 12/9/24 - follows with supportive onc, pain managed  with current regimen      # Hypoxia/low-grade temps  - Discussed pt borderline to ED recommendation 2/2 low-grade temps and hypoxic episode at home.  Pt agreeable to urgent care visit for nasal swabs to r/o COVID, Flu A/B, and RSV.  All (-).  - stable, PRN & HS oxygen use.  No further temps.    - Recommended routine oxygen use - 2L, increase to 4L with exertion.    - 12/9:  continuous oxygen use, except brief periods of time - eating.      # Hemoptysis/Epistaxis  - intermittent   - resolved with saline nasal spray  - 11/17/24 - epistaxis, brief episode  - will continue to monitor    - Anemia Hgb 7.1  - Pt consented today for blood transfusion (11/18/24)  - Discussed with Dr. Vargas, will repeat labs on 11/25/24.   Repeat labs, plus T&S and ABO ordered for 11/25/24.  Discussed he likely will need a transfusion. Recommended ED visit for repeat hemoptysis/epistaxis episodes.    - 2U PRBC's 11/25-11/26/24 [facility]    - Repeat CBC w/diff 12/16/24 + T&S    # Hyponatremia - 127 11/4/24  - Rx sent for NaCl tabs - 1 tab TID x7 days  - Pt not able to tolerate TID admin, taking 1-2x/day.    - Na 133 11/18/24, stable.    - Na 135 12/9/25, stable    # Thrombocytopenia   - 11/4/24   - 11/11/24 PLT 42   - No s/sx's bleeding  - Pt educate to bleeding precautions/interventions  - 11/18/24      # Leukopenia  - WBC 3.2 11/4/24   - no clinical s/sx's infection  - Denies dysuria   - post-visit urgent care visit - Flu A/B, RSV, and COVID nasal swabs (-).    - WBC 2.4 11/11/24 - pt plans to avoid crowds/large gatherings.  - WBC 6.1 11/18/24      # Anemia Hgb 7.0 11/11/24  - 11/18/24 Hgb 7.1. Discussed with Dr. Vargas, will repeat labs 11/25/24.  Pt consented for blood transfusion.  Routine labs, T&S, and ABO scheduled 11/25/24.    - repeat CBC w/diff 12/16/24     # Advanced care planning  - discussed incurable but treatable nature of metastatic disease, with goal of treatment to improve or maintain quality of life and prolong life.

## 2024-12-12 ENCOUNTER — TELEPHONE (OUTPATIENT)
Dept: PALLIATIVE MEDICINE | Facility: HOSPITAL | Age: 49
End: 2024-12-12
Payer: COMMERCIAL

## 2024-12-12 DIAGNOSIS — M89.9 LESION OF BONE OF CERVICAL SPINE: ICD-10-CM

## 2024-12-12 DIAGNOSIS — G89.3 CANCER RELATED PAIN: ICD-10-CM

## 2024-12-12 RX ORDER — OXYCODONE HYDROCHLORIDE 10 MG/1
10-15 TABLET ORAL EVERY 4 HOURS PRN
Qty: 270 TABLET | Refills: 0 | Status: SHIPPED | OUTPATIENT
Start: 2024-12-12 | End: 2025-01-11

## 2024-12-12 RX ORDER — GABAPENTIN 300 MG/1
300 CAPSULE ORAL 3 TIMES DAILY
Qty: 90 CAPSULE | Refills: 3 | Status: SHIPPED | OUTPATIENT
Start: 2024-12-12 | End: 2025-01-11

## 2024-12-12 RX ORDER — MORPHINE SULFATE 30 MG/1
30 TABLET, FILM COATED, EXTENDED RELEASE ORAL 3 TIMES DAILY
Qty: 90 TABLET | Refills: 0 | Status: SHIPPED | OUTPATIENT
Start: 2024-12-12 | End: 2025-01-11

## 2024-12-12 NOTE — TELEPHONE ENCOUNTER
OARRS reviewed and no concerns noted. Per last visit with Tati Rosa NP 11/18/2024  patient  to continue MS CONTIN 30 MG TID and Oxycodone 15 Mg  every 4 hours .  F/U visit scheduled for 12/30//24 . Refills sent to provider for review/approval . Patient updated.

## 2024-12-16 ENCOUNTER — LAB (OUTPATIENT)
Dept: LAB | Facility: CLINIC | Age: 49
End: 2024-12-16
Payer: COMMERCIAL

## 2024-12-16 DIAGNOSIS — D64.81 ANEMIA DUE TO ANTINEOPLASTIC CHEMOTHERAPY: ICD-10-CM

## 2024-12-16 DIAGNOSIS — C79.51 NSCLC METASTATIC TO BONE (MULTI): ICD-10-CM

## 2024-12-16 DIAGNOSIS — C34.90 NSCLC METASTATIC TO BONE (MULTI): ICD-10-CM

## 2024-12-16 DIAGNOSIS — T45.1X5A ANEMIA DUE TO ANTINEOPLASTIC CHEMOTHERAPY: ICD-10-CM

## 2024-12-16 LAB
ABO GROUP (TYPE) IN BLOOD: NORMAL
ANTIBODY SCREEN: NORMAL
BASOPHILS # BLD AUTO: 0.03 X10*3/UL (ref 0–0.1)
BASOPHILS NFR BLD AUTO: 1.1 %
EOSINOPHIL # BLD AUTO: 0.03 X10*3/UL (ref 0–0.7)
EOSINOPHIL NFR BLD AUTO: 1.1 %
ERYTHROCYTE [DISTWIDTH] IN BLOOD BY AUTOMATED COUNT: 16.7 % (ref 11.5–14.5)
HCT VFR BLD AUTO: 25 % (ref 41–52)
HGB BLD-MCNC: 8.4 G/DL (ref 13.5–17.5)
IMM GRANULOCYTES # BLD AUTO: 0.03 X10*3/UL (ref 0–0.7)
IMM GRANULOCYTES NFR BLD AUTO: 1.1 % (ref 0–0.9)
LYMPHOCYTES # BLD AUTO: 1.14 X10*3/UL (ref 1.2–4.8)
LYMPHOCYTES NFR BLD AUTO: 43.3 %
MCH RBC QN AUTO: 30.3 PG (ref 26–34)
MCHC RBC AUTO-ENTMCNC: 33.6 G/DL (ref 32–36)
MCV RBC AUTO: 90 FL (ref 80–100)
MONOCYTES # BLD AUTO: 0.17 X10*3/UL (ref 0.1–1)
MONOCYTES NFR BLD AUTO: 6.5 %
NEUTROPHILS # BLD AUTO: 1.23 X10*3/UL (ref 1.2–7.7)
NEUTROPHILS NFR BLD AUTO: 46.9 %
NRBC BLD-RTO: ABNORMAL /100{WBCS}
PLATELET # BLD AUTO: 184 X10*3/UL (ref 150–450)
RBC # BLD AUTO: 2.77 X10*6/UL (ref 4.5–5.9)
RH FACTOR (ANTIGEN D): NORMAL
WBC # BLD AUTO: 2.6 X10*3/UL (ref 4.4–11.3)

## 2024-12-16 PROCEDURE — 85025 COMPLETE CBC W/AUTO DIFF WBC: CPT

## 2024-12-16 PROCEDURE — 86900 BLOOD TYPING SEROLOGIC ABO: CPT

## 2024-12-16 PROCEDURE — 36415 COLL VENOUS BLD VENIPUNCTURE: CPT

## 2024-12-19 ENCOUNTER — APPOINTMENT (OUTPATIENT)
Dept: PRIMARY CARE | Facility: CLINIC | Age: 49
End: 2024-12-19
Payer: COMMERCIAL

## 2024-12-19 VITALS
HEIGHT: 66 IN | HEART RATE: 102 BPM | OXYGEN SATURATION: 94 % | SYSTOLIC BLOOD PRESSURE: 94 MMHG | DIASTOLIC BLOOD PRESSURE: 59 MMHG | WEIGHT: 106 LBS | BODY MASS INDEX: 17.04 KG/M2 | RESPIRATION RATE: 19 BRPM

## 2024-12-19 DIAGNOSIS — Z12.12 SCREENING FOR COLORECTAL CANCER: ICD-10-CM

## 2024-12-19 DIAGNOSIS — C34.90 NSCLC METASTATIC TO BONE (MULTI): ICD-10-CM

## 2024-12-19 DIAGNOSIS — Z12.11 SCREENING FOR COLORECTAL CANCER: ICD-10-CM

## 2024-12-19 DIAGNOSIS — Z13.220 SCREENING FOR LIPID DISORDERS: ICD-10-CM

## 2024-12-19 DIAGNOSIS — C79.51 NSCLC METASTATIC TO BONE (MULTI): ICD-10-CM

## 2024-12-19 DIAGNOSIS — M25.511 CHRONIC PAIN OF BOTH SHOULDERS: ICD-10-CM

## 2024-12-19 DIAGNOSIS — M25.512 CHRONIC PAIN OF BOTH SHOULDERS: ICD-10-CM

## 2024-12-19 DIAGNOSIS — Z13.220 SCREENING FOR HYPERLIPIDEMIA: ICD-10-CM

## 2024-12-19 DIAGNOSIS — Z12.5 SCREENING FOR PROSTATE CANCER: ICD-10-CM

## 2024-12-19 DIAGNOSIS — G89.29 CHRONIC PAIN OF BOTH SHOULDERS: ICD-10-CM

## 2024-12-19 DIAGNOSIS — Z00.00 ROUTINE GENERAL MEDICAL EXAMINATION AT HEALTH CARE FACILITY: Primary | ICD-10-CM

## 2024-12-19 DIAGNOSIS — F11.90 OPIATE USE: ICD-10-CM

## 2024-12-19 DIAGNOSIS — R68.2 DRY MOUTH: ICD-10-CM

## 2024-12-19 PROCEDURE — G0439 PPPS, SUBSEQ VISIT: HCPCS | Performed by: NURSE PRACTITIONER

## 2024-12-19 RX ORDER — PILOCARPINE HYDROCHLORIDE 5 MG/1
5 TABLET, FILM COATED ORAL 3 TIMES DAILY PRN
Qty: 90 TABLET | Refills: 11 | Status: SHIPPED | OUTPATIENT
Start: 2024-12-19 | End: 2025-12-19

## 2024-12-19 ASSESSMENT — ENCOUNTER SYMPTOMS
HEADACHES: 0
NECK STIFFNESS: 1
SLEEP DISTURBANCE: 0
SHORTNESS OF BREATH: 1
NERVOUS/ANXIOUS: 0
DYSPHORIC MOOD: 0
COUGH: 0
NECK PAIN: 1
DIARRHEA: 0
APPETITE CHANGE: 1
PALPITATIONS: 0
CONSTIPATION: 0
WEAKNESS: 1
CONFUSION: 0
FATIGUE: 0
UNEXPECTED WEIGHT CHANGE: 1
FEVER: 0

## 2024-12-19 ASSESSMENT — ACTIVITIES OF DAILY LIVING (ADL)
MANAGING_FINANCES: INDEPENDENT
DOING_HOUSEWORK: INDEPENDENT
GROCERY_SHOPPING: INDEPENDENT
BATHING: INDEPENDENT
DRESSING: INDEPENDENT
TAKING_MEDICATION: INDEPENDENT

## 2024-12-19 NOTE — ASSESSMENT & PLAN NOTE
Had radiation and continues chemotherapy, next due on 12/30/2024.  Orders:    Follow Up In [facility] - PCP - Established

## 2024-12-19 NOTE — ASSESSMENT & PLAN NOTE
Follows w/ palliative care, who manages pain.  Orders:    Follow Up In [facility] - PCP - Established

## 2024-12-19 NOTE — ASSESSMENT & PLAN NOTE
Orders:    pilocarpine (Salagen, pilocarpine,) 5 mg tablet; Take 1 tablet (5 mg) by mouth 3 times a day as needed (for dry mouth).

## 2024-12-19 NOTE — PROGRESS NOTES
"Subjective   Reason for Visit: Jovon Mccartney is an 49 y.o. male here for a Medicare Wellness visit.     Past Medical, Surgical, and Family History reviewed and updated in chart.    Reviewed all medications by prescribing practitioner or clinical pharmacist (such as prescriptions, OTCs, herbal therapies and supplements) and documented in the medical record.    HPI Pt is  on chemo and radiation for lung CA with bone metastases.  Next chemo is 12/30/24. Next MRI 1/27/2025    Patient Care Team:  MUNDO Hernandez-CNP as PCP - General (Gerontology)  Liset Vargas MD as On Call Attending Physician (Hematology and Oncology)   Dr. Sada Brooks-hem/onc  Catarina Rosa CNP (palliative care)    Review of Systems   Constitutional:  Positive for appetite change and unexpected weight change. Negative for fatigue and fever.   HENT:          Dry mouth; dry nares   Respiratory:  Positive for shortness of breath (SOB w/ exertion or if he takes O2 off for more than an hour at rest). Negative for cough.    Cardiovascular:  Negative for chest pain and palpitations.   Gastrointestinal:  Negative for constipation and diarrhea.        Had recent constipation, then loose stools, and currently describes having \"soft serve\" consistency stools.    Musculoskeletal:  Positive for neck pain and neck stiffness.   Neurological:  Positive for weakness. Negative for headaches (Improved since last visit).   Psychiatric/Behavioral:  Negative for confusion, dysphoric mood and sleep disturbance. The patient is not nervous/anxious.    Objective   Vitals:  BP 94/59   Pulse 102   Resp 19   Ht 1.676 m (5' 6\")   Wt 48.1 kg (106 lb)   SpO2 94%   BMI 17.11 kg/m²       Physical Exam  Constitutional:       General: He is awake. He is not in acute distress.     Appearance: He is cachectic. He is ill-appearing.      Interventions: Nasal cannula in place.      Comments: 2L O2 per NC   HENT:      Mouth/Throat:      Mouth: Mucous membranes are dry. "   Eyes:      Conjunctiva/sclera: Conjunctivae normal.   Cardiovascular:      Rate and Rhythm: Normal rate and regular rhythm.      Heart sounds: No murmur heard.  Pulmonary:      Effort: Pulmonary effort is normal.      Breath sounds: Normal breath sounds.   Abdominal:      General: Abdomen is flat.   Skin:     General: Skin is warm and dry.   Neurological:      Mental Status: He is alert and oriented to person, place, and time.   Psychiatric:         Mood and Affect: Mood normal.         Behavior: Behavior is cooperative.       Assessment & Plan  NSCLC metastatic to bone (Multi)  Had radiation and continues chemotherapy, next due on 12/30/2024.  Orders:    Follow Up In [facility] - PCP - Established    Chronic pain of both shoulders  Follows w/ palliative care, who manages pain.  Orders:    Follow Up In [facility] - PCP - Established    Opiate use  Pain managed w/ MS Contin 30 mg TID, Gabapentin 300 mg TID, and PRN Tylenol and ibuprofen.   Orders:    Follow Up In [facility] - PCP - Established    Screening for prostate cancer    Orders:    PSA screen; Future    Screening for lipid disorders  Add lipid panel to next routine lab orders       Routine general medical examination at health care facility    Orders:    1 Year Follow Up In [facility] - PCP - Wellness Exam; Future    Screening for colorectal cancer    Orders:    Cologuard®; Future    Cologuard®    Screening for hyperlipidemia    Orders:    Lipid Panel; Future    Dry mouth    Orders:    pilocarpine (Salagen, pilocarpine,) 5 mg tablet; Take 1 tablet (5 mg) by mouth 3 times a day as needed (for dry mouth).

## 2024-12-30 ENCOUNTER — OFFICE VISIT (OUTPATIENT)
Dept: HEMATOLOGY/ONCOLOGY | Facility: CLINIC | Age: 49
End: 2024-12-30
Payer: COMMERCIAL

## 2024-12-30 ENCOUNTER — LAB (OUTPATIENT)
Dept: LAB | Facility: CLINIC | Age: 49
End: 2024-12-30
Payer: COMMERCIAL

## 2024-12-30 ENCOUNTER — OFFICE VISIT (OUTPATIENT)
Dept: PALLIATIVE MEDICINE | Facility: CLINIC | Age: 49
End: 2024-12-30
Payer: COMMERCIAL

## 2024-12-30 ENCOUNTER — INFUSION (OUTPATIENT)
Dept: HEMATOLOGY/ONCOLOGY | Facility: CLINIC | Age: 49
End: 2024-12-30
Payer: COMMERCIAL

## 2024-12-30 VITALS
TEMPERATURE: 98.6 F | DIASTOLIC BLOOD PRESSURE: 68 MMHG | RESPIRATION RATE: 16 BRPM | SYSTOLIC BLOOD PRESSURE: 106 MMHG | BODY MASS INDEX: 16.88 KG/M2 | HEART RATE: 89 BPM | WEIGHT: 104.6 LBS | OXYGEN SATURATION: 98 %

## 2024-12-30 DIAGNOSIS — C79.51 NSCLC METASTATIC TO BONE (MULTI): ICD-10-CM

## 2024-12-30 DIAGNOSIS — G89.3 CANCER RELATED PAIN: ICD-10-CM

## 2024-12-30 DIAGNOSIS — C34.90 NSCLC METASTATIC TO BONE (MULTI): ICD-10-CM

## 2024-12-30 DIAGNOSIS — Z51.12 ENCOUNTER FOR ANTINEOPLASTIC CHEMOTHERAPY AND IMMUNOTHERAPY: ICD-10-CM

## 2024-12-30 DIAGNOSIS — M89.9 LESION OF BONE OF CERVICAL SPINE: ICD-10-CM

## 2024-12-30 DIAGNOSIS — Z51.5 PALLIATIVE CARE ENCOUNTER: ICD-10-CM

## 2024-12-30 DIAGNOSIS — R63.0 DECREASED APPETITE: Primary | ICD-10-CM

## 2024-12-30 DIAGNOSIS — C34.90 NSCLC METASTATIC TO BONE (MULTI): Primary | ICD-10-CM

## 2024-12-30 DIAGNOSIS — Z51.11 ENCOUNTER FOR ANTINEOPLASTIC CHEMOTHERAPY AND IMMUNOTHERAPY: ICD-10-CM

## 2024-12-30 DIAGNOSIS — C79.51 NSCLC METASTATIC TO BONE (MULTI): Primary | ICD-10-CM

## 2024-12-30 DIAGNOSIS — K59.00 CONSTIPATION, UNSPECIFIED CONSTIPATION TYPE: ICD-10-CM

## 2024-12-30 LAB
ALBUMIN SERPL BCP-MCNC: 3.4 G/DL (ref 3.4–5)
ALP SERPL-CCNC: 119 U/L (ref 33–120)
ALT SERPL W P-5'-P-CCNC: 13 U/L (ref 10–52)
ANION GAP SERPL CALC-SCNC: 12 MMOL/L (ref 10–20)
AST SERPL W P-5'-P-CCNC: 13 U/L (ref 9–39)
BASOPHILS # BLD AUTO: 0.02 X10*3/UL (ref 0–0.1)
BASOPHILS NFR BLD AUTO: 0.3 %
BILIRUB SERPL-MCNC: 0.4 MG/DL (ref 0–1.2)
BUN SERPL-MCNC: 12 MG/DL (ref 6–23)
CALCIUM SERPL-MCNC: 9.2 MG/DL (ref 8.6–10.3)
CHLORIDE SERPL-SCNC: 99 MMOL/L (ref 98–107)
CO2 SERPL-SCNC: 27 MMOL/L (ref 21–32)
CREAT SERPL-MCNC: 0.45 MG/DL (ref 0.5–1.3)
EGFRCR SERPLBLD CKD-EPI 2021: >90 ML/MIN/1.73M*2
EOSINOPHIL # BLD AUTO: 0.01 X10*3/UL (ref 0–0.7)
EOSINOPHIL NFR BLD AUTO: 0.2 %
ERYTHROCYTE [DISTWIDTH] IN BLOOD BY AUTOMATED COUNT: 19.5 % (ref 11.5–14.5)
GLUCOSE SERPL-MCNC: 111 MG/DL (ref 74–99)
HCT VFR BLD AUTO: 27.5 % (ref 41–52)
HGB BLD-MCNC: 8.8 G/DL (ref 13.5–17.5)
IMM GRANULOCYTES # BLD AUTO: 0.02 X10*3/UL (ref 0–0.7)
IMM GRANULOCYTES NFR BLD AUTO: 0.3 % (ref 0–0.9)
LYMPHOCYTES # BLD AUTO: 1.17 X10*3/UL (ref 1.2–4.8)
LYMPHOCYTES NFR BLD AUTO: 20.1 %
MCH RBC QN AUTO: 30.6 PG (ref 26–34)
MCHC RBC AUTO-ENTMCNC: 32 G/DL (ref 32–36)
MCV RBC AUTO: 96 FL (ref 80–100)
MONOCYTES # BLD AUTO: 0.87 X10*3/UL (ref 0.1–1)
MONOCYTES NFR BLD AUTO: 14.9 %
NEUTROPHILS # BLD AUTO: 3.73 X10*3/UL (ref 1.2–7.7)
NEUTROPHILS NFR BLD AUTO: 64.2 %
NRBC BLD-RTO: ABNORMAL /100{WBCS}
OVALOCYTES BLD QL SMEAR: NORMAL
PLATELET # BLD AUTO: 402 X10*3/UL (ref 150–450)
PLATELET # BLD AUTO: ABNORMAL 10*3/UL
POLYCHROMASIA BLD QL SMEAR: NORMAL
POTASSIUM SERPL-SCNC: 4 MMOL/L (ref 3.5–5.3)
PROT SERPL-MCNC: 7.1 G/DL (ref 6.4–8.2)
RBC # BLD AUTO: 2.88 X10*6/UL (ref 4.5–5.9)
RBC MORPH BLD: NORMAL
SCHISTOCYTES BLD QL SMEAR: NORMAL
SODIUM SERPL-SCNC: 134 MMOL/L (ref 136–145)
WBC # BLD AUTO: 5.8 X10*3/UL (ref 4.4–11.3)

## 2024-12-30 PROCEDURE — 96411 CHEMO IV PUSH ADDL DRUG: CPT

## 2024-12-30 PROCEDURE — 2500000004 HC RX 250 GENERAL PHARMACY W/ HCPCS (ALT 636 FOR OP/ED): Performed by: NURSE PRACTITIONER

## 2024-12-30 PROCEDURE — 2500000004 HC RX 250 GENERAL PHARMACY W/ HCPCS (ALT 636 FOR OP/ED): Mod: JZ,JG | Performed by: STUDENT IN AN ORGANIZED HEALTH CARE EDUCATION/TRAINING PROGRAM

## 2024-12-30 PROCEDURE — 99214 OFFICE O/P EST MOD 30 MIN: CPT | Performed by: NURSE PRACTITIONER

## 2024-12-30 PROCEDURE — 80053 COMPREHEN METABOLIC PANEL: CPT

## 2024-12-30 PROCEDURE — 2500000004 HC RX 250 GENERAL PHARMACY W/ HCPCS (ALT 636 FOR OP/ED): Mod: JZ,JG | Performed by: NURSE PRACTITIONER

## 2024-12-30 PROCEDURE — 96413 CHEMO IV INFUSION 1 HR: CPT

## 2024-12-30 PROCEDURE — 99214 OFFICE O/P EST MOD 30 MIN: CPT

## 2024-12-30 PROCEDURE — 96367 TX/PROPH/DG ADDL SEQ IV INF: CPT

## 2024-12-30 PROCEDURE — 96417 CHEMO IV INFUS EACH ADDL SEQ: CPT

## 2024-12-30 PROCEDURE — 36415 COLL VENOUS BLD VENIPUNCTURE: CPT

## 2024-12-30 PROCEDURE — 85025 COMPLETE CBC W/AUTO DIFF WBC: CPT

## 2024-12-30 PROCEDURE — 96375 TX/PRO/DX INJ NEW DRUG ADDON: CPT | Mod: INF

## 2024-12-30 PROCEDURE — 99214 OFFICE O/P EST MOD 30 MIN: CPT | Mod: 25,27 | Performed by: NURSE PRACTITIONER

## 2024-12-30 PROCEDURE — G2211 COMPLEX E/M VISIT ADD ON: HCPCS | Performed by: NURSE PRACTITIONER

## 2024-12-30 PROCEDURE — 99214 OFFICE O/P EST MOD 30 MIN: CPT | Mod: 25

## 2024-12-30 PROCEDURE — 85049 AUTOMATED PLATELET COUNT: CPT | Mod: 59

## 2024-12-30 PROCEDURE — 96372 THER/PROPH/DIAG INJ SC/IM: CPT

## 2024-12-30 RX ORDER — DIPHENHYDRAMINE HYDROCHLORIDE 50 MG/ML
50 INJECTION INTRAMUSCULAR; INTRAVENOUS AS NEEDED
Status: CANCELLED | OUTPATIENT
Start: 2024-12-30

## 2024-12-30 RX ORDER — FAMOTIDINE 10 MG/ML
20 INJECTION INTRAVENOUS ONCE AS NEEDED
Status: DISCONTINUED | OUTPATIENT
Start: 2024-12-30 | End: 2024-12-30 | Stop reason: HOSPADM

## 2024-12-30 RX ORDER — PROCHLORPERAZINE MALEATE 5 MG
10 TABLET ORAL EVERY 6 HOURS PRN
Status: CANCELLED | OUTPATIENT
Start: 2024-12-30

## 2024-12-30 RX ORDER — CYANOCOBALAMIN 1000 UG/ML
1000 INJECTION, SOLUTION INTRAMUSCULAR; SUBCUTANEOUS ONCE
Status: COMPLETED | OUTPATIENT
Start: 2024-12-30 | End: 2024-12-30

## 2024-12-30 RX ORDER — MORPHINE SULFATE 30 MG/1
30 TABLET, FILM COATED, EXTENDED RELEASE ORAL 3 TIMES DAILY
Qty: 90 TABLET | Refills: 0 | Status: SHIPPED | OUTPATIENT
Start: 2025-01-09 | End: 2025-02-08

## 2024-12-30 RX ORDER — MIRTAZAPINE 15 MG/1
15 TABLET, FILM COATED ORAL NIGHTLY
Qty: 30 TABLET | Refills: 2 | Status: SHIPPED | OUTPATIENT
Start: 2024-12-30 | End: 2025-03-30

## 2024-12-30 RX ORDER — PALONOSETRON 0.05 MG/ML
0.25 INJECTION, SOLUTION INTRAVENOUS ONCE
Status: COMPLETED | OUTPATIENT
Start: 2024-12-30 | End: 2024-12-30

## 2024-12-30 RX ORDER — PROCHLORPERAZINE MALEATE 10 MG
10 TABLET ORAL EVERY 6 HOURS PRN
Qty: 30 TABLET | Refills: 5 | Status: SHIPPED | OUTPATIENT
Start: 2024-12-30

## 2024-12-30 RX ORDER — EPINEPHRINE 0.3 MG/.3ML
0.3 INJECTION SUBCUTANEOUS EVERY 5 MIN PRN
Status: CANCELLED | OUTPATIENT
Start: 2024-12-30

## 2024-12-30 RX ORDER — OXYCODONE HYDROCHLORIDE 10 MG/1
10-15 TABLET ORAL EVERY 4 HOURS PRN
Qty: 270 TABLET | Refills: 0 | Status: SHIPPED | OUTPATIENT
Start: 2025-01-09 | End: 2025-02-08

## 2024-12-30 RX ORDER — DIPHENHYDRAMINE HYDROCHLORIDE 50 MG/ML
50 INJECTION INTRAMUSCULAR; INTRAVENOUS AS NEEDED
Status: DISCONTINUED | OUTPATIENT
Start: 2024-12-30 | End: 2024-12-30 | Stop reason: HOSPADM

## 2024-12-30 RX ORDER — DEXAMETHASONE 6 MG/1
12 TABLET ORAL ONCE
Status: COMPLETED | OUTPATIENT
Start: 2024-12-30 | End: 2024-12-30

## 2024-12-30 RX ORDER — EPINEPHRINE 0.3 MG/.3ML
0.3 INJECTION SUBCUTANEOUS EVERY 5 MIN PRN
Status: DISCONTINUED | OUTPATIENT
Start: 2024-12-30 | End: 2024-12-30 | Stop reason: HOSPADM

## 2024-12-30 RX ORDER — HEPARIN SODIUM,PORCINE/PF 10 UNIT/ML
50 SYRINGE (ML) INTRAVENOUS AS NEEDED
OUTPATIENT
Start: 2024-12-30

## 2024-12-30 RX ORDER — ONDANSETRON HYDROCHLORIDE 8 MG/1
8 TABLET, FILM COATED ORAL EVERY 8 HOURS PRN
Qty: 30 TABLET | Refills: 5 | Status: SHIPPED | OUTPATIENT
Start: 2024-12-30

## 2024-12-30 RX ORDER — FAMOTIDINE 10 MG/ML
20 INJECTION INTRAVENOUS ONCE AS NEEDED
Status: CANCELLED | OUTPATIENT
Start: 2024-12-30

## 2024-12-30 RX ORDER — CYANOCOBALAMIN 1000 UG/ML
1000 INJECTION, SOLUTION INTRAMUSCULAR; SUBCUTANEOUS ONCE
Status: CANCELLED | OUTPATIENT
Start: 2024-12-30

## 2024-12-30 RX ORDER — PROCHLORPERAZINE EDISYLATE 5 MG/ML
10 INJECTION INTRAMUSCULAR; INTRAVENOUS EVERY 6 HOURS PRN
Status: DISCONTINUED | OUTPATIENT
Start: 2024-12-30 | End: 2024-12-30 | Stop reason: HOSPADM

## 2024-12-30 RX ORDER — DEXAMETHASONE 6 MG/1
12 TABLET ORAL ONCE
Status: CANCELLED | OUTPATIENT
Start: 2024-12-30

## 2024-12-30 RX ORDER — DEXAMETHASONE 4 MG/1
TABLET ORAL
Qty: 5 TABLET | Refills: 5 | Status: SHIPPED | OUTPATIENT
Start: 2024-12-30

## 2024-12-30 RX ORDER — HEPARIN 100 UNIT/ML
500 SYRINGE INTRAVENOUS AS NEEDED
OUTPATIENT
Start: 2024-12-30

## 2024-12-30 RX ORDER — PALONOSETRON 0.05 MG/ML
0.25 INJECTION, SOLUTION INTRAVENOUS ONCE
Status: CANCELLED | OUTPATIENT
Start: 2024-12-30

## 2024-12-30 RX ORDER — PROCHLORPERAZINE MALEATE 10 MG
10 TABLET ORAL EVERY 6 HOURS PRN
Status: DISCONTINUED | OUTPATIENT
Start: 2024-12-30 | End: 2024-12-30 | Stop reason: HOSPADM

## 2024-12-30 RX ORDER — ALBUTEROL SULFATE 0.83 MG/ML
3 SOLUTION RESPIRATORY (INHALATION) AS NEEDED
Status: DISCONTINUED | OUTPATIENT
Start: 2024-12-30 | End: 2024-12-30 | Stop reason: HOSPADM

## 2024-12-30 RX ORDER — ALBUTEROL SULFATE 0.83 MG/ML
3 SOLUTION RESPIRATORY (INHALATION) AS NEEDED
Status: CANCELLED | OUTPATIENT
Start: 2024-12-30

## 2024-12-30 RX ORDER — FOLIC ACID 1 MG/1
1000 TABLET ORAL DAILY
Qty: 30 TABLET | Refills: 11 | Status: SHIPPED | OUTPATIENT
Start: 2024-12-30

## 2024-12-30 RX ORDER — PROCHLORPERAZINE EDISYLATE 5 MG/ML
10 INJECTION INTRAMUSCULAR; INTRAVENOUS EVERY 6 HOURS PRN
Status: CANCELLED | OUTPATIENT
Start: 2024-12-30

## 2024-12-30 RX ADMIN — CYANOCOBALAMIN 1000 MCG: 1000 INJECTION INTRAMUSCULAR; SUBCUTANEOUS at 12:19

## 2024-12-30 RX ADMIN — PEMETREXED DISODIUM 600 MG: 500 INJECTION, POWDER, LYOPHILIZED, FOR SOLUTION INTRAVENOUS at 13:31

## 2024-12-30 RX ADMIN — PALONOSETRON HYDROCHLORIDE 250 MCG: 0.25 INJECTION INTRAVENOUS at 12:18

## 2024-12-30 RX ADMIN — CARBOPLATIN 440 MG: 600 INJECTION, SOLUTION INTRAVENOUS at 13:43

## 2024-12-30 RX ADMIN — DEXAMETHASONE 12 MG: 6 TABLET ORAL at 12:19

## 2024-12-30 RX ADMIN — FOSAPREPITANT 150 MG: 150 INJECTION, POWDER, LYOPHILIZED, FOR SOLUTION INTRAVENOUS at 12:19

## 2024-12-30 RX ADMIN — SODIUM CHLORIDE 200 MG: 9 INJECTION, SOLUTION INTRAVENOUS at 12:57

## 2024-12-30 ASSESSMENT — ENCOUNTER SYMPTOMS
UNEXPECTED WEIGHT CHANGE: 0
SHORTNESS OF BREATH: 1
VOMITING: 0
CONSTIPATION: 1
HEMOPTYSIS: 0
CARDIOVASCULAR NEGATIVE: 1
BACK PAIN: 1
FEVER: 0
APPETITE CHANGE: 0
FATIGUE: 1
MYALGIAS: 1
DIARRHEA: 1
NAUSEA: 0

## 2024-12-30 ASSESSMENT — PAIN SCALES - GENERAL: PAINLEVEL_OUTOF10: 0-NO PAIN

## 2024-12-30 NOTE — PROGRESS NOTES
SUPPORTIVE AND PALLIATIVE ONCOLOGY FOLLOW-UP - OUTPATIENT      SERVICE DATE: 12/30/2024    Referred by:  Anton Mcgrath MD  Medical Oncologist: No care team member to display   Radiation Oncologist: No care team member to display  Primary Physician: Matilda Garcia  [phone]    REASON FOR CONSULT/CHIEF CONSULT COMPLAINT: pain management and Introduction to Supportive and Palliative Oncology Services    Subjective   HISTORY OF PRESENT ILLNESS: Jovon Mccartney is a 49 y.o. male who presents with a PMH of juvenile RA and newly diagnosed NSCLC with metastatic disease to the right scapula, parieto-occipital bone, C2, C3, bilateral humeral and right anterior acetabular osteolytic lesions. Completing RT to the right scapula and right scalp. Started Carbo/pem/pem 10/28/24.     Pain Assessment:  Pain Score: 5/10  Location: neck and shoulder    Symptom Assessment:  Pain:very much  constant aching pain in his bilateral posterior shoulders, arms, and neck. The pain is a 5-6/10 at its worst. He is taking oxycodone 30 mg 3 times per day. This does provide him relief, but that is because he falls asleep. He has good and bad days with pain.   Headache: none  Dizziness:none  Lack of energy: somewhat feels this has improved because he has more awake time during the day now  Difficulty sleeping: somewhat due to pain. 3 hours at a time.   Worrying: none  Anxiety: none  Depression: a little  Pain in mouth/swallowing: none  Dry mouth: none  Taste changes: none  Shortness of breath: none  Lack of appetite: a little. Drinking high protein shakes ~1- per day and ensure once per day. His appetite is up and down.   Nausea: none  Vomiting: none   Constipation: a little states that he has constipation and then diarrhea. He has not been able to get this under control yet.   Diarrhea: none  Sore muscles: none  Numbness or tingling in hands/feet/other: none  Weight loss: a little  Other: none      Information obtained from: chart review,  interview of patient, and interview of family  ______________________________________________________________________     Oncology History   NSCLC metastatic to bone (Multi)   9/26/2024 Initial Diagnosis    NSCLC metastatic to bone (Multi)     9/26/2024 Cancer Staged    Staging form: Lung, AJCC 8th Edition, Clinical stage from 9/26/2024: Stage IVB (cT4, cN3, cM1c) - Signed by Anton Mcgrath MD PhD on 9/26/2024     10/28/2024 -  Chemotherapy    Pembrolizumab + PEMEtrexed / CARBOplatin, 21 Day Cycles     1/21/2025 -  Chemotherapy    Pembrolizumab + PEMEtrexed, 21 Day Cycles          Past Medical History:   Diagnosis Date    Adalimumab (Humira) long-term use 09/15/2024    Arthritis 1974    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    Hilar mass 09/15/2024    Juvenile rheumatoid arthritis (Multi) 09/15/2024    Lung cancer (Multi) 09 17 2024    Rheumatoid arthritis 10 1974     Past Surgical History:   Procedure Laterality Date    BRONCHOSCOPY  9 16 2024    LUNG BIOPSY  9 16 2024     Family History   Problem Relation Name Age of Onset    Breast cancer Mother JORDY CHURCH     Cancer Mother JORDY CHURCH     Miscarriages / Stillbirths Mother JORDY CHURCH     Cancer Maternal Grandfather HUBER DEMARCO     Stroke Maternal Grandfather HUBER ROLANDWELL         SOCIAL HISTORY  Children 2, Grandchildren 3, Support system wife and kids, Employment on disability since 17 years old, and Hobbies working on cars and 4 wheeling.    Social History:  reports that he quit smoking about 3 months ago. His smoking use included cigarettes. He has a 58.5 pack-year smoking history. He has been exposed to tobacco smoke. He has never used smokeless tobacco. He reports that he does not currently use alcohol. He reports that he does not currently use drugs.  Quit smoking September 2024.     REVIEW OF SYSTEMS  Review of systems negative unless noted in HPI.       Objective     Current Outpatient Medications   Medication Instructions     acetaminophen (TYLENOL) 500 mg, Every 6 hours PRN    budesonide-glycopyr-formoterol (BREZTRI) 160-9-4.8 mcg/actuation HFA aerosol inhaler 2 puffs, inhalation, 2 times daily    dexAMETHasone (Decadron) 4 mg tablet Take 4 mg (1 tablet) by mouth twice daily the day before treatment, once the evening of treatment, and twice daily the day after treatment.    folic acid (Folvite) 1 mg tablet Take 1 tablet (1,000 mcg) by mouth once daily. Do not start before October 27, 2024.    folic acid (FOLVITE) 1,000 mcg, oral, Daily    gabapentin (NEURONTIN) 300 mg, oral, 3 times daily    ibuprofen 400 mg, Every 6 hours PRN    ipratropium-albuteroL (Combivent Respimat)  mcg/actuation inhaler 2 puffs, inhalation, 4 times daily PRN    morphine CR (MS CONTIN) 30 mg, oral, 3 times daily, Do not crush, chew, or split.    naloxone (NARCAN) 4 mg, nasal, As needed, May repeat every 2-3 minutes if needed, alternating nostrils, until medical assistance becomes available.    OLANZapine (ZyPREXA) 5 mg tablet Take 1 tablet (5 mg) by mouth nightly for 4 nights starting the evening of treatment    ondansetron (ZOFRAN) 8 mg, oral, Every 8 hours PRN    ondansetron (ZOFRAN) 8 mg, oral, Every 8 hours PRN    oxyCODONE (ROXICODONE) 10-15 mg, oral, Every 4 hours PRN, MAX 9 TABS PER DAY    oxygen (O2) gas therapy 1 each, inhalation, Every 24 hours    pantoprazole (PROTONIX) 40 mg, oral, Daily before breakfast, Do not crush, chew, or split.    pilocarpine (SALAGEN (PILOCARPINE)) 5 mg, oral, 3 times daily PRN    polyethylene glycol (GLYCOLAX, MIRALAX) 17 g, oral, Daily    prochlorperazine (COMPAZINE) 10 mg, oral, Every 6 hours PRN    prochlorperazine (COMPAZINE) 10 mg, oral, Every 6 hours PRN       Allergies: No Known Allergies      Lab on 12/30/2024   Component Date Value Ref Range Status    WBC 12/30/2024 5.8  4.4 - 11.3 x10*3/uL Final    nRBC 12/30/2024    Final    Not Measured    RBC 12/30/2024 2.88 (L)  4.50 - 5.90 x10*6/uL Final    Hemoglobin  12/30/2024 8.8 (L)  13.5 - 17.5 g/dL Final    Hematocrit 12/30/2024 27.5 (L)  41.0 - 52.0 % Final    MCV 12/30/2024 96  80 - 100 fL Final    MCH 12/30/2024 30.6  26.0 - 34.0 pg Final    MCHC 12/30/2024 32.0  32.0 - 36.0 g/dL Final    RDW 12/30/2024 19.5 (H)  11.5 - 14.5 % Final    Platelets 12/30/2024    Final    PLATELET CLUMPS PRECLUDE QUANTITATION. PLATELET ESTIMATE APPEARS ADEQUATE. Platelet count verified by smear review.    Neutrophils % 12/30/2024 64.2  40.0 - 80.0 % Final    Immature Granulocytes %, Automated 12/30/2024 0.3  0.0 - 0.9 % Final    Immature Granulocyte Count (IG) includes promyelocytes, myelocytes and metamyelocytes but does not include bands. Percent differential counts (%) should be interpreted in the context of the absolute cell counts (cells/UL).    Lymphocytes % 12/30/2024 20.1  13.0 - 44.0 % Final    Monocytes % 12/30/2024 14.9  2.0 - 10.0 % Final    Eosinophils % 12/30/2024 0.2  0.0 - 6.0 % Final    Basophils % 12/30/2024 0.3  0.0 - 2.0 % Final    Neutrophils Absolute 12/30/2024 3.73  1.20 - 7.70 x10*3/uL Final    Percent differential counts (%) should be interpreted in the context of the absolute cell counts (cells/uL).    Immature Granulocytes Absolute, Au* 12/30/2024 0.02  0.00 - 0.70 x10*3/uL Final    Lymphocytes Absolute 12/30/2024 1.17 (L)  1.20 - 4.80 x10*3/uL Final    Monocytes Absolute 12/30/2024 0.87  0.10 - 1.00 x10*3/uL Final    Eosinophils Absolute 12/30/2024 0.01  0.00 - 0.70 x10*3/uL Final    Basophils Absolute 12/30/2024 0.02  0.00 - 0.10 x10*3/uL Final    Automated WBC differential has been confirmed by manual smear.    Glucose 12/30/2024 111 (H)  74 - 99 mg/dL Final    Sodium 12/30/2024 134 (L)  136 - 145 mmol/L Final    Potassium 12/30/2024 4.0  3.5 - 5.3 mmol/L Final    Chloride 12/30/2024 99  98 - 107 mmol/L Final    Bicarbonate 12/30/2024 27  21 - 32 mmol/L Final    Anion Gap 12/30/2024 12  10 - 20 mmol/L Final    Urea Nitrogen 12/30/2024 12  6 - 23 mg/dL Final     Creatinine 12/30/2024 0.45 (L)  0.50 - 1.30 mg/dL Final    eGFR 12/30/2024 >90  >60 mL/min/1.73m*2 Final    Calculations of estimated GFR are performed using the 2021 CKD-EPI Study Refit equation without the race variable for the IDMS-Traceable creatinine methods.  https://jasn.asnjournals.org/content/early/2021/09/22/ASN.9487382048    Calcium 12/30/2024 9.2  8.6 - 10.3 mg/dL Final    Albumin 12/30/2024 3.4  3.4 - 5.0 g/dL Final    Alkaline Phosphatase 12/30/2024 119  33 - 120 U/L Final    Total Protein 12/30/2024 7.1  6.4 - 8.2 g/dL Final    AST 12/30/2024 13  9 - 39 U/L Final    Bilirubin, Total 12/30/2024 0.4  0.0 - 1.2 mg/dL Final    ALT 12/30/2024 13  10 - 52 U/L Final    Patients treated with Sulfasalazine may generate falsely decreased results for ALT.    RBC Morphology 12/30/2024 See Below   Final    Polychromasia 12/30/2024 Mild   Final    RBC Fragments 12/30/2024 Few   Final    Ovalocytes 12/30/2024 Few   Final    Platelets 12/30/2024 402  150 - 450 x10*3/uL Final   Lab on 12/16/2024   Component Date Value Ref Range Status    WBC 12/16/2024 2.6 (L)  4.4 - 11.3 x10*3/uL Final    nRBC 12/16/2024    Final    Not Measured    RBC 12/16/2024 2.77 (L)  4.50 - 5.90 x10*6/uL Final    Hemoglobin 12/16/2024 8.4 (L)  13.5 - 17.5 g/dL Final    Hematocrit 12/16/2024 25.0 (L)  41.0 - 52.0 % Final    MCV 12/16/2024 90  80 - 100 fL Final    MCH 12/16/2024 30.3  26.0 - 34.0 pg Final    MCHC 12/16/2024 33.6  32.0 - 36.0 g/dL Final    RDW 12/16/2024 16.7 (H)  11.5 - 14.5 % Final    Platelets 12/16/2024 184  150 - 450 x10*3/uL Final    Neutrophils % 12/16/2024 46.9  40.0 - 80.0 % Final    Immature Granulocytes %, Automated 12/16/2024 1.1 (H)  0.0 - 0.9 % Final    Immature Granulocyte Count (IG) includes promyelocytes, myelocytes and metamyelocytes but does not include bands. Percent differential counts (%) should be interpreted in the context of the absolute cell counts (cells/UL).    Lymphocytes % 12/16/2024 43.3  13.0 - 44.0  % Final    Monocytes % 12/16/2024 6.5  2.0 - 10.0 % Final    Eosinophils % 12/16/2024 1.1  0.0 - 6.0 % Final    Basophils % 12/16/2024 1.1  0.0 - 2.0 % Final    Neutrophils Absolute 12/16/2024 1.23  1.20 - 7.70 x10*3/uL Final    Percent differential counts (%) should be interpreted in the context of the absolute cell counts (cells/uL).    Immature Granulocytes Absolute, Au* 12/16/2024 0.03  0.00 - 0.70 x10*3/uL Final    Lymphocytes Absolute 12/16/2024 1.14 (L)  1.20 - 4.80 x10*3/uL Final    Monocytes Absolute 12/16/2024 0.17  0.10 - 1.00 x10*3/uL Final    Eosinophils Absolute 12/16/2024 0.03  0.00 - 0.70 x10*3/uL Final    Basophils Absolute 12/16/2024 0.03  0.00 - 0.10 x10*3/uL Final    ABO TYPE 12/16/2024 B   Final    Rh TYPE 12/16/2024 POS   Final    ANTIBODY SCREEN 12/16/2024 NEG   Final   Lab on 12/09/2024   Component Date Value Ref Range Status    WBC 12/09/2024 5.7  4.4 - 11.3 x10*3/uL Final    nRBC 12/09/2024    Final    Not Measured    RBC 12/09/2024 2.68 (L)  4.50 - 5.90 x10*6/uL Final    Hemoglobin 12/09/2024 8.1 (L)  13.5 - 17.5 g/dL Final    Hematocrit 12/09/2024 24.7 (L)  41.0 - 52.0 % Final    MCV 12/09/2024 92  80 - 100 fL Final    MCH 12/09/2024 30.2  26.0 - 34.0 pg Final    MCHC 12/09/2024 32.8  32.0 - 36.0 g/dL Final    RDW 12/09/2024 17.6 (H)  11.5 - 14.5 % Final    Platelets 12/09/2024 483 (H)  150 - 450 x10*3/uL Final    Neutrophils % 12/09/2024 65.9  40.0 - 80.0 % Final    Immature Granulocytes %, Automated 12/09/2024 0.9  0.0 - 0.9 % Final    Immature Granulocyte Count (IG) includes promyelocytes, myelocytes and metamyelocytes but does not include bands. Percent differential counts (%) should be interpreted in the context of the absolute cell counts (cells/UL).    Lymphocytes % 12/09/2024 22.8  13.0 - 44.0 % Final    Monocytes % 12/09/2024 10.2  2.0 - 10.0 % Final    Eosinophils % 12/09/2024 0.0  0.0 - 6.0 % Final    Basophils % 12/09/2024 0.2  0.0 - 2.0 % Final    Neutrophils Absolute  12/09/2024 3.74  1.20 - 7.70 x10*3/uL Final    Percent differential counts (%) should be interpreted in the context of the absolute cell counts (cells/uL).    Immature Granulocytes Absolute, Au* 12/09/2024 0.05  0.00 - 0.70 x10*3/uL Final    Lymphocytes Absolute 12/09/2024 1.29  1.20 - 4.80 x10*3/uL Final    Monocytes Absolute 12/09/2024 0.58  0.10 - 1.00 x10*3/uL Final    Eosinophils Absolute 12/09/2024 0.00  0.00 - 0.70 x10*3/uL Final    Basophils Absolute 12/09/2024 0.01  0.00 - 0.10 x10*3/uL Final    Glucose 12/09/2024 175 (H)  74 - 99 mg/dL Final    Sodium 12/09/2024 135 (L)  136 - 145 mmol/L Final    Potassium 12/09/2024 3.8  3.5 - 5.3 mmol/L Final    Chloride 12/09/2024 100  98 - 107 mmol/L Final    Bicarbonate 12/09/2024 26  21 - 32 mmol/L Final    Anion Gap 12/09/2024 13  10 - 20 mmol/L Final    Urea Nitrogen 12/09/2024 14  6 - 23 mg/dL Final    Creatinine 12/09/2024 0.43 (L)  0.50 - 1.30 mg/dL Final    eGFR 12/09/2024 >90  >60 mL/min/1.73m*2 Final    Calculations of estimated GFR are performed using the 2021 CKD-EPI Study Refit equation without the race variable for the IDMS-Traceable creatinine methods.  https://jasn.asnjournals.org/content/early/2021/09/22/ASN.1903428334    Calcium 12/09/2024 8.9  8.6 - 10.3 mg/dL Final    Albumin 12/09/2024 3.3 (L)  3.4 - 5.0 g/dL Final    Alkaline Phosphatase 12/09/2024 110  33 - 120 U/L Final    Total Protein 12/09/2024 6.8  6.4 - 8.2 g/dL Final    AST 12/09/2024 15  9 - 39 U/L Final    Bilirubin, Total 12/09/2024 0.4  0.0 - 1.2 mg/dL Final    ALT 12/09/2024 16  10 - 52 U/L Final    Patients treated with Sulfasalazine may generate falsely decreased results for ALT.    Cortisol  A.M. 12/09/2024 2.3 (L)  5.0 - 20.0 ug/dL Final    Thyroid Stimulating Hormone 12/09/2024 0.84  0.44 - 3.98 mIU/L Final        PHYSICAL EXAMINATION  Vital Signs:       11/25/2024    10:30 PM 11/26/2024    12:30 AM 11/26/2024     8:00 AM 11/26/2024     9:45 AM 12/9/2024    11:03 AM 12/19/2024     " 1:14 PM 12/30/2024    10:28 AM   Vitals   Systolic 104 127 99 108 113 94 106   Diastolic 70 78 67 70 65 59 68   BP Location    Right arm Left arm  Left arm   Heart Rate 82 87 84 75 85 102 89   Temp 36.8 °C (98.2 °F) 37.3 °C (99.1 °F) 36.1 °C (97 °F) 36.2 °C (97.2 °F) 37.4 °C (99.3 °F)  37 °C (98.6 °F)   Resp 16 16 18 18 16 19 16   Height      1.676 m (5' 6\")    Weight (lb)     110.5 106 104.6   BMI     17.84 kg/m2 17.11 kg/m2 16.88 kg/m2   BSA (m2)     1.53 m2 1.5 m2 1.49 m2   Visit Report     Report Report Report     Vital signs reviewed       Physical Exam  Constitutional:       Appearance: He is underweight.   HENT:      Head: Normocephalic.   Eyes:      Pupils: Pupils are equal, round, and reactive to light.   Pulmonary:      Effort: Pulmonary effort is normal.   Musculoskeletal:         General: Normal range of motion.      Right hand: Swelling present.      Left hand: Swelling present.      Right foot: Swelling present.      Left foot: Swelling present.   Neurological:      Mental Status: He is oriented to person, place, and time.   Psychiatric:         Mood and Affect: Mood normal.         Behavior: Behavior normal.          ASSESSMENT/PLAN    Pain  Pain is: cancer related pain  Type: somatic  Pain control: sub-optimally controlled  Home regimen:   - Continue Oxycodone 15 mg every 4 hours as needed  - Increase MSContin to 30/30/60 mg.   - Continue Tylenol 1000 mg every 8 hours as needed  - Continue Ibuprofen 600 mg every 6 hours as needed   - Continue with RT    Opioid Use  Medication Management:   - OARRS report reviewed with no aberrant behavior; consistent with  prescriptions/records and patient history  - .  Overdose Risk Score 430.   This has been discussed with patient.   - We will continue to closely monitor the patient for signs of prescription misuse including UDS, OARRS review and subjective reports at each visit.  - No concurrent benzodiazepine use   - I am a provider who either is or has " consulted and collaborated with a provider certified in Hospice and Palliative Medicine and have conducted a face-face visit and examination for this patient.  - Routine Urine Drug Screen complete 10/8/24 appropriately positive for opioids and negative for illicit substances  - Controlled Substance Agreement completed 10/8/24  - Specifically discussed that controlled substance prescriptions will only be provided by our group as outlined in the completed agreement  - Prescribed naloxone prescribed 9/27/24 - patient has at home  - Red Flags: none    Constipation   At risk for constipation related to opioids,  currently not constipated   Usual bowel pattern: every 1-2 days   Current regimen:   - Continue Senna 2 tabs BID. May increase to 3 tabs BID if needed.   - Continue Miralax 17 g daily. Increase to 2 times per day if constipation continues  - If no BM within 48-72 hours, start MOM 8% every 6 hours     Sleeping Difficulty:  Impaired sleep related to pain  Current regimen:    - Continue pain medication as described above    Decreased appetite  Related to malignancy  Nutrition consult - may consider at next visit  Current regimen:    - Start Mirtazapine 15 mg daily at bedtime  - Encouraged smaller, more frequent meals  - encouraged ensure/boost 1-2 times per day  - Drinking high calorie protein smoothies   - Met with Nena Felder    Advance Directives  Existence of Advance Directives:Yes, documentation or copy in medical record  Decision maker: HCPOA is Judith Mccartney  Code Status: Full code    Next Follow-Up Visit:  Return to clinic in 3 weeks     Signature and billing  Thank you for allowing us to participate in the care of this patient. Recommendations will be communicated back to the consulting service by way of shared electronic medical record or face-to-face.    Medical complexity was high level due to due to complexity of problems, extensive data review, and high risk of management/treatment.  Time was spent on the  following: Prep Time, Time Directly with Patient/Family/Caregiver, Documentation Time. Total time spent: 30      DATA   Diagnostic tests and information reviewed for today's visit:  Most recent labs, Most recent imaging, Medications       Some elements copied from Oncology note on 11/18/24, the elements have been updated and all reflect current decision making from today, 12/30/2024.      Plan of Care discussed with: Patient and Family/Significant Other: wife      SIGNATURE: MUNDO Jacobo-CNP    Contact information:  Supportive and Palliative Oncology  Monday-Friday 8 AM-5 PM  Phone:  [phone], press option #5, then option #1.   Or Epic Secure Chat

## 2024-12-30 NOTE — PROGRESS NOTES
[facility] - Medical Oncology Follow-Up Visit    Patient ID: Jovon Mccartney is a 49 y.o. male with NSCLC adenocarcinoma     Current therapy: cyanocobalamin (Vitamin B-12) injection 1,000 mcg, 1,000 mcg, intramuscular, Once, 2 of 2 cycles    Administration: 1,000 mcg (10/28/2024), 1,000 mcg (12/30/2024)        fosaprepitant (Emend) 150 mg in sodium chloride 0.9% 150 mL IV, 150 mg, intravenous, Once, 4 of 4 cycles    Administration: 150 mg (10/28/2024), 150 mg (11/18/2024), 150 mg (12/9/2024), 150 mg (12/30/2024)        CARBOplatin (Paraplatin) 627 mg in sodium chloride 0.9% 172.7 mL IV, 627 mg, intravenous, Once, 4 of 4 cycles    Administration: 627 mg (10/28/2024), 627 mg (11/18/2024), 500 mg (12/9/2024), 440 mg (12/30/2024)        methylPREDNISolone sod succinate (SOLU-Medrol) 40 mg/mL injection 40 mg, 40 mg, intravenous, As needed, 4 of 4 cycles        palonosetron (Aloxi) injection 250 mcg, 250 mcg, intravenous, Once, 4 of 4 cycles    Administration: 250 mcg (10/28/2024), 250 mcg (11/18/2024), 250 mcg (12/9/2024), 250 mcg (12/30/2024)        pembrolizumab (Keytruda) 200 mg in sodium chloride 0.9% 118 mL IV, 200 mg, intravenous, Once, 4 of 4 cycles    Administration: 200 mg (10/28/2024), 200 mg (11/18/2024), 200 mg (12/9/2024), 200 mg (12/30/2024)        PEMEtrexed disodium (Alimta) 800 mg in sodium chloride 0.9% 142 mL IV, 500 mg/m2 = 800 mg, intravenous, Once, 4 of 4 cycles    Dose modification: 400 mg/m2 (original dose 500 mg/m2, Cycle 2)    Administration: 800 mg (10/28/2024), 645 mg (11/18/2024), 600 mg (12/9/2024), 600 mg (12/30/2024)       Chief Concern: readiness to treat visit    Oncologic History:     DIAGNOSIS  NSCLC adenocarcinoma      STAGING  cT4 pN3 M1c      CURRENT SITES OF DISEASE  R hilar mass, 4L, 11L, scapula,  right  parieto-occipital bone, C2, C3, ?lymphatic carcinomatosis     MOLECULAR GENOMICS  KRAS p.G12C ([id number] c.34G>T)   PD-L1 <1%     PRIOR THERAPY         CURRENT THERAPY  Palliative XRT skull, C-spine, R shoulder 10/10/24 - 10/16/24  Carboplatin (AUC 5), Pemetrexed (500mg/m2), & Pembrolizumab 10/28/24 -   - Pemetrexed dose reduced 400mg/m2 - C2 11/18/24   - Carboplatin dose reduced (AUC 4) - C3 12/9/24      CURRENT ONCOLOGICAL PROBLEMS  Hemoptysis - resolved  Epistaxis - intermittent        HISTORY OF PRESENT ILLNESS  Mr. Mccartney is a 48 yo with PMH significant for RA who present to the [facility] on 9/9/24 with 2 months of progressively worsening headaches and neck pain with occasional radiation to the R and L arms. A lytic lesion was noted on CT head 9/9/24 of the parietal bone, and CT cervical spine was concerning for lucent lesions C2 and C3. Brain MRI and MR cervical spine confirmed marrow-replacing lesions fo the C2 and C3 vertebral bodies. CT C/A/P w/o contrast on 9/10/24 was concerning for soft tissue mass of the R hilum, lytic lesions of the R scapula, and 3.5 cm mass in the L gluteus. He was transferred to [facility] on 9/10/24 CT C/A/P w/IV contrast on 9/15/24 confirmed the lung mass encasing the R mainstem bronchus abutting the R main pulmonary artery and extending to the lung periphery, as well as R perihilar lesion and lymphangitic carcinomatosis, prominent mediastinal nodes, L axillary node, nodular thickening of bilateral adrenal glands, likely lesion of the R scapula, small hepatic lesions, moderate pericardial effusion. EBUS on 9/17/24 with pathology of R hilar mass with adenocarcinoma, KRAS G12C, PD-L1 pending, and positive lymph nodes 11L and 4L. He met Dr. Mcgrath and discussed palliative radiation to the occipital lesion and cervical spine. PET/CT on 10/1/24 with FDG avid confluent R hilar/mediastinal mass, mild FDG avidity along upper and middle lobe, bilateral scapular, humeral, R anterior acetabular osseous metastases. He started palliative radiation 10/10/24-10/16/24.      He was born with juvenile RA, usually his feet and knees feel good compared to  everything. He used to walk with a crutch, since the hospital he has been walking without it. He follows with a rheumatologist at [place]. It's fairly managed he says, has  been on humira for 20-25 years, right when it first came out. Kept the fluid off his knees and helped him better than anything else. He was on all kinds of drugs he said before including steroids, methotrexate, others. Typically with the humira he doesn't really get flares, sometimes in his wrists. He's been off for about a month, so thinks he'd have a flare in about a month.      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 Maybe had one exposure to asbestos. 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 Lives at home with his wife and a dog, 3 cats, a bird and fish. They have 2 sons and 3 grandchildren.  Tob: quit smoking 9/9/24, smoked 39 years, 1-1.5 ppd  EtOH: occasional  Illicits: previous smoking marijuana, only edibles now for pain     FAMILY HISTORY  Mother - breast cancer dx 56 yo  Maternal grandmother - bone cancer  Paternal cousin - bone and/or lung cancer  Paternal grandmother - unknown cancer  No other family members with autoimmune diseases    HPI   He is here today with his son.  Reports energy level is on and off.  Oxygen - continues on 2L.   No w/c needed today.  No further nosebleeds since last tx.  Appetite - good and bad days with slight wt loss noted.  Denies n/v.  +C/d -one extreme or the other.   No fevers, chills, or CP.  No s/sx's bleeding.  Labs WNL. Feels up for treatment today.      Meds (Current):    Current Outpatient Medications:     acetaminophen (Tylenol) 500 mg tablet, Take 1 tablet (500 mg) by mouth every 6 hours if needed for mild pain (1 - 3)., Disp: , Rfl:     budesonide-glycopyr-formoterol (BREZTRI) 160-9-4.8 mcg/actuation HFA aerosol inhaler, Inhale 2 puffs 2 times a day., Disp: 10.7 g, Rfl: 3     dexAMETHasone (Decadron) 4 mg tablet, Take 4 mg (1 tablet) by mouth twice daily the day before treatment, once the evening of treatment, and twice daily the day after treatment., Disp: 5 tablet, Rfl: 5    folic acid (Folvite) 1 mg tablet, Take 1 tablet (1,000 mcg) by mouth once daily. Do not start before October 27, 2024., Disp: 30 tablet, Rfl: 11    folic acid (Folvite) 1 mg tablet, Take 1 tablet (1,000 mcg) by mouth once daily., Disp: 30 tablet, Rfl: 11    gabapentin (Neurontin) 300 mg capsule, Take 1 capsule (300 mg) by mouth 3 times a day., Disp: 90 capsule, Rfl: 3    ibuprofen 200 mg tablet, Take 2 tablets (400 mg) by mouth every 6 hours if needed for mild pain (1 - 3)., Disp: , Rfl:     ipratropium-albuteroL (Combivent Respimat)  mcg/actuation inhaler, Inhale 2 puffs 4 times a day as needed for shortness of breath or wheezing., Disp: , Rfl:     mirtazapine (Remeron) 15 mg tablet, Take 1 tablet (15 mg) by mouth once daily at bedtime., Disp: 30 tablet, Rfl: 2    [START ON 1/9/2025] morphine CR (MS Contin) 30 mg 12 hr tablet, Take 1 tablet (30 mg) by mouth 3 times a day. Do not crush, chew, or split. Do not fill before January 9, 2025., Disp: 90 tablet, Rfl: 0    naloxone (Narcan) 4 mg/0.1 mL nasal spray, Administer 1 spray (4 mg) into affected nostril(s) if needed for opioid reversal. May repeat every 2-3 minutes if needed, alternating nostrils, until medical assistance becomes available., Disp: 2 each, Rfl: 3    OLANZapine (ZyPREXA) 5 mg tablet, Take 1 tablet (5 mg) by mouth nightly for 4 nights starting the evening of treatment, Disp: 4 tablet, Rfl: 2    ondansetron (Zofran) 8 mg tablet, Take 1 tablet (8 mg) by mouth every 8 hours if needed for nausea or vomiting. Do not fill before October 27, 2024., Disp: 30 tablet, Rfl: 5    ondansetron (Zofran) 8 mg tablet, Take 1 tablet (8 mg) by mouth every 8 hours if needed for nausea or vomiting., Disp: 30 tablet, Rfl: 5    [START ON 1/9/2025] oxyCODONE  (Roxicodone) 10 mg immediate release tablet, Take 1-1.5 tablets (10-15 mg) by mouth every 4 hours if needed for moderate pain (4 - 6) or severe pain (7 - 10). MAX 9 TABS PER DAY Do not fill before January 9, 2025., Disp: 270 tablet, Rfl: 0    oxygen (O2) gas therapy, Inhale 1 each once every 24 hours., Disp: , Rfl:     pantoprazole (ProtoNix) 40 mg EC tablet, Take 1 tablet (40 mg) by mouth once daily in the morning. Take before meals. Do not crush, chew, or split., Disp: 30 tablet, Rfl: 2    pilocarpine (Salagen, pilocarpine,) 5 mg tablet, Take 1 tablet (5 mg) by mouth 3 times a day as needed (for dry mouth)., Disp: 90 tablet, Rfl: 11    polyethylene glycol (Glycolax, Miralax) 17 gram/dose powder, Mix 17 g of powder and drink once daily., Disp: , Rfl:     prochlorperazine (Compazine) 10 mg tablet, Take 1 tablet (10 mg) by mouth every 6 hours if needed for nausea or vomiting. Do not fill before October 27, 2024., Disp: 30 tablet, Rfl: 5    prochlorperazine (Compazine) 10 mg tablet, Take 1 tablet (10 mg) by mouth every 6 hours if needed for nausea or vomiting., Disp: 30 tablet, Rfl: 5  No current facility-administered medications for this visit.    Review of Systems   Constitutional:  Positive for fatigue. Negative for appetite change, fever and unexpected weight change.   HENT:  Negative.  Negative for nosebleeds.    Respiratory:  Positive for shortness of breath. Negative for hemoptysis.    Cardiovascular: Negative.    Gastrointestinal:  Positive for constipation and diarrhea. Negative for nausea and vomiting.   Musculoskeletal:  Positive for back pain and myalgias.        Objective   BSA: 1.49 meters squared  Wt Readings from Last 5 Encounters:   12/30/24 47.4 kg (104 lb 9.6 oz)   12/19/24 48.1 kg (106 lb)   12/09/24 50.1 kg (110 lb 8 oz)   11/25/24 52.2 kg (115 lb)   11/20/24 54 kg (119 lb)     11/11/24 wt #123 - leg braces worn (8#)    /68 (BP Location: Left arm, Patient Position: Sitting)   Pulse 89    Temp 37 °C (98.6 °F) (Temporal)   Resp 16   Wt 47.4 kg (104 lb 9.6 oz)   SpO2 98% Comment: with tank at 2  BMI 16.88 kg/m²     ECOG Score:   Performance Status (ECOG): 2    ECOG   Definition  0          Fully active; no performance restrictions.  1          Strenuous physical activity restricted; fully ambulatory and able to carry out light work.  2          Capable of all self-care but unable to carry out any work activities. Up and about >50% of waking hours.  3          Capable of only limited self-care; confined to bed or chair >50% of waking hours.  4          Completely disabled; cannot carry out any self-care; totally confined to bed or chair.    Physical Exam  Vitals reviewed.   Constitutional:       General: He is not in acute distress.     Appearance: He is not ill-appearing.      Comments: Frail, cachectic     HENT:      Head: Normocephalic and atraumatic.      Mouth/Throat:      Mouth: Mucous membranes are moist.   Eyes:      Extraocular Movements: Extraocular movements intact.      Pupils: Pupils are equal, round, and reactive to light.   Cardiovascular:      Rate and Rhythm: Normal rate and regular rhythm.      Heart sounds: Normal heart sounds. No murmur heard.  Pulmonary:      Effort: Pulmonary effort is normal. No respiratory distress.      Breath sounds: Normal breath sounds.      Comments: Diminished on right.  Abdominal:      General: Bowel sounds are normal. There is no distension.      Palpations: Abdomen is soft.   Musculoskeletal:      Cervical back: Normal range of motion.      Right lower leg: Edema (ankles 2/2 RA) present.      Left lower leg: Edema (ankles 2/2 RA) present.      Comments: Chronic swelling to TINY ankles and left wrist/hand 2/2 RA.   Skin:     General: Skin is warm and dry.   Neurological:      General: No focal deficit present.      Mental Status: He is alert and oriented to person, place, and time. Mental status is at baseline.   Psychiatric:         Mood and Affect:  Mood normal.         Behavior: Behavior normal.         Thought Content: Thought content normal.        Results:  Labs:  Lab Results   Component Value Date    WBC 5.8 12/30/2024    HGB 8.8 (L) 12/30/2024    HCT 27.5 (L) 12/30/2024    MCV 96 12/30/2024     12/30/2024      Lab Results   Component Value Date    NEUTROABS 3.73 12/30/2024      Lab Results   Component Value Date    GLUCOSE 111 (H) 12/30/2024    CALCIUM 9.2 12/30/2024     (L) 12/30/2024    K 4.0 12/30/2024    CO2 27 12/30/2024    CL 99 12/30/2024    BUN 12 12/30/2024    CREATININE 0.45 (L) 12/30/2024    MG 1.66 11/26/2024     Lab Results   Component Value Date    ALT 13 12/30/2024    AST 13 12/30/2024    ALKPHOS 119 12/30/2024    BILITOT 0.4 12/30/2024    BILIDIR 0.1 09/14/2024      Lab Results   Component Value Date    ACTH 8.5 10/28/2024    CORTISOL 2.3 (L) 12/09/2024    TSH 0.84 12/09/2024       Imaging:  No new imaging.      Assessment/Plan      Jovon Mccartney is a 49 y.o. male here for follow up of NSCLC adenocarcinoma.     # NSCLC adenocarcinoma  - [id number]  - KRAS G12C, PD-L1 <1%  - discussed that SoC therapies include chemotherapy+ immunotherapy. Given longstanding juvenile RA, would like input from his rheumatologist regarding safety of immunotherapy in this setting. Discussed if no immunotherapy, would recommend chemotherapy alone vs KRAS G12C targeted therapy frontline, which typically is given in the second-line setting.  - provided information on carboplatin/pemetrexed/pembrolizumab vs adagrasib   - patient discussed with his rheumatologist regarding concern for flaring RA on immunotherapy. His rheumatologist will monitor closely, stop humira, and maintain him on low-dose steroids  - consented for carboplatin/pemetrexed/pembrolizumab to start 10/28/24  - will plan on utilizing adagrasib in the 2nd line setting if needed  - will plan on restaging scans after C4 - scheduled 1/15/25  - C2 11/18/24, Pemetrexed dose reduced 400mg/m2  -  Hospitalization 11/25-11/26/24 2U [facility]'s  - C3 12/9/24, Carboplatin dose reduced AUC4  - RTC in 3 weeks for Dr. Vargas visit, scan review, and maintenance pem/pem.       # Neoplasm related pain  - already saw Dr. Mcgrath, receiving palliative radiation - repeat C spine and brain MRI 1/23/25  - seeing supportive onc - started pt on ER Morphine and PRN Oxycodone.    - Pt also taking Ibuprofen 600mg QID and 1000mg Acetaminophen QID.  Recommended pt reduce both doses x1 to start.  Pt will cut back to TID x1 week and see if his pain/RA is tolerable.    - 11/18/24 - current TID Acetaminophen and Ibuprofen daily, plus Morphine, Oxycodone and Gabapentin.    - 12/9/24 - follows with supportive onc, pain managed with current regimen      # Hypoxia/low-grade temps  - Discussed pt borderline to ED recommendation 2/2 low-grade temps and hypoxic episode at home.  Pt agreeable to urgent care visit for nasal swabs to r/o COVID, Flu A/B, and RSV.  All (-).  - stable, PRN & HS oxygen use.  No further temps.    - Recommended routine oxygen use - 2L, increase to 4L with exertion.    - 12/9:  continuous oxygen use, except brief periods of time - eating.      # Hemoptysis/Epistaxis  - resolved with saline nasal spray  - 11/17/24 - epistaxis, brief episode  - will continue to monitor    - Anemia Hgb 7.1  - Pt consented today for blood transfusion (11/18/24)  - Discussed with Dr. Vargas, will repeat labs on 11/25/24.   Repeat labs, plus T&S and ABO ordered for 11/25/24.  Discussed he likely will need a transfusion. Recommended ED visit for repeat hemoptysis/epistaxis episodes.    - 2U [facility]'s 11/25-11/26/24 [facility]    - Repeat CBC w/diff 12/16/24 + T&S - Hgb 8.4   - stable Hgb 8.8 12/30/24     # Hyponatremia - 127 11/4/24  - Rx sent for NaCl tabs - 1 tab TID x7 days  - Na 134 12/30/24 - stable    # Thrombocytopenia - resolved   - 11/4/24   - 11/11/24 PLT 42   - No s/sx's bleeding  - Pt educate to bleeding precautions/interventions  - 12/30/24  -      # Leukopenia - resolved  - WBC 3.2 11/4/24   - WBC 5.8 12/30/24      # Advanced care planning  - discussed incurable but treatable nature of metastatic disease, with goal of treatment to improve or maintain quality of life and prolong life.

## 2025-01-01 ENCOUNTER — APPOINTMENT (OUTPATIENT)
Dept: HEMATOLOGY/ONCOLOGY | Facility: CLINIC | Age: 50
End: 2025-01-01
Payer: COMMERCIAL

## 2025-01-01 ENCOUNTER — HOSPITAL ENCOUNTER (INPATIENT)
Facility: HOSPITAL | Age: 50
LOS: 3 days | Discharge: HOSPICE | DRG: 199 | End: 2025-05-13
Attending: EMERGENCY MEDICINE | Admitting: INTERNAL MEDICINE
Payer: COMMERCIAL

## 2025-01-01 ENCOUNTER — APPOINTMENT (OUTPATIENT)
Dept: CARDIOLOGY | Facility: HOSPITAL | Age: 50
DRG: 199 | End: 2025-01-01
Payer: COMMERCIAL

## 2025-01-01 ENCOUNTER — APPOINTMENT (OUTPATIENT)
Dept: RADIOLOGY | Facility: HOSPITAL | Age: 50
DRG: 199 | End: 2025-01-01
Payer: COMMERCIAL

## 2025-01-01 ENCOUNTER — HOSPITAL ENCOUNTER (INPATIENT)
Facility: HOSPITAL | Age: 50
LOS: 3 days | End: 2025-05-16
Attending: INTERNAL MEDICINE | Admitting: INTERNAL MEDICINE
Payer: OTHER MISCELLANEOUS

## 2025-01-01 ENCOUNTER — APPOINTMENT (OUTPATIENT)
Dept: RADIOLOGY | Facility: HOSPITAL | Age: 50
End: 2025-01-01
Payer: OTHER MISCELLANEOUS

## 2025-01-01 ENCOUNTER — APPOINTMENT (OUTPATIENT)
Dept: RADIATION ONCOLOGY | Facility: CLINIC | Age: 50
End: 2025-01-01
Payer: COMMERCIAL

## 2025-01-01 VITALS
TEMPERATURE: 97.5 F | SYSTOLIC BLOOD PRESSURE: 71 MMHG | OXYGEN SATURATION: 85 % | DIASTOLIC BLOOD PRESSURE: 51 MMHG | HEART RATE: 102 BPM | WEIGHT: 110.89 LBS | RESPIRATION RATE: 20 BRPM | HEIGHT: 66 IN | BODY MASS INDEX: 17.82 KG/M2

## 2025-01-01 VITALS
RESPIRATION RATE: 25 BRPM | HEART RATE: 116 BPM | BODY MASS INDEX: 17.82 KG/M2 | OXYGEN SATURATION: 97 % | HEIGHT: 66 IN | WEIGHT: 110.89 LBS | DIASTOLIC BLOOD PRESSURE: 85 MMHG | TEMPERATURE: 97.7 F | SYSTOLIC BLOOD PRESSURE: 119 MMHG

## 2025-01-01 DIAGNOSIS — J18.9 PNEUMONIA OF RIGHT LOWER LOBE DUE TO INFECTIOUS ORGANISM: ICD-10-CM

## 2025-01-01 DIAGNOSIS — J93.83 PNEUMOTHORAX EX VACUO: ICD-10-CM

## 2025-01-01 DIAGNOSIS — E87.1 HYPONATREMIA: ICD-10-CM

## 2025-01-01 DIAGNOSIS — J96.01 ACUTE HYPOXIC RESPIRATORY FAILURE: Primary | ICD-10-CM

## 2025-01-01 DIAGNOSIS — J90 PLEURAL EFFUSION: ICD-10-CM

## 2025-01-01 DIAGNOSIS — S42.391A OTHER CLOSED FRACTURE OF SHAFT OF RIGHT HUMERUS, INITIAL ENCOUNTER: ICD-10-CM

## 2025-01-01 DIAGNOSIS — J96.01 ACUTE HYPOXIC RESPIRATORY FAILURE: ICD-10-CM

## 2025-01-01 DIAGNOSIS — R50.9 FEVER, UNSPECIFIED FEVER CAUSE: ICD-10-CM

## 2025-01-01 DIAGNOSIS — J43.2 CENTRILOBULAR EMPHYSEMA (MULTI): Primary | ICD-10-CM

## 2025-01-01 DIAGNOSIS — I50.20 UNSPECIFIED SYSTOLIC (CONGESTIVE) HEART FAILURE: ICD-10-CM

## 2025-01-01 DIAGNOSIS — R60.0 LOCALIZED EDEMA: ICD-10-CM

## 2025-01-01 LAB
ACID FAST STN SPEC: NORMAL
ALBUMIN SERPL BCP-MCNC: 2.6 G/DL (ref 3.4–5)
ALBUMIN SERPL BCP-MCNC: 2.7 G/DL (ref 3.4–5)
ALBUMIN SERPL BCP-MCNC: 2.7 G/DL (ref 3.4–5)
ALBUMIN SERPL BCP-MCNC: 3.1 G/DL (ref 3.4–5)
ALBUMIN SERPL BCP-MCNC: 3.2 G/DL (ref 3.4–5)
ALP SERPL-CCNC: 101 U/L (ref 33–120)
ALP SERPL-CCNC: 90 U/L (ref 33–120)
ALT SERPL W P-5'-P-CCNC: 6 U/L (ref 10–52)
ALT SERPL W P-5'-P-CCNC: 8 U/L (ref 10–52)
ANION GAP SERPL CALC-SCNC: 13 MMOL/L (ref 10–20)
ANION GAP SERPL CALC-SCNC: 15 MMOL/L (ref 10–20)
ANION GAP SERPL CALC-SCNC: 8 MMOL/L (ref 10–20)
AORTIC VALVE PEAK VELOCITY: 1.37 M/S
APPEARANCE UR: CLEAR
AST SERPL W P-5'-P-CCNC: 12 U/L (ref 9–39)
AST SERPL W P-5'-P-CCNC: 14 U/L (ref 9–39)
ATRIAL RATE: 105 BPM
ATRIAL RATE: 113 BPM
AV PEAK GRADIENT: 8 MMHG
AVA (PEAK VEL): 2.25 CM2
BACTERIA BLD CULT: NORMAL
BACTERIA BLD CULT: NORMAL
BACTERIA FLD CULT: NORMAL
BASOPHILS # BLD AUTO: 0.01 X10*3/UL (ref 0–0.1)
BASOPHILS # BLD AUTO: 0.02 X10*3/UL (ref 0–0.1)
BASOPHILS # BLD AUTO: 0.02 X10*3/UL (ref 0–0.1)
BASOPHILS NFR BLD AUTO: 0.1 %
BASOPHILS NFR BLD AUTO: 0.2 %
BASOPHILS NFR BLD AUTO: 0.2 %
BASOPHILS NFR FLD MANUAL: 0 %
BILIRUB SERPL-MCNC: 0.6 MG/DL (ref 0–1.2)
BILIRUB SERPL-MCNC: 0.6 MG/DL (ref 0–1.2)
BILIRUB UR STRIP.AUTO-MCNC: NEGATIVE MG/DL
BLASTS NFR FLD MANUAL: 0 % (ref ?–0)
BNP SERPL-MCNC: 38 PG/ML (ref 0–99)
BUN SERPL-MCNC: 10 MG/DL (ref 6–23)
BUN SERPL-MCNC: 12 MG/DL (ref 6–23)
BUN SERPL-MCNC: 13 MG/DL (ref 6–23)
CALCIUM SERPL-MCNC: 8.4 MG/DL (ref 8.6–10.3)
CALCIUM SERPL-MCNC: 8.5 MG/DL (ref 8.6–10.3)
CALCIUM SERPL-MCNC: 8.6 MG/DL (ref 8.6–10.3)
CALCIUM SERPL-MCNC: 8.6 MG/DL (ref 8.6–10.3)
CALCIUM SERPL-MCNC: 8.8 MG/DL (ref 8.6–10.3)
CARDIAC TROPONIN I PNL SERPL HS: 16 NG/L (ref 0–20)
CARDIAC TROPONIN I PNL SERPL HS: 28 NG/L (ref 0–20)
CARDIAC TROPONIN I PNL SERPL HS: 44 NG/L (ref 0–20)
CHLORIDE SERPL-SCNC: 87 MMOL/L (ref 98–107)
CHLORIDE SERPL-SCNC: 87 MMOL/L (ref 98–107)
CHLORIDE SERPL-SCNC: 90 MMOL/L (ref 98–107)
CHLORIDE SERPL-SCNC: 91 MMOL/L (ref 98–107)
CHLORIDE SERPL-SCNC: 92 MMOL/L (ref 98–107)
CHLORIDE UR-SCNC: 35 MMOL/L
CHLORIDE/CREATININE (MMOL/G) IN URINE: 132 MMOL/G CREAT (ref 23–275)
CLARITY FLD: ABNORMAL
CO2 SERPL-SCNC: 27 MMOL/L (ref 21–32)
CO2 SERPL-SCNC: 28 MMOL/L (ref 21–32)
CO2 SERPL-SCNC: 28 MMOL/L (ref 21–32)
CO2 SERPL-SCNC: 31 MMOL/L (ref 21–32)
CO2 SERPL-SCNC: 34 MMOL/L (ref 21–32)
COLOR FLD: ABNORMAL
COLOR UR: NORMAL
CREAT SERPL-MCNC: 0.31 MG/DL (ref 0.5–1.3)
CREAT SERPL-MCNC: 0.34 MG/DL (ref 0.5–1.3)
CREAT SERPL-MCNC: 0.35 MG/DL (ref 0.5–1.3)
CREAT SERPL-MCNC: 0.35 MG/DL (ref 0.5–1.3)
CREAT SERPL-MCNC: 0.4 MG/DL (ref 0.5–1.3)
CREAT UR-MCNC: 26.5 MG/DL (ref 20–370)
EGFRCR SERPLBLD CKD-EPI 2021: >90 ML/MIN/1.73M*2
EJECTION FRACTION APICAL 4 CHAMBER: 64.8
EJECTION FRACTION: 66 %
EOSINOPHIL # BLD AUTO: 0 X10*3/UL (ref 0–0.7)
EOSINOPHIL # BLD AUTO: 0.01 X10*3/UL (ref 0–0.7)
EOSINOPHIL # BLD AUTO: 0.02 X10*3/UL (ref 0–0.7)
EOSINOPHIL # BLD AUTO: 0.03 X10*3/UL (ref 0–0.7)
EOSINOPHIL # BLD AUTO: 0.03 X10*3/UL (ref 0–0.7)
EOSINOPHIL NFR BLD AUTO: 0 %
EOSINOPHIL NFR BLD AUTO: 0.1 %
EOSINOPHIL NFR BLD AUTO: 0.2 %
EOSINOPHIL NFR BLD AUTO: 0.2 %
EOSINOPHIL NFR BLD AUTO: 0.4 %
EOSINOPHIL NFR FLD MANUAL: 0 %
ERYTHROCYTE [DISTWIDTH] IN BLOOD BY AUTOMATED COUNT: 17.3 % (ref 11.5–14.5)
ERYTHROCYTE [DISTWIDTH] IN BLOOD BY AUTOMATED COUNT: 17.3 % (ref 11.5–14.5)
ERYTHROCYTE [DISTWIDTH] IN BLOOD BY AUTOMATED COUNT: 17.5 % (ref 11.5–14.5)
ERYTHROCYTE [DISTWIDTH] IN BLOOD BY AUTOMATED COUNT: 17.6 % (ref 11.5–14.5)
ERYTHROCYTE [DISTWIDTH] IN BLOOD BY AUTOMATED COUNT: 17.8 % (ref 11.5–14.5)
FLUAV RNA RESP QL NAA+PROBE: NOT DETECTED
FLUBV RNA RESP QL NAA+PROBE: NOT DETECTED
GLUCOSE FLD-MCNC: 111 MG/DL
GLUCOSE SERPL-MCNC: 100 MG/DL (ref 74–99)
GLUCOSE SERPL-MCNC: 102 MG/DL (ref 74–99)
GLUCOSE SERPL-MCNC: 137 MG/DL (ref 74–99)
GLUCOSE SERPL-MCNC: 138 MG/DL (ref 74–99)
GLUCOSE SERPL-MCNC: 89 MG/DL (ref 74–99)
GLUCOSE UR STRIP.AUTO-MCNC: NORMAL MG/DL
GRAM STN SPEC: NORMAL
GRAM STN SPEC: NORMAL
HCT VFR BLD AUTO: 25 % (ref 41–52)
HCT VFR BLD AUTO: 25.3 % (ref 41–52)
HCT VFR BLD AUTO: 25.8 % (ref 41–52)
HCT VFR BLD AUTO: 26.4 % (ref 41–52)
HCT VFR BLD AUTO: 28.3 % (ref 41–52)
HGB BLD-MCNC: 7.8 G/DL (ref 13.5–17.5)
HGB BLD-MCNC: 8 G/DL (ref 13.5–17.5)
HGB BLD-MCNC: 8 G/DL (ref 13.5–17.5)
HGB BLD-MCNC: 8.3 G/DL (ref 13.5–17.5)
HGB BLD-MCNC: 9.1 G/DL (ref 13.5–17.5)
HOLD SPECIMEN: 293
HOLD SPECIMEN: NORMAL
HOLD SPECIMEN: NORMAL
IMM GRANULOCYTES # BLD AUTO: 0.02 X10*3/UL (ref 0–0.7)
IMM GRANULOCYTES # BLD AUTO: 0.04 X10*3/UL (ref 0–0.7)
IMM GRANULOCYTES # BLD AUTO: 0.05 X10*3/UL (ref 0–0.7)
IMM GRANULOCYTES # BLD AUTO: 0.05 X10*3/UL (ref 0–0.7)
IMM GRANULOCYTES # BLD AUTO: 0.08 X10*3/UL (ref 0–0.7)
IMM GRANULOCYTES NFR BLD AUTO: 0.2 % (ref 0–0.9)
IMM GRANULOCYTES NFR BLD AUTO: 0.4 % (ref 0–0.9)
IMM GRANULOCYTES NFR BLD AUTO: 0.5 % (ref 0–0.9)
IMM GRANULOCYTES NFR BLD AUTO: 0.5 % (ref 0–0.9)
IMM GRANULOCYTES NFR BLD AUTO: 0.6 % (ref 0–0.9)
IMMATURE GRANULOCYTES IN FLUID: 0 %
INR PPP: 1.3 (ref 0.9–1.1)
KETONES UR STRIP.AUTO-MCNC: NEGATIVE MG/DL
LACTATE SERPL-SCNC: 1.3 MMOL/L (ref 0.4–2)
LDH FLD L TO P-CCNC: 229 U/L
LDH SERPL L TO P-CCNC: 169 U/L (ref 84–246)
LEFT VENTRICLE INTERNAL DIMENSION DIASTOLE: 3.71 CM (ref 3.5–6)
LEFT VENTRICULAR OUTFLOW TRACT DIAMETER: 1.9 CM
LEUKOCYTE ESTERASE UR QL STRIP.AUTO: NEGATIVE
LV EJECTION FRACTION BIPLANE: 66 %
LYMPHOCYTES # BLD AUTO: 0.64 X10*3/UL (ref 1.2–4.8)
LYMPHOCYTES # BLD AUTO: 0.67 X10*3/UL (ref 1.2–4.8)
LYMPHOCYTES # BLD AUTO: 0.88 X10*3/UL (ref 1.2–4.8)
LYMPHOCYTES # BLD AUTO: 0.95 X10*3/UL (ref 1.2–4.8)
LYMPHOCYTES # BLD AUTO: 1.07 X10*3/UL (ref 1.2–4.8)
LYMPHOCYTES NFR BLD AUTO: 10.2 %
LYMPHOCYTES NFR BLD AUTO: 12.5 %
LYMPHOCYTES NFR BLD AUTO: 5.3 %
LYMPHOCYTES NFR BLD AUTO: 6.4 %
LYMPHOCYTES NFR BLD AUTO: 9.2 %
LYMPHOCYTES NFR FLD MANUAL: 41 %
MAGNESIUM SERPL-MCNC: 1.7 MG/DL (ref 1.6–2.4)
MAGNESIUM SERPL-MCNC: 1.74 MG/DL (ref 1.6–2.4)
MAGNESIUM SERPL-MCNC: 1.87 MG/DL (ref 1.6–2.4)
MAGNESIUM SERPL-MCNC: 2.13 MG/DL (ref 1.6–2.4)
MAGNESIUM SERPL-MCNC: 2.47 MG/DL (ref 1.6–2.4)
MCH RBC QN AUTO: 26.7 PG (ref 26–34)
MCH RBC QN AUTO: 27 PG (ref 26–34)
MCH RBC QN AUTO: 27.2 PG (ref 26–34)
MCHC RBC AUTO-ENTMCNC: 31 G/DL (ref 32–36)
MCHC RBC AUTO-ENTMCNC: 31.2 G/DL (ref 32–36)
MCHC RBC AUTO-ENTMCNC: 31.4 G/DL (ref 32–36)
MCHC RBC AUTO-ENTMCNC: 31.6 G/DL (ref 32–36)
MCHC RBC AUTO-ENTMCNC: 32.2 G/DL (ref 32–36)
MCV RBC AUTO: 85 FL (ref 80–100)
MCV RBC AUTO: 85 FL (ref 80–100)
MCV RBC AUTO: 86 FL (ref 80–100)
MCV RBC AUTO: 87 FL (ref 80–100)
MCV RBC AUTO: 87 FL (ref 80–100)
MITRAL VALVE E/A RATIO: 0.64
MONOCYTES # BLD AUTO: 0.57 X10*3/UL (ref 0.1–1)
MONOCYTES # BLD AUTO: 0.62 X10*3/UL (ref 0.1–1)
MONOCYTES # BLD AUTO: 0.65 X10*3/UL (ref 0.1–1)
MONOCYTES # BLD AUTO: 0.75 X10*3/UL (ref 0.1–1)
MONOCYTES # BLD AUTO: 0.75 X10*3/UL (ref 0.1–1)
MONOCYTES NFR BLD AUTO: 5.9 %
MONOCYTES NFR BLD AUTO: 6 %
MONOCYTES NFR BLD AUTO: 7 %
MONOCYTES NFR BLD AUTO: 7.3 %
MONOCYTES NFR BLD AUTO: 7.5 %
MONOS+MACROS NFR FLD MANUAL: 4 %
MRSA DNA SPEC QL NAA+PROBE: NOT DETECTED
MRSA DNA SPEC QL NAA+PROBE: NOT DETECTED
MYCOBACTERIUM SPEC CULT: NORMAL
NEUTROPHILS # BLD AUTO: 11.2 X10*3/UL (ref 1.2–7.7)
NEUTROPHILS # BLD AUTO: 6.78 X10*3/UL (ref 1.2–7.7)
NEUTROPHILS # BLD AUTO: 7.61 X10*3/UL (ref 1.2–7.7)
NEUTROPHILS # BLD AUTO: 8 X10*3/UL (ref 1.2–7.7)
NEUTROPHILS # BLD AUTO: 8.6 X10*3/UL (ref 1.2–7.7)
NEUTROPHILS NFR BLD AUTO: 79.4 %
NEUTROPHILS NFR BLD AUTO: 82.2 %
NEUTROPHILS NFR BLD AUTO: 83.9 %
NEUTROPHILS NFR BLD AUTO: 85.5 %
NEUTROPHILS NFR BLD AUTO: 87.9 %
NEUTROPHILS NFR FLD MANUAL: 53 %
NITRITE UR QL STRIP.AUTO: NEGATIVE
NRBC BLD-RTO: 0 /100 WBCS (ref 0–0)
OSMOLALITY SERPL: 269 MOSM/KG (ref 280–300)
OSMOLALITY UR: 294 MOSM/KG (ref 200–1200)
OTHER CELLS NFR FLD MANUAL: 2 % (ref ?–0)
P AXIS: 52 DEGREES
P AXIS: 79 DEGREES
P OFFSET: 205 MS
P OFFSET: 208 MS
P ONSET: 164 MS
P ONSET: 165 MS
PATH REVIEW-CELL CT,FLUID: NORMAL
PH FLD: 6.2 [PH]
PH UR STRIP.AUTO: 6.5 [PH]
PHOSPHATE SERPL-MCNC: 3 MG/DL (ref 2.5–4.9)
PHOSPHATE SERPL-MCNC: 3.2 MG/DL (ref 2.5–4.9)
PHOSPHATE SERPL-MCNC: 3.6 MG/DL (ref 2.5–4.9)
PHOSPHATE SERPL-MCNC: 4.2 MG/DL (ref 2.5–4.9)
PLASMA CELLS NFR FLD MANUAL: 0 % (ref ?–0)
PLATELET # BLD AUTO: 198 X10*3/UL (ref 150–450)
PLATELET # BLD AUTO: 229 X10*3/UL (ref 150–450)
PLATELET # BLD AUTO: 232 X10*3/UL (ref 150–450)
PLATELET # BLD AUTO: 239 X10*3/UL (ref 150–450)
PLATELET # BLD AUTO: 246 X10*3/UL (ref 150–450)
POTASSIUM SERPL-SCNC: 3.2 MMOL/L (ref 3.5–5.3)
POTASSIUM SERPL-SCNC: 3.5 MMOL/L (ref 3.5–5.3)
POTASSIUM SERPL-SCNC: 3.6 MMOL/L (ref 3.5–5.3)
POTASSIUM SERPL-SCNC: 3.8 MMOL/L (ref 3.5–5.3)
POTASSIUM SERPL-SCNC: 4 MMOL/L (ref 3.5–5.3)
POTASSIUM UR-SCNC: 33 MMOL/L
POTASSIUM/CREAT UR-RTO: 125 MMOL/G CREAT
PR INTERVAL: 116 MS
PR INTERVAL: 118 MS
PROCALCITONIN SERPL-MCNC: 0.31 NG/ML
PROT FLD-MCNC: 2.6 G/DL
PROT SERPL-MCNC: 6 G/DL (ref 6.4–8.2)
PROT SERPL-MCNC: 6.5 G/DL (ref 6.4–8.2)
PROT SERPL-MCNC: 6.8 G/DL (ref 6.4–8.2)
PROT UR STRIP.AUTO-MCNC: NEGATIVE MG/DL
PROTHROMBIN TIME: 14.4 SECONDS (ref 9.8–12.4)
Q ONSET: 222 MS
Q ONSET: 224 MS
QRS COUNT: 17 BEATS
QRS COUNT: 19 BEATS
QRS DURATION: 92 MS
QRS DURATION: 94 MS
QT INTERVAL: 324 MS
QT INTERVAL: 340 MS
QTC CALCULATION(BAZETT): 444 MS
QTC CALCULATION(BAZETT): 449 MS
QTC FREDERICIA: 400 MS
QTC FREDERICIA: 409 MS
R AXIS: 103 DEGREES
R AXIS: 103 DEGREES
RBC # BLD AUTO: 2.87 X10*6/UL (ref 4.5–5.9)
RBC # BLD AUTO: 2.94 X10*6/UL (ref 4.5–5.9)
RBC # BLD AUTO: 2.96 X10*6/UL (ref 4.5–5.9)
RBC # BLD AUTO: 3.11 X10*6/UL (ref 4.5–5.9)
RBC # BLD AUTO: 3.34 X10*6/UL (ref 4.5–5.9)
RBC # FLD AUTO: ABNORMAL /UL
RBC # UR STRIP.AUTO: NEGATIVE MG/DL
RIGHT VENTRICLE FREE WALL PEAK S': 26 CM/S
RIGHT VENTRICLE PEAK SYSTOLIC PRESSURE: 73.8 MMHG
RSV RNA RESP QL NAA+PROBE: NOT DETECTED
SARS-COV-2 RNA RESP QL NAA+PROBE: NOT DETECTED
SODIUM SERPL-SCNC: 124 MMOL/L (ref 136–145)
SODIUM SERPL-SCNC: 125 MMOL/L (ref 136–145)
SODIUM SERPL-SCNC: 129 MMOL/L (ref 136–145)
SODIUM SERPL-SCNC: 129 MMOL/L (ref 136–145)
SODIUM SERPL-SCNC: 131 MMOL/L (ref 136–145)
SODIUM UR-SCNC: 32 MMOL/L
SODIUM/CREAT UR-RTO: 121 MMOL/G CREAT
SP GR UR STRIP.AUTO: 1.01
T AXIS: 59 DEGREES
T AXIS: 64 DEGREES
T OFFSET: 384 MS
T OFFSET: 394 MS
TOTAL CELLS COUNTED FLD: 100
UFH PPP CHRO-ACNC: 0.2 IU/ML (ref ?–1.1)
UFH PPP CHRO-ACNC: 0.3 IU/ML (ref ?–1.1)
UFH PPP CHRO-ACNC: 0.4 IU/ML (ref ?–1.1)
UROBILINOGEN UR STRIP.AUTO-MCNC: NORMAL MG/DL
VENTRICULAR RATE: 105 BPM
VENTRICULAR RATE: 113 BPM
WBC # BLD AUTO: 10.1 X10*3/UL (ref 4.4–11.3)
WBC # BLD AUTO: 12.7 X10*3/UL (ref 4.4–11.3)
WBC # BLD AUTO: 8.5 X10*3/UL (ref 4.4–11.3)
WBC # BLD AUTO: 9.3 X10*3/UL (ref 4.4–11.3)
WBC # BLD AUTO: 9.5 X10*3/UL (ref 4.4–11.3)
WBC # FLD AUTO: 252 /UL

## 2025-01-01 PROCEDURE — 36415 COLL VENOUS BLD VENIPUNCTURE: CPT

## 2025-01-01 PROCEDURE — 2500000002 HC RX 250 W HCPCS SELF ADMINISTERED DRUGS (ALT 637 FOR MEDICARE OP, ALT 636 FOR OP/ED): Performed by: INTERNAL MEDICINE

## 2025-01-01 PROCEDURE — 36415 COLL VENOUS BLD VENIPUNCTURE: CPT | Performed by: EMERGENCY MEDICINE

## 2025-01-01 PROCEDURE — 2500000001 HC RX 250 WO HCPCS SELF ADMINISTERED DRUGS (ALT 637 FOR MEDICARE OP)

## 2025-01-01 PROCEDURE — 83880 ASSAY OF NATRIURETIC PEPTIDE: CPT | Performed by: EMERGENCY MEDICINE

## 2025-01-01 PROCEDURE — 94640 AIRWAY INHALATION TREATMENT: CPT

## 2025-01-01 PROCEDURE — 99285 EMERGENCY DEPT VISIT HI MDM: CPT | Performed by: EMERGENCY MEDICINE

## 2025-01-01 PROCEDURE — 84155 ASSAY OF PROTEIN SERUM: CPT | Performed by: EMERGENCY MEDICINE

## 2025-01-01 PROCEDURE — 2500000004 HC RX 250 GENERAL PHARMACY W/ HCPCS (ALT 636 FOR OP/ED): Mod: JZ

## 2025-01-01 PROCEDURE — 93005 ELECTROCARDIOGRAM TRACING: CPT

## 2025-01-01 PROCEDURE — 96376 TX/PRO/DX INJ SAME DRUG ADON: CPT

## 2025-01-01 PROCEDURE — 71045 X-RAY EXAM CHEST 1 VIEW: CPT

## 2025-01-01 PROCEDURE — 84484 ASSAY OF TROPONIN QUANT: CPT | Performed by: EMERGENCY MEDICINE

## 2025-01-01 PROCEDURE — 83930 ASSAY OF BLOOD OSMOLALITY: CPT | Mod: STJLAB

## 2025-01-01 PROCEDURE — 2500000004 HC RX 250 GENERAL PHARMACY W/ HCPCS (ALT 636 FOR OP/ED): Performed by: INTERNAL MEDICINE

## 2025-01-01 PROCEDURE — 2020000001 HC ICU ROOM DAILY

## 2025-01-01 PROCEDURE — 87116 MYCOBACTERIA CULTURE: CPT | Mod: STJLAB | Performed by: EMERGENCY MEDICINE

## 2025-01-01 PROCEDURE — 99291 CRITICAL CARE FIRST HOUR: CPT

## 2025-01-01 PROCEDURE — 83605 ASSAY OF LACTIC ACID: CPT | Performed by: EMERGENCY MEDICINE

## 2025-01-01 PROCEDURE — 93010 ELECTROCARDIOGRAM REPORT: CPT | Performed by: INTERNAL MEDICINE

## 2025-01-01 PROCEDURE — 1150000001 HC HOSPICE PRIVATE ROOM DAILY

## 2025-01-01 PROCEDURE — 83735 ASSAY OF MAGNESIUM: CPT

## 2025-01-01 PROCEDURE — 99291 CRITICAL CARE FIRST HOUR: CPT | Performed by: EMERGENCY MEDICINE

## 2025-01-01 PROCEDURE — 2500000002 HC RX 250 W HCPCS SELF ADMINISTERED DRUGS (ALT 637 FOR MEDICARE OP, ALT 636 FOR OP/ED)

## 2025-01-01 PROCEDURE — 83986 ASSAY PH BODY FLUID NOS: CPT | Mod: STJLAB | Performed by: EMERGENCY MEDICINE

## 2025-01-01 PROCEDURE — 2500000004 HC RX 250 GENERAL PHARMACY W/ HCPCS (ALT 636 FOR OP/ED): Mod: JZ | Performed by: EMERGENCY MEDICINE

## 2025-01-01 PROCEDURE — 83615 LACTATE (LD) (LDH) ENZYME: CPT | Performed by: EMERGENCY MEDICINE

## 2025-01-01 PROCEDURE — 85520 HEPARIN ASSAY: CPT | Performed by: EMERGENCY MEDICINE

## 2025-01-01 PROCEDURE — 2500000004 HC RX 250 GENERAL PHARMACY W/ HCPCS (ALT 636 FOR OP/ED): Mod: JZ | Performed by: INTERNAL MEDICINE

## 2025-01-01 PROCEDURE — 82436 ASSAY OF URINE CHLORIDE: CPT

## 2025-01-01 PROCEDURE — 2500000002 HC RX 250 W HCPCS SELF ADMINISTERED DRUGS (ALT 637 FOR MEDICARE OP, ALT 636 FOR OP/ED): Performed by: NURSE PRACTITIONER

## 2025-01-01 PROCEDURE — 71045 X-RAY EXAM CHEST 1 VIEW: CPT | Performed by: STUDENT IN AN ORGANIZED HEALTH CARE EDUCATION/TRAINING PROGRAM

## 2025-01-01 PROCEDURE — 32551 INSERTION OF CHEST TUBE: CPT | Performed by: EMERGENCY MEDICINE

## 2025-01-01 PROCEDURE — 93306 TTE W/DOPPLER COMPLETE: CPT

## 2025-01-01 PROCEDURE — 87641 MR-STAPH DNA AMP PROBE: CPT | Performed by: EMERGENCY MEDICINE

## 2025-01-01 PROCEDURE — 99291 CRITICAL CARE FIRST HOUR: CPT | Performed by: INTERNAL MEDICINE

## 2025-01-01 PROCEDURE — 85520 HEPARIN ASSAY: CPT | Performed by: INTERNAL MEDICINE

## 2025-01-01 PROCEDURE — 2500000005 HC RX 250 GENERAL PHARMACY W/O HCPCS: Performed by: EMERGENCY MEDICINE

## 2025-01-01 PROCEDURE — 87205 SMEAR GRAM STAIN: CPT | Mod: STJLAB | Performed by: EMERGENCY MEDICINE

## 2025-01-01 PROCEDURE — 87641 MR-STAPH DNA AMP PROBE: CPT

## 2025-01-01 PROCEDURE — 71275 CT ANGIOGRAPHY CHEST: CPT | Performed by: RADIOLOGY

## 2025-01-01 PROCEDURE — 71275 CT ANGIOGRAPHY CHEST: CPT

## 2025-01-01 PROCEDURE — 71045 X-RAY EXAM CHEST 1 VIEW: CPT | Performed by: RADIOLOGY

## 2025-01-01 PROCEDURE — 83615 LACTATE (LD) (LDH) ENZYME: CPT | Mod: STJLAB | Performed by: EMERGENCY MEDICINE

## 2025-01-01 PROCEDURE — 2500000005 HC RX 250 GENERAL PHARMACY W/O HCPCS

## 2025-01-01 PROCEDURE — 84157 ASSAY OF PROTEIN OTHER: CPT | Mod: STJLAB | Performed by: EMERGENCY MEDICINE

## 2025-01-01 PROCEDURE — 84145 PROCALCITONIN (PCT): CPT | Mod: STJLAB

## 2025-01-01 PROCEDURE — 85025 COMPLETE CBC W/AUTO DIFF WBC: CPT

## 2025-01-01 PROCEDURE — 80053 COMPREHEN METABOLIC PANEL: CPT | Performed by: EMERGENCY MEDICINE

## 2025-01-01 PROCEDURE — 80053 COMPREHEN METABOLIC PANEL: CPT

## 2025-01-01 PROCEDURE — 73030 X-RAY EXAM OF SHOULDER: CPT | Mod: RIGHT SIDE | Performed by: STUDENT IN AN ORGANIZED HEALTH CARE EDUCATION/TRAINING PROGRAM

## 2025-01-01 PROCEDURE — 0W9930Z DRAINAGE OF RIGHT PLEURAL CAVITY WITH DRAINAGE DEVICE, PERCUTANEOUS APPROACH: ICD-10-PCS | Performed by: EMERGENCY MEDICINE

## 2025-01-01 PROCEDURE — 82945 GLUCOSE OTHER FLUID: CPT | Mod: STJLAB | Performed by: EMERGENCY MEDICINE

## 2025-01-01 PROCEDURE — 96365 THER/PROPH/DIAG IV INF INIT: CPT

## 2025-01-01 PROCEDURE — 83735 ASSAY OF MAGNESIUM: CPT | Performed by: EMERGENCY MEDICINE

## 2025-01-01 PROCEDURE — 87637 SARSCOV2&INF A&B&RSV AMP PRB: CPT | Performed by: EMERGENCY MEDICINE

## 2025-01-01 PROCEDURE — 85610 PROTHROMBIN TIME: CPT | Performed by: EMERGENCY MEDICINE

## 2025-01-01 PROCEDURE — 96367 TX/PROPH/DG ADDL SEQ IV INF: CPT

## 2025-01-01 PROCEDURE — 99222 1ST HOSP IP/OBS MODERATE 55: CPT

## 2025-01-01 PROCEDURE — 85025 COMPLETE CBC W/AUTO DIFF WBC: CPT | Performed by: EMERGENCY MEDICINE

## 2025-01-01 PROCEDURE — 2550000001 HC RX 255 CONTRASTS: Mod: JZ | Performed by: EMERGENCY MEDICINE

## 2025-01-01 PROCEDURE — 81003 URINALYSIS AUTO W/O SCOPE: CPT | Performed by: EMERGENCY MEDICINE

## 2025-01-01 PROCEDURE — 73030 X-RAY EXAM OF SHOULDER: CPT | Mod: RT

## 2025-01-01 PROCEDURE — 84100 ASSAY OF PHOSPHORUS: CPT

## 2025-01-01 PROCEDURE — 89051 BODY FLUID CELL COUNT: CPT | Performed by: EMERGENCY MEDICINE

## 2025-01-01 PROCEDURE — 80069 RENAL FUNCTION PANEL: CPT

## 2025-01-01 PROCEDURE — 73060 X-RAY EXAM OF HUMERUS: CPT | Mod: LEFT SIDE | Performed by: RADIOLOGY

## 2025-01-01 PROCEDURE — 83935 ASSAY OF URINE OSMOLALITY: CPT | Mod: STJLAB

## 2025-01-01 PROCEDURE — 87040 BLOOD CULTURE FOR BACTERIA: CPT | Mod: STJLAB | Performed by: EMERGENCY MEDICINE

## 2025-01-01 PROCEDURE — 96375 TX/PRO/DX INJ NEW DRUG ADDON: CPT

## 2025-01-01 PROCEDURE — 5A0945A ASSISTANCE WITH RESPIRATORY VENTILATION, 24-96 CONSECUTIVE HOURS, HIGH NASAL FLOW/VELOCITY: ICD-10-PCS | Performed by: EMERGENCY MEDICINE

## 2025-01-01 PROCEDURE — 73060 X-RAY EXAM OF HUMERUS: CPT | Mod: LT

## 2025-01-01 PROCEDURE — 96366 THER/PROPH/DIAG IV INF ADDON: CPT

## 2025-01-01 PROCEDURE — 2500000004 HC RX 250 GENERAL PHARMACY W/ HCPCS (ALT 636 FOR OP/ED)

## 2025-01-01 PROCEDURE — 93306 TTE W/DOPPLER COMPLETE: CPT | Performed by: INTERNAL MEDICINE

## 2025-01-01 RX ORDER — MORPHINE SULFATE 30 MG/1
30 TABLET, FILM COATED, EXTENDED RELEASE ORAL ONCE
Refills: 0 | Status: COMPLETED | OUTPATIENT
Start: 2025-01-01 | End: 2025-01-01

## 2025-01-01 RX ORDER — PILOCARPINE HYDROCHLORIDE 5 MG/1
5 TABLET, FILM COATED ORAL 3 TIMES DAILY PRN
Status: DISCONTINUED | OUTPATIENT
Start: 2025-01-01 | End: 2025-01-01 | Stop reason: HOSPADM

## 2025-01-01 RX ORDER — SENNOSIDES 8.6 MG/1
1 TABLET ORAL NIGHTLY
Status: DISCONTINUED | OUTPATIENT
Start: 2025-01-01 | End: 2025-01-01 | Stop reason: HOSPADM

## 2025-01-01 RX ORDER — PREDNISONE 10 MG/1
10 TABLET ORAL DAILY
Status: CANCELLED | OUTPATIENT
Start: 2025-01-01 | End: 2025-01-01

## 2025-01-01 RX ORDER — SENNOSIDES 8.6 MG/1
1 TABLET ORAL NIGHTLY
Status: DISCONTINUED | OUTPATIENT
Start: 2025-01-01 | End: 2025-01-01

## 2025-01-01 RX ORDER — ONDANSETRON HYDROCHLORIDE 2 MG/ML
8 INJECTION, SOLUTION INTRAVENOUS EVERY 8 HOURS PRN
Status: DISCONTINUED | OUTPATIENT
Start: 2025-01-01 | End: 2025-01-01 | Stop reason: HOSPADM

## 2025-01-01 RX ORDER — DOCUSATE SODIUM 100 MG/1
100 CAPSULE, LIQUID FILLED ORAL 2 TIMES DAILY PRN
Status: CANCELLED | OUTPATIENT
Start: 2025-01-01

## 2025-01-01 RX ORDER — IPRATROPIUM BROMIDE AND ALBUTEROL SULFATE 2.5; .5 MG/3ML; MG/3ML
3 SOLUTION RESPIRATORY (INHALATION) EVERY 2 HOUR PRN
Status: DISCONTINUED | OUTPATIENT
Start: 2025-01-01 | End: 2025-01-01 | Stop reason: HOSPADM

## 2025-01-01 RX ORDER — FUROSEMIDE 10 MG/ML
60 INJECTION INTRAMUSCULAR; INTRAVENOUS ONCE
Status: COMPLETED | OUTPATIENT
Start: 2025-01-01 | End: 2025-01-01

## 2025-01-01 RX ORDER — ACETAMINOPHEN 500 MG
5 TABLET ORAL NIGHTLY PRN
Status: DISCONTINUED | OUTPATIENT
Start: 2025-01-01 | End: 2025-01-01 | Stop reason: HOSPADM

## 2025-01-01 RX ORDER — MORPHINE SULFATE IN 0.9 % NACL 30 MG/30ML
5 PATIENT CONTROLLED ANALGESIA SYRINGE INTRAVENOUS CONTINUOUS
Status: DISCONTINUED | OUTPATIENT
Start: 2025-01-01 | End: 2025-01-01

## 2025-01-01 RX ORDER — POTASSIUM CHLORIDE 20 MEQ/1
20 TABLET, EXTENDED RELEASE ORAL EVERY 6 HOURS PRN
Status: DISCONTINUED | OUTPATIENT
Start: 2025-01-01 | End: 2025-01-01

## 2025-01-01 RX ORDER — HYDROXYZINE HYDROCHLORIDE 25 MG/1
25 TABLET, FILM COATED ORAL EVERY 6 HOURS PRN
Status: DISCONTINUED | OUTPATIENT
Start: 2025-01-01 | End: 2025-01-01 | Stop reason: HOSPADM

## 2025-01-01 RX ORDER — POTASSIUM CHLORIDE 14.9 MG/ML
20 INJECTION INTRAVENOUS ONCE
Status: COMPLETED | OUTPATIENT
Start: 2025-01-01 | End: 2025-01-01

## 2025-01-01 RX ORDER — IPRATROPIUM BROMIDE AND ALBUTEROL SULFATE 2.5; .5 MG/3ML; MG/3ML
3 SOLUTION RESPIRATORY (INHALATION) ONCE
Status: DISCONTINUED | OUTPATIENT
Start: 2025-01-01 | End: 2025-01-01

## 2025-01-01 RX ORDER — SENNOSIDES 8.6 MG/1
1 TABLET ORAL NIGHTLY
Status: CANCELLED | OUTPATIENT
Start: 2025-01-01

## 2025-01-01 RX ORDER — MIRTAZAPINE 15 MG/1
15 TABLET, FILM COATED ORAL NIGHTLY
Status: DISCONTINUED | OUTPATIENT
Start: 2025-01-01 | End: 2025-01-01 | Stop reason: HOSPADM

## 2025-01-01 RX ORDER — ACETAMINOPHEN AND PHENYLEPHRINE HCL 325; 5 MG/1; MG/1
1 TABLET ORAL DAILY
COMMUNITY

## 2025-01-01 RX ORDER — MAGNESIUM SULFATE HEPTAHYDRATE 40 MG/ML
4 INJECTION, SOLUTION INTRAVENOUS EVERY 6 HOURS PRN
Status: CANCELLED | OUTPATIENT
Start: 2025-01-01

## 2025-01-01 RX ORDER — MORPHINE SULFATE IN 0.9 % NACL 30 MG/30ML
PATIENT CONTROLLED ANALGESIA SYRINGE INTRAVENOUS CONTINUOUS
Refills: 0 | Status: DISCONTINUED | OUTPATIENT
Start: 2025-01-01 | End: 2025-01-01 | Stop reason: HOSPADM

## 2025-01-01 RX ORDER — MAGNESIUM SULFATE HEPTAHYDRATE 40 MG/ML
4 INJECTION, SOLUTION INTRAVENOUS EVERY 6 HOURS PRN
Status: DISCONTINUED | OUTPATIENT
Start: 2025-01-01 | End: 2025-01-01 | Stop reason: HOSPADM

## 2025-01-01 RX ORDER — MAGNESIUM SULFATE HEPTAHYDRATE 40 MG/ML
4 INJECTION, SOLUTION INTRAVENOUS EVERY 6 HOURS PRN
Status: DISCONTINUED | OUTPATIENT
Start: 2025-01-01 | End: 2025-01-01

## 2025-01-01 RX ORDER — VANCOMYCIN HYDROCHLORIDE 1 G/200ML
1 INJECTION, SOLUTION INTRAVENOUS ONCE
Status: COMPLETED | OUTPATIENT
Start: 2025-01-01 | End: 2025-01-01

## 2025-01-01 RX ORDER — HALOPERIDOL LACTATE 5 MG/ML
5 INJECTION, SOLUTION INTRAMUSCULAR
Status: DISCONTINUED | OUTPATIENT
Start: 2025-01-01 | End: 2025-01-01 | Stop reason: HOSPADM

## 2025-01-01 RX ORDER — ONDANSETRON HYDROCHLORIDE 2 MG/ML
8 INJECTION, SOLUTION INTRAVENOUS EVERY 8 HOURS PRN
Status: CANCELLED | OUTPATIENT
Start: 2025-01-01

## 2025-01-01 RX ORDER — POTASSIUM CHLORIDE 14.9 MG/ML
20 INJECTION INTRAVENOUS EVERY 6 HOURS PRN
Status: DISCONTINUED | OUTPATIENT
Start: 2025-01-01 | End: 2025-01-01 | Stop reason: HOSPADM

## 2025-01-01 RX ORDER — POTASSIUM CHLORIDE 20 MEQ/1
20 TABLET, EXTENDED RELEASE ORAL EVERY 6 HOURS PRN
Status: CANCELLED | OUTPATIENT
Start: 2025-01-01

## 2025-01-01 RX ORDER — HEPARIN SODIUM 10000 [USP'U]/100ML
0-4500 INJECTION, SOLUTION INTRAVENOUS CONTINUOUS
Status: DISCONTINUED | OUTPATIENT
Start: 2025-01-01 | End: 2025-01-01 | Stop reason: HOSPADM

## 2025-01-01 RX ORDER — POTASSIUM CHLORIDE 20 MEQ/1
20 TABLET, EXTENDED RELEASE ORAL EVERY 6 HOURS PRN
Status: DISCONTINUED | OUTPATIENT
Start: 2025-01-01 | End: 2025-01-01 | Stop reason: HOSPADM

## 2025-01-01 RX ORDER — LORAZEPAM 2 MG/ML
0.5 INJECTION INTRAMUSCULAR
Status: DISCONTINUED | OUTPATIENT
Start: 2025-01-01 | End: 2025-01-01 | Stop reason: CLARIF

## 2025-01-01 RX ORDER — POTASSIUM CHLORIDE 14.9 MG/ML
20 INJECTION INTRAVENOUS EVERY 6 HOURS PRN
Status: DISCONTINUED | OUTPATIENT
Start: 2025-01-01 | End: 2025-01-01

## 2025-01-01 RX ORDER — BUDESONIDE 0.5 MG/2ML
0.5 INHALANT ORAL
Status: DISCONTINUED | OUTPATIENT
Start: 2025-01-01 | End: 2025-01-01

## 2025-01-01 RX ORDER — PILOCARPINE HYDROCHLORIDE 5 MG/1
5 TABLET, FILM COATED ORAL 3 TIMES DAILY PRN
Status: CANCELLED | OUTPATIENT
Start: 2025-01-01

## 2025-01-01 RX ORDER — MORPHINE SULFATE 2 MG/ML
2 INJECTION, SOLUTION INTRAMUSCULAR; INTRAVENOUS
Status: DISCONTINUED | OUTPATIENT
Start: 2025-01-01 | End: 2025-01-01

## 2025-01-01 RX ORDER — MAGNESIUM SULFATE HEPTAHYDRATE 40 MG/ML
2 INJECTION, SOLUTION INTRAVENOUS EVERY 6 HOURS PRN
Status: CANCELLED | OUTPATIENT
Start: 2025-01-01

## 2025-01-01 RX ORDER — LORAZEPAM 2 MG/ML
0.5 INJECTION INTRAMUSCULAR ONCE
Status: COMPLETED | OUTPATIENT
Start: 2025-01-01 | End: 2025-01-01

## 2025-01-01 RX ORDER — MORPHINE SULFATE IN 0.9 % NACL 30 MG/30ML
PATIENT CONTROLLED ANALGESIA SYRINGE INTRAVENOUS CONTINUOUS
Refills: 0 | Status: DISCONTINUED | OUTPATIENT
Start: 2025-01-01 | End: 2025-01-01

## 2025-01-01 RX ORDER — PANTOPRAZOLE SODIUM 40 MG/1
40 TABLET, DELAYED RELEASE ORAL
Status: DISCONTINUED | OUTPATIENT
Start: 2025-01-01 | End: 2025-01-01 | Stop reason: HOSPADM

## 2025-01-01 RX ORDER — POTASSIUM CHLORIDE 1.5 G/1.58G
40 POWDER, FOR SOLUTION ORAL EVERY 6 HOURS PRN
Status: DISCONTINUED | OUTPATIENT
Start: 2025-01-01 | End: 2025-01-01

## 2025-01-01 RX ORDER — BUDESONIDE 0.5 MG/2ML
0.5 INHALANT ORAL
Status: DISCONTINUED | OUTPATIENT
Start: 2025-01-01 | End: 2025-01-01 | Stop reason: HOSPADM

## 2025-01-01 RX ORDER — MORPHINE SULFATE 4 MG/ML
4 INJECTION, SOLUTION INTRAMUSCULAR; INTRAVENOUS
Status: DISCONTINUED | OUTPATIENT
Start: 2025-01-01 | End: 2025-01-01 | Stop reason: HOSPADM

## 2025-01-01 RX ORDER — MORPHINE SULFATE 10 MG/.5ML
10 SOLUTION ORAL EVERY 2 HOUR PRN
Status: DISCONTINUED | OUTPATIENT
Start: 2025-01-01 | End: 2025-01-01

## 2025-01-01 RX ORDER — MORPHINE SULFATE 4 MG/ML
4 INJECTION, SOLUTION INTRAMUSCULAR; INTRAVENOUS
Status: DISCONTINUED | OUTPATIENT
Start: 2025-01-01 | End: 2025-01-01

## 2025-01-01 RX ORDER — ACETAMINOPHEN 500 MG
5 TABLET ORAL NIGHTLY PRN
Status: CANCELLED | OUTPATIENT
Start: 2025-01-01

## 2025-01-01 RX ORDER — POLYETHYLENE GLYCOL 3350 17 G/17G
17 POWDER, FOR SOLUTION ORAL DAILY
Status: DISCONTINUED | OUTPATIENT
Start: 2025-01-01 | End: 2025-01-01 | Stop reason: HOSPADM

## 2025-01-01 RX ORDER — FORMOTEROL FUMARATE 20 UG/2ML
20 SOLUTION RESPIRATORY (INHALATION)
Status: DISCONTINUED | OUTPATIENT
Start: 2025-01-01 | End: 2025-01-01

## 2025-01-01 RX ORDER — IPRATROPIUM BROMIDE AND ALBUTEROL SULFATE 2.5; .5 MG/3ML; MG/3ML
3 SOLUTION RESPIRATORY (INHALATION) 4 TIMES DAILY PRN
Status: DISCONTINUED | OUTPATIENT
Start: 2025-01-01 | End: 2025-01-01

## 2025-01-01 RX ORDER — DOCUSATE SODIUM 100 MG/1
100 CAPSULE, LIQUID FILLED ORAL 2 TIMES DAILY PRN
Status: DISCONTINUED | OUTPATIENT
Start: 2025-01-01 | End: 2025-01-01

## 2025-01-01 RX ORDER — POTASSIUM CHLORIDE 14.9 MG/ML
20 INJECTION INTRAVENOUS EVERY 6 HOURS PRN
Status: CANCELLED | OUTPATIENT
Start: 2025-01-01

## 2025-01-01 RX ORDER — IPRATROPIUM BROMIDE AND ALBUTEROL SULFATE 2.5; .5 MG/3ML; MG/3ML
3 SOLUTION RESPIRATORY (INHALATION)
Status: DISCONTINUED | OUTPATIENT
Start: 2025-01-01 | End: 2025-01-01 | Stop reason: HOSPADM

## 2025-01-01 RX ORDER — MORPHINE SULFATE 30 MG/1
60 TABLET, FILM COATED, EXTENDED RELEASE ORAL NIGHTLY
Status: DISCONTINUED | OUTPATIENT
Start: 2025-01-01 | End: 2025-01-01

## 2025-01-01 RX ORDER — POLYETHYLENE GLYCOL 3350 17 G/17G
17 POWDER, FOR SOLUTION ORAL DAILY
Status: DISCONTINUED | OUTPATIENT
Start: 2025-01-01 | End: 2025-01-01

## 2025-01-01 RX ORDER — ACETAMINOPHEN 325 MG/1
650 TABLET ORAL EVERY 4 HOURS PRN
Status: DISCONTINUED | OUTPATIENT
Start: 2025-01-01 | End: 2025-01-01 | Stop reason: HOSPADM

## 2025-01-01 RX ORDER — GLYCOPYRROLATE 0.2 MG/ML
0.2 INJECTION INTRAMUSCULAR; INTRAVENOUS EVERY 4 HOURS PRN
Status: DISCONTINUED | OUTPATIENT
Start: 2025-01-01 | End: 2025-01-01 | Stop reason: HOSPADM

## 2025-01-01 RX ORDER — HYDROXYZINE HYDROCHLORIDE 25 MG/1
25 TABLET, FILM COATED ORAL EVERY 6 HOURS PRN
Status: CANCELLED | OUTPATIENT
Start: 2025-01-01

## 2025-01-01 RX ORDER — PREDNISONE 10 MG/1
10 TABLET ORAL DAILY
Status: DISCONTINUED | OUTPATIENT
Start: 2025-01-01 | End: 2025-01-01 | Stop reason: HOSPADM

## 2025-01-01 RX ORDER — OXYCODONE HYDROCHLORIDE 10 MG/1
10 TABLET ORAL EVERY 4 HOURS PRN
Refills: 0 | Status: CANCELLED | OUTPATIENT
Start: 2025-01-01

## 2025-01-01 RX ORDER — FORMOTEROL FUMARATE 20 UG/2ML
20 SOLUTION RESPIRATORY (INHALATION)
Status: DISCONTINUED | OUTPATIENT
Start: 2025-01-01 | End: 2025-01-01 | Stop reason: HOSPADM

## 2025-01-01 RX ORDER — POTASSIUM CHLORIDE 1.5 G/1.58G
20 POWDER, FOR SOLUTION ORAL ONCE
Status: DISCONTINUED | OUTPATIENT
Start: 2025-01-01 | End: 2025-01-01

## 2025-01-01 RX ORDER — ACETAMINOPHEN 160 MG/5ML
650 SOLUTION ORAL EVERY 4 HOURS PRN
Status: DISCONTINUED | OUTPATIENT
Start: 2025-01-01 | End: 2025-01-01 | Stop reason: HOSPADM

## 2025-01-01 RX ORDER — PREDNISONE 20 MG/1
20 TABLET ORAL DAILY
Status: COMPLETED | OUTPATIENT
Start: 2025-01-01 | End: 2025-01-01

## 2025-01-01 RX ORDER — MAGNESIUM SULFATE HEPTAHYDRATE 40 MG/ML
2 INJECTION, SOLUTION INTRAVENOUS EVERY 6 HOURS PRN
Status: DISCONTINUED | OUTPATIENT
Start: 2025-01-01 | End: 2025-01-01

## 2025-01-01 RX ORDER — MAGNESIUM SULFATE HEPTAHYDRATE 40 MG/ML
2 INJECTION, SOLUTION INTRAVENOUS EVERY 6 HOURS PRN
Status: DISCONTINUED | OUTPATIENT
Start: 2025-01-01 | End: 2025-01-01 | Stop reason: HOSPADM

## 2025-01-01 RX ORDER — BUDESONIDE 0.5 MG/2ML
0.5 INHALANT ORAL
Status: CANCELLED | OUTPATIENT
Start: 2025-01-01

## 2025-01-01 RX ORDER — NALOXONE HYDROCHLORIDE 0.4 MG/ML
0.2 INJECTION, SOLUTION INTRAMUSCULAR; INTRAVENOUS; SUBCUTANEOUS AS NEEDED
Status: DISCONTINUED | OUTPATIENT
Start: 2025-01-01 | End: 2025-01-01 | Stop reason: HOSPADM

## 2025-01-01 RX ORDER — IPRATROPIUM BROMIDE AND ALBUTEROL SULFATE 2.5; .5 MG/3ML; MG/3ML
3 SOLUTION RESPIRATORY (INHALATION)
Status: CANCELLED | OUTPATIENT
Start: 2025-01-01

## 2025-01-01 RX ORDER — ACETAMINOPHEN 325 MG/1
650 TABLET ORAL ONCE
Status: COMPLETED | OUTPATIENT
Start: 2025-01-01 | End: 2025-01-01

## 2025-01-01 RX ORDER — PANTOPRAZOLE SODIUM 40 MG/1
40 TABLET, DELAYED RELEASE ORAL
Status: CANCELLED | OUTPATIENT
Start: 2025-01-01

## 2025-01-01 RX ORDER — POTASSIUM CHLORIDE 20 MEQ/1
40 TABLET, EXTENDED RELEASE ORAL EVERY 6 HOURS PRN
Status: CANCELLED | OUTPATIENT
Start: 2025-01-01

## 2025-01-01 RX ORDER — PREDNISONE 10 MG/1
10 TABLET ORAL DAILY
Status: DISCONTINUED | OUTPATIENT
Start: 2025-01-01 | End: 2025-01-01

## 2025-01-01 RX ORDER — VANCOMYCIN HYDROCHLORIDE 1 G/200ML
1 INJECTION, SOLUTION INTRAVENOUS EVERY 12 HOURS
Status: DISCONTINUED | OUTPATIENT
Start: 2025-01-01 | End: 2025-01-01

## 2025-01-01 RX ORDER — POTASSIUM CHLORIDE 1.5 G/1.58G
40 POWDER, FOR SOLUTION ORAL EVERY 6 HOURS PRN
Status: CANCELLED | OUTPATIENT
Start: 2025-01-01

## 2025-01-01 RX ORDER — HYDROXYZINE HYDROCHLORIDE 25 MG/1
25 TABLET, FILM COATED ORAL EVERY 6 HOURS PRN
Status: DISCONTINUED | OUTPATIENT
Start: 2025-01-01 | End: 2025-01-01

## 2025-01-01 RX ORDER — MORPHINE SULFATE 30 MG/1
60 TABLET, FILM COATED, EXTENDED RELEASE ORAL NIGHTLY
Refills: 0 | Status: CANCELLED | OUTPATIENT
Start: 2025-01-01

## 2025-01-01 RX ORDER — GABAPENTIN 300 MG/1
300 CAPSULE ORAL 3 TIMES DAILY
Status: DISCONTINUED | OUTPATIENT
Start: 2025-01-01 | End: 2025-01-01

## 2025-01-01 RX ORDER — MORPHINE SULFATE 30 MG/1
60 TABLET, FILM COATED, EXTENDED RELEASE ORAL NIGHTLY
Refills: 0 | Status: DISCONTINUED | OUTPATIENT
Start: 2025-01-01 | End: 2025-01-01 | Stop reason: HOSPADM

## 2025-01-01 RX ORDER — PANTOPRAZOLE SODIUM 40 MG/1
40 TABLET, DELAYED RELEASE ORAL
Status: DISCONTINUED | OUTPATIENT
Start: 2025-01-01 | End: 2025-01-01

## 2025-01-01 RX ORDER — OXYCODONE HYDROCHLORIDE 10 MG/1
10 TABLET ORAL EVERY 4 HOURS PRN
Status: DISCONTINUED | OUTPATIENT
Start: 2025-01-01 | End: 2025-01-01

## 2025-01-01 RX ORDER — MORPHINE SULFATE 30 MG/1
30 TABLET, FILM COATED, EXTENDED RELEASE ORAL 2 TIMES DAILY
Status: DISCONTINUED | OUTPATIENT
Start: 2025-01-01 | End: 2025-01-01

## 2025-01-01 RX ORDER — POLYETHYLENE GLYCOL 3350 17 G/17G
17 POWDER, FOR SOLUTION ORAL DAILY
Status: CANCELLED | OUTPATIENT
Start: 2025-01-01

## 2025-01-01 RX ORDER — IPRATROPIUM BROMIDE AND ALBUTEROL SULFATE 2.5; .5 MG/3ML; MG/3ML
3 SOLUTION RESPIRATORY (INHALATION)
Status: DISCONTINUED | OUTPATIENT
Start: 2025-01-01 | End: 2025-01-01

## 2025-01-01 RX ORDER — ACETAMINOPHEN 325 MG/1
500 TABLET ORAL EVERY 6 HOURS PRN
Status: DISCONTINUED | OUTPATIENT
Start: 2025-01-01 | End: 2025-01-01

## 2025-01-01 RX ORDER — LORAZEPAM 2 MG/ML
0.5 INJECTION INTRAMUSCULAR EVERY 4 HOURS PRN
Status: DISCONTINUED | OUTPATIENT
Start: 2025-01-01 | End: 2025-01-01

## 2025-01-01 RX ORDER — DOCUSATE SODIUM 100 MG/1
100 CAPSULE, LIQUID FILLED ORAL 2 TIMES DAILY PRN
Status: DISCONTINUED | OUTPATIENT
Start: 2025-01-01 | End: 2025-01-01 | Stop reason: HOSPADM

## 2025-01-01 RX ORDER — POTASSIUM CHLORIDE 1.5 G/1.58G
40 POWDER, FOR SOLUTION ORAL ONCE
Status: COMPLETED | OUTPATIENT
Start: 2025-01-01 | End: 2025-01-01

## 2025-01-01 RX ORDER — GABAPENTIN 300 MG/1
300 CAPSULE ORAL 3 TIMES DAILY
Status: DISCONTINUED | OUTPATIENT
Start: 2025-01-01 | End: 2025-01-01 | Stop reason: HOSPADM

## 2025-01-01 RX ORDER — ACETAMINOPHEN 325 MG/1
650 TABLET ORAL EVERY 4 HOURS PRN
Status: CANCELLED | OUTPATIENT
Start: 2025-01-01

## 2025-01-01 RX ORDER — POTASSIUM CHLORIDE 1.5 G/1.58G
20 POWDER, FOR SOLUTION ORAL EVERY 6 HOURS PRN
Status: DISCONTINUED | OUTPATIENT
Start: 2025-01-01 | End: 2025-01-01

## 2025-01-01 RX ORDER — POTASSIUM CHLORIDE 20 MEQ/1
40 TABLET, EXTENDED RELEASE ORAL EVERY 6 HOURS PRN
Status: DISCONTINUED | OUTPATIENT
Start: 2025-01-01 | End: 2025-01-01 | Stop reason: HOSPADM

## 2025-01-01 RX ORDER — HEPARIN SODIUM 10000 [USP'U]/100ML
0-4500 INJECTION, SOLUTION INTRAVENOUS CONTINUOUS
Status: DISCONTINUED | OUTPATIENT
Start: 2025-01-01 | End: 2025-01-01

## 2025-01-01 RX ORDER — FORMOTEROL FUMARATE 20 UG/2ML
20 SOLUTION RESPIRATORY (INHALATION)
Status: CANCELLED | OUTPATIENT
Start: 2025-01-01

## 2025-01-01 RX ORDER — ACETAMINOPHEN 160 MG/5ML
650 SOLUTION ORAL EVERY 4 HOURS PRN
Status: CANCELLED | OUTPATIENT
Start: 2025-01-01

## 2025-01-01 RX ORDER — MIRTAZAPINE 15 MG/1
15 TABLET, FILM COATED ORAL NIGHTLY
Status: DISCONTINUED | OUTPATIENT
Start: 2025-01-01 | End: 2025-01-01

## 2025-01-01 RX ORDER — IPRATROPIUM BROMIDE AND ALBUTEROL SULFATE 2.5; .5 MG/3ML; MG/3ML
3 SOLUTION RESPIRATORY (INHALATION) EVERY 2 HOUR PRN
Status: CANCELLED | OUTPATIENT
Start: 2025-01-01

## 2025-01-01 RX ORDER — HEPARIN SODIUM 10000 [USP'U]/100ML
0-4500 INJECTION, SOLUTION INTRAVENOUS CONTINUOUS
Status: CANCELLED | OUTPATIENT
Start: 2025-01-01

## 2025-01-01 RX ORDER — LORAZEPAM 2 MG/ML
INJECTION INTRAMUSCULAR
Status: COMPLETED
Start: 2025-01-01 | End: 2025-01-01

## 2025-01-01 RX ORDER — MORPHINE SULFATE 30 MG/1
30 TABLET, FILM COATED, EXTENDED RELEASE ORAL 2 TIMES DAILY
Refills: 0 | Status: DISCONTINUED | OUTPATIENT
Start: 2025-01-01 | End: 2025-01-01 | Stop reason: HOSPADM

## 2025-01-01 RX ORDER — MORPHINE SULFATE 30 MG/1
30 TABLET, FILM COATED, EXTENDED RELEASE ORAL 2 TIMES DAILY
Refills: 0 | Status: CANCELLED | OUTPATIENT
Start: 2025-01-01

## 2025-01-01 RX ORDER — MIRTAZAPINE 15 MG/1
15 TABLET, FILM COATED ORAL NIGHTLY
Status: CANCELLED | OUTPATIENT
Start: 2025-01-01

## 2025-01-01 RX ORDER — GABAPENTIN 300 MG/1
300 CAPSULE ORAL 3 TIMES DAILY
Status: CANCELLED | OUTPATIENT
Start: 2025-01-01

## 2025-01-01 RX ORDER — POTASSIUM CHLORIDE 1.5 G/1.58G
20 POWDER, FOR SOLUTION ORAL EVERY 6 HOURS PRN
Status: CANCELLED | OUTPATIENT
Start: 2025-01-01

## 2025-01-01 RX ORDER — POTASSIUM CHLORIDE 20 MEQ/1
40 TABLET, EXTENDED RELEASE ORAL EVERY 6 HOURS PRN
Status: DISCONTINUED | OUTPATIENT
Start: 2025-01-01 | End: 2025-01-01

## 2025-01-01 RX ORDER — OXYCODONE HYDROCHLORIDE 10 MG/1
10 TABLET ORAL EVERY 4 HOURS PRN
Refills: 0 | Status: DISCONTINUED | OUTPATIENT
Start: 2025-01-01 | End: 2025-01-01 | Stop reason: HOSPADM

## 2025-01-01 RX ORDER — ACETAMINOPHEN 160 MG/5ML
650 SOLUTION ORAL EVERY 4 HOURS PRN
Status: DISCONTINUED | OUTPATIENT
Start: 2025-01-01 | End: 2025-01-01

## 2025-01-01 RX ORDER — POTASSIUM CHLORIDE 1.5 G/1.58G
20 POWDER, FOR SOLUTION ORAL EVERY 6 HOURS PRN
Status: DISCONTINUED | OUTPATIENT
Start: 2025-01-01 | End: 2025-01-01 | Stop reason: HOSPADM

## 2025-01-01 RX ORDER — ACETAMINOPHEN 325 MG/1
650 TABLET ORAL EVERY 4 HOURS PRN
Status: DISCONTINUED | OUTPATIENT
Start: 2025-01-01 | End: 2025-01-01

## 2025-01-01 RX ORDER — DIAZEPAM 5 MG/ML
2.5 INJECTION, SOLUTION INTRAMUSCULAR; INTRAVENOUS
Status: DISCONTINUED | OUTPATIENT
Start: 2025-01-01 | End: 2025-01-01 | Stop reason: HOSPADM

## 2025-01-01 RX ORDER — POTASSIUM CHLORIDE 1.5 G/1.58G
40 POWDER, FOR SOLUTION ORAL EVERY 6 HOURS PRN
Status: DISCONTINUED | OUTPATIENT
Start: 2025-01-01 | End: 2025-01-01 | Stop reason: HOSPADM

## 2025-01-01 RX ADMIN — MORPHINE SULFATE 30 MG: 30 TABLET, EXTENDED RELEASE ORAL at 08:12

## 2025-01-01 RX ADMIN — PREDNISONE 20 MG: 20 TABLET ORAL at 09:32

## 2025-01-01 RX ADMIN — IPRATROPIUM BROMIDE AND ALBUTEROL SULFATE 3 ML: 2.5; .5 SOLUTION RESPIRATORY (INHALATION) at 18:17

## 2025-01-01 RX ADMIN — MORPHINE SULFATE 30 MG: 30 TABLET, EXTENDED RELEASE ORAL at 14:30

## 2025-01-01 RX ADMIN — IPRATROPIUM BROMIDE AND ALBUTEROL SULFATE 3 ML: 2.5; .5 SOLUTION RESPIRATORY (INHALATION) at 22:26

## 2025-01-01 RX ADMIN — IPRATROPIUM BROMIDE AND ALBUTEROL SULFATE 3 ML: 2.5; .5 SOLUTION RESPIRATORY (INHALATION) at 03:59

## 2025-01-01 RX ADMIN — VANCOMYCIN HYDROCHLORIDE 1 G: 1 INJECTION, SOLUTION INTRAVENOUS at 02:39

## 2025-01-01 RX ADMIN — MORPHINE SULFATE 4 MG: 4 INJECTION, SOLUTION INTRAMUSCULAR; INTRAVENOUS at 19:53

## 2025-01-01 RX ADMIN — GABAPENTIN 300 MG: 300 CAPSULE ORAL at 14:41

## 2025-01-01 RX ADMIN — DIAZEPAM 2.5 MG: 10 INJECTION, SOLUTION INTRAMUSCULAR; INTRAVENOUS at 16:00

## 2025-01-01 RX ADMIN — ACETAMINOPHEN 650 MG: 650 SUSPENSION ORAL at 21:29

## 2025-01-01 RX ADMIN — IPRATROPIUM BROMIDE AND ALBUTEROL SULFATE 3 ML: 2.5; .5 SOLUTION RESPIRATORY (INHALATION) at 06:00

## 2025-01-01 RX ADMIN — Medication: at 00:09

## 2025-01-01 RX ADMIN — HEPARIN SODIUM 1200 UNITS/HR: 10000 INJECTION, SOLUTION INTRAVENOUS at 15:13

## 2025-01-01 RX ADMIN — PIPERACILLIN SODIUM AND TAZOBACTAM SODIUM 3.38 G: 3; .375 INJECTION, SOLUTION INTRAVENOUS at 14:41

## 2025-01-01 RX ADMIN — POLYETHYLENE GLYCOL 3350 17 G: 17 POWDER, FOR SOLUTION ORAL at 08:12

## 2025-01-01 RX ADMIN — DIAZEPAM 2.5 MG: 10 INJECTION, SOLUTION INTRAMUSCULAR; INTRAVENOUS at 18:46

## 2025-01-01 RX ADMIN — IPRATROPIUM BROMIDE AND ALBUTEROL SULFATE 3 ML: 2.5; .5 SOLUTION RESPIRATORY (INHALATION) at 09:05

## 2025-01-01 RX ADMIN — Medication: at 05:27

## 2025-01-01 RX ADMIN — Medication 50 PERCENT: at 18:19

## 2025-01-01 RX ADMIN — HEPARIN SODIUM 1000 UNITS/HR: 10000 INJECTION, SOLUTION INTRAVENOUS at 00:36

## 2025-01-01 RX ADMIN — MORPHINE SULFATE 60 MG: 30 TABLET, EXTENDED RELEASE ORAL at 20:56

## 2025-01-01 RX ADMIN — GABAPENTIN 300 MG: 300 CAPSULE ORAL at 14:30

## 2025-01-01 RX ADMIN — ACETAMINOPHEN 650 MG: 325 TABLET ORAL at 05:19

## 2025-01-01 RX ADMIN — Medication 70 PERCENT: at 07:53

## 2025-01-01 RX ADMIN — HYDROMORPHONE HYDROCHLORIDE 0.5 MG: 1 INJECTION, SOLUTION INTRAMUSCULAR; INTRAVENOUS; SUBCUTANEOUS at 00:54

## 2025-01-01 RX ADMIN — ACETAMINOPHEN 650 MG: 650 SUSPENSION ORAL at 21:08

## 2025-01-01 RX ADMIN — MORPHINE SULFATE 4 MG: 4 INJECTION, SOLUTION INTRAMUSCULAR; INTRAVENOUS at 17:40

## 2025-01-01 RX ADMIN — IPRATROPIUM BROMIDE AND ALBUTEROL SULFATE 3 ML: 2.5; .5 SOLUTION RESPIRATORY (INHALATION) at 07:48

## 2025-01-01 RX ADMIN — PIPERACILLIN SODIUM AND TAZOBACTAM SODIUM 3.38 G: 3; .375 INJECTION, SOLUTION INTRAVENOUS at 06:02

## 2025-01-01 RX ADMIN — MAGNESIUM SULFATE HEPTAHYDRATE 2 G: 40 INJECTION, SOLUTION INTRAVENOUS at 01:20

## 2025-01-01 RX ADMIN — ACETAMINOPHEN 650 MG: 650 SUSPENSION ORAL at 06:44

## 2025-01-01 RX ADMIN — FUROSEMIDE 60 MG: 10 INJECTION, SOLUTION INTRAMUSCULAR; INTRAVENOUS at 05:24

## 2025-01-01 RX ADMIN — IPRATROPIUM BROMIDE AND ALBUTEROL SULFATE 3 ML: 2.5; .5 SOLUTION RESPIRATORY (INHALATION) at 21:29

## 2025-01-01 RX ADMIN — IPRATROPIUM BROMIDE AND ALBUTEROL SULFATE 3 ML: 2.5; .5 SOLUTION RESPIRATORY (INHALATION) at 09:53

## 2025-01-01 RX ADMIN — PIPERACILLIN SODIUM AND TAZOBACTAM SODIUM 3.38 G: 3; .375 INJECTION, SOLUTION INTRAVENOUS at 22:53

## 2025-01-01 RX ADMIN — MORPHINE SULFATE 4 MG: 4 INJECTION, SOLUTION INTRAMUSCULAR; INTRAVENOUS at 17:47

## 2025-01-01 RX ADMIN — MORPHINE SULFATE 30 MG: 30 TABLET, EXTENDED RELEASE ORAL at 08:38

## 2025-01-01 RX ADMIN — POTASSIUM CHLORIDE 40 MEQ: 1.5 POWDER, FOR SOLUTION ORAL at 08:06

## 2025-01-01 RX ADMIN — MIRTAZAPINE 15 MG: 15 TABLET, FILM COATED ORAL at 21:01

## 2025-01-01 RX ADMIN — PIPERACILLIN SODIUM AND TAZOBACTAM SODIUM 3.38 G: 3; .375 INJECTION, SOLUTION INTRAVENOUS at 23:31

## 2025-01-01 RX ADMIN — IPRATROPIUM BROMIDE AND ALBUTEROL SULFATE 3 ML: 2.5; .5 SOLUTION RESPIRATORY (INHALATION) at 14:30

## 2025-01-01 RX ADMIN — GABAPENTIN 300 MG: 300 CAPSULE ORAL at 08:06

## 2025-01-01 RX ADMIN — Medication 50 PERCENT: at 11:13

## 2025-01-01 RX ADMIN — IPRATROPIUM BROMIDE AND ALBUTEROL SULFATE 3 ML: 2.5; .5 SOLUTION RESPIRATORY (INHALATION) at 10:28

## 2025-01-01 RX ADMIN — Medication: at 21:35

## 2025-01-01 RX ADMIN — Medication 50 PERCENT: at 19:11

## 2025-01-01 RX ADMIN — PIPERACILLIN SODIUM AND TAZOBACTAM SODIUM 3.38 G: 3; .375 INJECTION, SOLUTION INTRAVENOUS at 23:07

## 2025-01-01 RX ADMIN — HEPARIN SODIUM 1200 UNITS/HR: 10000 INJECTION, SOLUTION INTRAVENOUS at 11:19

## 2025-01-01 RX ADMIN — AZITHROMYCIN MONOHYDRATE 500 MG: 500 INJECTION, POWDER, LYOPHILIZED, FOR SOLUTION INTRAVENOUS at 21:03

## 2025-01-01 RX ADMIN — PREDNISONE 10 MG: 10 TABLET ORAL at 08:12

## 2025-01-01 RX ADMIN — PIPERACILLIN SODIUM AND TAZOBACTAM SODIUM 4.5 G: 4; .5 INJECTION, SOLUTION INTRAVENOUS at 23:32

## 2025-01-01 RX ADMIN — IPRATROPIUM BROMIDE AND ALBUTEROL SULFATE 3 ML: 2.5; .5 SOLUTION RESPIRATORY (INHALATION) at 00:29

## 2025-01-01 RX ADMIN — IPRATROPIUM BROMIDE AND ALBUTEROL SULFATE 3 ML: 2.5; .5 SOLUTION RESPIRATORY (INHALATION) at 03:11

## 2025-01-01 RX ADMIN — MORPHINE SULFATE 4 MG: 4 INJECTION, SOLUTION INTRAMUSCULAR; INTRAVENOUS at 08:03

## 2025-01-01 RX ADMIN — HYDROXYZINE HYDROCHLORIDE 25 MG: 25 TABLET, FILM COATED ORAL at 21:08

## 2025-01-01 RX ADMIN — MORPHINE SULFATE 4 MG: 4 INJECTION, SOLUTION INTRAMUSCULAR; INTRAVENOUS at 07:23

## 2025-01-01 RX ADMIN — Medication: at 11:51

## 2025-01-01 RX ADMIN — IPRATROPIUM BROMIDE AND ALBUTEROL SULFATE 3 ML: 2.5; .5 SOLUTION RESPIRATORY (INHALATION) at 20:11

## 2025-01-01 RX ADMIN — SENNOSIDES 8.6 MG: 8.6 TABLET, FILM COATED ORAL at 15:13

## 2025-01-01 RX ADMIN — IPRATROPIUM BROMIDE AND ALBUTEROL SULFATE 3 ML: 2.5; .5 SOLUTION RESPIRATORY (INHALATION) at 13:34

## 2025-01-01 RX ADMIN — Medication 60 PERCENT: at 00:28

## 2025-01-01 RX ADMIN — AZITHROMYCIN MONOHYDRATE 500 MG: 500 INJECTION, POWDER, LYOPHILIZED, FOR SOLUTION INTRAVENOUS at 00:37

## 2025-01-01 RX ADMIN — MORPHINE SULFATE 4 MG: 4 INJECTION, SOLUTION INTRAMUSCULAR; INTRAVENOUS at 15:20

## 2025-01-01 RX ADMIN — BUDESONIDE 0.5 MG: 0.5 INHALANT RESPIRATORY (INHALATION) at 07:48

## 2025-01-01 RX ADMIN — POTASSIUM CHLORIDE 20 MEQ: 14.9 INJECTION, SOLUTION INTRAVENOUS at 06:37

## 2025-01-01 RX ADMIN — Medication 50 PERCENT: at 21:29

## 2025-01-01 RX ADMIN — IPRATROPIUM BROMIDE AND ALBUTEROL SULFATE 3 ML: 2.5; .5 SOLUTION RESPIRATORY (INHALATION) at 17:49

## 2025-01-01 RX ADMIN — HYDROXYZINE HYDROCHLORIDE 25 MG: 25 TABLET, FILM COATED ORAL at 10:48

## 2025-01-01 RX ADMIN — GABAPENTIN 300 MG: 300 CAPSULE ORAL at 20:51

## 2025-01-01 RX ADMIN — IPRATROPIUM BROMIDE AND ALBUTEROL SULFATE 3 ML: 2.5; .5 SOLUTION RESPIRATORY (INHALATION) at 12:34

## 2025-01-01 RX ADMIN — Medication 50 PERCENT: at 04:25

## 2025-01-01 RX ADMIN — LORAZEPAM 0.5 MG: 2 INJECTION INTRAMUSCULAR; INTRAVENOUS at 11:31

## 2025-01-01 RX ADMIN — Medication: at 23:56

## 2025-01-01 RX ADMIN — AZITHROMYCIN MONOHYDRATE 500 MG: 500 INJECTION, POWDER, LYOPHILIZED, FOR SOLUTION INTRAVENOUS at 21:11

## 2025-01-01 RX ADMIN — Medication 70 PERCENT: at 06:11

## 2025-01-01 RX ADMIN — PANTOPRAZOLE SODIUM 40 MG: 40 TABLET, DELAYED RELEASE ORAL at 08:06

## 2025-01-01 RX ADMIN — Medication 50 PERCENT: at 16:06

## 2025-01-01 RX ADMIN — SODIUM CHLORIDE, SODIUM LACTATE, POTASSIUM CHLORIDE, AND CALCIUM CHLORIDE 1000 ML: .6; .31; .03; .02 INJECTION, SOLUTION INTRAVENOUS at 13:35

## 2025-01-01 RX ADMIN — MORPHINE SULFATE 30 MG: 30 TABLET, EXTENDED RELEASE ORAL at 14:41

## 2025-01-01 RX ADMIN — LORAZEPAM 0.5 MG: 2 INJECTION INTRAMUSCULAR; INTRAVENOUS at 17:15

## 2025-01-01 RX ADMIN — MORPHINE SULFATE 30 MG: 30 TABLET, EXTENDED RELEASE ORAL at 09:33

## 2025-01-01 RX ADMIN — BUDESONIDE 0.5 MG: 0.5 INHALANT RESPIRATORY (INHALATION) at 20:11

## 2025-01-01 RX ADMIN — GABAPENTIN 300 MG: 300 CAPSULE ORAL at 21:01

## 2025-01-01 RX ADMIN — IPRATROPIUM BROMIDE AND ALBUTEROL SULFATE 3 ML: 2.5; .5 SOLUTION RESPIRATORY (INHALATION) at 16:31

## 2025-01-01 RX ADMIN — DIAZEPAM 2.5 MG: 10 INJECTION, SOLUTION INTRAMUSCULAR; INTRAVENOUS at 08:25

## 2025-01-01 RX ADMIN — Medication 60 PERCENT: at 08:25

## 2025-01-01 RX ADMIN — Medication 55 PERCENT: at 20:15

## 2025-01-01 RX ADMIN — MORPHINE SULFATE 30 MG: 30 TABLET, EXTENDED RELEASE ORAL at 12:55

## 2025-01-01 RX ADMIN — SODIUM CHLORIDE 1000 ML: 0.9 INJECTION, SOLUTION INTRAVENOUS at 23:33

## 2025-01-01 RX ADMIN — IPRATROPIUM BROMIDE AND ALBUTEROL SULFATE 3 ML: 2.5; .5 SOLUTION RESPIRATORY (INHALATION) at 05:13

## 2025-01-01 RX ADMIN — HYDROMORPHONE HYDROCHLORIDE 0.5 MG: 1 INJECTION, SOLUTION INTRAMUSCULAR; INTRAVENOUS; SUBCUTANEOUS at 02:38

## 2025-01-01 RX ADMIN — POTASSIUM CHLORIDE 20 MEQ: 1500 TABLET, EXTENDED RELEASE ORAL at 06:40

## 2025-01-01 RX ADMIN — IPRATROPIUM BROMIDE AND ALBUTEROL SULFATE 3 ML: 2.5; .5 SOLUTION RESPIRATORY (INHALATION) at 03:37

## 2025-01-01 RX ADMIN — MORPHINE SULFATE 4 MG: 4 INJECTION, SOLUTION INTRAMUSCULAR; INTRAVENOUS at 21:40

## 2025-01-01 RX ADMIN — ACETAMINOPHEN 650 MG: 325 TABLET ORAL at 13:17

## 2025-01-01 RX ADMIN — IOHEXOL 60 ML: 350 INJECTION, SOLUTION INTRAVENOUS at 02:17

## 2025-01-01 RX ADMIN — GABAPENTIN 300 MG: 300 CAPSULE ORAL at 08:12

## 2025-01-01 RX ADMIN — IPRATROPIUM BROMIDE AND ALBUTEROL SULFATE 3 ML: 2.5; .5 SOLUTION RESPIRATORY (INHALATION) at 20:44

## 2025-01-01 RX ADMIN — HYDROXYZINE HYDROCHLORIDE 25 MG: 25 TABLET, FILM COATED ORAL at 14:30

## 2025-01-01 RX ADMIN — MORPHINE SULFATE 4 MG: 4 INJECTION, SOLUTION INTRAMUSCULAR; INTRAVENOUS at 13:18

## 2025-01-01 RX ADMIN — PIPERACILLIN SODIUM AND TAZOBACTAM SODIUM 3.38 G: 3; .375 INJECTION, SOLUTION INTRAVENOUS at 06:39

## 2025-01-01 RX ADMIN — MIRTAZAPINE 15 MG: 15 TABLET, FILM COATED ORAL at 20:51

## 2025-01-01 RX ADMIN — MORPHINE SULFATE 4 MG: 4 INJECTION, SOLUTION INTRAMUSCULAR; INTRAVENOUS at 10:23

## 2025-01-01 RX ADMIN — IPRATROPIUM BROMIDE AND ALBUTEROL SULFATE 3 ML: 2.5; .5 SOLUTION RESPIRATORY (INHALATION) at 19:06

## 2025-01-01 RX ADMIN — IPRATROPIUM BROMIDE AND ALBUTEROL SULFATE 3 ML: 2.5; .5 SOLUTION RESPIRATORY (INHALATION) at 09:51

## 2025-01-01 RX ADMIN — LORAZEPAM 0.5 MG: 2 INJECTION INTRAMUSCULAR; INTRAVENOUS at 08:06

## 2025-01-01 RX ADMIN — HYDROXYZINE HYDROCHLORIDE 25 MG: 25 TABLET, FILM COATED ORAL at 18:05

## 2025-01-01 RX ADMIN — FORMOTEROL FUMARATE 20 MCG: 20 SOLUTION RESPIRATORY (INHALATION) at 16:06

## 2025-01-01 RX ADMIN — HYDROXYZINE HYDROCHLORIDE 25 MG: 25 TABLET, FILM COATED ORAL at 12:14

## 2025-01-01 RX ADMIN — DIAZEPAM 2.5 MG: 10 INJECTION, SOLUTION INTRAMUSCULAR; INTRAVENOUS at 00:21

## 2025-01-01 RX ADMIN — SODIUM CHLORIDE, SODIUM LACTATE, POTASSIUM CHLORIDE, AND CALCIUM CHLORIDE 1000 ML: .6; .31; .03; .02 INJECTION, SOLUTION INTRAVENOUS at 11:00

## 2025-01-01 RX ADMIN — LORAZEPAM 0.5 MG: 2 INJECTION INTRAMUSCULAR at 08:06

## 2025-01-01 RX ADMIN — IPRATROPIUM BROMIDE AND ALBUTEROL SULFATE 3 ML: 2.5; .5 SOLUTION RESPIRATORY (INHALATION) at 12:12

## 2025-01-01 RX ADMIN — SODIUM CHLORIDE, SODIUM LACTATE, POTASSIUM CHLORIDE, AND CALCIUM CHLORIDE 1000 ML: .6; .31; .03; .02 INJECTION, SOLUTION INTRAVENOUS at 11:37

## 2025-01-01 RX ADMIN — PIPERACILLIN SODIUM AND TAZOBACTAM SODIUM 3.38 G: 3; .375 INJECTION, SOLUTION INTRAVENOUS at 14:30

## 2025-01-01 RX ADMIN — HYDROXYZINE HYDROCHLORIDE 25 MG: 25 TABLET, FILM COATED ORAL at 06:39

## 2025-01-01 RX ADMIN — Medication 60 PERCENT: at 22:29

## 2025-01-01 RX ADMIN — HEPARIN SODIUM 800 UNITS/HR: 10000 INJECTION, SOLUTION INTRAVENOUS at 05:10

## 2025-01-01 RX ADMIN — Medication 60 PERCENT: at 03:42

## 2025-01-01 RX ADMIN — AZITHROMYCIN MONOHYDRATE 500 MG: 500 INJECTION, POWDER, LYOPHILIZED, FOR SOLUTION INTRAVENOUS at 22:17

## 2025-01-01 RX ADMIN — LORAZEPAM 0.5 MG: 2 INJECTION INTRAMUSCULAR; INTRAVENOUS at 01:48

## 2025-01-01 RX ADMIN — LORAZEPAM 0.5 MG: 2 INJECTION INTRAMUSCULAR; INTRAVENOUS at 10:29

## 2025-01-01 RX ADMIN — Medication 12 L/MIN: at 21:55

## 2025-01-01 RX ADMIN — Medication 50 PERCENT: at 16:34

## 2025-01-01 RX ADMIN — Medication 60 PERCENT: at 06:30

## 2025-01-01 RX ADMIN — ACETAMINOPHEN 650 MG: 650 SUSPENSION ORAL at 08:12

## 2025-01-01 RX ADMIN — HYDROXYZINE HYDROCHLORIDE 25 MG: 25 TABLET, FILM COATED ORAL at 13:17

## 2025-01-01 RX ADMIN — MORPHINE SULFATE 60 MG: 30 TABLET, EXTENDED RELEASE ORAL at 20:51

## 2025-01-01 RX ADMIN — IPRATROPIUM BROMIDE AND ALBUTEROL SULFATE 3 ML: 2.5; .5 SOLUTION RESPIRATORY (INHALATION) at 22:05

## 2025-01-01 RX ADMIN — PIPERACILLIN SODIUM AND TAZOBACTAM SODIUM 3.38 G: 3; .375 INJECTION, SOLUTION INTRAVENOUS at 07:37

## 2025-01-01 RX ADMIN — LORAZEPAM 0.5 MG: 2 INJECTION INTRAMUSCULAR; INTRAVENOUS at 22:04

## 2025-01-01 RX ADMIN — Medication 6 L/MIN: at 21:48

## 2025-01-01 RX ADMIN — PIPERACILLIN SODIUM AND TAZOBACTAM SODIUM 3.38 G: 3; .375 INJECTION, SOLUTION INTRAVENOUS at 15:12

## 2025-01-01 RX ADMIN — Medication 60 PERCENT: at 05:13

## 2025-01-01 RX ADMIN — HEPARIN SODIUM 1200 UNITS/HR: 10000 INJECTION, SOLUTION INTRAVENOUS at 19:04

## 2025-01-01 RX ADMIN — MORPHINE SULFATE 4 MG: 4 INJECTION, SOLUTION INTRAMUSCULAR; INTRAVENOUS at 14:56

## 2025-01-01 RX ADMIN — MORPHINE SULFATE 30 MG: 30 TABLET, EXTENDED RELEASE ORAL at 15:13

## 2025-01-01 RX ADMIN — ACETAMINOPHEN 650 MG: 650 SUSPENSION ORAL at 18:05

## 2025-01-01 RX ADMIN — IPRATROPIUM BROMIDE AND ALBUTEROL SULFATE 3 ML: 2.5; .5 SOLUTION RESPIRATORY (INHALATION) at 00:25

## 2025-01-01 RX ADMIN — Medication 70 PERCENT: at 09:06

## 2025-01-01 RX ADMIN — GABAPENTIN 300 MG: 300 CAPSULE ORAL at 09:33

## 2025-01-01 RX ADMIN — IPRATROPIUM BROMIDE AND ALBUTEROL SULFATE 3 ML: 2.5; .5 SOLUTION RESPIRATORY (INHALATION) at 05:22

## 2025-01-01 RX ADMIN — Medication 55 PERCENT: at 05:36

## 2025-01-01 RX ADMIN — Medication 70 PERCENT: at 12:13

## 2025-01-01 RX ADMIN — IPRATROPIUM BROMIDE AND ALBUTEROL SULFATE 3 ML: 2.5; .5 SOLUTION RESPIRATORY (INHALATION) at 08:23

## 2025-01-01 RX ADMIN — HYDROXYZINE HYDROCHLORIDE 25 MG: 25 TABLET, FILM COATED ORAL at 06:14

## 2025-01-01 RX ADMIN — PIPERACILLIN SODIUM AND TAZOBACTAM SODIUM 3.38 G: 3; .375 INJECTION, SOLUTION INTRAVENOUS at 07:40

## 2025-01-01 RX ADMIN — IPRATROPIUM BROMIDE AND ALBUTEROL SULFATE 3 ML: 2.5; .5 SOLUTION RESPIRATORY (INHALATION) at 13:26

## 2025-01-01 RX ADMIN — HYDROXYZINE HYDROCHLORIDE 25 MG: 25 TABLET, FILM COATED ORAL at 20:51

## 2025-01-01 RX ADMIN — IPRATROPIUM BROMIDE AND ALBUTEROL SULFATE 3 ML: 2.5; .5 SOLUTION RESPIRATORY (INHALATION) at 21:07

## 2025-01-01 RX ADMIN — DIAZEPAM 2.5 MG: 10 INJECTION, SOLUTION INTRAMUSCULAR; INTRAVENOUS at 21:17

## 2025-01-01 RX ADMIN — FORMOTEROL FUMARATE 20 MCG: 20 SOLUTION RESPIRATORY (INHALATION) at 07:52

## 2025-01-01 RX ADMIN — DIAZEPAM 2.5 MG: 10 INJECTION, SOLUTION INTRAMUSCULAR; INTRAVENOUS at 14:25

## 2025-01-01 RX ADMIN — POLYETHYLENE GLYCOL 3350 17 G: 17 POWDER, FOR SOLUTION ORAL at 08:06

## 2025-01-01 RX ADMIN — IPRATROPIUM BROMIDE AND ALBUTEROL SULFATE 3 ML: 2.5; .5 SOLUTION RESPIRATORY (INHALATION) at 06:30

## 2025-01-01 RX ADMIN — IPRATROPIUM BROMIDE AND ALBUTEROL SULFATE 3 ML: 2.5; .5 SOLUTION RESPIRATORY (INHALATION) at 11:10

## 2025-01-01 RX ADMIN — IPRATROPIUM BROMIDE AND ALBUTEROL SULFATE 3 ML: 2.5; .5 SOLUTION RESPIRATORY (INHALATION) at 08:03

## 2025-01-01 RX ADMIN — DIAZEPAM 2.5 MG: 10 INJECTION, SOLUTION INTRAMUSCULAR; INTRAVENOUS at 15:59

## 2025-01-01 RX ADMIN — IPRATROPIUM BROMIDE AND ALBUTEROL SULFATE 3 ML: 2.5; .5 SOLUTION RESPIRATORY (INHALATION) at 16:05

## 2025-01-01 RX ADMIN — BUDESONIDE 0.5 MG: 0.5 INHALANT RESPIRATORY (INHALATION) at 11:11

## 2025-01-01 RX ADMIN — LORAZEPAM 0.5 MG: 2 INJECTION INTRAMUSCULAR; INTRAVENOUS at 00:47

## 2025-01-01 RX ADMIN — IPRATROPIUM BROMIDE AND ALBUTEROL SULFATE 3 ML: 2.5; .5 SOLUTION RESPIRATORY (INHALATION) at 05:12

## 2025-01-01 RX ADMIN — PANTOPRAZOLE SODIUM 40 MG: 40 TABLET, DELAYED RELEASE ORAL at 08:12

## 2025-01-01 RX ADMIN — PREDNISONE 20 MG: 20 TABLET ORAL at 08:06

## 2025-01-01 RX ADMIN — GABAPENTIN 300 MG: 300 CAPSULE ORAL at 15:13

## 2025-01-01 RX ADMIN — MORPHINE SULFATE 60 MG: 30 TABLET, EXTENDED RELEASE ORAL at 21:01

## 2025-01-01 RX ADMIN — POTASSIUM CHLORIDE 20 MEQ: 14.9 INJECTION, SOLUTION INTRAVENOUS at 01:46

## 2025-01-01 RX ADMIN — IPRATROPIUM BROMIDE AND ALBUTEROL SULFATE 3 ML: 2.5; .5 SOLUTION RESPIRATORY (INHALATION) at 02:21

## 2025-01-01 RX ADMIN — LORAZEPAM 0.5 MG: 2 INJECTION INTRAMUSCULAR; INTRAVENOUS at 20:52

## 2025-01-01 RX ADMIN — MORPHINE SULFATE 30 MG: 30 TABLET, EXTENDED RELEASE ORAL at 10:48

## 2025-01-01 SDOH — SOCIAL STABILITY: SOCIAL INSECURITY: WITHIN THE LAST YEAR, HAVE YOU BEEN AFRAID OF YOUR PARTNER OR EX-PARTNER?: NO

## 2025-01-01 SDOH — ECONOMIC STABILITY: FOOD INSECURITY: WITHIN THE PAST 12 MONTHS, YOU WORRIED THAT YOUR FOOD WOULD RUN OUT BEFORE YOU GOT THE MONEY TO BUY MORE.: NEVER TRUE

## 2025-01-01 SDOH — SOCIAL STABILITY: SOCIAL INSECURITY: WITHIN THE LAST YEAR, HAVE YOU BEEN HUMILIATED OR EMOTIONALLY ABUSED IN OTHER WAYS BY YOUR PARTNER OR EX-PARTNER?: NO

## 2025-01-01 SDOH — SOCIAL STABILITY: SOCIAL INSECURITY: ABUSE: ADULT

## 2025-01-01 SDOH — ECONOMIC STABILITY: INCOME INSECURITY: IN THE PAST 12 MONTHS HAS THE ELECTRIC, GAS, OIL, OR WATER COMPANY THREATENED TO SHUT OFF SERVICES IN YOUR HOME?: NO

## 2025-01-01 SDOH — SOCIAL STABILITY: SOCIAL INSECURITY: HAVE YOU HAD THOUGHTS OF HARMING ANYONE ELSE?: NO

## 2025-01-01 SDOH — SOCIAL STABILITY: SOCIAL INSECURITY: HAVE YOU HAD ANY THOUGHTS OF HARMING ANYONE ELSE?: NO

## 2025-01-01 SDOH — SOCIAL STABILITY: SOCIAL INSECURITY: ARE YOU OR HAVE YOU BEEN THREATENED OR ABUSED PHYSICALLY, EMOTIONALLY, OR SEXUALLY BY ANYONE?: NO

## 2025-01-01 SDOH — SOCIAL STABILITY: SOCIAL INSECURITY: DO YOU FEEL ANYONE HAS EXPLOITED OR TAKEN ADVANTAGE OF YOU FINANCIALLY OR OF YOUR PERSONAL PROPERTY?: NO

## 2025-01-01 SDOH — SOCIAL STABILITY: SOCIAL INSECURITY: DOES ANYONE TRY TO KEEP YOU FROM HAVING/CONTACTING OTHER FRIENDS OR DOING THINGS OUTSIDE YOUR HOME?: NO

## 2025-01-01 SDOH — ECONOMIC STABILITY: FOOD INSECURITY: WITHIN THE PAST 12 MONTHS, THE FOOD YOU BOUGHT JUST DIDN'T LAST AND YOU DIDN'T HAVE MONEY TO GET MORE.: NEVER TRUE

## 2025-01-01 SDOH — SOCIAL STABILITY: SOCIAL INSECURITY: DO YOU FEEL UNSAFE GOING BACK TO THE PLACE WHERE YOU ARE LIVING?: NO

## 2025-01-01 SDOH — SOCIAL STABILITY: SOCIAL INSECURITY: HAS ANYONE EVER THREATENED TO HURT YOUR FAMILY OR YOUR PETS?: NO

## 2025-01-01 SDOH — SOCIAL STABILITY: SOCIAL INSECURITY: ARE THERE ANY APPARENT SIGNS OF INJURIES/BEHAVIORS THAT COULD BE RELATED TO ABUSE/NEGLECT?: NO

## 2025-01-01 ASSESSMENT — PAIN - FUNCTIONAL ASSESSMENT
PAIN_FUNCTIONAL_ASSESSMENT: 0-10
PAIN_FUNCTIONAL_ASSESSMENT: PAINAD (PAIN ASSESSMENT IN ADVANCED DEMENTIA SCALE)
PAIN_FUNCTIONAL_ASSESSMENT: 0-10
PAIN_FUNCTIONAL_ASSESSMENT: CPOT (CRITICAL CARE PAIN OBSERVATION TOOL)
PAIN_FUNCTIONAL_ASSESSMENT: 0-10
PAIN_FUNCTIONAL_ASSESSMENT: PAINAD (PAIN ASSESSMENT IN ADVANCED DEMENTIA SCALE)
PAIN_FUNCTIONAL_ASSESSMENT: 0-10
PAIN_FUNCTIONAL_ASSESSMENT: 0-10
PAIN_FUNCTIONAL_ASSESSMENT: PAINAD (PAIN ASSESSMENT IN ADVANCED DEMENTIA SCALE)

## 2025-01-01 ASSESSMENT — COGNITIVE AND FUNCTIONAL STATUS - GENERAL
WALKING IN HOSPITAL ROOM: A LOT
EATING MEALS: A LOT
CLIMB 3 TO 5 STEPS WITH RAILING: A LOT
PERSONAL GROOMING: A LITTLE
DRESSING REGULAR LOWER BODY CLOTHING: TOTAL
DRESSING REGULAR LOWER BODY CLOTHING: A LOT
MOBILITY SCORE: 6
DRESSING REGULAR LOWER BODY CLOTHING: TOTAL
DRESSING REGULAR UPPER BODY CLOTHING: TOTAL
WALKING IN HOSPITAL ROOM: TOTAL
CLIMB 3 TO 5 STEPS WITH RAILING: A LOT
TURNING FROM BACK TO SIDE WHILE IN FLAT BAD: TOTAL
MOVING FROM LYING ON BACK TO SITTING ON SIDE OF FLAT BED WITH BEDRAILS: A LITTLE
DAILY ACTIVITIY SCORE: 12
DRESSING REGULAR LOWER BODY CLOTHING: A LOT
STANDING UP FROM CHAIR USING ARMS: TOTAL
HELP NEEDED FOR BATHING: A LOT
TURNING FROM BACK TO SIDE WHILE IN FLAT BAD: A LOT
PATIENT BASELINE BEDBOUND: NO
STANDING UP FROM CHAIR USING ARMS: TOTAL
STANDING UP FROM CHAIR USING ARMS: TOTAL
EATING MEALS: TOTAL
DRESSING REGULAR UPPER BODY CLOTHING: A LOT
CLIMB 3 TO 5 STEPS WITH RAILING: TOTAL
DRESSING REGULAR UPPER BODY CLOTHING: TOTAL
PERSONAL GROOMING: TOTAL
DRESSING REGULAR UPPER BODY CLOTHING: TOTAL
MOVING TO AND FROM BED TO CHAIR: A LOT
CLIMB 3 TO 5 STEPS WITH RAILING: TOTAL
HELP NEEDED FOR BATHING: TOTAL
PERSONAL GROOMING: TOTAL
DAILY ACTIVITIY SCORE: 6
MOVING TO AND FROM BED TO CHAIR: A LOT
MOBILITY SCORE: 6
EATING MEALS: A LOT
MOVING FROM LYING ON BACK TO SITTING ON SIDE OF FLAT BED WITH BEDRAILS: A LITTLE
EATING MEALS: TOTAL
WALKING IN HOSPITAL ROOM: A LITTLE
MOVING FROM LYING ON BACK TO SITTING ON SIDE OF FLAT BED WITH BEDRAILS: A LITTLE
PERSONAL GROOMING: TOTAL
EATING MEALS: TOTAL
HELP NEEDED FOR BATHING: TOTAL
CLIMB 3 TO 5 STEPS WITH RAILING: TOTAL
DAILY ACTIVITIY SCORE: 12
TURNING FROM BACK TO SIDE WHILE IN FLAT BAD: A LITTLE
DAILY ACTIVITIY SCORE: 6
WALKING IN HOSPITAL ROOM: TOTAL
HELP NEEDED FOR BATHING: A LOT
TURNING FROM BACK TO SIDE WHILE IN FLAT BAD: A LITTLE
DRESSING REGULAR LOWER BODY CLOTHING: A LITTLE
MOVING TO AND FROM BED TO CHAIR: TOTAL
HELP NEEDED FOR BATHING: TOTAL
DAILY ACTIVITIY SCORE: 19
DRESSING REGULAR UPPER BODY CLOTHING: A LOT
STANDING UP FROM CHAIR USING ARMS: A LOT
TOILETING: A LOT
HELP NEEDED FOR BATHING: TOTAL
PERSONAL GROOMING: A LOT
MOVING TO AND FROM BED TO CHAIR: TOTAL
CLIMB 3 TO 5 STEPS WITH RAILING: TOTAL
DAILY ACTIVITIY SCORE: 6
MOVING TO AND FROM BED TO CHAIR: A LOT
TOILETING: A LOT
HELP NEEDED FOR BATHING: A LITTLE
CLIMB 3 TO 5 STEPS WITH RAILING: TOTAL
MOVING TO AND FROM BED TO CHAIR: A LOT
HELP NEEDED FOR BATHING: A LOT
DAILY ACTIVITIY SCORE: 6
TOILETING: A LOT
DRESSING REGULAR LOWER BODY CLOTHING: TOTAL
EATING MEALS: TOTAL
TURNING FROM BACK TO SIDE WHILE IN FLAT BAD: TOTAL
MOVING TO AND FROM BED TO CHAIR: TOTAL
MOBILITY SCORE: 6
CLIMB 3 TO 5 STEPS WITH RAILING: TOTAL
MOBILITY SCORE: 12
DRESSING REGULAR UPPER BODY CLOTHING: TOTAL
TOILETING: TOTAL
TOILETING: A LITTLE
MOVING FROM LYING ON BACK TO SITTING ON SIDE OF FLAT BED WITH BEDRAILS: A LOT
TURNING FROM BACK TO SIDE WHILE IN FLAT BAD: TOTAL
MOVING TO AND FROM BED TO CHAIR: A LITTLE
MOVING TO AND FROM BED TO CHAIR: TOTAL
MOVING TO AND FROM BED TO CHAIR: TOTAL
MOBILITY SCORE: 8
PERSONAL GROOMING: TOTAL
WALKING IN HOSPITAL ROOM: TOTAL
MOBILITY SCORE: 6
PERSONAL GROOMING: A LOT
PERSONAL GROOMING: TOTAL
DRESSING REGULAR LOWER BODY CLOTHING: A LOT
MOBILITY SCORE: 12
MOVING FROM LYING ON BACK TO SITTING ON SIDE OF FLAT BED WITH BEDRAILS: TOTAL
STANDING UP FROM CHAIR USING ARMS: TOTAL
CLIMB 3 TO 5 STEPS WITH RAILING: A LOT
WALKING IN HOSPITAL ROOM: A LOT
EATING MEALS: TOTAL
WALKING IN HOSPITAL ROOM: A LOT
DRESSING REGULAR UPPER BODY CLOTHING: A LOT
TOILETING: TOTAL
TOILETING: TOTAL
MOVING TO AND FROM BED TO CHAIR: TOTAL
PERSONAL GROOMING: A LOT
DAILY ACTIVITIY SCORE: 12
MOVING FROM LYING ON BACK TO SITTING ON SIDE OF FLAT BED WITH BEDRAILS: A LOT
WALKING IN HOSPITAL ROOM: A LOT
MOVING FROM LYING ON BACK TO SITTING ON SIDE OF FLAT BED WITH BEDRAILS: TOTAL
TURNING FROM BACK TO SIDE WHILE IN FLAT BAD: TOTAL
PERSONAL GROOMING: TOTAL
TOILETING: A LOT
STANDING UP FROM CHAIR USING ARMS: A LOT
MOVING FROM LYING ON BACK TO SITTING ON SIDE OF FLAT BED WITH BEDRAILS: TOTAL
TOILETING: TOTAL
DRESSING REGULAR UPPER BODY CLOTHING: TOTAL
WALKING IN HOSPITAL ROOM: TOTAL
DRESSING REGULAR UPPER BODY CLOTHING: A LOT
TURNING FROM BACK TO SIDE WHILE IN FLAT BAD: TOTAL
PERSONAL GROOMING: A LOT
MOVING TO AND FROM BED TO CHAIR: A LOT
DRESSING REGULAR LOWER BODY CLOTHING: TOTAL
DAILY ACTIVITIY SCORE: 12
DRESSING REGULAR LOWER BODY CLOTHING: A LOT
STANDING UP FROM CHAIR USING ARMS: A LITTLE
DAILY ACTIVITIY SCORE: 6
EATING MEALS: A LOT
DRESSING REGULAR UPPER BODY CLOTHING: A LOT
DRESSING REGULAR LOWER BODY CLOTHING: A LOT
STANDING UP FROM CHAIR USING ARMS: A LOT
MOBILITY SCORE: 14
HELP NEEDED FOR BATHING: A LOT
MOBILITY SCORE: 13
CLIMB 3 TO 5 STEPS WITH RAILING: A LOT
TOILETING: TOTAL
MOVING FROM LYING ON BACK TO SITTING ON SIDE OF FLAT BED WITH BEDRAILS: A LOT
STANDING UP FROM CHAIR USING ARMS: TOTAL
HELP NEEDED FOR BATHING: A LOT
MOBILITY SCORE: 6
EATING MEALS: A LOT
TURNING FROM BACK TO SIDE WHILE IN FLAT BAD: TOTAL
PERSONAL GROOMING: A LOT
STANDING UP FROM CHAIR USING ARMS: TOTAL
DRESSING REGULAR UPPER BODY CLOTHING: TOTAL
STANDING UP FROM CHAIR USING ARMS: A LOT
MOVING FROM LYING ON BACK TO SITTING ON SIDE OF FLAT BED WITH BEDRAILS: TOTAL
EATING MEALS: A LOT
TURNING FROM BACK TO SIDE WHILE IN FLAT BAD: A LOT
EATING MEALS: TOTAL
WALKING IN HOSPITAL ROOM: A LOT
MOVING FROM LYING ON BACK TO SITTING ON SIDE OF FLAT BED WITH BEDRAILS: A LITTLE
MOBILITY SCORE: 12
TOILETING: A LOT
MOVING FROM LYING ON BACK TO SITTING ON SIDE OF FLAT BED WITH BEDRAILS: TOTAL
WALKING IN HOSPITAL ROOM: TOTAL
STANDING UP FROM CHAIR USING ARMS: A LOT
CLIMB 3 TO 5 STEPS WITH RAILING: A LITTLE
TOILETING: TOTAL
CLIMB 3 TO 5 STEPS WITH RAILING: A LOT
HELP NEEDED FOR BATHING: TOTAL
HELP NEEDED FOR BATHING: TOTAL
MOBILITY SCORE: 18
DAILY ACTIVITIY SCORE: 6
DAILY ACTIVITIY SCORE: 12
DRESSING REGULAR UPPER BODY CLOTHING: A LITTLE
WALKING IN HOSPITAL ROOM: TOTAL
TURNING FROM BACK TO SIDE WHILE IN FLAT BAD: A LOT
TURNING FROM BACK TO SIDE WHILE IN FLAT BAD: A LOT
DRESSING REGULAR LOWER BODY CLOTHING: TOTAL
DRESSING REGULAR LOWER BODY CLOTHING: TOTAL

## 2025-01-01 ASSESSMENT — ACTIVITIES OF DAILY LIVING (ADL)
ADEQUATE_TO_COMPLETE_ADL: YES
BATHING: DEPENDENT
PATIENT'S MEMORY ADEQUATE TO SAFELY COMPLETE DAILY ACTIVITIES?: YES
JUDGMENT_ADEQUATE_SAFELY_COMPLETE_DAILY_ACTIVITIES: YES
HEARING - RIGHT EAR: FUNCTIONAL
ADEQUATE_TO_COMPLETE_ADL: YES
DRESSING YOURSELF: DEPENDENT
PATIENT'S MEMORY ADEQUATE TO SAFELY COMPLETE DAILY ACTIVITIES?: YES
GROOMING: DEPENDENT
FEEDING YOURSELF: DEPENDENT
LACK_OF_TRANSPORTATION: NO
HEARING - LEFT EAR: FUNCTIONAL
TOILETING: DEPENDENT
GROOMING: DEPENDENT
WALKS IN HOME: DEPENDENT
LACK_OF_TRANSPORTATION: NO
TOILETING: DEPENDENT
HEARING - LEFT EAR: FUNCTIONAL
FEEDING YOURSELF: DEPENDENT
JUDGMENT_ADEQUATE_SAFELY_COMPLETE_DAILY_ACTIVITIES: YES
HEARING - RIGHT EAR: FUNCTIONAL
WALKS IN HOME: DEPENDENT

## 2025-01-01 ASSESSMENT — PAIN DESCRIPTION - DESCRIPTORS
DESCRIPTORS: ACHING

## 2025-01-01 ASSESSMENT — PAIN SCALES - PAIN ASSESSMENT IN ADVANCED DEMENTIA (PAINAD)
TOTALSCORE: 0
CONSOLABILITY: NO NEED TO CONSOLE
TOTALSCORE: 0
BREATHING: OCCASIONAL LABORED BREATHING, SHORT PERIOD OF HYPERVENTILATION
BODYLANGUAGE: RELAXED
BREATHING: NORMAL
FACIALEXPRESSION: SMILING OR INEXPRESSIVE
FACIALEXPRESSION: SMILING OR INEXPRESSIVE
BODYLANGUAGE: RELAXED
CONSOLABILITY: NO NEED TO CONSOLE
BODYLANGUAGE: RELAXED
CONSOLABILITY: NO NEED TO CONSOLE
BODYLANGUAGE: TENSE, DISTRESSED PACING, FIDGETING
CONSOLABILITY: NO NEED TO CONSOLE
BREATHING: NORMAL
TOTALSCORE: 2
TOTALSCORE: 1
FACIALEXPRESSION: SMILING OR INEXPRESSIVE
BODYLANGUAGE: RELAXED
BREATHING: OCCASIONAL LABORED BREATHING, SHORT PERIOD OF HYPERVENTILATION
FACIALEXPRESSION: SMILING OR INEXPRESSIVE
FACIALEXPRESSION: SMILING OR INEXPRESSIVE
BREATHING: NORMAL
BREATHING: OCCASIONAL LABORED BREATHING, SHORT PERIOD OF HYPERVENTILATION
FACIALEXPRESSION: SMILING OR INEXPRESSIVE
TOTALSCORE: 1
FACIALEXPRESSION: SMILING OR INEXPRESSIVE
TOTALSCORE: 0
BODYLANGUAGE: RELAXED
BODYLANGUAGE: RELAXED
TOTALSCORE: 0
BREATHING: NORMAL
CONSOLABILITY: NO NEED TO CONSOLE

## 2025-01-01 ASSESSMENT — LIFESTYLE VARIABLES
AUDIT-C TOTAL SCORE: 0
HOW MANY STANDARD DRINKS CONTAINING ALCOHOL DO YOU HAVE ON A TYPICAL DAY: PATIENT DOES NOT DRINK
SKIP TO QUESTIONS 9-10: 1
HOW OFTEN DO YOU HAVE A DRINK CONTAINING ALCOHOL: NEVER
HOW OFTEN DO YOU HAVE 6 OR MORE DRINKS ON ONE OCCASION: NEVER
AUDIT-C TOTAL SCORE: 0

## 2025-01-01 ASSESSMENT — PAIN SCALES - GENERAL
PAINLEVEL_OUTOF10: 3
PAINLEVEL_OUTOF10: 5 - MODERATE PAIN
PAINLEVEL_OUTOF10: 5 - MODERATE PAIN
PAINLEVEL_OUTOF10: 7
PAINLEVEL_OUTOF10: 6
PAINLEVEL_OUTOF10: 8
PAINLEVEL_OUTOF10: 6
PAINLEVEL_OUTOF10: 7
PAINLEVEL_OUTOF10: 7
PAINLEVEL_OUTOF10: 5 - MODERATE PAIN
PAINLEVEL_OUTOF10: 7
PAINLEVEL_OUTOF10: 0 - NO PAIN
PAINLEVEL_OUTOF10: 5 - MODERATE PAIN
PAINLEVEL_OUTOF10: 6
PAINLEVEL_OUTOF10: 5 - MODERATE PAIN
PAINLEVEL_OUTOF10: 0 - NO PAIN
PAINLEVEL_OUTOF10: 7
PAINLEVEL_OUTOF10: 9
PAINLEVEL_OUTOF10: 8
PAINLEVEL_OUTOF10: 0 - NO PAIN
PAINLEVEL_OUTOF10: 7
PAINLEVEL_OUTOF10: 9
PAINLEVEL_OUTOF10: 7
PAINLEVEL_OUTOF10: 0 - NO PAIN
PAINLEVEL_OUTOF10: 0 - NO PAIN
PAINLEVEL_OUTOF10: 7
PAINLEVEL_OUTOF10: 5 - MODERATE PAIN
PAINLEVEL_OUTOF10: 3
PAINLEVEL_OUTOF10: 0 - NO PAIN

## 2025-01-01 ASSESSMENT — PAIN DESCRIPTION - ORIENTATION
ORIENTATION: RIGHT

## 2025-01-01 ASSESSMENT — COLUMBIA-SUICIDE SEVERITY RATING SCALE - C-SSRS
2. HAVE YOU ACTUALLY HAD ANY THOUGHTS OF KILLING YOURSELF?: NO
1. IN THE PAST MONTH, HAVE YOU WISHED YOU WERE DEAD OR WISHED YOU COULD GO TO SLEEP AND NOT WAKE UP?: NO
6. HAVE YOU EVER DONE ANYTHING, STARTED TO DO ANYTHING, OR PREPARED TO DO ANYTHING TO END YOUR LIFE?: NO
2. HAVE YOU ACTUALLY HAD ANY THOUGHTS OF KILLING YOURSELF?: NO
1. IN THE PAST MONTH, HAVE YOU WISHED YOU WERE DEAD OR WISHED YOU COULD GO TO SLEEP AND NOT WAKE UP?: NO
6. HAVE YOU EVER DONE ANYTHING, STARTED TO DO ANYTHING, OR PREPARED TO DO ANYTHING TO END YOUR LIFE?: NO

## 2025-01-01 ASSESSMENT — PAIN DESCRIPTION - LOCATION
LOCATION: SHOULDER

## 2025-01-01 ASSESSMENT — PAIN DESCRIPTION - PAIN TYPE: TYPE: ACUTE PAIN

## 2025-01-07 ENCOUNTER — PATIENT MESSAGE (OUTPATIENT)
Dept: PALLIATIVE MEDICINE | Facility: CLINIC | Age: 50
End: 2025-01-07
Payer: COMMERCIAL

## 2025-01-07 DIAGNOSIS — M89.9 LESION OF BONE OF CERVICAL SPINE: ICD-10-CM

## 2025-01-07 RX ORDER — PANTOPRAZOLE SODIUM 40 MG/1
40 TABLET, DELAYED RELEASE ORAL
Qty: 30 TABLET | Refills: 3 | Status: SHIPPED | OUTPATIENT
Start: 2025-01-07

## 2025-01-09 ENCOUNTER — APPOINTMENT (OUTPATIENT)
Dept: RADIATION ONCOLOGY | Facility: CLINIC | Age: 50
End: 2025-01-09
Payer: COMMERCIAL

## 2025-01-15 ENCOUNTER — HOSPITAL ENCOUNTER (OUTPATIENT)
Dept: RADIOLOGY | Facility: CLINIC | Age: 50
Discharge: HOME | End: 2025-01-15
Payer: COMMERCIAL

## 2025-01-15 DIAGNOSIS — C79.51 NSCLC METASTATIC TO BONE (MULTI): ICD-10-CM

## 2025-01-15 DIAGNOSIS — C34.90 NSCLC METASTATIC TO BONE (MULTI): ICD-10-CM

## 2025-01-15 PROCEDURE — 2550000001 HC RX 255 CONTRASTS: Performed by: NURSE PRACTITIONER

## 2025-01-15 PROCEDURE — 74177 CT ABD & PELVIS W/CONTRAST: CPT | Performed by: STUDENT IN AN ORGANIZED HEALTH CARE EDUCATION/TRAINING PROGRAM

## 2025-01-15 PROCEDURE — 71260 CT THORAX DX C+: CPT | Performed by: STUDENT IN AN ORGANIZED HEALTH CARE EDUCATION/TRAINING PROGRAM

## 2025-01-15 PROCEDURE — 71260 CT THORAX DX C+: CPT

## 2025-01-15 RX ADMIN — IOHEXOL 90 ML: 350 INJECTION, SOLUTION INTRAVENOUS at 11:04

## 2025-01-16 ENCOUNTER — APPOINTMENT (OUTPATIENT)
Dept: RADIOLOGY | Facility: CLINIC | Age: 50
End: 2025-01-16
Payer: COMMERCIAL

## 2025-01-20 ENCOUNTER — LAB (OUTPATIENT)
Dept: LAB | Facility: CLINIC | Age: 50
End: 2025-01-20
Payer: COMMERCIAL

## 2025-01-20 ENCOUNTER — OFFICE VISIT (OUTPATIENT)
Dept: HEMATOLOGY/ONCOLOGY | Facility: CLINIC | Age: 50
End: 2025-01-20
Payer: COMMERCIAL

## 2025-01-20 VITALS
SYSTOLIC BLOOD PRESSURE: 101 MMHG | RESPIRATION RATE: 18 BRPM | HEART RATE: 105 BPM | BODY MASS INDEX: 17.15 KG/M2 | DIASTOLIC BLOOD PRESSURE: 64 MMHG | OXYGEN SATURATION: 93 % | TEMPERATURE: 100.6 F | WEIGHT: 106.26 LBS

## 2025-01-20 DIAGNOSIS — C34.90 NSCLC METASTATIC TO BONE (MULTI): ICD-10-CM

## 2025-01-20 DIAGNOSIS — C79.51 NSCLC METASTATIC TO BONE (MULTI): ICD-10-CM

## 2025-01-20 DIAGNOSIS — C79.51 NSCLC METASTATIC TO BONE (MULTI): Primary | ICD-10-CM

## 2025-01-20 DIAGNOSIS — C34.90 NSCLC METASTATIC TO BONE (MULTI): Primary | ICD-10-CM

## 2025-01-20 LAB
ALBUMIN SERPL BCP-MCNC: 3.5 G/DL (ref 3.4–5)
ALP SERPL-CCNC: 113 U/L (ref 33–120)
ALT SERPL W P-5'-P-CCNC: 11 U/L (ref 10–52)
ANION GAP SERPL CALC-SCNC: 13 MMOL/L (ref 10–20)
AST SERPL W P-5'-P-CCNC: 12 U/L (ref 9–39)
BASOPHILS # BLD AUTO: 0.01 X10*3/UL (ref 0–0.1)
BASOPHILS NFR BLD AUTO: 0.2 %
BILIRUB SERPL-MCNC: 0.3 MG/DL (ref 0–1.2)
BUN SERPL-MCNC: 9 MG/DL (ref 6–23)
CALCIUM SERPL-MCNC: 9.2 MG/DL (ref 8.6–10.3)
CHLORIDE SERPL-SCNC: 97 MMOL/L (ref 98–107)
CO2 SERPL-SCNC: 28 MMOL/L (ref 21–32)
CREAT SERPL-MCNC: 0.53 MG/DL (ref 0.5–1.3)
EGFRCR SERPLBLD CKD-EPI 2021: >90 ML/MIN/1.73M*2
EOSINOPHIL # BLD AUTO: 0 X10*3/UL (ref 0–0.7)
EOSINOPHIL NFR BLD AUTO: 0 %
ERYTHROCYTE [DISTWIDTH] IN BLOOD BY AUTOMATED COUNT: 18.3 % (ref 11.5–14.5)
GLUCOSE SERPL-MCNC: 119 MG/DL (ref 74–99)
HCT VFR BLD AUTO: 24.9 % (ref 41–52)
HGB BLD-MCNC: 7.8 G/DL (ref 13.5–17.5)
IMM GRANULOCYTES # BLD AUTO: 0.01 X10*3/UL (ref 0–0.7)
IMM GRANULOCYTES NFR BLD AUTO: 0.2 % (ref 0–0.9)
LYMPHOCYTES # BLD AUTO: 1.08 X10*3/UL (ref 1.2–4.8)
LYMPHOCYTES NFR BLD AUTO: 16.3 %
MCH RBC QN AUTO: 29.9 PG (ref 26–34)
MCHC RBC AUTO-ENTMCNC: 31.3 G/DL (ref 32–36)
MCV RBC AUTO: 95 FL (ref 80–100)
MONOCYTES # BLD AUTO: 0.79 X10*3/UL (ref 0.1–1)
MONOCYTES NFR BLD AUTO: 12 %
NEUTROPHILS # BLD AUTO: 4.72 X10*3/UL (ref 1.2–7.7)
NEUTROPHILS NFR BLD AUTO: 71.3 %
PLATELET # BLD AUTO: 466 X10*3/UL (ref 150–450)
POTASSIUM SERPL-SCNC: 4.4 MMOL/L (ref 3.5–5.3)
PROT SERPL-MCNC: 7.4 G/DL (ref 6.4–8.2)
RBC # BLD AUTO: 2.61 X10*6/UL (ref 4.5–5.9)
SODIUM SERPL-SCNC: 134 MMOL/L (ref 136–145)
WBC # BLD AUTO: 6.6 X10*3/UL (ref 4.4–11.3)

## 2025-01-20 PROCEDURE — 80053 COMPREHEN METABOLIC PANEL: CPT

## 2025-01-20 PROCEDURE — 82024 ASSAY OF ACTH: CPT

## 2025-01-20 PROCEDURE — 99215 OFFICE O/P EST HI 40 MIN: CPT | Performed by: STUDENT IN AN ORGANIZED HEALTH CARE EDUCATION/TRAINING PROGRAM

## 2025-01-20 PROCEDURE — 36415 COLL VENOUS BLD VENIPUNCTURE: CPT

## 2025-01-20 PROCEDURE — 85025 COMPLETE CBC W/AUTO DIFF WBC: CPT

## 2025-01-20 PROCEDURE — 82533 TOTAL CORTISOL: CPT

## 2025-01-20 PROCEDURE — G2211 COMPLEX E/M VISIT ADD ON: HCPCS | Performed by: STUDENT IN AN ORGANIZED HEALTH CARE EDUCATION/TRAINING PROGRAM

## 2025-01-20 PROCEDURE — 84443 ASSAY THYROID STIM HORMONE: CPT

## 2025-01-20 RX ORDER — PROCHLORPERAZINE EDISYLATE 5 MG/ML
10 INJECTION INTRAMUSCULAR; INTRAVENOUS EVERY 6 HOURS PRN
Status: CANCELLED | OUTPATIENT
Start: 2025-01-21

## 2025-01-20 RX ORDER — MULTIVITAMIN/IRON/FOLIC ACID 18MG-0.4MG
1 TABLET ORAL DAILY
COMMUNITY

## 2025-01-20 RX ORDER — EPINEPHRINE 0.3 MG/.3ML
0.3 INJECTION SUBCUTANEOUS EVERY 5 MIN PRN
Status: CANCELLED | OUTPATIENT
Start: 2025-01-21

## 2025-01-20 RX ORDER — ALBUTEROL SULFATE 0.83 MG/ML
3 SOLUTION RESPIRATORY (INHALATION) AS NEEDED
Status: CANCELLED | OUTPATIENT
Start: 2025-01-21

## 2025-01-20 RX ORDER — CYANOCOBALAMIN 1000 UG/ML
1000 INJECTION, SOLUTION INTRAMUSCULAR; SUBCUTANEOUS ONCE
Status: CANCELLED | OUTPATIENT
Start: 2025-01-21

## 2025-01-20 RX ORDER — PROCHLORPERAZINE MALEATE 5 MG
10 TABLET ORAL EVERY 6 HOURS PRN
Status: CANCELLED | OUTPATIENT
Start: 2025-01-21

## 2025-01-20 RX ORDER — DIPHENHYDRAMINE HYDROCHLORIDE 50 MG/ML
50 INJECTION INTRAMUSCULAR; INTRAVENOUS AS NEEDED
Status: CANCELLED | OUTPATIENT
Start: 2025-01-21

## 2025-01-20 RX ORDER — FAMOTIDINE 10 MG/ML
20 INJECTION INTRAVENOUS ONCE AS NEEDED
Status: CANCELLED | OUTPATIENT
Start: 2025-01-21

## 2025-01-20 RX ORDER — DEXAMETHASONE SODIUM PHOSPHATE 10 MG/ML
10 INJECTION INTRAMUSCULAR; INTRAVENOUS ONCE
Status: CANCELLED | OUTPATIENT
Start: 2025-01-21

## 2025-01-20 ASSESSMENT — ENCOUNTER SYMPTOMS
APPETITE CHANGE: 0
DIARRHEA: 1
UNEXPECTED WEIGHT CHANGE: 0
VOMITING: 0
FATIGUE: 1
CARDIOVASCULAR NEGATIVE: 1
MYALGIAS: 1
BACK PAIN: 1
FEVER: 0
SHORTNESS OF BREATH: 1
HEMOPTYSIS: 0
NAUSEA: 0
CONSTIPATION: 1

## 2025-01-20 ASSESSMENT — PAIN SCALES - GENERAL: PAINLEVEL_OUTOF10: 7

## 2025-01-20 NOTE — PROGRESS NOTES
[facility] - Medical Oncology Follow-Up Visit    Patient ID: Jovon Mccartney is a 49 y.o. male with NSCLC adenocarcinoma     Current therapy: cyanocobalamin (Vitamin B-12) injection 1,000 mcg, 1,000 mcg, intramuscular, Once, 2 of 2 cycles    Administration: 1,000 mcg (10/28/2024), 1,000 mcg (12/30/2024)        fosaprepitant (Emend) 150 mg in sodium chloride 0.9% 150 mL IV, 150 mg, intravenous, Once, 4 of 4 cycles    Administration: 150 mg (10/28/2024), 150 mg (11/18/2024), 150 mg (12/9/2024), 150 mg (12/30/2024)        CARBOplatin (Paraplatin) 627 mg in sodium chloride 0.9% 172.7 mL IV, 627 mg, intravenous, Once, 4 of 4 cycles    Administration: 627 mg (10/28/2024), 627 mg (11/18/2024), 500 mg (12/9/2024), 440 mg (12/30/2024)        methylPREDNISolone sod succinate (SOLU-Medrol) 40 mg/mL injection 40 mg, 40 mg, intravenous, As needed, 4 of 4 cycles        palonosetron (Aloxi) injection 250 mcg, 250 mcg, intravenous, Once, 4 of 4 cycles    Administration: 250 mcg (10/28/2024), 250 mcg (11/18/2024), 250 mcg (12/9/2024), 250 mcg (12/30/2024)        pembrolizumab (Keytruda) 200 mg in sodium chloride 0.9% 118 mL IV, 200 mg, intravenous, Once, 4 of 4 cycles    Administration: 200 mg (10/28/2024), 200 mg (11/18/2024), 200 mg (12/9/2024), 200 mg (12/30/2024)        PEMEtrexed disodium (Alimta) 800 mg in sodium chloride 0.9% 142 mL IV, 500 mg/m2 = 800 mg, intravenous, Once, 4 of 4 cycles    Dose modification: 400 mg/m2 (original dose 500 mg/m2, Cycle 2)    Administration: 800 mg (10/28/2024), 645 mg (11/18/2024), 600 mg (12/9/2024), 600 mg (12/30/2024)       Chief Concern: readiness to treat visit    Oncologic History:     DIAGNOSIS  NSCLC adenocarcinoma      STAGING  cT4 pN3 M1c      CURRENT SITES OF DISEASE  R hilar mass, 4L, 11L, scapula,  right  parieto-occipital bone, C2, C3, ?lymphatic carcinomatosis     MOLECULAR GENOMICS  KRAS p.G12C ([id number] c.34G>T)   PD-L1 <1%     PRIOR THERAPY         CURRENT THERAPY  Palliative XRT skull, C-spine, R shoulder 10/10/24 - 10/16/24  Carboplatin (AUC 5), Pemetrexed (500mg/m2), & Pembrolizumab 10/28/24 -   - Pemetrexed dose reduced 400mg/m2 - C2 11/18/24   - Carboplatin dose reduced (AUC 4) - C3 12/9/24      CURRENT ONCOLOGICAL PROBLEMS  Hemoptysis - resolved  Epistaxis - intermittent        HISTORY OF PRESENT ILLNESS  Mr. Mccartney is a 50 yo with PMH significant for RA who present to the [place] ER on 9/9/24 with 2 months of progressively worsening headaches and neck pain with occasional radiation to the R and L arms. A lytic lesion was noted on CT head 9/9/24 of the parietal bone, and CT cervical spine was concerning for lucent lesions C2 and C3. Brain MRI and MR cervical spine confirmed marrow-replacing lesions fo the C2 and C3 vertebral bodies. CT C/A/P w/o contrast on 9/10/24 was concerning for soft tissue mass of the R hilum, lytic lesions of the R scapula, and 3.5 cm mass in the L gluteus. [place] [facility] [place] C/A/P w/IV contrast on 9/15/24 confirmed the lung mass encasing the R mainstem bronchus abutting the R main pulmonary artery and extending to the lung periphery, as well as R perihilar lesion and lymphangitic carcinomatosis, prominent mediastinal nodes, L axillary node, nodular thickening of bilateral adrenal glands, likely lesion of the R scapula, small hepatic lesions, moderate pericardial effusion. EBUS on 9/17/24 with pathology of R hilar mass with adenocarcinoma, KRAS G12C, PD-L1 pending, and positive lymph nodes 11L and 4L. He met Dr. Mcrgath and discussed palliative radiation to the occipital lesion and cervical spine. PET/CT on 10/1/24 with FDG avid confluent R hilar/mediastinal mass, mild FDG avidity along upper and middle lobe, bilateral scapular, humeral, R anterior acetabular osseous metastases. He started palliative radiation 10/10/24-10/16/24.      He was born with juvenile RA, usually his feet and knees feel good compared to  everything. He used to walk with a crutch, since the hospital he has been walking without it. He follows with a rheumatologist at [place]. It's fairly managed he says, has  been on humira for 20-25 years, right when it first came out. Kept the fluid off his knees and helped him better than anything else. He was on all kinds of drugs he said before including steroids, methotrexate, others. Typically with the humira he doesn't really get flares, sometimes in his wrists. He's been off for about a month, so thinks he'd have a flare in about a month.      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 Maybe had one exposure to asbestos. 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 Lives at home with his wife and a dog, 3 cats, a bird and fish. They have 2 sons and 3 grandchildren.  Tob: quit smoking 9/9/24, smoked 39 years, 1-1.5 ppd  EtOH: occasional  Illicits: previous smoking marijuana, only edibles now for pain     FAMILY HISTORY  Mother - breast cancer dx 56 yo  Maternal grandmother - bone cancer  Paternal cousin - bone and/or lung cancer  Paternal grandmother - unknown cancer  No other family members with autoimmune diseases    HPI   He is here today with his wife. He gained 2 lbs. Has had occasional chills at home, goes away within 5 minutes, nothing long periods of times. Sometimes his temp is in the 99s but also goes away. Nothing that stays. Pain has been overall stable - not better, not worse. He does still have some vision changes - every once in awhile colors kind of changes, then goes away. This happens every day. Also has been constipated but then gets diarrhea.     Meds (Current):    Current Outpatient Medications:     acetaminophen (Tylenol) 500 mg tablet, Take 1 tablet (500 mg) by mouth every 6 hours if needed for mild pain (1 - 3)., Disp: , Rfl:     budesonide-glycopyr-formoterol (BREZTRI) 160-9-4.8  mcg/actuation HFA aerosol inhaler, Inhale 2 puffs 2 times a day., Disp: 10.7 g, Rfl: 3    dexAMETHasone (Decadron) 4 mg tablet, Take 4 mg (1 tablet) by mouth twice daily the day before treatment, once the evening of treatment, and twice daily the day after treatment., Disp: 5 tablet, Rfl: 5    folic acid (Folvite) 1 mg tablet, Take 1 tablet (1,000 mcg) by mouth once daily. Do not start before October 27, 2024., Disp: 30 tablet, Rfl: 11    folic acid (Folvite) 1 mg tablet, Take 1 tablet (1,000 mcg) by mouth once daily., Disp: 30 tablet, Rfl: 11    gabapentin (Neurontin) 300 mg capsule, Take 1 capsule (300 mg) by mouth 3 times a day., Disp: 90 capsule, Rfl: 3    ibuprofen 200 mg tablet, Take 2 tablets (400 mg) by mouth every 6 hours if needed for mild pain (1 - 3)., Disp: , Rfl:     ipratropium-albuteroL (Combivent Respimat)  mcg/actuation inhaler, Inhale 2 puffs 4 times a day as needed for shortness of breath or wheezing., Disp: , Rfl:     mirtazapine (Remeron) 15 mg tablet, Take 1 tablet (15 mg) by mouth once daily at bedtime., Disp: 30 tablet, Rfl: 2    morphine CR (MS Contin) 30 mg 12 hr tablet, Take 1 tablet (30 mg) by mouth 3 times a day. Do not crush, chew, or split., Disp: 90 tablet, Rfl: 0    naloxone (Narcan) 4 mg/0.1 mL nasal spray, Administer 1 spray (4 mg) into affected nostril(s) if needed for opioid reversal. May repeat every 2-3 minutes if needed, alternating nostrils, until medical assistance becomes available., Disp: 2 each, Rfl: 3    OLANZapine (ZyPREXA) 5 mg tablet, Take 1 tablet (5 mg) by mouth nightly for 4 nights starting the evening of treatment, Disp: 4 tablet, Rfl: 2    ondansetron (Zofran) 8 mg tablet, Take 1 tablet (8 mg) by mouth every 8 hours if needed for nausea or vomiting. Do not fill before October 27, 2024., Disp: 30 tablet, Rfl: 5    ondansetron (Zofran) 8 mg tablet, Take 1 tablet (8 mg) by mouth every 8 hours if needed for nausea or vomiting., Disp: 30 tablet, Rfl: 5     oxyCODONE (Roxicodone) 10 mg immediate release tablet, Take 1-1.5 tablets (10-15 mg) by mouth every 4 hours if needed for moderate pain (4 - 6) or severe pain (7 - 10). MAX 9 TABS PER DAY Do not fill before January 9, 2025., Disp: 270 tablet, Rfl: 0    oxygen (O2) gas therapy, Inhale 1 each once every 24 hours., Disp: , Rfl:     pantoprazole (ProtoNix) 40 mg EC tablet, Take 1 tablet (40 mg) by mouth once daily in the morning. Take before meals. Do not crush, chew, or split., Disp: 30 tablet, Rfl: 3    pilocarpine (Salagen, pilocarpine,) 5 mg tablet, Take 1 tablet (5 mg) by mouth 3 times a day as needed (for dry mouth)., Disp: 90 tablet, Rfl: 11    polyethylene glycol (Glycolax, Miralax) 17 gram/dose powder, Mix 17 g of powder and drink once daily., Disp: , Rfl:     prochlorperazine (Compazine) 10 mg tablet, Take 1 tablet (10 mg) by mouth every 6 hours if needed for nausea or vomiting. Do not fill before October 27, 2024., Disp: 30 tablet, Rfl: 5    prochlorperazine (Compazine) 10 mg tablet, Take 1 tablet (10 mg) by mouth every 6 hours if needed for nausea or vomiting., Disp: 30 tablet, Rfl: 5    Review of Systems   Constitutional:  Positive for fatigue. Negative for appetite change, fever and unexpected weight change.   HENT:  Negative.  Negative for nosebleeds.    Respiratory:  Positive for shortness of breath. Negative for hemoptysis.    Cardiovascular: Negative.    Gastrointestinal:  Positive for constipation and diarrhea. Negative for nausea and vomiting.   Musculoskeletal:  Positive for back pain and myalgias.        Objective   BSA: 1.5 meters squared  Wt Readings from Last 5 Encounters:   01/20/25 48.2 kg (106 lb 4.2 oz)   12/30/24 47.4 kg (104 lb 9.6 oz)   12/19/24 48.1 kg (106 lb)   12/09/24 50.1 kg (110 lb 8 oz)   11/25/24 52.2 kg (115 lb)     11/11/24 wt #123 - leg braces worn (8#)    /64 (BP Location: Right arm, Patient Position: Sitting, BP Cuff Size: Adult)   Pulse 105   Temp (!) 38.1 °C (100.6  °F) (Temporal)   Resp 18   Wt 48.2 kg (106 lb 4.2 oz)   SpO2 93%   BMI 17.15 kg/m²     ECOG Score:   Performance Status (ECOG): 2    ECOG   Definition  0          Fully active; no performance restrictions.  1          Strenuous physical activity restricted; fully ambulatory and able to carry out light work.  2          Capable of all self-care but unable to carry out any work activities. Up and about >50% of waking hours.  3          Capable of only limited self-care; confined to bed or chair >50% of waking hours.  4          Completely disabled; cannot carry out any self-care; totally confined to bed or chair.    Physical Exam  Vitals reviewed.   Constitutional:       General: He is not in acute distress.     Appearance: He is not ill-appearing.      Comments: Frail, cachectic     HENT:      Head: Normocephalic and atraumatic.      Mouth/Throat:      Mouth: Mucous membranes are moist.   Eyes:      Extraocular Movements: Extraocular movements intact.      Pupils: Pupils are equal, round, and reactive to light.   Cardiovascular:      Rate and Rhythm: Normal rate and regular rhythm.      Heart sounds: Normal heart sounds. No murmur heard.  Pulmonary:      Effort: Pulmonary effort is normal. No respiratory distress.      Breath sounds: Normal breath sounds.      Comments: Diminished on right.  Abdominal:      General: Bowel sounds are normal. There is no distension.      Palpations: Abdomen is soft.   Musculoskeletal:      Cervical back: Normal range of motion.      Right lower leg: Edema (ankles 2/2 RA) present.      Left lower leg: Edema (ankles 2/2 RA) present.      Comments: Chronic swelling to TINY ankles and left wrist/hand 2/2 RA.   Skin:     General: Skin is warm and dry.   Neurological:      General: No focal deficit present.      Mental Status: He is alert and oriented to person, place, and time. Mental status is at baseline.   Psychiatric:         Mood and Affect: Mood normal.         Behavior: Behavior  normal.         Thought Content: Thought content normal.        Results:  Labs:  Lab Results   Component Value Date    WBC 5.8 12/30/2024    HGB 8.8 (L) 12/30/2024    HCT 27.5 (L) 12/30/2024    MCV 96 12/30/2024     12/30/2024      Lab Results   Component Value Date    NEUTROABS 3.73 12/30/2024      Lab Results   Component Value Date    GLUCOSE 111 (H) 12/30/2024    CALCIUM 9.2 12/30/2024     (L) 12/30/2024    K 4.0 12/30/2024    CO2 27 12/30/2024    CL 99 12/30/2024    BUN 12 12/30/2024    CREATININE 0.45 (L) 12/30/2024    MG 1.66 11/26/2024     Lab Results   Component Value Date    ALT 13 12/30/2024    AST 13 12/30/2024    ALKPHOS 119 12/30/2024    BILITOT 0.4 12/30/2024    BILIDIR 0.1 09/14/2024      Lab Results   Component Value Date    ACTH 8.5 10/28/2024    CORTISOL 2.3 (L) 12/09/2024    TSH 0.84 12/09/2024       Imaging:  I have personally reviewed the below imaging and concur with the reported findings unless otherwise stated:    CT chest abdomen pelvis w IV contrast    Result Date: 1/17/2025  Impression: CHEST 1.  Redemonstration of soft tissue mass encasing the right mainstem bronchus predominantly along its posterior aspect without stenosis of the mainstem bronchus. Taking into account differences in technique this mass appears  slightly decreased in size, however evaluation of the borders is somewhat limited due to adjacent right parahilar and infrahilar airspace disease. There is stenosis and partial opacification of the right middle lobe and lower lobe bronchi with interval development of  moderate-size loculated right pleural effusion and subsegmental consolidative opacities in the right middle lobe and right lower lobe. These represent a combination of postobstructive atelectasis and postobstructive pneumonia. 2. Slight interval decrease of the distal right subcarinal lymph node now measuring 0.9 cm, previously 1.1 cm which demonstrated hypermetabolism on recent PET-CT. No other  lymphadenopathy. 3. Unchanged moderate inferior pericardial effusion.   ABDOMEN - PELVIS 1.  Interval worsening of lytic metastatic bone marrow lesions involving the right greater than left scapula and interval development of lytic metastatic lesion in the right anterior acetabulum. Metastatic lesion in the right humerus diaphysis also partially visualized. Additionally interval development of mixed lytic sclerotic lesion within the posterior T2 vertebral body and sclerotic bone marrow lesion within the anterior superior T12 vertebral body concerning for progressive bone marrow metastatic disease. 2. Interval development of mild body wall edema. No ascites or lymphadenopathy in the abdomen/pelvis.     Signed by: Manohar Campbell 1/17/2025 2:58 PM Dictation workstation:   [id number]       Assessment/Plan      Jovon Mccartney is a 49 y.o. male here for follow up of NSCLC adenocarcinoma.     # NSCLC adenocarcinoma  - [id number]  - KRAS G12C, PD-L1 <1%  - discussed that SoC therapies include chemotherapy+ immunotherapy. Given longstanding juvenile RA, would like input from his rheumatologist regarding safety of immunotherapy in this setting. Discussed if no immunotherapy, would recommend chemotherapy alone vs KRAS G12C targeted therapy frontline, which typically is given in the second-line setting.  - provided information on carboplatin/pemetrexed/pembrolizumab vs adagrasib   - patient discussed with his rheumatologist regarding concern for flaring RA on immunotherapy. His rheumatologist will monitor closely, stop humira, and maintain him on low-dose steroids  - consented for carboplatin/pemetrexed/pembrolizumab to start 10/28/24  - will plan on utilizing adagrasib in the 2nd line setting if needed  - will plan on restaging scans after C4 - scheduled 1/15/25  - C2 11/18/24, Pemetrexed dose reduced 400mg/m2  - Hospitalization 11/25-11/26/24 2U PRBC's  - C3 12/9/24, Carboplatin dose reduced AUC4  - personally reviewed restaging  scans with likely stable disease, will monitor closely and continue on maintenance pemetrexed/pembrolizumab  -next scans in 3 months     # Neoplasm related pain  - already saw Dr. Mcgrath, receiving palliative radiation - repeat C spine and brain MRI 1/23/25  - seeing supportive onc - started pt on ER Morphine and PRN Oxycodone.    - Pt also taking Ibuprofen 600mg QID and 1000mg Acetaminophen QID.  Recommended pt reduce both doses x1 to start.  Pt will cut back to TID x1 week and see if his pain/RA is tolerable.    - 11/18/24 - current TID Acetaminophen and Ibuprofen daily, plus Morphine, Oxycodone and Gabapentin.    - 12/9/24 - follows with supportive onc, pain managed with current regimen      # Hypoxia/low-grade temps  - Discussed pt borderline to ED recommendation 2/2 low-grade temps and hypoxic episode at home.  Pt agreeable to urgent care visit for nasal swabs to r/o COVID, Flu A/B, and RSV.  All (-).  - stable, PRN & HS oxygen use.  No further temps.    - Recommended routine oxygen use - 2L, increase to 4L with exertion.    - 12/9:  continuous oxygen use, except brief periods of time - eating.      # Hemoptysis/Epistaxis  - resolved with saline nasal spray  - 11/17/24 - epistaxis, brief episode  - will continue to monitor    - Anemia Hgb 7.1  - Pt consented today for blood transfusion (11/18/24)  - Discussed with Dr. Vargas, will repeat labs on 11/25/24.   Repeat labs, plus T&S and ABO ordered for 11/25/24.  Discussed he likely will need a transfusion. Recommended ED visit for repeat hemoptysis/epistaxis episodes.    - 2U PRBC's 11/25-11/26/24 [facility]    - Repeat CBC w/diff 12/16/24 + T&S - Hgb 8.4   - stable Hgb 8.8 12/30/24     # Hyponatremia - 127 11/4/24  - Rx sent for NaCl tabs - 1 tab TID x7 days  - Na 134 12/30/24 - stable    # Thrombocytopenia - resolved   - 11/4/24   - 11/11/24 PLT 42   - No s/sx's bleeding  - Pt educate to bleeding precautions/interventions  - 12/30/24 -      # Leukopenia -  resolved  - WBC 3.2 11/4/24   - WBC 5.8 12/30/24      # Advanced care planning  - discussed incurable but treatable nature of metastatic disease, with goal of treatment to improve or maintain quality of life and prolong life.

## 2025-01-21 ENCOUNTER — OFFICE VISIT (OUTPATIENT)
Dept: PALLIATIVE MEDICINE | Facility: CLINIC | Age: 50
End: 2025-01-21
Payer: COMMERCIAL

## 2025-01-21 ENCOUNTER — INFUSION (OUTPATIENT)
Dept: HEMATOLOGY/ONCOLOGY | Facility: CLINIC | Age: 50
End: 2025-01-21
Payer: COMMERCIAL

## 2025-01-21 VITALS
HEIGHT: 65 IN | BODY MASS INDEX: 17.52 KG/M2 | RESPIRATION RATE: 18 BRPM | SYSTOLIC BLOOD PRESSURE: 115 MMHG | DIASTOLIC BLOOD PRESSURE: 70 MMHG | TEMPERATURE: 97.9 F | WEIGHT: 105.16 LBS | OXYGEN SATURATION: 91 % | HEART RATE: 111 BPM

## 2025-01-21 DIAGNOSIS — K59.00 CONSTIPATION, UNSPECIFIED CONSTIPATION TYPE: ICD-10-CM

## 2025-01-21 DIAGNOSIS — C34.90 NSCLC METASTATIC TO BONE (MULTI): ICD-10-CM

## 2025-01-21 DIAGNOSIS — Z51.5 PALLIATIVE CARE ENCOUNTER: Primary | ICD-10-CM

## 2025-01-21 DIAGNOSIS — R63.0 DECREASED APPETITE: ICD-10-CM

## 2025-01-21 DIAGNOSIS — C79.51 NSCLC METASTATIC TO BONE (MULTI): ICD-10-CM

## 2025-01-21 DIAGNOSIS — M89.9 LESION OF BONE OF CERVICAL SPINE: ICD-10-CM

## 2025-01-21 DIAGNOSIS — G89.3 CANCER RELATED PAIN: ICD-10-CM

## 2025-01-21 LAB
CORTIS AM PEAK SERPL-MSCNC: 22.2 UG/DL (ref 5–20)
TSH SERPL-ACNC: 1.42 MIU/L (ref 0.44–3.98)

## 2025-01-21 PROCEDURE — 2500000004 HC RX 250 GENERAL PHARMACY W/ HCPCS (ALT 636 FOR OP/ED): Performed by: STUDENT IN AN ORGANIZED HEALTH CARE EDUCATION/TRAINING PROGRAM

## 2025-01-21 PROCEDURE — 96411 CHEMO IV PUSH ADDL DRUG: CPT

## 2025-01-21 PROCEDURE — 96413 CHEMO IV INFUSION 1 HR: CPT

## 2025-01-21 PROCEDURE — 2500000004 HC RX 250 GENERAL PHARMACY W/ HCPCS (ALT 636 FOR OP/ED): Mod: JZ,TB | Performed by: STUDENT IN AN ORGANIZED HEALTH CARE EDUCATION/TRAINING PROGRAM

## 2025-01-21 PROCEDURE — 96372 THER/PROPH/DIAG INJ SC/IM: CPT

## 2025-01-21 PROCEDURE — 99214 OFFICE O/P EST MOD 30 MIN: CPT | Mod: 25

## 2025-01-21 PROCEDURE — 99214 OFFICE O/P EST MOD 30 MIN: CPT

## 2025-01-21 PROCEDURE — 96375 TX/PRO/DX INJ NEW DRUG ADDON: CPT | Mod: INF

## 2025-01-21 PROCEDURE — 1036F TOBACCO NON-USER: CPT

## 2025-01-21 RX ORDER — FAMOTIDINE 10 MG/ML
20 INJECTION INTRAVENOUS ONCE AS NEEDED
Status: DISCONTINUED | OUTPATIENT
Start: 2025-01-21 | End: 2025-01-21 | Stop reason: HOSPADM

## 2025-01-21 RX ORDER — EPINEPHRINE 0.3 MG/.3ML
0.3 INJECTION SUBCUTANEOUS EVERY 5 MIN PRN
Status: DISCONTINUED | OUTPATIENT
Start: 2025-01-21 | End: 2025-01-21 | Stop reason: HOSPADM

## 2025-01-21 RX ORDER — PROCHLORPERAZINE EDISYLATE 5 MG/ML
10 INJECTION INTRAMUSCULAR; INTRAVENOUS EVERY 6 HOURS PRN
Status: DISCONTINUED | OUTPATIENT
Start: 2025-01-21 | End: 2025-01-21 | Stop reason: HOSPADM

## 2025-01-21 RX ORDER — ALBUTEROL SULFATE 0.83 MG/ML
3 SOLUTION RESPIRATORY (INHALATION) AS NEEDED
Status: DISCONTINUED | OUTPATIENT
Start: 2025-01-21 | End: 2025-01-21 | Stop reason: HOSPADM

## 2025-01-21 RX ORDER — DEXAMETHASONE SODIUM PHOSPHATE 10 MG/ML
10 INJECTION INTRAMUSCULAR; INTRAVENOUS ONCE
Status: COMPLETED | OUTPATIENT
Start: 2025-01-21 | End: 2025-01-21

## 2025-01-21 RX ORDER — DIPHENHYDRAMINE HYDROCHLORIDE 50 MG/ML
50 INJECTION INTRAMUSCULAR; INTRAVENOUS AS NEEDED
Status: DISCONTINUED | OUTPATIENT
Start: 2025-01-21 | End: 2025-01-21 | Stop reason: HOSPADM

## 2025-01-21 RX ORDER — CYANOCOBALAMIN 1000 UG/ML
1000 INJECTION, SOLUTION INTRAMUSCULAR; SUBCUTANEOUS ONCE
Status: COMPLETED | OUTPATIENT
Start: 2025-01-21 | End: 2025-01-21

## 2025-01-21 RX ORDER — PROCHLORPERAZINE MALEATE 10 MG
10 TABLET ORAL EVERY 6 HOURS PRN
Status: DISCONTINUED | OUTPATIENT
Start: 2025-01-21 | End: 2025-01-21 | Stop reason: HOSPADM

## 2025-01-21 RX ORDER — OXYCODONE HYDROCHLORIDE 10 MG/1
10-15 TABLET ORAL EVERY 4 HOURS PRN
Qty: 270 TABLET | Refills: 0 | Status: SHIPPED | OUTPATIENT
Start: 2025-01-21 | End: 2025-02-20

## 2025-01-21 RX ADMIN — PEMETREXED DISODIUM 600 MG: 500 INJECTION, POWDER, LYOPHILIZED, FOR SOLUTION INTRAVENOUS at 10:13

## 2025-01-21 RX ADMIN — DEXAMETHASONE SODIUM PHOSPHATE 10 MG: 10 INJECTION INTRAMUSCULAR; INTRAVENOUS at 09:26

## 2025-01-21 RX ADMIN — CYANOCOBALAMIN 1000 MCG: 1000 INJECTION INTRAMUSCULAR; SUBCUTANEOUS at 09:26

## 2025-01-21 RX ADMIN — SODIUM CHLORIDE 200 MG: 9 INJECTION, SOLUTION INTRAVENOUS at 09:37

## 2025-01-21 ASSESSMENT — PAIN SCALES - GENERAL: PAINLEVEL_OUTOF10: 5

## 2025-01-21 NOTE — SIGNIFICANT EVENT

## 2025-01-21 NOTE — PROGRESS NOTES
SUPPORTIVE AND PALLIATIVE ONCOLOGY FOLLOW-UP - OUTPATIENT      SERVICE DATE: 1/21/2025    Referred by:  Anton Mcgrath MD  Medical Oncologist: No care team member to display   Radiation Oncologist: No care team member to display  Primary Physician: Matilda Garcia  [phone]    REASON FOR CONSULT/CHIEF CONSULT COMPLAINT: pain management and Introduction to Supportive and Palliative Oncology Services    Subjective   HISTORY OF PRESENT ILLNESS: Jovon Mccartney is a 49 y.o. male who presents with a PMH of juvenile RA and newly diagnosed NSCLC with metastatic disease to the right scapula, parieto-occipital bone, C2, C3, bilateral humeral and right anterior acetabular osteolytic lesions. Completing RT to the right scapula and right scalp. Started Carbo/pem/pem 10/28/24. Now on maintenance pemetrexed + pembrolizumab.     Pain Assessment:  Pain Score: 5/10  Location: neck and shoulder    Symptom Assessment:  Pain:very much  constant aching pain in his bilateral posterior shoulders, arms, and neck. The pain is a 7/10 at its worst. He reports that most days his pain stays around a 5-6/10. He has been taking oxycodone 20 mg 3 times per day. He said this puts him to sleep so he feels less pain.   Headache: none  Dizziness:none  Lack of energy: somewhat ongoing fatigue. Napping during the day.   Difficulty sleeping: a little    Worrying: none  Anxiety: none  Depression: a little  Pain in mouth/swallowing: none  Dry mouth: none  Taste changes: none  Shortness of breath: none  Lack of appetite: a little. He does feel a little improvement in his appetite since starting remeron. He has been able to gain weight which he is happy about.   Nausea: none  Vomiting: none   Constipation: a little states that he has constipation and then diarrhea. He has not been able to get this under control yet. Has been taking senna 2 times per day.   Diarrhea: none  Sore muscles: none  Numbness or tingling in hands/feet/other: none  Weight loss: a  little  Other: none      Information obtained from: chart review, interview of patient, and interview of family  ______________________________________________________________________     Oncology History   NSCLC metastatic to bone (Multi)   9/26/2024 Initial Diagnosis    NSCLC metastatic to bone (Multi)     9/26/2024 Cancer Staged    Staging form: Lung, AJCC 8th Edition, Clinical stage from 9/26/2024: Stage IVB (cT4, cN3, cM1c) - Signed by Anton Mcgrath MD PhD on 9/26/2024     10/28/2024 - 12/30/2024 Chemotherapy    Pembrolizumab + PEMEtrexed / CARBOplatin, 21 Day Cycles     1/21/2025 -  Chemotherapy    Pembrolizumab + PEMEtrexed, 21 Day Cycles          Past Medical History:   Diagnosis Date    Adalimumab (Humira) long-term use 09/15/2024    Arthritis 1974    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    Hilar mass 09/15/2024    Juvenile rheumatoid arthritis (Multi) 09/15/2024    Lung cancer (Multi) 09 17 2024    Rheumatoid arthritis 10 1974     Past Surgical History:   Procedure Laterality Date    BRONCHOSCOPY  9 16 2024    LUNG BIOPSY  9 16 2024     Family History   Problem Relation Name Age of Onset    Breast cancer Mother JORDY CHURCH     Cancer Mother JORDY CHURCH     Miscarriages / Stillbirths Mother JORDY CHURCH     Cancer Maternal Grandfather HUBER DEMARCO     Stroke Maternal Grandfather HUBER PAL         SOCIAL HISTORY  Children 2, Grandchildren 3, Support system wife and kids, Employment on disability since 17 years old, and Hobbies working on cars and 4 wheeling.    Social History:  reports that he quit smoking about 4 months ago. His smoking use included cigarettes. He has a 58.5 pack-year smoking history. He has been exposed to tobacco smoke. He has never used smokeless tobacco. He reports that he does not currently use alcohol. He reports that he does not currently use drugs.  Quit smoking September 2024.     REVIEW OF SYSTEMS  Review of systems negative unless noted in HPI.        Objective     Current Outpatient Medications   Medication Instructions    acetaminophen (TYLENOL) 500 mg, Every 6 hours PRN    b complex 0.4 mg tablet 1 tablet, Daily    budesonide-glycopyr-formoterol (BREZTRI) 160-9-4.8 mcg/actuation HFA aerosol inhaler 2 puffs, inhalation, 2 times daily    dexAMETHasone (Decadron) 4 mg tablet Take 4 mg (1 tablet) by mouth twice daily the day before treatment, once the evening of treatment, and twice daily the day after treatment.    folic acid (Folvite) 1 mg tablet Take 1 tablet (1,000 mcg) by mouth once daily. Do not start before October 27, 2024.    folic acid (FOLVITE) 1,000 mcg, oral, Daily    gabapentin (NEURONTIN) 300 mg, oral, 3 times daily    ibuprofen 400 mg, Every 6 hours PRN    ipratropium-albuteroL (Combivent Respimat)  mcg/actuation inhaler 2 puffs, inhalation, 4 times daily PRN    mirtazapine (REMERON) 15 mg, oral, Nightly    morphine CR (MS CONTIN) 30 mg, oral, 3 times daily, Do not crush, chew, or split.    naloxone (NARCAN) 4 mg, nasal, As needed, May repeat every 2-3 minutes if needed, alternating nostrils, until medical assistance becomes available.    OLANZapine (ZyPREXA) 5 mg tablet Take 1 tablet (5 mg) by mouth nightly for 4 nights starting the evening of treatment    ondansetron (ZOFRAN) 8 mg, oral, Every 8 hours PRN    ondansetron (ZOFRAN) 8 mg, oral, Every 8 hours PRN    oxyCODONE (ROXICODONE) 10-15 mg, oral, Every 4 hours PRN, MAX 9 TABS PER DAY    oxygen (O2) gas therapy 1 each, inhalation, Every 24 hours    pantoprazole (PROTONIX) 40 mg, oral, Daily before breakfast, Do not crush, chew, or split.    pilocarpine (SALAGEN (PILOCARPINE)) 5 mg, oral, 3 times daily PRN    polyethylene glycol (GLYCOLAX, MIRALAX) 17 g, oral, Daily    prochlorperazine (COMPAZINE) 10 mg, oral, Every 6 hours PRN    prochlorperazine (COMPAZINE) 10 mg, oral, Every 6 hours PRN       Allergies: No Known Allergies      Lab on 01/20/2025   Component Date Value Ref Range Status     WBC 01/20/2025 6.6  4.4 - 11.3 x10*3/uL Final    RBC 01/20/2025 2.61 (L)  4.50 - 5.90 x10*6/uL Final    Hemoglobin 01/20/2025 7.8 (L)  13.5 - 17.5 g/dL Final    Hematocrit 01/20/2025 24.9 (L)  41.0 - 52.0 % Final    MCV 01/20/2025 95  80 - 100 fL Final    MCH 01/20/2025 29.9  26.0 - 34.0 pg Final    MCHC 01/20/2025 31.3 (L)  32.0 - 36.0 g/dL Final    RDW 01/20/2025 18.3 (H)  11.5 - 14.5 % Final    Platelets 01/20/2025 466 (H)  150 - 450 x10*3/uL Final    Neutrophils % 01/20/2025 71.3  40.0 - 80.0 % Final    Immature Granulocytes %, Automated 01/20/2025 0.2  0.0 - 0.9 % Final    Immature Granulocyte Count (IG) includes promyelocytes, myelocytes and metamyelocytes but does not include bands. Percent differential counts (%) should be interpreted in the context of the absolute cell counts (cells/UL).    Lymphocytes % 01/20/2025 16.3  13.0 - 44.0 % Final    Monocytes % 01/20/2025 12.0  2.0 - 10.0 % Final    Eosinophils % 01/20/2025 0.0  0.0 - 6.0 % Final    Basophils % 01/20/2025 0.2  0.0 - 2.0 % Final    Neutrophils Absolute 01/20/2025 4.72  1.20 - 7.70 x10*3/uL Final    Percent differential counts (%) should be interpreted in the context of the absolute cell counts (cells/uL).    Immature Granulocytes Absolute, Au* 01/20/2025 0.01  0.00 - 0.70 x10*3/uL Final    Lymphocytes Absolute 01/20/2025 1.08 (L)  1.20 - 4.80 x10*3/uL Final    Monocytes Absolute 01/20/2025 0.79  0.10 - 1.00 x10*3/uL Final    Eosinophils Absolute 01/20/2025 0.00  0.00 - 0.70 x10*3/uL Final    Basophils Absolute 01/20/2025 0.01  0.00 - 0.10 x10*3/uL Final    Glucose 01/20/2025 119 (H)  74 - 99 mg/dL Final    Sodium 01/20/2025 134 (L)  136 - 145 mmol/L Final    Potassium 01/20/2025 4.4  3.5 - 5.3 mmol/L Final    Chloride 01/20/2025 97 (L)  98 - 107 mmol/L Final    Bicarbonate 01/20/2025 28  21 - 32 mmol/L Final    Anion Gap 01/20/2025 13  10 - 20 mmol/L Final    Urea Nitrogen 01/20/2025 9  6 - 23 mg/dL Final    Creatinine 01/20/2025 0.53  0.50 -  1.30 mg/dL Final    eGFR 01/20/2025 >90  >60 mL/min/1.73m*2 Final    Calculations of estimated GFR are performed using the 2021 CKD-EPI Study Refit equation without the race variable for the IDMS-Traceable creatinine methods.  https://jasn.asnjournals.org/content/early/2021/09/22/ASN.8136543791    Calcium 01/20/2025 9.2  8.6 - 10.3 mg/dL Final    Albumin 01/20/2025 3.5  3.4 - 5.0 g/dL Final    Alkaline Phosphatase 01/20/2025 113  33 - 120 U/L Final    Total Protein 01/20/2025 7.4  6.4 - 8.2 g/dL Final    AST 01/20/2025 12  9 - 39 U/L Final    Bilirubin, Total 01/20/2025 0.3  0.0 - 1.2 mg/dL Final    ALT 01/20/2025 11  10 - 52 U/L Final    Patients treated with Sulfasalazine may generate falsely decreased results for ALT.    Cortisol  A.M. 01/20/2025 22.2 (H)  5.0 - 20.0 ug/dL Final    Thyroid Stimulating Hormone 01/20/2025 1.42  0.44 - 3.98 mIU/L Final   Lab on 12/30/2024   Component Date Value Ref Range Status    WBC 12/30/2024 5.8  4.4 - 11.3 x10*3/uL Final    nRBC 12/30/2024    Final    Not Measured    RBC 12/30/2024 2.88 (L)  4.50 - 5.90 x10*6/uL Final    Hemoglobin 12/30/2024 8.8 (L)  13.5 - 17.5 g/dL Final    Hematocrit 12/30/2024 27.5 (L)  41.0 - 52.0 % Final    MCV 12/30/2024 96  80 - 100 fL Final    MCH 12/30/2024 30.6  26.0 - 34.0 pg Final    MCHC 12/30/2024 32.0  32.0 - 36.0 g/dL Final    RDW 12/30/2024 19.5 (H)  11.5 - 14.5 % Final    Platelets 12/30/2024    Final    PLATELET CLUMPS PRECLUDE QUANTITATION. PLATELET ESTIMATE APPEARS ADEQUATE. Platelet count verified by smear review.    Neutrophils % 12/30/2024 64.2  40.0 - 80.0 % Final    Immature Granulocytes %, Automated 12/30/2024 0.3  0.0 - 0.9 % Final    Immature Granulocyte Count (IG) includes promyelocytes, myelocytes and metamyelocytes but does not include bands. Percent differential counts (%) should be interpreted in the context of the absolute cell counts (cells/UL).    Lymphocytes % 12/30/2024 20.1  13.0 - 44.0 % Final    Monocytes % 12/30/2024  14.9  2.0 - 10.0 % Final    Eosinophils % 12/30/2024 0.2  0.0 - 6.0 % Final    Basophils % 12/30/2024 0.3  0.0 - 2.0 % Final    Neutrophils Absolute 12/30/2024 3.73  1.20 - 7.70 x10*3/uL Final    Percent differential counts (%) should be interpreted in the context of the absolute cell counts (cells/uL).    Immature Granulocytes Absolute, Au* 12/30/2024 0.02  0.00 - 0.70 x10*3/uL Final    Lymphocytes Absolute 12/30/2024 1.17 (L)  1.20 - 4.80 x10*3/uL Final    Monocytes Absolute 12/30/2024 0.87  0.10 - 1.00 x10*3/uL Final    Eosinophils Absolute 12/30/2024 0.01  0.00 - 0.70 x10*3/uL Final    Basophils Absolute 12/30/2024 0.02  0.00 - 0.10 x10*3/uL Final    Automated WBC differential has been confirmed by manual smear.    Glucose 12/30/2024 111 (H)  74 - 99 mg/dL Final    Sodium 12/30/2024 134 (L)  136 - 145 mmol/L Final    Potassium 12/30/2024 4.0  3.5 - 5.3 mmol/L Final    Chloride 12/30/2024 99  98 - 107 mmol/L Final    Bicarbonate 12/30/2024 27  21 - 32 mmol/L Final    Anion Gap 12/30/2024 12  10 - 20 mmol/L Final    Urea Nitrogen 12/30/2024 12  6 - 23 mg/dL Final    Creatinine 12/30/2024 0.45 (L)  0.50 - 1.30 mg/dL Final    eGFR 12/30/2024 >90  >60 mL/min/1.73m*2 Final    Calculations of estimated GFR are performed using the 2021 CKD-EPI Study Refit equation without the race variable for the IDMS-Traceable creatinine methods.  https://jasn.asnjournals.org/content/early/2021/09/22/ASN.9635693368    Calcium 12/30/2024 9.2  8.6 - 10.3 mg/dL Final    Albumin 12/30/2024 3.4  3.4 - 5.0 g/dL Final    Alkaline Phosphatase 12/30/2024 119  33 - 120 U/L Final    Total Protein 12/30/2024 7.1  6.4 - 8.2 g/dL Final    AST 12/30/2024 13  9 - 39 U/L Final    Bilirubin, Total 12/30/2024 0.4  0.0 - 1.2 mg/dL Final    ALT 12/30/2024 13  10 - 52 U/L Final    Patients treated with Sulfasalazine may generate falsely decreased results for ALT.    RBC Morphology 12/30/2024 See Below   Final    Polychromasia 12/30/2024 Mild   Final    RBC  "Fragments 12/30/2024 Few   Final    Ovalocytes 12/30/2024 Few   Final    Platelets 12/30/2024 402  150 - 450 x10*3/uL Final        PHYSICAL EXAMINATION  Vital Signs:       11/26/2024     9:45 AM 12/9/2024    11:03 AM 12/19/2024     1:14 PM 12/30/2024    10:28 AM 1/20/2025    11:04 AM 1/21/2025     8:41 AM 1/21/2025    10:56 AM   Vitals   Systolic 108 113 94 106 101 115 --   Diastolic 70 65 59 68 64 70 --   BP Location Right arm Left arm  Left arm Right arm     Heart Rate 75 85 102 89 105 111    Temp 36.2 °C (97.2 °F) 37.4 °C (99.3 °F)  37 °C (98.6 °F) 38.1 °C (100.6 °F) 36.6 °C (97.9 °F) --   Resp 18 16 19 16 18 18    Height   1.676 m (5' 6\")   1.652 m (5' 5.04\")     Weight (lb)  110.5 106 104.6 106.26 105.16 --   BMI  17.84 kg/m2 17.11 kg/m2 16.88 kg/m2 17.15 kg/m2 17.48 kg/m2    BSA (m2)  1.53 m2 1.5 m2 1.49 m2 1.5 m2 1.48 m2    Visit Report  Report Report Report    Report Report Report Report       Significant value    Multiple values from one day are sorted in reverse-chronological order     Vital signs reviewed       Physical Exam  Constitutional:       Appearance: He is underweight.   HENT:      Head: Normocephalic.   Eyes:      Pupils: Pupils are equal, round, and reactive to light.   Pulmonary:      Effort: Pulmonary effort is normal.   Musculoskeletal:         General: Normal range of motion.      Right hand: Swelling present.      Left hand: Swelling present.      Right foot: Swelling present.      Left foot: Swelling present.   Neurological:      Mental Status: He is oriented to person, place, and time.   Psychiatric:         Mood and Affect: Mood normal.         Behavior: Behavior normal.          ASSESSMENT/PLAN    Pain  Pain is: cancer related pain  Type: somatic  Pain control: sub-optimally controlled  Home regimen:   - Continue Oxycodone 15 mg every 4 hours as needed  - Continue MSContin 30/30/60 mg.   - Continue Tylenol 1000 mg every 8 hours as needed  - Continue Ibuprofen 600 mg every 6 hours as " needed   - Continue with RT    Opioid Use  Medication Management:   - OARRS report reviewed with no aberrant behavior; consistent with  prescriptions/records and patient history  - .  Overdose Risk Score 430.   This has been discussed with patient.   - We will continue to closely monitor the patient for signs of prescription misuse including UDS, OARRS review and subjective reports at each visit.  - No concurrent benzodiazepine use   - I am a provider who either is or has consulted and collaborated with a provider certified in Hospice and Palliative Medicine and have conducted a face-face visit and examination for this patient.  - Routine Urine Drug Screen complete 10/8/24 appropriately positive for opioids and negative for illicit substances  - Controlled Substance Agreement completed 10/8/24  - Specifically discussed that controlled substance prescriptions will only be provided by our group as outlined in the completed agreement  - Prescribed naloxone prescribed 9/27/24 - patient has at home  - Red Flags: none    Constipation   At risk for constipation related to opioids,  currently not constipated   Usual bowel pattern: every 1-2 days   Current regimen:   - Continue Senna 2 tabs daily.   - Discussed starting Dulcolax 2 times daily and stopping miralax to see if this regimen works better.   - Continue Miralax 17 g daily. Increase to 2 times per day if constipation continues  - If no BM within 48-72 hours, start MOM 8% every 6 hours     Sleeping Difficulty:  Impaired sleep related to pain  Current regimen:    - Continue pain medication as described above    Decreased appetite  Related to malignancy  Nutrition consult - may consider at next visit  Current regimen:    - Continue Mirtazapine 15 mg daily at bedtime  - Encouraged smaller, more frequent meals  - encouraged ensure/boost 1-2 times per day  - Drinking high calorie protein smoothies   - Met with Nena Felder    Advance Directives  Existence of Advance  Directives:Yes, documentation or copy in medical record  Decision maker: PADMINI is Judith Mccartney  Code Status: Full code    Next Follow-Up Visit:  Return to clinic in 3 weeks     Signature and billing  Thank you for allowing us to participate in the care of this patient. Recommendations will be communicated back to the consulting service by way of shared electronic medical record or face-to-face.    Medical complexity was high level due to due to complexity of problems, extensive data review, and high risk of management/treatment.  Time was spent on the following: Prep Time, Time Directly with Patient/Family/Caregiver, Documentation Time. Total time spent: 30      DATA   Diagnostic tests and information reviewed for today's visit:  Most recent labs, Most recent imaging, Medications       Some elements copied from Oncology note on 12/30/24, the elements have been updated and all reflect current decision making from today, 1/21/2025.      Plan of Care discussed with: Patient and Family/Significant Other: wife      SIGNATURE: ADELINA Jacobo    Contact information:  Supportive and Palliative Oncology  Monday-Friday 8 AM-5 PM  Phone:  [phone], press option #5, then option #1.   Or Epic Secure Chat

## 2025-01-22 LAB — ACTH PLAS-MCNC: 6.2 PG/ML (ref 7.2–63.3)

## 2025-01-22 NOTE — PROGRESS NOTES
Radiation Oncology Follow-Up    Patient Name:  Jovon Mccartney  MRN:  [id number]  :  1975    Referring Provider: Anton Mcgrath MD PhD  Primary Care Provider: ADELINA Hernandez  Care Team: Patient Care Team:  ADELINA Hernandez as PCP - General (Gerontology)  Liset Vargas MD as On Call Attending Physician (Hematology and Oncology)  Anton Mcgrath MD PhD as Radiation Oncologist (Radiation Oncology)    Date of Service: 2025    SUBJECTIVE  History of Present Illness:  Jovon Mccartney is a 49 y.o. 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  He is status post palliative radiation therapy to the C-spine, right scalp, and right shoulder, delivering a total of 20 Gray in 5 fractions and completed on 10/17/2024.  The patient has received on 2020 for third cycle of carboplatin (AUC 5 decreased with AUC 4), pemetrexed (20 mg/m² decreased to 400 mg/m²), and pembrolizumab.  He is presenting today for follow-up.    Since last seen, the patient underwent 3 cycles of systemic therapy as detailed above.  The patient underwent on 1/15/2025 a CAT scan of the chest abdomen and pelvis.  CT of the chest came back with:    CHEST  1. Redemonstration of soft tissue mass encasing the right mainstem  bronchus predominantly along its posterior aspect without stenosis of  the mainstem bronchus. Taking into account differences in technique  this mass appears slightly decreased in size, however evaluation of  the borders is somewhat limited due to adjacent right parahilar and  infrahilar airspace disease. There is stenosis and partial  opacification of the right middle lobe and lower lobe bronchi with  interval development of moderate-size loculated right pleural  effusion and subsegmental consolidative opacities in the right middle  lobe and right lower lobe. These  represent a combination of  postobstructive atelectasis and postobstructive pneumonia.  2. Slight interval decrease of the distal right subcarinal lymph node  now measuring 0.9 cm, previously 1.1 cm which demonstrated  hypermetabolism on recent PET-CT. No other lymphadenopathy.  3. Unchanged moderate inferior pericardial effusion.    Also underwent on 1/22/2025 of the cervical spine and of the brain.  Results came back with:    === 01/23/25 ===    MR BRAIN W AND WO CONTRAST    - Impression -  There has been interval enlargement of the previously noted expansile  enhancing osseous lesion involving the posterior right parietal and  right occipital bones most compatible with an osseous metastasis.  Specifically, it measures up to 64 mm in greatest axial dimension as  seen on current axial post gadolinium fat saturated T1 slice 22 of 39  compared with the prior study dated 09/09/2024 it mm measured  approximately 41 mm in greatest axial dimension. There is adjacent  dural thickening and enhancement raising the possibility of adjacent  dural neoplastic invasion and/or dural reaction. There is mild mass  effect upon the adjacent right cerebral hemisphere without edema  within the adjacent brain parenchyma.    There has been interval enlargement of an enhancing osseous  metastatic lesion along the floor of the right middle cranial fossa  surrounding the right temporomandibular joint currently measuring a  proximally 31 mm x 28 mm in transaxial dimensions as seen on axial  post gadolinium fat saturated T1 slice 9 of 39 compared with the  prior study dated 9/09/2024 where it measured approximately 16 mm x  16 mm in transaxial dimensions. There is adjacent dural thickening  and enhancement which may represent adjacent dural neoplastic  invasion and/or dural reaction. There is mild mass effect upon the  adjacent inferolateral right temporal lobe without significant edema  within the adjacent brain parenchyma. There is  extracranial caudal  extension of the enhancing mass surrounding the right  temporomandibular joint.    The above findings are again noted be superimposed upon mild diffuse  brain parenchymal volume loss.    Scattered nonspecific white matter changes are again noted within  cerebral hemispheres bilaterally as well as ill-defined increased  signal on the FLAIR and T2 images overlying the brainstem which while  nonspecific can be seen with more remote small-vessel ischemic change  among others.    There is again evidence of a very small developmental venous  anomaly/venous angioma along the medial right frontal lobe.    MACRO:  Critical Finding:  See findings. Notification was initiated on  1/23/2025 at 4:15 pm by  Evert Wilkerson.  (**-YCF-**)    Signed by: Evert Wilkerson 1/23/2025 4:15 PM  Dictation workstation:   [id number]     During today's interview, the patient presented with his son.  He noted that he did not experience any increase in the pain over the shoulders, however the pain is probably the same, but it seems to be controlled on his current pain regimen.  The patient denies any pain in the hip.  He denies any new onset weakness or numbness or tingling.  He denies any urine or stools incontinence.  Mr. Chopra has however noted an increase in his shortness of breath and he is now using his oxygen machine more frequently.      Treatment Rendered:   3D CRT: Right Scalp, Right Scapula, Not Applicable Cervical spine    Treatment Period Technique Fraction Dose Fractions Total Dose   Course 1 10/10/2024-10/17/2024  (days elapsed: 7)         C_Spine 10/10/2024-10/17/2024 3D 400 / 400 cGy 5 / 5 2000 / 2,000 cGy         R_Scalp 10/10/2024-10/17/2024 3D 400 / 400 cGy 5 / 5 2000 / 2,000 cGy         R_Shoulder 10/10/2024-10/17/2024 3D 400 / 400 cGy 5 / 5 2000 / 2,000 cGy       Review of Systems:       Radiation Oncology Nursing Note     Pain: The patient's current pain level was assessed.  They report currently having a  pain of 8 out of 10.  They feel their pain is under control with the use of pain medications.     Review of Systems:  Review of Systems   Constitutional:  Positive for appetite change (low appetite / supplements / appetite stimulant), fatigue (very tired, sleeps between personal care / sleeping better at night) and unexpected weight change (gained). Negative for chills, diaphoresis and fever.   Eyes:  Negative for eye problems and icterus.        Notes a discoloration of vision at times that resolves quickly   Respiratory:  Positive for cough (dry) and shortness of breath (without  O2,and excertion). Negative for chest tightness, hemoptysis and wheezing.         Oxygen 2L   Cardiovascular: Negative.    Gastrointestinal:  Positive for constipation (pain meds contributing) and diarrhea. Negative for abdominal distention, abdominal pain, blood in stool, nausea, rectal pain and vomiting.   Endocrine: Negative.    Genitourinary:  Positive for nocturia (q3 hrs nightly). Negative for bladder incontinence, difficulty urinating, dyspareunia, dysuria, frequency, hematuria, pelvic pain and penile discharge.    Musculoskeletal:  Positive for arthralgias (arms, hand, back , shoulders). Negative for back pain, flank pain, myalgias, neck pain and neck stiffness.        JRA patient   Skin: Negative.    Neurological: Negative.    Hematological: Negative.    Psychiatric/Behavioral:  Positive for decreased concentration and depression. Negative for confusion, sleep disturbance and suicidal ideas. The patient is not nervous/anxious.         Performance Status:   The Karnofsky performance scale today is 70, Cares for self; unable to carry on normal activity or to do active work (ECOG equivalent 1).       OBJECTIVE  Vital Signs:  /76 (BP Location: Right arm, Patient Position: Sitting, BP Cuff Size: Adult)   Pulse 94   Temp 36.8 °C (98.2 °F) (Temporal)   Resp 18   Wt 48.4 kg (106 lb 11.2 oz)   SpO2 96%   BMI 17.73 kg/m²    Physical Exam  Constitutional:       Comments: Thin male, looks tired, with nasal cannula.   HENT:      Head: Normocephalic.   Eyes:      Conjunctiva/sclera: Conjunctivae normal.      Pupils: Pupils are equal, round, and reactive to light.   Cardiovascular:      Rate and Rhythm: Normal rate and regular rhythm.   Pulmonary:      Breath sounds: Rhonchi present.      Comments: Bilateral decrease in breath sounds.  Abdominal:      General: Bowel sounds are normal.      Palpations: Abdomen is soft.   Musculoskeletal:      Comments: No tenderness upon passive motion of the hip.  Bilateral shoulder point tenderness that seems to be unchanged from the initial encounter.  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   Skin:     General: Skin is warm.   Neurological:      General: No focal deficit present.      Mental Status: He is oriented to person, place, and time. Mental status is at baseline.            ASSESSMENT:   Jovon Mccartney is a 49 y.o. 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  He is status post palliative radiation therapy to the C-spine, right scalp, and right shoulder, delivering a total of 20 Gray in 5 fractions and completed on 10/17/2024.  The patient has received on 12/9/2020 for third cycle of carboplatin (AUC 5 decreased with AUC 4), pemetrexed (20 mg/m² decreased to 400 mg/m²), and pembrolizumab.  He is presenting today for follow-up.    While the most recent CAT scan has revealed stable disease in the chest, it seems however that the disease has widely progressed over the bony skeleton.  I have explained to Mr. Do the current disease status and overall situation of progressive osseous metastatic disease.  The patient expressed sadness about hearing the results.  I then spoke about the role of radiation in the current situation, to  provide palliation.  We spoke about the lytic acetabular lesion, and the associated fracture risk and the weight bearing bone of the pelvis.  We spoke about the usual treatment of these lesions which can include surgical consultation, weight bearing avoidance, and radiation therapy.  Open the current multiple morbidities the patient does not seem to be a good surgical candidate and he elected not to proceed with a surgical consultation.  He signed consent today to proceed with radiation therapy to the right acetabulum.    We then spoke about the fact that the role of radiation in the current setting is palliation is not cure.  There of radiation is to provide local control, by preventing further expansion, hopefully provide shrinkage, and limits associated pain.  We then spoke about logistics of radiation therapy which include the acquisition of a CT simulation treatment position.  We then went over the side effects of radiation which include fatigue, nausea, fracture, loose stools, and theoretical risk of secondary malignancies.  The patient understood the benefits and side effects as outlined he agreed to proceed with radiation and signed the consent.    Meanwhile I will be presenting the results of the MRI of the cervical spine and brain MRI on the upcoming CNS tumor board.  We discussed the possible treatment strategies to address the local progression at this level.  I will repeat with Mr. Mccartney next week prior to his CT SIM and discuss further treatment options.    IDENTIFYING DATA:   Cancer Staging   NSCLC metastatic to bone (Multi)  Staging form: Lung, AJCC 8th Edition  - Clinical stage from 9/26/2024: Stage IVB (cT4, cN3, cM1c) - Signed by Anton Mcgrath MD PhD on 9/26/2024    Problem List Items Addressed This Visit       Lesion of parietal bone    Relevant Orders    MR brain w and wo IV contrast    Lesion of bone of cervical spine    Relevant Orders    MR cervical spine w and wo IV contrast    NSCLC  metastatic to bone (Multi) - Primary    Relevant Orders    Referral to Orthopaedic Surgery    MR brain w and wo IV contrast    MR cervical spine w and wo IV contrast    MR thoracic spine w and wo IV contrast    Rad Onc Intent to Treat    Lytic bone lesion of hip      PLAN:    [ ] CT SIM next week in order to plan the delivery of palliative radiation to the right acetabulum lytic lesion.  [ ] Given the results of the MRI of the cervical spine, and brain, I will present his case in the upcoming CNS tumor board for expert consensus.  I will then meet the patient on Thursday and update him about the results.      NCCN Guidelines were applicable to guide this patients treatment plan.  Anton Mcgrath MD PhD

## 2025-01-23 ENCOUNTER — APPOINTMENT (OUTPATIENT)
Dept: RADIOLOGY | Facility: CLINIC | Age: 50
End: 2025-01-23
Payer: COMMERCIAL

## 2025-01-23 ENCOUNTER — HOSPITAL ENCOUNTER (OUTPATIENT)
Dept: RADIOLOGY | Facility: HOSPITAL | Age: 50
Discharge: HOME | End: 2025-01-23
Payer: COMMERCIAL

## 2025-01-23 ENCOUNTER — HOSPITAL ENCOUNTER (OUTPATIENT)
Dept: RADIATION ONCOLOGY | Facility: CLINIC | Age: 50
Setting detail: RADIATION/ONCOLOGY SERIES
Discharge: HOME | End: 2025-01-23
Payer: COMMERCIAL

## 2025-01-23 ENCOUNTER — HOSPITAL ENCOUNTER (OUTPATIENT)
Dept: RADIOLOGY | Facility: CLINIC | Age: 50
End: 2025-01-23
Payer: COMMERCIAL

## 2025-01-23 VITALS
WEIGHT: 106.7 LBS | OXYGEN SATURATION: 96 % | DIASTOLIC BLOOD PRESSURE: 76 MMHG | RESPIRATION RATE: 18 BRPM | HEART RATE: 94 BPM | SYSTOLIC BLOOD PRESSURE: 115 MMHG | BODY MASS INDEX: 17.73 KG/M2 | TEMPERATURE: 98.2 F

## 2025-01-23 DIAGNOSIS — C34.90 NSCLC METASTATIC TO BONE (MULTI): Primary | ICD-10-CM

## 2025-01-23 DIAGNOSIS — M89.9 LESION OF PARIETAL BONE: ICD-10-CM

## 2025-01-23 DIAGNOSIS — C79.51 SECONDARY MALIGNANT NEOPLASM OF BONE (MULTI): ICD-10-CM

## 2025-01-23 DIAGNOSIS — M89.8X5 LYTIC BONE LESION OF HIP: ICD-10-CM

## 2025-01-23 DIAGNOSIS — M89.9 LESION OF BONE OF CERVICAL SPINE: ICD-10-CM

## 2025-01-23 DIAGNOSIS — C79.51 NSCLC METASTATIC TO BONE (MULTI): Primary | ICD-10-CM

## 2025-01-23 PROCEDURE — 2550000001 HC RX 255 CONTRASTS: Performed by: INTERNAL MEDICINE

## 2025-01-23 PROCEDURE — 99215 OFFICE O/P EST HI 40 MIN: CPT | Performed by: INTERNAL MEDICINE

## 2025-01-23 PROCEDURE — A9575 INJ GADOTERATE MEGLUMI 0.1ML: HCPCS | Performed by: INTERNAL MEDICINE

## 2025-01-23 PROCEDURE — 72141 MRI NECK SPINE W/O DYE: CPT

## 2025-01-23 PROCEDURE — 70553 MRI BRAIN STEM W/O & W/DYE: CPT

## 2025-01-23 PROCEDURE — 72141 MRI NECK SPINE W/O DYE: CPT | Performed by: RADIOLOGY

## 2025-01-23 PROCEDURE — 70553 MRI BRAIN STEM W/O & W/DYE: CPT | Performed by: RADIOLOGY

## 2025-01-23 RX ORDER — GADOTERATE MEGLUMINE 376.9 MG/ML
0.2 INJECTION INTRAVENOUS
Status: COMPLETED | OUTPATIENT
Start: 2025-01-23 | End: 2025-01-23

## 2025-01-23 RX ADMIN — GADOTERATE MEGLUMINE 9 ML: 376.9 INJECTION INTRAVENOUS at 11:45

## 2025-01-23 ASSESSMENT — ENCOUNTER SYMPTOMS
CONSTIPATION: 1
BACK PAIN: 0
FREQUENCY: 0
SLEEP DISTURBANCE: 0
FLANK PAIN: 0
DIFFICULTY URINATING: 0
ABDOMINAL DISTENTION: 0
BLOOD IN STOOL: 0
MYALGIAS: 0
ABDOMINAL PAIN: 0
NEUROLOGICAL NEGATIVE: 1
ENDOCRINE NEGATIVE: 1
CONFUSION: 0
CARDIOVASCULAR NEGATIVE: 1
ARTHRALGIAS: 1
FEVER: 0
DEPRESSION: 1
CHILLS: 0
HEMOPTYSIS: 0
DIAPHORESIS: 0
COUGH: 1
EYE PROBLEMS: 0
DECREASED CONCENTRATION: 1
SCLERAL ICTERUS: 0
DIARRHEA: 1
HEMATURIA: 0
HEMATOLOGIC/LYMPHATIC NEGATIVE: 1
NECK STIFFNESS: 0
RECTAL PAIN: 0
NAUSEA: 0
VOMITING: 0
NECK PAIN: 0
NERVOUS/ANXIOUS: 0
DYSURIA: 0
UNEXPECTED WEIGHT CHANGE: 1
CHEST TIGHTNESS: 0
SHORTNESS OF BREATH: 1
WHEEZING: 0
APPETITE CHANGE: 1
FATIGUE: 1

## 2025-01-23 ASSESSMENT — PAIN SCALES - GENERAL: PAINLEVEL_OUTOF10: 8

## 2025-01-23 NOTE — PROGRESS NOTES
Radiation Oncology Nursing Note    Pain: The patient's current pain level was assessed.  They report currently having a pain of 8 out of 10.  They feel their pain is under control with the use of pain medications.    Review of Systems:  Review of Systems   Constitutional:  Positive for appetite change (low appetite / supplements / appetite stimulant), fatigue (very tired, sleeps between personal care / sleeping better at night) and unexpected weight change (gained). Negative for chills, diaphoresis and fever.   Eyes:  Negative for eye problems and icterus.        Notes a discoloration of vision at times that resolves quickly   Respiratory:  Positive for cough (dry) and shortness of breath (without  O2,and excertion). Negative for chest tightness, hemoptysis and wheezing.         Oxygen 2L   Cardiovascular: Negative.    Gastrointestinal:  Positive for constipation (pain meds contributing) and diarrhea. Negative for abdominal distention, abdominal pain, blood in stool, nausea, rectal pain and vomiting.   Endocrine: Negative.    Genitourinary:  Positive for nocturia (q3 hrs nightly). Negative for bladder incontinence, difficulty urinating, dyspareunia, dysuria, frequency, hematuria, pelvic pain and penile discharge.    Musculoskeletal:  Positive for arthralgias (arms, hand, back , shoulders). Negative for back pain, flank pain, myalgias, neck pain and neck stiffness.        JRA patient   Skin: Negative.    Neurological: Negative.    Hematological: Negative.    Psychiatric/Behavioral:  Positive for decreased concentration and depression. Negative for confusion, sleep disturbance and suicidal ideas. The patient is not nervous/anxious.

## 2025-01-24 DIAGNOSIS — M89.9 LESION OF BONE OF CERVICAL SPINE: ICD-10-CM

## 2025-01-24 DIAGNOSIS — C79.51 NSCLC METASTATIC TO BONE (MULTI): Primary | ICD-10-CM

## 2025-01-24 DIAGNOSIS — C34.90 NSCLC METASTATIC TO BONE (MULTI): Primary | ICD-10-CM

## 2025-01-24 PROBLEM — M89.8X5 LYTIC BONE LESION OF HIP: Status: ACTIVE | Noted: 2025-01-24

## 2025-01-29 ENCOUNTER — APPOINTMENT (OUTPATIENT)
Dept: HEMATOLOGY/ONCOLOGY | Facility: HOSPITAL | Age: 50
End: 2025-01-29
Payer: COMMERCIAL

## 2025-01-29 NOTE — PROGRESS NOTES
Radiation Oncology Follow-Up    Patient Name:  Jovon Mccartney  MRN:  [id number]  :  1975    Referring Provider: Anton Mcgrath MD PhD  Primary Care Provider: ADELINA Hernandez  Care Team: Patient Care Team:  ADELINA Hernandez as PCP - General (Gerontology)  Liset Vargas MD as On Call Attending Physician (Hematology and Oncology)  Anton Mcgrath MD PhD as Radiation Oncologist (Radiation Oncology)    Date of Service: 2025    SUBJECTIVE  History of Present Illness:  Jovon Mccartney is a 49 y.o. 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  He is status post palliative radiation therapy to the C-spine, right scalp, and right shoulder, delivering a total of 20 Gray in 5 fractions and completed on 10/17/2024.  The patient has received on 2020 for third cycle of carboplatin (AUC 5 decreased with AUC 4), pemetrexed (20 mg/m² decreased to 400 mg/m²), and pembrolizumab.    I have met with Mr. Garcia last week to discuss the most recent findings on the surveillance CAT scan, and we decided on proceeding with treating palliatively the right anterior acetabulum.  On the same day, the patient had underwent brain MRI and MRI of the cervical spine as follow-up.  I have discussed these cases yesterday with the brain and spine tumor board.  The consensus was to consider FET PET and proceed with close surveillance.  The patient is scheduled to undergo CT SIM for the left femur palliative radiation today.    During today's interview, I have explained to the patient the consensus of the tumor board.  I have also went with him over the overall diagnosis and relayed to him my previous communication with Dr. Vargas.  We spoke about the fact that while his disease is controlled in the chest, it seems to be not controlled outside the chest and particularly over the axial  skeleton.  Taking into account our communication and the patient's preference, and the opinion of Dr. Vargas, the patient will remain on his current systemic therapy and we will be doing close surveillance to the bone disease and consider aggressive local therapy.    We then went today again over the MRI images and explained the presence of the bone lesion that has nearly doubled in size over the right middle cranial fossa.  The patient and his son asked to take pictures of his radiological images.  The patient denied any right jaw pain, he denied any right headache, he denied any right vision problems.      Treatment Rendered:   3D CRT: Right Scalp, Right Scapula, Not Applicable Cervical spine    Treatment Period Technique Fraction Dose Fractions Total Dose   Course 1 10/10/2024-10/17/2024  (days elapsed: 7)         C_Spine 10/10/2024-10/17/2024 3D 400 / 400 cGy 5 / 5 2000 / 2,000 cGy         R_Scalp 10/10/2024-10/17/2024 3D 400 / 400 cGy 5 / 5 2000 / 2,000 cGy         R_Shoulder 10/10/2024-10/17/2024 3D 400 / 400 cGy 5 / 5 2000 / 2,000 cGy           Performance Status:   The Karnofsky performance scale today is 60, Requires occasional assistance, but is able to care for most of his personal needs (ECOG equivalent 2).          ASSESSMENT:   Jovon Mccartney is a 49 y.o. 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  He is status post palliative radiation therapy to the C-spine, right scalp, and right shoulder, delivering a total of 20 Gray in 5 fractions and completed on 10/17/2024.  The patient has received on 12/9/2020 for third cycle of carboplatin (AUC 5 decreased with AUC 4), pemetrexed (20 mg/m² decreased to 400 mg/m²), and pembrolizumab.    With her current systemic therapy, disease seems to be well-controlled over the chest, but the patient has unfortunately skeletal  progression.  His case was printed and the CNS tumor board, and the overall treatment plan scheme was finalized with medical oncology, keeping the patient on the current systemic therapy, and pursuing aggressive local treatment.  We will therefore proceed with a CT SIM today of the right hip.  We will also acquire a CT SIM of the brain in order to plan an SBRT to the right middle cranial fossa bone lesion    We informed the patient that the role of RT in the current clinical context is palliation and not cure. We also informed him about the possible side effects of BRT, which include radiation dermatitis, that can manifest with redness, and rarely skin necrosis, hair loss. We also informed him about the possible side effects of nausea, vomiting, and fatigue. We also informed him about the possibility of short term memory loss, in addition to decrease in cognitive function.  We also spoke about the side effect of having optic neuritis, headache, seizure, and bleeding.    The patient and his son understood the benefits and side effects of RT as outlined above. They asked questions that revealed understanding and their questions were thoroughly answered. Patient elected to proceed with RT and signed the consent form.       IDENTIFYING DATA:   Cancer Staging   NSCLC metastatic to bone (Multi)  Staging form: Lung, AJCC 8th Edition  - Clinical stage from 9/26/2024: Stage IVB (cT4, cN3, cM1c) - Signed by Anton Mcgrath MD PhD on 9/26/2024    Problem List Items Addressed This Visit       NSCLC metastatic to bone (Multi) - Primary    Relevant Orders    Rad Onc Intent to Treat    NM PET CT FET brain      PLAN:    [ ] CT SIM today for SBRT of the right middle cranial fossa bone lesion.  [ ] Repeat MRI of the brain and the cervical spine in 2-month instead of 3-month.  [ ] Ordered a flatplate of the brain to be performed at the time of the MRI.  [ ] Patient to follow-up manage in my clinic 1 week after the acquisitions of the  2-month MRI infected.    NCCN Guidelines were applicable to guide this patients treatment plan.  Anton Mcgrath MD PhD

## 2025-01-30 ENCOUNTER — HOSPITAL ENCOUNTER (OUTPATIENT)
Dept: RADIOLOGY | Facility: EXTERNAL LOCATION | Age: 50
Discharge: HOME | End: 2025-01-30

## 2025-01-30 ENCOUNTER — HOSPITAL ENCOUNTER (OUTPATIENT)
Dept: RADIATION ONCOLOGY | Facility: CLINIC | Age: 50
Setting detail: RADIATION/ONCOLOGY SERIES
Discharge: HOME | End: 2025-01-30
Payer: COMMERCIAL

## 2025-01-30 ENCOUNTER — APPOINTMENT (OUTPATIENT)
Dept: RADIATION ONCOLOGY | Facility: CLINIC | Age: 50
End: 2025-01-30
Payer: COMMERCIAL

## 2025-01-30 VITALS
OXYGEN SATURATION: 91 % | DIASTOLIC BLOOD PRESSURE: 72 MMHG | TEMPERATURE: 99 F | WEIGHT: 100.97 LBS | RESPIRATION RATE: 18 BRPM | HEART RATE: 104 BPM | SYSTOLIC BLOOD PRESSURE: 118 MMHG | BODY MASS INDEX: 16.78 KG/M2

## 2025-01-30 DIAGNOSIS — C34.90 NSCLC METASTATIC TO BONE (MULTI): Primary | ICD-10-CM

## 2025-01-30 DIAGNOSIS — C79.51 SECONDARY MALIGNANT NEOPLASM OF BONE AND BONE MARROW: ICD-10-CM

## 2025-01-30 DIAGNOSIS — C79.51 NSCLC METASTATIC TO BONE (MULTI): Primary | ICD-10-CM

## 2025-01-30 DIAGNOSIS — C79.52 SECONDARY MALIGNANT NEOPLASM OF BONE AND BONE MARROW: ICD-10-CM

## 2025-01-30 ASSESSMENT — PAIN SCALES - GENERAL: PAINLEVEL_OUTOF10: 7

## 2025-01-30 NOTE — TUMOR BOARD NOTE
MULTIDISCIPLINARY TUMOR BOARD CONFERENCE NOTE  Jovon Mccartney was presented at the Tumor Board Conference  Conference date: 1/29/2025  Presenting Provider(s): Anton Mcgrath  Present at Conference: Medical Oncology, Radiation Oncology, Surgical Oncology, Radiology, and Pathology Representatives  Conference Review Type: Radiology Review and Treatment Plan Review     Impression:  Jovon Mccartney is a 49 y.o. 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  He is status post palliative radiation therapy to the C-spine, right scalp, and right shoulder, delivering a total of 20 Gray in 5 fractions and completed on 10/17/2024.  The patient has received on 12/9/2020 for third cycle of carboplatin (AUC 5 decreased with AUC 4), pemetrexed (20 mg/m² decreased to 400 mg/m²), and pembrolizumab.     Patient is presenting with radiological evidence of disease progression throughout the axial skeleton, including the brain and the C-spine.      National Guidelines discussed: Yes  Recommendations:    Referral Recommendations:  Repeat MRI of the brain in 2 months.  Repeat MRI of the cervical spine in 3 months.  Consider facet that most of the brain.  Local therapy for the right middle cranial fossa lesion.      Cancer Staging:  Cancer Staging   NSCLC metastatic to bone (Multi)  Staging form: Lung, AJCC 8th Edition  - Clinical stage from 9/26/2024: Stage IVB (cT4, cN3, cM1c) - Signed by Anton Mcgrath MD PhD on 9/26/2024       Disclaimer  [facility] tumor board recommendations represent the consensus opinion of physicians present at a weekly patient care conference. The treating [facility] physician is not always present, and many of the physicians formulating the recommendation have not personally seen or examined the patient under discussion. It is understood that the treating [facility] physician considers the  expertise of the Tumor Board Recommendation in formulating his/her plan for the patient. However, in many situations, based on individualized patient considerations, a different plan is determined by the treating physician to be the optimal medical management.

## 2025-02-03 ASSESSMENT — ENCOUNTER SYMPTOMS
WHEEZING: 1
ARTHRALGIAS: 1
MYALGIAS: 1
SHORTNESS OF BREATH: 1

## 2025-02-03 NOTE — PROGRESS NOTES
.   Patient: Jovon Mccartney    [id number]  : 1975 -- AGE 49 y.o.    Provider: Melina Chacko CNP     Location [facility]   Service Date: 2025              [facility]  New Visit Note      HISTORY OF PRESENT ILLNESS     The patient's referring provider is: No ref. provider found    HISTORY OF PRESENT ILLNESS   Jovon Mccartney is a 49 y.o. male who presents to a [facility] for a pulmonary evaluation with NSCLC with bone involvement.  He is a former smoker. I have independently interviewed and examined the patient in the office and reviewed available records.    Current History      Patient was recently admitted early this month with worsening headache and intermittent neck pain for 2 months. , MRI brain/C-spine showed ill-defined enhancing lesion within right parietal occipital bone with dural thickening and mass effect upon right occipital lobe without midline shift as well as ill-defined marrow replacing lesions within C2 and C3 vertebral bodies, constellation of findings suggestive of metastatic disease or multiple myeloma.  CT  showed amorphous soft tissue centered at the right hilum encompassing the RUL bronchus and inseparable from the right main pulmonary artery suspicious for bronchogenic carcinoma, with mildly enlarged mediastinal lymph nodes, multiple lytic lesions of R scapula and bilateral humeral head. S/p bronchoscopy on 2024 for Lung Mass with brain and bone metastasis which revealed Airway stenosis, of bronchus intermedius and RLL and RML segments. Pathology positive for adenocarcinoma with the malignant cells are positive for TTF-1 and napsin A, and negative for p40     Smoked 1 ppd x 39 years, stopped when he was admitted 2024     On today's visit, the patient reports has dyspnea on exertion, but none at rest. Symptoms started many years ago, but has mostly been stable. He is only troubled by  breathlessness except on strenuous exercise (mMRC 0).  He is active as he can with juvenile arthritis, since discharge he is feeling weaker. Has shoulder and bicep pain.    Denies orthopnea, pnd, or mat.  Weight has been mostly stable.  Relates  chronic dry cough, C/o wheezing, and no sputum. No night cough. No hemoptysis. No fever or shivering chills. Has throat clearing. Denies chest pain or heartburn.     Previous pulmonary history:  no history of recurrent infections, or lung disease as a child.  No previous lung hx, never on oxygen or inhaler therapy. He was hospitalized with asthmatic bronchitis as a child, then resolved     Inhalers/nebulized medications: combivent - has used twice     Hospitalization History: Not been hospitalized over the last year for breathing related problem.    Sleep history:  Complains of snoring, apnea, feeling tired during the day, and taking naps during the day.     Current History 10/23/2024    On today's visit, the patient reports Occ having shortness of breath with walking in the hospital after a couple minutes,  In the am has cough clears clear sputum. C/o wheezing. He is using Breztri once a day.   He was using PRN. No night time cough. Not using combivent .   Denies fever/chills, chest pain or GERD   No ER visits   Completed radiation therapy - starting chemotherapy soon.     Current History 11/20/2024    On today's visit, the patient reports he started chemo and ready to start round #2. He had some anemia.  Felt better than he previously felt. Denies cough or phlegm, no hemoptysis. He is using O2 at 2 L and sleeping with O2 at night.  His pulse at home is 94% with 2L   He is short of breath with 1 flight of stairs.  He is SANDERSON with 10-15 min.    Denies Fevers/chills or chest pain  Denies GERD  Denies runny nose or post nasal drip   Using Breztri daily. Has not used albuterol at all.     Denies Night time cough       Current History 2/12/2025    continue on maintenance  pemetrexed/pembrolizumab       On today's visit, the patient reports no ER or urgent care visits. He is starting radiation neck shoulder and pelvis.    Breathing is ok. If he exerts he gets short of breath. If he removes his oxygen he is 88% at home, but on 2 L he is 96%. Denies coughing fever or chills.   He wheezes if he over exerts himself.   No respiratory illness or chest tightness or heartburn  Denies dizziness. His BP is low but asymptomatic. He has been eating and drinking ok. He is on protein shakes 1400 calories, His weight increased   No night time symptoms.   Using 2L at night  Not required rescue inhaler   Encouraged oral hydration today       ALLERGIES AND MEDICATIONS     ALLERGIES  No Known Allergies    MEDICATIONS  Current Outpatient Medications   Medication Sig Dispense Refill    acetaminophen (Tylenol) 500 mg tablet Take 1 tablet (500 mg) by mouth every 6 hours if needed for mild pain (1 - 3).      b complex 0.4 mg tablet Take 1 tablet by mouth once daily.      budesonide-glycopyr-formoterol (BREZTRI) 160-9-4.8 mcg/actuation HFA aerosol inhaler Inhale 2 puffs 2 times a day. 10.7 g 3    dexAMETHasone (Decadron) 4 mg tablet Take 4 mg (1 tablet) by mouth twice daily the day before treatment, once the evening of treatment, and twice daily the day after treatment. 5 tablet 5    folic acid (Folvite) 1 mg tablet Take 1 tablet (1,000 mcg) by mouth once daily. Do not start before October 27, 2024. 30 tablet 11    folic acid (Folvite) 1 mg tablet Take 1 tablet (1,000 mcg) by mouth once daily. 30 tablet 11    ibuprofen 200 mg tablet Take 2 tablets (400 mg) by mouth every 6 hours if needed for mild pain (1 - 3).      ipratropium-albuteroL (Combivent Respimat)  mcg/actuation inhaler Inhale 2 puffs 4 times a day as needed for shortness of breath or wheezing.      mirtazapine (Remeron) 15 mg tablet Take 1 tablet (15 mg) by mouth once daily at bedtime. 30 tablet 2    morphine CR (MS Contin) 30 mg 12 hr  tablet Take 1 tablet (30 mg) by mouth 2 times a day AND 2 tablets (60 mg) once daily at bedtime. Do not crush, chew, or split. Take 30 MG AM, 30 MG MID DAY and 60 MG at BEDTIME.. 120 tablet 0    naloxone (Narcan) 4 mg/0.1 mL nasal spray Administer 1 spray (4 mg) into affected nostril(s) if needed for opioid reversal. May repeat every 2-3 minutes if needed, alternating nostrils, until medical assistance becomes available. 2 each 3    ondansetron (Zofran) 8 mg tablet Take 1 tablet (8 mg) by mouth every 8 hours if needed for nausea or vomiting. Do not fill before October 27, 2024. 30 tablet 5    ondansetron (Zofran) 8 mg tablet Take 1 tablet (8 mg) by mouth every 8 hours if needed for nausea or vomiting. 30 tablet 5    oxyCODONE (Roxicodone) 10 mg immediate release tablet Take 1-1.5 tablets (10-15 mg) by mouth every 4 hours if needed for moderate pain (4 - 6) or severe pain (7 - 10). MAX 9 TABS PER  tablet 0    oxygen (O2) gas therapy Inhale 1 each once every 24 hours.      pantoprazole (ProtoNix) 40 mg EC tablet Take 1 tablet (40 mg) by mouth once daily in the morning. Take before meals. Do not crush, chew, or split. 30 tablet 3    pilocarpine (Salagen, pilocarpine,) 5 mg tablet Take 1 tablet (5 mg) by mouth 3 times a day as needed (for dry mouth). 90 tablet 11    polyethylene glycol (Glycolax, Miralax) 17 gram/dose powder Mix 17 g of powder and drink once daily.      prochlorperazine (Compazine) 10 mg tablet Take 1 tablet (10 mg) by mouth every 6 hours if needed for nausea or vomiting. Do not fill before October 27, 2024. 30 tablet 5    prochlorperazine (Compazine) 10 mg tablet Take 1 tablet (10 mg) by mouth every 6 hours if needed for nausea or vomiting. 30 tablet 5    gabapentin (Neurontin) 300 mg capsule Take 1 capsule (300 mg) by mouth 3 times a day. 90 capsule 3     No current facility-administered medications for this visit.         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     PAST SURGICAL HISTORY  Past Surgical History:   Procedure Laterality Date    BRONCHOSCOPY  9 16 2024    LUNG BIOPSY  9 16 2024       IMMUNIZATION HISTORY    There is no immunization history on file for this patient.    SOCIAL HISTORY  He  reports that he quit smoking about 5 months ago. His smoking use included cigarettes. He has a 58.5 pack-year smoking history. He has been exposed to tobacco smoke. He has never used smokeless tobacco. He reports that he does not currently use alcohol. He reports that he does not currently use drugs. He Patient  Smoked 1 ppd x 39 years, stopped when he was admitted 9/9/2024     OCCUPATIONAL/ENVIRONMENTAL HISTORY  Currently works as: disabled   DOES/DOES NOT EC: does have possible known exposure to asbestos, but not to silica, beryllium or inhaled metals. Exposure to a board that had asbestos   DOES/DOES NOT EC: does have exposure to birds or exotic animals. Had  cockatoo x 14 years     FAMILY HISTORY  Family History   Problem Relation Name Age of Onset    Breast cancer Mother JORDY CHURCH     Cancer Mother JORDY CHURCH     Miscarriages / Stillbirths Mother JORDY CHURCH     Cancer Maternal Grandfather HUBER PAL     Stroke Maternal Grandfather HUBER PAL      DOES/DOES NOT EC: does not have a family history of pulmonary disease.  DOES/DOES NOT EC: does have a family history of cancer. Maternal grandmother - bone cancer, mother breast cancer, paternal cousin lung cancer   DOES/DOES NOT EC: does have a family history of autoimmune disorders. He has RA    RESULTS/DATA     Pulmonary Function Test Results         Complete Pulmonary Function Test Pre/Post Bronchodialator (Spirometry Pre/Post/DLCO/Lung Volumes)  10/16/2024  Status: Final result     Study Result    Narrative & Impression   The FEV1/FVC (0.51) is reduced. The FEV1 (1.88L/60%) is moderately reduced. The FVC is normal. Following administration of bronchodilators, there is no significant response. The TLC by body plethysmography is normal. The DLCO (47%)  is severely reduced; however, the diffusing capacity was not corrected for the patient's hemoglobin. The airways resistance is normal. FeNO was 21 ppb.  Conclusion: Reduced FEV1/FVC with a normal FVC indicates a moderate airflow obstructive defect. Lung volumes are consistent with hyperinflation. DLCO values are consistent with pulmonary vascular impairment, emphysema with preserved lung volume or anemia.     Scans on Order [id number]      Pulmonary Function Test - Scan on 10/16/2024 10:58 PM                            Chest Radiograph     No testing done     Chest CT Scan     9/15/2024 CT chest    IMPRESSION:  1. Masslike enhancing soft tissue thickening within the right hilar  region which encases the right mainstem bronchus and abuts the right  main pulmonary artery, suspicious for neoplasm. This soft tissue  thickening additionally extends along the interlobular bronchials.  Further evaluation with tissue sampling or PET-CT is recommended.  2. Nodular thickening of the right pleura is suspicious for disease  with possible direct lymphangitic carcinomatosis as well in the right  infrahilar region.  3. Few prominent mediastinal lymph nodes are described above.  4. Permeative appearing lesion involving the right scapula is  suspicious for metastatic disease.  5. Abdominal findings include too small to characterize hypodense  lesions in the liver as well as adrenal thickening, potentially  adenomas. Further characterization by abdominal MRI is recommended  given the thoracic findings.  6. Moderate pericardial effusion.  7. Severe paraseptal emphysematous changes predominantly affecting  the lung apices.     "    Echocardiogram     No testing done          REVIEW OF SYSTEMS     REVIEW OF SYSTEMS  Review of Systems   Respiratory:  Positive for shortness of breath and wheezing.    Musculoskeletal:  Positive for arthralgias, gait problem and myalgias.         PHYSICAL EXAM     VITAL SIGNS: BP (!) 93/44   Pulse 102   Resp 18   Ht 1.676 m (5' 6\")   Wt 49.2 kg (108 lb 6.4 oz)   SpO2 90% Comment: 2L  BMI 17.50 kg/m²  BP (!) 93/44   Pulse 102   Resp 18   Ht 1.676 m (5' 6\")   Wt 49.2 kg (108 lb 6.4 oz)   SpO2 90% Comment: 2L  BMI 17.50 kg/m²      CURRENT WEIGHT: [unfilled]  BMI: [unfilled]  PREVIOUS WEIGHTS:  Wt Readings from Last 3 Encounters:   02/12/25 49.2 kg (108 lb 6.4 oz)   02/10/25 46.9 kg (103 lb 6.4 oz)   01/30/25 45.8 kg (100 lb 15.5 oz)       Physical Exam  Constitutional:       Appearance: Normal appearance.   HENT:      Head: Normocephalic and atraumatic.      Right Ear: External ear normal.      Left Ear: External ear normal.      Nose: Nose normal.      Mouth/Throat:      Mouth: Mucous membranes are moist.      Pharynx: Oropharynx is clear.   Eyes:      Extraocular Movements: Extraocular movements intact.      Conjunctiva/sclera: Conjunctivae normal.      Pupils: Pupils are equal, round, and reactive to light.   Cardiovascular:      Rate and Rhythm: Normal rate and regular rhythm.      Pulses: Normal pulses.      Heart sounds: Normal heart sounds.   Pulmonary:      Effort: Pulmonary effort is normal.      Breath sounds: Normal breath sounds.   Abdominal:      General: Bowel sounds are normal.      Palpations: Abdomen is soft.   Musculoskeletal:         General: Normal range of motion.      Cervical back: Normal range of motion and neck supple.      Comments: Bilateral lower leg braces    Skin:     General: Skin is warm and dry.   Neurological:      General: No focal deficit present.      Mental Status: He is alert and oriented to person, place, and time. Mental status is at baseline.   Psychiatric:         " Mood and Affect: Mood normal.         Behavior: Behavior normal.         Thought Content: Thought content normal.         Judgment: Judgment normal.         ASSESSMENT/PLAN     Mr. Mccartney is a 49 y.o. 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 He was referred to the [facility] for evaluation of NSCLC with bone involve[ment and COPD    Problem List and Orders      Assessment and Plan / Recommendations:  Problem List Items Addressed This Visit    None          COPD with MMRC 2  PFTs with moderate airflow obstruction, lung volumes with hyperinflation, diffusion capacity reduced FEV1 60%    cont  ICS/LABA/LAMA breztri 2 puffs twice a day, rinse after use, discussed using twice a day daily   - cont combivent   - albuterol hfa 2 puffs or albuterol nebs every 4-6 hours as needed  - now with anemia may contribute dyspnea       Hypoxia multifactorial with anemia and COPD: Patient maintained on 2L -     Oncology - NSCLC - has followup with Oncology and Radiation Oncology     Thank you for visiting the Pulmonary clinic today!     Return to clinic  3 months or  sooner if needed   Melina Chacko CNP  My office number is [phone] -     Call to schedule  for radiology - CT scans/PFTs etc at  [phone]  General scheduling  [phone]     Best way to get a hold of me is to call my office --> Please do not send me follow my health messages  Any test results will be discussed at next visit -- please make sure to make a follow up appt after testing.

## 2025-02-04 ENCOUNTER — TELEPHONE (OUTPATIENT)
Dept: PALLIATIVE MEDICINE | Facility: HOSPITAL | Age: 50
End: 2025-02-04
Payer: COMMERCIAL

## 2025-02-04 DIAGNOSIS — G89.3 CANCER RELATED PAIN: ICD-10-CM

## 2025-02-04 RX ORDER — MORPHINE SULFATE 30 MG/1
TABLET, FILM COATED, EXTENDED RELEASE ORAL
Qty: 120 TABLET | Refills: 0 | Status: SHIPPED | OUTPATIENT
Start: 2025-02-04 | End: 2025-03-06

## 2025-02-04 NOTE — TELEPHONE ENCOUNTER
OARRS reviewed and no concerns noted. Per last visit with Tati Rosa NP  on 1/21/25, patient should taking MS CONTIN 30,30,60 . F/U visit scheduled for 2/10/25. Refills sent to provider for review/approval . Patient updated.

## 2025-02-06 ENCOUNTER — DOCUMENTATION (OUTPATIENT)
Dept: PALLIATIVE MEDICINE | Facility: HOSPITAL | Age: 50
End: 2025-02-06
Payer: COMMERCIAL

## 2025-02-06 NOTE — PROGRESS NOTES
Per [facility], PA has been obtained for the MS CONTIN. This will affect future fills .

## 2025-02-06 NOTE — PROGRESS NOTES
"Received an email from [employer] stating that the MS CONTIN has \"quantity limits\"  A 30 day supply was filled earlier this week but future fills will not be covered. Request for PA sent to [facility], awaiting response.   "

## 2025-02-10 ENCOUNTER — OFFICE VISIT (OUTPATIENT)
Dept: HEMATOLOGY/ONCOLOGY | Facility: CLINIC | Age: 50
End: 2025-02-10
Payer: COMMERCIAL

## 2025-02-10 ENCOUNTER — INFUSION (OUTPATIENT)
Dept: HEMATOLOGY/ONCOLOGY | Facility: CLINIC | Age: 50
End: 2025-02-10
Payer: COMMERCIAL

## 2025-02-10 ENCOUNTER — OFFICE VISIT (OUTPATIENT)
Dept: PALLIATIVE MEDICINE | Facility: CLINIC | Age: 50
End: 2025-02-10
Payer: COMMERCIAL

## 2025-02-10 ENCOUNTER — LAB (OUTPATIENT)
Dept: LAB | Facility: CLINIC | Age: 50
End: 2025-02-10
Payer: COMMERCIAL

## 2025-02-10 VITALS — BODY MASS INDEX: 17.19 KG/M2 | HEIGHT: 65 IN

## 2025-02-10 VITALS
WEIGHT: 103.4 LBS | OXYGEN SATURATION: 95 % | TEMPERATURE: 99.9 F | HEART RATE: 109 BPM | SYSTOLIC BLOOD PRESSURE: 122 MMHG | RESPIRATION RATE: 18 BRPM | BODY MASS INDEX: 17.19 KG/M2 | DIASTOLIC BLOOD PRESSURE: 79 MMHG

## 2025-02-10 DIAGNOSIS — Z51.11 ENCOUNTER FOR ANTINEOPLASTIC CHEMOTHERAPY AND IMMUNOTHERAPY: Primary | ICD-10-CM

## 2025-02-10 DIAGNOSIS — C79.51 NSCLC METASTATIC TO BONE (MULTI): ICD-10-CM

## 2025-02-10 DIAGNOSIS — C34.90 NSCLC METASTATIC TO BONE (MULTI): ICD-10-CM

## 2025-02-10 DIAGNOSIS — Z51.12 ENCOUNTER FOR ANTINEOPLASTIC CHEMOTHERAPY AND IMMUNOTHERAPY: Primary | ICD-10-CM

## 2025-02-10 DIAGNOSIS — Z51.5 PALLIATIVE CARE ENCOUNTER: Primary | ICD-10-CM

## 2025-02-10 LAB
ALBUMIN SERPL BCP-MCNC: 3.3 G/DL (ref 3.4–5)
ALP SERPL-CCNC: 113 U/L (ref 33–120)
ALT SERPL W P-5'-P-CCNC: 13 U/L (ref 10–52)
ANION GAP SERPL CALC-SCNC: 13 MMOL/L (ref 10–20)
AST SERPL W P-5'-P-CCNC: 15 U/L (ref 9–39)
BASOPHILS # BLD AUTO: 0.01 X10*3/UL (ref 0–0.1)
BASOPHILS NFR BLD AUTO: 0.1 %
BILIRUB SERPL-MCNC: 0.3 MG/DL (ref 0–1.2)
BUN SERPL-MCNC: 10 MG/DL (ref 6–23)
CALCIUM SERPL-MCNC: 9.3 MG/DL (ref 8.6–10.3)
CHLORIDE SERPL-SCNC: 99 MMOL/L (ref 98–107)
CO2 SERPL-SCNC: 27 MMOL/L (ref 21–32)
CREAT SERPL-MCNC: 0.55 MG/DL (ref 0.5–1.3)
EGFRCR SERPLBLD CKD-EPI 2021: >90 ML/MIN/1.73M*2
EOSINOPHIL # BLD AUTO: 0.05 X10*3/UL (ref 0–0.7)
EOSINOPHIL NFR BLD AUTO: 0.7 %
ERYTHROCYTE [DISTWIDTH] IN BLOOD BY AUTOMATED COUNT: 17.6 % (ref 11.5–14.5)
GLUCOSE SERPL-MCNC: 164 MG/DL (ref 74–99)
HCT VFR BLD AUTO: 26.1 % (ref 41–52)
HGB BLD-MCNC: 7.9 G/DL (ref 13.5–17.5)
IMM GRANULOCYTES # BLD AUTO: 0.03 X10*3/UL (ref 0–0.7)
IMM GRANULOCYTES NFR BLD AUTO: 0.4 % (ref 0–0.9)
LYMPHOCYTES # BLD AUTO: 0.88 X10*3/UL (ref 1.2–4.8)
LYMPHOCYTES NFR BLD AUTO: 11.9 %
MCH RBC QN AUTO: 29.2 PG (ref 26–34)
MCHC RBC AUTO-ENTMCNC: 30.3 G/DL (ref 32–36)
MCV RBC AUTO: 96 FL (ref 80–100)
MONOCYTES # BLD AUTO: 0.84 X10*3/UL (ref 0.1–1)
MONOCYTES NFR BLD AUTO: 11.4 %
NEUTROPHILS # BLD AUTO: 5.59 X10*3/UL (ref 1.2–7.7)
NEUTROPHILS NFR BLD AUTO: 75.5 %
NRBC BLD-RTO: ABNORMAL /100{WBCS}
PLATELET # BLD AUTO: 552 X10*3/UL (ref 150–450)
POTASSIUM SERPL-SCNC: 3.9 MMOL/L (ref 3.5–5.3)
PROT SERPL-MCNC: 7.2 G/DL (ref 6.4–8.2)
RBC # BLD AUTO: 2.71 X10*6/UL (ref 4.5–5.9)
SODIUM SERPL-SCNC: 135 MMOL/L (ref 136–145)
WBC # BLD AUTO: 7.4 X10*3/UL (ref 4.4–11.3)

## 2025-02-10 PROCEDURE — 36415 COLL VENOUS BLD VENIPUNCTURE: CPT

## 2025-02-10 PROCEDURE — 1036F TOBACCO NON-USER: CPT

## 2025-02-10 PROCEDURE — 2500000004 HC RX 250 GENERAL PHARMACY W/ HCPCS (ALT 636 FOR OP/ED): Performed by: NURSE PRACTITIONER

## 2025-02-10 PROCEDURE — 99215 OFFICE O/P EST HI 40 MIN: CPT | Mod: 25,27 | Performed by: NURSE PRACTITIONER

## 2025-02-10 PROCEDURE — 96411 CHEMO IV PUSH ADDL DRUG: CPT

## 2025-02-10 PROCEDURE — 85025 COMPLETE CBC W/AUTO DIFF WBC: CPT

## 2025-02-10 PROCEDURE — G2211 COMPLEX E/M VISIT ADD ON: HCPCS | Performed by: NURSE PRACTITIONER

## 2025-02-10 PROCEDURE — 80053 COMPREHEN METABOLIC PANEL: CPT

## 2025-02-10 PROCEDURE — 99215 OFFICE O/P EST HI 40 MIN: CPT | Performed by: NURSE PRACTITIONER

## 2025-02-10 PROCEDURE — 99213 OFFICE O/P EST LOW 20 MIN: CPT

## 2025-02-10 PROCEDURE — 96375 TX/PRO/DX INJ NEW DRUG ADDON: CPT | Mod: INF

## 2025-02-10 PROCEDURE — 96413 CHEMO IV INFUSION 1 HR: CPT

## 2025-02-10 PROCEDURE — 99213 OFFICE O/P EST LOW 20 MIN: CPT | Mod: 25

## 2025-02-10 PROCEDURE — 1036F TOBACCO NON-USER: CPT | Performed by: NURSE PRACTITIONER

## 2025-02-10 PROCEDURE — 2500000004 HC RX 250 GENERAL PHARMACY W/ HCPCS (ALT 636 FOR OP/ED): Mod: JZ,TB | Performed by: NURSE PRACTITIONER

## 2025-02-10 RX ORDER — EPINEPHRINE 0.3 MG/.3ML
0.3 INJECTION SUBCUTANEOUS EVERY 5 MIN PRN
Status: CANCELLED | OUTPATIENT
Start: 2025-02-10

## 2025-02-10 RX ORDER — DEXAMETHASONE SODIUM PHOSPHATE 10 MG/ML
10 INJECTION INTRAMUSCULAR; INTRAVENOUS ONCE
Status: COMPLETED | OUTPATIENT
Start: 2025-02-10 | End: 2025-02-10

## 2025-02-10 RX ORDER — ALBUTEROL SULFATE 0.83 MG/ML
3 SOLUTION RESPIRATORY (INHALATION) AS NEEDED
Status: CANCELLED | OUTPATIENT
Start: 2025-02-10

## 2025-02-10 RX ORDER — DEXAMETHASONE 4 MG/1
TABLET ORAL
Qty: 5 TABLET | Refills: 5 | Status: SHIPPED | OUTPATIENT
Start: 2025-02-10

## 2025-02-10 RX ORDER — DEXAMETHASONE SODIUM PHOSPHATE 10 MG/ML
10 INJECTION INTRAMUSCULAR; INTRAVENOUS ONCE
Status: CANCELLED | OUTPATIENT
Start: 2025-02-10

## 2025-02-10 RX ORDER — FAMOTIDINE 10 MG/ML
20 INJECTION INTRAVENOUS ONCE AS NEEDED
Status: DISCONTINUED | OUTPATIENT
Start: 2025-02-10 | End: 2025-02-10 | Stop reason: HOSPADM

## 2025-02-10 RX ORDER — PROCHLORPERAZINE MALEATE 10 MG
10 TABLET ORAL EVERY 6 HOURS PRN
Status: DISCONTINUED | OUTPATIENT
Start: 2025-02-10 | End: 2025-02-10 | Stop reason: HOSPADM

## 2025-02-10 RX ORDER — EPINEPHRINE 0.3 MG/.3ML
0.3 INJECTION SUBCUTANEOUS EVERY 5 MIN PRN
Status: DISCONTINUED | OUTPATIENT
Start: 2025-02-10 | End: 2025-02-10 | Stop reason: HOSPADM

## 2025-02-10 RX ORDER — ALBUTEROL SULFATE 0.83 MG/ML
3 SOLUTION RESPIRATORY (INHALATION) AS NEEDED
Status: DISCONTINUED | OUTPATIENT
Start: 2025-02-10 | End: 2025-02-10 | Stop reason: HOSPADM

## 2025-02-10 RX ORDER — PROCHLORPERAZINE EDISYLATE 5 MG/ML
10 INJECTION INTRAMUSCULAR; INTRAVENOUS EVERY 6 HOURS PRN
Status: CANCELLED | OUTPATIENT
Start: 2025-02-10

## 2025-02-10 RX ORDER — PROCHLORPERAZINE EDISYLATE 5 MG/ML
10 INJECTION INTRAMUSCULAR; INTRAVENOUS EVERY 6 HOURS PRN
Status: DISCONTINUED | OUTPATIENT
Start: 2025-02-10 | End: 2025-02-10 | Stop reason: HOSPADM

## 2025-02-10 RX ORDER — PROCHLORPERAZINE MALEATE 5 MG
10 TABLET ORAL EVERY 6 HOURS PRN
Status: CANCELLED | OUTPATIENT
Start: 2025-02-10

## 2025-02-10 RX ORDER — DIPHENHYDRAMINE HYDROCHLORIDE 50 MG/ML
50 INJECTION INTRAMUSCULAR; INTRAVENOUS AS NEEDED
Status: CANCELLED | OUTPATIENT
Start: 2025-02-10

## 2025-02-10 RX ORDER — DIPHENHYDRAMINE HYDROCHLORIDE 50 MG/ML
50 INJECTION INTRAMUSCULAR; INTRAVENOUS AS NEEDED
Status: DISCONTINUED | OUTPATIENT
Start: 2025-02-10 | End: 2025-02-10 | Stop reason: HOSPADM

## 2025-02-10 RX ORDER — FAMOTIDINE 10 MG/ML
20 INJECTION INTRAVENOUS ONCE AS NEEDED
Status: CANCELLED | OUTPATIENT
Start: 2025-02-10

## 2025-02-10 RX ADMIN — PEMETREXED DISODIUM 600 MG: 500 INJECTION, POWDER, LYOPHILIZED, FOR SOLUTION INTRAVENOUS at 16:07

## 2025-02-10 RX ADMIN — DEXAMETHASONE SODIUM PHOSPHATE 10 MG: 10 INJECTION INTRAMUSCULAR; INTRAVENOUS at 15:07

## 2025-02-10 RX ADMIN — SODIUM CHLORIDE 200 MG: 9 INJECTION, SOLUTION INTRAVENOUS at 15:31

## 2025-02-10 ASSESSMENT — ENCOUNTER SYMPTOMS
HEMOPTYSIS: 0
FATIGUE: 1
BACK PAIN: 1
SHORTNESS OF BREATH: 1
CARDIOVASCULAR NEGATIVE: 1
CONSTIPATION: 1
UNEXPECTED WEIGHT CHANGE: 0
DIARRHEA: 0
VOMITING: 0
APPETITE CHANGE: 0
NAUSEA: 0
MYALGIAS: 1
FEVER: 0

## 2025-02-10 ASSESSMENT — PAIN SCALES - GENERAL: PAINLEVEL_OUTOF10: 0-NO PAIN

## 2025-02-10 NOTE — SIGNIFICANT EVENT

## 2025-02-10 NOTE — PROGRESS NOTES
[facility] - Medical Oncology Follow-Up Visit    Patient ID: Jovon Mccartney is a 49 y.o. male with NSCLC adenocarcinoma     Current therapy: cyanocobalamin (Vitamin B-12) injection 1,000 mcg, 1,000 mcg, intramuscular, Once, 2 of 2 cycles    Administration: 1,000 mcg (10/28/2024), 1,000 mcg (12/30/2024)        fosaprepitant (Emend) 150 mg in sodium chloride 0.9% 150 mL IV, 150 mg, intravenous, Once, 4 of 4 cycles    Administration: 150 mg (10/28/2024), 150 mg (11/18/2024), 150 mg (12/9/2024), 150 mg (12/30/2024)        CARBOplatin (Paraplatin) 627 mg in sodium chloride 0.9% 172.7 mL IV, 627 mg, intravenous, Once, 4 of 4 cycles    Administration: 627 mg (10/28/2024), 627 mg (11/18/2024), 500 mg (12/9/2024), 440 mg (12/30/2024)        methylPREDNISolone sod succinate (SOLU-Medrol) 40 mg/mL injection 40 mg, 40 mg, intravenous, As needed, 4 of 4 cycles        palonosetron (Aloxi) injection 250 mcg, 250 mcg, intravenous, Once, 4 of 4 cycles    Administration: 250 mcg (10/28/2024), 250 mcg (11/18/2024), 250 mcg (12/9/2024), 250 mcg (12/30/2024)        pembrolizumab (Keytruda) 200 mg in sodium chloride 0.9% 118 mL IV, 200 mg, intravenous, Once, 4 of 4 cycles    Administration: 200 mg (10/28/2024), 200 mg (11/18/2024), 200 mg (12/9/2024), 200 mg (12/30/2024)        PEMEtrexed disodium (Alimta) 800 mg in sodium chloride 0.9% 142 mL IV, 500 mg/m2 = 800 mg, intravenous, Once, 4 of 4 cycles    Dose modification: 400 mg/m2 (original dose 500 mg/m2, Cycle 2)    Administration: 800 mg (10/28/2024), 645 mg (11/18/2024), 600 mg (12/9/2024), 600 mg (12/30/2024)    cyanocobalamin (Vitamin B-12) injection 1,000 mcg, 1,000 mcg, intramuscular, Once, 1 of 6 cycles    Administration: 1,000 mcg (1/21/2025)        methylPREDNISolone sod succinate (SOLU-Medrol) 40 mg/mL injection 40 mg, 40 mg, intravenous, As needed, 1 of 18 cycles        pembrolizumab (Keytruda) 200 mg in sodium chloride 0.9% 118 mL IV, 200 mg,  intravenous, Once, 1 of 18 cycles    Administration: 200 mg (1/21/2025)        PEMEtrexed disodium (Alimta) 600 mg in sodium chloride 0.9% 134 mL IV, 400 mg/m2 = 600 mg (80 % of original dose 500 mg/m2), intravenous, Once, 1 of 18 cycles    Dose modification: 400 mg/m2 (original dose 500 mg/m2, Cycle 1)    Administration: 600 mg (1/21/2025)       Chief Concern: readiness to treat visit    Oncologic History:     DIAGNOSIS  NSCLC adenocarcinoma      STAGING  cT4 pN3 M1c      CURRENT SITES OF DISEASE  R hilar mass, 4L, 11L, scapula,  right,   parieto-occipital bone, C2, C3, ?lymphatic carcinomatosis     MOLECULAR GENOMICS  KRAS p.G12C ([id number] c.34G>T)   PD-L1 <1%     PRIOR THERAPY        CURRENT THERAPY  Palliative XRT skull, C-spine, R shoulder 10/10/24 - 10/16/24  Carboplatin (AUC 5), Pemetrexed (500mg/m2), & Pembrolizumab 10/28/24 -   - Pemetrexed dose reduced 400mg/m2 - C2 11/18/24   - Carboplatin dose reduced (AUC 4) - C3 12/9/24      CURRENT ONCOLOGICAL PROBLEMS  Hemoptysis - resolved  Epistaxis - intermittent        HISTORY OF PRESENT ILLNESS  Mr. Mccartney is a 48 yo with PMH significant for RA who present to the [facility] on 9/9/24 with 2 months of progressively worsening headaches and neck pain with occasional radiation to the R and L arms. A lytic lesion was noted on CT head 9/9/24 of the parietal bone, and CT cervical spine was concerning for lucent lesions C2 and C3. Brain MRI and MR cervical spine confirmed marrow-replacing lesions fo the C2 and C3 vertebral bodies. CT C/A/P w/o contrast on 9/10/24 was concerning for soft tissue mass of the R hilum, lytic lesions of the R scapula, and 3.5 cm mass in the L gluteus. He was transferred to [facility] on 9/10/24 CT C/A/P w/IV contrast on 9/15/24 confirmed the lung mass encasing the R mainstem bronchus abutting the R main pulmonary artery and extending to the lung periphery, as well as R perihilar lesion and lymphangitic carcinomatosis, prominent mediastinal nodes, L  axillary node, nodular thickening of bilateral adrenal glands, likely lesion of the R scapula, small hepatic lesions, moderate pericardial effusion. EBUS on 9/17/24 with pathology of R hilar mass with adenocarcinoma, KRAS G12C, PD-L1 pending, and positive lymph nodes 11L and 4L. He met Dr. Mcgrath and discussed palliative radiation to the occipital lesion and cervical spine. PET/CT on 10/1/24 with FDG avid confluent R hilar/mediastinal mass, mild FDG avidity along upper and middle lobe, bilateral scapular, humeral, R anterior acetabular osseous metastases. He started palliative radiation 10/10/24-10/16/24.      He was born with juvenile RA, usually his feet and knees feel good compared to everything. He used to walk with a crutch, since the hospital he has been walking without it. He follows with a rheumatologist at [place]. It's fairly managed he says, has  been on humira for 20-25 years, right when it first came out. Kept the fluid off his knees and helped him better than anything else. He was on all kinds of drugs he said before including steroids, methotrexate, others. Typically with the humira he doesn't really get flares, sometimes in his wrists. He's been off for about a month, so thinks he'd have a flare in about a month.      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 Maybe had one exposure to asbestos. 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 Lives at home with his wife and a dog, 3 cats, a bird and fish. They have 2 sons and 3 grandchildren.  Tob: quit smoking 9/9/24, smoked 39 years, 1-1.5 ppd  EtOH: occasional  Illicits: previous smoking marijuana, only edibles now for pain     FAMILY HISTORY  Mother - breast cancer dx 56 yo  Maternal grandmother - bone cancer  Paternal cousin - bone and/or lung cancer  Paternal grandmother - unknown cancer  No other family members with autoimmune  diseases    HPI   He is here today with his son.  Doing okay.  Good and bad days.   Wearing oxygen continuously.  Breathing feels good.  Appetite - other days better than others.  Tastes coming back (g1).  Has an appetite.  Intake includes protein shakes 3/day.  Denies n/v/d.  Still working on bowel regimen.  BM today.  Intermittent low-grade temps.  No other concerns.  Labs WNL.  Ready for treatment.        Meds (Current):    Current Outpatient Medications:     acetaminophen (Tylenol) 500 mg tablet, Take 1 tablet (500 mg) by mouth every 6 hours if needed for mild pain (1 - 3)., Disp: , Rfl:     b complex 0.4 mg tablet, Take 1 tablet by mouth once daily., Disp: , Rfl:     budesonide-glycopyr-formoterol (BREZTRI) 160-9-4.8 mcg/actuation HFA aerosol inhaler, Inhale 2 puffs 2 times a day., Disp: 10.7 g, Rfl: 3    dexAMETHasone (Decadron) 4 mg tablet, Take 4 mg (1 tablet) by mouth twice daily the day before treatment, once the evening of treatment, and twice daily the day after treatment., Disp: 5 tablet, Rfl: 5    folic acid (Folvite) 1 mg tablet, Take 1 tablet (1,000 mcg) by mouth once daily. Do not start before October 27, 2024., Disp: 30 tablet, Rfl: 11    folic acid (Folvite) 1 mg tablet, Take 1 tablet (1,000 mcg) by mouth once daily., Disp: 30 tablet, Rfl: 11    gabapentin (Neurontin) 300 mg capsule, Take 1 capsule (300 mg) by mouth 3 times a day., Disp: 90 capsule, Rfl: 3    ibuprofen 200 mg tablet, Take 2 tablets (400 mg) by mouth every 6 hours if needed for mild pain (1 - 3)., Disp: , Rfl:     ipratropium-albuteroL (Combivent Respimat)  mcg/actuation inhaler, Inhale 2 puffs 4 times a day as needed for shortness of breath or wheezing., Disp: , Rfl:     mirtazapine (Remeron) 15 mg tablet, Take 1 tablet (15 mg) by mouth once daily at bedtime., Disp: 30 tablet, Rfl: 2    morphine CR (MS Contin) 30 mg 12 hr tablet, Take 1 tablet (30 mg) by mouth 2 times a day AND 2 tablets (60 mg) once daily at bedtime. Do not  crush, chew, or split. Take 30 MG AM, 30 MG MID DAY and 60 MG at BEDTIME.., Disp: 120 tablet, Rfl: 0    naloxone (Narcan) 4 mg/0.1 mL nasal spray, Administer 1 spray (4 mg) into affected nostril(s) if needed for opioid reversal. May repeat every 2-3 minutes if needed, alternating nostrils, until medical assistance becomes available., Disp: 2 each, Rfl: 3    OLANZapine (ZyPREXA) 5 mg tablet, Take 1 tablet (5 mg) by mouth nightly for 4 nights starting the evening of treatment, Disp: 4 tablet, Rfl: 2    ondansetron (Zofran) 8 mg tablet, Take 1 tablet (8 mg) by mouth every 8 hours if needed for nausea or vomiting. Do not fill before October 27, 2024., Disp: 30 tablet, Rfl: 5    ondansetron (Zofran) 8 mg tablet, Take 1 tablet (8 mg) by mouth every 8 hours if needed for nausea or vomiting., Disp: 30 tablet, Rfl: 5    oxyCODONE (Roxicodone) 10 mg immediate release tablet, Take 1-1.5 tablets (10-15 mg) by mouth every 4 hours if needed for moderate pain (4 - 6) or severe pain (7 - 10). MAX 9 TABS PER DAY, Disp: 270 tablet, Rfl: 0    oxygen (O2) gas therapy, Inhale 1 each once every 24 hours., Disp: , Rfl:     pantoprazole (ProtoNix) 40 mg EC tablet, Take 1 tablet (40 mg) by mouth once daily in the morning. Take before meals. Do not crush, chew, or split., Disp: 30 tablet, Rfl: 3    pilocarpine (Salagen, pilocarpine,) 5 mg tablet, Take 1 tablet (5 mg) by mouth 3 times a day as needed (for dry mouth)., Disp: 90 tablet, Rfl: 11    polyethylene glycol (Glycolax, Miralax) 17 gram/dose powder, Mix 17 g of powder and drink once daily., Disp: , Rfl:     prochlorperazine (Compazine) 10 mg tablet, Take 1 tablet (10 mg) by mouth every 6 hours if needed for nausea or vomiting. Do not fill before October 27, 2024., Disp: 30 tablet, Rfl: 5    prochlorperazine (Compazine) 10 mg tablet, Take 1 tablet (10 mg) by mouth every 6 hours if needed for nausea or vomiting., Disp: 30 tablet, Rfl: 5    Review of Systems   Constitutional:  Positive for  fatigue. Negative for appetite change, fever and unexpected weight change.   HENT:  Negative.  Negative for nosebleeds.    Respiratory:  Positive for shortness of breath. Negative for hemoptysis.    Cardiovascular: Negative.    Gastrointestinal:  Positive for constipation. Negative for diarrhea, nausea and vomiting.   Musculoskeletal:  Positive for back pain and myalgias.        Objective   BSA: 1.47 meters squared  Wt Readings from Last 5 Encounters:   02/10/25 46.9 kg (103 lb 6.4 oz)   01/30/25 45.8 kg (100 lb 15.5 oz)   01/23/25 48.4 kg (106 lb 11.2 oz)   01/21/25 47.7 kg (105 lb 2.6 oz)   01/20/25 48.2 kg (106 lb 4.2 oz)     11/11/24 wt #123 - leg braces worn (8#)    /79 (BP Location: Left arm, Patient Position: Sitting)   Pulse 109   Temp 37.7 °C (99.9 °F) (Temporal)   Resp 18   Wt 46.9 kg (103 lb 6.4 oz)   SpO2 95%   BMI 17.19 kg/m²     ECOG Score:   Performance Status (ECOG): 2    ECOG   Definition  0          Fully active; no performance restrictions.  1          Strenuous physical activity restricted; fully ambulatory and able to carry out light work.  2          Capable of all self-care but unable to carry out any work activities. Up and about >50% of waking hours.  3          Capable of only limited self-care; confined to bed or chair >50% of waking hours.  4          Completely disabled; cannot carry out any self-care; totally confined to bed or chair.    Physical Exam  Vitals reviewed.   Constitutional:       General: He is not in acute distress.     Appearance: He is not ill-appearing.      Comments: Frail, cachectic     HENT:      Head: Normocephalic and atraumatic.      Mouth/Throat:      Mouth: Mucous membranes are moist.   Eyes:      Extraocular Movements: Extraocular movements intact.      Pupils: Pupils are equal, round, and reactive to light.   Cardiovascular:      Rate and Rhythm: Regular rhythm. Tachycardia present.      Heart sounds: Normal heart sounds. No murmur  heard.  Pulmonary:      Effort: Pulmonary effort is normal. No respiratory distress.      Breath sounds: Normal breath sounds.      Comments: Diminished RLL.   Abdominal:      General: Bowel sounds are normal. There is no distension.      Palpations: Abdomen is soft.   Musculoskeletal:      Cervical back: Normal range of motion.      Right lower leg: Edema (ankles 2/2 RA) present.      Left lower leg: Edema (ankles 2/2 RA) present.      Comments: Chronic swelling to TINY ankles and left wrist/hand 2/2 RA.   Skin:     General: Skin is warm and dry.   Neurological:      General: No focal deficit present.      Mental Status: He is alert and oriented to person, place, and time. Mental status is at baseline.   Psychiatric:         Mood and Affect: Mood normal.         Behavior: Behavior normal.         Thought Content: Thought content normal.        Results:  Labs:  Lab Results   Component Value Date    WBC 7.4 02/10/2025    HGB 7.9 (L) 02/10/2025    HCT 26.1 (L) 02/10/2025    MCV 96 02/10/2025     (H) 02/10/2025      Lab Results   Component Value Date    NEUTROABS 5.59 02/10/2025      Lab Results   Component Value Date    GLUCOSE 164 (H) 02/10/2025    CALCIUM 9.3 02/10/2025     (L) 02/10/2025    K 3.9 02/10/2025    CO2 27 02/10/2025    CL 99 02/10/2025    BUN 10 02/10/2025    CREATININE 0.55 02/10/2025    MG 1.66 11/26/2024     Lab Results   Component Value Date    ALT 13 02/10/2025    AST 15 02/10/2025    ALKPHOS 113 02/10/2025    BILITOT 0.3 02/10/2025    BILIDIR 0.1 09/14/2024      Lab Results   Component Value Date    ACTH 6.2 (L) 01/20/2025    CORTISOL 22.2 (H) 01/20/2025    TSH 1.42 01/20/2025       Imaging:  No new imaging.      Assessment/Plan      Jovon Mccartney is a 49 y.o. male here for follow up of NSCLC adenocarcinoma.     # NSCLC adenocarcinoma  - [id number]  - KRAS G12C, PD-L1 <1%  - discussed that SoC therapies include chemotherapy+ immunotherapy. Given longstanding juvenile RA, would like input  from his rheumatologist regarding safety of immunotherapy in this setting. Discussed if no immunotherapy, would recommend chemotherapy alone vs KRAS G12C targeted therapy frontline, which typically is given in the second-line setting.  - provided information on carboplatin/pemetrexed/pembrolizumab vs adagrasib   - patient discussed with his rheumatologist regarding concern for flaring RA on immunotherapy. His rheumatologist will monitor closely, stop humira, and maintain him on low-dose steroids  - consented for carboplatin/pemetrexed/pembrolizumab to start 10/28/24  - will plan on utilizing adagrasib in the 2nd line setting if needed  - will plan on restaging scans after C4.  - C2 11/18/24, Pemetrexed dose reduced 400mg/m2  - Hospitalization 11/25-11/26/24 2U PRBC's  - C3 12/9/24, Carboplatin dose reduced AUC4  - personally reviewed restaging scans with likely stable disease, will monitor closely and continue on maintenance pemetrexed/pembrolizumab  - bone-only progression, with stable/maybe minimal improvement in intrathoracic disease, and we opted for continuation on pemetrexed/pembrolizumab and close monitoring. Discussion with Dr. Mcgrath for possible palliative radiation  -next scans in 3 months (4/9/25)  - RTC in 3 weeks.       # Neoplasm related pain  - already saw Dr. Mcgrath, receiving palliative radiation - repeat C spine and brain MRI 1/23/25  - seeing supportive onc - started pt on ER Morphine and PRN Oxycodone.    - Pt also taking Ibuprofen 600mg QID and 1000mg Acetaminophen QID.  Recommended pt reduce both doses x1 to start.  Pt will cut back to TID x1 week and see if his pain/RA is tolerable.    - 11/18/24 - current TID Acetaminophen and Ibuprofen daily, plus Morphine, Oxycodone and Gabapentin.    - 12/9/24 - follows with supportive onc, pain managed with current regimen      # Hypoxia/low-grade temps  - Discussed pt borderline to ED recommendation 2/2 low-grade temps and hypoxic episode at home.  Pt  agreeable to urgent care visit for nasal swabs to r/o COVID, Flu A/B, and RSV.  All (-).  - stable, PRN & HS oxygen use.  No further temps.    - Recommended routine oxygen use - 2L, increase to 4L with exertion.    - 12/9:  continuous oxygen use, except brief periods of time - eating.      # Hemoptysis/Epistaxis  - resolved with saline nasal spray  - 11/17/24 - epistaxis, brief episode  - will continue to monitor    - Anemia Hgb 7.1  - Pt consented today for blood transfusion (11/18/24)  - Discussed with Dr. Vargas, will repeat labs on 11/25/24.   Repeat labs, plus T&S and ABO ordered for 11/25/24.  Discussed he likely will need a transfusion. Recommended ED visit for repeat hemoptysis/epistaxis episodes.    - 2U PRBC's 11/25-11/26/24 [facility]    - Repeat CBC w/diff 12/16/24 + T&S - Hgb 8.4   - stable Hgb 8.8 12/30/24     # Hyponatremia - 127 11/4/24  - Rx sent for NaCl tabs - 1 tab TID x7 days  - Na 134 12/30/24 - stable    # Thrombocytopenia - resolved   - 11/4/24   - 11/11/24 PLT 42   - No s/sx's bleeding  - Pt educate to bleeding precautions/interventions  - 12/30/24 -      # Leukopenia - resolved  - WBC 3.2 11/4/24   - WBC 5.8 12/30/24      # Advanced care planning  - discussed incurable but treatable nature of metastatic disease, with goal of treatment to improve or maintain quality of life and prolong life.

## 2025-02-10 NOTE — PROGRESS NOTES
SUPPORTIVE AND PALLIATIVE ONCOLOGY FOLLOW-UP - OUTPATIENT      SERVICE DATE: 2/10/2025    Referred by:  Anton Mcgrath MD  Medical Oncologist: No care team member to display   Radiation Oncologist: Anton Mcgrath MD PhD  Primary Physician: Matilda Garcia  [phone]    REASON FOR CONSULT/CHIEF CONSULT COMPLAINT: pain management and Introduction to Supportive and Palliative Oncology Services    Subjective   HISTORY OF PRESENT ILLNESS: Jovon Mccartney is a 49 y.o. male who presents with a PMH of juvenile RA and newly diagnosed NSCLC with metastatic disease to the right scapula, parieto-occipital bone, C2, C3, bilateral humeral and right anterior acetabular osteolytic lesions. Completing RT to the right scapula and right scalp. Started Carbo/pem/pem 10/28/24. Now on maintenance pemetrexed + pembrolizumab. Will be receiving radiation to the right middle cranial fossa.     Pain Assessment:  Pain Score: 5/10  Location: neck and shoulder    Symptom Assessment:  Pain:very much  throbbing, constant pain in his left hand. He has intermittent pain in his bilateral shoulders. He has good and bad days. HE has been taking oxycodone 15 mg three times per day which brings his pain level down from an 8/10 to a 5/10.    Headache: none  Dizziness:none  Lack of energy: somewhat ongoing fatigue. Napping during the day. He is able to stay busy when there are activities going on. He is alone most of the week so he is not as active.  Difficulty sleeping: a little  thinks he has been sleeping more soundly recently.   Worrying: none  Anxiety: none  Depression: a little  Pain in mouth/swallowing: none  Dry mouth: none  Taste changes: none  Shortness of breath: none  Lack of appetite: a little. Good and bad days. He is drinking 3 smoothies per day which are each 600 calories. He is able to eat small meals. He is using edibles which do help improve has appetite.   Nausea: none  Vomiting: none   Constipation: a little states that he has  constipation and then diarrhea. He has not been able to get this under control yet. Has been taking senna 2 times per day.   Diarrhea: none  Sore muscles: none  Numbness or tingling in hands/feet/other: none  Weight loss: a little  Other: none      Information obtained from: chart review, interview of patient, and interview of family  ______________________________________________________________________     Oncology History   NSCLC metastatic to bone (Multi)   9/26/2024 Initial Diagnosis    NSCLC metastatic to bone (Multi)     9/26/2024 Cancer Staged    Staging form: Lung, AJCC 8th Edition, Clinical stage from 9/26/2024: Stage IVB (cT4, cN3, cM1c) - Signed by Anton Mcgrath MD PhD on 9/26/2024     10/28/2024 - 12/30/2024 Chemotherapy    Pembrolizumab + PEMEtrexed / CARBOplatin, 21 Day Cycles     1/21/2025 -  Chemotherapy    Pembrolizumab + PEMEtrexed, 21 Day Cycles          Past Medical History:   Diagnosis Date    Adalimumab (Humira) long-term use 09/15/2024    Arthritis 1974    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    Hilar mass 09/15/2024    Juvenile rheumatoid arthritis (Multi) 09/15/2024    Lung cancer (Multi) 09 17 2024    Rheumatoid arthritis 10 1974     Past Surgical History:   Procedure Laterality Date    BRONCHOSCOPY  9 16 2024    LUNG BIOPSY  9 16 2024     Family History   Problem Relation Name Age of Onset    Breast cancer Mother JORDY CHURCH     Cancer Mother JORDY CHURCH     Miscarriages / Stillbirths Mother JORDY CHURCH     Cancer Maternal Grandfather HUBER PAL     Stroke Maternal Grandfather HUBER ROLANDWELL         SOCIAL HISTORY  Children 2, Grandchildren 3, Support system wife and kids, Employment on disability since 17 years old, and Hobbies working on cars and 4 wheeling.    Social History:  reports that he quit smoking about 5 months ago. His smoking use included cigarettes. He has a 58.5 pack-year smoking history. He has been exposed to tobacco smoke. He has never used  smokeless tobacco. He reports that he does not currently use alcohol. He reports that he does not currently use drugs.  Quit smoking September 2024.     REVIEW OF SYSTEMS  Review of systems negative unless noted in HPI.       Objective     Current Outpatient Medications   Medication Instructions    acetaminophen (TYLENOL) 500 mg, Every 6 hours PRN    b complex 0.4 mg tablet 1 tablet, Daily    budesonide-glycopyr-formoterol (BREZTRI) 160-9-4.8 mcg/actuation HFA aerosol inhaler 2 puffs, inhalation, 2 times daily    dexAMETHasone (Decadron) 4 mg tablet Take 4 mg (1 tablet) by mouth twice daily the day before treatment, once the evening of treatment, and twice daily the day after treatment.    folic acid (Folvite) 1 mg tablet Take 1 tablet (1,000 mcg) by mouth once daily. Do not start before October 27, 2024.    folic acid (FOLVITE) 1,000 mcg, oral, Daily    gabapentin (NEURONTIN) 300 mg, oral, 3 times daily    ibuprofen 400 mg, Every 6 hours PRN    ipratropium-albuteroL (Combivent Respimat)  mcg/actuation inhaler 2 puffs, inhalation, 4 times daily PRN    mirtazapine (REMERON) 15 mg, oral, Nightly    morphine CR (MS Contin) 30 mg 12 hr tablet Take 1 tablet (30 mg) by mouth 2 times a day AND 2 tablets (60 mg) once daily at bedtime. Do not crush, chew, or split. Take 30 MG AM, 30 MG MID DAY and 60 MG at BEDTIME..    naloxone (NARCAN) 4 mg, nasal, As needed, May repeat every 2-3 minutes if needed, alternating nostrils, until medical assistance becomes available.    ondansetron (ZOFRAN) 8 mg, oral, Every 8 hours PRN    ondansetron (ZOFRAN) 8 mg, oral, Every 8 hours PRN    oxyCODONE (ROXICODONE) 10-15 mg, oral, Every 4 hours PRN, MAX 9 TABS PER DAY    oxygen (O2) gas therapy 1 each, inhalation, Every 24 hours    pantoprazole (PROTONIX) 40 mg, oral, Daily before breakfast, Do not crush, chew, or split.    pilocarpine (SALAGEN (PILOCARPINE)) 5 mg, oral, 3 times daily PRN    polyethylene glycol (GLYCOLAX, MIRALAX) 17 g,  oral, Daily    prochlorperazine (COMPAZINE) 10 mg, oral, Every 6 hours PRN    prochlorperazine (COMPAZINE) 10 mg, oral, Every 6 hours PRN       Allergies: No Known Allergies      Lab on 02/10/2025   Component Date Value Ref Range Status    WBC 02/10/2025 7.4  4.4 - 11.3 x10*3/uL Final    nRBC 02/10/2025    Final    Not Measured    RBC 02/10/2025 2.71 (L)  4.50 - 5.90 x10*6/uL Final    Hemoglobin 02/10/2025 7.9 (L)  13.5 - 17.5 g/dL Final    Hematocrit 02/10/2025 26.1 (L)  41.0 - 52.0 % Final    MCV 02/10/2025 96  80 - 100 fL Final    MCH 02/10/2025 29.2  26.0 - 34.0 pg Final    MCHC 02/10/2025 30.3 (L)  32.0 - 36.0 g/dL Final    RDW 02/10/2025 17.6 (H)  11.5 - 14.5 % Final    Platelets 02/10/2025 552 (H)  150 - 450 x10*3/uL Final    Neutrophils % 02/10/2025 75.5  40.0 - 80.0 % Final    Immature Granulocytes %, Automated 02/10/2025 0.4  0.0 - 0.9 % Final    Immature Granulocyte Count (IG) includes promyelocytes, myelocytes and metamyelocytes but does not include bands. Percent differential counts (%) should be interpreted in the context of the absolute cell counts (cells/UL).    Lymphocytes % 02/10/2025 11.9  13.0 - 44.0 % Final    Monocytes % 02/10/2025 11.4  2.0 - 10.0 % Final    Eosinophils % 02/10/2025 0.7  0.0 - 6.0 % Final    Basophils % 02/10/2025 0.1  0.0 - 2.0 % Final    Neutrophils Absolute 02/10/2025 5.59  1.20 - 7.70 x10*3/uL Final    Percent differential counts (%) should be interpreted in the context of the absolute cell counts (cells/uL).    Immature Granulocytes Absolute, Au* 02/10/2025 0.03  0.00 - 0.70 x10*3/uL Final    Lymphocytes Absolute 02/10/2025 0.88 (L)  1.20 - 4.80 x10*3/uL Final    Monocytes Absolute 02/10/2025 0.84  0.10 - 1.00 x10*3/uL Final    Eosinophils Absolute 02/10/2025 0.05  0.00 - 0.70 x10*3/uL Final    Basophils Absolute 02/10/2025 0.01  0.00 - 0.10 x10*3/uL Final    Glucose 02/10/2025 164 (H)  74 - 99 mg/dL Final    Sodium 02/10/2025 135 (L)  136 - 145 mmol/L Final    Potassium  02/10/2025 3.9  3.5 - 5.3 mmol/L Final    Chloride 02/10/2025 99  98 - 107 mmol/L Final    Bicarbonate 02/10/2025 27  21 - 32 mmol/L Final    Anion Gap 02/10/2025 13  10 - 20 mmol/L Final    Urea Nitrogen 02/10/2025 10  6 - 23 mg/dL Final    Creatinine 02/10/2025 0.55  0.50 - 1.30 mg/dL Final    eGFR 02/10/2025 >90  >60 mL/min/1.73m*2 Final    Calculations of estimated GFR are performed using the 2021 CKD-EPI Study Refit equation without the race variable for the IDMS-Traceable creatinine methods.  https://jasn.asnjournals.org/content/early/2021/09/22/ASN.5894877013    Calcium 02/10/2025 9.3  8.6 - 10.3 mg/dL Final    Albumin 02/10/2025 3.3 (L)  3.4 - 5.0 g/dL Final    Alkaline Phosphatase 02/10/2025 113  33 - 120 U/L Final    Total Protein 02/10/2025 7.2  6.4 - 8.2 g/dL Final    AST 02/10/2025 15  9 - 39 U/L Final    Bilirubin, Total 02/10/2025 0.3  0.0 - 1.2 mg/dL Final    ALT 02/10/2025 13  10 - 52 U/L Final    Patients treated with Sulfasalazine may generate falsely decreased results for ALT.   Lab on 01/20/2025   Component Date Value Ref Range Status    WBC 01/20/2025 6.6  4.4 - 11.3 x10*3/uL Final    RBC 01/20/2025 2.61 (L)  4.50 - 5.90 x10*6/uL Final    Hemoglobin 01/20/2025 7.8 (L)  13.5 - 17.5 g/dL Final    Hematocrit 01/20/2025 24.9 (L)  41.0 - 52.0 % Final    MCV 01/20/2025 95  80 - 100 fL Final    MCH 01/20/2025 29.9  26.0 - 34.0 pg Final    MCHC 01/20/2025 31.3 (L)  32.0 - 36.0 g/dL Final    RDW 01/20/2025 18.3 (H)  11.5 - 14.5 % Final    Platelets 01/20/2025 466 (H)  150 - 450 x10*3/uL Final    Neutrophils % 01/20/2025 71.3  40.0 - 80.0 % Final    Immature Granulocytes %, Automated 01/20/2025 0.2  0.0 - 0.9 % Final    Immature Granulocyte Count (IG) includes promyelocytes, myelocytes and metamyelocytes but does not include bands. Percent differential counts (%) should be interpreted in the context of the absolute cell counts (cells/UL).    Lymphocytes % 01/20/2025 16.3  13.0 - 44.0 % Final    Monocytes  % 01/20/2025 12.0  2.0 - 10.0 % Final    Eosinophils % 01/20/2025 0.0  0.0 - 6.0 % Final    Basophils % 01/20/2025 0.2  0.0 - 2.0 % Final    Neutrophils Absolute 01/20/2025 4.72  1.20 - 7.70 x10*3/uL Final    Percent differential counts (%) should be interpreted in the context of the absolute cell counts (cells/uL).    Immature Granulocytes Absolute, Au* 01/20/2025 0.01  0.00 - 0.70 x10*3/uL Final    Lymphocytes Absolute 01/20/2025 1.08 (L)  1.20 - 4.80 x10*3/uL Final    Monocytes Absolute 01/20/2025 0.79  0.10 - 1.00 x10*3/uL Final    Eosinophils Absolute 01/20/2025 0.00  0.00 - 0.70 x10*3/uL Final    Basophils Absolute 01/20/2025 0.01  0.00 - 0.10 x10*3/uL Final    Glucose 01/20/2025 119 (H)  74 - 99 mg/dL Final    Sodium 01/20/2025 134 (L)  136 - 145 mmol/L Final    Potassium 01/20/2025 4.4  3.5 - 5.3 mmol/L Final    Chloride 01/20/2025 97 (L)  98 - 107 mmol/L Final    Bicarbonate 01/20/2025 28  21 - 32 mmol/L Final    Anion Gap 01/20/2025 13  10 - 20 mmol/L Final    Urea Nitrogen 01/20/2025 9  6 - 23 mg/dL Final    Creatinine 01/20/2025 0.53  0.50 - 1.30 mg/dL Final    eGFR 01/20/2025 >90  >60 mL/min/1.73m*2 Final    Calculations of estimated GFR are performed using the 2021 CKD-EPI Study Refit equation without the race variable for the IDMS-Traceable creatinine methods.  https://jasn.asnjournals.org/content/early/2021/09/22/ASN.6912409210    Calcium 01/20/2025 9.2  8.6 - 10.3 mg/dL Final    Albumin 01/20/2025 3.5  3.4 - 5.0 g/dL Final    Alkaline Phosphatase 01/20/2025 113  33 - 120 U/L Final    Total Protein 01/20/2025 7.4  6.4 - 8.2 g/dL Final    AST 01/20/2025 12  9 - 39 U/L Final    Bilirubin, Total 01/20/2025 0.3  0.0 - 1.2 mg/dL Final    ALT 01/20/2025 11  10 - 52 U/L Final    Patients treated with Sulfasalazine may generate falsely decreased results for ALT.    Adrenocorticotropic Hormone (ACTH) 01/20/2025 6.2 (L)  7.2 - 63.3 pg/mL Final    INTERPRETIVE INFORMATION: Adrenocorticotropic  "Hormone    Reference interval based on samples collected between 7 a.m. and   10 a.m.  No reference intervals established for p.m. collections.    Pediatric reference values are the same as adults (Acta Paediatr   Scand 1981;70:341-345).  This assay measures intact ACTH 1-39;   some types of synthetic ACTH and ACTH fragments are not detected   by this assay.  Performed By: [facility]  [address] [place] [id number]  : Umang Huggins MD, PhD  CLIA Number: [id number]    Cortisol  A.M. 01/20/2025 22.2 (H)  5.0 - 20.0 ug/dL Final    Thyroid Stimulating Hormone 01/20/2025 1.42  0.44 - 3.98 mIU/L Final        PHYSICAL EXAMINATION  Vital Signs:       12/30/2024    10:28 AM 1/20/2025    11:04 AM 1/21/2025     8:41 AM 1/21/2025    10:56 AM 1/23/2025    12:50 PM 1/30/2025    11:01 AM 2/10/2025     1:27 PM   Vitals   Systolic 106 101 115 -- 115 118 122   Diastolic 68 64 70 -- 76 72 79   BP Location Left arm Right arm   Right arm Right arm Left arm   Heart Rate 89 105 111  94 104 109   Temp 37 °C (98.6 °F) 38.1 °C (100.6 °F) 36.6 °C (97.9 °F) -- 36.8 °C (98.2 °F) 37.2 °C (99 °F) 37.7 °C (99.9 °F)   Resp 16 18 18  18 18 18   Height   1.652 m (5' 5.04\")        Weight (lb) 104.6 106.26 105.16 -- 106.7 100.97 103.4   BMI 16.88 kg/m2 17.15 kg/m2 17.48 kg/m2  17.73 kg/m2 16.78 kg/m2 17.19 kg/m2   BSA (m2) 1.49 m2 1.5 m2 1.48 m2  1.49 m2 1.45 m2 1.47 m2   Visit Report Report    Report Report Report Report   Report       Significant value     Vital signs reviewed       Physical Exam  Constitutional:       Appearance: He is underweight.   HENT:      Head: Normocephalic.   Eyes:      Pupils: Pupils are equal, round, and reactive to light.   Pulmonary:      Effort: Pulmonary effort is normal.   Musculoskeletal:         General: Normal range of motion.      Right hand: Swelling present.      Left hand: Swelling present.      Right foot: Swelling present.      Left foot: Swelling present. "   Neurological:      Mental Status: He is oriented to person, place, and time.   Psychiatric:         Mood and Affect: Mood normal.         Behavior: Behavior normal.          ASSESSMENT/PLAN    Pain  Pain is: cancer related pain  Type: somatic  Pain control: sub-optimally controlled  Home regimen:   - Continue Oxycodone 15 mg every 4 hours as needed  - Continue MSContin 30/30/60 mg.   - Continue Tylenol 1000 mg every 8 hours as needed  - Continue Ibuprofen 600 mg every 6 hours as needed   - Continue with RT    Opioid Use  Medication Management:   - OARRS report reviewed with no aberrant behavior; consistent with  prescriptions/records and patient history  - .  Overdose Risk Score 430.   This has been discussed with patient.   - We will continue to closely monitor the patient for signs of prescription misuse including UDS, OARRS review and subjective reports at each visit.  - No concurrent benzodiazepine use   - I am a provider who either is or has consulted and collaborated with a provider certified in Hospice and Palliative Medicine and have conducted a face-face visit and examination for this patient.  - Routine Urine Drug Screen complete 10/8/24 appropriately positive for opioids and negative for illicit substances  - Controlled Substance Agreement completed 10/8/24  - Specifically discussed that controlled substance prescriptions will only be provided by our group as outlined in the completed agreement  - Prescribed naloxone prescribed 9/27/24 - patient has at home  - Red Flags: none    Constipation   At risk for constipation related to opioids,  currently not constipated   Usual bowel pattern: every 1-2 days   Current regimen:   - Continue Senna 2 tabs daily.   - Discussed starting Dulcolax 2 times daily and stopping miralax to see if this regimen works better.   - Continue Miralax 17 g daily. Increase to 2 times per day if constipation continues  - If no BM within 48-72 hours, start MOM 8% every 6  hours     Sleeping Difficulty:  Impaired sleep related to pain  Current regimen:    - Continue pain medication as described above    Decreased appetite  Related to malignancy  Nutrition consult - may consider at next visit  Current regimen:    - Continue Mirtazapine 15 mg daily at bedtime. Discussed increasing mirtazapine is not effective at higher doses for appetite  - Could consider Marinol  - He is currently using edibles which do assist with appetite   - Encouraged smaller, more frequent meals  - encouraged ensure/boost 1-2 times per day  - Drinking high calorie protein smoothies   - Met with Nena Felder    Advance Directives  Existence of Advance Directives:Yes, documentation or copy in medical record  Decision maker: STEVENOA is Judith Sherrill  Code Status: Full code    Next Follow-Up Visit:  Return to clinic in 6 weeks     Signature and billing  Thank you for allowing us to participate in the care of this patient. Recommendations will be communicated back to the consulting service by way of shared electronic medical record or face-to-face.    Medical complexity was high level due to due to complexity of problems, extensive data review, and high risk of management/treatment.  Time was spent on the following: Prep Time, Time Directly with Patient/Family/Caregiver, Documentation Time. Total time spent: 20      DATA   Diagnostic tests and information reviewed for today's visit:  Most recent labs, Most recent imaging, Medications       Some elements copied from Oncology note on 1/21/25, the elements have been updated and all reflect current decision making from today, 2/10/2025.      Plan of Care discussed with: Patient and Family/Significant Other: wife      SIGNATURE: ADELINA Jacobo    Contact information:  Supportive and Palliative Oncology  Monday-Friday 8 AM-5 PM  Phone:  [phone], press option #5, then option #1.   Or Epic Secure Chat

## 2025-02-11 ENCOUNTER — HOSPITAL ENCOUNTER (OUTPATIENT)
Dept: RADIATION ONCOLOGY | Facility: CLINIC | Age: 50
Setting detail: RADIATION/ONCOLOGY SERIES
Discharge: HOME | End: 2025-02-11
Payer: COMMERCIAL

## 2025-02-11 PROCEDURE — 77300 RADIATION THERAPY DOSE PLAN: CPT | Performed by: INTERNAL MEDICINE

## 2025-02-11 PROCEDURE — 77334 RADIATION TREATMENT AID(S): CPT | Performed by: INTERNAL MEDICINE

## 2025-02-11 PROCEDURE — 77370 RADIATION PHYSICS CONSULT: CPT | Performed by: INTERNAL MEDICINE

## 2025-02-11 PROCEDURE — 77295 3-D RADIOTHERAPY PLAN: CPT | Performed by: INTERNAL MEDICINE

## 2025-02-12 ENCOUNTER — APPOINTMENT (OUTPATIENT)
Dept: PULMONOLOGY | Facility: CLINIC | Age: 50
End: 2025-02-12
Payer: COMMERCIAL

## 2025-02-12 VITALS
RESPIRATION RATE: 18 BRPM | HEART RATE: 102 BPM | WEIGHT: 108.4 LBS | DIASTOLIC BLOOD PRESSURE: 44 MMHG | OXYGEN SATURATION: 90 % | SYSTOLIC BLOOD PRESSURE: 93 MMHG | BODY MASS INDEX: 17.42 KG/M2 | HEIGHT: 66 IN

## 2025-02-12 DIAGNOSIS — C79.31 LUNG CANCER METASTATIC TO BRAIN (MULTI): ICD-10-CM

## 2025-02-12 DIAGNOSIS — J44.9 CHRONIC OBSTRUCTIVE PULMONARY DISEASE, UNSPECIFIED COPD TYPE (MULTI): Primary | ICD-10-CM

## 2025-02-12 DIAGNOSIS — C34.90 LUNG CANCER METASTATIC TO BRAIN (MULTI): ICD-10-CM

## 2025-02-12 DIAGNOSIS — R09.02 HYPOXIA: ICD-10-CM

## 2025-02-12 DIAGNOSIS — C79.31 NSCLC METASTATIC TO BRAIN (MULTI): ICD-10-CM

## 2025-02-12 DIAGNOSIS — C34.90 NSCLC METASTATIC TO BRAIN (MULTI): ICD-10-CM

## 2025-02-12 PROCEDURE — 1036F TOBACCO NON-USER: CPT | Performed by: NURSE PRACTITIONER

## 2025-02-12 PROCEDURE — 3008F BODY MASS INDEX DOCD: CPT | Performed by: NURSE PRACTITIONER

## 2025-02-12 PROCEDURE — 99214 OFFICE O/P EST MOD 30 MIN: CPT | Performed by: NURSE PRACTITIONER

## 2025-02-12 ASSESSMENT — PATIENT HEALTH QUESTIONNAIRE - PHQ9
2. FEELING DOWN, DEPRESSED OR HOPELESS: NOT AT ALL
SUM OF ALL RESPONSES TO PHQ9 QUESTIONS 1 AND 2: 0
1. LITTLE INTEREST OR PLEASURE IN DOING THINGS: NOT AT ALL

## 2025-02-12 ASSESSMENT — COPD QUESTIONNAIRES
QUESTION5_HOMEACTIVITIES: 3
QUESTION8_ENERGYLEVEL: 3
QUESTION4_WALKINCLINE: 3
QUESTION1_COUGHFREQUENCY: 3
QUESTION3_CHESTTIGHTNESS: 1
QUESTION2_CHESTPHLEGM: 1
CAT_TOTALSCORE: 15
QUESTION7_SLEEPQUALITY: 0 - I SLEEP SOUNDLY
QUESTION6_LEAVINGHOUSE: 1

## 2025-02-12 ASSESSMENT — ENCOUNTER SYMPTOMS
DEPRESSION: 0
OCCASIONAL FEELINGS OF UNSTEADINESS: 1
LOSS OF SENSATION IN FEET: 0

## 2025-02-12 NOTE — PATIENT INSTRUCTIONS
COPD with MMRC 2  PFTs with moderate airflow obstruction, lung volumes with hyperinflation, diffusion capacity reduced FEV1 60%    cont  ICS/LABA/LAMA breztri 2 puffs twice a day, rinse after use, discussed using twice a day daily   - cont combivent   - albuterol hfa 2 puffs or albuterol nebs every 4-6 hours as needed  - now with anemia may contribute dyspnea       Hypoxia multifactorial with anemia and COPD: Patient maintained on 2L -     Oncology - NSCLC - has followup with Oncology and Radiation Oncology     Thank you for visiting the Pulmonary clinic today!     Return to clinic  3 months or  sooner if needed   Melina Chacko CNP  My office number is [phone] -     Call to schedule  for radiology - CT scans/PFTs etc at  [phone]  General scheduling  [phone]     Best way to get a hold of me is to call my office --> Please do not send me follow my health messages  Any test results will be discussed at next visit -- please make sure to make a follow up appt after testing.

## 2025-02-17 ENCOUNTER — PATIENT OUTREACH (OUTPATIENT)
Dept: CARE COORDINATION | Facility: CLINIC | Age: 50
End: 2025-02-17
Payer: COMMERCIAL

## 2025-02-17 NOTE — PROGRESS NOTES
Attempted to contact PT in regards to his provider referral for resource assist and to obtain more inf as to what assistance is needed: PT was not available, left a voice message w/my name and number for a return call back.    Discussed the following topics on behalf of the patient:  []  Behavioral Health Assistance     []  Case Management  []   Assistance  []  Digital Equity Assistance  []  Dental Health Assistance  []  Education Assistance  []  Employment Assistance  []  Financial Strain Relief Assistance  []  Food Insecurity Assistance  []  Healthcare Coverage Assistance  []  Housing Stability Assistance  []  IP Violence Relief Assistance  []  Legal Assistance  []  Physical Activity Assistance  []  Social Connection Assistance  []  Stress Relief Assistance   []  Substance Abuse Assistance  []  Transportation Assistance  []  Utility Assistance  []  Other: [insert comment here]    Next Steps:   WILL FU WITH THE PATIENT ON 2/19        JAYNA Pardo

## 2025-02-20 ENCOUNTER — PATIENT OUTREACH (OUTPATIENT)
Dept: CARE COORDINATION | Facility: CLINIC | Age: 50
End: 2025-02-20
Payer: COMMERCIAL

## 2025-02-20 ENCOUNTER — HOSPITAL ENCOUNTER (OUTPATIENT)
Dept: RADIATION ONCOLOGY | Facility: CLINIC | Age: 50
Setting detail: RADIATION/ONCOLOGY SERIES
Discharge: HOME | End: 2025-02-20
Payer: COMMERCIAL

## 2025-02-20 VITALS
OXYGEN SATURATION: 97 % | WEIGHT: 102.51 LBS | SYSTOLIC BLOOD PRESSURE: 106 MMHG | TEMPERATURE: 97.9 F | HEART RATE: 84 BPM | BODY MASS INDEX: 16.55 KG/M2 | RESPIRATION RATE: 18 BRPM | DIASTOLIC BLOOD PRESSURE: 68 MMHG

## 2025-02-20 DIAGNOSIS — C79.32 SECONDARY MALIGNANT NEOPLASM OF CEREBRAL MENINGES (MULTI): ICD-10-CM

## 2025-02-20 DIAGNOSIS — C79.51 SECONDARY MALIGNANT NEOPLASM OF BONE (MULTI): ICD-10-CM

## 2025-02-20 LAB
RAD ONC MSQ ACTUAL FRACTIONS DELIVERED: 1
RAD ONC MSQ ACTUAL FRACTIONS DELIVERED: 1
RAD ONC MSQ ACTUAL SESSION DELIVERED DOSE: 400 CGRAY
RAD ONC MSQ ACTUAL SESSION DELIVERED DOSE: 600 CGRAY
RAD ONC MSQ ACTUAL TOTAL DOSE: 400 CGRAY
RAD ONC MSQ ACTUAL TOTAL DOSE: 600 CGRAY
RAD ONC MSQ ELAPSED DAYS: 0
RAD ONC MSQ ELAPSED DAYS: 0
RAD ONC MSQ LAST DATE: NORMAL
RAD ONC MSQ LAST DATE: NORMAL
RAD ONC MSQ PRESCRIBED FRACTIONAL DOSE: 400 CGRAY
RAD ONC MSQ PRESCRIBED FRACTIONAL DOSE: 600 CGRAY
RAD ONC MSQ PRESCRIBED NUMBER OF FRACTIONS: 5
RAD ONC MSQ PRESCRIBED NUMBER OF FRACTIONS: 5
RAD ONC MSQ PRESCRIBED TECHNIQUE: NORMAL
RAD ONC MSQ PRESCRIBED TECHNIQUE: NORMAL
RAD ONC MSQ PRESCRIBED TOTAL DOSE: 2000 CGRAY
RAD ONC MSQ PRESCRIBED TOTAL DOSE: 3000 CGRAY
RAD ONC MSQ PRESCRIPTION PATTERN COMMENT: NORMAL
RAD ONC MSQ PRESCRIPTION PATTERN COMMENT: NORMAL
RAD ONC MSQ START DATE: NORMAL
RAD ONC MSQ START DATE: NORMAL
RAD ONC MSQ TREATMENT COURSE NUMBER: 2
RAD ONC MSQ TREATMENT COURSE NUMBER: 2
RAD ONC MSQ TREATMENT SITE: NORMAL
RAD ONC MSQ TREATMENT SITE: NORMAL

## 2025-02-20 PROCEDURE — 77280 THER RAD SIMULAJ FIELD SMPL: CPT | Performed by: INTERNAL MEDICINE

## 2025-02-20 PROCEDURE — 77373 STRTCTC BDY RAD THER TX DLVR: CPT | Performed by: INTERNAL MEDICINE

## 2025-02-20 ASSESSMENT — PAIN SCALES - GENERAL: PAINLEVEL_OUTOF10: 0-NO PAIN

## 2025-02-20 NOTE — PROGRESS NOTES
Radiation Oncology On Treatment Visit    Patient Name:  Jovon Mccartney  MRN:  [id number]  :  1975    Referring Provider: Anton Mcgrath MD PhD  Primary Care Provider: ADELINA Hernandez  Care Team: Patient Care Team:  ADELINA Hernandez as PCP - General (Gerontology)  Liset Vargas MD as On Call Attending Physician (Hematology and Oncology)  Anton Mcgrath MD PhD as Radiation Oncologist (Radiation Oncology)  Nicola Garcia [facility] as Care Manager (Case Management)    Date of Service: 2025     Diagnosis:   Specialty Problems          Radiation Oncology Problems    NSCLC metastatic to bone (Multi)         Treatment Summary:  3D CRT: Right Scalp, Right Scapula, Not Applicable Cervical spine    Treatment Period Technique Fraction Dose Fractions Total Dose   Course 1 10/10/2024-10/17/2024  (days elapsed: 7)         C_Spine 10/10/2024-10/17/2024 3D 400 / 400 cGy 5 / 5 2000 / 2,000 cGy         R_Scalp 10/10/2024-10/17/2024 3D 400 / 400 cGy 5 / 5 2000 / 2,000 cGy         R_Shoulder 10/10/2024-10/17/2024 3D 400 / 400 cGy 5 / 5 2000 / 2,000 cGy      Radiation Therapy    Treatment Period Technique Fraction Dose Fractions Total Dose   Course 2 2025-2025  (days elapsed: 0)         Brain_R 2025-2025 SBRT 600 / 600 cGy 1 / 5 600 / 3,000 cGy         R_Hip 2025-2025 3-Field 400 / 400 cGy  / 5 400 / 2,000 cGy     SUBJECTIVE:     Jovon Mccartney is a 49 y.o. 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  He is status post palliative radiation therapy to the C-spine, right scalp, and right shoulder, delivering a total of 20 Gray in 5 fractions and completed on 10/17/2024.  The patient has received on 2020 for third cycle of carboplatin (AUC 5 decreased with AUC 4), pemetrexed (20 mg/m² decreased to 400 mg/m²), and pembrolizumab.   Restaging imaging showed progression of the disease in the bone.  The case was discussed with medical oncologist and decision was to proceed with aggressive local therapy given the patient's background past medical history.  The patient's are currently undergoing SBRT to the right brain, and 3D to the right hip.    He presents today with his son for a scheduled OTV he denies any new onset pain.  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  He denies any double vision, numbness, or headache.  His pain is well-controlled by his current pain regimen.    OBJECTIVE:   Vital Signs:  /68 (BP Location: Right arm, Patient Position: Sitting, BP Cuff Size: Adult)   Pulse 84   Temp 36.6 °C (97.9 °F) (Temporal)   Resp 18   Wt 46.5 kg (102 lb 8.2 oz)   SpO2 97%   BMI 16.55 kg/m²     Other Pertinent Findings:     GA: NAD, ACO x 3  VS: WNL    Toxicity Assessment          2/20/2025    13:00   Toxicity Assessment   Adverse Events Reviewed (WDL) Yes (Within Defined Limits)   Treatment Site Bone   Anorexia Grade 1       fair appetite   Dehydration Grade 0   Dermatitis Radiation Grade 0   Fatigue Grade 1   Pain Grade 1       MS contin BID with oxycodone prn/ moderate relief.   Bone Pain Grade 1       parminder arms. upper back and shoulders        Assessment / Plan:  The patient is tolerating radiation therapy as anticipated.  Continue per current treatment plan.     Follow-up scheduled with repeat imaging on May 1, 2025.

## 2025-02-20 NOTE — PROGRESS NOTES
Contacted and spoke to Mr. Mccartney, to obtain more info regarding resources that he needed to be connected with.  He stated that he was in need of financial, utility and medical bills assistance. I stated that I will follow up with him next with resources that I could connect him with.     Discussed the following topics on behalf of the patient:  []  Behavioral Health Assistance     []  Case Management  []   Assistance  []  Digital Equity Assistance  []  Dental Health Assistance  []  Education Assistance  []  Employment Assistance  [x]  Financial Strain Relief Assistance  []  Food Insecurity Assistance  []  Healthcare Coverage Assistance  []  Housing Stability Assistance  []  IP Violence Relief Assistance  []  Legal Assistance  []  Physical Activity Assistance  []  Social Connection Assistance  []  Stress Relief Assistance   []  Substance Abuse Assistance  []  Transportation Assistance  [x]  Utility Assistance  [x]  Other: MEDICAL BILLS ASSISTANCE    Next Steps:   WILL FU WITH THE PATIENT ON 2/24      Nicola Garcia [facility]

## 2025-02-21 ENCOUNTER — HOSPITAL ENCOUNTER (OUTPATIENT)
Dept: RADIATION ONCOLOGY | Facility: CLINIC | Age: 50
Setting detail: RADIATION/ONCOLOGY SERIES
Discharge: HOME | End: 2025-02-21
Payer: COMMERCIAL

## 2025-02-21 DIAGNOSIS — C79.51 SECONDARY MALIGNANT NEOPLASM OF BONE (MULTI): ICD-10-CM

## 2025-02-21 DIAGNOSIS — Z51.0 ENCOUNTER FOR ANTINEOPLASTIC RADIATION THERAPY: ICD-10-CM

## 2025-02-21 DIAGNOSIS — C79.32 SECONDARY MALIGNANT NEOPLASM OF CEREBRAL MENINGES (MULTI): ICD-10-CM

## 2025-02-21 LAB
RAD ONC MSQ ACTUAL FRACTIONS DELIVERED: 2
RAD ONC MSQ ACTUAL SESSION DELIVERED DOSE: 400 CGRAY
RAD ONC MSQ ACTUAL TOTAL DOSE: 800 CGRAY
RAD ONC MSQ ELAPSED DAYS: 1
RAD ONC MSQ LAST DATE: NORMAL
RAD ONC MSQ PRESCRIBED FRACTIONAL DOSE: 400 CGRAY
RAD ONC MSQ PRESCRIBED NUMBER OF FRACTIONS: 5
RAD ONC MSQ PRESCRIBED TECHNIQUE: NORMAL
RAD ONC MSQ PRESCRIBED TOTAL DOSE: 2000 CGRAY
RAD ONC MSQ PRESCRIPTION PATTERN COMMENT: NORMAL
RAD ONC MSQ START DATE: NORMAL
RAD ONC MSQ TREATMENT COURSE NUMBER: 2
RAD ONC MSQ TREATMENT SITE: NORMAL

## 2025-02-21 PROCEDURE — 77373 STRTCTC BDY RAD THER TX DLVR: CPT | Performed by: RADIOLOGY

## 2025-02-24 ENCOUNTER — HOSPITAL ENCOUNTER (OUTPATIENT)
Dept: RADIATION ONCOLOGY | Facility: CLINIC | Age: 50
Setting detail: RADIATION/ONCOLOGY SERIES
Discharge: HOME | End: 2025-02-24
Payer: COMMERCIAL

## 2025-02-24 DIAGNOSIS — Z51.0 ENCOUNTER FOR ANTINEOPLASTIC RADIATION THERAPY: ICD-10-CM

## 2025-02-24 DIAGNOSIS — C79.32 SECONDARY MALIGNANT NEOPLASM OF CEREBRAL MENINGES (MULTI): ICD-10-CM

## 2025-02-24 DIAGNOSIS — C79.51 SECONDARY MALIGNANT NEOPLASM OF BONE (MULTI): ICD-10-CM

## 2025-02-24 LAB
RAD ONC MSQ ACTUAL FRACTIONS DELIVERED: 3
RAD ONC MSQ ACTUAL FRACTIONS DELIVERED: 3
RAD ONC MSQ ACTUAL SESSION DELIVERED DOSE: 400 CGRAY
RAD ONC MSQ ACTUAL SESSION DELIVERED DOSE: 600 CGRAY
RAD ONC MSQ ACTUAL TOTAL DOSE: 1200 CGRAY
RAD ONC MSQ ACTUAL TOTAL DOSE: 1800 CGRAY
RAD ONC MSQ ELAPSED DAYS: 4
RAD ONC MSQ ELAPSED DAYS: 4
RAD ONC MSQ LAST DATE: NORMAL
RAD ONC MSQ LAST DATE: NORMAL
RAD ONC MSQ PRESCRIBED FRACTIONAL DOSE: 400 CGRAY
RAD ONC MSQ PRESCRIBED FRACTIONAL DOSE: 600 CGRAY
RAD ONC MSQ PRESCRIBED NUMBER OF FRACTIONS: 5
RAD ONC MSQ PRESCRIBED NUMBER OF FRACTIONS: 5
RAD ONC MSQ PRESCRIBED TECHNIQUE: NORMAL
RAD ONC MSQ PRESCRIBED TECHNIQUE: NORMAL
RAD ONC MSQ PRESCRIBED TOTAL DOSE: 2000 CGRAY
RAD ONC MSQ PRESCRIBED TOTAL DOSE: 3000 CGRAY
RAD ONC MSQ PRESCRIPTION PATTERN COMMENT: NORMAL
RAD ONC MSQ PRESCRIPTION PATTERN COMMENT: NORMAL
RAD ONC MSQ START DATE: NORMAL
RAD ONC MSQ START DATE: NORMAL
RAD ONC MSQ TREATMENT COURSE NUMBER: 2
RAD ONC MSQ TREATMENT COURSE NUMBER: 2
RAD ONC MSQ TREATMENT SITE: NORMAL
RAD ONC MSQ TREATMENT SITE: NORMAL

## 2025-02-24 PROCEDURE — 77373 STRTCTC BDY RAD THER TX DLVR: CPT | Performed by: RADIOLOGY

## 2025-02-25 ENCOUNTER — APPOINTMENT (OUTPATIENT)
Dept: RADIATION ONCOLOGY | Facility: CLINIC | Age: 50
End: 2025-02-25
Payer: COMMERCIAL

## 2025-02-26 ENCOUNTER — HOSPITAL ENCOUNTER (OUTPATIENT)
Dept: RADIATION ONCOLOGY | Facility: CLINIC | Age: 50
Setting detail: RADIATION/ONCOLOGY SERIES
Discharge: HOME | End: 2025-02-26
Payer: COMMERCIAL

## 2025-02-26 DIAGNOSIS — C79.32 SECONDARY MALIGNANT NEOPLASM OF CEREBRAL MENINGES (MULTI): ICD-10-CM

## 2025-02-26 DIAGNOSIS — Z51.0 ENCOUNTER FOR ANTINEOPLASTIC RADIATION THERAPY: ICD-10-CM

## 2025-02-26 DIAGNOSIS — C79.51 SECONDARY MALIGNANT NEOPLASM OF BONE (MULTI): ICD-10-CM

## 2025-02-26 LAB
RAD ONC MSQ ACTUAL FRACTIONS DELIVERED: 4
RAD ONC MSQ ACTUAL FRACTIONS DELIVERED: 4
RAD ONC MSQ ACTUAL SESSION DELIVERED DOSE: 400 CGRAY
RAD ONC MSQ ACTUAL SESSION DELIVERED DOSE: 600 CGRAY
RAD ONC MSQ ACTUAL TOTAL DOSE: 1600 CGRAY
RAD ONC MSQ ACTUAL TOTAL DOSE: 2400 CGRAY
RAD ONC MSQ ELAPSED DAYS: 6
RAD ONC MSQ ELAPSED DAYS: 6
RAD ONC MSQ LAST DATE: NORMAL
RAD ONC MSQ LAST DATE: NORMAL
RAD ONC MSQ PRESCRIBED FRACTIONAL DOSE: 400 CGRAY
RAD ONC MSQ PRESCRIBED FRACTIONAL DOSE: 600 CGRAY
RAD ONC MSQ PRESCRIBED NUMBER OF FRACTIONS: 5
RAD ONC MSQ PRESCRIBED NUMBER OF FRACTIONS: 5
RAD ONC MSQ PRESCRIBED TECHNIQUE: NORMAL
RAD ONC MSQ PRESCRIBED TECHNIQUE: NORMAL
RAD ONC MSQ PRESCRIBED TOTAL DOSE: 2000 CGRAY
RAD ONC MSQ PRESCRIBED TOTAL DOSE: 3000 CGRAY
RAD ONC MSQ PRESCRIPTION PATTERN COMMENT: NORMAL
RAD ONC MSQ PRESCRIPTION PATTERN COMMENT: NORMAL
RAD ONC MSQ START DATE: NORMAL
RAD ONC MSQ START DATE: NORMAL
RAD ONC MSQ TREATMENT COURSE NUMBER: 2
RAD ONC MSQ TREATMENT COURSE NUMBER: 2
RAD ONC MSQ TREATMENT SITE: NORMAL
RAD ONC MSQ TREATMENT SITE: NORMAL

## 2025-02-26 PROCEDURE — 77373 STRTCTC BDY RAD THER TX DLVR: CPT | Performed by: RADIOLOGY

## 2025-02-27 ENCOUNTER — APPOINTMENT (OUTPATIENT)
Dept: RADIATION ONCOLOGY | Facility: CLINIC | Age: 50
End: 2025-02-27
Payer: COMMERCIAL

## 2025-02-27 ENCOUNTER — PATIENT OUTREACH (OUTPATIENT)
Dept: CARE COORDINATION | Facility: CLINIC | Age: 50
End: 2025-02-27
Payer: COMMERCIAL

## 2025-02-27 NOTE — PROGRESS NOTES
Attempted to follow up with the patient in regards to provider referral for resource assist: PT was not available, left a voice message w/my name and number for a return call back.

## 2025-02-28 ENCOUNTER — HOSPITAL ENCOUNTER (OUTPATIENT)
Dept: RADIATION ONCOLOGY | Facility: CLINIC | Age: 50
Setting detail: RADIATION/ONCOLOGY SERIES
Discharge: HOME | End: 2025-02-28
Payer: COMMERCIAL

## 2025-02-28 ENCOUNTER — DOCUMENTATION (OUTPATIENT)
Dept: RADIATION ONCOLOGY | Facility: CLINIC | Age: 50
End: 2025-02-28
Payer: COMMERCIAL

## 2025-02-28 ENCOUNTER — APPOINTMENT (OUTPATIENT)
Dept: RADIATION ONCOLOGY | Facility: CLINIC | Age: 50
End: 2025-02-28
Payer: COMMERCIAL

## 2025-02-28 DIAGNOSIS — C79.51 SECONDARY MALIGNANT NEOPLASM OF BONE (MULTI): ICD-10-CM

## 2025-02-28 DIAGNOSIS — Z51.0 ENCOUNTER FOR ANTINEOPLASTIC RADIATION THERAPY: ICD-10-CM

## 2025-02-28 DIAGNOSIS — C79.32 SECONDARY MALIGNANT NEOPLASM OF CEREBRAL MENINGES (MULTI): ICD-10-CM

## 2025-02-28 LAB
RAD ONC MSQ ACTUAL FRACTIONS DELIVERED: 5
RAD ONC MSQ ACTUAL FRACTIONS DELIVERED: 5
RAD ONC MSQ ACTUAL SESSION DELIVERED DOSE: 400 CGRAY
RAD ONC MSQ ACTUAL SESSION DELIVERED DOSE: 600 CGRAY
RAD ONC MSQ ACTUAL TOTAL DOSE: 2000 CGRAY
RAD ONC MSQ ACTUAL TOTAL DOSE: 3000 CGRAY
RAD ONC MSQ ELAPSED DAYS: 8
RAD ONC MSQ ELAPSED DAYS: 8
RAD ONC MSQ LAST DATE: NORMAL
RAD ONC MSQ LAST DATE: NORMAL
RAD ONC MSQ PRESCRIBED FRACTIONAL DOSE: 400 CGRAY
RAD ONC MSQ PRESCRIBED FRACTIONAL DOSE: 600 CGRAY
RAD ONC MSQ PRESCRIBED NUMBER OF FRACTIONS: 5
RAD ONC MSQ PRESCRIBED NUMBER OF FRACTIONS: 5
RAD ONC MSQ PRESCRIBED TECHNIQUE: NORMAL
RAD ONC MSQ PRESCRIBED TECHNIQUE: NORMAL
RAD ONC MSQ PRESCRIBED TOTAL DOSE: 2000 CGRAY
RAD ONC MSQ PRESCRIBED TOTAL DOSE: 3000 CGRAY
RAD ONC MSQ PRESCRIPTION PATTERN COMMENT: NORMAL
RAD ONC MSQ PRESCRIPTION PATTERN COMMENT: NORMAL
RAD ONC MSQ START DATE: NORMAL
RAD ONC MSQ START DATE: NORMAL
RAD ONC MSQ TREATMENT COURSE NUMBER: 2
RAD ONC MSQ TREATMENT COURSE NUMBER: 2
RAD ONC MSQ TREATMENT SITE: NORMAL
RAD ONC MSQ TREATMENT SITE: NORMAL

## 2025-02-28 PROCEDURE — 77336 RADIATION PHYSICS CONSULT: CPT | Performed by: INTERNAL MEDICINE

## 2025-02-28 PROCEDURE — 77373 STRTCTC BDY RAD THER TX DLVR: CPT | Performed by: RADIOLOGY

## 2025-03-03 ENCOUNTER — LAB (OUTPATIENT)
Dept: LAB | Facility: CLINIC | Age: 50
End: 2025-03-03
Payer: COMMERCIAL

## 2025-03-03 ENCOUNTER — APPOINTMENT (OUTPATIENT)
Dept: RADIATION ONCOLOGY | Facility: CLINIC | Age: 50
End: 2025-03-03
Payer: COMMERCIAL

## 2025-03-03 ENCOUNTER — OFFICE VISIT (OUTPATIENT)
Dept: HEMATOLOGY/ONCOLOGY | Facility: CLINIC | Age: 50
End: 2025-03-03
Payer: COMMERCIAL

## 2025-03-03 ENCOUNTER — APPOINTMENT (OUTPATIENT)
Dept: HEMATOLOGY/ONCOLOGY | Facility: CLINIC | Age: 50
End: 2025-03-03
Payer: COMMERCIAL

## 2025-03-03 ENCOUNTER — PATIENT OUTREACH (OUTPATIENT)
Dept: CARE COORDINATION | Facility: CLINIC | Age: 50
End: 2025-03-03
Payer: COMMERCIAL

## 2025-03-03 ENCOUNTER — INFUSION (OUTPATIENT)
Dept: HEMATOLOGY/ONCOLOGY | Facility: CLINIC | Age: 50
End: 2025-03-03
Payer: COMMERCIAL

## 2025-03-03 VITALS
RESPIRATION RATE: 18 BRPM | SYSTOLIC BLOOD PRESSURE: 110 MMHG | BODY MASS INDEX: 16.79 KG/M2 | DIASTOLIC BLOOD PRESSURE: 70 MMHG | TEMPERATURE: 98.8 F | OXYGEN SATURATION: 92 % | WEIGHT: 104 LBS | HEART RATE: 76 BPM

## 2025-03-03 VITALS — HEIGHT: 65 IN | BODY MASS INDEX: 17.29 KG/M2

## 2025-03-03 DIAGNOSIS — D50.8 OTHER IRON DEFICIENCY ANEMIA: Primary | ICD-10-CM

## 2025-03-03 DIAGNOSIS — C34.90 NSCLC METASTATIC TO BONE (MULTI): ICD-10-CM

## 2025-03-03 DIAGNOSIS — D64.81 ANEMIA DUE TO ANTINEOPLASTIC CHEMOTHERAPY: ICD-10-CM

## 2025-03-03 DIAGNOSIS — C79.51 NSCLC METASTATIC TO BONE (MULTI): ICD-10-CM

## 2025-03-03 DIAGNOSIS — Z51.12 ENCOUNTER FOR ANTINEOPLASTIC CHEMOTHERAPY AND IMMUNOTHERAPY: ICD-10-CM

## 2025-03-03 DIAGNOSIS — T45.1X5A ANEMIA DUE TO ANTINEOPLASTIC CHEMOTHERAPY: ICD-10-CM

## 2025-03-03 DIAGNOSIS — E87.1 HYPONATREMIA: ICD-10-CM

## 2025-03-03 DIAGNOSIS — Z51.11 ENCOUNTER FOR ANTINEOPLASTIC CHEMOTHERAPY AND IMMUNOTHERAPY: ICD-10-CM

## 2025-03-03 PROBLEM — D50.9 IRON DEFICIENCY ANEMIA: Status: ACTIVE | Noted: 2025-03-03

## 2025-03-03 LAB
ALBUMIN SERPL BCP-MCNC: 3.4 G/DL (ref 3.4–5)
ALP SERPL-CCNC: 115 U/L (ref 33–120)
ALT SERPL W P-5'-P-CCNC: 9 U/L (ref 10–52)
ANION GAP SERPL CALC-SCNC: 12 MMOL/L (ref 10–20)
AST SERPL W P-5'-P-CCNC: 12 U/L (ref 9–39)
BASOPHILS # BLD AUTO: 0 X10*3/UL (ref 0–0.1)
BASOPHILS NFR BLD AUTO: 0 %
BILIRUB SERPL-MCNC: 0.3 MG/DL (ref 0–1.2)
BUN SERPL-MCNC: 11 MG/DL (ref 6–23)
CALCIUM SERPL-MCNC: 9.1 MG/DL (ref 8.6–10.3)
CHLORIDE SERPL-SCNC: 97 MMOL/L (ref 98–107)
CO2 SERPL-SCNC: 30 MMOL/L (ref 21–32)
CREAT SERPL-MCNC: 0.44 MG/DL (ref 0.5–1.3)
EGFRCR SERPLBLD CKD-EPI 2021: >90 ML/MIN/1.73M*2
EOSINOPHIL # BLD AUTO: 0.04 X10*3/UL (ref 0–0.7)
EOSINOPHIL NFR BLD AUTO: 0.7 %
ERYTHROCYTE [DISTWIDTH] IN BLOOD BY AUTOMATED COUNT: 17.3 % (ref 11.5–14.5)
GLUCOSE SERPL-MCNC: 88 MG/DL (ref 74–99)
HCT VFR BLD AUTO: 25.4 % (ref 41–52)
HGB BLD-MCNC: 7.6 G/DL (ref 13.5–17.5)
IMM GRANULOCYTES # BLD AUTO: 0.02 X10*3/UL (ref 0–0.7)
IMM GRANULOCYTES NFR BLD AUTO: 0.4 % (ref 0–0.9)
LYMPHOCYTES # BLD AUTO: 1.03 X10*3/UL (ref 1.2–4.8)
LYMPHOCYTES NFR BLD AUTO: 18.6 %
MCH RBC QN AUTO: 28.3 PG (ref 26–34)
MCHC RBC AUTO-ENTMCNC: 29.9 G/DL (ref 32–36)
MCV RBC AUTO: 94 FL (ref 80–100)
MONOCYTES # BLD AUTO: 0.75 X10*3/UL (ref 0.1–1)
MONOCYTES NFR BLD AUTO: 13.6 %
NEUTROPHILS # BLD AUTO: 3.69 X10*3/UL (ref 1.2–7.7)
NEUTROPHILS NFR BLD AUTO: 66.7 %
NRBC BLD-RTO: ABNORMAL /100{WBCS}
PLATELET # BLD AUTO: 497 X10*3/UL (ref 150–450)
POTASSIUM SERPL-SCNC: 4.5 MMOL/L (ref 3.5–5.3)
PROT SERPL-MCNC: 7.4 G/DL (ref 6.4–8.2)
RBC # BLD AUTO: 2.69 X10*6/UL (ref 4.5–5.9)
SODIUM SERPL-SCNC: 134 MMOL/L (ref 136–145)
WBC # BLD AUTO: 5.5 X10*3/UL (ref 4.4–11.3)

## 2025-03-03 PROCEDURE — 96375 TX/PRO/DX INJ NEW DRUG ADDON: CPT | Mod: INF

## 2025-03-03 PROCEDURE — 84443 ASSAY THYROID STIM HORMONE: CPT

## 2025-03-03 PROCEDURE — 1036F TOBACCO NON-USER: CPT | Performed by: NURSE PRACTITIONER

## 2025-03-03 PROCEDURE — 36415 COLL VENOUS BLD VENIPUNCTURE: CPT

## 2025-03-03 PROCEDURE — 96411 CHEMO IV PUSH ADDL DRUG: CPT

## 2025-03-03 PROCEDURE — 2500000004 HC RX 250 GENERAL PHARMACY W/ HCPCS (ALT 636 FOR OP/ED): Mod: JZ,TB | Performed by: STUDENT IN AN ORGANIZED HEALTH CARE EDUCATION/TRAINING PROGRAM

## 2025-03-03 PROCEDURE — 82533 TOTAL CORTISOL: CPT

## 2025-03-03 PROCEDURE — 99214 OFFICE O/P EST MOD 30 MIN: CPT | Performed by: NURSE PRACTITIONER

## 2025-03-03 PROCEDURE — 80053 COMPREHEN METABOLIC PANEL: CPT

## 2025-03-03 PROCEDURE — 85025 COMPLETE CBC W/AUTO DIFF WBC: CPT

## 2025-03-03 PROCEDURE — 96413 CHEMO IV INFUSION 1 HR: CPT

## 2025-03-03 PROCEDURE — 99214 OFFICE O/P EST MOD 30 MIN: CPT | Mod: 25 | Performed by: NURSE PRACTITIONER

## 2025-03-03 PROCEDURE — G2211 COMPLEX E/M VISIT ADD ON: HCPCS | Performed by: NURSE PRACTITIONER

## 2025-03-03 PROCEDURE — 2500000004 HC RX 250 GENERAL PHARMACY W/ HCPCS (ALT 636 FOR OP/ED): Performed by: STUDENT IN AN ORGANIZED HEALTH CARE EDUCATION/TRAINING PROGRAM

## 2025-03-03 RX ORDER — PROCHLORPERAZINE EDISYLATE 5 MG/ML
10 INJECTION INTRAMUSCULAR; INTRAVENOUS EVERY 6 HOURS PRN
Status: CANCELLED | OUTPATIENT
Start: 2025-03-03

## 2025-03-03 RX ORDER — DIPHENHYDRAMINE HYDROCHLORIDE 50 MG/ML
50 INJECTION INTRAMUSCULAR; INTRAVENOUS AS NEEDED
Status: DISCONTINUED | OUTPATIENT
Start: 2025-03-03 | End: 2025-03-03 | Stop reason: HOSPADM

## 2025-03-03 RX ORDER — PROCHLORPERAZINE MALEATE 10 MG
10 TABLET ORAL EVERY 6 HOURS PRN
Status: DISCONTINUED | OUTPATIENT
Start: 2025-03-03 | End: 2025-03-03 | Stop reason: HOSPADM

## 2025-03-03 RX ORDER — PROCHLORPERAZINE EDISYLATE 5 MG/ML
10 INJECTION INTRAMUSCULAR; INTRAVENOUS EVERY 6 HOURS PRN
Status: DISCONTINUED | OUTPATIENT
Start: 2025-03-03 | End: 2025-03-03 | Stop reason: HOSPADM

## 2025-03-03 RX ORDER — HEPARIN 100 UNIT/ML
500 SYRINGE INTRAVENOUS AS NEEDED
OUTPATIENT
Start: 2025-03-03

## 2025-03-03 RX ORDER — DIPHENHYDRAMINE HYDROCHLORIDE 50 MG/ML
50 INJECTION INTRAMUSCULAR; INTRAVENOUS AS NEEDED
Status: CANCELLED | OUTPATIENT
Start: 2025-03-03

## 2025-03-03 RX ORDER — ALBUTEROL SULFATE 0.83 MG/ML
3 SOLUTION RESPIRATORY (INHALATION) AS NEEDED
Status: CANCELLED | OUTPATIENT
Start: 2025-03-03

## 2025-03-03 RX ORDER — FAMOTIDINE 10 MG/ML
20 INJECTION, SOLUTION INTRAVENOUS ONCE AS NEEDED
Status: DISCONTINUED | OUTPATIENT
Start: 2025-03-03 | End: 2025-03-03 | Stop reason: HOSPADM

## 2025-03-03 RX ORDER — EPINEPHRINE 0.3 MG/.3ML
0.3 INJECTION SUBCUTANEOUS EVERY 5 MIN PRN
Status: DISCONTINUED | OUTPATIENT
Start: 2025-03-03 | End: 2025-03-03 | Stop reason: HOSPADM

## 2025-03-03 RX ORDER — DEXAMETHASONE SODIUM PHOSPHATE 10 MG/ML
10 INJECTION INTRAMUSCULAR; INTRAVENOUS ONCE
Status: CANCELLED | OUTPATIENT
Start: 2025-03-03

## 2025-03-03 RX ORDER — ALBUTEROL SULFATE 0.83 MG/ML
3 SOLUTION RESPIRATORY (INHALATION) AS NEEDED
Status: DISCONTINUED | OUTPATIENT
Start: 2025-03-03 | End: 2025-03-03 | Stop reason: HOSPADM

## 2025-03-03 RX ORDER — EPINEPHRINE 0.3 MG/.3ML
0.3 INJECTION SUBCUTANEOUS EVERY 5 MIN PRN
Status: CANCELLED | OUTPATIENT
Start: 2025-03-03

## 2025-03-03 RX ORDER — HEPARIN SODIUM,PORCINE/PF 10 UNIT/ML
50 SYRINGE (ML) INTRAVENOUS AS NEEDED
OUTPATIENT
Start: 2025-03-03

## 2025-03-03 RX ORDER — PROCHLORPERAZINE MALEATE 5 MG
10 TABLET ORAL EVERY 6 HOURS PRN
Status: CANCELLED | OUTPATIENT
Start: 2025-03-03

## 2025-03-03 RX ORDER — FAMOTIDINE 10 MG/ML
20 INJECTION, SOLUTION INTRAVENOUS ONCE AS NEEDED
Status: CANCELLED | OUTPATIENT
Start: 2025-03-03

## 2025-03-03 RX ADMIN — PEMETREXED DISODIUM 600 MG: 500 INJECTION, POWDER, LYOPHILIZED, FOR SOLUTION INTRAVENOUS at 13:57

## 2025-03-03 RX ADMIN — SODIUM CHLORIDE 200 MG: 9 INJECTION, SOLUTION INTRAVENOUS at 13:15

## 2025-03-03 RX ADMIN — DEXAMETHASONE SODIUM PHOSPHATE 10 MG: 4 INJECTION, SOLUTION INTRA-ARTICULAR; INTRALESIONAL; INTRAMUSCULAR; INTRAVENOUS; SOFT TISSUE at 13:06

## 2025-03-03 ASSESSMENT — ENCOUNTER SYMPTOMS
MYALGIAS: 1
CARDIOVASCULAR NEGATIVE: 1
FATIGUE: 1
NAUSEA: 0
DIARRHEA: 0
SHORTNESS OF BREATH: 1
UNEXPECTED WEIGHT CHANGE: 0
FEVER: 0
BACK PAIN: 1
CONSTIPATION: 1
VOMITING: 0
APPETITE CHANGE: 0
HEMOPTYSIS: 0

## 2025-03-03 ASSESSMENT — PAIN SCALES - GENERAL: PAINLEVEL_OUTOF10: 5

## 2025-03-03 NOTE — PROGRESS NOTES
[facility] - Medical Oncology Follow-Up Visit    Patient ID: Jovon Mccartney is a 49 y.o. male with NSCLC adenocarcinoma     Current therapy: cyanocobalamin (Vitamin B-12) injection 1,000 mcg, 1,000 mcg, intramuscular, Once, 2 of 2 cycles    Administration: 1,000 mcg (10/28/2024), 1,000 mcg (12/30/2024)        fosaprepitant (Emend) 150 mg in sodium chloride 0.9% 150 mL IV, 150 mg, intravenous, Once, 4 of 4 cycles    Administration: 150 mg (10/28/2024), 150 mg (11/18/2024), 150 mg (12/9/2024), 150 mg (12/30/2024)        CARBOplatin (Paraplatin) 627 mg in sodium chloride 0.9% 172.7 mL IV, 627 mg, intravenous, Once, 4 of 4 cycles    Administration: 627 mg (10/28/2024), 627 mg (11/18/2024), 500 mg (12/9/2024), 440 mg (12/30/2024)        methylPREDNISolone sod succinate (SOLU-Medrol) 40 mg/mL injection 40 mg, 40 mg, intravenous, As needed, 4 of 4 cycles        palonosetron (Aloxi) injection 250 mcg, 250 mcg, intravenous, Once, 4 of 4 cycles    Administration: 250 mcg (10/28/2024), 250 mcg (11/18/2024), 250 mcg (12/9/2024), 250 mcg (12/30/2024)        pembrolizumab (Keytruda) 200 mg in sodium chloride 0.9% 118 mL IV, 200 mg, intravenous, Once, 4 of 4 cycles    Administration: 200 mg (10/28/2024), 200 mg (11/18/2024), 200 mg (12/9/2024), 200 mg (12/30/2024)        PEMEtrexed disodium (Alimta) 800 mg in sodium chloride 0.9% 142 mL IV, 500 mg/m2 = 800 mg, intravenous, Once, 4 of 4 cycles    Dose modification: 400 mg/m2 (original dose 500 mg/m2, Cycle 2)    Administration: 800 mg (10/28/2024), 645 mg (11/18/2024), 600 mg (12/9/2024), 600 mg (12/30/2024)    cyanocobalamin (Vitamin B-12) injection 1,000 mcg, 1,000 mcg, intramuscular, Once, 1 of 6 cycles    Administration: 1,000 mcg (1/21/2025)        methylPREDNISolone sod succinate (SOLU-Medrol) 40 mg/mL injection 40 mg, 40 mg, intravenous, As needed, 3 of 18 cycles        pembrolizumab (Keytruda) 200 mg in sodium chloride 0.9% 118 mL IV, 200 mg,  intravenous, Once, 3 of 18 cycles    Administration: 200 mg (1/21/2025), 200 mg (2/10/2025), 200 mg (3/3/2025)        PEMEtrexed disodium (Alimta) 600 mg in sodium chloride 0.9% 134 mL IV, 400 mg/m2 = 600 mg (80 % of original dose 500 mg/m2), intravenous, Once, 3 of 18 cycles    Dose modification: 400 mg/m2 (original dose 500 mg/m2, Cycle 1)    Administration: 600 mg (1/21/2025), 600 mg (2/10/2025), 600 mg (3/3/2025)       Chief Concern: readiness to treat visit    Oncologic History:     DIAGNOSIS  NSCLC adenocarcinoma      STAGING  cT4 pN3 M1c      CURRENT SITES OF DISEASE  R hilar mass, 4L, 11L, scapula,  right,   parieto-occipital bone, C2, C3, ?lymphatic carcinomatosis     MOLECULAR GENOMICS  KRAS p.G12C ([id number] c.34G>T)   PD-L1 <1%     PRIOR THERAPY        CURRENT THERAPY  - Palliative XRT course 1: R_skull 2000 cGy, C-spine 2000 cGy, R_ shoulder 2000 cGy (10/10/24 - 10/17/24)  - Carboplatin (AUC 5), Pemetrexed (500mg/m2), & Pembrolizumab 10/28/24 -   - Pemetrexed dose reduced 400mg/m2 - C2 11/18/24   - Carboplatin dose reduced (AUC 4) - C3 12/9/24   - Palliative RT course 2:  2/20 SBRT Brain_R 3000 cGy, R_Hip 3 fields 2000 cGy (2/20, 2/21, 2/24, 2/26 & 2/28/25)     CURRENT ONCOLOGICAL PROBLEMS  Hemoptysis - resolved  Epistaxis - intermittent        HISTORY OF PRESENT ILLNESS  Mr. Mccartney is a 50 yo with PMH significant for RA who present to the [place] ER on 9/9/24 with 2 months of progressively worsening headaches and neck pain with occasional radiation to the R and L arms. A lytic lesion was noted on CT head 9/9/24 of the parietal bone, and CT cervical spine was concerning for lucent lesions C2 and C3. Brain MRI and MR cervical spine confirmed marrow-replacing lesions fo the C2 and C3 vertebral bodies. CT C/A/P w/o contrast on 9/10/24 was concerning for soft tissue mass of the R hilum, lytic lesions of the R scapula, and 3.5 cm mass in the L gluteus. He was transferred to [facility] on 9/10/24 CT C/A/P w/IV contrast  on 9/15/24 confirmed the lung mass encasing the R mainstem bronchus abutting the R main pulmonary artery and extending to the lung periphery, as well as R perihilar lesion and lymphangitic carcinomatosis, prominent mediastinal nodes, L axillary node, nodular thickening of bilateral adrenal glands, likely lesion of the R scapula, small hepatic lesions, moderate pericardial effusion. EBUS on 9/17/24 with pathology of R hilar mass with adenocarcinoma, KRAS G12C, PD-L1 pending, and positive lymph nodes 11L and 4L. He met Dr. Mcgrath and discussed palliative radiation to the occipital lesion and cervical spine. PET/CT on 10/1/24 with FDG avid confluent R hilar/mediastinal mass, mild FDG avidity along upper and middle lobe, bilateral scapular, humeral, R anterior acetabular osseous metastases. He started palliative radiation 10/10/24-10/16/24.      He was born with juvenile RA, usually his feet and knees feel good compared to everything. He used to walk with a crutch, since the hospital he has been walking without it. He follows with a rheumatologist at [facility]. It's fairly managed he says, has  been on humira for 20-25 years, right when it first came out. Kept the fluid off his knees and helped him better than anything else. He was on all kinds of drugs he said before including steroids, methotrexate, others. Typically with the humira he doesn't really get flares, sometimes in his wrists. He's been off for about a month, so thinks he'd have a flare in about a month.      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 Maybe had one exposure to asbestos. 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 Lives at home with his wife and a dog, 3 cats, a bird and fish. They have 2 sons and 3 grandchildren.  Tob: quit smoking 9/9/24, smoked 39 years, 1-1.5 ppd  EtOH: occasional  Illicits: previous smoking marijuana, only edibles  now for pain     FAMILY HISTORY  Mother - breast cancer dx 56 yo  Maternal grandmother - bone cancer  Paternal cousin - bone and/or lung cancer  Paternal grandmother - unknown cancer  No other family members with autoimmune diseases    HPI   He is here today with his wife.  Breathing feels good.  Able to go up and down the stairs.   Denies cough, no phlegm.   Appetite - comes and goes with +wt gain.   Taking in high protein shakes - at least 2/day.  Denies n/v/d.  Routine BM every other day.  Palliative RT last week - last tx Friday.  Pain with decreased appetite during RT course.  Now recovering.   Voiding fine.   Low-grade temps - his norm.  No chills or CP.  Pain 5/10 with current regimen.  No rashes or skin concerns.  Labs WNL.  Ready for treatment today.      Meds (Current):    Current Outpatient Medications:     acetaminophen (Tylenol) 500 mg tablet, Take 1 tablet (500 mg) by mouth every 6 hours if needed for mild pain (1 - 3)., Disp: , Rfl:     b complex 0.4 mg tablet, Take 1 tablet by mouth once daily., Disp: , Rfl:     budesonide-glycopyr-formoterol (BREZTRI) 160-9-4.8 mcg/actuation HFA aerosol inhaler, Inhale 2 puffs 2 times a day., Disp: 10.7 g, Rfl: 3    dexAMETHasone (Decadron) 4 mg tablet, Take 4 mg (1 tablet) by mouth twice daily the day before treatment, once the evening of treatment, and twice daily the day after treatment., Disp: 5 tablet, Rfl: 5    folic acid (Folvite) 1 mg tablet, Take 1 tablet (1,000 mcg) by mouth once daily. Do not start before October 27, 2024., Disp: 30 tablet, Rfl: 11    folic acid (Folvite) 1 mg tablet, Take 1 tablet (1,000 mcg) by mouth once daily., Disp: 30 tablet, Rfl: 11    ibuprofen 200 mg tablet, Take 2 tablets (400 mg) by mouth every 6 hours if needed for mild pain (1 - 3)., Disp: , Rfl:     ipratropium-albuteroL (Combivent Respimat)  mcg/actuation inhaler, Inhale 2 puffs 4 times a day as needed for shortness of breath or wheezing., Disp: , Rfl:     mirtazapine  (Remeron) 15 mg tablet, Take 1 tablet (15 mg) by mouth once daily at bedtime., Disp: 30 tablet, Rfl: 2    morphine CR (MS Contin) 30 mg 12 hr tablet, Take 1 tablet (30 mg) by mouth 2 times a day AND 2 tablets (60 mg) once daily at bedtime. Do not crush, chew, or split. Take 30 MG AM, 30 MG MID DAY and 60 MG at BEDTIME.., Disp: 120 tablet, Rfl: 0    naloxone (Narcan) 4 mg/0.1 mL nasal spray, Administer 1 spray (4 mg) into affected nostril(s) if needed for opioid reversal. May repeat every 2-3 minutes if needed, alternating nostrils, until medical assistance becomes available., Disp: 2 each, Rfl: 3    ondansetron (Zofran) 8 mg tablet, Take 1 tablet (8 mg) by mouth every 8 hours if needed for nausea or vomiting. Do not fill before October 27, 2024., Disp: 30 tablet, Rfl: 5    ondansetron (Zofran) 8 mg tablet, Take 1 tablet (8 mg) by mouth every 8 hours if needed for nausea or vomiting., Disp: 30 tablet, Rfl: 5    oxygen (O2) gas therapy, Inhale 1 each once every 24 hours., Disp: , Rfl:     pantoprazole (ProtoNix) 40 mg EC tablet, Take 1 tablet (40 mg) by mouth once daily in the morning. Take before meals. Do not crush, chew, or split., Disp: 30 tablet, Rfl: 3    pilocarpine (Salagen, pilocarpine,) 5 mg tablet, Take 1 tablet (5 mg) by mouth 3 times a day as needed (for dry mouth)., Disp: 90 tablet, Rfl: 11    polyethylene glycol (Glycolax, Miralax) 17 gram/dose powder, Mix 17 g of powder and drink once daily., Disp: , Rfl:     prochlorperazine (Compazine) 10 mg tablet, Take 1 tablet (10 mg) by mouth every 6 hours if needed for nausea or vomiting. Do not fill before October 27, 2024., Disp: 30 tablet, Rfl: 5    prochlorperazine (Compazine) 10 mg tablet, Take 1 tablet (10 mg) by mouth every 6 hours if needed for nausea or vomiting., Disp: 30 tablet, Rfl: 5    gabapentin (Neurontin) 300 mg capsule, Take 1 capsule (300 mg) by mouth 3 times a day., Disp: 90 capsule, Rfl: 3  No current facility-administered medications for  this visit.    Review of Systems   Constitutional:  Positive for fatigue. Negative for appetite change, fever and unexpected weight change.   HENT:  Negative.  Negative for nosebleeds.    Respiratory:  Positive for shortness of breath. Negative for hemoptysis.    Cardiovascular: Negative.    Gastrointestinal:  Positive for constipation. Negative for diarrhea, nausea and vomiting.   Musculoskeletal:  Positive for back pain and myalgias.        Objective   BSA: 1.48 meters squared  Wt Readings from Last 5 Encounters:   03/03/25 47.2 kg (104 lb)   02/20/25 46.5 kg (102 lb 8.2 oz)   02/12/25 49.2 kg (108 lb 6.4 oz)   02/10/25 46.9 kg (103 lb 6.4 oz)   01/30/25 45.8 kg (100 lb 15.5 oz)     11/11/24 wt #123 - leg braces worn (8#)    /70 (BP Location: Left arm, Patient Position: Sitting, BP Cuff Size: Adult)   Pulse 76   Temp 37.1 °C (98.8 °F) (Temporal)   Resp 18   Wt 47.2 kg (104 lb)   SpO2 92% Comment: WITHOUT HIS O2 TANK. NOTIFIED NURSE  BMI 16.79 kg/m²     ECOG Score:   Performance Status (ECOG): 2    ECOG   Definition  0          Fully active; no performance restrictions.  1          Strenuous physical activity restricted; fully ambulatory and able to carry out light work.  2          Capable of all self-care but unable to carry out any work activities. Up and about >50% of waking hours.  3          Capable of only limited self-care; confined to bed or chair >50% of waking hours.  4          Completely disabled; cannot carry out any self-care; totally confined to bed or chair.    Physical Exam  Vitals reviewed.   Constitutional:       General: He is not in acute distress.     Appearance: He is not ill-appearing.      Comments: Frail, cachectic     HENT:      Head: Normocephalic and atraumatic.      Mouth/Throat:      Mouth: Mucous membranes are moist.   Eyes:      Extraocular Movements: Extraocular movements intact.      Pupils: Pupils are equal, round, and reactive to light.   Cardiovascular:      Rate  and Rhythm: Normal rate and regular rhythm.      Heart sounds: Normal heart sounds. No murmur heard.  Pulmonary:      Effort: Pulmonary effort is normal. No respiratory distress.      Breath sounds: Normal breath sounds.      Comments: Diminished RLL.   Abdominal:      General: Bowel sounds are normal. There is no distension.      Palpations: Abdomen is soft.   Musculoskeletal:      Cervical back: Normal range of motion.      Right lower leg: Edema (ankles 2/2 RA) present.      Left lower leg: Edema (ankles 2/2 RA) present.      Comments: Chronic swelling to TINY ankles and left wrist/hand 2/2 RA.   Skin:     General: Skin is warm and dry.   Neurological:      General: No focal deficit present.      Mental Status: He is alert and oriented to person, place, and time. Mental status is at baseline.   Psychiatric:         Mood and Affect: Mood normal.         Behavior: Behavior normal.         Thought Content: Thought content normal.        Results:  Labs:  Lab Results   Component Value Date    WBC 5.5 03/03/2025    HGB 7.6 (L) 03/03/2025    HCT 25.4 (L) 03/03/2025    MCV 94 03/03/2025     (H) 03/03/2025      Lab Results   Component Value Date    NEUTROABS 3.69 03/03/2025      Lab Results   Component Value Date    GLUCOSE 88 03/03/2025    CALCIUM 9.1 03/03/2025     (L) 03/03/2025    K 4.5 03/03/2025    CO2 30 03/03/2025    CL 97 (L) 03/03/2025    BUN 11 03/03/2025    CREATININE 0.44 (L) 03/03/2025    MG 1.66 11/26/2024     Lab Results   Component Value Date    ALT 9 (L) 03/03/2025    AST 12 03/03/2025    ALKPHOS 115 03/03/2025    BILITOT 0.3 03/03/2025    BILIDIR 0.1 09/14/2024      Lab Results   Component Value Date    ACTH 6.2 (L) 01/20/2025    CORTISOL 22.2 (H) 01/20/2025    TSH 1.42 01/20/2025       Imaging:  No new imaging.      Assessment/Plan      Jovon Mccartney is a 49 y.o. male here for follow up of NSCLC adenocarcinoma.     # NSCLC adenocarcinoma  - [id number]  - KRAS G12C, PD-L1 <1%  - discussed that  SoC therapies include chemotherapy+ immunotherapy. Given longstanding juvenile RA, would like input from his rheumatologist regarding safety of immunotherapy in this setting. Discussed if no immunotherapy, would recommend chemotherapy alone vs KRAS G12C targeted therapy frontline, which typically is given in the second-line setting.  - provided information on carboplatin/pemetrexed/pembrolizumab vs adagrasib   - patient discussed with his rheumatologist regarding concern for flaring RA on immunotherapy. His rheumatologist will monitor closely, stop humira, and maintain him on low-dose steroids  - consented for carboplatin/pemetrexed/pembrolizumab to start 10/28/24  - will plan on utilizing adagrasib in the 2nd line setting if needed  - will plan on restaging scans after C4.  - C2 11/18/24, Pemetrexed dose reduced 400mg/m2  - Hospitalization 11/25-11/26/24 2U PRBC's  - C3 12/9/24, Carboplatin dose reduced AUC4  - personally reviewed restaging scans with likely stable disease, will monitor closely and continue on maintenance pemetrexed/pembrolizumab  - bone-only progression, with stable/maybe minimal improvement in intrathoracic disease, and we opted for continuation on pemetrexed/pembrolizumab and close monitoring. Discussion with Dr. Mcgrath for possible palliative radiation.  - Palliative RT course 2:  2/20 SBRT Brain_R, R_Hip (2/20, 2/21, 2/24, 2/26 & 2/28/25)  - next scans in 3 months (4/9/25)  - Repeat CBC w/diff 3/17/25 - lab req provided to pt.   - RTC in 3 weeks.       # Neoplasm related pain  - already saw Dr. Mcgrath, receiving palliative radiation - repeat C spine and brain MRI 1/23/25  - seeing supportive onc - started pt on ER Morphine and PRN Oxycodone.    - Pt also taking Ibuprofen 600mg QID and 1000mg Acetaminophen QID.  Recommended pt reduce both doses x1 to start.  Pt will cut back to TID x1 week and see if his pain/RA is tolerable.    - 11/18/24 - current TID Acetaminophen and Ibuprofen daily, plus  Morphine, Oxycodone and Gabapentin.    - 12/9/24 - follows with supportive onc, pain managed with current regimen      # Hypoxia/low-grade temps  - Discussed pt borderline to ED recommendation 2/2 low-grade temps and hypoxic episode at home.  Pt agreeable to urgent care visit for nasal swabs to r/o COVID, Flu A/B, and RSV.  All (-).  - stable, PRN & HS oxygen use.  No further temps.    - Recommended routine oxygen use - 2L, increase to 4L with exertion.    - 12/9:  continuous oxygen use, except brief periods of time - eating.      # Hemoptysis/Epistaxis  - resolved with saline nasal spray  - 11/17/24 - epistaxis, brief episode  - will continue to monitor    - Anemia Hgb 7.1  - Pt consented today for blood transfusion (11/18/24)  - Discussed with Dr. Vargas, will repeat labs on 11/25/24.   Repeat labs, plus T&S and ABO ordered for 11/25/24.  Discussed he likely will need a transfusion. Recommended ED visit for repeat hemoptysis/epistaxis episodes.    - 2U [facility]BC's 11/25-11/26/24 [facility]    - Repeat CBC w/diff 12/16/24 + T&S - Hgb 8.4   - stable Hgb 8.8 12/30/24  - 3/3: Hgb 7.6 3/3/25 - will repeat CBC w/diff 3/17/25      # Hyponatremia - chronic - stable  - Baseline 129-135  - ongoing monitoring    # Low ACTH 6.2/elevated Cortisol 22.2 - drawn 11:44 1/20/25   - will monitor for now.      # Thrombocytopenia - resolved   - 11/4/24   - 11/11/24 PLT 42   - No s/sx's bleeding  - Pt educate to bleeding precautions/interventions  - 12/30/24 -      # Leukopenia - resolved  - WBC 3.2 11/4/24   - WBC 5.8 12/30/24      # Advanced care planning  - discussed incurable but treatable nature of metastatic disease, with goal of treatment to improve or maintain quality of life and prolong life.

## 2025-03-03 NOTE — PROGRESS NOTES
Attempted to contact  and fu w/PT in regards to referral for resource assist: PT was not available, left a voice message w/my name and number for a return call back.      Discussed the following topics on behalf of the patient:  []  Behavioral Health Assistance     []  Case Management  []   Assistance  []  Digital Equity Assistance  []  Dental Health Assistance  []  Education Assistance  []  Employment Assistance  [x]  Financial Strain Relief Assistance  [x]  Food Insecurity Assistance  []  Healthcare Coverage Assistance  []  Housing Stability Assistance  []  IP Violence Relief Assistance  []  Legal Assistance  []  Physical Activity Assistance  []  Social Connection Assistance  []  Stress Relief Assistance   []  Substance Abuse Assistance  []  Transportation Assistance  [x]  Utility Assistance  []  Other: [insert comment here]    Next Steps:   Will be following up on 3/5    JAYNA Pardo

## 2025-03-03 NOTE — SIGNIFICANT EVENT

## 2025-03-04 ENCOUNTER — APPOINTMENT (OUTPATIENT)
Dept: RADIATION ONCOLOGY | Facility: CLINIC | Age: 50
End: 2025-03-04
Payer: COMMERCIAL

## 2025-03-04 LAB
CORTIS AM PEAK SERPL-MSCNC: 3.1 UG/DL (ref 5–20)
TSH SERPL-ACNC: 0.54 MIU/L (ref 0.44–3.98)

## 2025-03-05 ENCOUNTER — APPOINTMENT (OUTPATIENT)
Dept: RADIATION ONCOLOGY | Facility: CLINIC | Age: 50
End: 2025-03-05
Payer: COMMERCIAL

## 2025-03-05 ENCOUNTER — PATIENT OUTREACH (OUTPATIENT)
Dept: CARE COORDINATION | Facility: CLINIC | Age: 50
End: 2025-03-05
Payer: COMMERCIAL

## 2025-03-05 NOTE — PROGRESS NOTES
Attempted to contact PT in regards to provider referral for resource assist: PT was not available, left a voice message w/my name and number for a return call back.

## 2025-03-10 ENCOUNTER — PATIENT OUTREACH (OUTPATIENT)
Dept: CARE COORDINATION | Facility: CLINIC | Age: 50
End: 2025-03-10
Payer: COMMERCIAL

## 2025-03-10 NOTE — PROGRESS NOTES
Numerous attempts were made to contact PT in regards to provider referral for resource assist: PT was not available, left a voice message w/my name and number for a return call back.

## 2025-03-17 DIAGNOSIS — D50.8 OTHER IRON DEFICIENCY ANEMIA: ICD-10-CM

## 2025-03-18 ENCOUNTER — LAB (OUTPATIENT)
Dept: LAB | Facility: HOSPITAL | Age: 50
End: 2025-03-18
Payer: COMMERCIAL

## 2025-03-18 ENCOUNTER — TELEPHONE (OUTPATIENT)
Dept: HEMATOLOGY/ONCOLOGY | Facility: HOSPITAL | Age: 50
End: 2025-03-18

## 2025-03-18 ENCOUNTER — DOCUMENTATION (OUTPATIENT)
Dept: HEMATOLOGY/ONCOLOGY | Facility: HOSPITAL | Age: 50
End: 2025-03-18

## 2025-03-18 DIAGNOSIS — D50.8 OTHER IRON DEFICIENCY ANEMIAS: Primary | ICD-10-CM

## 2025-03-18 LAB
BASOPHILS # BLD AUTO: 0 X10*3/UL (ref 0–0.1)
BASOPHILS NFR BLD AUTO: 0 %
EOSINOPHIL # BLD AUTO: 0 X10*3/UL (ref 0–0.7)
EOSINOPHIL NFR BLD AUTO: 0 %
ERYTHROCYTE [DISTWIDTH] IN BLOOD BY AUTOMATED COUNT: 17.5 % (ref 11.5–14.5)
HCT VFR BLD AUTO: 20.6 % (ref 41–52)
HGB BLD-MCNC: 6.4 G/DL (ref 13.5–17.5)
IMM GRANULOCYTES # BLD AUTO: 0.04 X10*3/UL (ref 0–0.7)
IMM GRANULOCYTES NFR BLD AUTO: 1.1 % (ref 0–0.9)
LYMPHOCYTES # BLD AUTO: 0.57 X10*3/UL (ref 1.2–4.8)
LYMPHOCYTES NFR BLD AUTO: 15.1 %
MCH RBC QN AUTO: 27.9 PG (ref 26–34)
MCHC RBC AUTO-ENTMCNC: 31.1 G/DL (ref 32–36)
MCV RBC AUTO: 90 FL (ref 80–100)
MONOCYTES # BLD AUTO: 0.37 X10*3/UL (ref 0.1–1)
MONOCYTES NFR BLD AUTO: 9.8 %
NEUTROPHILS # BLD AUTO: 2.8 X10*3/UL (ref 1.2–7.7)
NEUTROPHILS NFR BLD AUTO: 74 %
NRBC BLD-RTO: 0 /100 WBCS (ref 0–0)
PLATELET # BLD AUTO: 130 X10*3/UL (ref 150–450)
RBC # BLD AUTO: 2.29 X10*6/UL (ref 4.5–5.9)
WBC # BLD AUTO: 3.8 X10*3/UL (ref 4.4–11.3)

## 2025-03-18 PROCEDURE — 85025 COMPLETE CBC W/AUTO DIFF WBC: CPT

## 2025-03-18 NOTE — TELEPHONE ENCOUNTER
Per fellow regarding critical hgb result, If he's not bleeding or feeling sick he can wait until tomorrow for Dr Dent clinic to arrange blood transfusion. Reached out to patient and spouse to check and see how patient is feeling, no answer at either number, left voicemail with callback number at both

## 2025-03-19 ENCOUNTER — LAB (OUTPATIENT)
Dept: LAB | Facility: CLINIC | Age: 50
End: 2025-03-19
Payer: COMMERCIAL

## 2025-03-19 DIAGNOSIS — D64.81 ANEMIA DUE TO ANTINEOPLASTIC CHEMOTHERAPY: Primary | ICD-10-CM

## 2025-03-19 DIAGNOSIS — C79.51 NSCLC METASTATIC TO BONE (MULTI): ICD-10-CM

## 2025-03-19 DIAGNOSIS — C34.90 NSCLC METASTATIC TO BONE (MULTI): ICD-10-CM

## 2025-03-19 DIAGNOSIS — T45.1X5A ANEMIA DUE TO ANTINEOPLASTIC CHEMOTHERAPY: ICD-10-CM

## 2025-03-19 DIAGNOSIS — D64.81 ANEMIA DUE TO ANTINEOPLASTIC CHEMOTHERAPY: ICD-10-CM

## 2025-03-19 DIAGNOSIS — T45.1X5A ANEMIA DUE TO ANTINEOPLASTIC CHEMOTHERAPY: Primary | ICD-10-CM

## 2025-03-19 DIAGNOSIS — D50.8 OTHER IRON DEFICIENCY ANEMIA: Primary | ICD-10-CM

## 2025-03-19 LAB
ABO GROUP (TYPE) IN BLOOD: NORMAL
ANTIBODY SCREEN: NORMAL
RH FACTOR (ANTIGEN D): NORMAL

## 2025-03-19 PROCEDURE — 36415 COLL VENOUS BLD VENIPUNCTURE: CPT

## 2025-03-19 PROCEDURE — 86900 BLOOD TYPING SEROLOGIC ABO: CPT

## 2025-03-19 RX ORDER — EPINEPHRINE 0.3 MG/.3ML
0.3 INJECTION SUBCUTANEOUS EVERY 5 MIN PRN
Status: CANCELLED | OUTPATIENT
Start: 2025-03-19

## 2025-03-19 RX ORDER — DIPHENHYDRAMINE HYDROCHLORIDE 50 MG/ML
50 INJECTION, SOLUTION INTRAMUSCULAR; INTRAVENOUS AS NEEDED
Status: CANCELLED | OUTPATIENT
Start: 2025-03-19

## 2025-03-19 RX ORDER — FAMOTIDINE 10 MG/ML
20 INJECTION, SOLUTION INTRAVENOUS ONCE AS NEEDED
Status: CANCELLED | OUTPATIENT
Start: 2025-03-19

## 2025-03-19 RX ORDER — ALBUTEROL SULFATE 0.83 MG/ML
3 SOLUTION RESPIRATORY (INHALATION) AS NEEDED
Status: CANCELLED | OUTPATIENT
Start: 2025-03-19

## 2025-03-19 RX ORDER — FUROSEMIDE 20 MG/1
20 TABLET ORAL ONCE
Status: CANCELLED | OUTPATIENT
Start: 2025-03-20

## 2025-03-19 NOTE — TELEPHONE ENCOUNTER
Patient states he is feeling fine, denies any bleeding, sob, etc. Patient aware team will likely reach out tomorrow to set up transfusion. Fellow updated.

## 2025-03-20 ENCOUNTER — INFUSION (OUTPATIENT)
Dept: HEMATOLOGY/ONCOLOGY | Facility: CLINIC | Age: 50
End: 2025-03-20
Payer: COMMERCIAL

## 2025-03-20 VITALS
DIASTOLIC BLOOD PRESSURE: 64 MMHG | TEMPERATURE: 98.8 F | BODY MASS INDEX: 17.68 KG/M2 | WEIGHT: 106.37 LBS | SYSTOLIC BLOOD PRESSURE: 100 MMHG | RESPIRATION RATE: 16 BRPM | HEART RATE: 87 BPM | OXYGEN SATURATION: 95 %

## 2025-03-20 DIAGNOSIS — T45.1X5A ANEMIA DUE TO ANTINEOPLASTIC CHEMOTHERAPY: ICD-10-CM

## 2025-03-20 DIAGNOSIS — D50.8 OTHER IRON DEFICIENCY ANEMIA: ICD-10-CM

## 2025-03-20 DIAGNOSIS — R50.9 FEVER, UNSPECIFIED FEVER CAUSE: ICD-10-CM

## 2025-03-20 DIAGNOSIS — C34.90 NSCLC METASTATIC TO BONE (MULTI): ICD-10-CM

## 2025-03-20 DIAGNOSIS — D64.81 ANEMIA DUE TO ANTINEOPLASTIC CHEMOTHERAPY: ICD-10-CM

## 2025-03-20 DIAGNOSIS — R50.9 FEVER, UNSPECIFIED FEVER CAUSE: Primary | ICD-10-CM

## 2025-03-20 DIAGNOSIS — C79.51 NSCLC METASTATIC TO BONE (MULTI): ICD-10-CM

## 2025-03-20 LAB
BLOOD EXPIRATION DATE: NORMAL
BLOOD EXPIRATION DATE: NORMAL
DISPENSE STATUS: NORMAL
DISPENSE STATUS: NORMAL
PRODUCT BLOOD TYPE: 7300
PRODUCT BLOOD TYPE: 7300
PRODUCT CODE: NORMAL
PRODUCT CODE: NORMAL
UNIT ABO: NORMAL
UNIT ABO: NORMAL
UNIT NUMBER: NORMAL
UNIT NUMBER: NORMAL
UNIT RH: NORMAL
UNIT RH: NORMAL
UNIT VOLUME: 350
UNIT VOLUME: 350
XM INTEP: NORMAL
XM INTEP: NORMAL

## 2025-03-20 PROCEDURE — 2500000001 HC RX 250 WO HCPCS SELF ADMINISTERED DRUGS (ALT 637 FOR MEDICARE OP): Performed by: NURSE PRACTITIONER

## 2025-03-20 PROCEDURE — 36430 TRANSFUSION BLD/BLD COMPNT: CPT

## 2025-03-20 RX ORDER — PROCHLORPERAZINE MALEATE 5 MG
10 TABLET ORAL EVERY 6 HOURS PRN
Status: CANCELLED | OUTPATIENT
Start: 2025-03-24

## 2025-03-20 RX ORDER — ALBUTEROL SULFATE 0.83 MG/ML
3 SOLUTION RESPIRATORY (INHALATION) AS NEEDED
OUTPATIENT
Start: 2025-03-20

## 2025-03-20 RX ORDER — EPINEPHRINE 0.3 MG/.3ML
0.3 INJECTION SUBCUTANEOUS EVERY 5 MIN PRN
OUTPATIENT
Start: 2025-03-20

## 2025-03-20 RX ORDER — HEPARIN 100 UNIT/ML
500 SYRINGE INTRAVENOUS AS NEEDED
OUTPATIENT
Start: 2025-03-20

## 2025-03-20 RX ORDER — ALBUTEROL SULFATE 0.83 MG/ML
3 SOLUTION RESPIRATORY (INHALATION) AS NEEDED
Status: CANCELLED | OUTPATIENT
Start: 2025-03-20

## 2025-03-20 RX ORDER — DIPHENHYDRAMINE HYDROCHLORIDE 50 MG/ML
50 INJECTION, SOLUTION INTRAMUSCULAR; INTRAVENOUS AS NEEDED
Status: CANCELLED | OUTPATIENT
Start: 2025-03-24

## 2025-03-20 RX ORDER — PROCHLORPERAZINE EDISYLATE 5 MG/ML
10 INJECTION INTRAMUSCULAR; INTRAVENOUS EVERY 6 HOURS PRN
Status: CANCELLED | OUTPATIENT
Start: 2025-03-24

## 2025-03-20 RX ORDER — DIPHENHYDRAMINE HYDROCHLORIDE 50 MG/ML
50 INJECTION, SOLUTION INTRAMUSCULAR; INTRAVENOUS AS NEEDED
OUTPATIENT
Start: 2025-03-20

## 2025-03-20 RX ORDER — ACETAMINOPHEN 325 MG/1
650 TABLET ORAL ONCE
Status: CANCELLED | OUTPATIENT
Start: 2025-03-20 | End: 2025-03-20

## 2025-03-20 RX ORDER — EPINEPHRINE 0.3 MG/.3ML
0.3 INJECTION SUBCUTANEOUS EVERY 5 MIN PRN
Status: CANCELLED | OUTPATIENT
Start: 2025-03-24

## 2025-03-20 RX ORDER — HEPARIN SODIUM,PORCINE/PF 10 UNIT/ML
50 SYRINGE (ML) INTRAVENOUS AS NEEDED
OUTPATIENT
Start: 2025-03-20

## 2025-03-20 RX ORDER — ACETAMINOPHEN 325 MG/1
650 TABLET ORAL ONCE
Status: DISPENSED | OUTPATIENT
Start: 2025-03-20

## 2025-03-20 RX ORDER — DIPHENHYDRAMINE HYDROCHLORIDE 50 MG/ML
50 INJECTION, SOLUTION INTRAMUSCULAR; INTRAVENOUS AS NEEDED
Status: CANCELLED | OUTPATIENT
Start: 2025-03-20

## 2025-03-20 RX ORDER — EPINEPHRINE 0.3 MG/.3ML
0.3 INJECTION SUBCUTANEOUS EVERY 5 MIN PRN
Status: CANCELLED | OUTPATIENT
Start: 2025-03-20

## 2025-03-20 RX ORDER — FAMOTIDINE 10 MG/ML
20 INJECTION, SOLUTION INTRAVENOUS ONCE AS NEEDED
Status: CANCELLED | OUTPATIENT
Start: 2025-03-20

## 2025-03-20 RX ORDER — ALBUTEROL SULFATE 0.83 MG/ML
3 SOLUTION RESPIRATORY (INHALATION) AS NEEDED
Status: CANCELLED | OUTPATIENT
Start: 2025-03-24

## 2025-03-20 RX ORDER — FUROSEMIDE 20 MG/1
20 TABLET ORAL ONCE
Status: COMPLETED | OUTPATIENT
Start: 2025-03-20 | End: 2025-03-20

## 2025-03-20 RX ORDER — DEXAMETHASONE SODIUM PHOSPHATE 10 MG/ML
10 INJECTION INTRAMUSCULAR; INTRAVENOUS ONCE
Status: CANCELLED | OUTPATIENT
Start: 2025-03-24

## 2025-03-20 RX ORDER — FAMOTIDINE 10 MG/ML
20 INJECTION, SOLUTION INTRAVENOUS ONCE AS NEEDED
Status: CANCELLED | OUTPATIENT
Start: 2025-03-24

## 2025-03-20 RX ORDER — CYANOCOBALAMIN 1000 UG/ML
1000 INJECTION, SOLUTION INTRAMUSCULAR; SUBCUTANEOUS ONCE
Status: CANCELLED | OUTPATIENT
Start: 2025-03-24

## 2025-03-20 RX ORDER — FAMOTIDINE 10 MG/ML
20 INJECTION, SOLUTION INTRAVENOUS ONCE AS NEEDED
OUTPATIENT
Start: 2025-03-20

## 2025-03-20 RX ADMIN — FUROSEMIDE 20 MG: 20 TABLET ORAL at 10:14

## 2025-03-20 ASSESSMENT — PAIN SCALES - GENERAL: PAINLEVEL_OUTOF10: 0-NO PAIN

## 2025-03-24 ENCOUNTER — OFFICE VISIT (OUTPATIENT)
Dept: HEMATOLOGY/ONCOLOGY | Facility: CLINIC | Age: 50
End: 2025-03-24
Payer: COMMERCIAL

## 2025-03-24 ENCOUNTER — SOCIAL WORK (OUTPATIENT)
Dept: CASE MANAGEMENT | Facility: HOSPITAL | Age: 50
End: 2025-03-24

## 2025-03-24 ENCOUNTER — INFUSION (OUTPATIENT)
Dept: HEMATOLOGY/ONCOLOGY | Facility: CLINIC | Age: 50
End: 2025-03-24
Payer: COMMERCIAL

## 2025-03-24 ENCOUNTER — LAB (OUTPATIENT)
Dept: LAB | Facility: CLINIC | Age: 50
End: 2025-03-24
Payer: COMMERCIAL

## 2025-03-24 ENCOUNTER — OFFICE VISIT (OUTPATIENT)
Dept: PALLIATIVE MEDICINE | Facility: CLINIC | Age: 50
End: 2025-03-24
Payer: COMMERCIAL

## 2025-03-24 VITALS
TEMPERATURE: 98.2 F | OXYGEN SATURATION: 92 % | SYSTOLIC BLOOD PRESSURE: 96 MMHG | HEART RATE: 86 BPM | WEIGHT: 106.7 LBS | DIASTOLIC BLOOD PRESSURE: 56 MMHG | RESPIRATION RATE: 18 BRPM | BODY MASS INDEX: 17.73 KG/M2

## 2025-03-24 DIAGNOSIS — C34.90 NSCLC METASTATIC TO BONE (MULTI): ICD-10-CM

## 2025-03-24 DIAGNOSIS — D69.6 THROMBOCYTOPENIA (CMS-HCC): Primary | ICD-10-CM

## 2025-03-24 DIAGNOSIS — C79.51 NSCLC METASTATIC TO BONE (MULTI): ICD-10-CM

## 2025-03-24 DIAGNOSIS — R74.8 ELEVATED ALKALINE PHOSPHATASE LEVEL: ICD-10-CM

## 2025-03-24 DIAGNOSIS — M89.9 LESION OF BONE OF CERVICAL SPINE: ICD-10-CM

## 2025-03-24 DIAGNOSIS — G89.3 CANCER RELATED PAIN: ICD-10-CM

## 2025-03-24 DIAGNOSIS — K59.00 CONSTIPATION, UNSPECIFIED CONSTIPATION TYPE: ICD-10-CM

## 2025-03-24 DIAGNOSIS — Z51.5 PALLIATIVE CARE ENCOUNTER: Primary | ICD-10-CM

## 2025-03-24 DIAGNOSIS — Z51.12 ENCOUNTER FOR ANTINEOPLASTIC IMMUNOTHERAPY: ICD-10-CM

## 2025-03-24 DIAGNOSIS — E87.1 HYPONATREMIA: ICD-10-CM

## 2025-03-24 LAB
ALBUMIN SERPL BCP-MCNC: 2.9 G/DL (ref 3.4–5)
ALP SERPL-CCNC: 228 U/L (ref 33–120)
ALT SERPL W P-5'-P-CCNC: 20 U/L (ref 10–52)
ANION GAP SERPL CALC-SCNC: 13 MMOL/L (ref 10–20)
AST SERPL W P-5'-P-CCNC: 42 U/L (ref 9–39)
BASOPHILS # BLD AUTO: 0.03 X10*3/UL (ref 0–0.1)
BASOPHILS NFR BLD AUTO: 0.7 %
BILIRUB SERPL-MCNC: 0.6 MG/DL (ref 0–1.2)
BUN SERPL-MCNC: 18 MG/DL (ref 6–23)
CALCIUM SERPL-MCNC: 8.4 MG/DL (ref 8.6–10.3)
CHLORIDE SERPL-SCNC: 92 MMOL/L (ref 98–107)
CO2 SERPL-SCNC: 27 MMOL/L (ref 21–32)
CREAT SERPL-MCNC: 0.66 MG/DL (ref 0.5–1.3)
EGFRCR SERPLBLD CKD-EPI 2021: >90 ML/MIN/1.73M*2
EOSINOPHIL # BLD AUTO: 0.01 X10*3/UL (ref 0–0.7)
EOSINOPHIL NFR BLD AUTO: 0.2 %
ERYTHROCYTE [DISTWIDTH] IN BLOOD BY AUTOMATED COUNT: 17.6 % (ref 11.5–14.5)
GLUCOSE SERPL-MCNC: 101 MG/DL (ref 74–99)
HCT VFR BLD AUTO: 26.6 % (ref 41–52)
HGB BLD-MCNC: 8.2 G/DL (ref 13.5–17.5)
HYPOCHROMIA BLD QL SMEAR: NORMAL
IMM GRANULOCYTES # BLD AUTO: 0.04 X10*3/UL (ref 0–0.7)
IMM GRANULOCYTES NFR BLD AUTO: 1 % (ref 0–0.9)
LYMPHOCYTES # BLD AUTO: 1.29 X10*3/UL (ref 1.2–4.8)
LYMPHOCYTES NFR BLD AUTO: 30.8 %
MCH RBC QN AUTO: 27.5 PG (ref 26–34)
MCHC RBC AUTO-ENTMCNC: 30.8 G/DL (ref 32–36)
MCV RBC AUTO: 89 FL (ref 80–100)
MONOCYTES # BLD AUTO: 0.32 X10*3/UL (ref 0.1–1)
MONOCYTES NFR BLD AUTO: 7.6 %
NEUTROPHILS # BLD AUTO: 2.5 X10*3/UL (ref 1.2–7.7)
NEUTROPHILS NFR BLD AUTO: 59.7 %
NRBC BLD-RTO: ABNORMAL /100{WBCS}
OVALOCYTES BLD QL SMEAR: NORMAL
PLATELET # BLD AUTO: 96 X10*3/UL (ref 150–450)
POTASSIUM SERPL-SCNC: 3.6 MMOL/L (ref 3.5–5.3)
PROT SERPL-MCNC: 6.6 G/DL (ref 6.4–8.2)
RBC # BLD AUTO: 2.98 X10*6/UL (ref 4.5–5.9)
RBC MORPH BLD: NORMAL
SODIUM SERPL-SCNC: 128 MMOL/L (ref 136–145)
WBC # BLD AUTO: 4.2 X10*3/UL (ref 4.4–11.3)

## 2025-03-24 PROCEDURE — 96413 CHEMO IV INFUSION 1 HR: CPT

## 2025-03-24 PROCEDURE — 85025 COMPLETE CBC W/AUTO DIFF WBC: CPT

## 2025-03-24 PROCEDURE — 99215 OFFICE O/P EST HI 40 MIN: CPT | Mod: 25,27 | Performed by: NURSE PRACTITIONER

## 2025-03-24 PROCEDURE — 2500000004 HC RX 250 GENERAL PHARMACY W/ HCPCS (ALT 636 FOR OP/ED): Performed by: STUDENT IN AN ORGANIZED HEALTH CARE EDUCATION/TRAINING PROGRAM

## 2025-03-24 PROCEDURE — 36415 COLL VENOUS BLD VENIPUNCTURE: CPT

## 2025-03-24 PROCEDURE — 84075 ASSAY ALKALINE PHOSPHATASE: CPT

## 2025-03-24 PROCEDURE — 99213 OFFICE O/P EST LOW 20 MIN: CPT

## 2025-03-24 PROCEDURE — 1036F TOBACCO NON-USER: CPT

## 2025-03-24 PROCEDURE — 99213 OFFICE O/P EST LOW 20 MIN: CPT | Mod: 25

## 2025-03-24 PROCEDURE — 1036F TOBACCO NON-USER: CPT | Performed by: NURSE PRACTITIONER

## 2025-03-24 PROCEDURE — G2211 COMPLEX E/M VISIT ADD ON: HCPCS | Performed by: NURSE PRACTITIONER

## 2025-03-24 PROCEDURE — 99215 OFFICE O/P EST HI 40 MIN: CPT | Performed by: NURSE PRACTITIONER

## 2025-03-24 RX ORDER — SODIUM CHLORIDE 1000 MG
1 TABLET, SOLUBLE MISCELLANEOUS 3 TIMES DAILY
Qty: 21 TABLET | Refills: 0 | Status: SHIPPED | OUTPATIENT
Start: 2025-03-24 | End: 2025-03-31

## 2025-03-24 RX ORDER — HEPARIN SODIUM,PORCINE/PF 10 UNIT/ML
50 SYRINGE (ML) INTRAVENOUS AS NEEDED
OUTPATIENT
Start: 2025-03-24

## 2025-03-24 RX ORDER — MORPHINE SULFATE 30 MG/1
TABLET, FILM COATED, EXTENDED RELEASE ORAL
Qty: 120 TABLET | Refills: 0 | Status: SHIPPED | OUTPATIENT
Start: 2025-04-07 | End: 2025-05-07

## 2025-03-24 RX ORDER — FAMOTIDINE 10 MG/ML
20 INJECTION, SOLUTION INTRAVENOUS ONCE AS NEEDED
Status: DISCONTINUED | OUTPATIENT
Start: 2025-03-24 | End: 2025-03-24 | Stop reason: HOSPADM

## 2025-03-24 RX ORDER — HEPARIN 100 UNIT/ML
500 SYRINGE INTRAVENOUS AS NEEDED
OUTPATIENT
Start: 2025-03-24

## 2025-03-24 RX ORDER — ALBUTEROL SULFATE 0.83 MG/ML
3 SOLUTION RESPIRATORY (INHALATION) AS NEEDED
Status: DISCONTINUED | OUTPATIENT
Start: 2025-03-24 | End: 2025-03-24 | Stop reason: HOSPADM

## 2025-03-24 RX ORDER — EPINEPHRINE 0.3 MG/.3ML
0.3 INJECTION SUBCUTANEOUS EVERY 5 MIN PRN
Status: DISCONTINUED | OUTPATIENT
Start: 2025-03-24 | End: 2025-03-24 | Stop reason: HOSPADM

## 2025-03-24 RX ORDER — OXYCODONE HYDROCHLORIDE 10 MG/1
10-15 TABLET ORAL EVERY 4 HOURS PRN
Qty: 270 TABLET | Refills: 0 | Status: SHIPPED | OUTPATIENT
Start: 2025-04-07 | End: 2025-05-07

## 2025-03-24 RX ORDER — DIPHENHYDRAMINE HYDROCHLORIDE 50 MG/ML
50 INJECTION, SOLUTION INTRAMUSCULAR; INTRAVENOUS AS NEEDED
Status: DISCONTINUED | OUTPATIENT
Start: 2025-03-24 | End: 2025-03-24 | Stop reason: HOSPADM

## 2025-03-24 RX ADMIN — SODIUM CHLORIDE 200 MG: 9 INJECTION, SOLUTION INTRAVENOUS at 11:46

## 2025-03-24 ASSESSMENT — ENCOUNTER SYMPTOMS
MYALGIAS: 1
VOMITING: 0
HEMOPTYSIS: 0
FATIGUE: 1
DIARRHEA: 0
BACK PAIN: 1
UNEXPECTED WEIGHT CHANGE: 0
CARDIOVASCULAR NEGATIVE: 1
APPETITE CHANGE: 0
SHORTNESS OF BREATH: 1
CONSTIPATION: 1
NAUSEA: 0
FEVER: 0

## 2025-03-24 ASSESSMENT — PAIN SCALES - GENERAL: PAINLEVEL_OUTOF10: 0-NO PAIN

## 2025-03-24 NOTE — PROGRESS NOTES
[facility] - Medical Oncology Follow-Up Visit    Patient ID: Jovon Mccartney is a 49 y.o. male with NSCLC adenocarcinoma     Current therapy: cyanocobalamin (Vitamin B-12) injection 1,000 mcg, 1,000 mcg, intramuscular, Once, 2 of 2 cycles    Administration: 1,000 mcg (10/28/2024), 1,000 mcg (12/30/2024)        fosaprepitant (Emend) 150 mg in sodium chloride 0.9% 150 mL IV, 150 mg, intravenous, Once, 4 of 4 cycles    Administration: 150 mg (10/28/2024), 150 mg (11/18/2024), 150 mg (12/9/2024), 150 mg (12/30/2024)        CARBOplatin (Paraplatin) 627 mg in sodium chloride 0.9% 172.7 mL IV, 627 mg, intravenous, Once, 4 of 4 cycles    Administration: 627 mg (10/28/2024), 627 mg (11/18/2024), 500 mg (12/9/2024), 440 mg (12/30/2024)        methylPREDNISolone sod succinate (SOLU-Medrol) 40 mg/mL injection 40 mg, 40 mg, intravenous, As needed, 4 of 4 cycles        palonosetron (Aloxi) injection 250 mcg, 250 mcg, intravenous, Once, 4 of 4 cycles    Administration: 250 mcg (10/28/2024), 250 mcg (11/18/2024), 250 mcg (12/9/2024), 250 mcg (12/30/2024)        pembrolizumab (Keytruda) 200 mg in sodium chloride 0.9% 118 mL IV, 200 mg, intravenous, Once, 4 of 4 cycles    Administration: 200 mg (10/28/2024), 200 mg (11/18/2024), 200 mg (12/9/2024), 200 mg (12/30/2024)        PEMEtrexed disodium (Alimta) 800 mg in sodium chloride 0.9% 142 mL IV, 500 mg/m2 = 800 mg, intravenous, Once, 4 of 4 cycles    Dose modification: 400 mg/m2 (original dose 500 mg/m2, Cycle 2)    Administration: 800 mg (10/28/2024), 645 mg (11/18/2024), 600 mg (12/9/2024), 600 mg (12/30/2024)    cyanocobalamin (Vitamin B-12) injection 1,000 mcg, 1,000 mcg, intramuscular, Once, 1 of 1 cycle    Administration: 1,000 mcg (1/21/2025)        methylPREDNISolone sod succinate (SOLU-Medrol) 40 mg/mL injection 40 mg, 40 mg, intravenous, As needed, 4 of 18 cycles        pembrolizumab (Keytruda) 200 mg in sodium chloride 0.9% 118 mL IV, 200 mg,  intravenous, Once, 4 of 18 cycles    Administration: 200 mg (1/21/2025), 200 mg (2/10/2025), 200 mg (3/3/2025), 200 mg (3/24/2025)        PEMEtrexed disodium (Alimta) 600 mg in sodium chloride 0.9% 134 mL IV, 400 mg/m2 = 600 mg (80 % of original dose 500 mg/m2), intravenous, Once, 3 of 17 cycles    Dose modification: 400 mg/m2 (original dose 500 mg/m2, Cycle 1)    Administration: 600 mg (1/21/2025), 600 mg (2/10/2025), 600 mg (3/3/2025)       Chief Concern: readiness to treat visit    Oncologic History:     DIAGNOSIS  NSCLC adenocarcinoma      STAGING  cT4 pN3 M1c      CURRENT SITES OF DISEASE  R hilar mass, 4L, 11L, scapula,  right,   parieto-occipital bone, C2, C3, ?lymphatic carcinomatosis     MOLECULAR GENOMICS  KRAS p.G12C ([id number] c.34G>T)   PD-L1 <1%     PRIOR THERAPY        CURRENT THERAPY  - Palliative XRT course 1: R_skull 2000 cGy, C-spine 2000 cGy, R_ shoulder 2000 cGy (10/10/24 - 10/17/24)  - Carboplatin (AUC 5), Pemetrexed (500mg/m2), & Pembrolizumab 10/28/24 -   - Pemetrexed dose reduced 400mg/m2 - C2 11/18/24   - Carboplatin dose reduced (AUC 4) - C3 12/9/24   - Maintenance Pemetrexed & Pembrolizumab 400mg/m2  - 1/21/25 - 3/3/25, 3 cycles   - Palliative RT course 2:  2/20 SBRT Brain_R 3000 cGy, R_Hip 3 fields 2000 cGy (2/20, 2/21, 2/24, 2/26 & 2/28/25)  - Hgb 6.4 - 2U PRBC transfusion 3/20/25 -> 8.2   - Pembrolizumab monotherapy 3/24/25 -      CURRENT ONCOLOGICAL PROBLEMS  Hemoptysis - resolved  Epistaxis - intermittent   Anemia d/t chemotherapy         HISTORY OF PRESENT ILLNESS  Mr. Mccartney is a 50 yo with PMH significant for RA who present to the [place] ER on 9/9/24 with 2 months of progressively worsening headaches and neck pain with occasional radiation to the R and L arms. A lytic lesion was noted on CT head 9/9/24 of the parietal bone, and CT cervical spine was concerning for lucent lesions C2 and C3. Brain MRI and MR cervical spine confirmed marrow-replacing lesions fo the C2 and C3 vertebral  bodies. CT C/A/P w/o contrast on 9/10/24 was concerning for soft tissue mass of the R hilum, lytic lesions of the R scapula, and 3.5 cm mass in the L gluteus. He was transferred to [facility] on 9/10/24 CT C/A/P w/IV contrast on 9/15/24 confirmed the lung mass encasing the R mainstem bronchus abutting the R main pulmonary artery and extending to the lung periphery, as well as R perihilar lesion and lymphangitic carcinomatosis, prominent mediastinal nodes, L axillary node, nodular thickening of bilateral adrenal glands, likely lesion of the R scapula, small hepatic lesions, moderate pericardial effusion. EBUS on 9/17/24 with pathology of R hilar mass with adenocarcinoma, KRAS G12C, PD-L1 pending, and positive lymph nodes 11L and 4L. He met Dr. Mcgrath and discussed palliative radiation to the occipital lesion and cervical spine. PET/CT on 10/1/24 with FDG avid confluent R hilar/mediastinal mass, mild FDG avidity along upper and middle lobe, bilateral scapular, humeral, R anterior acetabular osseous metastases. He started palliative radiation 10/10/24-10/16/24.      He was born with juvenile RA, usually his feet and knees feel good compared to everything. He used to walk with a crutch, since the hospital he has been walking without it. He follows with a rheumatologist at [place]. It's fairly managed he says, has  been on humira for 20-25 years, right when it first came out. Kept the fluid off his knees and helped him better than anything else. He was on all kinds of drugs he said before including steroids, methotrexate, others. Typically with the humira he doesn't really get flares, sometimes in his wrists. He's been off for about a month, so thinks he'd have a flare in about a month.      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 Maybe had one exposure to asbestos. 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 Lives  at home with his wife and a dog, 3 cats, a bird and fish. They have 2 sons and 3 grandchildren.  Tob: quit smoking 9/9/24, smoked 39 years, 1-1.5 ppd  EtOH: occasional  Illicits: previous smoking marijuana, only edibles now for pain     FAMILY HISTORY  Mother - breast cancer dx 56 yo  Maternal grandmother - bone cancer  Paternal cousin - bone and/or lung cancer  Paternal grandmother - unknown cancer  No other family members with autoimmune diseases    HPI   He is here today with his son.  States he has a little more energy following the transfusion.  Not sleeping as much.   Breathing feels good. Intermittent SOB.  About the same since before the transfusion.  Appetite is good.  Denies n/v/d.  BM every 2-3 days.  Routine bowel regimen.  End of 2nd day will add 2nd miralax dose.  Voiding fine.  Able to climb stairs to bathroom.  Chronic dry skin.   Previously seen by derm.  Plans to follow-up.  Not bothersome to him.  B/p 96/56 - asymptomatic.  Discussed treatment plan changed to pembrolizumab only.  Labs WNL.  Ready for treatment.       Meds (Current):    Current Outpatient Medications:     acetaminophen (Tylenol) 500 mg tablet, Take 1 tablet (500 mg) by mouth every 6 hours if needed for mild pain (1 - 3)., Disp: , Rfl:     b complex 0.4 mg tablet, Take 1 tablet by mouth once daily., Disp: , Rfl:     budesonide-glycopyr-formoterol (BREZTRI) 160-9-4.8 mcg/actuation HFA aerosol inhaler, Inhale 2 puffs 2 times a day., Disp: 10.7 g, Rfl: 3    dexAMETHasone (Decadron) 4 mg tablet, Take 4 mg (1 tablet) by mouth twice daily the day before treatment, once the evening of treatment, and twice daily the day after treatment., Disp: 5 tablet, Rfl: 5    folic acid (Folvite) 1 mg tablet, Take 1 tablet (1,000 mcg) by mouth once daily. Do not start before October 27, 2024., Disp: 30 tablet, Rfl: 11    folic acid (Folvite) 1 mg tablet, Take 1 tablet (1,000 mcg) by mouth once daily., Disp: 30 tablet, Rfl: 11    ibuprofen 200 mg tablet,  Take 2 tablets (400 mg) by mouth every 6 hours if needed for mild pain (1 - 3)., Disp: , Rfl:     ipratropium-albuteroL (Combivent Respimat)  mcg/actuation inhaler, Inhale 2 puffs 4 times a day as needed for shortness of breath or wheezing., Disp: , Rfl:     mirtazapine (Remeron) 15 mg tablet, Take 1 tablet (15 mg) by mouth once daily at bedtime., Disp: 30 tablet, Rfl: 2    naloxone (Narcan) 4 mg/0.1 mL nasal spray, Administer 1 spray (4 mg) into affected nostril(s) if needed for opioid reversal. May repeat every 2-3 minutes if needed, alternating nostrils, until medical assistance becomes available., Disp: 2 each, Rfl: 3    ondansetron (Zofran) 8 mg tablet, Take 1 tablet (8 mg) by mouth every 8 hours if needed for nausea or vomiting. Do not fill before October 27, 2024., Disp: 30 tablet, Rfl: 5    ondansetron (Zofran) 8 mg tablet, Take 1 tablet (8 mg) by mouth every 8 hours if needed for nausea or vomiting., Disp: 30 tablet, Rfl: 5    oxygen (O2) gas therapy, Inhale 1 each once every 24 hours., Disp: , Rfl:     pantoprazole (ProtoNix) 40 mg EC tablet, Take 1 tablet (40 mg) by mouth once daily in the morning. Take before meals. Do not crush, chew, or split., Disp: 30 tablet, Rfl: 3    pilocarpine (Salagen, pilocarpine,) 5 mg tablet, Take 1 tablet (5 mg) by mouth 3 times a day as needed (for dry mouth)., Disp: 90 tablet, Rfl: 11    polyethylene glycol (Glycolax, Miralax) 17 gram/dose powder, Mix 17 g of powder and drink once daily., Disp: , Rfl:     prochlorperazine (Compazine) 10 mg tablet, Take 1 tablet (10 mg) by mouth every 6 hours if needed for nausea or vomiting. Do not fill before October 27, 2024., Disp: 30 tablet, Rfl: 5    prochlorperazine (Compazine) 10 mg tablet, Take 1 tablet (10 mg) by mouth every 6 hours if needed for nausea or vomiting., Disp: 30 tablet, Rfl: 5    gabapentin (Neurontin) 300 mg capsule, Take 1 capsule (300 mg) by mouth 3 times a day., Disp: 90 capsule, Rfl: 3    [START ON  4/7/2025] morphine CR (MS Contin) 30 mg 12 hr tablet, Take 1 tablet (30 mg) by mouth 2 times a day AND 2 tablets (60 mg) once daily at bedtime. Do not crush, chew, or split. Take 30 MG AM, 30 MG MID DAY and 60 MG at BEDTIME.. Do not fill before April 7, 2025., Disp: 120 tablet, Rfl: 0    [START ON 4/7/2025] oxyCODONE (Roxicodone) 10 mg immediate release tablet, Take 1-1.5 tablets (10-15 mg) by mouth every 4 hours if needed for moderate pain (4 - 6) or severe pain (7 - 10). MAX 9 TABS PER DAY Do not fill before April 7, 2025., Disp: 270 tablet, Rfl: 0    sodium chloride 1,000 mg tablet, Take 1 tablet (1 g) by mouth 3 times a day for 7 days., Disp: 21 tablet, Rfl: 0    Current Facility-Administered Medications:     acetaminophen (Tylenol) tablet 650 mg, 650 mg, oral, Once, Catarina Brooks, APRN-CNP    Review of Systems   Constitutional:  Positive for fatigue. Negative for appetite change, fever and unexpected weight change.   HENT:  Negative.  Negative for nosebleeds.    Respiratory:  Positive for shortness of breath. Negative for hemoptysis.    Cardiovascular: Negative.    Gastrointestinal:  Positive for constipation. Negative for diarrhea, nausea and vomiting.   Musculoskeletal:  Positive for back pain and myalgias.        Objective   BSA: 1.49 meters squared  Wt Readings from Last 5 Encounters:   03/24/25 48.4 kg (106 lb 11.2 oz)   03/20/25 48.2 kg (106 lb 6 oz)   03/03/25 47.2 kg (104 lb)   02/20/25 46.5 kg (102 lb 8.2 oz)   02/12/25 49.2 kg (108 lb 6.4 oz)     11/11/24 wt #123 - leg braces worn (8#)    BP 96/56 (BP Location: Right arm, Patient Position: Sitting, BP Cuff Size: Child) Comment: NOTIFIED PROVIDER  Pulse 86   Temp 36.8 °C (98.2 °F) (Temporal)   Resp 18   Wt 48.4 kg (106 lb 11.2 oz)   SpO2 92% Comment: ON O2 machine. NOTIFIED PROVIDER  BMI 17.73 kg/m²     ECOG Score:   Performance Status (ECOG): 2    ECOG   Definition  0          Fully active; no performance restrictions.  1          Strenuous  physical activity restricted; fully ambulatory and able to carry out light work.  2          Capable of all self-care but unable to carry out any work activities. Up and about >50% of waking hours.  3          Capable of only limited self-care; confined to bed or chair >50% of waking hours.  4          Completely disabled; cannot carry out any self-care; totally confined to bed or chair.    Physical Exam  Vitals reviewed.   Constitutional:       General: He is not in acute distress.     Appearance: He is not ill-appearing.      Comments: Frail, cachectic     HENT:      Head: Normocephalic and atraumatic.      Mouth/Throat:      Mouth: Mucous membranes are moist.   Eyes:      Extraocular Movements: Extraocular movements intact.      Pupils: Pupils are equal, round, and reactive to light.   Cardiovascular:      Rate and Rhythm: Normal rate and regular rhythm.      Heart sounds: Normal heart sounds. No murmur heard.  Pulmonary:      Effort: Pulmonary effort is normal. No respiratory distress.      Breath sounds: Normal breath sounds.      Comments: Diminished RLL.   Abdominal:      General: Bowel sounds are normal. There is no distension.      Palpations: Abdomen is soft.   Musculoskeletal:      Cervical back: Normal range of motion.      Right lower leg: Edema (ankles 2/2 RA) present.      Left lower leg: Edema (ankles 2/2 RA) present.      Comments: Chronic swelling to TINY ankles and left wrist/hand 2/2 RA.   Skin:     General: Skin is warm and dry.   Neurological:      General: No focal deficit present.      Mental Status: He is alert and oriented to person, place, and time. Mental status is at baseline.   Psychiatric:         Mood and Affect: Mood normal.         Behavior: Behavior normal.         Thought Content: Thought content normal.        Results:  Labs:  Lab Results   Component Value Date    WBC 4.2 (L) 03/24/2025    HGB 8.2 (L) 03/24/2025    HCT 26.6 (L) 03/24/2025    MCV 89 03/24/2025    PLT 96 (L)  03/24/2025      Lab Results   Component Value Date    NEUTROABS 2.50 03/24/2025      Lab Results   Component Value Date    GLUCOSE 101 (H) 03/24/2025    CALCIUM 8.4 (L) 03/24/2025     (L) 03/24/2025    K 3.6 03/24/2025    CO2 27 03/24/2025    CL 92 (L) 03/24/2025    BUN 18 03/24/2025    CREATININE 0.66 03/24/2025    MG 1.66 11/26/2024     Lab Results   Component Value Date    ALT 20 03/24/2025    AST 42 (H) 03/24/2025    ALKPHOS 228 (H) 03/24/2025    BILITOT 0.6 03/24/2025    BILIDIR 0.1 09/14/2024      Lab Results   Component Value Date    ACTH 6.2 (L) 01/20/2025    CORTISOL 3.1 (L) 03/03/2025    TSH 0.54 03/03/2025       Imaging:  No new imaging.      Assessment/Plan      Jovon Mccartney is a 49 y.o. male here for follow up of NSCLC adenocarcinoma.     # NSCLC adenocarcinoma  - [id number]  - KRAS G12C, PD-L1 <1%  - discussed that SoC therapies include chemotherapy+ immunotherapy. Given longstanding juvenile RA, would like input from his rheumatologist regarding safety of immunotherapy in this setting. Discussed if no immunotherapy, would recommend chemotherapy alone vs KRAS G12C targeted therapy frontline, which typically is given in the second-line setting.  - provided information on carboplatin/pemetrexed/pembrolizumab vs adagrasib   - patient discussed with his rheumatologist regarding concern for flaring RA on immunotherapy. His rheumatologist will monitor closely, stop humira, and maintain him on low-dose steroids  - consented for carboplatin/pemetrexed/pembrolizumab to start 10/28/24  - will plan on utilizing adagrasib in the 2nd line setting if needed  - will plan on restaging scans after C4.  - C2 11/18/24, Pemetrexed dose reduced 400mg/m2  - Hospitalization 11/25-11/26/24 2U PRBC's  - C3 12/9/24, Carboplatin dose reduced AUC4  - personally reviewed restaging scans with likely stable disease, will monitor closely and continue on maintenance pemetrexed/pembrolizumab  - bone-only progression, with  stable/maybe minimal improvement in intrathoracic disease, and we opted for continuation on pemetrexed/pembrolizumab and close monitoring. Discussion with Dr. Mcgrath for possible palliative radiation.  - Palliative RT course 2:  2/20 SBRT Brain_R, R_Hip (2/20, 2/21, 2/24, 2/26 & 2/28/25)  - next scans in 3 months (4/9/25)  - Repeat CBC w/diff 3/17/25 - lab req provided to pt. -> Hgb 6.4, blood transfusion 3/20/25 - 2U PRBC's    - Pemetrexed removed from tx plan, Pembro only 3/24/25   - RTC in 3 weeks with Dr. Vargas visit with scan review.      # Neoplasm related pain  - already saw Dr. Mcgrath, receiving palliative radiation - repeat C spine and brain MRI 3/26/25  - seeing supportive onc - started pt on ER Morphine and PRN Oxycodone.    - Pt also taking Ibuprofen 600mg QID and 1000mg Acetaminophen QID.  Recommended pt reduce both doses x1 to start.  Pt will cut back to TID x1 week and see if his pain/RA is tolerable.    - 11/18/24 - current TID Acetaminophen daily, plus Morphine, Oxycodone and Gabapentin.    - 12/9/24 - follows with supportive onc, pain managed with current regimen      # Hypoxia/low-grade temps  - Discussed pt borderline to ED recommendation 2/2 low-grade temps and hypoxic episode at home.  Pt agreeable to urgent care visit for nasal swabs to r/o COVID, Flu A/B, and RSV.  All (-).  - stable, PRN & HS oxygen use.  No further temps.    - Recommended routine oxygen use - 2L, increase to 4L with exertion.    - 12/9:  continuous oxygen use, except brief periods of time - eating.      # Hemoptysis/Epistaxis  - resolved with saline nasal spray  - 11/17/24 - epistaxis, brief episode  - will continue to monitor    - Anemia Hgb 7.1  - Pt consented today for blood transfusion (11/18/24)  - Discussed with Dr. Vargas, will repeat labs on 11/25/24.   Repeat labs, plus T&S and ABO ordered for 11/25/24.  Discussed he likely will need a transfusion. Recommended ED visit for repeat hemoptysis/epistaxis episodes.    - 2U  PRBC's 11/25-11/26/24 [facility]    - Repeat CBC w/diff 12/16/24 + T&S - Hgb 8.4   - stable Hgb 8.8 12/30/24  - 3/3: Hgb 7.6 3/3/25 - will repeat CBC w/diff 3/17/25    - 3/17 Hgb 6.4, 2U PRBC's transfused 3/20/25 Hgb -> 8.2     # Hyponatremia - chronic - stable  - Baseline 129-135  - 128 3/24/25 - Rx sent for NaCl 1 tab TID x7 days, repeat CMP 3/31/25  - ongoing monitoring    # Low ACTH 6.2/elevated Cortisol 22.2 - drawn 11:44 1/20/25   - will monitor for now.      # Thrombocytopenia   - 11/4/24   - 11/11/24 PLT 42   - 12/30/24 -    - 3/24/25 PLT 96   - No s/sx's bleeding  - Pt educate to bleeding precautions/interventions  - Repeat CBC w/diff 3/31/24     # Leukopenia - stable  - 3.2 11/4/24   - 5.8 12/30/24   - 4.2 3/24/25     # Elevated ALP   - 228 3/24/25 (g1)  - ongoing monitoring     # Advanced care planning  - discussed incurable but treatable nature of metastatic disease, with goal of treatment to improve or maintain quality of life and prolong life.

## 2025-03-24 NOTE — PROGRESS NOTES
Social Work Note  3/24/25 NOAH met with patient and son in infusion twice today to get and discuss the paperwork for hospital assistance.  NOAH sent what was given to this writer to the financial navigator.  Navigator needed only the income for June, July and August of 2024 for wife with her signature and date on it.  NOAH wrote this out and gave that information to them later in the day.  NOAH will remain available to assist patient.  Cass Raymundo, MSW, [phone]

## 2025-03-24 NOTE — PROGRESS NOTES
SUPPORTIVE AND PALLIATIVE ONCOLOGY FOLLOW-UP - OUTPATIENT      SERVICE DATE: 3/24/2025    Referred by:  Anton Mcgrath MD  Medical Oncologist: No care team member to display   Radiation Oncologist: Anton Mcgrath MD PhD  Primary Physician: Matilda Garcia  [phone]    REASON FOR CONSULT/CHIEF CONSULT COMPLAINT: pain management and Introduction to Supportive and Palliative Oncology Services    Subjective   HISTORY OF PRESENT ILLNESS: Jovon Mccartney is a 49 y.o. male who presents with a PMH of juvenile RA and newly diagnosed NSCLC with metastatic disease to the right scapula, parieto-occipital bone, C2, C3, bilateral humeral and right anterior acetabular osteolytic lesions. Completing RT to the right scapula and right scalp. Started Carbo/pem/pem 10/28/24. Now on maintenance pemetrexed + pembrolizumab. Completed RT to the brain and right hip 2/28/25.     Pain Assessment:  Pain Score: 4/10  Location: right shoulder    Symptom Assessment:  Pain:very much  constant aching pain in his right shoulder. He reports that overall his pain has improved and he has more mobility of his arms. His pain is a 6/10 at its worst and improves to a 4/10 with oxycodone. He is taking this 4 times per day. Feels that his pain is well managed at this time. He has not needed his braces for his feet/ankles recently either.   Headache: none  Dizziness:none  Lack of energy: somewhat ongoing fatigue. Improving slowly. He is napping less but feels drained by 5-6 pm.   Difficulty sleeping: none    Worrying: none  Anxiety: none  Depression: none  Pain in mouth/swallowing: none  Dry mouth: none  Taste changes: none  Shortness of breath: none  Lack of appetite: a little. Good and bad days. Still doing high calorie and protein shakes.  Nausea: none  Vomiting: none   Constipation: a little states that he has constipation and then diarrhea. He has not been able to get this under control yet. Has been taking senna 2 times per day. Weight is stable.    Diarrhea: none  Sore muscles: none  Numbness or tingling in hands/feet/other: none  Weight loss: a little  Other: none      Information obtained from: chart review, interview of patient, and interview of family  ______________________________________________________________________     Oncology History   NSCLC metastatic to bone (Multi)   9/26/2024 Initial Diagnosis    NSCLC metastatic to bone (Multi)     9/26/2024 Cancer Staged    Staging form: Lung, AJCC 8th Edition, Clinical stage from 9/26/2024: Stage IVB (cT4, cN3, cM1c) - Signed by Anton Mcgrath MD PhD on 9/26/2024     10/28/2024 - 12/30/2024 Chemotherapy    Pembrolizumab + PEMEtrexed / CARBOplatin, 21 Day Cycles     1/21/2025 -  Chemotherapy    Pembrolizumab + PEMEtrexed, 21 Day Cycles          Past Medical History:   Diagnosis Date    Adalimumab (Humira) long-term use 09/15/2024    Arthritis 1974    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    Hilar mass 09/15/2024    Juvenile rheumatoid arthritis (Multi) 09/15/2024    Lung cancer (Multi) 09 17 2024    Rheumatoid arthritis 10 1974     Past Surgical History:   Procedure Laterality Date    BRONCHOSCOPY  9 16 2024    LUNG BIOPSY  9 16 2024     Family History   Problem Relation Name Age of Onset    Breast cancer Mother JORDY CHURCH     Cancer Mother JORDY CHURCH     Miscarriages / Stillbirths Mother JORDY CHURCH     Cancer Maternal Grandfather HUBER DEMARCO     Stroke Maternal Grandfather HUBER DEMARCO         SOCIAL HISTORY  Children 2, Grandchildren 3, Support system wife and kids, Employment on disability since 17 years old, and Hobbies working on cars and 4 wheeling.    Social History:  reports that he quit smoking about 6 months ago. His smoking use included cigarettes. He has a 58.5 pack-year smoking history. He has been exposed to tobacco smoke. He has never used smokeless tobacco. He reports that he does not currently use alcohol. He reports that he does not currently use drugs.  Quit  smoking September 2024.     REVIEW OF SYSTEMS  Review of systems negative unless noted in HPI.       Objective     Current Outpatient Medications   Medication Instructions    acetaminophen (TYLENOL) 500 mg, Every 6 hours PRN    b complex 0.4 mg tablet 1 tablet, Daily    budesonide-glycopyr-formoterol (BREZTRI) 160-9-4.8 mcg/actuation HFA aerosol inhaler 2 puffs, inhalation, 2 times daily    dexAMETHasone (Decadron) 4 mg tablet Take 4 mg (1 tablet) by mouth twice daily the day before treatment, once the evening of treatment, and twice daily the day after treatment.    folic acid (Folvite) 1 mg tablet Take 1 tablet (1,000 mcg) by mouth once daily. Do not start before October 27, 2024.    folic acid (FOLVITE) 1,000 mcg, oral, Daily    gabapentin (NEURONTIN) 300 mg, oral, 3 times daily    ibuprofen 400 mg, Every 6 hours PRN    ipratropium-albuteroL (Combivent Respimat)  mcg/actuation inhaler 2 puffs, inhalation, 4 times daily PRN    mirtazapine (REMERON) 15 mg, oral, Nightly    morphine CR (MS Contin) 30 mg 12 hr tablet Take 1 tablet (30 mg) by mouth 2 times a day AND 2 tablets (60 mg) once daily at bedtime. Do not crush, chew, or split. Take 30 MG AM, 30 MG MID DAY and 60 MG at BEDTIME..    naloxone (NARCAN) 4 mg, nasal, As needed, May repeat every 2-3 minutes if needed, alternating nostrils, until medical assistance becomes available.    ondansetron (ZOFRAN) 8 mg, oral, Every 8 hours PRN    ondansetron (ZOFRAN) 8 mg, oral, Every 8 hours PRN    oxyCODONE (ROXICODONE) 10-15 mg, oral, Every 4 hours PRN, MAX 9 TABS PER DAY    oxygen (O2) gas therapy 1 each, inhalation, Every 24 hours    pantoprazole (PROTONIX) 40 mg, oral, Daily before breakfast, Do not crush, chew, or split.    pilocarpine (SALAGEN (PILOCARPINE)) 5 mg, oral, 3 times daily PRN    polyethylene glycol (GLYCOLAX, MIRALAX) 17 g, oral, Daily    prochlorperazine (COMPAZINE) 10 mg, oral, Every 6 hours PRN    prochlorperazine (COMPAZINE) 10 mg, oral, Every  6 hours PRN    sodium chloride 1 g, oral, 3 times daily       Allergies: No Known Allergies      Lab on 03/24/2025   Component Date Value Ref Range Status    WBC 03/24/2025 4.2 (L)  4.4 - 11.3 x10*3/uL Final    nRBC 03/24/2025    Final    Not Measured    RBC 03/24/2025 2.98 (L)  4.50 - 5.90 x10*6/uL Final    Hemoglobin 03/24/2025 8.2 (L)  13.5 - 17.5 g/dL Final    Hematocrit 03/24/2025 26.6 (L)  41.0 - 52.0 % Final    MCV 03/24/2025 89  80 - 100 fL Final    MCH 03/24/2025 27.5  26.0 - 34.0 pg Final    MCHC 03/24/2025 30.8 (L)  32.0 - 36.0 g/dL Final    RDW 03/24/2025 17.6 (H)  11.5 - 14.5 % Final    Platelets 03/24/2025 96 (L)  150 - 450 x10*3/uL Final    Neutrophils % 03/24/2025 59.7  40.0 - 80.0 % Final    Immature Granulocytes %, Automated 03/24/2025 1.0 (H)  0.0 - 0.9 % Final    Immature Granulocyte Count (IG) includes promyelocytes, myelocytes and metamyelocytes but does not include bands. Percent differential counts (%) should be interpreted in the context of the absolute cell counts (cells/UL).    Lymphocytes % 03/24/2025 30.8  13.0 - 44.0 % Final    Monocytes % 03/24/2025 7.6  2.0 - 10.0 % Final    Eosinophils % 03/24/2025 0.2  0.0 - 6.0 % Final    Basophils % 03/24/2025 0.7  0.0 - 2.0 % Final    Neutrophils Absolute 03/24/2025 2.50  1.20 - 7.70 x10*3/uL Final    Percent differential counts (%) should be interpreted in the context of the absolute cell counts (cells/uL).    Immature Granulocytes Absolute, Au* 03/24/2025 0.04  0.00 - 0.70 x10*3/uL Final    Lymphocytes Absolute 03/24/2025 1.29  1.20 - 4.80 x10*3/uL Final    Monocytes Absolute 03/24/2025 0.32  0.10 - 1.00 x10*3/uL Final    Eosinophils Absolute 03/24/2025 0.01  0.00 - 0.70 x10*3/uL Final    Basophils Absolute 03/24/2025 0.03  0.00 - 0.10 x10*3/uL Final    Manual smear reveals less than 10 % bands and no immature granulocytes    Glucose 03/24/2025 101 (H)  74 - 99 mg/dL Final    Sodium 03/24/2025 128 (L)  136 - 145 mmol/L Final    Potassium  03/24/2025 3.6  3.5 - 5.3 mmol/L Final    Chloride 03/24/2025 92 (L)  98 - 107 mmol/L Final    Bicarbonate 03/24/2025 27  21 - 32 mmol/L Final    Anion Gap 03/24/2025 13  10 - 20 mmol/L Final    Urea Nitrogen 03/24/2025 18  6 - 23 mg/dL Final    Creatinine 03/24/2025 0.66  0.50 - 1.30 mg/dL Final    eGFR 03/24/2025 >90  >60 mL/min/1.73m*2 Final    Calculations of estimated GFR are performed using the 2021 CKD-EPI Study Refit equation without the race variable for the IDMS-Traceable creatinine methods.  https://jasn.asnjournals.org/content/early/2021/09/22/ASN.1222247179    Calcium 03/24/2025 8.4 (L)  8.6 - 10.3 mg/dL Final    Albumin 03/24/2025 2.9 (L)  3.4 - 5.0 g/dL Final    Alkaline Phosphatase 03/24/2025 228 (H)  33 - 120 U/L Final    Total Protein 03/24/2025 6.6  6.4 - 8.2 g/dL Final    AST 03/24/2025 42 (H)  9 - 39 U/L Final    Bilirubin, Total 03/24/2025 0.6  0.0 - 1.2 mg/dL Final    ALT 03/24/2025 20  10 - 52 U/L Final    Patients treated with Sulfasalazine may generate falsely decreased results for ALT.    RBC Morphology 03/24/2025 See Below   Final    Hypochromia 03/24/2025 Mild   Final    Ovalocytes 03/24/2025 Few   Final   Lab on 03/19/2025   Component Date Value Ref Range Status    ABO TYPE 03/19/2025 B   Final    Rh TYPE 03/19/2025 POS   Final    ANTIBODY SCREEN 03/19/2025 NEG   Final   Orders Only on 03/17/2025   Component Date Value Ref Range Status    WBC 03/18/2025 3.8 (L)  4.4 - 11.3 x10*3/uL Final    nRBC 03/18/2025 0.0  0.0 - 0.0 /100 WBCs Final    RBC 03/18/2025 2.29 (L)  4.50 - 5.90 x10*6/uL Final    Hemoglobin 03/18/2025 6.4 (LL)  13.5 - 17.5 g/dL Final    Hematocrit 03/18/2025 20.6 (L)  41.0 - 52.0 % Final    MCV 03/18/2025 90  80 - 100 fL Final    MCH 03/18/2025 27.9  26.0 - 34.0 pg Final    MCHC 03/18/2025 31.1 (L)  32.0 - 36.0 g/dL Final    RDW 03/18/2025 17.5 (H)  11.5 - 14.5 % Final    Platelets 03/18/2025 130 (L)  150 - 450 x10*3/uL Final    Neutrophils % 03/18/2025 74.0  40.0 - 80.0 %  Final    Immature Granulocytes %, Automated 03/18/2025 1.1 (H)  0.0 - 0.9 % Final    Immature Granulocyte Count (IG) includes promyelocytes, myelocytes and metamyelocytes but does not include bands. Percent differential counts (%) should be interpreted in the context of the absolute cell counts (cells/UL).    Lymphocytes % 03/18/2025 15.1  13.0 - 44.0 % Final    Monocytes % 03/18/2025 9.8  2.0 - 10.0 % Final    Eosinophils % 03/18/2025 0.0  0.0 - 6.0 % Final    Basophils % 03/18/2025 0.0  0.0 - 2.0 % Final    Neutrophils Absolute 03/18/2025 2.80  1.20 - 7.70 x10*3/uL Final    Percent differential counts (%) should be interpreted in the context of the absolute cell counts (cells/uL).    Immature Granulocytes Absolute, Au* 03/18/2025 0.04  0.00 - 0.70 x10*3/uL Final    Lymphocytes Absolute 03/18/2025 0.57 (L)  1.20 - 4.80 x10*3/uL Final    Monocytes Absolute 03/18/2025 0.37  0.10 - 1.00 x10*3/uL Final    Eosinophils Absolute 03/18/2025 0.00  0.00 - 0.70 x10*3/uL Final    Basophils Absolute 03/18/2025 0.00  0.00 - 0.10 x10*3/uL Final   Infusion on 03/03/2025   Component Date Value Ref Range Status    PRODUCT CODE 03/19/2025 [id number]   Final    Unit Number 03/19/2025 [id number]   Final    Unit ABO 03/19/2025 B   Final    Unit RH 03/19/2025 POS   Final    XM INTEP 03/19/2025 COMP   Final    Dispense Status 03/19/2025 TR   Final    Blood Expiration Date 03/19/2025 3/31/2025 11:59:00 PM EDT   Final    PRODUCT BLOOD TYPE 03/19/2025 7300   Final    UNIT VOLUME 03/19/2025 350   Final-Edited    PRODUCT CODE 03/19/2025 [id number]   Final    Unit Number 03/19/2025 [id number]   Final    Unit ABO 03/19/2025 B   Final    Unit RH 03/19/2025 POS   Final    XM INTEP 03/19/2025 COMP   Final    Dispense Status 03/19/2025 TR   Final    Blood Expiration Date 03/19/2025 3/28/2025 11:59:00 PM EDT   Final    PRODUCT BLOOD TYPE 03/19/2025 7300   Final    UNIT VOLUME 03/19/2025 350   Final-Edited   Lab on 03/03/2025   Component Date  Value Ref Range Status    WBC 03/03/2025 5.5  4.4 - 11.3 x10*3/uL Final    nRBC 03/03/2025    Final    Not Measured    RBC 03/03/2025 2.69 (L)  4.50 - 5.90 x10*6/uL Final    Hemoglobin 03/03/2025 7.6 (L)  13.5 - 17.5 g/dL Final    Hematocrit 03/03/2025 25.4 (L)  41.0 - 52.0 % Final    MCV 03/03/2025 94  80 - 100 fL Final    MCH 03/03/2025 28.3  26.0 - 34.0 pg Final    MCHC 03/03/2025 29.9 (L)  32.0 - 36.0 g/dL Final    RDW 03/03/2025 17.3 (H)  11.5 - 14.5 % Final    Platelets 03/03/2025 497 (H)  150 - 450 x10*3/uL Final    Neutrophils % 03/03/2025 66.7  40.0 - 80.0 % Final    Immature Granulocytes %, Automated 03/03/2025 0.4  0.0 - 0.9 % Final    Immature Granulocyte Count (IG) includes promyelocytes, myelocytes and metamyelocytes but does not include bands. Percent differential counts (%) should be interpreted in the context of the absolute cell counts (cells/UL).    Lymphocytes % 03/03/2025 18.6  13.0 - 44.0 % Final    Monocytes % 03/03/2025 13.6  2.0 - 10.0 % Final    Eosinophils % 03/03/2025 0.7  0.0 - 6.0 % Final    Basophils % 03/03/2025 0.0  0.0 - 2.0 % Final    Neutrophils Absolute 03/03/2025 3.69  1.20 - 7.70 x10*3/uL Final    Percent differential counts (%) should be interpreted in the context of the absolute cell counts (cells/uL).    Immature Granulocytes Absolute, Au* 03/03/2025 0.02  0.00 - 0.70 x10*3/uL Final    Lymphocytes Absolute 03/03/2025 1.03 (L)  1.20 - 4.80 x10*3/uL Final    Monocytes Absolute 03/03/2025 0.75  0.10 - 1.00 x10*3/uL Final    Eosinophils Absolute 03/03/2025 0.04  0.00 - 0.70 x10*3/uL Final    Basophils Absolute 03/03/2025 0.00  0.00 - 0.10 x10*3/uL Final    Glucose 03/03/2025 88  74 - 99 mg/dL Final    Sodium 03/03/2025 134 (L)  136 - 145 mmol/L Final    Potassium 03/03/2025 4.5  3.5 - 5.3 mmol/L Final    Chloride 03/03/2025 97 (L)  98 - 107 mmol/L Final    Bicarbonate 03/03/2025 30  21 - 32 mmol/L Final    Anion Gap 03/03/2025 12  10 - 20 mmol/L Final    Urea Nitrogen 03/03/2025  11  6 - 23 mg/dL Final    Creatinine 03/03/2025 0.44 (L)  0.50 - 1.30 mg/dL Final    eGFR 03/03/2025 >90  >60 mL/min/1.73m*2 Final    Calculations of estimated GFR are performed using the 2021 CKD-EPI Study Refit equation without the race variable for the IDMS-Traceable creatinine methods.  https://jasn.asnjournals.org/content/early/2021/09/22/ASN.9409293857    Calcium 03/03/2025 9.1  8.6 - 10.3 mg/dL Final    Albumin 03/03/2025 3.4  3.4 - 5.0 g/dL Final    Alkaline Phosphatase 03/03/2025 115  33 - 120 U/L Final    Total Protein 03/03/2025 7.4  6.4 - 8.2 g/dL Final    AST 03/03/2025 12  9 - 39 U/L Final    Bilirubin, Total 03/03/2025 0.3  0.0 - 1.2 mg/dL Final    ALT 03/03/2025 9 (L)  10 - 52 U/L Final    Patients treated with Sulfasalazine may generate falsely decreased results for ALT.    Cortisol  A.M. 03/03/2025 3.1 (L)  5.0 - 20.0 ug/dL Final    Thyroid Stimulating Hormone 03/03/2025 0.54  0.44 - 3.98 mIU/L Final   Hospital Outpatient Visit on 02/28/2025   Component Date Value Ref Range Status    Treatment Site 02/28/2025 Brain_R   Final    Course Number 02/28/2025 2   Final    Prescribed Fractional Dose 02/28/2025 600  cGray Final    Prescribed Total Dose 02/28/2025 3,000  cGray Final    Actual Fractions Delivered 02/28/2025 5   Final    Prescription Pattern Comment 02/28/2025 Daily CBCT.   Final    Actual Session Delivered Dose 02/28/2025 600  cGray Final    Actual Total Dose 02/28/2025 3,000  cGray Final    Prescribed Technique 02/28/2025 SBRT   Final    Elapsed Days 02/28/2025 8   Final    Start Date 02/28/2025 2/20/2025   Final    Last Date 02/28/2025 2/28/2025   Final    Prescribed Number of Fractions 02/28/2025 5   Final    Treatment Site 02/28/2025 R_Hip   Final    Course Number 02/28/2025 2   Final    Prescribed Fractional Dose 02/28/2025 400  cGray Final    Prescribed Total Dose 02/28/2025 2,000  cGray Final    Actual Fractions Delivered 02/28/2025 5   Final    Prescription Pattern Comment 02/28/2025  Daily CBCT.   Final    Actual Session Delivered Dose 02/28/2025 400  cGray Final    Actual Total Dose 02/28/2025 2,000  cGray Final    Prescribed Technique 02/28/2025 3-Field   Final    Elapsed Days 02/28/2025 8   Final    Start Date 02/28/2025 2/20/2025   Final    Last Date 02/28/2025 2/28/2025   Final    Prescribed Number of Fractions 02/28/2025 5   Final   Hospital Outpatient Visit on 02/26/2025   Component Date Value Ref Range Status    Treatment Site 02/26/2025 Brain_R   Final    Course Number 02/26/2025 2   Final    Prescribed Fractional Dose 02/26/2025 600  cGray Final    Prescribed Total Dose 02/26/2025 3,000  cGray Final    Actual Fractions Delivered 02/26/2025 4   Final    Prescription Pattern Comment 02/26/2025 Daily CBCT.   Final    Actual Session Delivered Dose 02/26/2025 600  cGray Final    Actual Total Dose 02/26/2025 2,400  cGray Final    Prescribed Technique 02/26/2025 SBRT   Final    Elapsed Days 02/26/2025 6   Final    Start Date 02/26/2025 2/20/2025   Final    Last Date 02/26/2025 2/26/2025   Final    Prescribed Number of Fractions 02/26/2025 5   Final    Treatment Site 02/26/2025 R_Hip   Final    Course Number 02/26/2025 2   Final    Prescribed Fractional Dose 02/26/2025 400  cGray Final    Prescribed Total Dose 02/26/2025 2,000  cGray Final    Actual Fractions Delivered 02/26/2025 4   Final    Prescription Pattern Comment 02/26/2025 Daily CBCT.   Final    Actual Session Delivered Dose 02/26/2025 400  cGray Final    Actual Total Dose 02/26/2025 1,600  cGray Final    Prescribed Technique 02/26/2025 3-Field   Final    Elapsed Days 02/26/2025 6   Final    Start Date 02/26/2025 2/20/2025   Final    Last Date 02/26/2025 2/26/2025   Final    Prescribed Number of Fractions 02/26/2025 5   Final        PHYSICAL EXAMINATION  Vital Signs:       3/20/2025     8:35 AM 3/20/2025     9:00 AM 3/20/2025    10:15 AM 3/20/2025    10:37 AM 3/20/2025    10:45 AM 3/20/2025    11:45 AM 3/24/2025    10:04 AM   Vitals    Systolic 97 87 91 91 104 100 96   Diastolic 60 55 59 59 64 64 56   BP Location       Right arm   Heart Rate 106 110 88 88 91 87 86   Temp 38.1 °C (100.6 °F) 37.8 °C (100 °F) 36.8 °C (98.2 °F) 36.8 °C (98.2 °F) 37.5 °C (99.5 °F) 37.1 °C (98.8 °F) 36.8 °C (98.2 °F)   Resp 16 16 16 16 18 16 18   Weight (lb)       106.7   BMI       17.73 kg/m2   BSA (m2)       1.49 m2   Visit Report       Report     Vital signs reviewed       Physical Exam  Constitutional:       Appearance: He is underweight.   HENT:      Head: Normocephalic.   Eyes:      Pupils: Pupils are equal, round, and reactive to light.   Pulmonary:      Effort: Pulmonary effort is normal.   Musculoskeletal:         General: Normal range of motion.      Right hand: Swelling present.      Left hand: Swelling present.      Right foot: Swelling present.      Left foot: Swelling present.   Neurological:      Mental Status: He is oriented to person, place, and time.   Psychiatric:         Mood and Affect: Mood normal.         Behavior: Behavior normal.          ASSESSMENT/PLAN    Pain  Pain is: cancer related pain  Type: somatic  Pain control: sub-optimally controlled  Home regimen:   - Continue Oxycodone 15 mg every 4 hours as needed  - Continue MSContin 30/30/60 mg.   - Continue Tylenol 1000 mg every 8 hours as needed  - Continue Ibuprofen 600 mg every 6 hours as needed   - Continue with RT    Opioid Use  Medication Management:   - OARRS report reviewed with no aberrant behavior; consistent with  prescriptions/records and patient history  - .  Overdose Risk Score 430.   This has been discussed with patient.   - We will continue to closely monitor the patient for signs of prescription misuse including UDS, OARRS review and subjective reports at each visit.  - No concurrent benzodiazepine use   - I am a provider who either is or has consulted and collaborated with a provider certified in Hospice and Palliative Medicine and have conducted a face-face visit  and examination for this patient.  - Routine Urine Drug Screen complete 10/8/24 appropriately positive for opioids and negative for illicit substances  - Controlled Substance Agreement completed 10/8/24  - Specifically discussed that controlled substance prescriptions will only be provided by our group as outlined in the completed agreement  - Prescribed naloxone prescribed 9/27/24 - patient has at home  - Red Flags: none    Constipation   At risk for constipation related to opioids,  currently not constipated   Usual bowel pattern: every 1-2 days   Current regimen:   - Continue Senna 2 tabs BID. Recently increased this due to constipation  - Recommend starting Miralax daily instead of PRN   - If no BM within 48-72 hours, start MOM 8% every 6 hours     Sleeping Difficulty:  Impaired sleep related to pain  Current regimen:    - Continue pain medication as described above    Decreased appetite  Related to malignancy  Nutrition consult - may consider at next visit  Current regimen:    - Continue Mirtazapine 15 mg daily at bedtime. Discussed increasing mirtazapine is not effective at higher doses for appetite  - Could consider Marinol  - He is currently using edibles which do assist with appetite   - Encouraged smaller, more frequent meals  - encouraged ensure/boost 1-2 times per day  - Drinking high calorie protein smoothies   - Met with Nena Felder    Advance Directives  Existence of Advance Directives:Yes, documentation or copy in medical record  Decision maker: HCPOA is Judith Mccartney  Code Status: Full code    Next Follow-Up Visit:  Return to clinic in 2-3 months     Signature and billing  Thank you for allowing us to participate in the care of this patient. Recommendations will be communicated back to the consulting service by way of shared electronic medical record or face-to-face.    Medical complexity was high level due to due to complexity of problems, extensive data review, and high risk of  management/treatment.  Time was spent on the following: Prep Time, Time Directly with Patient/Family/Caregiver, Documentation Time. Total time spent: 20      DATA   Diagnostic tests and information reviewed for today's visit:  Most recent labs, Most recent imaging, Medications       Some elements copied from Oncology note on 2/10/25, the elements have been updated and all reflect current decision making from today, 3/24/2025.      Plan of Care discussed with: Patient and Family/Significant Other: wife      SIGNATURE: ADELINA Jacobo    Contact information:  Supportive and Palliative Oncology  Monday-Friday 8 AM-5 PM  Phone:  [phone], press option #5, then option #1.   Or Epic Secure Chat

## 2025-03-24 NOTE — SIGNIFICANT EVENT

## 2025-03-25 ENCOUNTER — HOSPITAL ENCOUNTER (OUTPATIENT)
Dept: RADIOLOGY | Facility: HOSPITAL | Age: 50
Discharge: HOME | End: 2025-03-25
Payer: COMMERCIAL

## 2025-03-25 DIAGNOSIS — C34.90 NSCLC METASTATIC TO BONE (MULTI): ICD-10-CM

## 2025-03-25 DIAGNOSIS — C79.51 NSCLC METASTATIC TO BONE (MULTI): ICD-10-CM

## 2025-03-25 PROCEDURE — A9597 PET, DX, FOR TUMOR ID, NOC: HCPCS | Performed by: INTERNAL MEDICINE

## 2025-03-25 PROCEDURE — 3430000001 HC RX 343 DIAGNOSTIC RADIOPHARMACEUTICALS: Performed by: INTERNAL MEDICINE

## 2025-03-25 PROCEDURE — 78815 PET IMAGE W/CT SKULL-THIGH: CPT

## 2025-03-25 RX ADMIN — Medication 5.9 MILLICURIE: at 13:20

## 2025-03-26 ENCOUNTER — HOSPITAL ENCOUNTER (OUTPATIENT)
Dept: RADIOLOGY | Facility: HOSPITAL | Age: 50
Discharge: HOME | End: 2025-03-26
Payer: COMMERCIAL

## 2025-03-26 DIAGNOSIS — C34.90 NSCLC METASTATIC TO BONE (MULTI): ICD-10-CM

## 2025-03-26 DIAGNOSIS — C79.51 NSCLC METASTATIC TO BONE (MULTI): ICD-10-CM

## 2025-03-26 DIAGNOSIS — M89.9 LESION OF BONE OF CERVICAL SPINE: ICD-10-CM

## 2025-03-26 DIAGNOSIS — M89.9 LESION OF PARIETAL BONE: ICD-10-CM

## 2025-03-26 PROCEDURE — 72156 MRI NECK SPINE W/O & W/DYE: CPT

## 2025-03-26 PROCEDURE — 70553 MRI BRAIN STEM W/O & W/DYE: CPT | Performed by: RADIOLOGY

## 2025-03-26 PROCEDURE — 72157 MRI CHEST SPINE W/O & W/DYE: CPT

## 2025-03-26 PROCEDURE — 70553 MRI BRAIN STEM W/O & W/DYE: CPT

## 2025-03-26 PROCEDURE — 72157 MRI CHEST SPINE W/O & W/DYE: CPT | Performed by: RADIOLOGY

## 2025-03-26 PROCEDURE — 2550000001 HC RX 255 CONTRASTS: Performed by: INTERNAL MEDICINE

## 2025-03-26 PROCEDURE — A9575 INJ GADOTERATE MEGLUMI 0.1ML: HCPCS | Performed by: INTERNAL MEDICINE

## 2025-03-26 RX ORDER — GADOTERATE MEGLUMINE 376.9 MG/ML
10 INJECTION INTRAVENOUS
Status: COMPLETED | OUTPATIENT
Start: 2025-03-26 | End: 2025-03-26

## 2025-03-26 RX ADMIN — GADOTERATE MEGLUMINE 10 ML: 376.9 INJECTION INTRAVENOUS at 12:06

## 2025-03-27 ENCOUNTER — APPOINTMENT (OUTPATIENT)
Dept: RADIATION ONCOLOGY | Facility: CLINIC | Age: 50
End: 2025-03-27
Payer: COMMERCIAL

## 2025-03-27 ENCOUNTER — APPOINTMENT (OUTPATIENT)
Dept: PRIMARY CARE | Facility: CLINIC | Age: 50
End: 2025-03-27
Payer: COMMERCIAL

## 2025-03-27 VITALS
RESPIRATION RATE: 17 BRPM | SYSTOLIC BLOOD PRESSURE: 112 MMHG | HEART RATE: 77 BPM | OXYGEN SATURATION: 96 % | BODY MASS INDEX: 17.19 KG/M2 | DIASTOLIC BLOOD PRESSURE: 62 MMHG | HEIGHT: 66 IN | WEIGHT: 107 LBS

## 2025-03-27 DIAGNOSIS — J96.11 CHRONIC RESPIRATORY FAILURE WITH HYPOXIA: ICD-10-CM

## 2025-03-27 DIAGNOSIS — M25.511 CHRONIC PAIN OF BOTH SHOULDERS: ICD-10-CM

## 2025-03-27 DIAGNOSIS — M08.00 JUVENILE RHEUMATOID ARTHRITIS (MULTI): ICD-10-CM

## 2025-03-27 DIAGNOSIS — C79.51 NSCLC METASTATIC TO BONE (MULTI): Primary | ICD-10-CM

## 2025-03-27 DIAGNOSIS — C34.90 NSCLC METASTATIC TO BONE (MULTI): Primary | ICD-10-CM

## 2025-03-27 DIAGNOSIS — G89.29 CHRONIC PAIN OF BOTH SHOULDERS: ICD-10-CM

## 2025-03-27 DIAGNOSIS — M25.512 CHRONIC PAIN OF BOTH SHOULDERS: ICD-10-CM

## 2025-03-27 PROCEDURE — 99213 OFFICE O/P EST LOW 20 MIN: CPT | Performed by: NURSE PRACTITIONER

## 2025-03-27 NOTE — PROGRESS NOTES
"Subjective   Jovon Mccartney is a 49 y.o. male who presents for Follow-up. Currently being treated for lung CA metastatic to bone with palliative radiation therapy. He is very happy with his oncology treatment team.  HPI  Review of Systems   Constitutional:  Negative for appetite change (States eating a little better), fatigue, fever and unexpected weight change.   HENT:          Dry mouth; dry nares   Respiratory:  Positive for shortness of breath (SOB w/ exertion or if he takes O2 off for more than an hour at rest). Negative for cough.    Cardiovascular:  Negative for chest pain and palpitations.   Gastrointestinal:  Positive for constipation and nausea (infrequent). Negative for abdominal pain and diarrhea.        Had recent constipation, then loose stools, and currently describes having \"soft serve\" consistency stools.    Musculoskeletal:  Positive for back pain.   Neurological:  Positive for weakness (generalized). Negative for headaches.   Psychiatric/Behavioral:  Negative for confusion, dysphoric mood and sleep disturbance. The patient is not nervous/anxious.    Objective     Physical Exam  Constitutional:       General: He is awake. He is not in acute distress.     Appearance: He is cachectic. He is ill-appearing.      Interventions: Nasal cannula in place.      Comments: 2L O2 per NC   HENT:      Mouth/Throat:      Mouth: Mucous membranes are moist.   Eyes:      Conjunctiva/sclera: Conjunctivae normal.   Cardiovascular:      Rate and Rhythm: Normal rate and regular rhythm.      Heart sounds: No murmur heard.  Pulmonary:      Effort: Pulmonary effort is normal.      Breath sounds: Examination of the right-upper field reveals decreased breath sounds. Examination of the right-middle field reveals decreased breath sounds. Examination of the right-lower field reveals decreased breath sounds. Decreased breath sounds present.      Comments: On 2L O2 per NC continuously.  Abdominal:      General: Abdomen is flat. " "  Musculoskeletal:      Right lower leg: Edema (pitting pedal/ankle edema) present.      Left lower leg: Edema (pitting pedal/ankle edema) present.   Skin:     General: Skin is warm and dry.   Neurological:      Mental Status: He is alert and oriented to person, place, and time.   Psychiatric:         Mood and Affect: Mood normal.         Behavior: Behavior is cooperative.     /62   Pulse 77   Resp 17   Ht 1.676 m (5' 6\")   Wt 48.5 kg (107 lb)   SpO2 96%   BMI 17.27 kg/m²     Assessment/Plan     Problem List Items Addressed This Visit       Juvenile rheumatoid arthritis (Multi)    Overview     Previously wore braces to BLE. Had been on Humira for 20-25 years, but stopped when diagnosed w/ NSCLC d/t  incompatibility with meds.          NSCLC metastatic to bone (Multi) - Primary    Overview     Stage Ivb.  Follows w/ Dr. Vargas (Hem/Onc), Dr. Mcgrath (radiation oncology),  ADELINA Segura (Hem/Onc), ADELINA White (Hem/Onc palliative care)         Current Assessment & Plan     Continue care as directed by oncology team.          Relevant Orders    Referral to Clinical Pharmacy    Follow Up In [facility] - PCP - Established    Chronic pain of both shoulders    Current Assessment & Plan     Follows w/ palliative care, who manages pain.          Chronic respiratory failure with hypoxia (Multi)    Overview     Wears continuous O2 per NC at 2L. May increase to 4L as needed.   Breztri and PRN Combivent Respimat  Follows w/ pulmonology.           Relevant Orders    Referral to Clinical Pharmacy       "

## 2025-03-30 PROBLEM — Z79.620 ADALIMUMAB (HUMIRA) LONG-TERM USE: Status: RESOLVED | Noted: 2024-09-15 | Resolved: 2025-03-30

## 2025-03-30 PROBLEM — F11.90 OPIATE USE: Status: ACTIVE | Noted: 2025-03-30

## 2025-03-30 ASSESSMENT — ENCOUNTER SYMPTOMS
PALPITATIONS: 0
CONFUSION: 0
SLEEP DISTURBANCE: 0
UNEXPECTED WEIGHT CHANGE: 0
CONSTIPATION: 1
NERVOUS/ANXIOUS: 0
WEAKNESS: 1
DIARRHEA: 0
SHORTNESS OF BREATH: 1
HEADACHES: 0
APPETITE CHANGE: 0
ABDOMINAL PAIN: 0
FATIGUE: 0
NAUSEA: 1
BACK PAIN: 1
COUGH: 0
DYSPHORIC MOOD: 0
FEVER: 0

## 2025-03-31 DIAGNOSIS — C79.32 SECONDARY MALIGNANT NEOPLASM OF CEREBRAL MENINGES (MULTI): Primary | ICD-10-CM

## 2025-03-31 DIAGNOSIS — D69.6 THROMBOCYTOPENIA (CMS-HCC): ICD-10-CM

## 2025-03-31 DIAGNOSIS — E87.1 HYPONATREMIA: ICD-10-CM

## 2025-04-01 ENCOUNTER — LAB (OUTPATIENT)
Dept: LAB | Facility: HOSPITAL | Age: 50
End: 2025-04-01
Payer: COMMERCIAL

## 2025-04-01 DIAGNOSIS — E87.1 HYPO-OSMOLALITY AND HYPONATREMIA: Primary | ICD-10-CM

## 2025-04-01 LAB
ALBUMIN SERPL BCP-MCNC: 2.9 G/DL (ref 3.4–5)
ALP SERPL-CCNC: 162 U/L (ref 33–120)
ALT SERPL W P-5'-P-CCNC: 21 U/L (ref 10–52)
ANION GAP SERPL CALC-SCNC: 14 MMOL/L (ref 10–20)
AST SERPL W P-5'-P-CCNC: 24 U/L (ref 9–39)
BASOPHILS # BLD AUTO: 0.07 X10*3/UL (ref 0–0.1)
BASOPHILS NFR BLD AUTO: 1.2 %
BILIRUB SERPL-MCNC: 0.4 MG/DL (ref 0–1.2)
BUN SERPL-MCNC: 10 MG/DL (ref 6–23)
CALCIUM SERPL-MCNC: 8.6 MG/DL (ref 8.6–10.6)
CHLORIDE SERPL-SCNC: 98 MMOL/L (ref 98–107)
CO2 SERPL-SCNC: 27 MMOL/L (ref 21–32)
CREAT SERPL-MCNC: 0.54 MG/DL (ref 0.5–1.3)
EGFRCR SERPLBLD CKD-EPI 2021: >90 ML/MIN/1.73M*2
EOSINOPHIL # BLD AUTO: 0.05 X10*3/UL (ref 0–0.7)
EOSINOPHIL NFR BLD AUTO: 0.9 %
ERYTHROCYTE [DISTWIDTH] IN BLOOD BY AUTOMATED COUNT: 18.1 % (ref 11.5–14.5)
GLUCOSE SERPL-MCNC: 91 MG/DL (ref 74–99)
HCT VFR BLD AUTO: 29.1 % (ref 41–52)
HGB BLD-MCNC: 8.5 G/DL (ref 13.5–17.5)
IMM GRANULOCYTES # BLD AUTO: 0.02 X10*3/UL (ref 0–0.7)
IMM GRANULOCYTES NFR BLD AUTO: 0.4 % (ref 0–0.9)
LYMPHOCYTES # BLD AUTO: 1.15 X10*3/UL (ref 1.2–4.8)
LYMPHOCYTES NFR BLD AUTO: 20.2 %
MCH RBC QN AUTO: 27.2 PG (ref 26–34)
MCHC RBC AUTO-ENTMCNC: 29.2 G/DL (ref 32–36)
MCV RBC AUTO: 93 FL (ref 80–100)
MONOCYTES # BLD AUTO: 1.1 X10*3/UL (ref 0.1–1)
MONOCYTES NFR BLD AUTO: 19.3 %
NEUTROPHILS # BLD AUTO: 3.3 X10*3/UL (ref 1.2–7.7)
NEUTROPHILS NFR BLD AUTO: 58 %
NRBC BLD-RTO: 0 /100 WBCS (ref 0–0)
PLATELET # BLD AUTO: 395 X10*3/UL (ref 150–450)
POTASSIUM SERPL-SCNC: 4.5 MMOL/L (ref 3.5–5.3)
PROT SERPL-MCNC: 6.9 G/DL (ref 6.4–8.2)
RBC # BLD AUTO: 3.13 X10*6/UL (ref 4.5–5.9)
SODIUM SERPL-SCNC: 134 MMOL/L (ref 136–145)
WBC # BLD AUTO: 5.7 X10*3/UL (ref 4.4–11.3)

## 2025-04-01 PROCEDURE — 80053 COMPREHEN METABOLIC PANEL: CPT

## 2025-04-01 PROCEDURE — 85025 COMPLETE CBC W/AUTO DIFF WBC: CPT

## 2025-04-02 ENCOUNTER — TUMOR BOARD CONFERENCE (OUTPATIENT)
Dept: HEMATOLOGY/ONCOLOGY | Facility: HOSPITAL | Age: 50
End: 2025-04-02
Payer: COMMERCIAL

## 2025-04-02 NOTE — TUMOR BOARD NOTE
CNS Tumor Board Recommendations       Patient was presented by   at our CNS Tumor Board on 04/02/2025 which included representatives from Radiation oncology, Surgical oncology, Neuro-oncology, Pathology, Radiology, Research, Neurosurgery, Social Work (Neurosurgery).     Current patient presents with history of the following treatment history: PMH Juvenile RA on Humera who presented with headache and neck pain. MRI brain and C spine with enhancing lesion within R parieto-occipital bone and vertebral lesion of C2 and C3 concerning for metastatic disease. CT chest with RUL hilar mass, mediastinal lymph node 1.1cm, subcarinal node 1.1cm, left axillary lymph node 1.4cm now s/p bronchoscopy for NSCLC adenocarcinoma. Treatment course significant for palliative XRT to R skull for 2000 cGY, C-spine 2000cGY, R- shoulder 2000cGY 10/10/24 - 10/17/24. Systemic treatment with Carboplatin, pemetrexed, and pembrolizumab 10/28/24. Maintenance pemetrexed and pembrolizumab x 3 cycles 01/21/25 - 03/03/25. Now on pembrolizumab monotherapy.     FET-PET with increased activity to the RO parietal lesion. MRI brain and spine with improvement/ stable lesions.     The CNS Tumor Board tumor board considered available treatment options and made the following recommendations: MRI Brain if changes noted can consider FET-PET.     Clinical Trial Status: N/A     National site-specific guidelines were discussed with respect to the case.

## 2025-04-07 ENCOUNTER — TELEPHONE (OUTPATIENT)
Dept: PALLIATIVE MEDICINE | Facility: HOSPITAL | Age: 50
End: 2025-04-07
Payer: COMMERCIAL

## 2025-04-07 DIAGNOSIS — G89.3 CANCER RELATED PAIN: ICD-10-CM

## 2025-04-07 RX ORDER — MORPHINE SULFATE 30 MG/1
TABLET, FILM COATED, EXTENDED RELEASE ORAL
Qty: 120 TABLET | Refills: 0 | Status: SHIPPED | OUTPATIENT
Start: 2025-04-07 | End: 2025-05-07

## 2025-04-07 NOTE — TELEPHONE ENCOUNTER
OARRS reviewed and no concerns noted. Per last visit with Tati Rosa NP  on 3/24/25 patient to continue MS CONTIN. F/U visit scheduled for 4/14/25.  Refills sent to provider for review/approval . Patient updated.

## 2025-04-09 ENCOUNTER — APPOINTMENT (OUTPATIENT)
Dept: RADIOLOGY | Facility: CLINIC | Age: 50
End: 2025-04-09
Payer: COMMERCIAL

## 2025-04-11 DIAGNOSIS — K59.00 CONSTIPATION, UNSPECIFIED CONSTIPATION TYPE: Primary | ICD-10-CM

## 2025-04-11 RX ORDER — DOCUSATE SODIUM 100 MG/1
100 CAPSULE, LIQUID FILLED ORAL 2 TIMES DAILY PRN
Qty: 60 CAPSULE | Refills: 0 | Status: SHIPPED | OUTPATIENT
Start: 2025-04-11

## 2025-04-11 NOTE — TELEPHONE ENCOUNTER
Patient requested Colace 100 mg bid. Reviewed with Tati Rosa CNP and she approved. Next visit to be scheduled. Pended to provider and sending to [facility]

## 2025-04-14 ENCOUNTER — LAB (OUTPATIENT)
Dept: LAB | Facility: CLINIC | Age: 50
End: 2025-04-14
Payer: COMMERCIAL

## 2025-04-14 ENCOUNTER — INFUSION (OUTPATIENT)
Dept: HEMATOLOGY/ONCOLOGY | Facility: CLINIC | Age: 50
End: 2025-04-14
Payer: COMMERCIAL

## 2025-04-14 ENCOUNTER — OFFICE VISIT (OUTPATIENT)
Dept: HEMATOLOGY/ONCOLOGY | Facility: CLINIC | Age: 50
End: 2025-04-14
Payer: COMMERCIAL

## 2025-04-14 VITALS
HEART RATE: 93 BPM | BODY MASS INDEX: 17.37 KG/M2 | DIASTOLIC BLOOD PRESSURE: 76 MMHG | OXYGEN SATURATION: 94 % | SYSTOLIC BLOOD PRESSURE: 110 MMHG | RESPIRATION RATE: 18 BRPM | WEIGHT: 107.6 LBS | TEMPERATURE: 98.4 F

## 2025-04-14 DIAGNOSIS — C34.90 NSCLC METASTATIC TO BONE (MULTI): ICD-10-CM

## 2025-04-14 DIAGNOSIS — C34.90 NSCLC METASTATIC TO BONE (MULTI): Primary | ICD-10-CM

## 2025-04-14 DIAGNOSIS — C79.51 NSCLC METASTATIC TO BONE (MULTI): ICD-10-CM

## 2025-04-14 DIAGNOSIS — C79.51 NSCLC METASTATIC TO BONE (MULTI): Primary | ICD-10-CM

## 2025-04-14 LAB
ALBUMIN SERPL BCP-MCNC: 3.1 G/DL (ref 3.4–5)
ALP SERPL-CCNC: 127 U/L (ref 33–120)
ALT SERPL W P-5'-P-CCNC: 8 U/L (ref 10–52)
ANION GAP SERPL CALC-SCNC: 13 MMOL/L (ref 10–20)
AST SERPL W P-5'-P-CCNC: 14 U/L (ref 9–39)
BASOPHILS # BLD AUTO: 0.05 X10*3/UL (ref 0–0.1)
BASOPHILS NFR BLD AUTO: 0.8 %
BILIRUB SERPL-MCNC: 0.4 MG/DL (ref 0–1.2)
BUN SERPL-MCNC: 14 MG/DL (ref 6–23)
CALCIUM SERPL-MCNC: 9.1 MG/DL (ref 8.6–10.3)
CHLORIDE SERPL-SCNC: 99 MMOL/L (ref 98–107)
CO2 SERPL-SCNC: 27 MMOL/L (ref 21–32)
CORTIS AM PEAK SERPL-MSCNC: 20.1 UG/DL (ref 5–20)
CREAT SERPL-MCNC: 0.58 MG/DL (ref 0.5–1.3)
EGFRCR SERPLBLD CKD-EPI 2021: >90 ML/MIN/1.73M*2
EOSINOPHIL # BLD AUTO: 0.02 X10*3/UL (ref 0–0.7)
EOSINOPHIL NFR BLD AUTO: 0.3 %
ERYTHROCYTE [DISTWIDTH] IN BLOOD BY AUTOMATED COUNT: 17.6 % (ref 11.5–14.5)
GLUCOSE SERPL-MCNC: 105 MG/DL (ref 74–99)
HCT VFR BLD AUTO: 26.9 % (ref 41–52)
HGB BLD-MCNC: 8.4 G/DL (ref 13.5–17.5)
IMM GRANULOCYTES # BLD AUTO: 0.01 X10*3/UL (ref 0–0.7)
IMM GRANULOCYTES NFR BLD AUTO: 0.2 % (ref 0–0.9)
LYMPHOCYTES # BLD AUTO: 0.77 X10*3/UL (ref 1.2–4.8)
LYMPHOCYTES NFR BLD AUTO: 12.1 %
MCH RBC QN AUTO: 28.1 PG (ref 26–34)
MCHC RBC AUTO-ENTMCNC: 31.2 G/DL (ref 32–36)
MCV RBC AUTO: 90 FL (ref 80–100)
MONOCYTES # BLD AUTO: 0.71 X10*3/UL (ref 0.1–1)
MONOCYTES NFR BLD AUTO: 11.2 %
NEUTROPHILS # BLD AUTO: 4.8 X10*3/UL (ref 1.2–7.7)
NEUTROPHILS NFR BLD AUTO: 75.4 %
NRBC BLD-RTO: ABNORMAL /100{WBCS}
PLATELET # BLD AUTO: 354 X10*3/UL (ref 150–450)
POTASSIUM SERPL-SCNC: 4 MMOL/L (ref 3.5–5.3)
PROT SERPL-MCNC: 7.3 G/DL (ref 6.4–8.2)
RBC # BLD AUTO: 2.99 X10*6/UL (ref 4.5–5.9)
SODIUM SERPL-SCNC: 135 MMOL/L (ref 136–145)
TSH SERPL-ACNC: 1.15 MIU/L (ref 0.44–3.98)
WBC # BLD AUTO: 6.4 X10*3/UL (ref 4.4–11.3)

## 2025-04-14 PROCEDURE — 85025 COMPLETE CBC W/AUTO DIFF WBC: CPT

## 2025-04-14 PROCEDURE — 84443 ASSAY THYROID STIM HORMONE: CPT

## 2025-04-14 PROCEDURE — 80053 COMPREHEN METABOLIC PANEL: CPT

## 2025-04-14 PROCEDURE — 1036F TOBACCO NON-USER: CPT | Performed by: STUDENT IN AN ORGANIZED HEALTH CARE EDUCATION/TRAINING PROGRAM

## 2025-04-14 PROCEDURE — 82533 TOTAL CORTISOL: CPT

## 2025-04-14 PROCEDURE — 96413 CHEMO IV INFUSION 1 HR: CPT

## 2025-04-14 PROCEDURE — 2500000004 HC RX 250 GENERAL PHARMACY W/ HCPCS (ALT 636 FOR OP/ED): Performed by: STUDENT IN AN ORGANIZED HEALTH CARE EDUCATION/TRAINING PROGRAM

## 2025-04-14 PROCEDURE — 99215 OFFICE O/P EST HI 40 MIN: CPT | Mod: 25 | Performed by: STUDENT IN AN ORGANIZED HEALTH CARE EDUCATION/TRAINING PROGRAM

## 2025-04-14 PROCEDURE — 36415 COLL VENOUS BLD VENIPUNCTURE: CPT

## 2025-04-14 PROCEDURE — G2211 COMPLEX E/M VISIT ADD ON: HCPCS | Performed by: STUDENT IN AN ORGANIZED HEALTH CARE EDUCATION/TRAINING PROGRAM

## 2025-04-14 PROCEDURE — 99215 OFFICE O/P EST HI 40 MIN: CPT | Performed by: STUDENT IN AN ORGANIZED HEALTH CARE EDUCATION/TRAINING PROGRAM

## 2025-04-14 RX ORDER — FAMOTIDINE 10 MG/ML
20 INJECTION, SOLUTION INTRAVENOUS ONCE AS NEEDED
Status: CANCELLED | OUTPATIENT
Start: 2025-04-14

## 2025-04-14 RX ORDER — FAMOTIDINE 10 MG/ML
20 INJECTION, SOLUTION INTRAVENOUS ONCE AS NEEDED
Status: DISCONTINUED | OUTPATIENT
Start: 2025-04-14 | End: 2025-04-14 | Stop reason: HOSPADM

## 2025-04-14 RX ORDER — EPINEPHRINE 0.3 MG/.3ML
0.3 INJECTION SUBCUTANEOUS EVERY 5 MIN PRN
Status: CANCELLED | OUTPATIENT
Start: 2025-04-14

## 2025-04-14 RX ORDER — DIPHENHYDRAMINE HYDROCHLORIDE 50 MG/ML
50 INJECTION, SOLUTION INTRAMUSCULAR; INTRAVENOUS AS NEEDED
Status: DISCONTINUED | OUTPATIENT
Start: 2025-04-14 | End: 2025-04-14 | Stop reason: HOSPADM

## 2025-04-14 RX ORDER — HEPARIN SODIUM,PORCINE/PF 10 UNIT/ML
50 SYRINGE (ML) INTRAVENOUS AS NEEDED
OUTPATIENT
Start: 2025-04-14

## 2025-04-14 RX ORDER — ALBUTEROL SULFATE 0.83 MG/ML
3 SOLUTION RESPIRATORY (INHALATION) AS NEEDED
Status: DISCONTINUED | OUTPATIENT
Start: 2025-04-14 | End: 2025-04-14 | Stop reason: HOSPADM

## 2025-04-14 RX ORDER — DIPHENHYDRAMINE HYDROCHLORIDE 50 MG/ML
50 INJECTION, SOLUTION INTRAMUSCULAR; INTRAVENOUS AS NEEDED
Status: CANCELLED | OUTPATIENT
Start: 2025-04-14

## 2025-04-14 RX ORDER — EPINEPHRINE 0.3 MG/.3ML
0.3 INJECTION SUBCUTANEOUS EVERY 5 MIN PRN
Status: DISCONTINUED | OUTPATIENT
Start: 2025-04-14 | End: 2025-04-14 | Stop reason: HOSPADM

## 2025-04-14 RX ORDER — ALBUTEROL SULFATE 0.83 MG/ML
3 SOLUTION RESPIRATORY (INHALATION) AS NEEDED
Status: CANCELLED | OUTPATIENT
Start: 2025-04-14

## 2025-04-14 RX ORDER — HEPARIN 100 UNIT/ML
500 SYRINGE INTRAVENOUS AS NEEDED
OUTPATIENT
Start: 2025-04-14

## 2025-04-14 RX ADMIN — SODIUM CHLORIDE 200 MG: 9 INJECTION, SOLUTION INTRAVENOUS at 11:52

## 2025-04-14 ASSESSMENT — ENCOUNTER SYMPTOMS
HEMOPTYSIS: 0
UNEXPECTED WEIGHT CHANGE: 0
CONSTIPATION: 1
BACK PAIN: 1
APPETITE CHANGE: 0
CARDIOVASCULAR NEGATIVE: 1
DIARRHEA: 0
MYALGIAS: 1
NAUSEA: 0
FEVER: 0
FATIGUE: 1
VOMITING: 0
SHORTNESS OF BREATH: 1

## 2025-04-14 ASSESSMENT — PAIN SCALES - GENERAL: PAINLEVEL_OUTOF10: 10-WORST PAIN EVER

## 2025-04-14 NOTE — SIGNIFICANT EVENT

## 2025-04-14 NOTE — PROGRESS NOTES
[facility] - Medical Oncology Follow-Up Visit    Patient ID: Jovon Mccartney is a 49 y.o. male with NSCLC adenocarcinoma     Current therapy: cyanocobalamin (Vitamin B-12) injection 1,000 mcg, 1,000 mcg, intramuscular, Once, 2 of 2 cycles    Administration: 1,000 mcg (10/28/2024), 1,000 mcg (12/30/2024)        fosaprepitant (Emend) 150 mg in sodium chloride 0.9% 150 mL IV, 150 mg, intravenous, Once, 4 of 4 cycles    Administration: 150 mg (10/28/2024), 150 mg (11/18/2024), 150 mg (12/9/2024), 150 mg (12/30/2024)        CARBOplatin (Paraplatin) 627 mg in sodium chloride 0.9% 172.7 mL IV, 627 mg, intravenous, Once, 4 of 4 cycles    Administration: 627 mg (10/28/2024), 627 mg (11/18/2024), 500 mg (12/9/2024), 440 mg (12/30/2024)        methylPREDNISolone sod succinate (SOLU-Medrol) 40 mg/mL injection 40 mg, 40 mg, intravenous, As needed, 4 of 4 cycles        palonosetron (Aloxi) injection 250 mcg, 250 mcg, intravenous, Once, 4 of 4 cycles    Administration: 250 mcg (10/28/2024), 250 mcg (11/18/2024), 250 mcg (12/9/2024), 250 mcg (12/30/2024)        pembrolizumab (Keytruda) 200 mg in sodium chloride 0.9% 118 mL IV, 200 mg, intravenous, Once, 4 of 4 cycles    Administration: 200 mg (10/28/2024), 200 mg (11/18/2024), 200 mg (12/9/2024), 200 mg (12/30/2024)        PEMEtrexed disodium (Alimta) 800 mg in sodium chloride 0.9% 142 mL IV, 500 mg/m2 = 800 mg, intravenous, Once, 4 of 4 cycles    Dose modification: 400 mg/m2 (original dose 500 mg/m2, Cycle 2)    Administration: 800 mg (10/28/2024), 645 mg (11/18/2024), 600 mg (12/9/2024), 600 mg (12/30/2024)    cyanocobalamin (Vitamin B-12) injection 1,000 mcg, 1,000 mcg, intramuscular, Once, 1 of 1 cycle    Administration: 1,000 mcg (1/21/2025)        methylPREDNISolone sod succinate (SOLU-Medrol) 40 mg/mL injection 40 mg, 40 mg, intravenous, As needed, 4 of 18 cycles        pembrolizumab (Keytruda) 200 mg in sodium chloride 0.9% 118 mL IV, 200 mg,  intravenous, Once, 4 of 18 cycles    Administration: 200 mg (1/21/2025), 200 mg (2/10/2025), 200 mg (3/3/2025), 200 mg (3/24/2025)        PEMEtrexed disodium (Alimta) 600 mg in sodium chloride 0.9% 134 mL IV, 400 mg/m2 = 600 mg (80 % of original dose 500 mg/m2), intravenous, Once, 3 of 17 cycles    Dose modification: 400 mg/m2 (original dose 500 mg/m2, Cycle 1)    Administration: 600 mg (1/21/2025), 600 mg (2/10/2025), 600 mg (3/3/2025)       Chief Concern: readiness to treat visit    Oncologic History:     DIAGNOSIS  NSCLC adenocarcinoma      STAGING  cT4 pN3 M1c      CURRENT SITES OF DISEASE  R hilar mass, 4L, 11L, scapula,  right,   parieto-occipital bone, C2, C3, ?lymphatic carcinomatosis     MOLECULAR GENOMICS  KRAS p.G12C ([id number] c.34G>T)   PD-L1 <1%     PRIOR THERAPY        CURRENT THERAPY  - Palliative XRT course 1: R_skull 2000 cGy, C-spine 2000 cGy, R_ shoulder 2000 cGy (10/10/24 - 10/17/24)  - Carboplatin (AUC 5), Pemetrexed (500mg/m2), & Pembrolizumab 10/28/24 -   - Pemetrexed dose reduced 400mg/m2 - C2 11/18/24   - Carboplatin dose reduced (AUC 4) - C3 12/9/24   - Maintenance Pemetrexed & Pembrolizumab 400mg/m2  - 1/21/25 - 3/3/25, 3 cycles   - Palliative RT course 2:  2/20 SBRT Brain_R 3000 cGy, R_Hip 3 fields 2000 cGy (2/20, 2/21, 2/24, 2/26 & 2/28/25)  - Hgb 6.4 - 2U PRBC transfusion 3/20/25 -> 8.2   - Pembrolizumab monotherapy 3/24/25 -      CURRENT ONCOLOGICAL PROBLEMS  Hemoptysis - resolved  Epistaxis - intermittent   Anemia d/t chemotherapy         HISTORY OF PRESENT ILLNESS  Mr. Mccartney is a 50 yo with PMH significant for RA who present to the [facility] on 9/9/24 with 2 months of progressively worsening headaches and neck pain with occasional radiation to the R and L arms. A lytic lesion was noted on CT head 9/9/24 of the parietal bone, and CT cervical spine was concerning for lucent lesions C2 and C3. Brain MRI and MR cervical spine confirmed marrow-replacing lesions fo the C2 and C3 vertebral  bodies. CT C/A/P w/o contrast on 9/10/24 was concerning for soft tissue mass of the R hilum, lytic lesions of the R scapula, and 3.5 cm mass in the L gluteus. He was transferred to [facility] on 9/10/24 CT C/A/P w/IV contrast on 9/15/24 confirmed the lung mass encasing the R mainstem bronchus abutting the R main pulmonary artery and extending to the lung periphery, as well as R perihilar lesion and lymphangitic carcinomatosis, prominent mediastinal nodes, L axillary node, nodular thickening of bilateral adrenal glands, likely lesion of the R scapula, small hepatic lesions, moderate pericardial effusion. EBUS on 9/17/24 with pathology of R hilar mass with adenocarcinoma, KRAS G12C, PD-L1 pending, and positive lymph nodes 11L and 4L. He met Dr. Mcgrath and discussed palliative radiation to the occipital lesion and cervical spine. PET/CT on 10/1/24 with FDG avid confluent R hilar/mediastinal mass, mild FDG avidity along upper and middle lobe, bilateral scapular, humeral, R anterior acetabular osseous metastases. He started palliative radiation 10/10/24-10/16/24.      He was born with juvenile RA, usually his feet and knees feel good compared to everything. He used to walk with a crutch, since the hospital he has been walking without it. He follows with a rheumatologist at [place]. It's fairly managed he says, has  been on humira for 20-25 years, right when it first came out. Kept the fluid off his knees and helped him better than anything else. He was on all kinds of drugs he said before including steroids, methotrexate, others. Typically with the humira he doesn't really get flares, sometimes in his wrists. He's been off for about a month, so thinks he'd have a flare in about a month.      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 Maybe had one exposure to asbestos. 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 Lives  at home with his wife and a dog, 3 cats, a bird and fish. They have 2 sons and 3 grandchildren.  Tob: quit smoking 9/9/24, smoked 39 years, 1-1.5 ppd  EtOH: occasional  Illicits: previous smoking marijuana, only edibles now for pain     FAMILY HISTORY  Mother - breast cancer dx 56 yo  Maternal grandmother - bone cancer  Paternal cousin - bone and/or lung cancer  Paternal grandmother - unknown cancer  No other family members with autoimmune diseases    HPI   He is here today with his wife. The pain in his L wrist and arm has been bad - just didn't go down, been really swollen in the last week. Taking the salt tabs daily now. Pain elsewhere has been pretty good.      Meds (Current):    Current Outpatient Medications:     acetaminophen (Tylenol) 500 mg tablet, Take 1 tablet (500 mg) by mouth every 6 hours if needed for mild pain (1 - 3)., Disp: , Rfl:     b complex 0.4 mg tablet, Take 1 tablet by mouth once daily., Disp: , Rfl:     budesonide-glycopyr-formoterol (BREZTRI) 160-9-4.8 mcg/actuation HFA aerosol inhaler, Inhale 2 puffs 2 times a day., Disp: 10.7 g, Rfl: 3    dexAMETHasone (Decadron) 4 mg tablet, Take 4 mg (1 tablet) by mouth twice daily the day before treatment, once the evening of treatment, and twice daily the day after treatment., Disp: 5 tablet, Rfl: 5    docusate sodium (Colace) 100 mg capsule, Take 1 capsule (100 mg) by mouth 2 times a day as needed for constipation (for hard stools)., Disp: 60 capsule, Rfl: 0    folic acid (Folvite) 1 mg tablet, Take 1 tablet (1,000 mcg) by mouth once daily. Do not start before October 27, 2024., Disp: 30 tablet, Rfl: 11    folic acid (Folvite) 1 mg tablet, Take 1 tablet (1,000 mcg) by mouth once daily., Disp: 30 tablet, Rfl: 11    gabapentin (Neurontin) 300 mg capsule, Take 1 capsule (300 mg) by mouth 3 times a day., Disp: 90 capsule, Rfl: 3    ibuprofen 200 mg tablet, Take 2 tablets (400 mg) by mouth every 6 hours if needed for mild pain (1 - 3)., Disp: , Rfl:      ipratropium-albuteroL (Combivent Respimat)  mcg/actuation inhaler, Inhale 2 puffs 4 times a day as needed for shortness of breath or wheezing., Disp: , Rfl:     mirtazapine (Remeron) 15 mg tablet, Take 1 tablet (15 mg) by mouth once daily at bedtime., Disp: 30 tablet, Rfl: 2    morphine CR (MS Contin) 30 mg 12 hr tablet, Take 1 tablet (30 mg) by mouth 2 times a day AND 2 tablets (60 mg) once daily at bedtime. Do not crush, chew, or split. Take 30 MG AM, 30 MG MID DAY and 60 MG at BEDTIME.., Disp: 120 tablet, Rfl: 0    naloxone (Narcan) 4 mg/0.1 mL nasal spray, Administer 1 spray (4 mg) into affected nostril(s) if needed for opioid reversal. May repeat every 2-3 minutes if needed, alternating nostrils, until medical assistance becomes available., Disp: 2 each, Rfl: 3    ondansetron (Zofran) 8 mg tablet, Take 1 tablet (8 mg) by mouth every 8 hours if needed for nausea or vomiting. Do not fill before October 27, 2024., Disp: 30 tablet, Rfl: 5    ondansetron (Zofran) 8 mg tablet, Take 1 tablet (8 mg) by mouth every 8 hours if needed for nausea or vomiting., Disp: 30 tablet, Rfl: 5    oxyCODONE (Roxicodone) 10 mg immediate release tablet, Take 1-1.5 tablets (10-15 mg) by mouth every 4 hours if needed for moderate pain (4 - 6) or severe pain (7 - 10). MAX 9 TABS PER DAY Do not fill before April 7, 2025., Disp: 270 tablet, Rfl: 0    oxygen (O2) gas therapy, Inhale 1 each once every 24 hours., Disp: , Rfl:     pantoprazole (ProtoNix) 40 mg EC tablet, Take 1 tablet (40 mg) by mouth once daily in the morning. Take before meals. Do not crush, chew, or split., Disp: 30 tablet, Rfl: 3    pilocarpine (Salagen, pilocarpine,) 5 mg tablet, Take 1 tablet (5 mg) by mouth 3 times a day as needed (for dry mouth)., Disp: 90 tablet, Rfl: 11    polyethylene glycol (Glycolax, Miralax) 17 gram/dose powder, Mix 17 g of powder and drink once daily., Disp: , Rfl:     prochlorperazine (Compazine) 10 mg tablet, Take 1 tablet (10 mg) by  mouth every 6 hours if needed for nausea or vomiting. Do not fill before October 27, 2024., Disp: 30 tablet, Rfl: 5    prochlorperazine (Compazine) 10 mg tablet, Take 1 tablet (10 mg) by mouth every 6 hours if needed for nausea or vomiting., Disp: 30 tablet, Rfl: 5    Current Facility-Administered Medications:     acetaminophen (Tylenol) tablet 650 mg, 650 mg, oral, Once, Catarina Brooks, APRN-CNP    Review of Systems   Constitutional:  Positive for fatigue. Negative for appetite change, fever and unexpected weight change.   HENT:  Negative.  Negative for nosebleeds.    Respiratory:  Positive for shortness of breath. Negative for hemoptysis.    Cardiovascular: Negative.    Gastrointestinal:  Positive for constipation. Negative for diarrhea, nausea and vomiting.   Musculoskeletal:  Positive for back pain and myalgias.        Objective   BSA: 1.51 meters squared  Wt Readings from Last 5 Encounters:   04/14/25 48.8 kg (107 lb 9.6 oz)   03/27/25 48.5 kg (107 lb)   03/24/25 48.4 kg (106 lb 11.2 oz)   03/20/25 48.2 kg (106 lb 6 oz)   03/03/25 47.2 kg (104 lb)     11/11/24 wt #123 - leg braces worn (8#)    /76 (BP Location: Left arm, Patient Position: Sitting)   Pulse 93   Temp 36.9 °C (98.4 °F) (Temporal)   Resp 18   Wt 48.8 kg (107 lb 9.6 oz)   SpO2 94%   BMI 17.37 kg/m²     ECOG Score:   Performance Status (ECOG): 2    ECOG   Definition  0          Fully active; no performance restrictions.  1          Strenuous physical activity restricted; fully ambulatory and able to carry out light work.  2          Capable of all self-care but unable to carry out any work activities. Up and about >50% of waking hours.  3          Capable of only limited self-care; confined to bed or chair >50% of waking hours.  4          Completely disabled; cannot carry out any self-care; totally confined to bed or chair.    Physical Exam  Vitals reviewed.   Constitutional:       General: He is not in acute distress.     Appearance:  He is not ill-appearing.      Comments: Frail, cachectic     HENT:      Head: Normocephalic and atraumatic.      Mouth/Throat:      Mouth: Mucous membranes are moist.   Eyes:      Extraocular Movements: Extraocular movements intact.      Pupils: Pupils are equal, round, and reactive to light.   Cardiovascular:      Rate and Rhythm: Normal rate and regular rhythm.      Heart sounds: Normal heart sounds. No murmur heard.  Pulmonary:      Effort: Pulmonary effort is normal. No respiratory distress.      Breath sounds: Normal breath sounds.      Comments: Diminished RLL.   Abdominal:      General: Bowel sounds are normal. There is no distension.      Palpations: Abdomen is soft.   Musculoskeletal:      Cervical back: Normal range of motion.      Right lower leg: Edema (ankles 2/2 RA) present.      Left lower leg: Edema (ankles 2/2 RA) present.      Comments: Chronic swelling to TINY ankles and left wrist/hand 2/2 RA.   Skin:     General: Skin is warm and dry.   Neurological:      General: No focal deficit present.      Mental Status: He is alert and oriented to person, place, and time. Mental status is at baseline.   Psychiatric:         Mood and Affect: Mood normal.         Behavior: Behavior normal.         Thought Content: Thought content normal.        Results:  Labs:  Lab Results   Component Value Date    WBC 6.4 04/14/2025    HGB 8.4 (L) 04/14/2025    HCT 26.9 (L) 04/14/2025    MCV 90 04/14/2025     04/14/2025      Lab Results   Component Value Date    NEUTROABS 4.80 04/14/2025      Lab Results   Component Value Date    GLUCOSE 105 (H) 04/14/2025    CALCIUM 9.1 04/14/2025     (L) 04/14/2025    K 4.0 04/14/2025    CO2 27 04/14/2025    CL 99 04/14/2025    BUN 14 04/14/2025    CREATININE 0.58 04/14/2025    MG 1.66 11/26/2024     Lab Results   Component Value Date    ALT 8 (L) 04/14/2025    AST 14 04/14/2025    ALKPHOS 127 (H) 04/14/2025    BILITOT 0.4 04/14/2025    BILIDIR 0.1 09/14/2024      Lab Results    Component Value Date    ACTH 6.2 (L) 01/20/2025    CORTISOL 3.1 (L) 03/03/2025    TSH 0.54 03/03/2025       Imaging:  I have personally reviewed the below imaging and concur with the reported findings unless otherwise stated:    PET/CT FET Brain 3/25/25  IMPRESSION:  Multiple FET avid lesions involving the right temporomandibular  joint, floor of the right middle cranial fossa, and dural based  lesion overlying the right occipital parietal lobe are concerning for  a neoplastic process.         Assessment/Plan      Jovon Mccartney is a 49 y.o. male here for follow up of NSCLC adenocarcinoma.     # NSCLC adenocarcinoma  - [id number]  - KRAS G12C, PD-L1 <1%  - discussed that SoC therapies include chemotherapy+ immunotherapy. Given longstanding juvenile RA, would like input from his rheumatologist regarding safety of immunotherapy in this setting. Discussed if no immunotherapy, would recommend chemotherapy alone vs KRAS G12C targeted therapy frontline, which typically is given in the second-line setting.  - provided information on carboplatin/pemetrexed/pembrolizumab vs adagrasib   - patient discussed with his rheumatologist regarding concern for flaring RA on immunotherapy. His rheumatologist will monitor closely, stop humira, and maintain him on low-dose steroids  - consented for carboplatin/pemetrexed/pembrolizumab to start 10/28/24  - will plan on utilizing adagrasib in the 2nd line setting if needed  - will plan on restaging scans after C4.  - C2 11/18/24, Pemetrexed dose reduced 400mg/m2  - Hospitalization 11/25-11/26/24 2U PRBC's  - C3 12/9/24, Carboplatin dose reduced AUC4  - personally reviewed restaging scans with likely stable disease, will monitor closely and continue on maintenance pemetrexed/pembrolizumab  - bone-only progression, with stable/maybe minimal improvement in intrathoracic disease, and we opted for continuation on pemetrexed/pembrolizumab and close monitoring. Discussion with Dr. Mcgrath for  possible palliative radiation.  - Palliative RT course 2:  2/20 SBRT Brain_R, R_Hip (2/20, 2/21, 2/24, 2/26 & 2/28/25)  - next scans in 3 months (4/9/25)  - Repeat CBC w/diff 3/17/25 - lab req provided to pt. -> Hgb 6.4, blood transfusion 3/20/25 - 2U PRBC's    - Pemetrexed removed from tx plan, Pembro only starting 3/24/25   - will continue on pembrolizumab alone, and ordered CT scan for restaging  - he will have repeat brain MRI per radiation oncology     # Neoplasm related pain  - already saw Dr. Mcgrath, receiving palliative radiation - repeat C spine and brain MRI 3/26/25  - seeing supportive onc - started pt on ER Morphine and PRN Oxycodone.    - Pt also taking Ibuprofen 600mg QID and 1000mg Acetaminophen QID.  Recommended pt reduce both doses x1 to start.  Pt will cut back to TID x1 week and see if his pain/RA is tolerable.    - 11/18/24 - current TID Acetaminophen daily, plus Morphine, Oxycodone and Gabapentin.    - 12/9/24 - follows with supportive onc, pain managed with current regimen      # Hypoxia/low-grade temps  - Discussed pt borderline to ED recommendation 2/2 low-grade temps and hypoxic episode at home.  Pt agreeable to urgent care visit for nasal swabs to r/o COVID, Flu A/B, and RSV.  All (-).  - stable, PRN & HS oxygen use.  No further temps.    - Recommended routine oxygen use - 2L, increase to 4L with exertion.    - 12/9:  continuous oxygen use, except brief periods of time - eating.      # Hemoptysis/Epistaxis  - resolved with saline nasal spray  - 11/17/24 - epistaxis, brief episode  - will continue to monitor    - Anemia Hgb 7.1  - Pt consented today for blood transfusion (11/18/24)  - Discussed with Dr. Vargas, will repeat labs on 11/25/24.   Repeat labs, plus T&S and ABO ordered for 11/25/24.  Discussed he likely will need a transfusion. Recommended ED visit for repeat hemoptysis/epistaxis episodes.    - 2U PRBC's 11/25-11/26/24 [facility]    - Repeat CBC w/diff 12/16/24 + T&S - Hgb 8.4   - stable  Hgb 8.8 12/30/24  - 3/3: Hgb 7.6 3/3/25 - will repeat CBC w/diff 3/17/25    - 3/17 Hgb 6.4, 2U PRBC's transfused 3/20/25 Hgb -> 8.2     # Hyponatremia - chronic - stable  - Baseline 129-135  - 128 3/24/25 - Rx sent for NaCl 1 tab TID x7 days, repeat CMP 3/31/25  - ongoing monitoring    # Low ACTH 6.2/elevated Cortisol 22.2 - drawn 11:44 1/20/25   - will monitor for now.      # Thrombocytopenia   - 11/4/24   - 11/11/24 PLT 42   - 12/30/24 -    - 3/24/25 PLT 96   - No s/sx's bleeding  - Pt educate to bleeding precautions/interventions  - Repeat CBC w/diff 3/31/24     # Leukopenia - stable  - 3.2 11/4/24   - 5.8 12/30/24   - 4.2 3/24/25     # Elevated ALP   - 228 3/24/25 (g1)  - ongoing monitoring     # Advanced care planning  - discussed incurable but treatable nature of metastatic disease, with goal of treatment to improve or maintain quality of life and prolong life.

## 2025-04-15 ENCOUNTER — PATIENT MESSAGE (OUTPATIENT)
Dept: PALLIATIVE MEDICINE | Facility: CLINIC | Age: 50
End: 2025-04-15
Payer: COMMERCIAL

## 2025-04-18 ENCOUNTER — TELEPHONE (OUTPATIENT)
Dept: HEMATOLOGY/ONCOLOGY | Facility: HOSPITAL | Age: 50
End: 2025-04-18
Payer: COMMERCIAL

## 2025-04-18 NOTE — TELEPHONE ENCOUNTER
RN called and left a message for Jovon in regards to his 5/5 appointment with Dr. Vargas. Provider is out that day and need to get him rescheduled. Clinic contact information was given for callback.

## 2025-04-23 ENCOUNTER — APPOINTMENT (OUTPATIENT)
Dept: RADIOLOGY | Facility: HOSPITAL | Age: 50
End: 2025-04-23
Payer: COMMERCIAL

## 2025-04-25 ENCOUNTER — TELEPHONE (OUTPATIENT)
Dept: HEMATOLOGY/ONCOLOGY | Facility: HOSPITAL | Age: 50
End: 2025-04-25
Payer: COMMERCIAL

## 2025-04-25 NOTE — TELEPHONE ENCOUNTER
[facility] -   Infectious diseases evaluation  admission date 8/5/2022    reason for consultation:   UTI    Impression :   Current:  UTI/E. coli grew on urine culture pending sensitivity  Sepsis picture  Hypotension  Atrial fibrillation  History of PE/DVT  Diabetes mellitus  History of hypertension  Hyperlipidemia    Other:  Penicillin and Cipro allergy  History of ESBL    Recommendations   IV meropenem  Follow-up blood and urine cultures and adjust antibiotics as needed  Follow CBC and renal function  Supportive care    History of Present Illness:   Initial history:  Shreya Sainz is a 79y.o.-year-old female was brought to the hospital by EMS after a fall at home. The patient had generalized weakness for 1 day prior to admission associated with lightheadedness. Symptoms moderate to severe, no alleviating or aggravating factors. The patient fell twice at home. The patient was hypotensive on arrival to the ER, received fluid boluses and was started on Levophed. Childers catheter was placed. She is awake, denied cough or shortness of breath,, denied nausea or vomiting, no abdominal pain. 8/5/2022 lactic acid 1.1, urinalysis showed 6-9 WBC, cloudy, small leukocyte esterase  Chest x-ray showed no acute abnormality  COVID test was negative  Interval changes  8/7/2022   She is feeling better, had a fever with temperature max of 100.3 yesterday, afebrile today, off Levophed.   Patient Vitals for the past 8 hrs:   BP Temp Temp src Pulse Resp SpO2 Weight   08/07/22 0715 (!) 111/43 -- -- 58 18 95 % --   08/07/22 0700 (!) 135/46 -- -- 59 17 98 % --   08/07/22 0645 (!) 134/52 -- -- 60 19 97 % --   08/07/22 0630 (!) 130/50 -- -- 59 21 95 % --   08/07/22 0615 (!) 135/48 -- -- 60 22 96 % --   08/07/22 0600 (!) 143/47 -- -- 59 16 96 % --   08/07/22 0545 (!) 141/54 -- -- 60 16 98 % --   08/07/22 0530 (!) 138/49 -- -- 58 22 97 % --   08/07/22 0515 (!) 142/59 -- -- 58 23 96 % 192 RN called and left a message for Jovon in regards to his upcoming appointment with Dr. Vargas on 5/5. RN informed him that provider is not in clinic that day and we need to reschedule. Clinic contact information was given for callback.     lb 3.9 oz (87.2 kg)   08/07/22 0500 (!) 142/54 -- -- 58 23 96 % --   08/07/22 0445 (!) 138/58 -- -- 58 22 96 % --   08/07/22 0430 (!) 146/57 -- -- 61 24 95 % --   08/07/22 0415 119/67 97.9 °F (36.6 °C) Oral 57 25 96 % --   08/07/22 0400 (!) 101/45 -- -- 53 14 97 % --   08/07/22 0345 (!) 109/38 -- -- 54 13 96 % --   08/07/22 0330 (!) 108/41 -- -- 52 14 97 % --   08/07/22 0315 (!) 111/46 -- -- 52 14 97 % --   08/07/22 0300 (!) 114/43 -- -- 55 17 97 % --   08/07/22 0245 (!) 106/46 -- -- 53 15 96 % --   08/07/22 0230 (!) 112/44 -- -- 53 14 97 % --   08/07/22 0215 (!) 106/45 -- -- 54 15 96 % --         Summary of relevant labs:  Labs:    Micro:    Imaging:      I have personally reviewed the past medical history, past surgical history, medications, social history, and family history, and I haveupdated the database accordingly. Allergies: Avapro [irbesartan], Ciprofloxacin hcl, Cozaar [losartan potassium], Diovan [valsartan], Lipitor [atorvastatin calcium], Lisinopril, Pcn [penicillins], Pravachol [pravastatin sodium], Tape [adhesive tape], Tramadol, Zetia [ezetimibe], and Morphine     Review of Systems:     Review of Systems  As per history present illness, other than above 12 system review was negative  Physical Examination :       Physical Exam  Constitutional:       General: She is not in acute distress. HENT:      Head: Atraumatic. Right Ear: External ear normal.      Left Ear: External ear normal.   Eyes:      General: No scleral icterus. Conjunctiva/sclera: Conjunctivae normal.   Cardiovascular:      Rate and Rhythm: Normal rate and regular rhythm. Heart sounds: No murmur heard. Pulmonary:      Effort: Pulmonary effort is normal.      Breath sounds: Normal breath sounds. Abdominal:      General: There is no distension. Palpations: Abdomen is soft. Tenderness: There is no abdominal tenderness.    Genitourinary:     Comments: Childers catheter in place  Musculoskeletal:      Cervical back: Neck supple. No rigidity. Right lower leg: No edema. Left lower leg: No edema. Skin:     General: Skin is warm. Coloration: Skin is not jaundiced. Neurological:      Mental Status: She is alert and oriented to person, place, and time. Mental status is at baseline. Past Medical History:     Past Medical History:   Diagnosis Date    [facility] 3[facility]28/2014    Chronic back pain     with rt. leg pain    Diverticulosis     GERD (gastroesophageal reflux disease)     Hyperlipidemia     Hypertension     Hypothyroidism     Obesity (BMI 30.0-34. 9) 8/12/2016    Onychomycosis     PE (pulmonary embolism)     H/O DVT of rt leg    Type II or unspecified type diabetes mellitus without mention of complication, not stated as uncontrolled        Past Surgical  History:     Past Surgical History:   Procedure Laterality Date    CHOLECYSTECTOMY      COLON SURGERY      s/p sigmoid cloectomy    COLONOSCOPY N/A 1/25/2021    COLONOSCOPY DIAGNOSTIC performed by Davina Pandey MD at [facility]. ([address] [place] [address])      SIGMOIDOSCOPY N/A 12/2/2021    SIGMOIDOSCOPY DIAGNOSTIC FLEXIBLE performed by Davina Pandey MD at [facility] 09/20/2019    DR IMAN GAUTHIER    UPPER GASTROINTESTINAL ENDOSCOPY N/A 12/2/2021    EGD DIAGNOSTIC ONLY performed by Davina Pandey MD at [address] [place] [address]      s/p       Medications:      hydrocortisone sodium succinate PF  50 mg IntraVENous Q8H    latanoprost  1 drop Both Eyes Nightly    levothyroxine  100 mcg Oral Daily    sodium chloride flush  10 mL IntraVENous 2 times per day    ferrous sulfate  325 mg Oral BID WC    apixaban  2.5 mg Oral BID    meropenem  500 mg IntraVENous Q12H       Social History:     Social History     Socioeconomic History    Marital status:       Spouse name: Not on file    Number of children: Not on file    Years of education: Not on file    Highest education level: Not on file   Occupational History    Not on file   Tobacco Use    Smoking status: Never    Smokeless tobacco: Never   Vaping Use    Vaping Use: Never used   Substance and Sexual Activity    Alcohol use: No    Drug use: No    Sexual activity: Not on file   Other Topics Concern    Not on file   Social History Narrative    Not on file     Social Determinants of Health     Financial Resource Strain: Low Risk     Difficulty of Paying Living Expenses: Not hard at all   Food Insecurity: No Food Insecurity    Worried About Running Out of Food in the Last Year: Never true    Ran Out of Food in the Last Year: Never true   Transportation Needs: Not on file   Physical Activity: Not on file   Stress: Not on file   Social Connections: Not on file   Intimate Partner Violence: Not on file   Housing Stability: Not on file       Family History:     Family History   Adopted: Yes   Problem Relation Age of Onset    No Known Problems Mother     No Known Problems Father       Medical Decision Making:   I have independently reviewed/ordered the following labs:    CBC with Differential:   Recent Labs     08/06/22  0434 08/07/22  0511   WBC 7.5 6.6   HGB 8.7* 9.1*   HCT 26.1* 27.1*   * 121*   LYMPHOPCT 3* 3*   MONOPCT 8* 7       BMP:  Recent Labs     08/06/22  0434 08/07/22  0511   * 132*   K 4.0 4.4   CL 99 105   CO2 21 20   BUN 27* 21   CREATININE 1.90* 1.35*       Hepatic Function Panel:   Recent Labs     08/05/22  1609 08/06/22  0434 08/07/22  0511   PROT 6.0* 5.1* 5.0*  5.5*   LABALBU 3.0* 2.5* 2.6*   BILIDIR 0.91*  --   --    IBILI 0.69  --   --    BILITOT 1.60* 1.07 0.55   ALKPHOS 90 77 93   ALT 15 13 14   AST 30 24 23       No results for input(s): RPR in the last 72 hours. No results for input(s): HIV in the last 72 hours. No results for input(s): BC in the last 72 hours.   Lab Results   Component Value Date/Time    CREATININE 1.35 08/07/2022 05:11 AM    GLUCOSE 150 08/07/2022 05:11 AM    GLUCOSE 123 03/17/2012 07:18 AM       Detailed results: Thank you for allowing us to participate in the care of this patient. Please call with questions. This note is created with the assistance of a speech recognition program.  While intending to generate adocument that actually reflects the content of the visit, the document can still have some errors including those of syntax and sound a like substitutions which may escape proof reading. It such instances, actual meaningcan be extrapolated by contextual diversion.     Dutch Hand MD  Office: [phone]  Perfect serve / office [phone]

## 2025-04-30 ENCOUNTER — APPOINTMENT (OUTPATIENT)
Dept: CARDIOLOGY | Facility: HOSPITAL | Age: 50
DRG: 871 | End: 2025-04-30
Payer: COMMERCIAL

## 2025-04-30 ENCOUNTER — APPOINTMENT (OUTPATIENT)
Dept: RADIOLOGY | Facility: HOSPITAL | Age: 50
DRG: 871 | End: 2025-04-30
Payer: COMMERCIAL

## 2025-04-30 ENCOUNTER — HOSPITAL ENCOUNTER (INPATIENT)
Facility: HOSPITAL | Age: 50
End: 2025-04-30
Attending: STUDENT IN AN ORGANIZED HEALTH CARE EDUCATION/TRAINING PROGRAM | Admitting: STUDENT IN AN ORGANIZED HEALTH CARE EDUCATION/TRAINING PROGRAM
Payer: COMMERCIAL

## 2025-04-30 ENCOUNTER — APPOINTMENT (OUTPATIENT)
Dept: RADIOLOGY | Facility: CLINIC | Age: 50
End: 2025-04-30
Payer: COMMERCIAL

## 2025-04-30 DIAGNOSIS — J44.1 COPD EXACERBATION (MULTI): Primary | ICD-10-CM

## 2025-04-30 DIAGNOSIS — J96.21 ACUTE ON CHRONIC RESPIRATORY FAILURE WITH HYPOXIA: ICD-10-CM

## 2025-04-30 DIAGNOSIS — G89.3 CANCER RELATED PAIN: ICD-10-CM

## 2025-04-30 DIAGNOSIS — M89.9 LESION OF BONE OF CERVICAL SPINE: ICD-10-CM

## 2025-04-30 DIAGNOSIS — J18.9 PNEUMONIA DUE TO INFECTIOUS ORGANISM, UNSPECIFIED LATERALITY, UNSPECIFIED PART OF LUNG: ICD-10-CM

## 2025-04-30 PROBLEM — J96.01 SEPSIS WITH ACUTE HYPOXIC RESPIRATORY FAILURE WITHOUT SEPTIC SHOCK: Status: ACTIVE | Noted: 2025-04-30

## 2025-04-30 PROBLEM — R65.20 SEPSIS WITH ACUTE HYPOXIC RESPIRATORY FAILURE WITHOUT SEPTIC SHOCK: Status: ACTIVE | Noted: 2025-04-30

## 2025-04-30 PROBLEM — A41.9 SEPSIS WITH ACUTE HYPOXIC RESPIRATORY FAILURE WITHOUT SEPTIC SHOCK: Status: ACTIVE | Noted: 2025-04-30

## 2025-04-30 PROBLEM — D84.9 IMMUNOCOMPROMISED: Status: ACTIVE | Noted: 2025-04-30

## 2025-04-30 PROBLEM — J15.9 COMMUNITY ACQUIRED BACTERIAL PNEUMONIA: Status: ACTIVE | Noted: 2025-04-30

## 2025-04-30 PROBLEM — E43 SEVERE PROTEIN-CALORIE MALNUTRITION (MULTI): Status: ACTIVE | Noted: 2025-04-30

## 2025-04-30 LAB
ALBUMIN SERPL BCP-MCNC: 3.2 G/DL (ref 3.4–5)
ALP SERPL-CCNC: 106 U/L (ref 33–120)
ALT SERPL W P-5'-P-CCNC: 8 U/L (ref 10–52)
ANION GAP SERPL CALC-SCNC: 16 MMOL/L (ref 10–20)
APPEARANCE UR: CLEAR
AST SERPL W P-5'-P-CCNC: 16 U/L (ref 9–39)
ATRIAL RATE: 103 BPM
BASOPHILS # BLD AUTO: 0.07 X10*3/UL (ref 0–0.1)
BASOPHILS NFR BLD AUTO: 0.6 %
BILIRUB SERPL-MCNC: 0.5 MG/DL (ref 0–1.2)
BILIRUB UR STRIP.AUTO-MCNC: NEGATIVE MG/DL
BNP SERPL-MCNC: 26 PG/ML (ref 0–99)
BUN SERPL-MCNC: 12 MG/DL (ref 6–23)
CALCIUM SERPL-MCNC: 8.5 MG/DL (ref 8.6–10.3)
CARDIAC TROPONIN I PNL SERPL HS: 4 NG/L (ref 0–20)
CARDIAC TROPONIN I PNL SERPL HS: 6 NG/L (ref 0–20)
CHLORIDE SERPL-SCNC: 94 MMOL/L (ref 98–107)
CO2 SERPL-SCNC: 23 MMOL/L (ref 21–32)
COLOR UR: NORMAL
CREAT SERPL-MCNC: 0.45 MG/DL (ref 0.5–1.3)
EGFRCR SERPLBLD CKD-EPI 2021: >90 ML/MIN/1.73M*2
EOSINOPHIL # BLD AUTO: 0.01 X10*3/UL (ref 0–0.7)
EOSINOPHIL NFR BLD AUTO: 0.1 %
ERYTHROCYTE [DISTWIDTH] IN BLOOD BY AUTOMATED COUNT: 16.9 % (ref 11.5–14.5)
GLUCOSE SERPL-MCNC: 114 MG/DL (ref 74–99)
GLUCOSE UR STRIP.AUTO-MCNC: NORMAL MG/DL
HCT VFR BLD AUTO: 27.4 % (ref 41–52)
HGB BLD-MCNC: 8 G/DL (ref 13.5–17.5)
HOLD SPECIMEN: NORMAL
IMM GRANULOCYTES # BLD AUTO: 0.04 X10*3/UL (ref 0–0.7)
IMM GRANULOCYTES NFR BLD AUTO: 0.4 % (ref 0–0.9)
KETONES UR STRIP.AUTO-MCNC: NEGATIVE MG/DL
LACTATE SERPL-SCNC: 1.9 MMOL/L (ref 0.4–2)
LEUKOCYTE ESTERASE UR QL STRIP.AUTO: NEGATIVE
LYMPHOCYTES # BLD AUTO: 0.79 X10*3/UL (ref 1.2–4.8)
LYMPHOCYTES NFR BLD AUTO: 7.1 %
MAGNESIUM SERPL-MCNC: 1.79 MG/DL (ref 1.6–2.4)
MCH RBC QN AUTO: 27.5 PG (ref 26–34)
MCHC RBC AUTO-ENTMCNC: 29.2 G/DL (ref 32–36)
MCV RBC AUTO: 94 FL (ref 80–100)
MONOCYTES # BLD AUTO: 0.85 X10*3/UL (ref 0.1–1)
MONOCYTES NFR BLD AUTO: 7.6 %
NEUTROPHILS # BLD AUTO: 9.42 X10*3/UL (ref 1.2–7.7)
NEUTROPHILS NFR BLD AUTO: 84.2 %
NITRITE UR QL STRIP.AUTO: NEGATIVE
NRBC BLD-RTO: 0 /100 WBCS (ref 0–0)
P AXIS: 21 DEGREES
P OFFSET: 183 MS
P ONSET: 146 MS
PH UR STRIP.AUTO: 6.5 [PH]
PLATELET # BLD AUTO: 322 X10*3/UL (ref 150–450)
POTASSIUM SERPL-SCNC: 4.1 MMOL/L (ref 3.5–5.3)
PR INTERVAL: 122 MS
PROT SERPL-MCNC: 7.2 G/DL (ref 6.4–8.2)
PROT UR STRIP.AUTO-MCNC: NEGATIVE MG/DL
Q ONSET: 207 MS
QRS COUNT: 17 BEATS
QRS DURATION: 94 MS
QT INTERVAL: 338 MS
QTC CALCULATION(BAZETT): 442 MS
QTC FREDERICIA: 405 MS
R AXIS: 93 DEGREES
RBC # BLD AUTO: 2.91 X10*6/UL (ref 4.5–5.9)
RBC # UR STRIP.AUTO: NEGATIVE MG/DL
SODIUM SERPL-SCNC: 129 MMOL/L (ref 136–145)
SP GR UR STRIP.AUTO: 1.03
T AXIS: 61 DEGREES
T OFFSET: 376 MS
UROBILINOGEN UR STRIP.AUTO-MCNC: NORMAL MG/DL
VENTRICULAR RATE: 103 BPM
WBC # BLD AUTO: 11.2 X10*3/UL (ref 4.4–11.3)

## 2025-04-30 PROCEDURE — 2550000001 HC RX 255 CONTRASTS: Performed by: STUDENT IN AN ORGANIZED HEALTH CARE EDUCATION/TRAINING PROGRAM

## 2025-04-30 PROCEDURE — 96375 TX/PRO/DX INJ NEW DRUG ADDON: CPT

## 2025-04-30 PROCEDURE — 99285 EMERGENCY DEPT VISIT HI MDM: CPT | Mod: 25 | Performed by: STUDENT IN AN ORGANIZED HEALTH CARE EDUCATION/TRAINING PROGRAM

## 2025-04-30 PROCEDURE — 1200000002 HC GENERAL ROOM WITH TELEMETRY DAILY

## 2025-04-30 PROCEDURE — 71275 CT ANGIOGRAPHY CHEST: CPT

## 2025-04-30 PROCEDURE — 81003 URINALYSIS AUTO W/O SCOPE: CPT

## 2025-04-30 PROCEDURE — 36415 COLL VENOUS BLD VENIPUNCTURE: CPT

## 2025-04-30 PROCEDURE — 87040 BLOOD CULTURE FOR BACTERIA: CPT | Mod: STJLAB | Performed by: STUDENT IN AN ORGANIZED HEALTH CARE EDUCATION/TRAINING PROGRAM

## 2025-04-30 PROCEDURE — 2500000004 HC RX 250 GENERAL PHARMACY W/ HCPCS (ALT 636 FOR OP/ED): Mod: JZ

## 2025-04-30 PROCEDURE — 2500000004 HC RX 250 GENERAL PHARMACY W/ HCPCS (ALT 636 FOR OP/ED): Mod: JZ | Performed by: STUDENT IN AN ORGANIZED HEALTH CARE EDUCATION/TRAINING PROGRAM

## 2025-04-30 PROCEDURE — 96366 THER/PROPH/DIAG IV INF ADDON: CPT

## 2025-04-30 PROCEDURE — 71045 X-RAY EXAM CHEST 1 VIEW: CPT | Performed by: STUDENT IN AN ORGANIZED HEALTH CARE EDUCATION/TRAINING PROGRAM

## 2025-04-30 PROCEDURE — 2500000002 HC RX 250 W HCPCS SELF ADMINISTERED DRUGS (ALT 637 FOR MEDICARE OP, ALT 636 FOR OP/ED)

## 2025-04-30 PROCEDURE — 94640 AIRWAY INHALATION TREATMENT: CPT

## 2025-04-30 PROCEDURE — 84484 ASSAY OF TROPONIN QUANT: CPT

## 2025-04-30 PROCEDURE — 99223 1ST HOSP IP/OBS HIGH 75: CPT | Performed by: STUDENT IN AN ORGANIZED HEALTH CARE EDUCATION/TRAINING PROGRAM

## 2025-04-30 PROCEDURE — 83605 ASSAY OF LACTIC ACID: CPT

## 2025-04-30 PROCEDURE — 71045 X-RAY EXAM CHEST 1 VIEW: CPT

## 2025-04-30 PROCEDURE — 2500000005 HC RX 250 GENERAL PHARMACY W/O HCPCS: Performed by: STUDENT IN AN ORGANIZED HEALTH CARE EDUCATION/TRAINING PROGRAM

## 2025-04-30 PROCEDURE — 93005 ELECTROCARDIOGRAM TRACING: CPT

## 2025-04-30 PROCEDURE — 71275 CT ANGIOGRAPHY CHEST: CPT | Performed by: STUDENT IN AN ORGANIZED HEALTH CARE EDUCATION/TRAINING PROGRAM

## 2025-04-30 PROCEDURE — 83735 ASSAY OF MAGNESIUM: CPT

## 2025-04-30 PROCEDURE — 2500000001 HC RX 250 WO HCPCS SELF ADMINISTERED DRUGS (ALT 637 FOR MEDICARE OP): Performed by: STUDENT IN AN ORGANIZED HEALTH CARE EDUCATION/TRAINING PROGRAM

## 2025-04-30 PROCEDURE — 2500000002 HC RX 250 W HCPCS SELF ADMINISTERED DRUGS (ALT 637 FOR MEDICARE OP, ALT 636 FOR OP/ED): Performed by: STUDENT IN AN ORGANIZED HEALTH CARE EDUCATION/TRAINING PROGRAM

## 2025-04-30 PROCEDURE — 96367 TX/PROPH/DG ADDL SEQ IV INF: CPT

## 2025-04-30 PROCEDURE — 85025 COMPLETE CBC W/AUTO DIFF WBC: CPT

## 2025-04-30 PROCEDURE — 83880 ASSAY OF NATRIURETIC PEPTIDE: CPT

## 2025-04-30 PROCEDURE — 80051 ELECTROLYTE PANEL: CPT

## 2025-04-30 PROCEDURE — 96365 THER/PROPH/DIAG IV INF INIT: CPT

## 2025-04-30 RX ORDER — ACETAMINOPHEN 325 MG/1
650 TABLET ORAL EVERY 4 HOURS PRN
Status: ACTIVE | OUTPATIENT
Start: 2025-04-30

## 2025-04-30 RX ORDER — MIRTAZAPINE 15 MG/1
15 TABLET, FILM COATED ORAL NIGHTLY
Status: DISPENSED | OUTPATIENT
Start: 2025-04-30

## 2025-04-30 RX ORDER — FOLIC ACID 1 MG/1
1000 TABLET ORAL DAILY
Status: DISPENSED | OUTPATIENT
Start: 2025-04-30

## 2025-04-30 RX ORDER — IPRATROPIUM BROMIDE AND ALBUTEROL SULFATE 2.5; .5 MG/3ML; MG/3ML
3 SOLUTION RESPIRATORY (INHALATION) EVERY 20 MIN
Status: COMPLETED | OUTPATIENT
Start: 2025-04-30 | End: 2025-04-30

## 2025-04-30 RX ORDER — DOCUSATE SODIUM 100 MG/1
100 CAPSULE, LIQUID FILLED ORAL 2 TIMES DAILY PRN
Status: DISPENSED | OUTPATIENT
Start: 2025-04-30

## 2025-04-30 RX ORDER — ENOXAPARIN SODIUM 100 MG/ML
40 INJECTION SUBCUTANEOUS
Status: DISPENSED | OUTPATIENT
Start: 2025-04-30

## 2025-04-30 RX ORDER — ACETAMINOPHEN 650 MG/1
650 SUPPOSITORY RECTAL EVERY 4 HOURS PRN
Status: ACTIVE | OUTPATIENT
Start: 2025-04-30

## 2025-04-30 RX ORDER — POLYETHYLENE GLYCOL 3350 17 G/17G
17 POWDER, FOR SOLUTION ORAL DAILY
Status: DISPENSED | OUTPATIENT
Start: 2025-04-30

## 2025-04-30 RX ORDER — BUDESONIDE 0.25 MG/2ML
0.25 INHALANT ORAL
Status: DISCONTINUED | OUTPATIENT
Start: 2025-04-30 | End: 2025-05-03

## 2025-04-30 RX ORDER — CEFTRIAXONE 2 G/50ML
2 INJECTION, SOLUTION INTRAVENOUS EVERY 24 HOURS
Status: DISPENSED | OUTPATIENT
Start: 2025-05-01

## 2025-04-30 RX ORDER — PANTOPRAZOLE SODIUM 40 MG/1
40 TABLET, DELAYED RELEASE ORAL
Status: DISPENSED | OUTPATIENT
Start: 2025-05-01

## 2025-04-30 RX ORDER — PILOCARPINE HYDROCHLORIDE 5 MG/1
5 TABLET, FILM COATED ORAL 3 TIMES DAILY PRN
Status: DISPENSED | OUTPATIENT
Start: 2025-04-30

## 2025-04-30 RX ORDER — TALC
3 POWDER (GRAM) TOPICAL ONCE
Status: COMPLETED | OUTPATIENT
Start: 2025-05-01 | End: 2025-04-30

## 2025-04-30 RX ORDER — IPRATROPIUM BROMIDE AND ALBUTEROL SULFATE 2.5; .5 MG/3ML; MG/3ML
3 SOLUTION RESPIRATORY (INHALATION)
Status: DISCONTINUED | OUTPATIENT
Start: 2025-04-30 | End: 2025-04-30

## 2025-04-30 RX ORDER — MORPHINE SULFATE 30 MG/1
60 TABLET, FILM COATED, EXTENDED RELEASE ORAL NIGHTLY
Refills: 0 | Status: DISCONTINUED | OUTPATIENT
Start: 2025-04-30 | End: 2025-04-30

## 2025-04-30 RX ORDER — GABAPENTIN 300 MG/1
300 CAPSULE ORAL 3 TIMES DAILY
Status: DISPENSED | OUTPATIENT
Start: 2025-04-30

## 2025-04-30 RX ORDER — AZITHROMYCIN 500 MG/1
500 TABLET, FILM COATED ORAL
Status: DISCONTINUED | OUTPATIENT
Start: 2025-05-01 | End: 2025-05-03

## 2025-04-30 RX ORDER — MORPHINE SULFATE 30 MG/1
60 TABLET, FILM COATED, EXTENDED RELEASE ORAL NIGHTLY
Refills: 0 | Status: DISPENSED | OUTPATIENT
Start: 2025-04-30

## 2025-04-30 RX ORDER — OXYCODONE HYDROCHLORIDE 10 MG/1
10 TABLET ORAL EVERY 4 HOURS PRN
Refills: 0 | Status: DISPENSED | OUTPATIENT
Start: 2025-04-30

## 2025-04-30 RX ORDER — CEFTRIAXONE 2 G/50ML
2 INJECTION, SOLUTION INTRAVENOUS ONCE
Status: COMPLETED | OUTPATIENT
Start: 2025-04-30 | End: 2025-04-30

## 2025-04-30 RX ORDER — ACETAMINOPHEN 160 MG/5ML
650 SOLUTION ORAL EVERY 4 HOURS PRN
Status: DISPENSED | OUTPATIENT
Start: 2025-04-30

## 2025-04-30 RX ORDER — MORPHINE SULFATE 30 MG/1
30 TABLET, FILM COATED, EXTENDED RELEASE ORAL
Refills: 0 | Status: DISPENSED | OUTPATIENT
Start: 2025-04-30

## 2025-04-30 RX ORDER — MORPHINE SULFATE 30 MG/1
30 TABLET, FILM COATED, EXTENDED RELEASE ORAL
Refills: 0 | Status: DISCONTINUED | OUTPATIENT
Start: 2025-05-01 | End: 2025-04-30

## 2025-04-30 RX ORDER — IPRATROPIUM BROMIDE AND ALBUTEROL SULFATE 2.5; .5 MG/3ML; MG/3ML
3 SOLUTION RESPIRATORY (INHALATION)
Status: DISCONTINUED | OUTPATIENT
Start: 2025-04-30 | End: 2025-05-03

## 2025-04-30 RX ORDER — SENNOSIDES 8.6 MG/1
1 TABLET ORAL NIGHTLY
Status: DISPENSED | OUTPATIENT
Start: 2025-04-30

## 2025-04-30 RX ADMIN — IPRATROPIUM BROMIDE AND ALBUTEROL SULFATE 3 ML: 2.5; .5 SOLUTION RESPIRATORY (INHALATION) at 06:20

## 2025-04-30 RX ADMIN — IPRATROPIUM BROMIDE AND ALBUTEROL SULFATE 3 ML: 2.5; .5 SOLUTION RESPIRATORY (INHALATION) at 20:26

## 2025-04-30 RX ADMIN — IPRATROPIUM BROMIDE AND ALBUTEROL SULFATE 3 ML: 2.5; .5 SOLUTION RESPIRATORY (INHALATION) at 05:40

## 2025-04-30 RX ADMIN — Medication 4 L/MIN: at 20:16

## 2025-04-30 RX ADMIN — ENOXAPARIN SODIUM 40 MG: 40 INJECTION SUBCUTANEOUS at 12:09

## 2025-04-30 RX ADMIN — OXYCODONE HYDROCHLORIDE 10 MG: 10 TABLET ORAL at 23:27

## 2025-04-30 RX ADMIN — METHYLPREDNISOLONE SODIUM SUCCINATE 125 MG: 125 INJECTION, POWDER, FOR SOLUTION INTRAMUSCULAR; INTRAVENOUS at 05:47

## 2025-04-30 RX ADMIN — GABAPENTIN 300 MG: 300 CAPSULE ORAL at 20:09

## 2025-04-30 RX ADMIN — MELATONIN TAB 3 MG 3 MG: 3 TAB at 23:46

## 2025-04-30 RX ADMIN — OXYCODONE HYDROCHLORIDE 10 MG: 10 TABLET ORAL at 15:32

## 2025-04-30 RX ADMIN — AZITHROMYCIN MONOHYDRATE 500 MG: 500 INJECTION, POWDER, LYOPHILIZED, FOR SOLUTION INTRAVENOUS at 08:35

## 2025-04-30 RX ADMIN — FOLIC ACID 1000 MCG: 1 TABLET ORAL at 15:32

## 2025-04-30 RX ADMIN — IOHEXOL 70 ML: 350 INJECTION, SOLUTION INTRAVENOUS at 06:07

## 2025-04-30 RX ADMIN — POLYETHYLENE GLYCOL 3350 17 G: 17 POWDER, FOR SOLUTION ORAL at 12:09

## 2025-04-30 RX ADMIN — Medication 4 L/MIN: at 07:00

## 2025-04-30 RX ADMIN — GABAPENTIN 300 MG: 300 CAPSULE ORAL at 15:32

## 2025-04-30 RX ADMIN — CEFTRIAXONE SODIUM 2 G: 2 INJECTION, SOLUTION INTRAVENOUS at 07:48

## 2025-04-30 RX ADMIN — MORPHINE SULFATE 30 MG: 30 TABLET, EXTENDED RELEASE ORAL at 15:34

## 2025-04-30 RX ADMIN — Medication 5 L/MIN: at 06:30

## 2025-04-30 RX ADMIN — MIRTAZAPINE 15 MG: 15 TABLET, FILM COATED ORAL at 20:10

## 2025-04-30 RX ADMIN — BUDESONIDE 0.25 MG: 0.25 INHALANT RESPIRATORY (INHALATION) at 20:27

## 2025-04-30 RX ADMIN — IPRATROPIUM BROMIDE AND ALBUTEROL SULFATE 3 ML: 2.5; .5 SOLUTION RESPIRATORY (INHALATION) at 06:00

## 2025-04-30 RX ADMIN — MORPHINE SULFATE 60 MG: 30 TABLET, EXTENDED RELEASE ORAL at 20:08

## 2025-04-30 RX ADMIN — IPRATROPIUM BROMIDE AND ALBUTEROL SULFATE 3 ML: 2.5; .5 SOLUTION RESPIRATORY (INHALATION) at 15:43

## 2025-04-30 RX ADMIN — DOCUSATE SODIUM 100 MG: 100 CAPSULE, LIQUID FILLED ORAL at 15:32

## 2025-04-30 RX ADMIN — SENNOSIDES 8.6 MG: 8.6 TABLET, FILM COATED ORAL at 20:09

## 2025-04-30 SDOH — ECONOMIC STABILITY: HOUSING INSECURITY: AT ANY TIME IN THE PAST 12 MONTHS, WERE YOU HOMELESS OR LIVING IN A SHELTER (INCLUDING NOW)?: NO

## 2025-04-30 SDOH — SOCIAL STABILITY: SOCIAL INSECURITY: WITHIN THE LAST YEAR, HAVE YOU BEEN AFRAID OF YOUR PARTNER OR EX-PARTNER?: NO

## 2025-04-30 SDOH — SOCIAL STABILITY: SOCIAL INSECURITY: WITHIN THE LAST YEAR, HAVE YOU BEEN HUMILIATED OR EMOTIONALLY ABUSED IN OTHER WAYS BY YOUR PARTNER OR EX-PARTNER?: NO

## 2025-04-30 SDOH — ECONOMIC STABILITY: INCOME INSECURITY: IN THE PAST 12 MONTHS HAS THE ELECTRIC, GAS, OIL, OR WATER COMPANY THREATENED TO SHUT OFF SERVICES IN YOUR HOME?: NO

## 2025-04-30 SDOH — ECONOMIC STABILITY: FOOD INSECURITY: WITHIN THE PAST 12 MONTHS, YOU WORRIED THAT YOUR FOOD WOULD RUN OUT BEFORE YOU GOT THE MONEY TO BUY MORE.: NEVER TRUE

## 2025-04-30 SDOH — ECONOMIC STABILITY: HOUSING INSECURITY: IN THE PAST 12 MONTHS, HOW MANY TIMES HAVE YOU MOVED WHERE YOU WERE LIVING?: 1

## 2025-04-30 SDOH — SOCIAL STABILITY: SOCIAL INSECURITY: WERE YOU ABLE TO COMPLETE ALL THE BEHAVIORAL HEALTH SCREENINGS?: YES

## 2025-04-30 SDOH — SOCIAL STABILITY: SOCIAL INSECURITY: HAS ANYONE EVER THREATENED TO HURT YOUR FAMILY OR YOUR PETS?: NO

## 2025-04-30 SDOH — ECONOMIC STABILITY: FOOD INSECURITY: WITHIN THE PAST 12 MONTHS, THE FOOD YOU BOUGHT JUST DIDN'T LAST AND YOU DIDN'T HAVE MONEY TO GET MORE.: NEVER TRUE

## 2025-04-30 SDOH — SOCIAL STABILITY: SOCIAL INSECURITY: HAVE YOU HAD THOUGHTS OF HARMING ANYONE ELSE?: NO

## 2025-04-30 SDOH — ECONOMIC STABILITY: HOUSING INSECURITY: IN THE LAST 12 MONTHS, WAS THERE A TIME WHEN YOU WERE NOT ABLE TO PAY THE MORTGAGE OR RENT ON TIME?: NO

## 2025-04-30 SDOH — ECONOMIC STABILITY: TRANSPORTATION INSECURITY: IN THE PAST 12 MONTHS, HAS LACK OF TRANSPORTATION KEPT YOU FROM MEDICAL APPOINTMENTS OR FROM GETTING MEDICATIONS?: NO

## 2025-04-30 SDOH — ECONOMIC STABILITY: FOOD INSECURITY: HOW HARD IS IT FOR YOU TO PAY FOR THE VERY BASICS LIKE FOOD, HOUSING, MEDICAL CARE, AND HEATING?: NOT HARD AT ALL

## 2025-04-30 SDOH — SOCIAL STABILITY: SOCIAL INSECURITY: DO YOU FEEL ANYONE HAS EXPLOITED OR TAKEN ADVANTAGE OF YOU FINANCIALLY OR OF YOUR PERSONAL PROPERTY?: NO

## 2025-04-30 SDOH — SOCIAL STABILITY: SOCIAL INSECURITY: ARE THERE ANY APPARENT SIGNS OF INJURIES/BEHAVIORS THAT COULD BE RELATED TO ABUSE/NEGLECT?: NO

## 2025-04-30 SDOH — SOCIAL STABILITY: SOCIAL INSECURITY: DOES ANYONE TRY TO KEEP YOU FROM HAVING/CONTACTING OTHER FRIENDS OR DOING THINGS OUTSIDE YOUR HOME?: NO

## 2025-04-30 SDOH — SOCIAL STABILITY: SOCIAL INSECURITY: ARE YOU OR HAVE YOU BEEN THREATENED OR ABUSED PHYSICALLY, EMOTIONALLY, OR SEXUALLY BY ANYONE?: NO

## 2025-04-30 SDOH — SOCIAL STABILITY: SOCIAL INSECURITY: ABUSE: ADULT

## 2025-04-30 SDOH — SOCIAL STABILITY: SOCIAL INSECURITY: DO YOU FEEL UNSAFE GOING BACK TO THE PLACE WHERE YOU ARE LIVING?: NO

## 2025-04-30 ASSESSMENT — COGNITIVE AND FUNCTIONAL STATUS - GENERAL
WALKING IN HOSPITAL ROOM: A LITTLE
CLIMB 3 TO 5 STEPS WITH RAILING: A LOT
EATING MEALS: A LITTLE
DRESSING REGULAR UPPER BODY CLOTHING: A LITTLE
STANDING UP FROM CHAIR USING ARMS: A LITTLE
TURNING FROM BACK TO SIDE WHILE IN FLAT BAD: A LITTLE
MOBILITY SCORE: 17
DRESSING REGULAR LOWER BODY CLOTHING: A LITTLE
MOVING FROM LYING ON BACK TO SITTING ON SIDE OF FLAT BED WITH BEDRAILS: A LITTLE
HELP NEEDED FOR BATHING: A LITTLE
PATIENT BASELINE BEDBOUND: NO
MOVING TO AND FROM BED TO CHAIR: A LITTLE
DAILY ACTIVITIY SCORE: 18
TOILETING: A LITTLE
PERSONAL GROOMING: A LITTLE

## 2025-04-30 ASSESSMENT — ACTIVITIES OF DAILY LIVING (ADL)
TOILETING: INDEPENDENT
GROOMING: INDEPENDENT
HEARING - LEFT EAR: FUNCTIONAL
WALKS IN HOME: INDEPENDENT
DRESSING YOURSELF: INDEPENDENT
ADEQUATE_TO_COMPLETE_ADL: YES
JUDGMENT_ADEQUATE_SAFELY_COMPLETE_DAILY_ACTIVITIES: YES
FEEDING YOURSELF: INDEPENDENT
BATHING: INDEPENDENT
PATIENT'S MEMORY ADEQUATE TO SAFELY COMPLETE DAILY ACTIVITIES?: YES
HEARING - RIGHT EAR: FUNCTIONAL
LACK_OF_TRANSPORTATION: NO

## 2025-04-30 ASSESSMENT — PAIN - FUNCTIONAL ASSESSMENT
PAIN_FUNCTIONAL_ASSESSMENT: 0-10

## 2025-04-30 ASSESSMENT — LIFESTYLE VARIABLES
HOW OFTEN DO YOU HAVE A DRINK CONTAINING ALCOHOL: NEVER
HOW OFTEN DO YOU HAVE 6 OR MORE DRINKS ON ONE OCCASION: NEVER
AUDIT-C TOTAL SCORE: 0
AUDIT-C TOTAL SCORE: 0
HOW MANY STANDARD DRINKS CONTAINING ALCOHOL DO YOU HAVE ON A TYPICAL DAY: PATIENT DOES NOT DRINK
SKIP TO QUESTIONS 9-10: 1

## 2025-04-30 ASSESSMENT — COLUMBIA-SUICIDE SEVERITY RATING SCALE - C-SSRS
6. HAVE YOU EVER DONE ANYTHING, STARTED TO DO ANYTHING, OR PREPARED TO DO ANYTHING TO END YOUR LIFE?: NO
2. HAVE YOU ACTUALLY HAD ANY THOUGHTS OF KILLING YOURSELF?: NO
1. IN THE PAST MONTH, HAVE YOU WISHED YOU WERE DEAD OR WISHED YOU COULD GO TO SLEEP AND NOT WAKE UP?: NO

## 2025-04-30 ASSESSMENT — PAIN SCALES - GENERAL
PAINLEVEL_OUTOF10: 4
PAINLEVEL_OUTOF10: 7
PAINLEVEL_OUTOF10: 6
PAINLEVEL_OUTOF10: 10 - WORST POSSIBLE PAIN
PAINLEVEL_OUTOF10: 10 - WORST POSSIBLE PAIN

## 2025-04-30 ASSESSMENT — PAIN DESCRIPTION - LOCATION
LOCATION: GENERALIZED
LOCATION: CHEST

## 2025-04-30 ASSESSMENT — ENCOUNTER SYMPTOMS
SHORTNESS OF BREATH: 1
ARTHRALGIAS: 1
WHEEZING: 1
MYALGIAS: 1

## 2025-04-30 ASSESSMENT — PAIN DESCRIPTION - ORIENTATION: ORIENTATION: MID;LEFT

## 2025-04-30 ASSESSMENT — PAIN DESCRIPTION - DESCRIPTORS
DESCRIPTORS: TIGHTNESS
DESCRIPTORS: OTHER (COMMENT)

## 2025-04-30 ASSESSMENT — PAIN DESCRIPTION - PROGRESSION: CLINICAL_PROGRESSION: NOT CHANGED

## 2025-04-30 ASSESSMENT — PAIN DESCRIPTION - FREQUENCY: FREQUENCY: CONSTANT/CONTINUOUS

## 2025-04-30 ASSESSMENT — PAIN DESCRIPTION - PAIN TYPE: TYPE: ACUTE PAIN

## 2025-04-30 ASSESSMENT — PAIN DESCRIPTION - ONSET: ONSET: SUDDEN

## 2025-04-30 NOTE — PROGRESS NOTES
Spiritual Care Visit  Spiritual Care Request    Reason for Visit:  Routine Visit: Introduction     Request Received From:       Focus of Care:  Visited With: Patient and family together         Refer to :          Spiritual Care Assessment    Spiritual Assessment:                      Care Provided:  Intended Effects: Promote sense of peace, Preserve dignity and respect, Meaning-making  Methods: Offer support  Interventions: Share words of hope and inspiration    Sense of Community and or Mandaeism Affiliation:  None         Addressed Needs/Concerns and/or Mark Through:          Outcome:        Advance Directives:         Spiritual Care Annotation    Annotation:  Patient was surrounded by his family.   was a supportive presence.

## 2025-04-30 NOTE — CARE PLAN
1122 - Pt arrived to 3N room 3033 with family at bedside    EOS note: Pt oriented x4 and pleasant. On 5L O2 (2L is baseline). Breathing treatments initiated. Adding on home medications for the patient, home pain medications given - patient did report slight relief.  Ambulates as a x1 assist to the BSC. Family is at bedside and aware of current plan.  Patient being treated with oral/IV antibitoics.  NSR/ST on telemetry    Pt resting at this time. Bed is in lowest position and locked with alarm on. Call light and personal possesions are within reach.

## 2025-04-30 NOTE — H&P
History Of Present Illness  Jovon Mccartney is a 50 y.o. male presenting with shortness of breath.  Patient was diagnosed with non-small cell lung cancer with metastasis at the end of last year, is currently following with oncology and is on carboplatin (AUC 5), Pemetrexed (500mg/m2), & Pembrolizumab 10/28/24.  Patient states his health has been gradually declining since the initial diagnosis, and over the past few months he has been having shortness of breath needing intermittent 2 L oxygen at home.  However over the past 5 days, he feels acute worsening of his breathing and still felt short of breath despite being on constantly 2 L nasal cannula oxygen.  Due to his symptoms not improving, he decided come to ED for further evaluation.  He stated he has some intermittent left-sided chest pain, but otherwise denies any fever, chills, nausea vomiting, coughing.  He lives with wife and son at home, denies any sick contact.       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    Surgical History  He has a past surgical history that includes Lung biopsy (9 16 2024) and Bronchoscopy (9 16 2024).     Social History  He reports that he quit smoking about 7 months ago. His smoking use included cigarettes. He has a 58.5 pack-year smoking history. He has been exposed to tobacco smoke. He has never used smokeless tobacco. He reports that he does not currently use alcohol. He reports that he does not currently use drugs.    Family History  Family History[1]     Allergies  Patient has no known allergies.    Review of Systems   ROS: 12 systems reviewed and negative except per HPI above     Physical Exam by System:     Constitutional: Well developed, severely  "malnourished, AO x 3, does not appear to be distress   ENMT: mucous membranes moist   Head/Neck: Neck supple   Respiratory/Thorax: Diminished bilaterally with bibasilar crackles   Cardiovascular: Tachycardic, regular   Gastrointestinal: Nondistended, soft, non-tender, no rebound tenderness    Musculoskeletal: ROM intact, no joint swelling, normal strength   Extremities: normal extremities   Neurological: alert and oriented x3, intact senses, motor       Last Recorded Vitals  Blood pressure 141/82, pulse (!) 107, temperature 37.2 °C (99 °F), temperature source Temporal, resp. rate 19, height 1.676 m (5' 6\"), weight 45.4 kg (100 lb), SpO2 (!) 92%.    Relevant Results  Results for orders placed or performed during the hospital encounter of 04/30/25 (from the past 24 hours)   CBC and Auto Differential   Result Value Ref Range    WBC 11.2 4.4 - 11.3 x10*3/uL    nRBC 0.0 0.0 - 0.0 /100 WBCs    RBC 2.91 (L) 4.50 - 5.90 x10*6/uL    Hemoglobin 8.0 (L) 13.5 - 17.5 g/dL    Hematocrit 27.4 (L) 41.0 - 52.0 %    MCV 94 80 - 100 fL    MCH 27.5 26.0 - 34.0 pg    MCHC 29.2 (L) 32.0 - 36.0 g/dL    RDW 16.9 (H) 11.5 - 14.5 %    Platelets 322 150 - 450 x10*3/uL    Neutrophils % 84.2 40.0 - 80.0 %    Immature Granulocytes %, Automated 0.4 0.0 - 0.9 %    Lymphocytes % 7.1 13.0 - 44.0 %    Monocytes % 7.6 2.0 - 10.0 %    Eosinophils % 0.1 0.0 - 6.0 %    Basophils % 0.6 0.0 - 2.0 %    Neutrophils Absolute 9.42 (H) 1.20 - 7.70 x10*3/uL    Immature Granulocytes Absolute, Automated 0.04 0.00 - 0.70 x10*3/uL    Lymphocytes Absolute 0.79 (L) 1.20 - 4.80 x10*3/uL    Monocytes Absolute 0.85 0.10 - 1.00 x10*3/uL    Eosinophils Absolute 0.01 0.00 - 0.70 x10*3/uL    Basophils Absolute 0.07 0.00 - 0.10 x10*3/uL   Magnesium   Result Value Ref Range    Magnesium 1.79 1.60 - 2.40 mg/dL   Comprehensive metabolic panel   Result Value Ref Range    Glucose 114 (H) 74 - 99 mg/dL    Sodium 129 (L) 136 - 145 mmol/L    Potassium 4.1 3.5 - 5.3 mmol/L    Chloride " 94 (L) 98 - 107 mmol/L    Bicarbonate 23 21 - 32 mmol/L    Anion Gap 16 10 - 20 mmol/L    Urea Nitrogen 12 6 - 23 mg/dL    Creatinine 0.45 (L) 0.50 - 1.30 mg/dL    eGFR >90 >60 mL/min/1.73m*2    Calcium 8.5 (L) 8.6 - 10.3 mg/dL    Albumin 3.2 (L) 3.4 - 5.0 g/dL    Alkaline Phosphatase 106 33 - 120 U/L    Total Protein 7.2 6.4 - 8.2 g/dL    AST 16 9 - 39 U/L    Bilirubin, Total 0.5 0.0 - 1.2 mg/dL    ALT 8 (L) 10 - 52 U/L   Lactate   Result Value Ref Range    Lactate 1.9 0.4 - 2.0 mmol/L   B-Type Natriuretic Peptide   Result Value Ref Range    BNP 26 0 - 99 pg/mL   Troponin I, High Sensitivity, Initial   Result Value Ref Range    Troponin I, High Sensitivity 6 0 - 20 ng/L   Troponin, High Sensitivity, 1 Hour   Result Value Ref Range    Troponin I, High Sensitivity 4 0 - 20 ng/L   Urinalysis with Reflex Culture and Microscopic   Result Value Ref Range    Color, Urine Light-Yellow Light-Yellow, Yellow, Dark-Yellow    Appearance, Urine Clear Clear    Specific Gravity, Urine 1.034 1.005 - 1.035    pH, Urine 6.5 5.0, 5.5, 6.0, 6.5, 7.0, 7.5, 8.0    Protein, Urine NEGATIVE NEGATIVE, 10 (TRACE), 20 (TRACE) mg/dL    Glucose, Urine Normal Normal mg/dL    Blood, Urine NEGATIVE NEGATIVE mg/dL    Ketones, Urine NEGATIVE NEGATIVE mg/dL    Bilirubin, Urine NEGATIVE NEGATIVE mg/dL    Urobilinogen, Urine Normal Normal mg/dL    Nitrite, Urine NEGATIVE NEGATIVE    Leukocyte Esterase, Urine NEGATIVE NEGATIVE      Scheduled medications  Scheduled Medications[2]  Continuous medications  Continuous Medications[3]  PRN medications  PRN Medications[4]            Assessment/Plan   Assessment & Plan  Acute on chronic respiratory failure with hypoxia  Community acquired bacterial pneumonia  Sepsis with acute hypoxic respiratory failure without septic shock  -Baseline 2 L oxygen at home as needed  -Currently saturating 95% on 5 L nasal cannula  -Patient meets sepsis criteria with positive SIRS (tachycardia, tachypnea) and acute respiratory  failure, however he would not need sepsis bolus due to stable blood pressure  -CAT scan of the chest reviewed, showed interval development of patchy consolidation in the left upper lobe compatible with pneumonia, this was not present on previous CT scan in January.  Again showed unchanged moderate-sized loculated right pleural effusion and consolidation in the basilar right lower lobe in the middle lobe consistent with postobstructive atelectasis.  -Patient was started on Rocephin and azithromycin, will continue  -Monitor respiratory symptoms  -Admit to inpatient with telemetry monitoring  -Patient also received Solu-Medrol in the emergency room, overall low clinical concern for COPD exacerbation at this time, will hold off on further steroid  -Will obtain strep pneumo, Legionella antigen  Anemia due to antineoplastic chemotherapy  Immunocompromised  NSCLC metastatic to bone (Multi)  -Continue outpatient follow-up with oncology  -Currently on Carboplatin (AUC 5), Pemetrexed (500mg/m2), & Pembrolizumab 10/28/24  -Continue home MS Contin and oxycodone for pain control  Severe protein-calorie malnutrition (Multi)  Hyponatremia  -Discussed with patient's family at bedside, stating patient has always been very thin, and after diagnosis of cancer, he has lost more weight over the past 2 months  -BMI 16.15 on this admission  -Nutrition consulted for evaluation and recommendation  -Suspect hyponatremia may be due to protein caloric malnutrition versus acute infection, will monitor closely for now    Discussed with patient and family at bedside in the emergency room, patient is full code       Complexity: High    This dictation was created using voice recognition software.  If there are any questions about inaccuracies please do not hesitate to contact me.      SIGNATURE: Jef Saleh MD PATIENT NAME: Jovon Mccartney   DATE: April 30, 2025 MRN: [id number]   TIME: 10:15 AM             [1]   Family History  Problem Relation Name Age  of Onset    Breast cancer Mother AUGUSTGEORGE CHURCH     Cancer Mother JORDY VAZQUEZ LYNNETTE     Miscarriages / Stillbirths Mother AUGUSTGEORGE CHURCH     Cancer Maternal Grandfather HUBER PAL     Stroke Maternal Grandfather HUBER PAL    [2] acetaminophen, 650 mg, oral, Once  oxygen, , inhalation, Continuous - Inhalation    [3]    [4]

## 2025-04-30 NOTE — ED PROVIDER NOTES
EMERGENCY DEPARTMENT ENCOUNTER      Pt Name: Jovon Mccartney  MRN: [id number]  Birthdate 1975  Date of evaluation: 4/30/2025  Provider: Roderick Johnson MD    CHIEF COMPLAINT       Chief Complaint   Patient presents with    Shortness of Breath     Pt c/o left sided chest tightness and SOB    Chest Pain     HISTORY OF PRESENT ILLNESS    HPI  50-year-old male presents emergency department chief complaint of shortness of breath and chest pain.  Patient is a past medical history significant for active lung cancer.  Patient claims that he has shortness of breath started approximately 5 days ago he does have a home oxygen requirement of 2 L.  He is currently on 5 L he indicates that his chest pain feels as if it is a pressure-like quality it is nonradiating not relieved 7 out of 10 in severity on and off in nature.  Patient is currently undergoing immunotherapy for his lung and bone cancer.  Nursing Notes were reviewed.    PAST MEDICAL HISTORY   Medical History[1]      SURGICAL HISTORY     Surgical History[2]      CURRENT MEDICATIONS       Previous Medications    ACETAMINOPHEN (TYLENOL) 500 MG TABLET    Take 1 tablet (500 mg) by mouth every 6 hours if needed for mild pain (1 - 3).    B COMPLEX 0.4 MG TABLET    Take 1 tablet by mouth once daily.    BUDESONIDE-GLYCOPYR-FORMOTEROL (BREZTRI) 160-9-4.8 MCG/ACTUATION HFA AEROSOL INHALER    Inhale 2 puffs 2 times a day.    DEXAMETHASONE (DECADRON) 4 MG TABLET    Take 4 mg (1 tablet) by mouth twice daily the day before treatment, once the evening of treatment, and twice daily the day after treatment.    DOCUSATE SODIUM (COLACE) 100 MG CAPSULE    Take 1 capsule (100 mg) by mouth 2 times a day as needed for constipation (for hard stools).    FOLIC ACID (FOLVITE) 1 MG TABLET    Take 1 tablet (1,000 mcg) by mouth once daily. Do not start before October 27, 2024.    FOLIC ACID (FOLVITE) 1 MG TABLET    Take 1 tablet (1,000 mcg) by mouth once daily.    GABAPENTIN (NEURONTIN) 300 MG  CAPSULE    Take 1 capsule (300 mg) by mouth 3 times a day.    IBUPROFEN 200 MG TABLET    Take 2 tablets (400 mg) by mouth every 6 hours if needed for mild pain (1 - 3).    IPRATROPIUM-ALBUTEROL (COMBIVENT RESPIMAT)  MCG/ACTUATION INHALER    Inhale 2 puffs 4 times a day as needed for shortness of breath or wheezing.    MIRTAZAPINE (REMERON) 15 MG TABLET    Take 1 tablet (15 mg) by mouth once daily at bedtime.    MORPHINE CR (MS CONTIN) 30 MG 12 HR TABLET    Take 1 tablet (30 mg) by mouth 2 times a day AND 2 tablets (60 mg) once daily at bedtime. Do not crush, chew, or split. Take 30 MG AM, 30 MG MID DAY and 60 MG at BEDTIME..    NALOXONE (NARCAN) 4 MG/0.1 ML NASAL SPRAY    Administer 1 spray (4 mg) into affected nostril(s) if needed for opioid reversal. May repeat every 2-3 minutes if needed, alternating nostrils, until medical assistance becomes available.    ONDANSETRON (ZOFRAN) 8 MG TABLET    Take 1 tablet (8 mg) by mouth every 8 hours if needed for nausea or vomiting. Do not fill before October 27, 2024.    ONDANSETRON (ZOFRAN) 8 MG TABLET    Take 1 tablet (8 mg) by mouth every 8 hours if needed for nausea or vomiting.    OXYCODONE (ROXICODONE) 10 MG IMMEDIATE RELEASE TABLET    Take 1-1.5 tablets (10-15 mg) by mouth every 4 hours if needed for moderate pain (4 - 6) or severe pain (7 - 10). MAX 9 TABS PER DAY Do not fill before April 7, 2025.    OXYGEN (O2) GAS THERAPY    Inhale 1 each once every 24 hours.    PANTOPRAZOLE (PROTONIX) 40 MG EC TABLET    Take 1 tablet (40 mg) by mouth once daily in the morning. Take before meals. Do not crush, chew, or split.    PILOCARPINE (SALAGEN, PILOCARPINE,) 5 MG TABLET    Take 1 tablet (5 mg) by mouth 3 times a day as needed (for dry mouth).    POLYETHYLENE GLYCOL (GLYCOLAX, MIRALAX) 17 GRAM/DOSE POWDER    Mix 17 g of powder and drink once daily.    PROCHLORPERAZINE (COMPAZINE) 10 MG TABLET    Take 1 tablet (10 mg) by mouth every 6 hours if needed for nausea or vomiting.  Do not fill before October 27, 2024.    PROCHLORPERAZINE (COMPAZINE) 10 MG TABLET    Take 1 tablet (10 mg) by mouth every 6 hours if needed for nausea or vomiting.       ALLERGIES     Patient has no known allergies.    FAMILY HISTORY     Family History[3]       SOCIAL HISTORY     Social History[4]    SCREENINGS                        PHYSICAL EXAM    (up to 7 for level 4, 8 or more for level 5)     ED Triage Vitals [04/30/25 0501]   Temperature Heart Rate Respirations BP   37.2 °C (99 °F) (!) 111 (!) 26 120/57      Pulse Ox Temp Source Heart Rate Source Patient Position   (!) 90 % Temporal Monitor Sitting      BP Location FiO2 (%)     Right arm --       Physical Exam  Constitutional:       Appearance: He is ill-appearing.   HENT:      Head: Normocephalic and atraumatic.      Mouth/Throat:      Mouth: Mucous membranes are moist.      Pharynx: Oropharynx is clear.   Eyes:      Extraocular Movements: Extraocular movements intact.      Pupils: Pupils are equal, round, and reactive to light.   Cardiovascular:      Rate and Rhythm: Regular rhythm. Tachycardia present.   Pulmonary:      Effort: Pulmonary effort is normal. No tachypnea.      Breath sounds: Decreased breath sounds present.   Abdominal:      Palpations: Abdomen is soft.   Musculoskeletal:         General: Normal range of motion.   Skin:     General: Skin is warm.      Capillary Refill: Capillary refill takes less than 2 seconds.   Neurological:      General: No focal deficit present.      Mental Status: He is alert.   Psychiatric:         Mood and Affect: Mood normal.          DIAGNOSTIC RESULTS     LABS:  Labs Reviewed   URINALYSIS WITH REFLEX CULTURE AND MICROSCOPIC    Narrative:     The following orders were created for panel order Urinalysis with Reflex Culture and Microscopic.  Procedure                               Abnormality         Status                     ---------                               -----------         ------                      Urinalysis with Reflex C...[[id number]]                                                 Extra Urine Gray Tube[[id number]]                                                         Please view results for these tests on the individual orders.   CBC WITH AUTO DIFFERENTIAL   MAGNESIUM   COMPREHENSIVE METABOLIC PANEL   LACTATE   TROPONIN SERIES- (INITIAL, 1 HR)    Narrative:     The following orders were created for panel order Troponin I Series, High Sensitivity (0, 1 HR).  Procedure                               Abnormality         Status                     ---------                               -----------         ------                     Troponin I, High Sensiti...[[id number]]                                                   Please view results for these tests on the individual orders.   B-TYPE NATRIURETIC PEPTIDE   URINALYSIS WITH REFLEX CULTURE AND MICROSCOPIC   EXTRA URINE GRAY TUBE   SERIAL TROPONIN-INITIAL       All other labs were within normal range or not returned as of this dictation.    Imaging  XR chest 1 view    (Results Pending)   CT angio chest for pulmonary embolism    (Results Pending)        Procedures  Procedures     EMERGENCY DEPARTMENT COURSE/MDM:   Medical Decision Making  50-year-old male presents emergency department chief complaint of shortness of breath and chest pain.  Medical management treatment emergency department consisted of nursing DuoNeb treatment Solu-Medrol.  Rule out any possible pulmonary embolism.  The patient will be sent for CT PE and cardiac labs also be evaluated.  Patient's labs are unremarkable for any ACS concerning findings no elevated white blood cell count.  However the CT scan of the chest pulmonary embolism does show pneumonia.  We have empirically started antibiotics and drawn septic labs.  The patient will be admitted to the hospital service for COPD exacerbation.  We have given DuoNeb treatment as well as Solu-Medrol.  We also started ceftriaxone and  azithromycin.        Patient and or family in agreement and understanding of treatment plan.  All questions answered.      I reviewed the case with the attending ED physician. The attending ED physician agrees with the plan. Patient and/or patient´s representative was counseled regarding labs, imaging, likely diagnosis, and plan. All questions were answered.    ED Medications administered this visit:    Medications   ipratropium-albuteroL (Duo-Neb) 0.5-2.5 mg/3 mL nebulizer solution 3 mL (has no administration in time range)   methylPREDNISolone sod succinate (SOLU-Medrol) injection 125 mg (has no administration in time range)       New Prescriptions from this visit:    New Prescriptions    No medications on file       Follow-up:  No follow-up provider specified.      Final Impression: No diagnosis found.      (Please note that portions of this note were completed with a voice recognition program.  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    Hilar mass 09/15/2024    Juvenile rheumatoid arthritis (Multi) 09/15/2024    Lung cancer (Multi) 09 17 2024    Rheumatoid arthritis 10 1974   [2]   Past Surgical History:  Procedure Laterality Date    BRONCHOSCOPY  9 16 2024    LUNG BIOPSY  9 16 2024   [3]   Family History  Problem Relation Name Age of Onset    Breast cancer Mother JORDY CHURCH     Cancer Mother JORDY CHURCH     Miscarriages / Stillbirths Mother JORDY CHURCH     Cancer Maternal Grandfather HUBER PAL     Stroke Maternal Grandfather HUBER PAL    [4]   Social History  Socioeconomic History    Marital status:    Tobacco Use    Smoking status: Former     Current packs/day: 0.00     Average packs/day: 1.5 packs/day for 39.0 years (58.5 ttl pk-yrs)     Types: Cigarettes     Quit date:  2024     Years since quittin.6     Passive exposure: Past    Smokeless tobacco: Never   Substance and Sexual Activity    Alcohol use: Not Currently     Comment: Stopped alcohol use.    Drug use: Not Currently     Social Drivers of Health     Financial Resource Strain: Patient Declined (2024)    Overall Financial Resource Strain (CARDIA)     Difficulty of Paying Living Expenses: Patient declined   Food Insecurity: Patient Declined (2024)    Hunger Vital Sign     Worried About Running Out of Food in the Last Year: Patient declined     Ran Out of Food in the Last Year: Patient declined   Transportation Needs: Patient Declined (2024)    PRAPARE - Transportation     Lack of Transportation (Medical): Patient declined     Lack of Transportation (Non-Medical): Patient declined   Physical Activity: Patient Declined (2024)    Exercise Vital Sign     Days of Exercise per Week: Patient declined     Minutes of Exercise per Session: Patient declined   Intimate Partner Violence: Not At Risk (2024)    Humiliation, Afraid, Rape, and Kick questionnaire     Fear of Current or Ex-Partner: No     Emotionally Abused: No     Physically Abused: No     Sexually Abused: No   Housing Stability: Unknown (2024)    Housing Stability Vital Sign     Unable to Pay for Housing in the Last Year: No     Number of Times Moved in the Last Year: 1     Homeless in the Last Year: Patient declined        Roderick Johnson MD  Resident  [id number]

## 2025-04-30 NOTE — PROGRESS NOTES
.   Patient: Jovon Mccartney    [id number]  : 1975 -- AGE 50 y.o.    Provider: Melina Chacko CNP     Location [facility]   Service Date: 2025              [facility]  New Visit Note      HISTORY OF PRESENT ILLNESS     The patient's referring provider is: No ref. provider found    HISTORY OF PRESENT ILLNESS   Jovon Mccartney is a 50 y.o. male who presents to a [facility] for a pulmonary evaluation with NSCLC with bone involvement.  He is a former smoker. I have independently interviewed and examined the patient in the office and reviewed available records.    Current History      Patient was recently admitted early this month with worsening headache and intermittent neck pain for 2 months. , MRI brain/C-spine showed ill-defined enhancing lesion within right parietal occipital bone with dural thickening and mass effect upon right occipital lobe without midline shift as well as ill-defined marrow replacing lesions within C2 and C3 vertebral bodies, constellation of findings suggestive of metastatic disease or multiple myeloma.  CT  showed amorphous soft tissue centered at the right hilum encompassing the RUL bronchus and inseparable from the right main pulmonary artery suspicious for bronchogenic carcinoma, with mildly enlarged mediastinal lymph nodes, multiple lytic lesions of R scapula and bilateral humeral head. S/p bronchoscopy on 2024 for Lung Mass with brain and bone metastasis which revealed Airway stenosis, of bronchus intermedius and RLL and RML segments. Pathology positive for adenocarcinoma with the malignant cells are positive for TTF-1 and napsin A, and negative for p40     Smoked 1 ppd x 39 years, stopped when he was admitted 2024     On today's visit, the patient reports has dyspnea on exertion, but none at rest. Symptoms started many years ago, but has mostly been stable. He is only troubled by  breathlessness except on strenuous exercise (mMRC 0).  He is active as he can with juvenile arthritis, since discharge he is feeling weaker. Has shoulder and bicep pain.    Denies orthopnea, pnd, or mat.  Weight has been mostly stable.  Relates  chronic dry cough, C/o wheezing, and no sputum. No night cough. No hemoptysis. No fever or shivering chills. Has throat clearing. Denies chest pain or heartburn.     Previous pulmonary history:  no history of recurrent infections, or lung disease as a child.  No previous lung hx, never on oxygen or inhaler therapy. He was hospitalized with asthmatic bronchitis as a child, then resolved     Inhalers/nebulized medications: combivent - has used twice     Hospitalization History: Not been hospitalized over the last year for breathing related problem.    Sleep history:  Complains of snoring, apnea, feeling tired during the day, and taking naps during the day.     Current History 10/23/2024    On today's visit, the patient reports Occ having shortness of breath with walking in the hospital after a couple minutes,  In the am has cough clears clear sputum. C/o wheezing. He is using Breztri once a day.   He was using PRN. No night time cough. Not using combivent .   Denies fever/chills, chest pain or GERD   No ER visits   Completed radiation therapy - starting chemotherapy soon.     Current History 11/20/2024    On today's visit, the patient reports he started chemo and ready to start round #2. He had some anemia.  Felt better than he previously felt. Denies cough or phlegm, no hemoptysis. He is using O2 at 2 L and sleeping with O2 at night.  His pulse at home is 94% with 2L   He is short of breath with 1 flight of stairs.  He is SANDERSON with 10-15 min.    Denies Fevers/chills or chest pain  Denies GERD  Denies runny nose or post nasal drip   Using Breztri daily. Has not used albuterol at all.     Denies Night time cough       Current History 2/12/2025    continue on maintenance  pemetrexed/pembrolizumab       On today's visit, the patient reports no ER or urgent care visits. He is starting radiation neck shoulder and pelvis.    Breathing is ok. If he exerts he gets short of breath. If he removes his oxygen he is 88% at home, but on 2 L he is 96%. Denies coughing fever or chills.   He wheezes if he over exerts himself.   No respiratory illness or chest tightness or heartburn  Denies dizziness. His BP is low but asymptomatic. He has been eating and drinking ok. He is on protein shakes 1400 calories, His weight increased   No night time symptoms.   Using 2L at night  Not required rescue inhaler   Encouraged oral hydration today       ALLERGIES AND MEDICATIONS     ALLERGIES  No Known Allergies    MEDICATIONS  Current Facility-Administered Medications   Medication Dose Route Frequency Provider Last Rate Last Admin    acetaminophen (Tylenol) tablet 650 mg  650 mg oral Once Catarina Brooks, MUNDO-CNP         Current Outpatient Medications   Medication Sig Dispense Refill    acetaminophen (Tylenol) 500 mg tablet Take 1 tablet (500 mg) by mouth every 6 hours if needed for mild pain (1 - 3).      b complex 0.4 mg tablet Take 1 tablet by mouth once daily.      budesonide-glycopyr-formoterol (BREZTRI) 160-9-4.8 mcg/actuation HFA aerosol inhaler Inhale 2 puffs 2 times a day. 10.7 g 3    dexAMETHasone (Decadron) 4 mg tablet Take 4 mg (1 tablet) by mouth twice daily the day before treatment, once the evening of treatment, and twice daily the day after treatment. 5 tablet 5    docusate sodium (Colace) 100 mg capsule Take 1 capsule (100 mg) by mouth 2 times a day as needed for constipation (for hard stools). 60 capsule 0    folic acid (Folvite) 1 mg tablet Take 1 tablet (1,000 mcg) by mouth once daily. Do not start before October 27, 2024. 30 tablet 11    folic acid (Folvite) 1 mg tablet Take 1 tablet (1,000 mcg) by mouth once daily. 30 tablet 11    gabapentin (Neurontin) 300 mg capsule Take 1 capsule (300  mg) by mouth 3 times a day. 90 capsule 3    ibuprofen 200 mg tablet Take 2 tablets (400 mg) by mouth every 6 hours if needed for mild pain (1 - 3).      ipratropium-albuteroL (Combivent Respimat)  mcg/actuation inhaler Inhale 2 puffs 4 times a day as needed for shortness of breath or wheezing.      mirtazapine (Remeron) 15 mg tablet Take 1 tablet (15 mg) by mouth once daily at bedtime. 30 tablet 2    morphine CR (MS Contin) 30 mg 12 hr tablet Take 1 tablet (30 mg) by mouth 2 times a day AND 2 tablets (60 mg) once daily at bedtime. Do not crush, chew, or split. Take 30 MG AM, 30 MG MID DAY and 60 MG at BEDTIME.. 120 tablet 0    naloxone (Narcan) 4 mg/0.1 mL nasal spray Administer 1 spray (4 mg) into affected nostril(s) if needed for opioid reversal. May repeat every 2-3 minutes if needed, alternating nostrils, until medical assistance becomes available. 2 each 3    ondansetron (Zofran) 8 mg tablet Take 1 tablet (8 mg) by mouth every 8 hours if needed for nausea or vomiting. Do not fill before October 27, 2024. 30 tablet 5    ondansetron (Zofran) 8 mg tablet Take 1 tablet (8 mg) by mouth every 8 hours if needed for nausea or vomiting. 30 tablet 5    oxyCODONE (Roxicodone) 10 mg immediate release tablet Take 1-1.5 tablets (10-15 mg) by mouth every 4 hours if needed for moderate pain (4 - 6) or severe pain (7 - 10). MAX 9 TABS PER DAY Do not fill before April 7, 2025. 270 tablet 0    oxygen (O2) gas therapy Inhale 1 each once every 24 hours.      pantoprazole (ProtoNix) 40 mg EC tablet Take 1 tablet (40 mg) by mouth once daily in the morning. Take before meals. Do not crush, chew, or split. 30 tablet 3    pilocarpine (Salagen, pilocarpine,) 5 mg tablet Take 1 tablet (5 mg) by mouth 3 times a day as needed (for dry mouth). 90 tablet 11    polyethylene glycol (Glycolax, Miralax) 17 gram/dose powder Mix 17 g of powder and drink once daily.      prochlorperazine (Compazine) 10 mg tablet Take 1 tablet (10 mg) by mouth  every 6 hours if needed for nausea or vomiting. Do not fill before October 27, 2024. 30 tablet 5    prochlorperazine (Compazine) 10 mg tablet Take 1 tablet (10 mg) by mouth every 6 hours if needed for nausea or vomiting. 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     PAST SURGICAL HISTORY  Past Surgical History:   Procedure Laterality Date    BRONCHOSCOPY  9 16 2024    LUNG BIOPSY  9 16 2024       IMMUNIZATION HISTORY    There is no immunization history on file for this patient.    SOCIAL HISTORY  He  reports that he quit smoking about 7 months ago. His smoking use included cigarettes. He has a 58.5 pack-year smoking history. He has been exposed to tobacco smoke. He has never used smokeless tobacco. He reports that he does not currently use alcohol. He reports that he does not currently use drugs. He Patient  Smoked 1 ppd x 39 years, stopped when he was admitted 9/9/2024     OCCUPATIONAL/ENVIRONMENTAL HISTORY  Currently works as: disabled   DOES/DOES NOT EC: does have possible known exposure to asbestos, but not to silica, beryllium or inhaled metals. Exposure to a board that had asbestos   DOES/DOES NOT EC: does have exposure to birds or exotic animals. Had  cockatoo x 14 years     FAMILY HISTORY  Family History   Problem Relation Name Age of Onset    Breast cancer Mother JORDY CHURCH     Cancer Mother JORDY CHURCH     Miscarriages / Stillbirths Mother JORDY CHURCH      Cancer Maternal Grandfather HUBER PAL     Stroke Maternal Grandfather HUBER PAL      DOES/DOES NOT EC: does not have a family history of pulmonary disease.  DOES/DOES NOT EC: does have a family history of cancer. Maternal grandmother - bone cancer, mother breast cancer, paternal cousin lung cancer   DOES/DOES NOT EC: does have a family history of autoimmune disorders. He has RA    RESULTS/DATA     Pulmonary Function Test Results         Complete Pulmonary Function Test Pre/Post Bronchodialator (Spirometry Pre/Post/DLCO/Lung Volumes) 10/16/2024  Status: Final result     Study Result    Narrative & Impression   The FEV1/FVC (0.51) is reduced. The FEV1 (1.88L/60%) is moderately reduced. The FVC is normal. Following administration of bronchodilators, there is no significant response. The TLC by body plethysmography is normal. The DLCO (47%)  is severely reduced; however, the diffusing capacity was not corrected for the patient's hemoglobin. The airways resistance is normal. FeNO was 21 ppb.  Conclusion: Reduced FEV1/FVC with a normal FVC indicates a moderate airflow obstructive defect. Lung volumes are consistent with hyperinflation. DLCO values are consistent with pulmonary vascular impairment, emphysema with preserved lung volume or anemia.     Scans on Order [id number]      Pulmonary Function Test - Scan on 10/16/2024 10:58 PM                            Chest Radiograph      Done 4/30/25 not read yet     Chest CT Scan     Done 4/30/25 not read yet       Echocardiogram     No testing done          REVIEW OF SYSTEMS     REVIEW OF SYSTEMS  Review of Systems   Respiratory:  Positive for shortness of breath and wheezing.    Musculoskeletal:  Positive for arthralgias, gait problem and myalgias.         PHYSICAL EXAM     VITAL SIGNS: There were no vitals taken for this visit. There were no vitals taken for this visit.     CURRENT WEIGHT: [unfilled]  BMI: [unfilled]  PREVIOUS WEIGHTS:  Wt Readings from Last 3 Encounters:    04/30/25 45.4 kg (100 lb)   04/14/25 48.8 kg (107 lb 9.6 oz)   03/27/25 48.5 kg (107 lb)       Physical Exam  Constitutional:       Appearance: Normal appearance.   HENT:      Head: Normocephalic and atraumatic.      Right Ear: External ear normal.      Left Ear: External ear normal.      Nose: Nose normal.      Mouth/Throat:      Mouth: Mucous membranes are moist.      Pharynx: Oropharynx is clear.   Eyes:      Extraocular Movements: Extraocular movements intact.      Conjunctiva/sclera: Conjunctivae normal.      Pupils: Pupils are equal, round, and reactive to light.   Cardiovascular:      Rate and Rhythm: Normal rate and regular rhythm.      Pulses: Normal pulses.      Heart sounds: Normal heart sounds.   Pulmonary:      Effort: Pulmonary effort is normal.      Breath sounds: Normal breath sounds.   Abdominal:      General: Bowel sounds are normal.      Palpations: Abdomen is soft.   Musculoskeletal:         General: Normal range of motion.      Cervical back: Normal range of motion and neck supple.      Comments: Bilateral lower leg braces    Skin:     General: Skin is warm and dry.   Neurological:      General: No focal deficit present.      Mental Status: He is alert and oriented to person, place, and time. Mental status is at baseline.   Psychiatric:         Mood and Affect: Mood normal.         Behavior: Behavior normal.         Thought Content: Thought content normal.         Judgment: Judgment normal.         ASSESSMENT/PLAN     Mr. Mccartney is a 50 y.o. 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 He was referred to the [facility] for evaluation of NSCLC with bone involve[ment and COPD    Problem List and Orders      Assessment  and Plan / Recommendations:  Problem List Items Addressed This Visit    None          COPD with MMRC 2  PFTs with moderate airflow obstruction, lung volumes with hyperinflation, diffusion capacity reduced FEV1 60%    cont  ICS/LABA/LAMA breztri 2 puffs twice a day, rinse after use, discussed using twice a day daily   - cont combivent   - albuterol hfa 2 puffs or albuterol nebs every 4-6 hours as needed  - now with anemia may contribute dyspnea       Hypoxia multifactorial with anemia and COPD: Patient maintained on 2L -     Oncology - NSCLC - has followup with Oncology and Radiation Oncology     Thank you for visiting the Pulmonary clinic today!     Return to clinic  3 months or  sooner if needed   Melina Chacko CNP  My office number is [phone] -     Call to schedule  for radiology - CT scans/PFTs etc at  [phone]  General scheduling  [phone]     Best way to get a hold of me is to call my office --> Please do not send me follow my health messages  Any test results will be discussed at next visit -- please make sure to make a follow up appt after testing.

## 2025-05-01 ENCOUNTER — APPOINTMENT (OUTPATIENT)
Dept: HEMATOLOGY/ONCOLOGY | Facility: CLINIC | Age: 50
End: 2025-05-01
Payer: COMMERCIAL

## 2025-05-01 LAB
ANION GAP SERPL CALC-SCNC: 13 MMOL/L (ref 10–20)
BUN SERPL-MCNC: 8 MG/DL (ref 6–23)
CALCIUM SERPL-MCNC: 9.3 MG/DL (ref 8.6–10.3)
CHLORIDE SERPL-SCNC: 96 MMOL/L (ref 98–107)
CO2 SERPL-SCNC: 28 MMOL/L (ref 21–32)
CREAT SERPL-MCNC: 0.37 MG/DL (ref 0.5–1.3)
EGFRCR SERPLBLD CKD-EPI 2021: >90 ML/MIN/1.73M*2
ERYTHROCYTE [DISTWIDTH] IN BLOOD BY AUTOMATED COUNT: 16.7 % (ref 11.5–14.5)
GLUCOSE SERPL-MCNC: 119 MG/DL (ref 74–99)
HCT VFR BLD AUTO: 25.4 % (ref 41–52)
HGB BLD-MCNC: 8.1 G/DL (ref 13.5–17.5)
MAGNESIUM SERPL-MCNC: 1.85 MG/DL (ref 1.6–2.4)
MCH RBC QN AUTO: 27.5 PG (ref 26–34)
MCHC RBC AUTO-ENTMCNC: 31.9 G/DL (ref 32–36)
MCV RBC AUTO: 86 FL (ref 80–100)
NRBC BLD-RTO: 0 /100 WBCS (ref 0–0)
PLATELET # BLD AUTO: 358 X10*3/UL (ref 150–450)
POTASSIUM SERPL-SCNC: 3.7 MMOL/L (ref 3.5–5.3)
PROCALCITONIN SERPL-MCNC: 0.2 NG/ML
RBC # BLD AUTO: 2.95 X10*6/UL (ref 4.5–5.9)
SODIUM SERPL-SCNC: 133 MMOL/L (ref 136–145)
WBC # BLD AUTO: 10 X10*3/UL (ref 4.4–11.3)

## 2025-05-01 PROCEDURE — 1200000002 HC GENERAL ROOM WITH TELEMETRY DAILY

## 2025-05-01 PROCEDURE — 99233 SBSQ HOSP IP/OBS HIGH 50: CPT | Performed by: STUDENT IN AN ORGANIZED HEALTH CARE EDUCATION/TRAINING PROGRAM

## 2025-05-01 PROCEDURE — 2500000002 HC RX 250 W HCPCS SELF ADMINISTERED DRUGS (ALT 637 FOR MEDICARE OP, ALT 636 FOR OP/ED): Performed by: STUDENT IN AN ORGANIZED HEALTH CARE EDUCATION/TRAINING PROGRAM

## 2025-05-01 PROCEDURE — 84145 PROCALCITONIN (PCT): CPT | Mod: STJLAB | Performed by: STUDENT IN AN ORGANIZED HEALTH CARE EDUCATION/TRAINING PROGRAM

## 2025-05-01 PROCEDURE — 83735 ASSAY OF MAGNESIUM: CPT | Performed by: STUDENT IN AN ORGANIZED HEALTH CARE EDUCATION/TRAINING PROGRAM

## 2025-05-01 PROCEDURE — 36415 COLL VENOUS BLD VENIPUNCTURE: CPT | Performed by: STUDENT IN AN ORGANIZED HEALTH CARE EDUCATION/TRAINING PROGRAM

## 2025-05-01 PROCEDURE — 2500000005 HC RX 250 GENERAL PHARMACY W/O HCPCS: Performed by: STUDENT IN AN ORGANIZED HEALTH CARE EDUCATION/TRAINING PROGRAM

## 2025-05-01 PROCEDURE — 2500000005 HC RX 250 GENERAL PHARMACY W/O HCPCS

## 2025-05-01 PROCEDURE — 94640 AIRWAY INHALATION TREATMENT: CPT

## 2025-05-01 PROCEDURE — 2500000004 HC RX 250 GENERAL PHARMACY W/ HCPCS (ALT 636 FOR OP/ED): Mod: JZ | Performed by: STUDENT IN AN ORGANIZED HEALTH CARE EDUCATION/TRAINING PROGRAM

## 2025-05-01 PROCEDURE — 85027 COMPLETE CBC AUTOMATED: CPT | Performed by: STUDENT IN AN ORGANIZED HEALTH CARE EDUCATION/TRAINING PROGRAM

## 2025-05-01 PROCEDURE — 80048 BASIC METABOLIC PNL TOTAL CA: CPT | Performed by: STUDENT IN AN ORGANIZED HEALTH CARE EDUCATION/TRAINING PROGRAM

## 2025-05-01 PROCEDURE — 87899 AGENT NOS ASSAY W/OPTIC: CPT | Mod: STJLAB | Performed by: STUDENT IN AN ORGANIZED HEALTH CARE EDUCATION/TRAINING PROGRAM

## 2025-05-01 PROCEDURE — 87449 NOS EACH ORGANISM AG IA: CPT | Mod: STJLAB | Performed by: STUDENT IN AN ORGANIZED HEALTH CARE EDUCATION/TRAINING PROGRAM

## 2025-05-01 PROCEDURE — 2500000001 HC RX 250 WO HCPCS SELF ADMINISTERED DRUGS (ALT 637 FOR MEDICARE OP): Performed by: STUDENT IN AN ORGANIZED HEALTH CARE EDUCATION/TRAINING PROGRAM

## 2025-05-01 RX ADMIN — GABAPENTIN 300 MG: 300 CAPSULE ORAL at 15:51

## 2025-05-01 RX ADMIN — ENOXAPARIN SODIUM 40 MG: 40 INJECTION SUBCUTANEOUS at 08:22

## 2025-05-01 RX ADMIN — IPRATROPIUM BROMIDE AND ALBUTEROL SULFATE 3 ML: 2.5; .5 SOLUTION RESPIRATORY (INHALATION) at 16:11

## 2025-05-01 RX ADMIN — IPRATROPIUM BROMIDE AND ALBUTEROL SULFATE 3 ML: 2.5; .5 SOLUTION RESPIRATORY (INHALATION) at 08:39

## 2025-05-01 RX ADMIN — GABAPENTIN 300 MG: 300 CAPSULE ORAL at 20:49

## 2025-05-01 RX ADMIN — IPRATROPIUM BROMIDE AND ALBUTEROL SULFATE 3 ML: 2.5; .5 SOLUTION RESPIRATORY (INHALATION) at 12:15

## 2025-05-01 RX ADMIN — PANTOPRAZOLE SODIUM 40 MG: 40 TABLET, DELAYED RELEASE ORAL at 06:23

## 2025-05-01 RX ADMIN — MORPHINE SULFATE 30 MG: 30 TABLET, EXTENDED RELEASE ORAL at 08:21

## 2025-05-01 RX ADMIN — BUDESONIDE 0.25 MG: 0.25 INHALANT RESPIRATORY (INHALATION) at 20:25

## 2025-05-01 RX ADMIN — GABAPENTIN 300 MG: 300 CAPSULE ORAL at 08:21

## 2025-05-01 RX ADMIN — CEFTRIAXONE SODIUM 2 G: 2 INJECTION, SOLUTION INTRAVENOUS at 08:22

## 2025-05-01 RX ADMIN — Medication 5 L/MIN: at 08:25

## 2025-05-01 RX ADMIN — POLYETHYLENE GLYCOL 3350 17 G: 17 POWDER, FOR SOLUTION ORAL at 08:22

## 2025-05-01 RX ADMIN — MORPHINE SULFATE 30 MG: 30 TABLET, EXTENDED RELEASE ORAL at 13:19

## 2025-05-01 RX ADMIN — SENNOSIDES 8.6 MG: 8.6 TABLET, FILM COATED ORAL at 20:49

## 2025-05-01 RX ADMIN — OXYCODONE HYDROCHLORIDE 10 MG: 10 TABLET ORAL at 11:08

## 2025-05-01 RX ADMIN — OXYCODONE HYDROCHLORIDE 10 MG: 10 TABLET ORAL at 15:51

## 2025-05-01 RX ADMIN — FOLIC ACID 1000 MCG: 1 TABLET ORAL at 08:21

## 2025-05-01 RX ADMIN — MORPHINE SULFATE 60 MG: 30 TABLET, EXTENDED RELEASE ORAL at 20:49

## 2025-05-01 RX ADMIN — BUDESONIDE 0.25 MG: 0.25 INHALANT RESPIRATORY (INHALATION) at 08:39

## 2025-05-01 RX ADMIN — MIRTAZAPINE 15 MG: 15 TABLET, FILM COATED ORAL at 20:49

## 2025-05-01 RX ADMIN — AZITHROMYCIN DIHYDRATE 500 MG: 500 TABLET ORAL at 08:21

## 2025-05-01 RX ADMIN — IPRATROPIUM BROMIDE AND ALBUTEROL SULFATE 3 ML: 2.5; .5 SOLUTION RESPIRATORY (INHALATION) at 20:25

## 2025-05-01 RX ADMIN — Medication 36 PERCENT: at 20:25

## 2025-05-01 SDOH — HEALTH STABILITY: PHYSICAL HEALTH
HOW OFTEN DO YOU NEED TO HAVE SOMEONE HELP YOU WHEN YOU READ INSTRUCTIONS, PAMPHLETS, OR OTHER WRITTEN MATERIAL FROM YOUR DOCTOR OR PHARMACY?: NEVER

## 2025-05-01 SDOH — SOCIAL STABILITY: SOCIAL INSECURITY: ARE YOU MARRIED, WIDOWED, DIVORCED, SEPARATED, NEVER MARRIED, OR LIVING WITH A PARTNER?: MARRIED

## 2025-05-01 ASSESSMENT — COGNITIVE AND FUNCTIONAL STATUS - GENERAL
WALKING IN HOSPITAL ROOM: A LITTLE
HELP NEEDED FOR BATHING: A LITTLE
DRESSING REGULAR LOWER BODY CLOTHING: A LITTLE
MOBILITY SCORE: 18
DAILY ACTIVITIY SCORE: 18
STANDING UP FROM CHAIR USING ARMS: A LITTLE
TURNING FROM BACK TO SIDE WHILE IN FLAT BAD: A LITTLE
CLIMB 3 TO 5 STEPS WITH RAILING: A LITTLE
PERSONAL GROOMING: A LITTLE
WALKING IN HOSPITAL ROOM: A LITTLE
MOVING FROM LYING ON BACK TO SITTING ON SIDE OF FLAT BED WITH BEDRAILS: A LITTLE
CLIMB 3 TO 5 STEPS WITH RAILING: A LITTLE
TOILETING: A LITTLE
STANDING UP FROM CHAIR USING ARMS: A LITTLE
MOVING FROM LYING ON BACK TO SITTING ON SIDE OF FLAT BED WITH BEDRAILS: A LITTLE
TURNING FROM BACK TO SIDE WHILE IN FLAT BAD: A LITTLE
MOVING TO AND FROM BED TO CHAIR: A LITTLE
DRESSING REGULAR UPPER BODY CLOTHING: A LITTLE
EATING MEALS: A LITTLE
MOVING TO AND FROM BED TO CHAIR: A LITTLE
MOBILITY SCORE: 18

## 2025-05-01 ASSESSMENT — PAIN - FUNCTIONAL ASSESSMENT
PAIN_FUNCTIONAL_ASSESSMENT: 0-10

## 2025-05-01 ASSESSMENT — PAIN SCALES - GENERAL
PAINLEVEL_OUTOF10: 8
PAINLEVEL_OUTOF10: 9
PAINLEVEL_OUTOF10: 8
PAINLEVEL_OUTOF10: 8
PAINLEVEL_OUTOF10: 7
PAINLEVEL_OUTOF10: 8

## 2025-05-01 ASSESSMENT — PAIN DESCRIPTION - LOCATION: LOCATION: GENERALIZED

## 2025-05-01 NOTE — CARE PLAN
Problem: Pain - Adult  Goal: Verbalizes/displays adequate comfort level or baseline comfort level  Outcome: Progressing     Problem: Discharge Planning  Goal: Discharge to home or other facility with appropriate resources  Outcome: Progressing     Problem: Chronic Conditions and Co-morbidities  Goal: Patient's chronic conditions and co-morbidity symptoms are monitored and maintained or improved  Outcome: Progressing     Problem: Nutrition  Goal: Nutrient intake appropriate for maintaining nutritional needs  Outcome: Progressing     Problem: Pain  Goal: Turns in bed with improved pain control throughout the shift  Outcome: Progressing     Problem: Skin  Goal: Prevent/minimize sheer/friction injuries  Outcome: Progressing  Flowsheets (Taken 5/1/2025 5730)  Prevent/minimize sheer/friction injuries: Turn/reposition every 2 hours/use positioning/transfer devices     Problem: Safety - Adult  Goal: Free from fall injury  Outcome: Met     Problem: Pain  Goal: Free from opioid side effects throughout the shift  Outcome: Met  Goal: Free from acute confusion related to pain meds throughout the shift  Outcome: Met     Problem: Fall/Injury  Goal: Not fall by end of shift  Outcome: Met  Goal: Be free from injury by end of the shift  Outcome: Met     The clinical goals for the shift include see care plan    EOS:  Patient has been having pain 8-9/10 generalized and has been medicated with scheduled MS contin and PRN oxy with some relief.  The O2 was titrated to 3L but he was feeling SOB with mild exertion and at rest and it was increased to 4L with improvement.

## 2025-05-01 NOTE — ASSESSMENT & PLAN NOTE
-Baseline 2 L oxygen at home as needed  -Currently saturating 95% on 5 L nasal cannula  -Patient meets sepsis criteria with positive SIRS (tachycardia, tachypnea) and acute respiratory failure, however he would not need sepsis bolus due to stable blood pressure  -CAT scan of the chest reviewed, showed interval development of patchy consolidation in the left upper lobe compatible with pneumonia, this was not present on previous CT scan in January.  Again showed unchanged moderate-sized loculated right pleural effusion and consolidation in the basilar right lower lobe in the middle lobe consistent with postobstructive atelectasis.  -Patient was started on Rocephin and azithromycin, will continue  -Monitor respiratory symptoms  -Admit to inpatient with telemetry monitoring  -Patient also received Solu-Medrol in the emergency room, overall low clinical concern for COPD exacerbation at this time, will hold off on further steroid  -Will obtain strep pneumo, Legionella antigen

## 2025-05-01 NOTE — PROGRESS NOTES
"   05/01/25 1432   Discharge Planning   Living Arrangements Spouse/significant other   Support Systems Spouse/significant other;Children   Type of Residence Private residence   Home or Post Acute Services None   Expected Discharge Disposition Home   Intensity of Service   Intensity of Service 0-30 min     Met with patient at bedside. He lives at home with his spouse in [place]. Patient states he is independent with ADLs, and uses a crutch. He states he uses 2L O2 at home (\"or more as needed\"). He is unsure which company supplies his home O2. PCP is Matilda Garcia. Patient plans to return home when medically ready for discharge. He confirms understanding of how to manage his health at home and of his home medications.   "

## 2025-05-01 NOTE — ASSESSMENT & PLAN NOTE
-Continue outpatient follow-up with oncology  -Currently on Carboplatin (AUC 5), Pemetrexed (500mg/m2), & Pembrolizumab 10/28/24  -Continue home MS Contin and oxycodone for pain control

## 2025-05-01 NOTE — CARE PLAN
The patient's goals for the shift include Sleep    The clinical goals for the shift include Comfort and rest    Over the shift, the patient did not make progress toward the following goals. Barriers to progression include chronic illness. Recommendations to address these barriers include pain management and provide a quiet environment.

## 2025-05-01 NOTE — ASSESSMENT & PLAN NOTE
-Discussed with patient's family at bedside, stating patient has always been very thin, and after diagnosis of cancer, he has lost more weight over the past 2 months  -BMI 16.15 on this admission  -Nutrition consulted for evaluation and recommendation  -Suspect hyponatremia may be due to protein caloric malnutrition versus acute infection, will monitor closely for now

## 2025-05-02 DIAGNOSIS — C79.51 NSCLC METASTATIC TO BONE (MULTI): ICD-10-CM

## 2025-05-02 DIAGNOSIS — C34.90 NSCLC METASTATIC TO BONE (MULTI): ICD-10-CM

## 2025-05-02 LAB
ANION GAP SERPL CALC-SCNC: 11 MMOL/L (ref 10–20)
BUN SERPL-MCNC: 8 MG/DL (ref 6–23)
CALCIUM SERPL-MCNC: 8.8 MG/DL (ref 8.6–10.3)
CHLORIDE SERPL-SCNC: 94 MMOL/L (ref 98–107)
CO2 SERPL-SCNC: 29 MMOL/L (ref 21–32)
CREAT SERPL-MCNC: 0.39 MG/DL (ref 0.5–1.3)
EGFRCR SERPLBLD CKD-EPI 2021: >90 ML/MIN/1.73M*2
ERYTHROCYTE [DISTWIDTH] IN BLOOD BY AUTOMATED COUNT: 16.8 % (ref 11.5–14.5)
FLUAV RNA RESP QL NAA+PROBE: NOT DETECTED
FLUBV RNA RESP QL NAA+PROBE: NOT DETECTED
GLUCOSE SERPL-MCNC: 102 MG/DL (ref 74–99)
HCT VFR BLD AUTO: 25.8 % (ref 41–52)
HGB BLD-MCNC: 8.1 G/DL (ref 13.5–17.5)
LEGIONELLA AG UR QL: NEGATIVE
MAGNESIUM SERPL-MCNC: 1.77 MG/DL (ref 1.6–2.4)
MCH RBC QN AUTO: 27.4 PG (ref 26–34)
MCHC RBC AUTO-ENTMCNC: 31.4 G/DL (ref 32–36)
MCV RBC AUTO: 87 FL (ref 80–100)
NRBC BLD-RTO: 0 /100 WBCS (ref 0–0)
PLATELET # BLD AUTO: 291 X10*3/UL (ref 150–450)
POTASSIUM SERPL-SCNC: 3.7 MMOL/L (ref 3.5–5.3)
RBC # BLD AUTO: 2.96 X10*6/UL (ref 4.5–5.9)
S PNEUM AG UR QL: NEGATIVE
SARS-COV-2 RNA RESP QL NAA+PROBE: NOT DETECTED
SODIUM SERPL-SCNC: 130 MMOL/L (ref 136–145)
WBC # BLD AUTO: 8.7 X10*3/UL (ref 4.4–11.3)

## 2025-05-02 PROCEDURE — 85027 COMPLETE CBC AUTOMATED: CPT | Performed by: STUDENT IN AN ORGANIZED HEALTH CARE EDUCATION/TRAINING PROGRAM

## 2025-05-02 PROCEDURE — 87636 SARSCOV2 & INF A&B AMP PRB: CPT | Performed by: STUDENT IN AN ORGANIZED HEALTH CARE EDUCATION/TRAINING PROGRAM

## 2025-05-02 PROCEDURE — 99233 SBSQ HOSP IP/OBS HIGH 50: CPT | Performed by: STUDENT IN AN ORGANIZED HEALTH CARE EDUCATION/TRAINING PROGRAM

## 2025-05-02 PROCEDURE — 36415 COLL VENOUS BLD VENIPUNCTURE: CPT | Performed by: STUDENT IN AN ORGANIZED HEALTH CARE EDUCATION/TRAINING PROGRAM

## 2025-05-02 PROCEDURE — 2500000004 HC RX 250 GENERAL PHARMACY W/ HCPCS (ALT 636 FOR OP/ED): Mod: JZ | Performed by: STUDENT IN AN ORGANIZED HEALTH CARE EDUCATION/TRAINING PROGRAM

## 2025-05-02 PROCEDURE — 80048 BASIC METABOLIC PNL TOTAL CA: CPT | Performed by: STUDENT IN AN ORGANIZED HEALTH CARE EDUCATION/TRAINING PROGRAM

## 2025-05-02 PROCEDURE — 94640 AIRWAY INHALATION TREATMENT: CPT

## 2025-05-02 PROCEDURE — 2500000004 HC RX 250 GENERAL PHARMACY W/ HCPCS (ALT 636 FOR OP/ED): Performed by: STUDENT IN AN ORGANIZED HEALTH CARE EDUCATION/TRAINING PROGRAM

## 2025-05-02 PROCEDURE — 2500000005 HC RX 250 GENERAL PHARMACY W/O HCPCS: Performed by: STUDENT IN AN ORGANIZED HEALTH CARE EDUCATION/TRAINING PROGRAM

## 2025-05-02 PROCEDURE — 2500000002 HC RX 250 W HCPCS SELF ADMINISTERED DRUGS (ALT 637 FOR MEDICARE OP, ALT 636 FOR OP/ED): Performed by: STUDENT IN AN ORGANIZED HEALTH CARE EDUCATION/TRAINING PROGRAM

## 2025-05-02 PROCEDURE — 83735 ASSAY OF MAGNESIUM: CPT | Performed by: STUDENT IN AN ORGANIZED HEALTH CARE EDUCATION/TRAINING PROGRAM

## 2025-05-02 PROCEDURE — 2500000001 HC RX 250 WO HCPCS SELF ADMINISTERED DRUGS (ALT 637 FOR MEDICARE OP): Performed by: STUDENT IN AN ORGANIZED HEALTH CARE EDUCATION/TRAINING PROGRAM

## 2025-05-02 PROCEDURE — 1200000002 HC GENERAL ROOM WITH TELEMETRY DAILY

## 2025-05-02 RX ORDER — PREDNISONE 20 MG/1
40 TABLET ORAL DAILY
Status: DISPENSED | OUTPATIENT
Start: 2025-05-02

## 2025-05-02 RX ADMIN — FOLIC ACID 1000 MCG: 1 TABLET ORAL at 09:40

## 2025-05-02 RX ADMIN — IPRATROPIUM BROMIDE AND ALBUTEROL SULFATE 3 ML: 2.5; .5 SOLUTION RESPIRATORY (INHALATION) at 15:53

## 2025-05-02 RX ADMIN — Medication 3 L/MIN: at 20:39

## 2025-05-02 RX ADMIN — IPRATROPIUM BROMIDE AND ALBUTEROL SULFATE 3 ML: 2.5; .5 SOLUTION RESPIRATORY (INHALATION) at 20:46

## 2025-05-02 RX ADMIN — PANTOPRAZOLE SODIUM 40 MG: 40 TABLET, DELAYED RELEASE ORAL at 05:52

## 2025-05-02 RX ADMIN — AZITHROMYCIN DIHYDRATE 500 MG: 500 TABLET ORAL at 09:40

## 2025-05-02 RX ADMIN — Medication 4 L/MIN: at 08:00

## 2025-05-02 RX ADMIN — MIRTAZAPINE 15 MG: 15 TABLET, FILM COATED ORAL at 20:36

## 2025-05-02 RX ADMIN — BUDESONIDE 0.25 MG: 0.25 INHALANT RESPIRATORY (INHALATION) at 20:46

## 2025-05-02 RX ADMIN — MORPHINE SULFATE 30 MG: 30 TABLET, EXTENDED RELEASE ORAL at 13:03

## 2025-05-02 RX ADMIN — IPRATROPIUM BROMIDE AND ALBUTEROL SULFATE 3 ML: 2.5; .5 SOLUTION RESPIRATORY (INHALATION) at 07:38

## 2025-05-02 RX ADMIN — GABAPENTIN 300 MG: 300 CAPSULE ORAL at 20:37

## 2025-05-02 RX ADMIN — BUDESONIDE 0.25 MG: 0.25 INHALANT RESPIRATORY (INHALATION) at 07:38

## 2025-05-02 RX ADMIN — OXYCODONE HYDROCHLORIDE 10 MG: 10 TABLET ORAL at 22:43

## 2025-05-02 RX ADMIN — PREDNISONE 40 MG: 20 TABLET ORAL at 11:51

## 2025-05-02 RX ADMIN — GABAPENTIN 300 MG: 300 CAPSULE ORAL at 17:29

## 2025-05-02 RX ADMIN — CEFTRIAXONE SODIUM 2 G: 2 INJECTION, SOLUTION INTRAVENOUS at 09:40

## 2025-05-02 RX ADMIN — MORPHINE SULFATE 60 MG: 30 TABLET, EXTENDED RELEASE ORAL at 20:37

## 2025-05-02 RX ADMIN — GABAPENTIN 300 MG: 300 CAPSULE ORAL at 09:44

## 2025-05-02 RX ADMIN — MORPHINE SULFATE 30 MG: 30 TABLET, EXTENDED RELEASE ORAL at 09:39

## 2025-05-02 RX ADMIN — ENOXAPARIN SODIUM 40 MG: 40 INJECTION SUBCUTANEOUS at 09:43

## 2025-05-02 RX ADMIN — IPRATROPIUM BROMIDE AND ALBUTEROL SULFATE 3 ML: 2.5; .5 SOLUTION RESPIRATORY (INHALATION) at 12:12

## 2025-05-02 RX ADMIN — Medication 3 L/MIN: at 15:54

## 2025-05-02 ASSESSMENT — COGNITIVE AND FUNCTIONAL STATUS - GENERAL
MOBILITY SCORE: 18
STANDING UP FROM CHAIR USING ARMS: A LITTLE
CLIMB 3 TO 5 STEPS WITH RAILING: A LITTLE
WALKING IN HOSPITAL ROOM: A LITTLE
TURNING FROM BACK TO SIDE WHILE IN FLAT BAD: A LITTLE
PERSONAL GROOMING: A LITTLE
TOILETING: A LITTLE
MOVING TO AND FROM BED TO CHAIR: A LITTLE
DAILY ACTIVITIY SCORE: 18
DRESSING REGULAR LOWER BODY CLOTHING: A LITTLE
MOBILITY SCORE: 18
TURNING FROM BACK TO SIDE WHILE IN FLAT BAD: A LITTLE
TOILETING: A LITTLE
STANDING UP FROM CHAIR USING ARMS: A LITTLE
PERSONAL GROOMING: A LITTLE
HELP NEEDED FOR BATHING: A LITTLE
DRESSING REGULAR UPPER BODY CLOTHING: A LITTLE
MOVING TO AND FROM BED TO CHAIR: A LITTLE
HELP NEEDED FOR BATHING: A LITTLE
CLIMB 3 TO 5 STEPS WITH RAILING: A LITTLE
MOVING FROM LYING ON BACK TO SITTING ON SIDE OF FLAT BED WITH BEDRAILS: A LITTLE
WALKING IN HOSPITAL ROOM: A LITTLE
EATING MEALS: A LITTLE
DAILY ACTIVITIY SCORE: 18
EATING MEALS: A LITTLE
DRESSING REGULAR UPPER BODY CLOTHING: A LITTLE
MOVING FROM LYING ON BACK TO SITTING ON SIDE OF FLAT BED WITH BEDRAILS: A LITTLE
DRESSING REGULAR LOWER BODY CLOTHING: A LITTLE

## 2025-05-02 ASSESSMENT — PAIN - FUNCTIONAL ASSESSMENT
PAIN_FUNCTIONAL_ASSESSMENT: 0-10

## 2025-05-02 ASSESSMENT — PAIN SCALES - GENERAL
PAINLEVEL_OUTOF10: 8
PAINLEVEL_OUTOF10: 8
PAINLEVEL_OUTOF10: 5 - MODERATE PAIN

## 2025-05-02 ASSESSMENT — PAIN DESCRIPTION - LOCATION: LOCATION: ARM

## 2025-05-02 NOTE — NURSING NOTE
End of shift note, no acute changes this shift. Patient remains on mscontin for pain. Tolerating diet well. Patient weaned down to 3 liters oxygen nasal cannula. Will continue to monitor. Family at bedside.

## 2025-05-02 NOTE — ASSESSMENT & PLAN NOTE
-Baseline 2-3L O2  -Weaned from 4L to 3L O3 5/2   -CAT scan of the chest reviewed, showed interval development of patchy consolidation in the left upper lobe compatible with pneumonia. Again showed unchanged moderate-sized loculated right pleural effusion and consolidation in the basilar right lower lobe in the middle lobe consistent with postobstructive atelectasis.  -Continue Rocephin and azithromycin  -Continue duonebs and pulmicort  -Start prednisone 40mg daily  -Will obtain strep pneumo, Legionella antigen  -Procalcitonin 0.2

## 2025-05-02 NOTE — PROGRESS NOTES
"Jovon Mccartney is a 50 y.o. male on day 2 of admission presenting with Acute on chronic respiratory failure with hypoxia.    Subjective     No major issues reported overnight.  During my exam was on 4 L O2 nasal cannula and had his own pulse ox  device at bedside,  Which is reading at 97% SpO2.  Wean patient off continued O2 nasal cannula.  He reports that his outpatient rheumatologist has started him on prednisone 10 mg daily.  Will plan to start restart steroids at slightly higher dosing given possible COPD component.     Objective     Physical Exam by System:       Constitutional: Pleasant, frail, not in distress  Skin: Warm and dry  Eyes: EOMI, clear sclera  ENMT: mucous membranes moist  Respiratory: Diminished breath sounds bilaterally  Cardiovascular: Regular, rate and rhythm, no murmurs  Abdominal: Nondistended, soft, non-tender, +BS  MSK: ROM intact  Neuro: alert and oriented x3      Last Recorded Vitals  Blood pressure 102/76, pulse (!) 118, temperature 37.8 °C (100 °F), temperature source Temporal, resp. rate 12, height 1.676 m (5' 5.98\"), weight 45.4 kg (100 lb), SpO2 99%.  Intake/Output last 3 Shifts:  I/O last 3 completed shifts:  In: 370 (8.2 mL/kg) [P.O.:370]  Out: 1250 (27.6 mL/kg) [Urine:1250 (0.8 mL/kg/hr)]  Weight: 45.4 kg     Relevant Results  Scheduled medications  Scheduled Medications[1]  Continuous medications  Continuous Medications[2]  PRN medications  PRN Medications[3]  Results from last 7 days   Lab Units 05/02/25  0657 05/01/25  0615 04/30/25  0558   WBC AUTO x10*3/uL 8.7 10.0 11.2   RBC AUTO x10*6/uL 2.96* 2.95* 2.91*   HEMOGLOBIN g/dL 8.1* 8.1* 8.0*     Results from last 7 days   Lab Units 05/02/25  0657 05/01/25  0615 04/30/25  0558   SODIUM mmol/L 130* 133* 129*   POTASSIUM mmol/L 3.7 3.7 4.1   CHLORIDE mmol/L 94* 96* 94*   CO2 mmol/L 29 28 23   BUN mg/dL 8 8 12   CREATININE mg/dL 0.39* 0.37* 0.45*   CALCIUM mg/dL 8.8 9.3 8.5*   MAGNESIUM mg/dL 1.77 1.85 1.79   BILIRUBIN TOTAL mg/dL  " --   --  0.5   ALT U/L  --   --  8*   AST U/L  --   --  16                Assessment/Plan   Assessment & Plan  Acute on chronic respiratory failure with hypoxia  Community acquired bacterial pneumonia  Sepsis with acute hypoxic respiratory failure without septic shock  -Baseline 2-3L O2  -Weaned from 4L to 3L O3 5/2   -CAT scan of the chest reviewed, showed interval development of patchy consolidation in the left upper lobe compatible with pneumonia. Again showed unchanged moderate-sized loculated right pleural effusion and consolidation in the basilar right lower lobe in the middle lobe consistent with postobstructive atelectasis.  -Continue Rocephin and azithromycin  -Continue duonebs and pulmicort  -Start prednisone 40mg daily  -Will obtain strep pneumo, Legionella antigen  -Procalcitonin 0.2      Anemia due to antineoplastic chemotherapy  Immunocompromised  NSCLC metastatic to bone (Multi)  -Continue outpatient follow-up with oncology  -Currently on Carboplatin (AUC 5), Pemetrexed (500mg/m2), & Pembrolizumab 10/28/24  -Continue home MS Contin and oxycodone for pain control  Severe protein-calorie malnutrition (Multi)  Hyponatremia  -Discussed with patient's family at bedside, stating patient has always been very thin, and after diagnosis of cancer, he has lost more weight over the past 2 months  -BMI 16.15 on this admission  -Nutrition consulted  -Suspect hyponatremia may be due to protein caloric malnutrition versus acute infection, will monitor closely for now    DVT prophylaxis with subcu Lovenox  Patient is full code             [1] azithromycin, 500 mg, oral, q24h BALTAZAR  budesonide, 0.25 mg, nebulization, BID   And  ipratropium-albuteroL, 3 mL, nebulization, 4x daily  cefTRIAXone, 2 g, intravenous, q24h  enoxaparin, 40 mg, subcutaneous, q24h BALTAZAR  folic acid, 1,000 mcg, oral, Daily  gabapentin, 300 mg, oral, TID  mirtazapine, 15 mg, oral, Nightly  morphine CR, 30 mg, oral, 2 times per day   And  morphine CR,  60 mg, oral, Nightly  oxygen, , inhalation, Continuous - Inhalation  pantoprazole, 40 mg, oral, Daily before breakfast  polyethylene glycol, 17 g, oral, Daily  sennosides, 1 tablet, oral, Nightly     [2]    [3] PRN medications: acetaminophen **OR** acetaminophen **OR** acetaminophen, acetaminophen **OR** acetaminophen **OR** acetaminophen, docusate sodium, oxyCODONE, pilocarpine

## 2025-05-02 NOTE — CARE PLAN
The patient's goals for the shift include Sleep    The clinical goals for the shift include see care plan      Problem: Pain - Adult  Goal: Verbalizes/displays adequate comfort level or baseline comfort level  Outcome: Progressing     Problem: Chronic Conditions and Co-morbidities  Goal: Patient's chronic conditions and co-morbidity symptoms are monitored and maintained or improved  Outcome: Progressing     Problem: Nutrition  Goal: Nutrient intake appropriate for maintaining nutritional needs  Outcome: Progressing     Problem: Pain  Goal: Takes deep breaths with improved pain control throughout the shift  Outcome: Progressing

## 2025-05-02 NOTE — PROGRESS NOTES
"Jovon Mccartney is a 50 y.o. male on day 1 of admission presenting with Acute on chronic respiratory failure with hypoxia.    Subjective   Patient seen examined, no acute events overnight.  Clinically feels slightly improved.  Oxygen requirement down to 4 L today.       Objective     Physical Exam by System:       Constitutional: Well developed, severely malnourished, AO x 3, does not appear to be distress   ENMT: mucous membranes moist   Head/Neck: Neck supple   Respiratory/Thorax: Diminished bilaterally with bibasilar crackles   Cardiovascular: Tachycardic, regular   Gastrointestinal: Nondistended, soft, non-tender, no rebound tenderness    Musculoskeletal: ROM intact, no joint swelling, normal strength   Extremities: normal extremities   Neurological: alert and oriented x3, intact senses, motor         Last Recorded Vitals  Blood pressure 102/57, pulse (!) 116, temperature 36.6 °C (97.9 °F), temperature source Temporal, resp. rate 18, height 1.676 m (5' 5.98\"), weight 45.4 kg (100 lb), SpO2 93%.  Intake/Output last 3 Shifts:  I/O last 3 completed shifts:  In: 610 (13.4 mL/kg) [P.O.:610]  Out: 1000 (22 mL/kg) [Urine:1000 (0.6 mL/kg/hr)]  Weight: 45.4 kg     Relevant Results  Scheduled medications  Scheduled Medications[1]  Continuous medications  Continuous Medications[2]  PRN medications  PRN Medications[3]  Results from last 7 days   Lab Units 05/01/25  0615 04/30/25  0558   WBC AUTO x10*3/uL 10.0 11.2   RBC AUTO x10*6/uL 2.95* 2.91*   HEMOGLOBIN g/dL 8.1* 8.0*     Results from last 7 days   Lab Units 05/01/25  0615 04/30/25  0558   SODIUM mmol/L 133* 129*   POTASSIUM mmol/L 3.7 4.1   CHLORIDE mmol/L 96* 94*   CO2 mmol/L 28 23   BUN mg/dL 8 12   CREATININE mg/dL 0.37* 0.45*   CALCIUM mg/dL 9.3 8.5*   MAGNESIUM mg/dL 1.85 1.79   BILIRUBIN TOTAL mg/dL  --  0.5   ALT U/L  --  8*   AST U/L  --  16                Assessment/Plan   Assessment & Plan  Acute on chronic respiratory failure with hypoxia  Community acquired " bacterial pneumonia  Sepsis with acute hypoxic respiratory failure without septic shock  -Baseline 2 L oxygen at home as needed  -saturating 95% on 5 L nasal cannula on admission, weaned to 4L today  -Patient meets sepsis criteria with positive SIRS (tachycardia, tachypnea) and acute respiratory failure, however he would not need sepsis bolus due to stable blood pressure  -CAT scan of the chest reviewed, showed interval development of patchy consolidation in the left upper lobe compatible with pneumonia, this was not present on previous CT scan in January.  Again showed unchanged moderate-sized loculated right pleural effusion and consolidation in the basilar right lower lobe in the middle lobe consistent with postobstructive atelectasis.  -Patient was started on Rocephin and azithromycin, will continue  -Monitor respiratory symptoms  -Admit to inpatient with telemetry monitoring  -Patient also received Solu-Medrol in the emergency room, overall low clinical concern for COPD exacerbation at this time, will hold off on further steroid  -Will obtain strep pneumo, Legionella antigen  -Procalcitonin 0.2  Anemia due to antineoplastic chemotherapy  Immunocompromised  NSCLC metastatic to bone (Multi)  -Continue outpatient follow-up with oncology  -Currently on Carboplatin (AUC 5), Pemetrexed (500mg/m2), & Pembrolizumab 10/28/24  -Continue home MS Contin and oxycodone for pain control  Severe protein-calorie malnutrition (Multi)  Hyponatremia  -Discussed with patient's family at bedside, stating patient has always been very thin, and after diagnosis of cancer, he has lost more weight over the past 2 months  -BMI 16.15 on this admission  -Nutrition consulted for evaluation and recommendation  -Suspect hyponatremia may be due to protein caloric malnutrition versus acute infection, will monitor closely for now      DVT prophylaxis with subcu Lovenox  Patient is full code           Complexity: High      This dictation was  created using voice recognition software.  If there are any questions about inaccuracies please do not hesitate to contact me.      Jef Saleh MD           [1] azithromycin, 500 mg, oral, q24h BALTAZAR  budesonide, 0.25 mg, nebulization, BID   And  ipratropium-albuteroL, 3 mL, nebulization, 4x daily  cefTRIAXone, 2 g, intravenous, q24h  enoxaparin, 40 mg, subcutaneous, q24h BALTAZAR  folic acid, 1,000 mcg, oral, Daily  gabapentin, 300 mg, oral, TID  mirtazapine, 15 mg, oral, Nightly  morphine CR, 30 mg, oral, 2 times per day   And  morphine CR, 60 mg, oral, Nightly  oxygen, , inhalation, Continuous - Inhalation  pantoprazole, 40 mg, oral, Daily before breakfast  polyethylene glycol, 17 g, oral, Daily  sennosides, 1 tablet, oral, Nightly    [2]    [3] PRN medications: acetaminophen **OR** acetaminophen **OR** acetaminophen, acetaminophen **OR** acetaminophen **OR** acetaminophen, docusate sodium, oxyCODONE, pilocarpine

## 2025-05-02 NOTE — CARE PLAN
The patient's goals for the shift include Sleep    The clinical goals for the shift include see plan of care

## 2025-05-02 NOTE — ASSESSMENT & PLAN NOTE
-Baseline 2 L oxygen at home as needed  -saturating 95% on 5 L nasal cannula on admission, weaned to 4L today  -Patient meets sepsis criteria with positive SIRS (tachycardia, tachypnea) and acute respiratory failure, however he would not need sepsis bolus due to stable blood pressure  -CAT scan of the chest reviewed, showed interval development of patchy consolidation in the left upper lobe compatible with pneumonia, this was not present on previous CT scan in January.  Again showed unchanged moderate-sized loculated right pleural effusion and consolidation in the basilar right lower lobe in the middle lobe consistent with postobstructive atelectasis.  -Patient was started on Rocephin and azithromycin, will continue  -Monitor respiratory symptoms  -Admit to inpatient with telemetry monitoring  -Patient also received Solu-Medrol in the emergency room, overall low clinical concern for COPD exacerbation at this time, will hold off on further steroid  -Will obtain strep pneumo, Legionella antigen  -Procalcitonin 0.2

## 2025-05-02 NOTE — PROGRESS NOTES
"Nutrition Initial Assessment:   Nutrition Assessment    Reason for Assessment: Admission nursing screening    Patient is a 50 y.o. male presenting with shortness of breath. Pt diagnosed with community acquired pneumonia, sepsis with acute hypoxic respiratory without septic shock. Diet is regular with poor intakes documented as well as skipping meals.    Medical History[1]      Nutrition History:  Energy Intake: Poor < 50 % (Pt explains he is hungry, but then as soon as the food arrives he is not interested.)  Food and Nutrient History: Pt lives at home with wife and son. He states his wife makes him smoothies and he drinks up to 3 a day. At baseline, prior to cancer, pt had an excellent appetite and could eat anything and everything. He stated he was diagnosed with rheumatoid arthritis at the age of 5 and never in his adult life weighed more than 130 lbs. Unfortunately, his weight has been dropping since his cancer dx and now he is at his lowest weight (100 lbs). He wears oxygen, but found himself more and more short of breath at [facility] unable to continue treatments and not eating as many smoothies.       Anthropometrics:  Height: 167.6 cm (5' 5.98\")   Weight: 45.4 kg (100 lb)   BMI (Calculated): 16.15  IBW/kg (Dietitian Calculated): 64.37 kg  Percent of IBW: 70.42 %                      Weight History:   Wt Readings from Last 30 Encounters:   04/30/25 45.4 kg (100 lb)   04/14/25 48.8 kg (107 lb 9.6 oz)   03/27/25 48.5 kg (107 lb)   03/24/25 48.4 kg (106 lb 11.2 oz)   03/20/25 48.2 kg (106 lb 6 oz)   03/03/25 47.2 kg (104 lb)   02/20/25 46.5 kg (102 lb 8.2 oz)   02/12/25 49.2 kg (108 lb 6.4 oz)   02/10/25 46.9 kg (103 lb 6.4 oz)   01/30/25 45.8 kg (100 lb 15.5 oz)   01/23/25 48.4 kg (106 lb 11.2 oz)   03/26/25 48.1 kg (106 lb)   01/21/25 47.7 kg (105 lb 2.6 oz)   01/20/25 48.2 kg (106 lb 4.2 oz)   12/30/24 47.4 kg (104 lb 9.6 oz)   12/19/24 48.1 kg (106 lb)   12/09/24 50.1 kg (110 lb 8 oz)   11/25/24 52.2 kg (115 lb) "   11/20/24 54 kg (119 lb)   11/18/24 54.2 kg (119 lb 8 oz)   11/11/24 55.8 kg (123 lb)   11/04/24 51.7 kg (114 lb)   11/04/24 51.8 kg (114 lb 3.2 oz)   10/28/24 53 kg (116 lb 11.7 oz)   10/28/24 53 kg (116 lb 11.7 oz)   10/23/24 56.8 kg (125 lb 3.2 oz)   10/21/24 56.2 kg (124 lb)   10/14/24 56 kg (123 lb 8 oz)   10/10/24 56.7 kg (125 lb)   10/08/24 56.2 kg (123 lb 14.4 oz)       Weight Change %:  Weight History / % Weight Change: Pt has lost 7 lbs (7%) in the past 2 weeks  Significant Weight Loss: Yes    Nutrition Focused Physical Exam Findings:    Subcutaneous Fat Loss:   Orbital Fat Pads: Severe (dark circles, hollowing and loose skin)  Buccal Fat Pads: Severe (hollow, sunken and narrow face)  Triceps: Severe (negligible fat tissue)  Ribs: Defer  Muscle Wasting:  Temporalis: Severe (hollowed scooping depression)  Pectoralis (Clavicular Region): Severe (protruding prominent clavicle)  Interosseous: Severe (depressed area between thumb and forefinger)  Edema:  Edema: none  Physical Findings:  Hair:  (wearing a hat)  Eyes: Negative  Nails: Positive (clubbing)  Skin: Positive (chronic dry skin)  Respiratory : Positive (4-liters (2-liters at baseline))  Digestive System Findings: Anorexia, Early satiety  Mouth Findings: Dysgeusia    Nutrition Significant Labs:  CBC Trend:   Results from last 7 days   Lab Units 05/02/25  0657 05/01/25  0615 04/30/25  0558   WBC AUTO x10*3/uL 8.7 10.0 11.2   RBC AUTO x10*6/uL 2.96* 2.95* 2.91*   HEMOGLOBIN g/dL 8.1* 8.1* 8.0*   HEMATOCRIT % 25.8* 25.4* 27.4*   MCV fL 87 86 94   PLATELETS AUTO x10*3/uL 291 358 322    , BMP Trend:   Results from last 7 days   Lab Units 05/02/25  0657 05/01/25  0615 04/30/25  0558   GLUCOSE mg/dL 102* 119* 114*   CALCIUM mg/dL 8.8 9.3 8.5*   SODIUM mmol/L 130* 133* 129*   POTASSIUM mmol/L 3.7 3.7 4.1   CO2 mmol/L 29 28 23   CHLORIDE mmol/L 94* 96* 94*   BUN mg/dL 8 8 12   CREATININE mg/dL 0.39* 0.37* 0.45*            I/O:   Last BM Date: 04/30/25;       Dietary Orders (From admission, onward)       Start     Ordered    05/02/25 1021  Oral nutritional supplements  Until discontinued        Question Answer Comment   Deliver with All meals    Select supplement: Ensure Plus High Protein        05/02/25 1020    04/30/25 1157  May Participate in Room Service  ( ROOM SERVICE MAY PARTICIPATE)  Once        Question:  .  Answer:  Yes    04/30/25 1156    04/30/25 1112  Adult diet Regular  Diet effective now        Question:  Diet type  Answer:  Regular    04/30/25 1111                     Estimated Needs:   Total Energy Estimated Needs in 24 hours (kCal):  (7397-1126)  Method for Estimating Needs: 30-35 kcal/kg  Total Protein Estimated Needs in 24 Hours (g):  (54-64)  Method for Estimating 24 Hour Protein Needs: 1.2-1.4g/kg  Total Fluid Estimated Needs in 24 Hours (mL):  (2963-6576)  Method for Estimating 24 Hour Fluid Needs: 1 ml/kcal        Nutrition Diagnosis   Malnutrition Diagnosis  Patient has Malnutrition Diagnosis: Yes  Diagnosis Status: New  Malnutrition Diagnosis: Severe malnutrition related to acute disease or injury  Related to: acute on chronic respiratory failure  As Evidenced by: significant weight loss of 7 lbs (7%) in the past 2.5 weeks along with decreased oral intakes of <75% of est energy needs with severe muscle wasting and loss of subcutaneous fat.            Nutrition Interventions/Recommendations   Nutrition prescription for oral nutrition    Nutrition Recommendations:  Individualized Nutrition Prescription Provided for : Continue REGULAR diet as ordered. Pt will benefit from 6 small meals daily. Will try a variety of oral nutrition supplements as tolerated: BOOST VHC, MAGIC CUP, ENSURE  PLUS HP and GELATEIN with meals    Nutrition Interventions/Goals:   Medical Food Supplement: Commercial beverage medical food supplement therapy  Goal: BOOST VHC = 530 kcal/22g protein, ENSURE PLUS HP = 350kcal/20g protein, MAGIC CUP=290 kcal/9g protein, GELATEIN  "PLUS = 160 kcal/20g protein      Education Documentation  Nutrition Care Manual, taught by Willa Pratt RDN, LD at 5/2/2025  1:47 PM.  Learner: Patient  Readiness: Acceptance  Method: Explanation, Handout  Response: Verbalizes Understanding  Comment: Provided handouts: \"How To Make A High-Calorie Shake\", \"High Calorie, High Protein Recipe Ideas\", \"High Calorie, High Protein Nutrition Therapy\" and offered oral nutrition supplement options.              Nutrition Monitoring and Evaluation   Food/Nutrient Related History Monitoring  Monitoring and Evaluation Plan: Intake / amount of food, Eating behavior  Intake / Amount of food: Consumes at least 75% or more of meals/snacks/supplements  Eating Behavior: Refusing food  Criteria: Pt to not skip meals.    Anthropometric Measurements  Monitoring and Evaluation Plan: Body weight  Body Weight: Body weight - Promote weight restoration    Biochemical Data, Medical Tests and Procedures  Monitoring and Evaluation Plan: Electrolyte/renal panel  Electrolyte and Renal Panel: Sodium  Criteria: WNR    Physical Exam Findings  Monitoring and Evaluation Plan: Digestive System  Digestive System Finding: Early satiety  Criteria: to improve as respiratory status improves    Goal Status: New goal(s) identified    Time Spent (min): 45 minutes              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

## 2025-05-02 NOTE — NURSING NOTE
2045 - patient sitting in bed. Administered medications per MAR, patient tolerated well. Call light within reach.    0550 - administered medication per MAR, patient tolerated well. Emptied urinal and placed call light within reach.

## 2025-05-02 NOTE — ASSESSMENT & PLAN NOTE
-Discussed with patient's family at bedside, stating patient has always been very thin, and after diagnosis of cancer, he has lost more weight over the past 2 months  -BMI 16.15 on this admission  -Nutrition consulted  -Suspect hyponatremia may be due to protein caloric malnutrition versus acute infection, will monitor closely for now

## 2025-05-03 LAB
ANION GAP SERPL CALC-SCNC: 12 MMOL/L (ref 10–20)
BUN SERPL-MCNC: 13 MG/DL (ref 6–23)
CALCIUM SERPL-MCNC: 8.9 MG/DL (ref 8.6–10.3)
CHLORIDE SERPL-SCNC: 95 MMOL/L (ref 98–107)
CO2 SERPL-SCNC: 28 MMOL/L (ref 21–32)
CREAT SERPL-MCNC: 0.41 MG/DL (ref 0.5–1.3)
EGFRCR SERPLBLD CKD-EPI 2021: >90 ML/MIN/1.73M*2
ERYTHROCYTE [DISTWIDTH] IN BLOOD BY AUTOMATED COUNT: 16.6 % (ref 11.5–14.5)
GLUCOSE SERPL-MCNC: 114 MG/DL (ref 74–99)
HCT VFR BLD AUTO: 24.8 % (ref 41–52)
HGB BLD-MCNC: 8 G/DL (ref 13.5–17.5)
MCH RBC QN AUTO: 27.6 PG (ref 26–34)
MCHC RBC AUTO-ENTMCNC: 32.3 G/DL (ref 32–36)
MCV RBC AUTO: 86 FL (ref 80–100)
NRBC BLD-RTO: 0 /100 WBCS (ref 0–0)
PLATELET # BLD AUTO: 286 X10*3/UL (ref 150–450)
POTASSIUM SERPL-SCNC: 3.7 MMOL/L (ref 3.5–5.3)
RBC # BLD AUTO: 2.9 X10*6/UL (ref 4.5–5.9)
SODIUM SERPL-SCNC: 131 MMOL/L (ref 136–145)
WBC # BLD AUTO: 8.6 X10*3/UL (ref 4.4–11.3)

## 2025-05-03 PROCEDURE — 1100000001 HC PRIVATE ROOM DAILY

## 2025-05-03 PROCEDURE — 2500000004 HC RX 250 GENERAL PHARMACY W/ HCPCS (ALT 636 FOR OP/ED): Mod: JZ | Performed by: STUDENT IN AN ORGANIZED HEALTH CARE EDUCATION/TRAINING PROGRAM

## 2025-05-03 PROCEDURE — 2500000005 HC RX 250 GENERAL PHARMACY W/O HCPCS: Performed by: STUDENT IN AN ORGANIZED HEALTH CARE EDUCATION/TRAINING PROGRAM

## 2025-05-03 PROCEDURE — 36415 COLL VENOUS BLD VENIPUNCTURE: CPT | Performed by: STUDENT IN AN ORGANIZED HEALTH CARE EDUCATION/TRAINING PROGRAM

## 2025-05-03 PROCEDURE — 94640 AIRWAY INHALATION TREATMENT: CPT

## 2025-05-03 PROCEDURE — 85027 COMPLETE CBC AUTOMATED: CPT | Performed by: STUDENT IN AN ORGANIZED HEALTH CARE EDUCATION/TRAINING PROGRAM

## 2025-05-03 PROCEDURE — 80048 BASIC METABOLIC PNL TOTAL CA: CPT | Performed by: STUDENT IN AN ORGANIZED HEALTH CARE EDUCATION/TRAINING PROGRAM

## 2025-05-03 PROCEDURE — 2500000002 HC RX 250 W HCPCS SELF ADMINISTERED DRUGS (ALT 637 FOR MEDICARE OP, ALT 636 FOR OP/ED): Performed by: STUDENT IN AN ORGANIZED HEALTH CARE EDUCATION/TRAINING PROGRAM

## 2025-05-03 PROCEDURE — 2500000004 HC RX 250 GENERAL PHARMACY W/ HCPCS (ALT 636 FOR OP/ED): Performed by: STUDENT IN AN ORGANIZED HEALTH CARE EDUCATION/TRAINING PROGRAM

## 2025-05-03 PROCEDURE — 2500000001 HC RX 250 WO HCPCS SELF ADMINISTERED DRUGS (ALT 637 FOR MEDICARE OP)

## 2025-05-03 PROCEDURE — 2500000001 HC RX 250 WO HCPCS SELF ADMINISTERED DRUGS (ALT 637 FOR MEDICARE OP): Performed by: STUDENT IN AN ORGANIZED HEALTH CARE EDUCATION/TRAINING PROGRAM

## 2025-05-03 PROCEDURE — 99233 SBSQ HOSP IP/OBS HIGH 50: CPT | Performed by: STUDENT IN AN ORGANIZED HEALTH CARE EDUCATION/TRAINING PROGRAM

## 2025-05-03 RX ORDER — BUDESONIDE 0.25 MG/2ML
0.25 INHALANT ORAL
Status: DISPENSED | OUTPATIENT
Start: 2025-05-03

## 2025-05-03 RX ORDER — IPRATROPIUM BROMIDE AND ALBUTEROL SULFATE 2.5; .5 MG/3ML; MG/3ML
3 SOLUTION RESPIRATORY (INHALATION) 3 TIMES DAILY
Status: DISPENSED | OUTPATIENT
Start: 2025-05-03

## 2025-05-03 RX ORDER — IPRATROPIUM BROMIDE AND ALBUTEROL SULFATE 2.5; .5 MG/3ML; MG/3ML
3 SOLUTION RESPIRATORY (INHALATION) EVERY 2 HOUR PRN
Status: ACTIVE | OUTPATIENT
Start: 2025-05-03

## 2025-05-03 RX ORDER — HYDROXYZINE HYDROCHLORIDE 25 MG/1
25 TABLET, FILM COATED ORAL ONCE
Status: COMPLETED | OUTPATIENT
Start: 2025-05-03 | End: 2025-05-03

## 2025-05-03 RX ADMIN — Medication 4 L/MIN: at 19:46

## 2025-05-03 RX ADMIN — Medication 4 L/MIN: at 08:00

## 2025-05-03 RX ADMIN — OXYCODONE HYDROCHLORIDE 10 MG: 10 TABLET ORAL at 18:27

## 2025-05-03 RX ADMIN — MORPHINE SULFATE 30 MG: 30 TABLET, EXTENDED RELEASE ORAL at 12:49

## 2025-05-03 RX ADMIN — MORPHINE SULFATE 30 MG: 30 TABLET, EXTENDED RELEASE ORAL at 07:35

## 2025-05-03 RX ADMIN — CEFTRIAXONE SODIUM 2 G: 2 INJECTION, SOLUTION INTRAVENOUS at 07:35

## 2025-05-03 RX ADMIN — ACETAMINOPHEN 650 MG: 650 SUSPENSION ORAL at 18:30

## 2025-05-03 RX ADMIN — BUDESONIDE 0.25 MG: 0.25 INHALANT RESPIRATORY (INHALATION) at 08:00

## 2025-05-03 RX ADMIN — IPRATROPIUM BROMIDE AND ALBUTEROL SULFATE 3 ML: 2.5; .5 SOLUTION RESPIRATORY (INHALATION) at 11:22

## 2025-05-03 RX ADMIN — MORPHINE SULFATE 60 MG: 30 TABLET, EXTENDED RELEASE ORAL at 21:43

## 2025-05-03 RX ADMIN — IPRATROPIUM BROMIDE AND ALBUTEROL SULFATE 3 ML: 2.5; .5 SOLUTION RESPIRATORY (INHALATION) at 19:45

## 2025-05-03 RX ADMIN — ENOXAPARIN SODIUM 40 MG: 40 INJECTION SUBCUTANEOUS at 10:11

## 2025-05-03 RX ADMIN — HYDROXYZINE HYDROCHLORIDE 25 MG: 25 TABLET, FILM COATED ORAL at 00:32

## 2025-05-03 RX ADMIN — MIRTAZAPINE 15 MG: 15 TABLET, FILM COATED ORAL at 21:43

## 2025-05-03 RX ADMIN — FOLIC ACID 1000 MCG: 1 TABLET ORAL at 10:11

## 2025-05-03 RX ADMIN — GABAPENTIN 300 MG: 300 CAPSULE ORAL at 10:11

## 2025-05-03 RX ADMIN — GABAPENTIN 300 MG: 300 CAPSULE ORAL at 21:43

## 2025-05-03 RX ADMIN — PREDNISONE 40 MG: 20 TABLET ORAL at 10:11

## 2025-05-03 RX ADMIN — IPRATROPIUM BROMIDE AND ALBUTEROL SULFATE 3 ML: 2.5; .5 SOLUTION RESPIRATORY (INHALATION) at 08:00

## 2025-05-03 RX ADMIN — GABAPENTIN 300 MG: 300 CAPSULE ORAL at 15:55

## 2025-05-03 RX ADMIN — BUDESONIDE 0.25 MG: 0.25 INHALANT RESPIRATORY (INHALATION) at 19:45

## 2025-05-03 RX ADMIN — PANTOPRAZOLE SODIUM 40 MG: 40 TABLET, DELAYED RELEASE ORAL at 07:35

## 2025-05-03 RX ADMIN — AZITHROMYCIN DIHYDRATE 500 MG: 500 TABLET ORAL at 10:11

## 2025-05-03 ASSESSMENT — PAIN DESCRIPTION - LOCATION
LOCATION: SHOULDER
LOCATION: CHEST

## 2025-05-03 ASSESSMENT — COGNITIVE AND FUNCTIONAL STATUS - GENERAL
TURNING FROM BACK TO SIDE WHILE IN FLAT BAD: A LITTLE
STANDING UP FROM CHAIR USING ARMS: A LITTLE
TOILETING: A LITTLE
DRESSING REGULAR LOWER BODY CLOTHING: A LITTLE
STANDING UP FROM CHAIR USING ARMS: A LITTLE
PERSONAL GROOMING: A LITTLE
TURNING FROM BACK TO SIDE WHILE IN FLAT BAD: A LITTLE
MOVING FROM LYING ON BACK TO SITTING ON SIDE OF FLAT BED WITH BEDRAILS: A LITTLE
CLIMB 3 TO 5 STEPS WITH RAILING: A LITTLE
MOBILITY SCORE: 18
WALKING IN HOSPITAL ROOM: A LITTLE
DRESSING REGULAR UPPER BODY CLOTHING: A LITTLE
DRESSING REGULAR UPPER BODY CLOTHING: A LITTLE
CLIMB 3 TO 5 STEPS WITH RAILING: A LITTLE
PERSONAL GROOMING: A LITTLE
MOVING TO AND FROM BED TO CHAIR: A LITTLE
MOBILITY SCORE: 18
DAILY ACTIVITIY SCORE: 18
WALKING IN HOSPITAL ROOM: A LITTLE
MOVING TO AND FROM BED TO CHAIR: A LITTLE
EATING MEALS: A LITTLE
EATING MEALS: A LITTLE
DRESSING REGULAR LOWER BODY CLOTHING: A LITTLE
HELP NEEDED FOR BATHING: A LITTLE
HELP NEEDED FOR BATHING: A LITTLE
TOILETING: A LITTLE
DAILY ACTIVITIY SCORE: 18
MOVING FROM LYING ON BACK TO SITTING ON SIDE OF FLAT BED WITH BEDRAILS: A LITTLE

## 2025-05-03 ASSESSMENT — PAIN SCALES - GENERAL
PAINLEVEL_OUTOF10: 7
PAINLEVEL_OUTOF10: 8
PAINLEVEL_OUTOF10: 8

## 2025-05-03 ASSESSMENT — PAIN - FUNCTIONAL ASSESSMENT
PAIN_FUNCTIONAL_ASSESSMENT: 0-10

## 2025-05-03 NOTE — PROGRESS NOTES
Patient Name: Jovon Mccartney   YOB: 1975    Subjective:  Had a rough night last night. Oxygen had to be bumped up to 5L. Was on 4L during my exam. Was able to wean to 3L by monitoring patient's pulse ox on his own machine.      Objective:    Vitals:    05/02/25 2039 05/03/25 0000 05/03/25 0800 05/03/25 1123   BP:  114/68 141/74    BP Location:  Right arm     Patient Position:  Sitting     Pulse:  106 (!) 118    Resp:  22 20    Temp:  36.8 °C (98.2 °F) 36.8 °C (98.2 °F)    TempSrc:  Temporal Temporal    SpO2: 95% 92% 94% 90%   Weight:       Height:           Physical Exam:    GEN: Awake and alert. Frail and cachectic  HEENT: Normocephalic and atraumatic.  Mucous membranes moist.  Clear sclera, PERRL, EOMI.  CARDIO: Regular rate and rhythm.  No murmurs, rubs, or gallops. No LE edema  RESP:  Diminished over R lung field   ABD: +BS x4, soft, non-tender. Not distended. No rebound or guarding.  MSK: Grossly normal inspection.  NEURO: A&O X 3. CN II-XII are grossly intact. No focal deficits.   SKIN: Warm and dry, no lesions, no rashes.  PSYCH: Appropriate mood and affect, no hallucinations.     Scheduled medications  Scheduled Medications[1]  Continuous medications  Continuous Medications[2]  PRN medications  PRN Medications[3]     Assessment and Plan:  Jovon Mccartney is a 50 y.o. male   Assessment & Plan      Acute on chronic hypoxic respiratory failure  CAP  -Baseline 2-3L O2, currently requiring 4L O2 NC  -CTA chest interval development of patchy consolidation in the left upper lobe compatible with pneumonia. Again showed unchanged moderate-sized loculated right pleural effusion and consolidation in the basilar right lower lobe in the middle lobe consistent with postobstructive atelectasis.  -Continue Rocephin, Azithromycin stopped 5/3  -Continue duonebs and pulmicort  -Continue prednisone 40mg daily  -strep pneumo and Legionella antigen  negative  -Procalcitonin 0.2    NSCLC Adenocarcinoma  -With mets to multiple bony sites  -Continue outpatient follow-up with oncology  -Currently on Carboplatin (AUC 5), Pemetrexed (500mg/m2), & Pembrolizumab 10/28/24  -Continue home MS Contin and oxycodone for pain control    Rheumatoid Arthritis   -Reports his rheumatologist recently started prednisone 10mg daily  -Continue with increased dose of prednisone 40mg as inpatient     Hyponatremia   -Chronic history, stable     DVT ppx: Lovenox  Code status: Full      I spent  minutes in the professional and overall care of this patient. More than 50% of this time was spent examining and counseling patient, reviewing plan of care, and coordinating medical care.    Dileep Arboleda DO  Attending Physician  [facility]               [1] azithromycin, 500 mg, oral, q24h BALTAZAR  budesonide, 0.25 mg, nebulization, BID   And  ipratropium-albuteroL, 3 mL, nebulization, TID  cefTRIAXone, 2 g, intravenous, q24h  enoxaparin, 40 mg, subcutaneous, q24h BALTAZAR  folic acid, 1,000 mcg, oral, Daily  gabapentin, 300 mg, oral, TID  mirtazapine, 15 mg, oral, Nightly  morphine CR, 30 mg, oral, 2 times per day   And  morphine CR, 60 mg, oral, Nightly  pantoprazole, 40 mg, oral, Daily before breakfast  polyethylene glycol, 17 g, oral, Daily  predniSONE, 40 mg, oral, Daily  sennosides, 1 tablet, oral, Nightly  [2]    [3] PRN medications: acetaminophen **OR** acetaminophen **OR** acetaminophen, acetaminophen **OR** acetaminophen **OR** acetaminophen, docusate sodium, ipratropium-albuteroL, oxyCODONE, oxygen, pilocarpine

## 2025-05-03 NOTE — CARE PLAN
The patient's goals for the shift include Sleep    The clinical goals for the shift include see poc

## 2025-05-03 NOTE — CARE PLAN
The patient's goals for the shift include Sleep    The clinical goals for the shift include see poc      Problem: Pain - Adult  Goal: Verbalizes/displays adequate comfort level or baseline comfort level  Outcome: Progressing     Problem: Discharge Planning  Goal: Discharge to home or other facility with appropriate resources  Outcome: Progressing

## 2025-05-03 NOTE — NURSING NOTE
5/2/2025 2035- Medicated with PO morphine as ordered for pain.    2243- Medicated with oxycodone as ordered for pain.     5/3/2025    0005- Patient reports unable to sleep. Resident notified.     0020- He was hypoxic 82% on 3L, tachycardic , and anxious. Increased oxygen from 3 to 5L. Heart rate now 104. Resident notified.     0032- Atarax given as ordered for anxiety.     0046- Heat applied to arthritic joints.

## 2025-05-03 NOTE — NURSING NOTE
End of shift noite, no acute changes this shift. Patient medicated for chest discomfort lung pain with mscontin and po oxycodone and tylenol this shift. Patient remains on iv antibiotics and po zithromax.  Family at bedside and updated on plan of care

## 2025-05-04 ENCOUNTER — APPOINTMENT (OUTPATIENT)
Dept: RADIOLOGY | Facility: HOSPITAL | Age: 50
DRG: 871 | End: 2025-05-04
Payer: COMMERCIAL

## 2025-05-04 VITALS
DIASTOLIC BLOOD PRESSURE: 72 MMHG | RESPIRATION RATE: 22 BRPM | WEIGHT: 100 LBS | SYSTOLIC BLOOD PRESSURE: 113 MMHG | HEIGHT: 66 IN | BODY MASS INDEX: 16.07 KG/M2 | TEMPERATURE: 98.2 F | OXYGEN SATURATION: 93 % | HEART RATE: 100 BPM

## 2025-05-04 LAB
ANION GAP SERPL CALC-SCNC: 13 MMOL/L (ref 10–20)
BACTERIA BLD CULT: NORMAL
BACTERIA BLD CULT: NORMAL
BUN SERPL-MCNC: 13 MG/DL (ref 6–23)
CALCIUM SERPL-MCNC: 9 MG/DL (ref 8.6–10.3)
CHLORIDE SERPL-SCNC: 94 MMOL/L (ref 98–107)
CO2 SERPL-SCNC: 28 MMOL/L (ref 21–32)
CREAT SERPL-MCNC: 0.43 MG/DL (ref 0.5–1.3)
EGFRCR SERPLBLD CKD-EPI 2021: >90 ML/MIN/1.73M*2
ERYTHROCYTE [DISTWIDTH] IN BLOOD BY AUTOMATED COUNT: 16.6 % (ref 11.5–14.5)
GLUCOSE SERPL-MCNC: 134 MG/DL (ref 74–99)
HCT VFR BLD AUTO: 25.3 % (ref 41–52)
HGB BLD-MCNC: 7.9 G/DL (ref 13.5–17.5)
MCH RBC QN AUTO: 26.8 PG (ref 26–34)
MCHC RBC AUTO-ENTMCNC: 31.2 G/DL (ref 32–36)
MCV RBC AUTO: 86 FL (ref 80–100)
NRBC BLD-RTO: 0 /100 WBCS (ref 0–0)
PLATELET # BLD AUTO: 299 X10*3/UL (ref 150–450)
POTASSIUM SERPL-SCNC: 3.6 MMOL/L (ref 3.5–5.3)
RBC # BLD AUTO: 2.95 X10*6/UL (ref 4.5–5.9)
SODIUM SERPL-SCNC: 131 MMOL/L (ref 136–145)
WBC # BLD AUTO: 8.7 X10*3/UL (ref 4.4–11.3)

## 2025-05-04 PROCEDURE — 71045 X-RAY EXAM CHEST 1 VIEW: CPT | Performed by: RADIOLOGY

## 2025-05-04 PROCEDURE — 2500000002 HC RX 250 W HCPCS SELF ADMINISTERED DRUGS (ALT 637 FOR MEDICARE OP, ALT 636 FOR OP/ED): Performed by: STUDENT IN AN ORGANIZED HEALTH CARE EDUCATION/TRAINING PROGRAM

## 2025-05-04 PROCEDURE — 85027 COMPLETE CBC AUTOMATED: CPT | Performed by: STUDENT IN AN ORGANIZED HEALTH CARE EDUCATION/TRAINING PROGRAM

## 2025-05-04 PROCEDURE — 1200000002 HC GENERAL ROOM WITH TELEMETRY DAILY

## 2025-05-04 PROCEDURE — 71045 X-RAY EXAM CHEST 1 VIEW: CPT

## 2025-05-04 PROCEDURE — 2500000004 HC RX 250 GENERAL PHARMACY W/ HCPCS (ALT 636 FOR OP/ED)

## 2025-05-04 PROCEDURE — 2500000004 HC RX 250 GENERAL PHARMACY W/ HCPCS (ALT 636 FOR OP/ED): Mod: JZ | Performed by: STUDENT IN AN ORGANIZED HEALTH CARE EDUCATION/TRAINING PROGRAM

## 2025-05-04 PROCEDURE — 36415 COLL VENOUS BLD VENIPUNCTURE: CPT | Performed by: STUDENT IN AN ORGANIZED HEALTH CARE EDUCATION/TRAINING PROGRAM

## 2025-05-04 PROCEDURE — 2500000001 HC RX 250 WO HCPCS SELF ADMINISTERED DRUGS (ALT 637 FOR MEDICARE OP): Performed by: STUDENT IN AN ORGANIZED HEALTH CARE EDUCATION/TRAINING PROGRAM

## 2025-05-04 PROCEDURE — 94640 AIRWAY INHALATION TREATMENT: CPT

## 2025-05-04 PROCEDURE — 80048 BASIC METABOLIC PNL TOTAL CA: CPT | Performed by: STUDENT IN AN ORGANIZED HEALTH CARE EDUCATION/TRAINING PROGRAM

## 2025-05-04 PROCEDURE — 2500000004 HC RX 250 GENERAL PHARMACY W/ HCPCS (ALT 636 FOR OP/ED): Performed by: STUDENT IN AN ORGANIZED HEALTH CARE EDUCATION/TRAINING PROGRAM

## 2025-05-04 PROCEDURE — 2500000005 HC RX 250 GENERAL PHARMACY W/O HCPCS: Performed by: STUDENT IN AN ORGANIZED HEALTH CARE EDUCATION/TRAINING PROGRAM

## 2025-05-04 PROCEDURE — 99233 SBSQ HOSP IP/OBS HIGH 50: CPT | Performed by: STUDENT IN AN ORGANIZED HEALTH CARE EDUCATION/TRAINING PROGRAM

## 2025-05-04 RX ORDER — FUROSEMIDE 10 MG/ML
20 INJECTION INTRAMUSCULAR; INTRAVENOUS ONCE
Status: COMPLETED | OUTPATIENT
Start: 2025-05-04 | End: 2025-05-04

## 2025-05-04 RX ADMIN — OXYCODONE HYDROCHLORIDE 10 MG: 10 TABLET ORAL at 02:25

## 2025-05-04 RX ADMIN — PREDNISONE 40 MG: 20 TABLET ORAL at 08:37

## 2025-05-04 RX ADMIN — CEFTRIAXONE SODIUM 2 G: 2 INJECTION, SOLUTION INTRAVENOUS at 08:38

## 2025-05-04 RX ADMIN — OXYCODONE HYDROCHLORIDE 10 MG: 10 TABLET ORAL at 12:03

## 2025-05-04 RX ADMIN — Medication 4 L/MIN: at 20:05

## 2025-05-04 RX ADMIN — Medication 4 L/MIN: at 20:09

## 2025-05-04 RX ADMIN — IPRATROPIUM BROMIDE AND ALBUTEROL SULFATE 3 ML: 2.5; .5 SOLUTION RESPIRATORY (INHALATION) at 13:55

## 2025-05-04 RX ADMIN — MORPHINE SULFATE 30 MG: 30 TABLET, EXTENDED RELEASE ORAL at 08:37

## 2025-05-04 RX ADMIN — GABAPENTIN 300 MG: 300 CAPSULE ORAL at 08:37

## 2025-05-04 RX ADMIN — HYDROMORPHONE HYDROCHLORIDE 0.4 MG: 1 INJECTION, SOLUTION INTRAMUSCULAR; INTRAVENOUS; SUBCUTANEOUS at 05:52

## 2025-05-04 RX ADMIN — FOLIC ACID 1000 MCG: 1 TABLET ORAL at 08:37

## 2025-05-04 RX ADMIN — BUDESONIDE 0.25 MG: 0.25 INHALANT RESPIRATORY (INHALATION) at 20:04

## 2025-05-04 RX ADMIN — ENOXAPARIN SODIUM 40 MG: 40 INJECTION SUBCUTANEOUS at 08:38

## 2025-05-04 RX ADMIN — MORPHINE SULFATE 30 MG: 30 TABLET, EXTENDED RELEASE ORAL at 13:38

## 2025-05-04 RX ADMIN — BUDESONIDE 0.25 MG: 0.25 INHALANT RESPIRATORY (INHALATION) at 08:09

## 2025-05-04 RX ADMIN — GABAPENTIN 300 MG: 300 CAPSULE ORAL at 15:19

## 2025-05-04 RX ADMIN — IPRATROPIUM BROMIDE AND ALBUTEROL SULFATE 3 ML: 2.5; .5 SOLUTION RESPIRATORY (INHALATION) at 20:04

## 2025-05-04 RX ADMIN — IPRATROPIUM BROMIDE AND ALBUTEROL SULFATE 3 ML: 2.5; .5 SOLUTION RESPIRATORY (INHALATION) at 08:08

## 2025-05-04 RX ADMIN — FUROSEMIDE 20 MG: 10 INJECTION, SOLUTION INTRAMUSCULAR; INTRAVENOUS at 13:39

## 2025-05-04 RX ADMIN — GABAPENTIN 300 MG: 300 CAPSULE ORAL at 20:48

## 2025-05-04 RX ADMIN — MORPHINE SULFATE 60 MG: 30 TABLET, EXTENDED RELEASE ORAL at 20:48

## 2025-05-04 RX ADMIN — MIRTAZAPINE 15 MG: 15 TABLET, FILM COATED ORAL at 20:48

## 2025-05-04 RX ADMIN — PANTOPRAZOLE SODIUM 40 MG: 40 TABLET, DELAYED RELEASE ORAL at 05:52

## 2025-05-04 ASSESSMENT — COGNITIVE AND FUNCTIONAL STATUS - GENERAL
MOVING FROM LYING ON BACK TO SITTING ON SIDE OF FLAT BED WITH BEDRAILS: A LITTLE
EATING MEALS: A LITTLE
TURNING FROM BACK TO SIDE WHILE IN FLAT BAD: A LITTLE
MOVING FROM LYING ON BACK TO SITTING ON SIDE OF FLAT BED WITH BEDRAILS: A LITTLE
WALKING IN HOSPITAL ROOM: A LITTLE
PERSONAL GROOMING: A LITTLE
DRESSING REGULAR UPPER BODY CLOTHING: A LITTLE
HELP NEEDED FOR BATHING: A LITTLE
DRESSING REGULAR LOWER BODY CLOTHING: A LITTLE
HELP NEEDED FOR BATHING: A LITTLE
STANDING UP FROM CHAIR USING ARMS: A LITTLE
MOBILITY SCORE: 18
TURNING FROM BACK TO SIDE WHILE IN FLAT BAD: A LITTLE
TOILETING: A LITTLE
DAILY ACTIVITIY SCORE: 18
MOVING TO AND FROM BED TO CHAIR: A LITTLE
TOILETING: A LITTLE
STANDING UP FROM CHAIR USING ARMS: A LITTLE
WALKING IN HOSPITAL ROOM: A LITTLE
PERSONAL GROOMING: A LITTLE
EATING MEALS: A LITTLE
MOVING TO AND FROM BED TO CHAIR: A LITTLE
DRESSING REGULAR LOWER BODY CLOTHING: A LITTLE
CLIMB 3 TO 5 STEPS WITH RAILING: A LITTLE
CLIMB 3 TO 5 STEPS WITH RAILING: A LITTLE
MOBILITY SCORE: 18
DAILY ACTIVITIY SCORE: 18
DRESSING REGULAR UPPER BODY CLOTHING: A LITTLE

## 2025-05-04 ASSESSMENT — PAIN SCALES - GENERAL
PAINLEVEL_OUTOF10: 8
PAINLEVEL_OUTOF10: 5 - MODERATE PAIN
PAINLEVEL_OUTOF10: 7

## 2025-05-04 ASSESSMENT — PAIN - FUNCTIONAL ASSESSMENT
PAIN_FUNCTIONAL_ASSESSMENT: 0-10

## 2025-05-04 ASSESSMENT — PAIN DESCRIPTION - DESCRIPTORS
DESCRIPTORS: SHARP
DESCRIPTORS: SHARP

## 2025-05-04 ASSESSMENT — PAIN DESCRIPTION - LOCATION
LOCATION: SHOULDER
LOCATION: ARM
LOCATION: SHOULDER
LOCATION: SHOULDER

## 2025-05-04 NOTE — NURSING NOTE
Patient is alert and oriented. Family has remained at bedside throughout the shift. Patient has endorsed pain throughout the day, and continues to get pain medication. Hourly rounding performed. Patient is resting. Bed alarm is on and audible. Patients belongings within reach. Will continue to monitor.

## 2025-05-04 NOTE — CARE PLAN
The patient's goals for the shift include Sleep    The clinical goals for the shift include See POC      Problem: Pain - Adult  Goal: Verbalizes/displays adequate comfort level or baseline comfort level  Outcome: Progressing     Problem: Discharge Planning  Goal: Discharge to home or other facility with appropriate resources  Outcome: Progressing     Problem: Chronic Conditions and Co-morbidities  Goal: Patient's chronic conditions and co-morbidity symptoms are monitored and maintained or improved  Outcome: Progressing     Problem: Nutrition  Goal: Nutrient intake appropriate for maintaining nutritional needs  Outcome: Progressing     Problem: Pain  Goal: Takes deep breaths with improved pain control throughout the shift  Outcome: Progressing  Goal: Turns in bed with improved pain control throughout the shift  Outcome: Progressing  Goal: Walks with improved pain control throughout the shift  Outcome: Progressing  Goal: Performs ADL's with improved pain control throughout shift  Outcome: Progressing  Goal: Participates in PT with improved pain control throughout the shift  Outcome: Progressing     Problem: Skin  Goal: Decreased wound size/increased tissue granulation at next dressing change  Outcome: Progressing  Goal: Participates in plan/prevention/treatment measures  Outcome: Progressing  Goal: Prevent/manage excess moisture  Outcome: Progressing  Goal: Prevent/minimize sheer/friction injuries  Outcome: Progressing  Goal: Promote/optimize nutrition  Outcome: Progressing  Flowsheets (Taken 5/3/2025 2306)  Promote/optimize nutrition: Consume > 50% meals/supplements  Goal: Promote skin healing  Outcome: Progressing     Problem: Fall/Injury  Goal: Verbalize understanding of personal risk factors for fall in the hospital  Outcome: Progressing  Goal: Verbalize understanding of risk factor reduction measures to prevent injury from fall in the home  Outcome: Progressing  Goal: Use assistive devices by end of the  shift  Outcome: Progressing  Goal: Pace activities to prevent fatigue by end of the shift  Outcome: Progressing

## 2025-05-04 NOTE — PROGRESS NOTES
Patient Name: Jovon Mccartney   YOB: 1975    Subjective:  Continues to feel short of breath. On 4L oxygen at rest, but desaturates quickly with limited movement. Will give small dose of lasix to see if that helps. Required dose of IV dilaudid overnight.      Objective:    Vitals:    05/04/25 0028 05/04/25 0800 05/04/25 0809 05/04/25 1355   BP: 115/81 120/75     BP Location: Right arm      Patient Position: Lying      Pulse: 102 (!) 120     Resp: 24 16     Temp: 37 °C (98.6 °F) 37.6 °C (99.7 °F)     TempSrc: Temporal Temporal     SpO2: 95% 91% 92% 91%   Weight:       Height:           Physical Exam:    GEN: Awake and alert. Frail and cachectic  HEENT: Normocephalic and atraumatic.  Mucous membranes moist.  Clear sclera, PERRL, EOMI.  CARDIO: Regular rate and rhythm.  No murmurs, rubs, or gallops. No LE edema  RESP:  Diminished over R lung field   ABD: +BS x4, soft, non-tender. Not distended. No rebound or guarding.  MSK: Grossly normal inspection.  NEURO: A&O X 3. CN II-XII are grossly intact. No focal deficits.   SKIN: Warm and dry, no lesions, no rashes.  PSYCH: Appropriate mood and affect, no hallucinations.     Scheduled medications  Scheduled Medications[1]  Continuous medications  Continuous Medications[2]  PRN medications  PRN Medications[3]     Assessment and Plan:  Jovon Mccartney is a 50 y.o. male   Assessment & Plan      Acute on chronic hypoxic respiratory failure  CAP  -Baseline 2-3L O2, currently requiring 4L O2 NC  -CTA chest interval development of patchy consolidation in the left upper lobe compatible with pneumonia. Again showed unchanged moderate-sized loculated right pleural effusion and consolidation in the basilar right lower lobe in the middle lobe consistent with postobstructive atelectasis.  -Continue Rocephin, Azithromycin stopped 5/3  -Continue duonebs and pulmicort  -Continue prednisone 40mg daily  -strep pneumo and Legionella  antigen negative  -Procalcitonin 0.2  -Dose of IV lasix 5/4    NSCLC Adenocarcinoma  -With mets to multiple bony sites  -Continue outpatient follow-up with oncology  -Currently on Carboplatin (AUC 5), Pemetrexed (500mg/m2), & Pembrolizumab 10/28/24  -Continue home MS Contin and oxycodone for pain control    Rheumatoid Arthritis   -Reports his rheumatologist recently started prednisone 10mg daily  -Continue with increased dose of prednisone 40mg as inpatient     Hyponatremia   -Chronic history, stable     DVT ppx: Lovenox  Code status: Full      I spent  minutes in the professional and overall care of this patient. More than 50% of this time was spent examining and counseling patient, reviewing plan of care, and coordinating medical care.    Dileep Arboleda DO  Attending Physician  [facility]               [1] budesonide, 0.25 mg, nebulization, BID   And  ipratropium-albuteroL, 3 mL, nebulization, TID  cefTRIAXone, 2 g, intravenous, q24h  enoxaparin, 40 mg, subcutaneous, q24h BALTAZAR  folic acid, 1,000 mcg, oral, Daily  gabapentin, 300 mg, oral, TID  mirtazapine, 15 mg, oral, Nightly  morphine CR, 30 mg, oral, 2 times per day   And  morphine CR, 60 mg, oral, Nightly  pantoprazole, 40 mg, oral, Daily before breakfast  polyethylene glycol, 17 g, oral, Daily  predniSONE, 40 mg, oral, Daily  sennosides, 1 tablet, oral, Nightly     [2]    [3] PRN medications: acetaminophen **OR** acetaminophen **OR** acetaminophen, acetaminophen **OR** acetaminophen **OR** acetaminophen, docusate sodium, ipratropium-albuteroL, oxyCODONE, oxygen, pilocarpine

## 2025-05-04 NOTE — NURSING NOTE
Pt given PRN pain medication and 1x dilaudid for chronic pain throughout night. Pt tolerated all care given. Pt safety maintained at all times.

## 2025-05-04 NOTE — CARE PLAN
The patient's goals for the shift include Sleep    The clinical goals for the shift include See POC    Over the shift, the patient did not make progress toward the following goals. Barriers to progression include weakness. Recommendations to address these barriers include see care plan.

## 2025-05-05 ENCOUNTER — APPOINTMENT (OUTPATIENT)
Dept: HEMATOLOGY/ONCOLOGY | Facility: CLINIC | Age: 50
End: 2025-05-05
Payer: COMMERCIAL

## 2025-05-05 DIAGNOSIS — C34.90 NSCLC METASTATIC TO BONE (MULTI): ICD-10-CM

## 2025-05-05 DIAGNOSIS — C79.51 NSCLC METASTATIC TO BONE (MULTI): ICD-10-CM

## 2025-05-05 LAB
ANION GAP SERPL CALC-SCNC: 14 MMOL/L (ref 10–20)
BUN SERPL-MCNC: 12 MG/DL (ref 6–23)
CALCIUM SERPL-MCNC: 9.3 MG/DL (ref 8.6–10.3)
CHLORIDE SERPL-SCNC: 90 MMOL/L (ref 98–107)
CO2 SERPL-SCNC: 30 MMOL/L (ref 21–32)
CREAT SERPL-MCNC: 0.44 MG/DL (ref 0.5–1.3)
EGFRCR SERPLBLD CKD-EPI 2021: >90 ML/MIN/1.73M*2
ERYTHROCYTE [DISTWIDTH] IN BLOOD BY AUTOMATED COUNT: 16.8 % (ref 11.5–14.5)
GLUCOSE SERPL-MCNC: 108 MG/DL (ref 74–99)
HCT VFR BLD AUTO: 26.2 % (ref 41–52)
HGB BLD-MCNC: 8.2 G/DL (ref 13.5–17.5)
MCH RBC QN AUTO: 26.7 PG (ref 26–34)
MCHC RBC AUTO-ENTMCNC: 31.3 G/DL (ref 32–36)
MCV RBC AUTO: 85 FL (ref 80–100)
NRBC BLD-RTO: 0 /100 WBCS (ref 0–0)
PLATELET # BLD AUTO: 289 X10*3/UL (ref 150–450)
POTASSIUM SERPL-SCNC: 3.8 MMOL/L (ref 3.5–5.3)
RBC # BLD AUTO: 3.07 X10*6/UL (ref 4.5–5.9)
SODIUM SERPL-SCNC: 130 MMOL/L (ref 136–145)
WBC # BLD AUTO: 8.8 X10*3/UL (ref 4.4–11.3)

## 2025-05-05 PROCEDURE — 2500000002 HC RX 250 W HCPCS SELF ADMINISTERED DRUGS (ALT 637 FOR MEDICARE OP, ALT 636 FOR OP/ED): Performed by: STUDENT IN AN ORGANIZED HEALTH CARE EDUCATION/TRAINING PROGRAM

## 2025-05-05 PROCEDURE — 1200000002 HC GENERAL ROOM WITH TELEMETRY DAILY

## 2025-05-05 PROCEDURE — 80048 BASIC METABOLIC PNL TOTAL CA: CPT

## 2025-05-05 PROCEDURE — 36415 COLL VENOUS BLD VENIPUNCTURE: CPT

## 2025-05-05 PROCEDURE — 2500000005 HC RX 250 GENERAL PHARMACY W/O HCPCS: Performed by: STUDENT IN AN ORGANIZED HEALTH CARE EDUCATION/TRAINING PROGRAM

## 2025-05-05 PROCEDURE — 2500000001 HC RX 250 WO HCPCS SELF ADMINISTERED DRUGS (ALT 637 FOR MEDICARE OP): Performed by: STUDENT IN AN ORGANIZED HEALTH CARE EDUCATION/TRAINING PROGRAM

## 2025-05-05 PROCEDURE — 94640 AIRWAY INHALATION TREATMENT: CPT

## 2025-05-05 PROCEDURE — 85027 COMPLETE CBC AUTOMATED: CPT

## 2025-05-05 PROCEDURE — 2500000004 HC RX 250 GENERAL PHARMACY W/ HCPCS (ALT 636 FOR OP/ED): Mod: JZ | Performed by: STUDENT IN AN ORGANIZED HEALTH CARE EDUCATION/TRAINING PROGRAM

## 2025-05-05 PROCEDURE — 99233 SBSQ HOSP IP/OBS HIGH 50: CPT | Performed by: STUDENT IN AN ORGANIZED HEALTH CARE EDUCATION/TRAINING PROGRAM

## 2025-05-05 PROCEDURE — 99222 1ST HOSP IP/OBS MODERATE 55: CPT

## 2025-05-05 RX ORDER — FUROSEMIDE 10 MG/ML
20 INJECTION INTRAMUSCULAR; INTRAVENOUS ONCE
Status: COMPLETED | OUTPATIENT
Start: 2025-05-05 | End: 2025-05-05

## 2025-05-05 RX ADMIN — OXYCODONE HYDROCHLORIDE 10 MG: 10 TABLET ORAL at 20:22

## 2025-05-05 RX ADMIN — BUDESONIDE 0.25 MG: 0.25 INHALANT RESPIRATORY (INHALATION) at 08:41

## 2025-05-05 RX ADMIN — GABAPENTIN 300 MG: 300 CAPSULE ORAL at 20:22

## 2025-05-05 RX ADMIN — ENOXAPARIN SODIUM 40 MG: 40 INJECTION SUBCUTANEOUS at 08:29

## 2025-05-05 RX ADMIN — BUDESONIDE 0.25 MG: 0.25 INHALANT RESPIRATORY (INHALATION) at 20:28

## 2025-05-05 RX ADMIN — CEFTRIAXONE SODIUM 2 G: 2 INJECTION, SOLUTION INTRAVENOUS at 08:29

## 2025-05-05 RX ADMIN — MORPHINE SULFATE 60 MG: 30 TABLET, EXTENDED RELEASE ORAL at 21:33

## 2025-05-05 RX ADMIN — Medication 4 L/MIN: at 20:27

## 2025-05-05 RX ADMIN — MIRTAZAPINE 15 MG: 15 TABLET, FILM COATED ORAL at 20:22

## 2025-05-05 RX ADMIN — MORPHINE SULFATE 30 MG: 30 TABLET, EXTENDED RELEASE ORAL at 08:29

## 2025-05-05 RX ADMIN — IPRATROPIUM BROMIDE AND ALBUTEROL SULFATE 3 ML: 2.5; .5 SOLUTION RESPIRATORY (INHALATION) at 13:01

## 2025-05-05 RX ADMIN — OXYCODONE HYDROCHLORIDE 10 MG: 10 TABLET ORAL at 02:34

## 2025-05-05 RX ADMIN — GABAPENTIN 300 MG: 300 CAPSULE ORAL at 08:29

## 2025-05-05 RX ADMIN — PREDNISONE 40 MG: 20 TABLET ORAL at 08:28

## 2025-05-05 RX ADMIN — SENNOSIDES 8.6 MG: 8.6 TABLET, FILM COATED ORAL at 20:22

## 2025-05-05 RX ADMIN — FOLIC ACID 1000 MCG: 1 TABLET ORAL at 08:29

## 2025-05-05 RX ADMIN — ACETAMINOPHEN 650 MG: 650 SUSPENSION ORAL at 19:22

## 2025-05-05 RX ADMIN — GABAPENTIN 300 MG: 300 CAPSULE ORAL at 14:34

## 2025-05-05 RX ADMIN — MORPHINE SULFATE 30 MG: 30 TABLET, EXTENDED RELEASE ORAL at 12:18

## 2025-05-05 RX ADMIN — OXYCODONE HYDROCHLORIDE 10 MG: 10 TABLET ORAL at 16:31

## 2025-05-05 RX ADMIN — PANTOPRAZOLE SODIUM 40 MG: 40 TABLET, DELAYED RELEASE ORAL at 05:31

## 2025-05-05 RX ADMIN — IPRATROPIUM BROMIDE AND ALBUTEROL SULFATE 3 ML: 2.5; .5 SOLUTION RESPIRATORY (INHALATION) at 08:41

## 2025-05-05 RX ADMIN — FUROSEMIDE 20 MG: 10 INJECTION, SOLUTION INTRAMUSCULAR; INTRAVENOUS at 16:31

## 2025-05-05 RX ADMIN — IPRATROPIUM BROMIDE AND ALBUTEROL SULFATE 3 ML: 2.5; .5 SOLUTION RESPIRATORY (INHALATION) at 20:27

## 2025-05-05 ASSESSMENT — PAIN SCALES - GENERAL
PAINLEVEL_OUTOF10: 8
PAINLEVEL_OUTOF10: 7
PAINLEVEL_OUTOF10: 8
PAINLEVEL_OUTOF10: 6
PAINLEVEL_OUTOF10: 8
PAINLEVEL_OUTOF10: 5 - MODERATE PAIN

## 2025-05-05 ASSESSMENT — PAIN - FUNCTIONAL ASSESSMENT
PAIN_FUNCTIONAL_ASSESSMENT: 0-10

## 2025-05-05 ASSESSMENT — PAIN DESCRIPTION - LOCATION
LOCATION: SHOULDER

## 2025-05-05 ASSESSMENT — PAIN DESCRIPTION - DESCRIPTORS
DESCRIPTORS: ACHING;DISCOMFORT

## 2025-05-05 ASSESSMENT — COGNITIVE AND FUNCTIONAL STATUS - GENERAL
TOILETING: A LITTLE
STANDING UP FROM CHAIR USING ARMS: A LITTLE
MOVING TO AND FROM BED TO CHAIR: A LITTLE
MOBILITY SCORE: 18
DRESSING REGULAR LOWER BODY CLOTHING: A LITTLE
DAILY ACTIVITIY SCORE: 20
DRESSING REGULAR UPPER BODY CLOTHING: A LITTLE
HELP NEEDED FOR BATHING: A LITTLE
TURNING FROM BACK TO SIDE WHILE IN FLAT BAD: A LITTLE
DRESSING REGULAR LOWER BODY CLOTHING: A LITTLE
TOILETING: A LITTLE
HELP NEEDED FOR BATHING: A LITTLE
TURNING FROM BACK TO SIDE WHILE IN FLAT BAD: A LITTLE
MOBILITY SCORE: 18
MOVING FROM LYING ON BACK TO SITTING ON SIDE OF FLAT BED WITH BEDRAILS: A LITTLE
WALKING IN HOSPITAL ROOM: A LITTLE
WALKING IN HOSPITAL ROOM: A LITTLE
MOVING FROM LYING ON BACK TO SITTING ON SIDE OF FLAT BED WITH BEDRAILS: A LITTLE
DAILY ACTIVITIY SCORE: 20
CLIMB 3 TO 5 STEPS WITH RAILING: A LITTLE
CLIMB 3 TO 5 STEPS WITH RAILING: A LITTLE
MOVING TO AND FROM BED TO CHAIR: A LITTLE
STANDING UP FROM CHAIR USING ARMS: A LITTLE
DRESSING REGULAR UPPER BODY CLOTHING: A LITTLE

## 2025-05-05 ASSESSMENT — PAIN DESCRIPTION - ORIENTATION
ORIENTATION: RIGHT;LEFT

## 2025-05-05 NOTE — CARE PLAN
Problem: Pain - Adult  Goal: Verbalizes/displays adequate comfort level or baseline comfort level  Outcome: Progressing     Problem: Discharge Planning  Goal: Discharge to home or other facility with appropriate resources  Outcome: Progressing     Problem: Chronic Conditions and Co-morbidities  Goal: Patient's chronic conditions and co-morbidity symptoms are monitored and maintained or improved  Outcome: Progressing     Problem: Nutrition  Goal: Nutrient intake appropriate for maintaining nutritional needs  Outcome: Progressing     Problem: Pain  Goal: Takes deep breaths with improved pain control throughout the shift  Outcome: Progressing  Goal: Turns in bed with improved pain control throughout the shift  Outcome: Progressing  Goal: Walks with improved pain control throughout the shift  Outcome: Progressing  Goal: Performs ADL's with improved pain control throughout shift  Outcome: Progressing  Goal: Participates in PT with improved pain control throughout the shift  Outcome: Progressing     Problem: Skin  Goal: Decreased wound size/increased tissue granulation at next dressing change  5/5/2025 1730 by Ranjana Jeffers RN  Outcome: Progressing  5/5/2025 0752 by Ranjana Jeffers RN  Flowsheets (Taken 5/5/2025 0752)  Decreased wound size/increased tissue granulation at next dressing change: Protective dressings over bony prominences  Goal: Participates in plan/prevention/treatment measures  5/5/2025 1730 by Ranjana Jeffers RN  Outcome: Progressing  5/5/2025 0752 by Ranjana Jeffers RN  Flowsheets (Taken 5/5/2025 0752)  Participates in plan/prevention/treatment measures: Elevate heels  Goal: Prevent/manage excess moisture  5/5/2025 1730 by Ranjana Jeffers RN  Outcome: Progressing  5/5/2025 0752 by Ranjana Jeffers RN  Flowsheets (Taken 5/5/2025 0752)  Prevent/manage excess moisture: Cleanse incontinence/protect with barrier cream  Goal: Prevent/minimize sheer/friction injuries  5/5/2025 1730 by Ranjana MCCORMACK  MARIAH Jeffers  Outcome: Progressing  5/5/2025 0752 by Ranjana Jeffers RN  Flowsheets (Taken 5/5/2025 0752)  Prevent/minimize sheer/friction injuries: Turn/reposition every 2 hours/use positioning/transfer devices  Goal: Promote/optimize nutrition  Outcome: Progressing  Goal: Promote skin healing  Outcome: Progressing     Problem: Fall/Injury  Goal: Verbalize understanding of personal risk factors for fall in the hospital  Outcome: Progressing  Goal: Verbalize understanding of risk factor reduction measures to prevent injury from fall in the home  Outcome: Progressing  Goal: Use assistive devices by end of the shift  Outcome: Progressing  Goal: Pace activities to prevent fatigue by end of the shift  Outcome: Progressing         EOS- No acute changes throughout the shift. Patient pain managed with scheduled morphine and one dose of oxycodone. Patient had shortness of breath this morning at rest improved with breathing treatment. Patient remains on 4L. Company at bedside this morning/afternoon and wife at bedside this evening. IV antibiotics continued. Safety maintained and call light within reach.

## 2025-05-05 NOTE — NURSING NOTE
Pt had no acute changes in condition throughout night. Pt medicated for chronic pain throughout shift. Pt safety maintained at all times.

## 2025-05-05 NOTE — CARE PLAN
The patient's goals for the shift include Sleep    The clinical goals for the shift include See POC      Problem: Pain - Adult  Goal: Verbalizes/displays adequate comfort level or baseline comfort level  Outcome: Progressing     Problem: Discharge Planning  Goal: Discharge to home or other facility with appropriate resources  Outcome: Progressing     Problem: Chronic Conditions and Co-morbidities  Goal: Patient's chronic conditions and co-morbidity symptoms are monitored and maintained or improved  Outcome: Progressing     Problem: Nutrition  Goal: Nutrient intake appropriate for maintaining nutritional needs  Outcome: Progressing     Problem: Pain  Goal: Takes deep breaths with improved pain control throughout the shift  Outcome: Progressing  Goal: Turns in bed with improved pain control throughout the shift  Outcome: Progressing  Goal: Walks with improved pain control throughout the shift  Outcome: Progressing  Goal: Performs ADL's with improved pain control throughout shift  Outcome: Progressing  Goal: Participates in PT with improved pain control throughout the shift  Outcome: Progressing     Problem: Skin  Goal: Decreased wound size/increased tissue granulation at next dressing change  Outcome: Progressing  Goal: Participates in plan/prevention/treatment measures  Outcome: Progressing  Goal: Prevent/manage excess moisture  Outcome: Progressing  Goal: Prevent/minimize sheer/friction injuries  Outcome: Progressing  Goal: Promote/optimize nutrition  Outcome: Progressing  Flowsheets (Taken 5/5/2025 0128)  Promote/optimize nutrition: Consume > 50% meals/supplements  Goal: Promote skin healing  Outcome: Progressing     Problem: Fall/Injury  Goal: Verbalize understanding of personal risk factors for fall in the hospital  Outcome: Progressing  Goal: Verbalize understanding of risk factor reduction measures to prevent injury from fall in the home  Outcome: Progressing  Goal: Use assistive devices by end of the  shift  Outcome: Progressing  Goal: Pace activities to prevent fatigue by end of the shift  Outcome: Progressing

## 2025-05-05 NOTE — PROGRESS NOTES
Spiritual Care Visit  Spiritual Care Request    Reason for Visit:        Request Received From:       Focus of Care:            Refer to :          Spiritual Care Assessment    Spiritual Assessment:                      Care Provided:  Intended Effects: Build relationship of care and support, Convey a calming presence, Demonstrate caring and concern, Lessen someone's feelings of isolation, Helping someone feel comforted, Promote sense of peace    Sense of Community and or Yarsani Affiliation:  None         Addressed Needs/Concerns and/or Mark Through:          Outcome:        Advance Directives:         Spiritual Care Annotation    Annotation:  Nice visit with patient and his step-father - patient hopes to recover and get back home to enjoy the outdoors and ATVs with his sons.

## 2025-05-05 NOTE — CONSULTS
"  Department of Medicine  Division of Pulmonary, Critical Care, and Sleep Medicine    Reason For Consult  I was asked by Dr. Arboleda to evaluate Mr. Jovon Mccartney for left upper lobe pneumonia and acute on chronic hypoxia.    History Of Present Illness  Jovon Mccartney is a 50 y.o. male with a history of non-small cell lung cancer with mets to the bone.  Currently on immunotherapy.  He reports he was on 2 L of oxygen intermittently and has progressively needed to wear oxygen daily.  Got to the point to where 4 to 5 L was not enough and felt increased shortness of breath.  Today on exam he is resting comfortably with stepfather at bedside.          Review of Systems  See HPI    Past Medical History:  Medical History[1]    Surgical History:  Surgical History[2]    Social History:   Social History[3]    Family History:  Family History[4]    Occupational & Environmental History          Vital Signs  Visit Vitals  /66 (BP Location: Right arm, Patient Position: Lying)   Pulse 101   Temp 36.8 °C (98.2 °F) (Temporal)   Resp 22   Ht 1.676 m (5' 5.98\")   Wt 45.4 kg (100 lb)   SpO2 92%   BMI 16.15 kg/m²   Smoking Status Former   BSA 1.45 m²        Physical Exam  Vitals and nursing note reviewed.   Constitutional:       General: No in acute distress.     Appearance: Normal appearance.   HENT:      Head: Normocephalic and atraumatic.      Mouth/Throat:      Mouth: Mucous membranes are moist.   Eyes:      Conjunctiva/sclera: Conjunctivae normal.   Cardiovascular:      Rate and Rhythm: Normal rate and regular rhythm.   Pulmonary:      Effort: Pulmonary effort is normal. No respiratory distress.      Breath sounds: Normal breath sounds. No stridor. No wheezing or rhonchi.   Musculoskeletal:         General: Normal range of motion.      Cervical back: Normal range of motion and neck supple.   Skin:     General: Skin is warm and dry.      Coloration: Skin is not jaundiced.   Neurological:      General: No focal deficit present.    "   Mental Status: Alert and oriented to person, place, and time. Mental status is at baseline.   Psychiatric:         Mood and Affect: Mood normal.         Behavior: Behavior normal.         Judgment: Judgment normal.       Oxygen Therapy  SpO2: 92 %  Medical Gas Therapy: Supplemental oxygen  Medical Gas Delivery Method: Nasal cannula         Medications   Scheduled medications  Scheduled Medications[5]  Continuous medications  Continuous Medications[6]  PRN medications  PRN Medications[7]     Allergies  Patient has no known allergies.      Lab Results     Lab Results   Component Value Date    WBC 8.8 05/05/2025    HGB 8.2 (L) 05/05/2025    HCT 26.2 (L) 05/05/2025    MCV 85 05/05/2025     05/05/2025      Lab Results   Component Value Date    GLUCOSE 108 (H) 05/05/2025    CALCIUM 9.3 05/05/2025     (L) 05/05/2025    K 3.8 05/05/2025    CO2 30 05/05/2025    CL 90 (L) 05/05/2025    BUN 12 05/05/2025    CREATININE 0.44 (L) 05/05/2025      Lab Results   Component Value Date    ALT 8 (L) 04/30/2025    AST 16 04/30/2025    ALKPHOS 106 04/30/2025    BILITOT 0.5 04/30/2025        Chest Radiograph   CT angio chest for pulmonary embolism 04/30/2025    Addendum 4/30/2025  5:46 PM  Interpreted By:  Manohar Campbell,  ADDENDUM:  There is also slight progression since January 2025 of the left  paramidline posterior T2 vertebral body bone marrow lesion with  slight posterior cortical breakthrough, axial image 28 of 329 and  sagittal image 67.    Signed by: Manohar Campbell 4/30/2025 5:46 PM    -------- ORIGINAL REPORT --------  Dictation workstation:   [id number]    Addendum 4/30/2025  5:40 PM  Interpreted By:  Manohar Campbell,  ADDENDUM:  On further review there is also redemonstration of extensive mixed  lytic and sclerotic metastatic bone marrow involvement of the  bilateral right greater than left scapula and proximal bilateral  humeri. There is soft tissue component arising from the anterior  aspect of the right and  left mid humeral diaphysis measuring 3.8 x  3.9 cm on the right and 3.1 x 2.9 cm on the left.    Signed by: Manohar Campbell 4/30/2025 5:40 PM    -------- ORIGINAL REPORT --------  Dictation workstation:   [id number]    Narrative  Interpreted By:  Manohar Campbell,  STUDY:  CT ANGIO CHEST FOR PULMONARY EMBOLISM;  4/30/2025 6:16 am    INDICATION:  Signs/Symptoms:Shortness of breath new oxygen requirement chest pain.    COMPARISON:  Chest CT 01/15/2025.    ACCESSION NUMBER(S):  [id number]    ORDERING CLINICIAN:  SILVINO VALDES    TECHNIQUE:  Helical data acquisition of the chest was obtained after  administration of intravenous contrast as part of pulmonary CT  angiography protocol.    Axial contiguous images were reformatted in coronal and sagittal  planes. Axial and coronal MIP images were created and reviewed.    FINDINGS:  POTENTIAL LIMITATIONS OF THE STUDY: Motion and mixing artifact which  limits evaluation of the distal branch vessels.    HEART AND VESSELS:  No discrete filling defects within the main pulmonary artery or its  branches.    Main pulmonary artery and its branches are normal in caliber.    The thoracic aorta is of normal course and caliber.    No coronary artery calcifications are seen.The study is not optimized  for evaluation of coronary arteries.    The cardiac chambers are not enlarged.    Moderate-sized pericardial effusion similar to prior.    MEDIASTINUM AND IVAN, LOWER NECK AND AXILLA:  The visualized thyroid gland is within normal limits.    No evidence of thoracic lymphadenopathy by CT criteria.    Esophagus appears within normal limits as seen.    LUNGS AND AIRWAYS:  There is similar narrowing of the proximal right lower lobe and right  middle lobe bronchi due to mass effect from the adjacent right hilar  and infrahilar mass. Similar persistent homogeneous consolidation in  the basilar right lower lobe and basilar right middle lobe favoring  to represent postobstructive atelectasis. The  mass appears to measure  proximally 1.2 cm posterior to the right bronchus intermedius,  however not well evaluated on CT and may be better evaluated on  PET-CT.    Similar appearance of small to moderate-sized loculated right pleural  effusion. Interval development of small left pleural effusion.  Interval development of patchy consolidation in the posterior aspect  of left upper lobe, axial image 129 consistent with pneumonia.    Similar appearance of advanced upper lobe predominant paraseptal and  centrilobular emphysema. Mosaic attenuation of the lung fields  similar to prior. Unchanged focal scarring in the right lung apex  measuring 2.0 x 0.8 cm.    UPPER ABDOMEN:  The visualized subdiaphragmatic structures demonstrate no remarkable  findings.    CHEST WALL AND OSSEOUS STRUCTURES:  There are no suspicious osseous lesions. The visualized osseous  structures are intact.    Impression  1. Interval development of patchy consolidation in the posterior  aspect of left upper lobe compatible with pneumonia. Interval  development of small simple appearing left pleural effusion.  2. Unchanged compared to January 2025 small to moderate-sized  loculated right pleural effusion and homogeneous consolidations in  the basilar right lower lobe and basilar right middle lobe most  consistent with postobstructive atelectasis. Unchanged chronic  pericardial effusion. No evidence of pulmonary emboli.      Signed by: Manhoar Campbell 4/30/2025 6:38 AM  Dictation workstation:   [id number]      XR chest 1 view 05/04/2025    Narrative  Interpreted By:  Anand Herbert,  STUDY:  XR CHEST 1 VIEW;  5/4/2025 2:41 pm    INDICATION:  Signs/Symptoms:Hx of lung cancer, admitted with left upper lobe PNA,  not responding to treatment.    COMPARISON:  04/30/2025    ACCESSION NUMBER(S):  [id number]    ORDERING CLINICIAN:  AMAIRANI MADRID    FINDINGS:  Severe emphysematous changes are present. Moderate to large right  basilar pleural effusion. Patchy  left perihilar consolidation is  increased in extent. Cardiomediastinal silhouette unchanged. Trace  left pleural effusion unchanged. Interstitial prominence. Partially  imaged large lytic metastases of both mid humeral diaphyses.    Impression  Left perihilar consolidation has increased compared to 4 days prior.  Moderate to large right and trace left pleural effusions unchanged.  Interstitial prominence which could reflect edema.    Partially imaged large bilateral mid humeral lytic metastases.    MACRO:  None    Signed by: Anand Herbert 5/5/2025 8:16 AM  Dictation workstation:   [id number]      XR chest 1 view 04/30/2025    Addendum 4/30/2025  6:40 AM  Interpreted By:  Manohar Campbell,  ADDENDUM:  On further review there is patchy consolidative opacity in the left  upper lobe new compared to CT scan from January 2025 compatible with  pneumonia, and better evaluated on concurrent chest CT. Findings of  loculated right pleural effusion and consolidation in the right lung  base appears stable compared to January 2025.    Signed by: Manohar Campbell 4/30/2025 6:40 AM    -------- ORIGINAL REPORT --------  Dictation workstation:   [id number]    Narrative  Interpreted By:  Manohar Campbell,  STUDY:  XR CHEST 1 VIEW;  4/30/2025 5:50 am    INDICATION:  Signs/Symptoms:Shortness of breath chest pain.    COMPARISON:  11/16/2009.    ACCESSION NUMBER(S):  [id number]    ORDERING CLINICIAN:  SILVINO VALDES    FINDINGS:    CARDIOMEDIASTINAL SILHOUETTE:  Cardiomediastinal silhouette is normal in size and configuration.    LUNGS/PLEURA:  There are hyperinflated hyperlucent lungs consistent with  emphysematous changes, with bullous emphysema in the lung apices.  There is moderate-size right pleural effusion with associated right  lung base opacity which may represent atelectasis or pneumonia. There  is also pulmonary venous congestion. No significant pleural effusion  on the left.      BONES: No evidence of acute osseous  abnormality.    Impression  1. Moderate-sized loculated right pleural effusion. Associated right  lung base atelectasis versus pneumonia. Pulmonary venous congestion.      Signed by: Manohar Campbell 2025 6:19 AM  Dictation workstation:   [id number]         Pulmonary Function Tests         Assessment and Plan / Recommendations   Assessment/Plan   Mr. Jovon mccord is a 50-year-old male with a history of non-small cell lung cancer with mets to the bone.  Consulted for left upper lobe pneumonia and acute on chronic hypoxia.    Left upper lobe community-acquired pneumonia  Acute on chronic hypoxia respiratory failure  NSCLC adenocarcinoma    - Continue to wean oxygen as tolerated  - Continue Rocephin and azithromycin  -Continue with 40 mg prednisone daily  - Continue DuoNebs 3 times daily  - Continue Pulmicort twice daily  - Will repeat CT chest after completion of antibiotics  - We discussed possible bronchoscopy in the future but this would be last resort if pneumonia does not clear up with antibiotics      MUNDO Martins-CNP      Date: 25  Time: 3:50 PM  Please excuse any misspellings or unintended errors related to the Dragon speech recognition software used to dictate this note.          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    Hilar mass 09/15/2024    Juvenile rheumatoid arthritis (Multi) 09/15/2024    Lung cancer (Multi) 2024    Rheumatoid arthritis 10 1974   [2]   Past Surgical History:  Procedure Laterality Date    BRONCHOSCOPY  2024    LUNG BIOPSY  2024   [3]   Social History  Tobacco Use    Smoking status: Former     Current packs/day: 0.00     Average packs/day: 1.5 packs/day for 39.0 years (58.5 ttl pk-yrs)     Types: Cigarettes     Quit date: 2024     Years since quittin.6     Passive exposure: Past    Smokeless tobacco: Never   Substance Use Topics     Alcohol use: Not Currently     Comment: Stopped alcohol use.    Drug use: Not Currently   [4]   Family History  Problem Relation Name Age of Onset    Breast cancer Mother JORDY CHURCH     Cancer Mother JORDY CHURCH     Miscarriages / Stillbirths Mother JORDY CHURCH     Cancer Maternal Grandfather HUBER PAL     Stroke Maternal Grandfather HUBER PAL    [5] budesonide, 0.25 mg, nebulization, BID   And  ipratropium-albuteroL, 3 mL, nebulization, TID  cefTRIAXone, 2 g, intravenous, q24h  enoxaparin, 40 mg, subcutaneous, q24h BALTAZAR  folic acid, 1,000 mcg, oral, Daily  gabapentin, 300 mg, oral, TID  mirtazapine, 15 mg, oral, Nightly  morphine CR, 30 mg, oral, 2 times per day   And  morphine CR, 60 mg, oral, Nightly  pantoprazole, 40 mg, oral, Daily before breakfast  polyethylene glycol, 17 g, oral, Daily  predniSONE, 40 mg, oral, Daily  sennosides, 1 tablet, oral, Nightly  [6]    [7] PRN medications: acetaminophen **OR** acetaminophen **OR** acetaminophen, acetaminophen **OR** acetaminophen **OR** acetaminophen, docusate sodium, ipratropium-albuteroL, oxyCODONE, oxygen, pilocarpine

## 2025-05-05 NOTE — PROGRESS NOTES
Patient Name: Jovon Mccartney   YOB: 1975    Subjective:  Reports slight improvement in breathing, but remains on 4L O2. Feels that he is breathing a little better. Pulmonology consulted given slow recovery from PNA.      Objective:    Vitals:    05/04/25 2005 05/05/25 0000 05/05/25 0800 05/05/25 0841   BP:  119/66     BP Location:  Right arm     Patient Position:  Lying     Pulse:  101     Resp:  22     Temp:  36.8 °C (98.2 °F)     TempSrc:  Temporal Temporal    SpO2: 93% 92%  92%   Weight:       Height:           Physical Exam:    GEN: Awake and alert. Frail and cachectic  HEENT: Normocephalic and atraumatic.  Mucous membranes moist.  Clear sclera, PERRL, EOMI.  CARDIO: Regular rate and rhythm.  No murmurs, rubs, or gallops. No LE edema  RESP:  Diminished over R lung field   ABD: +BS x4, soft, non-tender. Not distended. No rebound or guarding.  MSK: Grossly normal inspection.  NEURO: A&O X 3. CN II-XII are grossly intact. No focal deficits.   SKIN: Warm and dry, no lesions, no rashes.  PSYCH: Appropriate mood and affect, no hallucinations.     Scheduled medications  Scheduled Medications[1]  Continuous medications  Continuous Medications[2]  PRN medications  PRN Medications[3]     Assessment and Plan:  Jovon Mccartney is a 50 y.o. male   Assessment & Plan      Acute on chronic hypoxic respiratory failure  CAP  -Baseline 2L O2, currently requiring 4L O2 NC  -CTA chest interval development of patchy consolidation in the left upper lobe compatible with pneumonia. Again showed unchanged moderate-sized loculated right pleural effusion and consolidation in the basilar right lower lobe in the middle lobe consistent with postobstructive atelectasis.  -Continue Rocephin, Azithromycin stopped 5/3  -Continue duonebs and pulmicort  -Continue prednisone 40mg daily  -strep pneumo and Legionella antigen negative  -Procalcitonin 0.2  -Dose of IV lasix 5/4 and 5/5   -Pulm  consulted    NSCLC Adenocarcinoma  -With mets to multiple bony sites  -Continue outpatient follow-up with oncology  -Currently on immunotherapy.  -Continue home MS Contin and oxycodone for pain control    Rheumatoid Arthritis   -Reports his rheumatologist recently started prednisone 10mg daily  -Continue with increased dose of prednisone 40mg as inpatient     Hyponatremia   -Chronic history, stable     DVT ppx: Lovenox  Code status: Full      I spent  minutes in the professional and overall care of this patient. More than 50% of this time was spent examining and counseling patient, reviewing plan of care, and coordinating medical care.    Dileep Arboleda DO  Attending Physician  [facility]               [1] budesonide, 0.25 mg, nebulization, BID   And  ipratropium-albuteroL, 3 mL, nebulization, TID  cefTRIAXone, 2 g, intravenous, q24h  enoxaparin, 40 mg, subcutaneous, q24h BALTAZAR  folic acid, 1,000 mcg, oral, Daily  gabapentin, 300 mg, oral, TID  mirtazapine, 15 mg, oral, Nightly  morphine CR, 30 mg, oral, 2 times per day   And  morphine CR, 60 mg, oral, Nightly  pantoprazole, 40 mg, oral, Daily before breakfast  polyethylene glycol, 17 g, oral, Daily  predniSONE, 40 mg, oral, Daily  sennosides, 1 tablet, oral, Nightly     [2]    [3] PRN medications: acetaminophen **OR** acetaminophen **OR** acetaminophen, acetaminophen **OR** acetaminophen **OR** acetaminophen, docusate sodium, ipratropium-albuteroL, oxyCODONE, oxygen, pilocarpine

## 2025-05-06 LAB
ANION GAP SERPL CALC-SCNC: 13 MMOL/L (ref 10–20)
BUN SERPL-MCNC: 11 MG/DL (ref 6–23)
CALCIUM SERPL-MCNC: 9.2 MG/DL (ref 8.6–10.3)
CHLORIDE SERPL-SCNC: 89 MMOL/L (ref 98–107)
CO2 SERPL-SCNC: 30 MMOL/L (ref 21–32)
CREAT SERPL-MCNC: 0.39 MG/DL (ref 0.5–1.3)
EGFRCR SERPLBLD CKD-EPI 2021: >90 ML/MIN/1.73M*2
ERYTHROCYTE [DISTWIDTH] IN BLOOD BY AUTOMATED COUNT: 16.9 % (ref 11.5–14.5)
GLUCOSE SERPL-MCNC: 111 MG/DL (ref 74–99)
HCT VFR BLD AUTO: 26.3 % (ref 41–52)
HGB BLD-MCNC: 8.2 G/DL (ref 13.5–17.5)
MCH RBC QN AUTO: 27 PG (ref 26–34)
MCHC RBC AUTO-ENTMCNC: 31.2 G/DL (ref 32–36)
MCV RBC AUTO: 87 FL (ref 80–100)
NRBC BLD-RTO: 0 /100 WBCS (ref 0–0)
PLATELET # BLD AUTO: 267 X10*3/UL (ref 150–450)
POTASSIUM SERPL-SCNC: 3.4 MMOL/L (ref 3.5–5.3)
RBC # BLD AUTO: 3.04 X10*6/UL (ref 4.5–5.9)
SODIUM SERPL-SCNC: 129 MMOL/L (ref 136–145)
WBC # BLD AUTO: 7.8 X10*3/UL (ref 4.4–11.3)

## 2025-05-06 PROCEDURE — 80048 BASIC METABOLIC PNL TOTAL CA: CPT

## 2025-05-06 PROCEDURE — 99232 SBSQ HOSP IP/OBS MODERATE 35: CPT | Performed by: STUDENT IN AN ORGANIZED HEALTH CARE EDUCATION/TRAINING PROGRAM

## 2025-05-06 PROCEDURE — 94640 AIRWAY INHALATION TREATMENT: CPT

## 2025-05-06 PROCEDURE — 2500000002 HC RX 250 W HCPCS SELF ADMINISTERED DRUGS (ALT 637 FOR MEDICARE OP, ALT 636 FOR OP/ED): Performed by: STUDENT IN AN ORGANIZED HEALTH CARE EDUCATION/TRAINING PROGRAM

## 2025-05-06 PROCEDURE — 36415 COLL VENOUS BLD VENIPUNCTURE: CPT

## 2025-05-06 PROCEDURE — 85027 COMPLETE CBC AUTOMATED: CPT

## 2025-05-06 PROCEDURE — 2500000001 HC RX 250 WO HCPCS SELF ADMINISTERED DRUGS (ALT 637 FOR MEDICARE OP): Performed by: STUDENT IN AN ORGANIZED HEALTH CARE EDUCATION/TRAINING PROGRAM

## 2025-05-06 PROCEDURE — 1200000002 HC GENERAL ROOM WITH TELEMETRY DAILY

## 2025-05-06 PROCEDURE — 2500000004 HC RX 250 GENERAL PHARMACY W/ HCPCS (ALT 636 FOR OP/ED): Performed by: STUDENT IN AN ORGANIZED HEALTH CARE EDUCATION/TRAINING PROGRAM

## 2025-05-06 PROCEDURE — 2500000004 HC RX 250 GENERAL PHARMACY W/ HCPCS (ALT 636 FOR OP/ED): Mod: JZ | Performed by: STUDENT IN AN ORGANIZED HEALTH CARE EDUCATION/TRAINING PROGRAM

## 2025-05-06 RX ORDER — POTASSIUM CHLORIDE 1.5 G/1.58G
40 POWDER, FOR SOLUTION ORAL ONCE
Status: COMPLETED | OUTPATIENT
Start: 2025-05-06 | End: 2025-05-06

## 2025-05-06 RX ADMIN — BUDESONIDE 0.25 MG: 0.25 INHALANT RESPIRATORY (INHALATION) at 19:56

## 2025-05-06 RX ADMIN — IPRATROPIUM BROMIDE AND ALBUTEROL SULFATE 3 ML: 2.5; .5 SOLUTION RESPIRATORY (INHALATION) at 13:03

## 2025-05-06 RX ADMIN — CEFTRIAXONE SODIUM 2 G: 2 INJECTION, SOLUTION INTRAVENOUS at 08:30

## 2025-05-06 RX ADMIN — IPRATROPIUM BROMIDE AND ALBUTEROL SULFATE 3 ML: 2.5; .5 SOLUTION RESPIRATORY (INHALATION) at 08:22

## 2025-05-06 RX ADMIN — POTASSIUM CHLORIDE 40 MEQ: 1.5 POWDER, FOR SOLUTION ORAL at 08:30

## 2025-05-06 RX ADMIN — MIRTAZAPINE 15 MG: 15 TABLET, FILM COATED ORAL at 20:43

## 2025-05-06 RX ADMIN — BUDESONIDE 0.25 MG: 0.25 INHALANT RESPIRATORY (INHALATION) at 08:22

## 2025-05-06 RX ADMIN — ENOXAPARIN SODIUM 40 MG: 40 INJECTION SUBCUTANEOUS at 08:30

## 2025-05-06 RX ADMIN — OXYCODONE HYDROCHLORIDE 10 MG: 10 TABLET ORAL at 04:17

## 2025-05-06 RX ADMIN — MORPHINE SULFATE 30 MG: 30 TABLET, EXTENDED RELEASE ORAL at 08:30

## 2025-05-06 RX ADMIN — OXYCODONE HYDROCHLORIDE 10 MG: 10 TABLET ORAL at 00:16

## 2025-05-06 RX ADMIN — GABAPENTIN 300 MG: 300 CAPSULE ORAL at 14:14

## 2025-05-06 RX ADMIN — PREDNISONE 40 MG: 20 TABLET ORAL at 08:30

## 2025-05-06 RX ADMIN — MORPHINE SULFATE 60 MG: 30 TABLET, EXTENDED RELEASE ORAL at 20:43

## 2025-05-06 RX ADMIN — FOLIC ACID 1000 MCG: 1 TABLET ORAL at 08:30

## 2025-05-06 RX ADMIN — IPRATROPIUM BROMIDE AND ALBUTEROL SULFATE 3 ML: 2.5; .5 SOLUTION RESPIRATORY (INHALATION) at 19:56

## 2025-05-06 RX ADMIN — MORPHINE SULFATE 30 MG: 30 TABLET, EXTENDED RELEASE ORAL at 12:57

## 2025-05-06 RX ADMIN — GABAPENTIN 300 MG: 300 CAPSULE ORAL at 20:44

## 2025-05-06 RX ADMIN — POLYETHYLENE GLYCOL 3350 17 G: 17 POWDER, FOR SOLUTION ORAL at 08:30

## 2025-05-06 RX ADMIN — PANTOPRAZOLE SODIUM 40 MG: 40 TABLET, DELAYED RELEASE ORAL at 04:17

## 2025-05-06 RX ADMIN — OXYCODONE HYDROCHLORIDE 10 MG: 10 TABLET ORAL at 14:19

## 2025-05-06 RX ADMIN — GABAPENTIN 300 MG: 300 CAPSULE ORAL at 08:30

## 2025-05-06 RX ADMIN — OXYCODONE HYDROCHLORIDE 10 MG: 10 TABLET ORAL at 19:29

## 2025-05-06 ASSESSMENT — PAIN - FUNCTIONAL ASSESSMENT
PAIN_FUNCTIONAL_ASSESSMENT: 0-10

## 2025-05-06 ASSESSMENT — COGNITIVE AND FUNCTIONAL STATUS - GENERAL
MOBILITY SCORE: 18
HELP NEEDED FOR BATHING: A LITTLE
STANDING UP FROM CHAIR USING ARMS: A LITTLE
TOILETING: A LITTLE
TURNING FROM BACK TO SIDE WHILE IN FLAT BAD: A LITTLE
DRESSING REGULAR LOWER BODY CLOTHING: A LITTLE
CLIMB 3 TO 5 STEPS WITH RAILING: A LITTLE
MOVING TO AND FROM BED TO CHAIR: A LITTLE
WALKING IN HOSPITAL ROOM: A LITTLE
DAILY ACTIVITIY SCORE: 20
MOVING FROM LYING ON BACK TO SITTING ON SIDE OF FLAT BED WITH BEDRAILS: A LITTLE
DRESSING REGULAR UPPER BODY CLOTHING: A LITTLE

## 2025-05-06 ASSESSMENT — PAIN SCALES - GENERAL
PAINLEVEL_OUTOF10: 0 - NO PAIN
PAINLEVEL_OUTOF10: 8
PAINLEVEL_OUTOF10: 8
PAINLEVEL_OUTOF10: 7
PAINLEVEL_OUTOF10: 7
PAINLEVEL_OUTOF10: 8
PAINLEVEL_OUTOF10: 3

## 2025-05-06 ASSESSMENT — PAIN DESCRIPTION - LOCATION
LOCATION: SHOULDER
LOCATION: SHOULDER
LOCATION: GENERALIZED
LOCATION: SHOULDER

## 2025-05-06 ASSESSMENT — PAIN DESCRIPTION - ORIENTATION
ORIENTATION: RIGHT;LEFT
ORIENTATION: LEFT;RIGHT
ORIENTATION: RIGHT;LEFT

## 2025-05-06 ASSESSMENT — PAIN DESCRIPTION - DESCRIPTORS
DESCRIPTORS: ACHING;DISCOMFORT
DESCRIPTORS: ACHING;DISCOMFORT

## 2025-05-06 NOTE — NURSING NOTE
Throughout the shift patient did complain of pain and received his pain medication.  Heating pad is also in place.  Denies any other needs at this time.  Will continue to monitor.

## 2025-05-06 NOTE — PROGRESS NOTES
Patient Name: Jovon Mccartney   YOB: 1975    Subjective:  Remains on 4L O2. Explained to patient that he may likely stay on 4L O2 and this may be his new baseline oxygen requirements. No chest pain or fevers. Was saturating in low 90s O2 during my examination.     Objective:    Vitals:    05/05/25 2027 05/06/25 0000 05/06/25 0800 05/06/25 0822   BP:  115/77 113/70    BP Location:  Right arm     Patient Position:  Lying     Pulse:  98 61    Resp:  16 (!) 8    Temp:  36.6 °C (97.9 °F) 36.5 °C (97.7 °F)    TempSrc:  Temporal     SpO2: 94% 94% 93% 95%   Weight:       Height:           Physical Exam:    GEN: Awake and alert. Frail and cachectic  HEENT: Normocephalic and atraumatic.  Mucous membranes moist.  Clear sclera, PERRL, EOMI.  CARDIO: Regular rate and rhythm.  No murmurs, rubs, or gallops. No LE edema  RESP:  Diminished over R lung field   ABD: +BS x4, soft, non-tender. Not distended. No rebound or guarding.  MSK: Grossly normal inspection.  NEURO: A&O X 3. CN II-XII are grossly intact. No focal deficits.   SKIN: Warm and dry,  PSYCH: Appropriate mood and affect     Scheduled medications  Scheduled Medications[1]  Continuous medications  Continuous Medications[2]  PRN medications  PRN Medications[3]     Assessment and Plan:  Jovon Mccartney is a 50 y.o. male   Assessment & Plan      Acute on chronic hypoxic respiratory failure  CAP  -Baseline 2L O2, currently requiring 4L O2 NC. Suspect this may be patients new baseline.   -CTA chest interval development of patchy consolidation in the left upper lobe compatible with pneumonia. Showed unchanged moderate-sized loculated right pleural effusion and consolidation in the basilar right lower lobe in the middle lobe consistent with postobstructive atelectasis.  -Continue Rocephin, Azithromycin stopped 5/3  -Continue duonebs and pulmicort  -Continue prednisone 40mg daily  -Strep pneumo and Legionella antigen  negative  -Procalcitonin 0.2  -Dose of IV lasix 5/4 and 5/5   -Pulm following    NSCLC Adenocarcinoma  -With mets to multiple bony sites  -Continue outpatient follow-up with oncology  -Currently on immunotherapy.  -Continue home MS Contin and oxycodone for pain control    Rheumatoid Arthritis   -Reports his rheumatologist recently started prednisone 10mg daily  -Continue with increased dose of prednisone 40mg as inpatient     Hyponatremia   -Chronic history, stable     DVT ppx: Lovenox  Code status: Full  Dispo: Home possibly 5/7    I spent  minutes in the professional and overall care of this patient. More than 50% of this time was spent examining and counseling patient, reviewing plan of care, and coordinating medical care.    Dileep Arboleda DO  Attending Physician  [facility]               [1] budesonide, 0.25 mg, nebulization, BID   And  ipratropium-albuteroL, 3 mL, nebulization, TID  cefTRIAXone, 2 g, intravenous, q24h  enoxaparin, 40 mg, subcutaneous, q24h BALTAZAR  folic acid, 1,000 mcg, oral, Daily  gabapentin, 300 mg, oral, TID  mirtazapine, 15 mg, oral, Nightly  morphine CR, 30 mg, oral, 2 times per day   And  morphine CR, 60 mg, oral, Nightly  pantoprazole, 40 mg, oral, Daily before breakfast  polyethylene glycol, 17 g, oral, Daily  predniSONE, 40 mg, oral, Daily  sennosides, 1 tablet, oral, Nightly     [2]    [3] PRN medications: acetaminophen **OR** acetaminophen **OR** acetaminophen, acetaminophen **OR** acetaminophen **OR** acetaminophen, docusate sodium, ipratropium-albuteroL, oxyCODONE, oxygen, pilocarpine

## 2025-05-06 NOTE — CARE PLAN
Problem: Pain  Goal: Turns in bed with improved pain control throughout the shift  Outcome: Progressing     The patient's goals for the shift include pain control    The clinical goals for the shift include see plan of care

## 2025-05-06 NOTE — PROGRESS NOTES
Department of Medicine  Division of Pulmonary, Critical Care, and Sleep Medicine    Jovon Mccartney is a 50 y.o. male on day 6 of admission presenting with Acute on chronic respiratory failure with hypoxia.    Subjective   Patient seen and examined this morning, sitting up in bed. No overnight events. He is saturating well on 4L NC. Reports improvement in his shortness of breath compared to yesterday, though he does still complain of being winded after minimal exertion. No significant cough or wheezing.        Objective     Vital Signs      5/4/2025     4:00 PM 5/4/2025     8:00 PM 5/5/2025    12:00 AM 5/5/2025     8:00 AM 5/5/2025     8:00 PM 5/6/2025    12:00 AM 5/6/2025     8:00 AM   Vitals   Systolic 128 113 119  96 115 113   Diastolic 75 72 66  71 77 70   BP Location  Right arm Right arm Right arm Right arm Right arm    Heart Rate 118 100 101  106 98 61   Temp 36.8 °C (98.2 °F) 36.8 °C (98.2 °F) 36.8 °C (98.2 °F)  37 °C (98.6 °F) 36.6 °C (97.9 °F) 36.5 °C (97.7 °F)   Resp 16 22 22  20 16 8        Physical Exam  Vitals and nursing note reviewed.   Constitutional:       General: No acute distress.     Appearance: Normal appearance.   HENT:      Head: Normocephalic and atraumatic.      Mouth/Throat:      Mouth: Mucous membranes are moist.   Eyes:      Conjunctiva/sclera: Conjunctivae normal.   Cardiovascular:      Rate and Rhythm: Normal rate and regular rhythm.   Pulmonary:      Effort: Pulmonary effort is normal. No respiratory distress.      Breath sounds: Normal breath sounds. No stridor. No wheezing or rhonchi.   Musculoskeletal:         General: Normal range of motion.      Cervical back: Normal range of motion and neck supple.   Skin:     General: Skin is warm and dry.      Coloration: Skin is not jaundiced.   Neurological:      General: No focal deficit present.      Mental Status: Alert and oriented to person, place, and time. Mental status is at baseline.   Psychiatric:         Mood and Affect: Mood  normal.         Behavior: Behavior normal.         Judgment: Judgment normal.       Labs:  Lab Results   Component Value Date    WBC 7.8 05/06/2025    HGB 8.2 (L) 05/06/2025    HCT 26.3 (L) 05/06/2025    MCV 87 05/06/2025     05/06/2025      Lab Results   Component Value Date    GLUCOSE 111 (H) 05/06/2025    CALCIUM 9.2 05/06/2025     (L) 05/06/2025    K 3.4 (L) 05/06/2025    CO2 30 05/06/2025    CL 89 (L) 05/06/2025    BUN 11 05/06/2025    CREATININE 0.39 (L) 05/06/2025      Lab Results   Component Value Date    ALT 8 (L) 04/30/2025    AST 16 04/30/2025    ALKPHOS 106 04/30/2025    BILITOT 0.5 04/30/2025        Oxygen Therapy  SpO2: 95 %  Medical Gas Therapy: Supplemental oxygen  Medical Gas Delivery Method: Nasal cannula       Intake/Output last 3 Shifts:  I/O last 3 completed shifts:  In: 1660 (36.6 mL/kg) [P.O.:1610; IV Piggyback:50]  Out: 1500 (33.1 mL/kg) [Urine:1500 (0.9 mL/kg/hr)]  Weight: 45.4 kg       Medications   Scheduled medications  Scheduled Medications[1]  Continuous medications  Continuous Medications[2]  PRN medications  PRN Medications[3]     Allergies  Patient has no known allergies.      Chest Radiograph   CT angio chest for pulmonary embolism 04/30/2025    Addendum 4/30/2025  5:46 PM  Interpreted By:  Manohar Campbell,  ADDENDUM:  There is also slight progression since January 2025 of the left  paramidline posterior T2 vertebral body bone marrow lesion with  slight posterior cortical breakthrough, axial image 28 of 329 and  sagittal image 67.    Signed by: Manohar Campbell 4/30/2025 5:46 PM    -------- ORIGINAL REPORT --------  Dictation workstation:   [id number]    Addendum 4/30/2025  5:40 PM  Interpreted By:  Manohar Campbell,  ADDENDUM:  On further review there is also redemonstration of extensive mixed  lytic and sclerotic metastatic bone marrow involvement of the  bilateral right greater than left scapula and proximal bilateral  humeri. There is soft tissue component arising from  the anterior  aspect of the right and left mid humeral diaphysis measuring 3.8 x  3.9 cm on the right and 3.1 x 2.9 cm on the left.    Signed by: Manohar Campbell 4/30/2025 5:40 PM    -------- ORIGINAL REPORT --------  Dictation workstation:   [id number]    Narrative  Interpreted By:  Manohar Campbell,  STUDY:  CT ANGIO CHEST FOR PULMONARY EMBOLISM;  4/30/2025 6:16 am    INDICATION:  Signs/Symptoms:Shortness of breath new oxygen requirement chest pain.    COMPARISON:  Chest CT 01/15/2025.    ACCESSION NUMBER(S):  [id number]    ORDERING CLINICIAN:  SILVINO VALDES    TECHNIQUE:  Helical data acquisition of the chest was obtained after  administration of intravenous contrast as part of pulmonary CT  angiography protocol.    Axial contiguous images were reformatted in coronal and sagittal  planes. Axial and coronal MIP images were created and reviewed.    FINDINGS:  POTENTIAL LIMITATIONS OF THE STUDY: Motion and mixing artifact which  limits evaluation of the distal branch vessels.    HEART AND VESSELS:  No discrete filling defects within the main pulmonary artery or its  branches.    Main pulmonary artery and its branches are normal in caliber.    The thoracic aorta is of normal course and caliber.    No coronary artery calcifications are seen.The study is not optimized  for evaluation of coronary arteries.    The cardiac chambers are not enlarged.    Moderate-sized pericardial effusion similar to prior.    MEDIASTINUM AND IVAN, LOWER NECK AND AXILLA:  The visualized thyroid gland is within normal limits.    No evidence of thoracic lymphadenopathy by CT criteria.    Esophagus appears within normal limits as seen.    LUNGS AND AIRWAYS:  There is similar narrowing of the proximal right lower lobe and right  middle lobe bronchi due to mass effect from the adjacent right hilar  and infrahilar mass. Similar persistent homogeneous consolidation in  the basilar right lower lobe and basilar right middle lobe favoring  to  represent postobstructive atelectasis. The mass appears to measure  proximally 1.2 cm posterior to the right bronchus intermedius,  however not well evaluated on CT and may be better evaluated on  PET-CT.    Similar appearance of small to moderate-sized loculated right pleural  effusion. Interval development of small left pleural effusion.  Interval development of patchy consolidation in the posterior aspect  of left upper lobe, axial image 129 consistent with pneumonia.    Similar appearance of advanced upper lobe predominant paraseptal and  centrilobular emphysema. Mosaic attenuation of the lung fields  similar to prior. Unchanged focal scarring in the right lung apex  measuring 2.0 x 0.8 cm.    UPPER ABDOMEN:  The visualized subdiaphragmatic structures demonstrate no remarkable  findings.    CHEST WALL AND OSSEOUS STRUCTURES:  There are no suspicious osseous lesions. The visualized osseous  structures are intact.    Impression  1. Interval development of patchy consolidation in the posterior  aspect of left upper lobe compatible with pneumonia. Interval  development of small simple appearing left pleural effusion.  2. Unchanged compared to January 2025 small to moderate-sized  loculated right pleural effusion and homogeneous consolidations in  the basilar right lower lobe and basilar right middle lobe most  consistent with postobstructive atelectasis. Unchanged chronic  pericardial effusion. No evidence of pulmonary emboli.      Signed by: Manohar Campbell 4/30/2025 6:38 AM  Dictation workstation:   [id number]       XR chest 1 view 05/04/2025    Narrative  Interpreted By:  Anand Herbert,  STUDY:  XR CHEST 1 VIEW;  5/4/2025 2:41 pm    INDICATION:  Signs/Symptoms:Hx of lung cancer, admitted with left upper lobe PNA,  not responding to treatment.    COMPARISON:  04/30/2025    ACCESSION NUMBER(S):  [id number]    ORDERING CLINICIAN:  AMAIRANI MADRID    FINDINGS:  Severe emphysematous changes are present. Moderate to  large right  basilar pleural effusion. Patchy left perihilar consolidation is  increased in extent. Cardiomediastinal silhouette unchanged. Trace  left pleural effusion unchanged. Interstitial prominence. Partially  imaged large lytic metastases of both mid humeral diaphyses.    Impression  Left perihilar consolidation has increased compared to 4 days prior.  Moderate to large right and trace left pleural effusions unchanged.  Interstitial prominence which could reflect edema.    Partially imaged large bilateral mid humeral lytic metastases.    MACRO:  None    Signed by: Anand Herbert 5/5/2025 8:16 AM  Dictation workstation:   [id number]      XR chest 1 view 04/30/2025    Addendum 4/30/2025  6:40 AM  Interpreted By:  Manohar Campbell,  ADDENDUM:  On further review there is patchy consolidative opacity in the left  upper lobe new compared to CT scan from January 2025 compatible with  pneumonia, and better evaluated on concurrent chest CT. Findings of  loculated right pleural effusion and consolidation in the right lung  base appears stable compared to January 2025.    Signed by: Manohar Campbell 4/30/2025 6:40 AM    -------- ORIGINAL REPORT --------  Dictation workstation:   [id number]    Narrative  Interpreted By:  Manohar Campbell,  STUDY:  XR CHEST 1 VIEW;  4/30/2025 5:50 am    INDICATION:  Signs/Symptoms:Shortness of breath chest pain.    COMPARISON:  11/16/2009.    ACCESSION NUMBER(S):  [id number]    ORDERING CLINICIAN:  SILVINO VALDES    FINDINGS:    CARDIOMEDIASTINAL SILHOUETTE:  Cardiomediastinal silhouette is normal in size and configuration.    LUNGS/PLEURA:  There are hyperinflated hyperlucent lungs consistent with  emphysematous changes, with bullous emphysema in the lung apices.  There is moderate-size right pleural effusion with associated right  lung base opacity which may represent atelectasis or pneumonia. There  is also pulmonary venous congestion. No significant pleural effusion  on the  left.      BONES: No evidence of acute osseous abnormality.    Impression  1. Moderate-sized loculated right pleural effusion. Associated right  lung base atelectasis versus pneumonia. Pulmonary venous congestion.      Signed by: Manohar Campbell 4/30/2025 6:19 AM  Dictation workstation:   [id number]         Assessment and Plan / Recommendations   Assessment/Plan   Mr. Jovon Rose is a 50-year-old male with a history of non-small cell lung cancer with metastasis to the bone (on active immunotherapy every 21 days, last treatment 4/14). Pulmonology consulted for left upper lobe pneumonia and acute on chronic hypoxia.    Left upper lobe community-acquired pneumonia  Acute on chronic hypoxic respiratory failure  NSCLC adenocarcinoma   Right pleural effusion    - Continue to wean oxygen as tolerated (on 4L NC today); this may be his new baseline  - Continue rocephin, completed 3 day course of azithromycin - recommend 10-14 day course of antibiotic therapy  - Continue with prednisone 40mg daily - recommend 10 day burst therapy  - Continue DuoNebs TID  - Continue Pulmicort BID  - Repeat CT chest in about 4 weeks to evaluate improvement in pneumonia  - Add humidified air to NC given nosebleed      Patient was seen and discussed with attending.     Mabel Kwong DO  Internal Medicine PGY-1 Resident      Date: 05/06/25 Time: 10:28 AM  Please excuse any misspellings or unintended errors related to the Dragon speech recognition software used to dictate this note.           [1] budesonide, 0.25 mg, nebulization, BID   And  ipratropium-albuteroL, 3 mL, nebulization, TID  cefTRIAXone, 2 g, intravenous, q24h  enoxaparin, 40 mg, subcutaneous, q24h BALTAZAR  folic acid, 1,000 mcg, oral, Daily  gabapentin, 300 mg, oral, TID  mirtazapine, 15 mg, oral, Nightly  morphine CR, 30 mg, oral, 2 times per day   And  morphine CR, 60 mg, oral, Nightly  pantoprazole, 40 mg, oral, Daily before breakfast  polyethylene glycol, 17 g, oral,  Daily  predniSONE, 40 mg, oral, Daily  sennosides, 1 tablet, oral, Nightly  [2]    [3] PRN medications: acetaminophen **OR** acetaminophen **OR** acetaminophen, acetaminophen **OR** acetaminophen **OR** acetaminophen, docusate sodium, ipratropium-albuteroL, oxyCODONE, oxygen, pilocarpine

## 2025-05-06 NOTE — CARE PLAN
The patient's goals for the shift include pain control    The clinical goals for the shift include pain control      Problem: Skin  Goal: Decreased wound size/increased tissue granulation at next dressing change  Flowsheets (Taken 5/6/2025 0741)  Decreased wound size/increased tissue granulation at next dressing change: Promote sleep for wound healing  Goal: Participates in plan/prevention/treatment measures  Flowsheets (Taken 5/6/2025 0741)  Participates in plan/prevention/treatment measures: Elevate heels  Goal: Prevent/manage excess moisture  Flowsheets (Taken 5/6/2025 0741)  Prevent/manage excess moisture: Cleanse incontinence/protect with barrier cream  Goal: Prevent/minimize sheer/friction injuries  Flowsheets (Taken 5/6/2025 0741)  Prevent/minimize sheer/friction injuries: Turn/reposition every 2 hours/use positioning/transfer devices  Goal: Promote/optimize nutrition  Flowsheets (Taken 5/6/2025 0741)  Promote/optimize nutrition: Consume > 50% meals/supplements  Goal: Promote skin healing  Flowsheets (Taken 5/6/2025 0741)  Promote skin healing: Turn/reposition every 2 hours/use positioning/transfer devices

## 2025-05-06 NOTE — NURSING NOTE
Pt calm and cooperative through shift. Pt complained of shoulder pain and repositioned every two hours. Pt tolerated diet. Pt had IV abx this shift and was up to bedside commode.

## 2025-05-07 LAB
ANION GAP SERPL CALC-SCNC: 13 MMOL/L (ref 10–20)
BUN SERPL-MCNC: 10 MG/DL (ref 6–23)
CALCIUM SERPL-MCNC: 9 MG/DL (ref 8.6–10.3)
CHLORIDE SERPL-SCNC: 90 MMOL/L (ref 98–107)
CO2 SERPL-SCNC: 29 MMOL/L (ref 21–32)
CREAT SERPL-MCNC: 0.35 MG/DL (ref 0.5–1.3)
EGFRCR SERPLBLD CKD-EPI 2021: >90 ML/MIN/1.73M*2
ERYTHROCYTE [DISTWIDTH] IN BLOOD BY AUTOMATED COUNT: 16.9 % (ref 11.5–14.5)
GLUCOSE SERPL-MCNC: 106 MG/DL (ref 74–99)
HCT VFR BLD AUTO: 25.8 % (ref 41–52)
HGB BLD-MCNC: 7.9 G/DL (ref 13.5–17.5)
MCH RBC QN AUTO: 26.4 PG (ref 26–34)
MCHC RBC AUTO-ENTMCNC: 30.6 G/DL (ref 32–36)
MCV RBC AUTO: 86 FL (ref 80–100)
NRBC BLD-RTO: 0 /100 WBCS (ref 0–0)
PLATELET # BLD AUTO: 265 X10*3/UL (ref 150–450)
POTASSIUM SERPL-SCNC: 3.8 MMOL/L (ref 3.5–5.3)
RBC # BLD AUTO: 2.99 X10*6/UL (ref 4.5–5.9)
SODIUM SERPL-SCNC: 128 MMOL/L (ref 136–145)
WBC # BLD AUTO: 8.5 X10*3/UL (ref 4.4–11.3)

## 2025-05-07 PROCEDURE — 2500000001 HC RX 250 WO HCPCS SELF ADMINISTERED DRUGS (ALT 637 FOR MEDICARE OP): Performed by: STUDENT IN AN ORGANIZED HEALTH CARE EDUCATION/TRAINING PROGRAM

## 2025-05-07 PROCEDURE — 36415 COLL VENOUS BLD VENIPUNCTURE: CPT

## 2025-05-07 PROCEDURE — 85027 COMPLETE CBC AUTOMATED: CPT

## 2025-05-07 PROCEDURE — 2500000004 HC RX 250 GENERAL PHARMACY W/ HCPCS (ALT 636 FOR OP/ED): Performed by: STUDENT IN AN ORGANIZED HEALTH CARE EDUCATION/TRAINING PROGRAM

## 2025-05-07 PROCEDURE — 99232 SBSQ HOSP IP/OBS MODERATE 35: CPT | Performed by: STUDENT IN AN ORGANIZED HEALTH CARE EDUCATION/TRAINING PROGRAM

## 2025-05-07 PROCEDURE — 2500000005 HC RX 250 GENERAL PHARMACY W/O HCPCS: Performed by: STUDENT IN AN ORGANIZED HEALTH CARE EDUCATION/TRAINING PROGRAM

## 2025-05-07 PROCEDURE — 1200000002 HC GENERAL ROOM WITH TELEMETRY DAILY

## 2025-05-07 PROCEDURE — 2500000004 HC RX 250 GENERAL PHARMACY W/ HCPCS (ALT 636 FOR OP/ED): Mod: JZ | Performed by: STUDENT IN AN ORGANIZED HEALTH CARE EDUCATION/TRAINING PROGRAM

## 2025-05-07 PROCEDURE — 94640 AIRWAY INHALATION TREATMENT: CPT

## 2025-05-07 PROCEDURE — 2500000002 HC RX 250 W HCPCS SELF ADMINISTERED DRUGS (ALT 637 FOR MEDICARE OP, ALT 636 FOR OP/ED): Performed by: STUDENT IN AN ORGANIZED HEALTH CARE EDUCATION/TRAINING PROGRAM

## 2025-05-07 PROCEDURE — 80048 BASIC METABOLIC PNL TOTAL CA: CPT

## 2025-05-07 RX ORDER — IPRATROPIUM BROMIDE AND ALBUTEROL SULFATE 2.5; .5 MG/3ML; MG/3ML
3 SOLUTION RESPIRATORY (INHALATION) 4 TIMES DAILY PRN
Qty: 90 ML | Refills: 0 | Status: CANCELLED | OUTPATIENT
Start: 2025-05-07

## 2025-05-07 RX ORDER — IPRATROPIUM BROMIDE AND ALBUTEROL SULFATE 2.5; .5 MG/3ML; MG/3ML
3 SOLUTION RESPIRATORY (INHALATION) 4 TIMES DAILY PRN
Qty: 90 ML | Refills: 1 | Status: SHIPPED | OUTPATIENT
Start: 2025-05-07

## 2025-05-07 RX ADMIN — MORPHINE SULFATE 30 MG: 30 TABLET, EXTENDED RELEASE ORAL at 12:31

## 2025-05-07 RX ADMIN — MORPHINE SULFATE 30 MG: 30 TABLET, EXTENDED RELEASE ORAL at 08:30

## 2025-05-07 RX ADMIN — OXYCODONE HYDROCHLORIDE 10 MG: 10 TABLET ORAL at 19:10

## 2025-05-07 RX ADMIN — GABAPENTIN 300 MG: 300 CAPSULE ORAL at 20:26

## 2025-05-07 RX ADMIN — BUDESONIDE 0.25 MG: 0.25 INHALANT RESPIRATORY (INHALATION) at 08:05

## 2025-05-07 RX ADMIN — IPRATROPIUM BROMIDE AND ALBUTEROL SULFATE 3 ML: 2.5; .5 SOLUTION RESPIRATORY (INHALATION) at 20:23

## 2025-05-07 RX ADMIN — SENNOSIDES 8.6 MG: 8.6 TABLET, FILM COATED ORAL at 20:26

## 2025-05-07 RX ADMIN — GABAPENTIN 300 MG: 300 CAPSULE ORAL at 15:01

## 2025-05-07 RX ADMIN — BUDESONIDE 0.25 MG: 0.25 INHALANT RESPIRATORY (INHALATION) at 20:23

## 2025-05-07 RX ADMIN — IPRATROPIUM BROMIDE AND ALBUTEROL SULFATE 3 ML: 2.5; .5 SOLUTION RESPIRATORY (INHALATION) at 13:12

## 2025-05-07 RX ADMIN — OXYCODONE HYDROCHLORIDE 10 MG: 10 TABLET ORAL at 23:17

## 2025-05-07 RX ADMIN — PANTOPRAZOLE SODIUM 40 MG: 40 TABLET, DELAYED RELEASE ORAL at 06:19

## 2025-05-07 RX ADMIN — CEFTRIAXONE SODIUM 2 G: 2 INJECTION, SOLUTION INTRAVENOUS at 08:31

## 2025-05-07 RX ADMIN — PREDNISONE 40 MG: 20 TABLET ORAL at 08:30

## 2025-05-07 RX ADMIN — OXYCODONE HYDROCHLORIDE 10 MG: 10 TABLET ORAL at 03:48

## 2025-05-07 RX ADMIN — ENOXAPARIN SODIUM 40 MG: 40 INJECTION SUBCUTANEOUS at 08:30

## 2025-05-07 RX ADMIN — Medication 4.5 L/MIN: at 00:26

## 2025-05-07 RX ADMIN — GABAPENTIN 300 MG: 300 CAPSULE ORAL at 08:30

## 2025-05-07 RX ADMIN — IPRATROPIUM BROMIDE AND ALBUTEROL SULFATE 3 ML: 2.5; .5 SOLUTION RESPIRATORY (INHALATION) at 08:05

## 2025-05-07 RX ADMIN — OXYCODONE HYDROCHLORIDE 10 MG: 10 TABLET ORAL at 15:06

## 2025-05-07 RX ADMIN — MORPHINE SULFATE 60 MG: 30 TABLET, EXTENDED RELEASE ORAL at 20:26

## 2025-05-07 RX ADMIN — IPRATROPIUM BROMIDE AND ALBUTEROL SULFATE 3 ML: 2.5; .5 SOLUTION RESPIRATORY (INHALATION) at 00:26

## 2025-05-07 RX ADMIN — MIRTAZAPINE 15 MG: 15 TABLET, FILM COATED ORAL at 20:26

## 2025-05-07 RX ADMIN — FOLIC ACID 1000 MCG: 1 TABLET ORAL at 08:30

## 2025-05-07 ASSESSMENT — COGNITIVE AND FUNCTIONAL STATUS - GENERAL
MOVING TO AND FROM BED TO CHAIR: A LITTLE
DAILY ACTIVITIY SCORE: 21
MOBILITY SCORE: 18
HELP NEEDED FOR BATHING: A LITTLE
HELP NEEDED FOR BATHING: A LITTLE
STANDING UP FROM CHAIR USING ARMS: A LITTLE
TURNING FROM BACK TO SIDE WHILE IN FLAT BAD: A LITTLE
MOVING FROM LYING ON BACK TO SITTING ON SIDE OF FLAT BED WITH BEDRAILS: A LITTLE
CLIMB 3 TO 5 STEPS WITH RAILING: A LITTLE
MOBILITY SCORE: 18
WALKING IN HOSPITAL ROOM: A LITTLE
MOVING FROM LYING ON BACK TO SITTING ON SIDE OF FLAT BED WITH BEDRAILS: A LITTLE
MOVING TO AND FROM BED TO CHAIR: A LITTLE
CLIMB 3 TO 5 STEPS WITH RAILING: A LITTLE
STANDING UP FROM CHAIR USING ARMS: A LITTLE
DRESSING REGULAR LOWER BODY CLOTHING: A LITTLE
TOILETING: A LITTLE
TURNING FROM BACK TO SIDE WHILE IN FLAT BAD: A LITTLE
WALKING IN HOSPITAL ROOM: A LITTLE
DAILY ACTIVITIY SCORE: 21
DRESSING REGULAR LOWER BODY CLOTHING: A LITTLE
TOILETING: A LITTLE

## 2025-05-07 ASSESSMENT — PAIN - FUNCTIONAL ASSESSMENT
PAIN_FUNCTIONAL_ASSESSMENT: 0-10

## 2025-05-07 ASSESSMENT — PAIN SCALES - GENERAL
PAINLEVEL_OUTOF10: 8
PAINLEVEL_OUTOF10: 7
PAINLEVEL_OUTOF10: 6
PAINLEVEL_OUTOF10: 7
PAINLEVEL_OUTOF10: 7
PAINLEVEL_OUTOF10: 5 - MODERATE PAIN
PAINLEVEL_OUTOF10: 7
PAINLEVEL_OUTOF10: 7

## 2025-05-07 ASSESSMENT — PAIN DESCRIPTION - LOCATION
LOCATION: SHOULDER
LOCATION: SHOULDER
LOCATION: GENERALIZED
LOCATION: SHOULDER

## 2025-05-07 ASSESSMENT — PAIN DESCRIPTION - ORIENTATION
ORIENTATION: RIGHT;LEFT
ORIENTATION: RIGHT;LEFT

## 2025-05-07 NOTE — CARE PLAN
The patient's goals for the shift include pain control    The clinical goals for the shift include see care plan    Problem: Pain - Adult  Goal: Verbalizes/displays adequate comfort level or baseline comfort level  Outcome: Progressing     Problem: Pain  Goal: Takes deep breaths with improved pain control throughout the shift  Outcome: Progressing     Problem: Fall/Injury  Goal: Verbalize understanding of personal risk factors for fall in the hospital  Outcome: Progressing

## 2025-05-07 NOTE — CARE PLAN
Problem: Pain - Adult  Goal: Verbalizes/displays adequate comfort level or baseline comfort level  Outcome: Progressing     Problem: Discharge Planning  Goal: Discharge to home or other facility with appropriate resources  Outcome: Progressing     Problem: Chronic Conditions and Co-morbidities  Goal: Patient's chronic conditions and co-morbidity symptoms are monitored and maintained or improved  Outcome: Progressing     Problem: Nutrition  Goal: Nutrient intake appropriate for maintaining nutritional needs  Outcome: Progressing     Problem: Pain  Goal: Takes deep breaths with improved pain control throughout the shift  Outcome: Progressing  Goal: Turns in bed with improved pain control throughout the shift  Outcome: Progressing  Goal: Walks with improved pain control throughout the shift  Outcome: Progressing  Goal: Performs ADL's with improved pain control throughout shift  Outcome: Progressing  Goal: Participates in PT with improved pain control throughout the shift  Outcome: Progressing     Problem: Skin  Goal: Decreased wound size/increased tissue granulation at next dressing change  5/7/2025 1801 by Ranjana Jeffers RN  Outcome: Progressing  5/7/2025 0731 by Ranjana Jeffers RN  Flowsheets (Taken 5/7/2025 0731)  Decreased wound size/increased tissue granulation at next dressing change: Protective dressings over bony prominences  Goal: Participates in plan/prevention/treatment measures  5/7/2025 1801 by Ranjana Jeffers RN  Outcome: Progressing  5/7/2025 0731 by Ranjana Jeffers RN  Flowsheets (Taken 5/7/2025 0731)  Participates in plan/prevention/treatment measures: Elevate heels  Goal: Prevent/manage excess moisture  5/7/2025 1801 by Ranjana Jeffers RN  Outcome: Progressing  5/7/2025 0731 by Ranjana Jeffers RN  Flowsheets (Taken 5/7/2025 0731)  Prevent/manage excess moisture: Cleanse incontinence/protect with barrier cream  Goal: Prevent/minimize sheer/friction injuries  5/7/2025 1801 by Ranjana MCCORMACK  MARIAH Jeffers  Outcome: Progressing  5/7/2025 0731 by Ranjana Jeffers RN  Flowsheets (Taken 5/7/2025 0731)  Prevent/minimize sheer/friction injuries: Turn/reposition every 2 hours/use positioning/transfer devices  Goal: Promote/optimize nutrition  Outcome: Progressing  Goal: Promote skin healing  Outcome: Progressing     Problem: Fall/Injury  Goal: Verbalize understanding of personal risk factors for fall in the hospital  Outcome: Progressing  Goal: Verbalize understanding of risk factor reduction measures to prevent injury from fall in the home  Outcome: Progressing  Goal: Use assistive devices by end of the shift  Outcome: Progressing  Goal: Pace activities to prevent fatigue by end of the shift  Outcome: Progressing          EOS- No acute changes throughout the shift. Patient received pain medication throughout shift that helped with the pain a little bit. Patient states his shortness of breath come and go but remains on 4L. IV antibiotics continued. Safety maintained and call light within reach.

## 2025-05-07 NOTE — PROGRESS NOTES
Patient Name: Jovon Mccartney   YOB: 1975    Subjective:  No major issues overnight. Remains on 4L O2 NC. Explained that he will likely remain on 4L O2 and this may be his baseline. Explained that we can likely plan discharge home, but patient feels not ready yet. Explained that he will plan for discharge tomorrow. Care discussed with pulmonary team.      Objective:    Vitals:    05/07/25 0000 05/07/25 0026 05/07/25 0800 05/07/25 0805   BP: 122/67  131/77    BP Location: Right arm  Right arm    Patient Position: Lying  Sitting    Pulse: 107  78    Resp: 22  18    Temp: 36.2 °C (97.2 °F)  36.6 °C (97.9 °F)    TempSrc: Temporal  Temporal    SpO2: 92% 91% 100% 92%   Weight:       Height:           Physical Exam:    GEN: Awake and alert. Frail and cachectic  HEENT: Normocephalic and atraumatic.  Mucous membranes moist.  Clear sclera, PERRL, EOMI.  CARDIO: Regular rate and rhythm.  No murmurs, rubs, or gallops. No LE edema  RESP:  Diminished over R lung field   ABD: +BS x4, soft, non-tender. Not distended. No rebound or guarding.  MSK: Grossly normal inspection.  NEURO: A&O X 3. CN II-XII are grossly intact. No focal deficits.   SKIN: Warm and dry,  PSYCH: Appropriate mood and affect     Scheduled medications  Scheduled Medications[1]  Continuous medications  Continuous Medications[2]  PRN medications  PRN Medications[3]     Assessment and Plan:  Jovon Mccartney is a 50 y.o. male   Assessment & Plan      Acute on chronic hypoxic respiratory failure  COPD  CAP  -Baseline 2L O2, currently requiring 4L O2 NC.  -CTA chest interval development of patchy consolidation in the left upper lobe compatible with pneumonia. Showed unchanged moderate-sized loculated right pleural effusion and consolidation in the basilar right lower lobe in the middle lobe consistent with postobstructive atelectasis.  -Continue Rocephin, Azithromycin stopped 5/3  -Continue duonebs and  pulmicort  -Continue prednisone 40mg daily  -Strep pneumo and Legionella antigen negative  -Procalcitonin 0.2  -Dose of IV lasix 5/4 and 5/5   -Pulm following  -Suspect that 4L O2 may be patients new baseline  -Will discharge patient home with nebulizer     NSCLC Adenocarcinoma  -With mets to multiple bony sites  -Continue outpatient follow-up with oncology  -Currently on immunotherapy.  -Continue home MS Contin and oxycodone for pain control    Rheumatoid Arthritis   -Reports his rheumatologist recently started prednisone 10mg daily  -Continue with increased dose of prednisone 40mg as inpatient     Hyponatremia   -Chronic history, stable     DVT ppx: Lovenox  Code status: Full  Dispo: Home possibly 5/8    I spent  minutes in the professional and overall care of this patient. More than 50% of this time was spent examining and counseling patient, reviewing plan of care, and coordinating medical care.    Dileep Arboleda DO  Attending Physician  [facility]               [1] budesonide, 0.25 mg, nebulization, BID   And  ipratropium-albuteroL, 3 mL, nebulization, TID  cefTRIAXone, 2 g, intravenous, q24h  enoxaparin, 40 mg, subcutaneous, q24h BALTAZAR  folic acid, 1,000 mcg, oral, Daily  gabapentin, 300 mg, oral, TID  mirtazapine, 15 mg, oral, Nightly  morphine CR, 30 mg, oral, 2 times per day   And  morphine CR, 60 mg, oral, Nightly  pantoprazole, 40 mg, oral, Daily before breakfast  polyethylene glycol, 17 g, oral, Daily  predniSONE, 40 mg, oral, Daily  sennosides, 1 tablet, oral, Nightly     [2]    [3] PRN medications: acetaminophen **OR** acetaminophen **OR** acetaminophen, acetaminophen **OR** acetaminophen **OR** acetaminophen, docusate sodium, ipratropium-albuteroL, oxyCODONE, oxygen, pilocarpine

## 2025-05-08 ENCOUNTER — PHARMACY VISIT (OUTPATIENT)
Dept: PHARMACY | Facility: CLINIC | Age: 50
End: 2025-05-08
Payer: COMMERCIAL

## 2025-05-08 ENCOUNTER — SOCIAL WORK (OUTPATIENT)
Dept: CASE MANAGEMENT | Facility: HOSPITAL | Age: 50
End: 2025-05-08
Payer: COMMERCIAL

## 2025-05-08 VITALS
HEIGHT: 66 IN | DIASTOLIC BLOOD PRESSURE: 85 MMHG | TEMPERATURE: 97.7 F | HEART RATE: 107 BPM | OXYGEN SATURATION: 93 % | RESPIRATION RATE: 19 BRPM | WEIGHT: 100 LBS | SYSTOLIC BLOOD PRESSURE: 120 MMHG | BODY MASS INDEX: 16.07 KG/M2

## 2025-05-08 PROCEDURE — 2500000002 HC RX 250 W HCPCS SELF ADMINISTERED DRUGS (ALT 637 FOR MEDICARE OP, ALT 636 FOR OP/ED): Performed by: STUDENT IN AN ORGANIZED HEALTH CARE EDUCATION/TRAINING PROGRAM

## 2025-05-08 PROCEDURE — 2500000004 HC RX 250 GENERAL PHARMACY W/ HCPCS (ALT 636 FOR OP/ED): Performed by: STUDENT IN AN ORGANIZED HEALTH CARE EDUCATION/TRAINING PROGRAM

## 2025-05-08 PROCEDURE — 99239 HOSP IP/OBS DSCHRG MGMT >30: CPT | Performed by: STUDENT IN AN ORGANIZED HEALTH CARE EDUCATION/TRAINING PROGRAM

## 2025-05-08 PROCEDURE — RXMED WILLOW AMBULATORY MEDICATION CHARGE

## 2025-05-08 PROCEDURE — 94640 AIRWAY INHALATION TREATMENT: CPT

## 2025-05-08 PROCEDURE — 2500000001 HC RX 250 WO HCPCS SELF ADMINISTERED DRUGS (ALT 637 FOR MEDICARE OP): Performed by: STUDENT IN AN ORGANIZED HEALTH CARE EDUCATION/TRAINING PROGRAM

## 2025-05-08 RX ORDER — MORPHINE SULFATE 30 MG/1
TABLET, FILM COATED, EXTENDED RELEASE ORAL
Start: 2025-05-08 | End: 2025-05-17

## 2025-05-08 RX ORDER — AMOXICILLIN AND CLAVULANATE POTASSIUM 875; 125 MG/1; MG/1
875 TABLET, FILM COATED ORAL 2 TIMES DAILY
Qty: 4 TABLET | Refills: 0 | Status: SHIPPED | OUTPATIENT
Start: 2025-05-08 | End: 2025-05-10

## 2025-05-08 RX ORDER — OXYCODONE HYDROCHLORIDE 10 MG/1
10-15 TABLET ORAL EVERY 4 HOURS PRN
Start: 2025-05-08 | End: 2025-05-17

## 2025-05-08 RX ORDER — IPRATROPIUM BROMIDE AND ALBUTEROL SULFATE 2.5; .5 MG/3ML; MG/3ML
3 SOLUTION RESPIRATORY (INHALATION) 4 TIMES DAILY PRN
Qty: 90 ML | Refills: 1 | Status: SHIPPED | OUTPATIENT
Start: 2025-05-08 | End: 2025-05-17

## 2025-05-08 RX ORDER — PREDNISONE 10 MG/1
TABLET ORAL
Qty: 9 TABLET | Refills: 0 | Status: SHIPPED | OUTPATIENT
Start: 2025-05-09 | End: 2025-05-15

## 2025-05-08 RX ADMIN — IPRATROPIUM BROMIDE AND ALBUTEROL SULFATE 3 ML: 2.5; .5 SOLUTION RESPIRATORY (INHALATION) at 13:27

## 2025-05-08 RX ADMIN — PREDNISONE 40 MG: 20 TABLET ORAL at 08:23

## 2025-05-08 RX ADMIN — BUDESONIDE 0.25 MG: 0.25 INHALANT RESPIRATORY (INHALATION) at 07:26

## 2025-05-08 RX ADMIN — GABAPENTIN 300 MG: 300 CAPSULE ORAL at 08:23

## 2025-05-08 RX ADMIN — PANTOPRAZOLE SODIUM 40 MG: 40 TABLET, DELAYED RELEASE ORAL at 06:18

## 2025-05-08 RX ADMIN — IPRATROPIUM BROMIDE AND ALBUTEROL SULFATE 3 ML: 2.5; .5 SOLUTION RESPIRATORY (INHALATION) at 07:26

## 2025-05-08 RX ADMIN — FOLIC ACID 1000 MCG: 1 TABLET ORAL at 08:23

## 2025-05-08 RX ADMIN — MORPHINE SULFATE 30 MG: 30 TABLET, EXTENDED RELEASE ORAL at 08:23

## 2025-05-08 RX ADMIN — ENOXAPARIN SODIUM 40 MG: 40 INJECTION SUBCUTANEOUS at 08:23

## 2025-05-08 RX ADMIN — MORPHINE SULFATE 30 MG: 30 TABLET, EXTENDED RELEASE ORAL at 12:32

## 2025-05-08 RX ADMIN — CEFTRIAXONE SODIUM 2 G: 2 INJECTION, SOLUTION INTRAVENOUS at 08:23

## 2025-05-08 RX ADMIN — POLYETHYLENE GLYCOL 3350 17 G: 17 POWDER, FOR SOLUTION ORAL at 08:23

## 2025-05-08 ASSESSMENT — PAIN SCALES - GENERAL
PAINLEVEL_OUTOF10: 7
PAINLEVEL_OUTOF10: 7

## 2025-05-08 ASSESSMENT — COGNITIVE AND FUNCTIONAL STATUS - GENERAL
MOBILITY SCORE: 18
TOILETING: A LITTLE
DAILY ACTIVITIY SCORE: 21
CLIMB 3 TO 5 STEPS WITH RAILING: A LITTLE
WALKING IN HOSPITAL ROOM: A LITTLE
MOVING FROM LYING ON BACK TO SITTING ON SIDE OF FLAT BED WITH BEDRAILS: A LITTLE
MOVING TO AND FROM BED TO CHAIR: A LITTLE
TURNING FROM BACK TO SIDE WHILE IN FLAT BAD: A LITTLE
STANDING UP FROM CHAIR USING ARMS: A LITTLE
DRESSING REGULAR LOWER BODY CLOTHING: A LITTLE
HELP NEEDED FOR BATHING: A LITTLE

## 2025-05-08 ASSESSMENT — PAIN - FUNCTIONAL ASSESSMENT: PAIN_FUNCTIONAL_ASSESSMENT: 0-10

## 2025-05-08 NOTE — DISCHARGE SUMMARY
Discharge Diagnosis  Acute on chronic respiratory failure with hypoxia    Issues Required Follow-Up  You were admitted with PNA and possible COPD exacerbation    Despite treatment with antibiotics and steroids, you were unable to wean from 4L of oxygen. We suspect that this may be your new baseline    Please complete course of Augmentin and steroids as prescribed to complete treatment course     Follow up with your medical oncology and pulmonary team. Will need outpatient CT chest.      Test Results Pending At Discharge  Pending Labs       No current pending labs.            Hospital Course  Patient is a 50 year old male with medical history of metastatic NSCLC adenocarcinoma on pembrolizumab s/p palliative radiation to C-spine, right scalp and right shoulder, COPD with chronic respiratory failure on 2L O2 NC, rheumatoid arthritis, hyponatremia who presented to [facility] 4/30/25 with worsening shortness of breath. Patient had been wearing 2-3L O2 NC at home and continued to feel short of breath. He denied fevers, chills, chest pain. Did endorse occasional cough.    In ED, CTA chest revealed TIMI consolidation and known R pleural effusion and RLL atelectasis. Lab work fairly unremarkable aside from chronic hyponatremia. He was requiring 5L O2 NC. Patient was started on Rocephin/Azithromycin for treatment of PNA. His urinary antigens resulted negative.  COVID/Flu negative. He was unable to wean from 4L O2 NC, therefore prednisone was started and pulmonology was consulted. Unfortunately, despite antibiotics, steroids and spot diuresis, patient still was unable to wean from 4L O2 NC. Suspect that this may be patient's new baseline. He completed Rocephin therapy from 4/30-5/8/25 and will receive augmentin at discharge to complete 10 days of antibiotics. He will also complete a steroid taper. Pulmonology had recommended CT chest as outpatient with close follow up with his pulmonary team. He was also given a  nebulizer at discharge.  He will need continued close follow up with his medical and radiation oncology team as well.      Greater than 30 minutes was spent facilitating this patients discharge from the hospital which included examining the patient, reconciling medications, and making arrangements for future care.            Pertinent Physical Exam At Time of Discharge  Physical Exam    Constitutional: Frail, cachectic, not in distress   Skin: Warm and dry  Eyes: EOMI, clear sclera  ENMT: mucous membranes moist  Respiratory: Diminished bilaterally  Cardiovascular: Regular, rate  Abdominal: Nondistended, soft  MSK: ROM intact  Neuro: alert and oriented x3      Home Medications     Medication List      START taking these medications     amoxicillin-clavulanate 875-125 mg tablet; Commonly known as: Augmentin;   Take 1 tablet (875 mg) by mouth 2 times a day for 2 days.   predniSONE 10 mg tablet; Commonly known as: Deltasone; Take 2 tablets   (20 mg) by mouth once daily for 3 days, THEN 1 tablet (10 mg) once daily   for 3 days.; Start taking on: May 9, 2025     CHANGE how you take these medications     * ipratropium-albuteroL  mcg/actuation inhaler; Commonly known as:   Combivent Respimat; Inhale 2 puffs 4 times a day as needed for shortness   of breath or wheezing.; What changed: Another medication with the same   name was added. Make sure you understand how and when to take each.   * ipratropium-albuteroL 0.5-2.5 mg/3 mL nebulizer solution; Commonly   known as: Duo-Neb; Take 3 mL by nebulization 4 times a day as needed for   wheezing or shortness of breath.; What changed: You were already taking a   medication with the same name, and this prescription was added. Make sure   you understand how and when to take each.   morphine CR 30 mg 12 hr tablet; Commonly known as: MS Contin; Take 1   tablet (30 mg) by mouth 2 times a day AND 2 tablets (60 mg) once daily at   bedtime. Do not crush, chew, or split. Take 30 MG  AM, 30 MG MID DAY and 60   MG at BEDTIME.; What changed: See the new instructions.   ondansetron 8 mg tablet; Commonly known as: Zofran; Take 1 tablet (8 mg)   by mouth every 8 hours if needed for nausea or vomiting.; What changed:   Another medication with the same name was removed. Continue taking this   medication, and follow the directions you see here.   prochlorperazine 10 mg tablet; Commonly known as: Compazine; Take 1   tablet (10 mg) by mouth every 6 hours if needed for nausea or vomiting. Do   not fill before October 27, 2024.; What changed: Another medication with   the same name was removed. Continue taking this medication, and follow the   directions you see here.  * This list has 2 medication(s) that are the same as other medications   prescribed for you. Read the directions carefully, and ask your doctor or   other care provider to review them with you.     CONTINUE taking these medications     acetaminophen 500 mg tablet; Commonly known as: Tylenol   b complex 0.4 mg tablet   budesonide-glycopyr-formoterol 160-9-4.8 mcg/actuation HFA aerosol   inhaler; Commonly known as: BREZTRI; Inhale 2 puffs 2 times a day.   dexAMETHasone 4 mg tablet; Commonly known as: Decadron; Take 4 mg (1   tablet) by mouth twice daily the day before treatment, once the evening of   treatment, and twice daily the day after treatment.   docusate sodium 100 mg capsule; Commonly known as: Colace; Take 1   capsule (100 mg) by mouth 2 times a day as needed for constipation (for   hard stools).   * folic acid 1 mg tablet; Commonly known as: Folvite; Take 1 tablet   (1,000 mcg) by mouth once daily. Do not start before October 27, 2024.   * folic acid 1 mg tablet; Commonly known as: Folvite; Take 1 tablet   (1,000 mcg) by mouth once daily.   gabapentin 300 mg capsule; Commonly known as: Neurontin; Take 1 capsule   (300 mg) by mouth 3 times a day.   mirtazapine 15 mg tablet; Commonly known as: Remeron; Take 1 tablet (15   mg) by mouth  once daily at bedtime.   naloxone 4 mg/0.1 mL nasal spray; Commonly known as: Narcan; Administer   1 spray (4 mg) into affected nostril(s) if needed for opioid reversal. May   repeat every 2-3 minutes if needed, alternating nostrils, until medical   assistance becomes available.   oxyCODONE 10 mg immediate release tablet; Commonly known as: Roxicodone;   Take 1-1.5 tablets (10-15 mg) by mouth every 4 hours if needed for   moderate pain (4 - 6) or severe pain (7 - 10). MAX 9 TABS PER DAY   oxygen gas therapy; Commonly known as: O2; Inhale 1 each once every 24   hours.   pantoprazole 40 mg EC tablet; Commonly known as: ProtoNix; Take 1 tablet   (40 mg) by mouth once daily in the morning. Take before meals. Do not   crush, chew, or split.   pilocarpine 5 mg tablet; Commonly known as: Salagen (pilocarpine); Take   1 tablet (5 mg) by mouth 3 times a day as needed (for dry mouth).   polyethylene glycol 17 gram/dose powder; Commonly known as: Glycolax,   Miralax; Mix 17 g of powder and drink once daily.  * This list has 2 medication(s) that are the same as other medications   prescribed for you. Read the directions carefully, and ask your doctor or   other care provider to review them with you.     STOP taking these medications     ibuprofen 200 mg tablet       Outpatient Follow-Up  Future Appointments   Date Time Provider Department [place]   5/12/2025 11:00 AM ADELINA Agrawal [id number] [place]   5/12/2025 11:30 AM INF 09 EFRAIN [id number] [facility]   5/12/2025  4:20 PM ADELINA Whitehead [id number] [place]   5/27/2025  8:00 AM ADELINA Agrawal [id number] [facility]   5/27/2025  9:15 AM INF 07 EFRAIN [id number] [facility]   5/27/2025 10:00 AM ADELINA Jacobo [id number] [facility]   6/26/2025  1:00 PM ADELINA Hernandez [id number] [facility]   9/25/2025  1:00 PM ADELINA Hernandez [id number] [place]   9/29/2025  1:40 PM ADELINA Hernandez [id number] [place]   12/19/2025  1:00 PM Matilda MCCORMACK  MUNDO Garcia-CNP [id number] [place]       Greater than 30 minutes was spent facilitating this patients discharge from the hospital which included examining the patient, reconciling medications, and making arrangements for future care.    Dileep Arboleda DO  [facility]  Internal Medicine    This document was generated in whole or in part using the Dragon One medical voice recognition software and there may be some incorrect words/wording, spelling, or punctuation errors that were not corrected prior to finalization in the medical record.

## 2025-05-08 NOTE — PROGRESS NOTES
I spoke with this patient about COPD and the benefits of self-management. The patient is aware that this is a guideline and does not replace medical advice from their physician. We discussed pursed-lip and diaphragmatic breathing, recognizing their personal yellow zone, and reviewed the  Living Well With COPD book.   Is this a 30 re-admission?   Smoking cessation?   Wears home oxygen?   Does the patient know why they are here?   Most recent PFT:     Initiated COPD education with patient and son at the bedside. We discussed guillen cough in detail as well as belly breathing and pursed-lip to decrease anxiety. He states a good understanding. He is not interested in healthy at home at this time. He states he has a general weakness since he had a lung biopsy last year. He was diagnosed with stage 4 non-small cell lung cancer with mets to the bone. He was also given an incentive spirometer.

## 2025-05-08 NOTE — CARE PLAN
Problem: Pain - Adult  Goal: Verbalizes/displays adequate comfort level or baseline comfort level  Outcome: Progressing     Problem: Discharge Planning  Goal: Discharge to home or other facility with appropriate resources  Outcome: Progressing     Problem: Chronic Conditions and Co-morbidities  Goal: Patient's chronic conditions and co-morbidity symptoms are monitored and maintained or improved  Outcome: Progressing   The patient's goals for the shift include pain control    The clinical goals for the shift include maintain pt safety and adequate pain control throughout shift

## 2025-05-08 NOTE — DISCHARGE INSTRUCTIONS
You were admitted with PNA and possible COPD exacerbation    Despite treatment with antibiotics and steroids, you were unable to wean from 4L of oxygen. We suspect that this may be your new baseline    Please complete course of Augmentin and steroids as prescribed to complete treatment course     Follow up with your medical oncology and pulmonary team. Will need outpatient CT chest.

## 2025-05-08 NOTE — PROGRESS NOTES
Social Work Note  5/8/2025 Yesterday SW received a profit/loss statement from patient's wife for his hospital assistance.  She wrote a letter regarding why they were continuing to get bills.  SW also received a vm from patient today regarding this.  SW explained that his application was just sent in yesterday by the financial navigator with the paperwork that was mailed.  Nothing was able to be submitted until the paperwork was received from wife.  SW also explained they will likely continue to receive bills until if the application is approved.  Patient stated he did understand this.  SW will remain available to assist patient.  MAHENDRA Enriquez, [facility] [phone]

## 2025-05-08 NOTE — NURSING NOTE
Pt being discharged, son in room to transport. Pharmacy in room now with meds to beds, pt receiving nebulizer meds, have spoken with Pankaj santoro, home oxygen , States pt can be discharged and he will have his nebulizer machine delivered. Script was placed for that by Dr Arboleda. Pt to be given phone number to call when he gets home. IV HL removed, cath intact, tele removed.  Son has pt own wheelchair and oxygen tank.      1345 Reviewed discharge instructions with son. Gave verbal understanding. Son states he will finish getting room packed up and will notify staff when he is ready to leave.   Pt received aerosol prior to discharge.  Meds to beds delivered to room.     Phone number for nebulizer machine to be delivered on discharge instruction, son states they have it scheduled to be delivered at 330pm. Phone number to [address] [place] [address] provided to pt in case of any difficulties obtaining machine.

## 2025-05-10 ENCOUNTER — APPOINTMENT (OUTPATIENT)
Dept: CARDIOLOGY | Facility: HOSPITAL | Age: 50
DRG: 199 | End: 2025-05-10
Payer: COMMERCIAL

## 2025-05-10 PROCEDURE — 93970 EXTREMITY STUDY: CPT | Performed by: INTERNAL MEDICINE

## 2025-05-10 PROCEDURE — 93970 EXTREMITY STUDY: CPT

## 2025-05-10 NOTE — CONSULTS
Reason For Consult  Right shoulder pain    History Of Present Illness  Jovon Mccartney is a 50 y.o. male presenting with right shoulder pain.  Patient was recently admitted to the hospital for pneumonia that was just discharged on Thursday.  He re-presented to the hospital with worsening shortness of breath and chest tightness.  Reportedly while he was at home he had rolled in bed and felt a pop in the right arm and was unable to move it.  He does have a history of known non-small cell lung cancer for which he is on immunotherapy.  He has known metastases to bone.  He has previously had radiation to bone.  In the emergency department x-rays were obtained demonstrating a 10 cm lytic lesion in the diaphysis of the right humerus.  He has apparently had swelling noted in both arms prior to this time.  He denies any pain in the left arm at this point.  He denies any pain elsewhere on my exam.  He is currently admitted to the ICU for his worsening respiratory status.  He was found to have a pulmonary embolism this morning.  He is resting comfortably in bed this morning and reports that he does not really have any significant pain while in a splint.  He denies any numbness or tingling.  He does have a history of juvenile rheumatoid arthritis.     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    Surgical History  He has a past surgical history that includes Lung biopsy (9 16 2024) and Bronchoscopy (9 16 2024).     Social History  He reports that he quit smoking about 7 months ago. His smoking use included cigarettes. He has a 58.5 pack-year smoking history. He has been exposed to tobacco smoke. He has never used smokeless tobacco. He reports that he does not  "currently use alcohol. He reports that he does not currently use drugs.    Family History  Family History[1]     Allergies  Patient has no known allergies.     Physical Exam  Focused examination of the right upper extremity reveals that a coaptation splint is in place on the right humerus.  The splint is clean dry and intact.  Splint was not removed to further examine the skin.  There is no tenderness to palpation about the shoulder or about the elbow.  There is no tenderness in the forearm wrist or hand.  The patient demonstrates intact AIN/PIN/IO function.  Sensation is intact to light touch in the radial ulnar and median nerve distributions.  Radial pulse 2+.    The remainder of the secondary exam reveals enlargement of the upper arm on the left side.  The remainder of the secondary exam is negative for any bony tenderness or limitations in range of motion.     Last Recorded Vitals  Blood pressure (!) 117/48, pulse 108, temperature 37 °C (98.6 °F), temperature source Temporal, resp. rate 20, height 1.676 m (5' 6\"), weight 51.3 kg (113 lb 1.5 oz), SpO2 96%.    Relevant Results  Vascular US lower extremity venous duplex bilateral  Result Date: 5/10/2025  Preliminary Cardiology Report            [facility] [id number] [place] [address]     Tel [phone] Fax [phone]          Preliminary Vascular Lab Report  [facility] US LOWER EXTREMITY VENOUS DUPLEX BILATERAL  Patient Name:     YADIEL MELANI Carvajal Physician:  [id number] Annette Loyd MD Study Date:       5/10/2025     Ordering Provider:  [id number] IDALIA LUGO MRN/PID:          [id number]      Fellow: Accession#:       [id number]  Technologist:       Mark VIZCARRA RVT YOB: 1975     Technologist 2: Gender:           M             Encounter#:         [id number] Admission Status: Emergency     Location Performed: [facility]  Diagnosis/ICD: Localized (leg) edema-R60.0 Indication:    Limb edema CPT Codes:     " 73497 Peripheral venous duplex scan for DVT complete  Pertinent History: PE and COPD.  PRELIMINARY CONCLUSIONS:  Right Lower Venous: No evidence of acute deep vein thrombus visualized in the right lower extremity. Additional Findings; Cystic Structure 4.9 x 2.1 x 1.4cm right distal thigh anteromedial bursitis vs fluid collection. Left Lower Venous: No evidence of acute deep vein thrombus visualized in the left lower extremity. Additional Findings: Limited exam due to dressings.  Imaging & Doppler Findings:  Right Compress Thrombus SFJ     Yes      None  Left Compress Thrombus SFJ    Yes      None  Right                 Compressible Thrombus        Flow Distal External Iliac                None   Spontaneous/Phasic CFV                       Yes        None   Spontaneous/Phasic PFV                       Yes        None FV Proximal               Yes        None   Spontaneous/Phasic FV Mid                    Yes        None FV Distal                 Yes        None Popliteal                 Yes        None   Spontaneous/Phasic Peroneal                  Yes        None PTV                       Yes        None  Left                  Compress Thrombus        Flow Distal External Iliac            None   Spontaneous/Phasic CFV                     Yes      None   Spontaneous/Phasic PFV                     Yes      None FV Proximal             Yes      None   Spontaneous/Phasic FV Mid                  Yes      None   Spontaneous/Phasic FV Distal               Yes      None   Spontaneous/Phasic Popliteal               Yes      None   Spontaneous/Phasic Peroneal                Yes      None PTV                     Yes      None  VASCULAR PRELIMINARY REPORT completed by Mark VIZCARRA RVT on 5/10/2025 at 8:45:46 AM  ** Final **     XR chest 1 view  Result Date: 5/10/2025  Interpreted By:  Otis Rodrigues, STUDY: XR CHEST 1 VIEW  5/10/2025 5:58 am   INDICATION: Signs/Symptoms:worsening hypoxia, PTX monitoring   COMPARISON:  05/10/2025   ACCESSION NUMBER(S): [id number]   ORDERING CLINICIAN: IDALIA LUGO   TECHNIQUE: A single AP portable radiograph of the chest was obtained.   FINDINGS: Multiple cardiac monitoring leads are seen over the chest.  A right basilar chest tube is again seen. A loculated pneumothorax is again seen at the right lung base, similar to the prior study. A trace left-sided pleural effusion is again seen. Moderate to severe diffuse interstitial infiltrates are seen bilaterally, and may represent fibrosis, edema and/or pneumonia. Dense airspace consolidation is again seen at the right lung base, and may represent pneumonia. The cardiac silhouette is within normal limits for size.       1. Loculated right basilar pneumothorax status post chest tube placement. This likely represents an ex vacuo pneumothorax. 2. Diffuse interstitial infiltrates and right basilar airspace consolidation, as above. Clinical correlation and continued follow-up until clearing is recommended.   MACRO: None.   Signed by: Otis Rodrigues 5/10/2025 7:07 AM Dictation workstation:   [id number]    XR chest 1 view  Result Date: 5/10/2025  Interpreted By:  Anjelica Morejon, STUDY: XR CHEST 1 VIEW;  5/10/2025 5:08 am   INDICATION: Signs/Symptoms:Chest drain placed right sided     COMPARISON: Chest x-ray 05/09/2025. CT angiogram chest 05/10/2025.   ACCESSION NUMBER(S): [id number]   ORDERING CLINICIAN: IDALIA LUGO   TECHNIQUE: Portable upright frontal view of the chest was obtained .   FINDINGS: Monitoring leads are overlying the patient.   Interval placement of pigtail pleural catheter at the right lung base with complete resolution of the right pleural effusion. Interval development of right basilar pneumothorax measuring 5.5 cm in thickness.   There is bilateral interstitial thickening with diffuse bilateral airspace opacities. There are bilateral emphysematous changes.   Mottled appearance of the right scapula and proximal right humerus  and lytic lesion at the left scapula, consistent with the previously described osseous metastatic disease.       1. Interval development of right basilar pneumothorax. 2. Interval placement of a right-sided pigtail pleural catheter with resolution of the right pleural effusion. 3. Bilateral interstitial thickening with diffuse bilateral airspace opacities, may be secondary to pulmonary edema and/or pneumonia. 4. Emphysema.       MACRO: Anjelica Morejon discussed the significance and urgency of this critical finding by telephone with  IDALIA LUGO on 5/10/2025 at 5:22 am.  (**-RCF-**) Findings:  See findings.     Signed by: Anjelica Morejon 5/10/2025 5:29 AM Dictation workstation:   [id number]    CT angio chest for pulmonary embolism  Result Date: 5/10/2025  Interpreted By:  Ana Diaz, STUDY: CT ANGIO CHEST FOR PULMONARY EMBOLISM;  5/10/2025 2:16 am   INDICATION: Signs/Symptoms:Chest pain, hypoxia, tachycardia lung CA with mets - eval for PE.   COMPARISON: 04/30/2024   ACCESSION NUMBER(S): [id number]   ORDERING CLINICIAN: IDALIA LUGO   TECHNIQUE: Contiguous axial images of the chest were obtained after the intravenous administration of contrast using angiographic PE protocol. Coronal and sagittal reformatted images were reconstructed from the axial data. MIP images were created and reviewed.   FINDINGS: MEDIASTINUM AND LYMPH NODES: No enlarged intrathoracic or axillary lymph nodes.  No pneumomediastinum.   VESSELS:  Normal caliber aorta without significant aortic atherosclerosis. Main pulmonary artery measures up to 3.1 cm suggestive of pulmonary hypertension. Evaluation of the pulmonary artery is partially limited due to respiratory motion artifact. There is a new filling defect noted in the left upper lobe segmental/subsegmental branches.   HEART: Enlargement of the right ventricle compared to the left ventricle with straightening of the interventricular septum comment new from prior imaging.  No  significant coronary artery calcifications. Moderate pericardial effusion.   LUNG, AIRWAYS, AND PLEURA: Extensive upper lobe predominant emphysematous changes with apical blebs/bulla. There is bilateral significant bronchial wall thickening with a right lower lobe consolidative opacity similar to prior imaging. Right perihilar soft tissue again noted. Additional ground-glass scratch in opacities are noted throughout the remainder of the lung parenchyma, with a slightly consolidative opacities most pronounced in the posterior left lower lobe, similar to prior. Small left and at least moderate right pleural effusion. No sizable pneumothorax.   OSSEOUS STRUCTURES/CHEST WALL: Extension lytic lesions involving the bilateral scapula and visualized upper extremities noted similar to prior imaging. Lytic lesion in C2 vertebral body again noted. Findings concerning for pathologic fracture of the right humerus. Sclerotic lesion in the fluid lower vertebral body noted. Evaluation of the ribs change in limited due to patient motion. Cage Wei right 9th rib lytic lesion again noted.   UPPER ABDOMEN/OTHER: Possible density is anasarca there is evaluation of the upper abdomen is limited due to patient motion.       Interval development of left upper lobe pulmonary embolism and segmental/subsegmental branches. Interval development of enlargement of the right ventricle compared to the left which may be seen with right heart strain.   Redemonstrated diffuse bronchial wall thickening and right perihilar soft tissue. Right lower lobe consolidation again noted which may be related to mass, postobstructive atelectasis and/or pneumonia.   Moderate to large right pleural effusion, small left pleural effusion and moderate pericardial effusion again noted.   Diffuse interstitial prominence with hazy parenchymal opacities suggestive of interstitial/parenchymal edema. Slightly consolidative airspace opacity anomaly and left upper lobe  suggestive of superimposed infection, similar to prior imaging.   Extensive osseous metastasis with destructive lytic lesions most significant in the bilateral scapula and humerus. Findings are similar to prior imaging with concern for pathologic right humeral shaft fracture.   Diffuse bone marrow edema and additional findings as detailed.   MACRO: Ana Diaz discussed the significance and urgency of this critical finding Via secure [employer] with confirmation of receipt with  IDALIA LUGO on 5/10/2025 at 3:01 am.  (**-RCF-**) Findings:  See findings.   Signed by: Ana Diaz 5/10/2025 3:02 AM Dictation workstation:   [id number]    XR shoulder right 2+ views  Result Date: 5/9/2025  Interpreted By:  Joe Gray, STUDY: XR SHOULDER RIGHT 2+ VIEWS; ;  5/9/2025 10:50 pm   INDICATION: Signs/Symptoms:pop, decreased ROM.     COMPARISON: 05/04/2025 chest radiograph   ACCESSION NUMBER(S): [id number]   ORDERING CLINICIAN: ALECIA KAUFMAN   FINDINGS: There is a new pathologic fracture through the known expansile lytic lesion of the right mid humeral diaphysis that measures 10 cm in length.   There is additional lytic lesions throughout the right glenoid neck and scapular body, coracoid process.   There is severe emphysema. There is a small likely loculated right pleural effusion.       New nondisplaced pathologic fracture of the right mid humeral diaphysis through a 10 cm lytic lesion.   Lytic lesion of the right glenoid neck, coracoid process and scapular body.   Loculated right pleural effusion.   MACRO: None.   Signed by: Joe Gray 5/9/2025 11:25 PM Dictation workstation:   [id number]    XR chest 1 view  Result Date: 5/9/2025  Interpreted By:  Emre Keith, STUDY: XR CHEST 1 VIEW;  5/9/2025 10:50 pm   INDICATION: Signs/Symptoms:Chest Pain.   COMPARISON: 05/04/2025   ACCESSION NUMBER(S): [id number]   ORDERING CLINICIAN: ALECIA KAUFMAN   FINDINGS: Severe COPD change redemonstrated. There is  residual moderate-to-large right effusion as well as consolidation or atelectasis right lower lung. There is some increasing patchy airspace opacity left mid lung which may reflect worsening pneumonia.No pneumothorax.       Increasing patchy airspace opacity left mid lung may reflect worsening pneumonia compared to 05/04/2025. Otherwise similar/stable exam.   MACRO: None   Signed by: Emre Keith 5/9/2025 11:20 PM Dictation workstation:   [id number]    XR chest 1 view  Result Date: 5/5/2025  Interpreted By:  Anand Herbert, STUDY: XR CHEST 1 VIEW;  5/4/2025 2:41 pm   INDICATION: Signs/Symptoms:Hx of lung cancer, admitted with left upper lobe PNA, not responding to treatment.   COMPARISON: 04/30/2025   ACCESSION NUMBER(S): [id number]   ORDERING CLINICIAN: AMAIRANI MADRID   FINDINGS: Severe emphysematous changes are present. Moderate to large right basilar pleural effusion. Patchy left perihilar consolidation is increased in extent. Cardiomediastinal silhouette unchanged. Trace left pleural effusion unchanged. Interstitial prominence. Partially imaged large lytic metastases of both mid humeral diaphyses.       Left perihilar consolidation has increased compared to 4 days prior. Moderate to large right and trace left pleural effusions unchanged. Interstitial prominence which could reflect edema.   Partially imaged large bilateral mid humeral lytic metastases.   MACRO: None   Signed by: Anand Herbert 5/5/2025 8:16 AM Dictation workstation:   [id number]    CT angio chest for pulmonary embolism  Addendum Date: 4/30/2025  Interpreted By:  Manohar Campbell, ADDENDUM: There is also slight progression since January 2025 of the left paramidline posterior T2 vertebral body bone marrow lesion with slight posterior cortical breakthrough, axial image 28 of 329 and sagittal image 67.   Signed by: Manohar Campbell 4/30/2025 5:46 PM   -------- ORIGINAL REPORT -------- Dictation workstation:   [id number]    Addendum Date:  4/30/2025  Interpreted By:  Manohar Campbell, ADDENDUM: On further review there is also redemonstration of extensive mixed lytic and sclerotic metastatic bone marrow involvement of the bilateral right greater than left scapula and proximal bilateral humeri. There is soft tissue component arising from the anterior aspect of the right and left mid humeral diaphysis measuring 3.8 x 3.9 cm on the right and 3.1 x 2.9 cm on the left.   Signed by: Manohar Campbell 4/30/2025 5:40 PM   -------- ORIGINAL REPORT -------- Dictation workstation:   [id number]    Result Date: 4/30/2025  Interpreted By:  Manohar Campbell, STUDY: CT ANGIO CHEST FOR PULMONARY EMBOLISM;  4/30/2025 6:16 am   INDICATION: Signs/Symptoms:Shortness of breath new oxygen requirement chest pain.   COMPARISON: Chest CT 01/15/2025.   ACCESSION NUMBER(S): [id number]   ORDERING CLINICIAN: SILVINO VALDES   TECHNIQUE: Helical data acquisition of the chest was obtained after administration of intravenous contrast as part of pulmonary CT angiography protocol.   Axial contiguous images were reformatted in coronal and sagittal planes. Axial and coronal MIP images were created and reviewed.   FINDINGS: POTENTIAL LIMITATIONS OF THE STUDY: Motion and mixing artifact which limits evaluation of the distal branch vessels.   HEART AND VESSELS: No discrete filling defects within the main pulmonary artery or its branches.   Main pulmonary artery and its branches are normal in caliber.   The thoracic aorta is of normal course and caliber.   No coronary artery calcifications are seen.The study is not optimized for evaluation of coronary arteries.   The cardiac chambers are not enlarged.   Moderate-sized pericardial effusion similar to prior.   MEDIASTINUM AND IVAN, LOWER NECK AND AXILLA: The visualized thyroid gland is within normal limits.   No evidence of thoracic lymphadenopathy by CT criteria.   Esophagus appears within normal limits as seen.   LUNGS AND AIRWAYS: There is  similar narrowing of the proximal right lower lobe and right middle lobe bronchi due to mass effect from the adjacent right hilar and infrahilar mass. Similar persistent homogeneous consolidation in the basilar right lower lobe and basilar right middle lobe favoring to represent postobstructive atelectasis. The mass appears to measure proximally 1.2 cm posterior to the right bronchus intermedius, however not well evaluated on CT and may be better evaluated on PET-CT.   Similar appearance of small to moderate-sized loculated right pleural effusion. Interval development of small left pleural effusion. Interval development of patchy consolidation in the posterior aspect of left upper lobe, axial image 129 consistent with pneumonia.   Similar appearance of advanced upper lobe predominant paraseptal and centrilobular emphysema. Mosaic attenuation of the lung fields similar to prior. Unchanged focal scarring in the right lung apex measuring 2.0 x 0.8 cm.   UPPER ABDOMEN: The visualized subdiaphragmatic structures demonstrate no remarkable findings.   CHEST WALL AND OSSEOUS STRUCTURES: There are no suspicious osseous lesions. The visualized osseous structures are intact.       1. Interval development of patchy consolidation in the posterior aspect of left upper lobe compatible with pneumonia. Interval development of small simple appearing left pleural effusion. 2. Unchanged compared to January 2025 small to moderate-sized loculated right pleural effusion and homogeneous consolidations in the basilar right lower lobe and basilar right middle lobe most consistent with postobstructive atelectasis. Unchanged chronic pericardial effusion. No evidence of pulmonary emboli.     Signed by: Manohar Campbell 4/30/2025 6:38 AM Dictation workstation:   [id number]    ECG 12 lead  Result Date: 4/30/2025  Sinus tachycardia Rightward axis Incomplete right bundle branch block Borderline ECG No previous ECGs available Baseline wander  Confirmed by Ezio Anderson ([id number]) on 4/30/2025 3:07:13 PM    XR chest 1 view  Addendum Date: 4/30/2025  Interpreted By:  Manohar Campbell, ADDENDUM: On further review there is patchy consolidative opacity in the left upper lobe new compared to CT scan from January 2025 compatible with pneumonia, and better evaluated on concurrent chest CT. Findings of loculated right pleural effusion and consolidation in the right lung base appears stable compared to January 2025.   Signed by: Manohar Campbell 4/30/2025 6:40 AM   -------- ORIGINAL REPORT -------- Dictation workstation:   [id number]    Result Date: 4/30/2025  Interpreted By:  Manohar Campbell, STUDY: XR CHEST 1 VIEW;  4/30/2025 5:50 am   INDICATION: Signs/Symptoms:Shortness of breath chest pain.   COMPARISON: 11/16/2009.   ACCESSION NUMBER(S): [id number]   ORDERING CLINICIAN: SILVINO VALDES   FINDINGS:   CARDIOMEDIASTINAL SILHOUETTE: Cardiomediastinal silhouette is normal in size and configuration.   LUNGS/PLEURA: There are hyperinflated hyperlucent lungs consistent with emphysematous changes, with bullous emphysema in the lung apices. There is moderate-size right pleural effusion with associated right lung base opacity which may represent atelectasis or pneumonia. There is also pulmonary venous congestion. No significant pleural effusion on the left.     BONES: No evidence of acute osseous abnormality.       1. Moderate-sized loculated right pleural effusion. Associated right lung base atelectasis versus pneumonia. Pulmonary venous congestion.     Signed by: Manohar Campbell 4/30/2025 6:19 AM Dictation workstation:   [id number]        Scheduled medications  Scheduled Medications[2]  Continuous medications  Continuous Medications[3]  PRN medications  PRN Medications[4]  Results for orders placed or performed during the hospital encounter of 05/09/25 (from the past 24 hours)   CBC and Auto Differential   Result Value Ref Range    WBC 12.7 (H) 4.4 - 11.3 x10*3/uL    nRBC  0.0 0.0 - 0.0 /100 WBCs    RBC 3.11 (L) 4.50 - 5.90 x10*6/uL    Hemoglobin 8.3 (L) 13.5 - 17.5 g/dL    Hematocrit 26.4 (L) 41.0 - 52.0 %    MCV 85 80 - 100 fL    MCH 26.7 26.0 - 34.0 pg    MCHC 31.4 (L) 32.0 - 36.0 g/dL    RDW 17.3 (H) 11.5 - 14.5 %    Platelets 198 150 - 450 x10*3/uL    Neutrophils % 87.9 40.0 - 80.0 %    Immature Granulocytes %, Automated 0.6 0.0 - 0.9 %    Lymphocytes % 5.3 13.0 - 44.0 %    Monocytes % 5.9 2.0 - 10.0 %    Eosinophils % 0.2 0.0 - 6.0 %    Basophils % 0.1 0.0 - 2.0 %    Neutrophils Absolute 11.20 (H) 1.20 - 7.70 x10*3/uL    Immature Granulocytes Absolute, Automated 0.08 0.00 - 0.70 x10*3/uL    Lymphocytes Absolute 0.67 (L) 1.20 - 4.80 x10*3/uL    Monocytes Absolute 0.75 0.10 - 1.00 x10*3/uL    Eosinophils Absolute 0.03 0.00 - 0.70 x10*3/uL    Basophils Absolute 0.01 0.00 - 0.10 x10*3/uL   Comprehensive Metabolic Panel   Result Value Ref Range    Glucose 138 (H) 74 - 99 mg/dL    Sodium 124 (L) 136 - 145 mmol/L    Potassium 3.6 3.5 - 5.3 mmol/L    Chloride 87 (L) 98 - 107 mmol/L    Bicarbonate 28 21 - 32 mmol/L    Anion Gap 13 10 - 20 mmol/L    Urea Nitrogen 13 6 - 23 mg/dL    Creatinine 0.34 (L) 0.50 - 1.30 mg/dL    eGFR >90 >60 mL/min/1.73m*2    Calcium 8.6 8.6 - 10.3 mg/dL    Albumin 3.1 (L) 3.4 - 5.0 g/dL    Alkaline Phosphatase 101 33 - 120 U/L    Total Protein 6.5 6.4 - 8.2 g/dL    AST 12 9 - 39 U/L    Bilirubin, Total 0.6 0.0 - 1.2 mg/dL    ALT 8 (L) 10 - 52 U/L   Magnesium   Result Value Ref Range    Magnesium 1.74 1.60 - 2.40 mg/dL   Protime-INR   Result Value Ref Range    Protime 14.4 (H) 9.8 - 12.4 seconds    INR 1.3 (H) 0.9 - 1.1   Troponin I, High Sensitivity, Initial   Result Value Ref Range    Troponin I, High Sensitivity 16 0 - 20 ng/L   B-type natriuretic peptide   Result Value Ref Range    BNP 38 0 - 99 pg/mL   Sars-CoV-2, Influenza A/B and RSV PCR   Result Value Ref Range    Coronavirus 2019, PCR Not Detected Not Detected    Flu A Result Not Detected Not Detected     Flu B Result Not Detected Not Detected    RSV PCR Not Detected Not Detected   Lactate   Result Value Ref Range    Lactate 1.3 0.4 - 2.0 mmol/L   Troponin, High Sensitivity, 1 Hour   Result Value Ref Range    Troponin I, High Sensitivity 28 (H) 0 - 20 ng/L   MRSA Surveillance for Vancomycin De-escalation, PCR    Specimen: Anterior Nares; Swab   Result Value Ref Range    MRSA PCR Not Detected Not Detected   Urinalysis with Reflex Culture and Microscopic   Result Value Ref Range    Color, Urine Light-Yellow Light-Yellow, Yellow, Dark-Yellow    Appearance, Urine Clear Clear    Specific Gravity, Urine 1.009 1.005 - 1.035    pH, Urine 6.5 5.0, 5.5, 6.0, 6.5, 7.0, 7.5, 8.0    Protein, Urine NEGATIVE NEGATIVE, 10 (TRACE), 20 (TRACE) mg/dL    Glucose, Urine Normal Normal mg/dL    Blood, Urine NEGATIVE NEGATIVE mg/dL    Ketones, Urine NEGATIVE NEGATIVE mg/dL    Bilirubin, Urine NEGATIVE NEGATIVE mg/dL    Urobilinogen, Urine Normal Normal mg/dL    Nitrite, Urine NEGATIVE NEGATIVE    Leukocyte Esterase, Urine NEGATIVE NEGATIVE   Extra Urine Gray Tube   Result Value Ref Range    Extra Tube Hold for add-ons.    Troponin I, High Sensitivity   Result Value Ref Range    Troponin I, High Sensitivity 44 (H) 0 - 20 ng/L   Lactate Dehydrogenase   Result Value Ref Range     84 - 246 U/L   Lactate Dehydrogenase, Body Fluid   Result Value Ref Range    LD, Fluid 229 Not established. U/L   Glucose, Body Fluid   Result Value Ref Range    Glucose, Fluid 111 Not established mg/dL   Protein, Total, Body Fluid   Result Value Ref Range    Protein, Total Fluid 2.6 Not established g/dL   Body Fluid Cell Count   Result Value Ref Range    Color, Fluid Red (A) Colorless, Straw, Yellow    Clarity, Fluid Hazy (A) Clear    WBC, Fluid 252 See Comment /uL    RBC, Fluid 138,000 see comment /uL   Body Fluid Differential   Result Value Ref Range    Neutrophils %, Manual, Fluid 53 see comment %    Lymphocytes %, Manual, Fluid 41 see comment %     Mono/Macrophages %, Manual, Fluid 4 see comment %    Eosinophils %, Manual, Fluid 0 see comment %    Basophils %, Manual, Fluid 0 not established %    Immature Granulocytes %, Manual, Fluid 0 not established %    Blasts %, Manual, Fluid 0 not established %    Unclassified Cells %, Manual, Fluid 2 (H) not established %    Plasma Cells %, Manual, Fluid 0 not established %    Total Cells Counted, Fluid 100    CBC and Auto Differential   Result Value Ref Range    WBC 10.1 4.4 - 11.3 x10*3/uL    nRBC 0.0 0.0 - 0.0 /100 WBCs    RBC 3.34 (L) 4.50 - 5.90 x10*6/uL    Hemoglobin 9.1 (L) 13.5 - 17.5 g/dL    Hematocrit 28.3 (L) 41.0 - 52.0 %    MCV 85 80 - 100 fL    MCH 27.2 26.0 - 34.0 pg    MCHC 32.2 32.0 - 36.0 g/dL    RDW 17.3 (H) 11.5 - 14.5 %    Platelets 232 150 - 450 x10*3/uL    Neutrophils % 85.5 40.0 - 80.0 %    Immature Granulocytes %, Automated 0.5 0.0 - 0.9 %    Lymphocytes % 6.4 13.0 - 44.0 %    Monocytes % 7.5 2.0 - 10.0 %    Eosinophils % 0.0 0.0 - 6.0 %    Basophils % 0.1 0.0 - 2.0 %    Neutrophils Absolute 8.60 (H) 1.20 - 7.70 x10*3/uL    Immature Granulocytes Absolute, Automated 0.05 0.00 - 0.70 x10*3/uL    Lymphocytes Absolute 0.64 (L) 1.20 - 4.80 x10*3/uL    Monocytes Absolute 0.75 0.10 - 1.00 x10*3/uL    Eosinophils Absolute 0.00 0.00 - 0.70 x10*3/uL    Basophils Absolute 0.01 0.00 - 0.10 x10*3/uL   Renal function panel   Result Value Ref Range    Glucose 137 (H) 74 - 99 mg/dL    Sodium 125 (L) 136 - 145 mmol/L    Potassium 3.5 3.5 - 5.3 mmol/L    Chloride 87 (L) 98 - 107 mmol/L    Bicarbonate 27 21 - 32 mmol/L    Anion Gap 15 10 - 20 mmol/L    Urea Nitrogen 12 6 - 23 mg/dL    Creatinine 0.35 (L) 0.50 - 1.30 mg/dL    eGFR >90 >60 mL/min/1.73m*2    Calcium 8.6 8.6 - 10.3 mg/dL    Phosphorus 4.2 2.5 - 4.9 mg/dL    Albumin 3.2 (L) 3.4 - 5.0 g/dL   Magnesium   Result Value Ref Range    Magnesium 1.70 1.60 - 2.40 mg/dL   Protein, Total   Result Value Ref Range    Total Protein 6.8 6.4 - 8.2 g/dL   MRSA  Surveillance for Vancomycin De-escalation, PCR    Specimen: Anterior Nares; Swab   Result Value Ref Range    MRSA PCR Not Detected Not Detected   Transthoracic Echo Complete   Result Value Ref Range    BSA 1.55 m2   Heparin Assay, UFH   Result Value Ref Range    Heparin Unfractionated 0.2 See Comment Below for Therapeutic Ranges IU/mL        Assessment/Plan     Right nondisplaced pathologic humerus fracture secondary to large lytic lesion from known non-small cell lung cancer    I had a long discussion with the patient at bedside.  We discussed that he has a expansile lytic lesion in his humerus with a new nondisplaced pathologic fracture.  There are notable lytic lesions scattered throughout the shoulder girdle as well involving the glenoid and scapula.  We discussed that he ultimately may benefit from stabilization of the pathologic fracture with an intramedullary nail.  We discussed that this would primarily be for palliative reasons.  At this point he requires further improvement in his medical status to consider potential surgical intervention.  At this point I would recommend continued conservative management in the coaptation splint.  We could consider transitioning him into a Zavala brace potentially.  I would recommend outpatient follow-up with an orthopedic oncologist or traumatologist to discuss potential stabilization with an intramedullary marco.    - Nonweightbearing right upper extremity in splint  - Will obtain additional imaging of the left humerus given complaints of swelling in that arm which are similar to the symptoms on the right.  - No acute operative intervention at this time  - Would recommend outpatient follow-up as above  - PT/OT eval and treat once medically stable  - Please contact me with questions or concerns      Gilbert Dolan MD         [1]   Family History  Problem Relation Name Age of Onset    Breast cancer Mother JORDY CHURCH     Cancer Mother JORDY CHURCH      Miscarriages / Stillbirths Mother JORDY CHURCH     Cancer Maternal Grandfather HUBER PAL     Stroke Maternal Grandfather HUBER PAL    [2] azithromycin, 500 mg, intravenous, Daily  gabapentin, 300 mg, oral, TID  ipratropium-albuteroL, 3 mL, nebulization, Once  mirtazapine, 15 mg, oral, Nightly  morphine CR, 30 mg, oral, BID   And  morphine CR, 60 mg, oral, Nightly  pantoprazole, 40 mg, oral, Daily before breakfast  piperacillin-tazobactam, 3.375 g, intravenous, q8h  polyethylene glycol, 17 g, oral, Daily  predniSONE, 20 mg, oral, Daily   Followed by  [START ON 5/12/2025] predniSONE, 10 mg, oral, Daily    [3] heparin, 0-4,500 Units/hr, Last Rate: 800 Units/hr (05/10/25 0510)    [4] PRN medications: acetaminophen, docusate sodium, heparin, ipratropium-albuteroL, magnesium sulfate, magnesium sulfate, ondansetron, oxyCODONE, oxygen, pilocarpine, potassium chloride CR **OR** potassium chloride, potassium chloride CR **OR** potassium chloride, potassium chloride     shoulder/Left:

## 2025-05-10 NOTE — PROGRESS NOTES
"Nutrition Initial Assessment:   Nutrition Assessment    Reason for Assessment: Provider consult order    Patient is a 50 y.o. male presenting with SOB, hyponatremia, right shoulder pain. Pt was discharged from here on 5/8 for pneumonia, on oxygen. Pt returned to ED with experiencing worsening SOB and chest tightness. CT showed segmental pulmonary embolus in lt upper lobe, on heparin drip. Also found to have large right pleural effusion, chest tube in ED.Also found to have right nondisplaced pathologic humerus fracture. Pt is on HFNC. 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    Medical History[1]    Nutrition History:  Energy Intake: Poor < 50 %  Food and Nutrient History: Met with pt and wife. Pt seen by RD here during last visit. Per wife, she makes him high protein smoothies/shakes at home. Tries to eat 6 smaller meals a day. Able to tolerate softer foods better, pudding, yogurt, jello, etc. Pt was supplementing with a variety of supplements during last visit - magic cup, gelatein, Ensure + HP and Boost VHC. per pt, sometimes the shakes are too thick, interested in trying ensure clear.  Vitamin/Herbal Supplement Use: B12, folvite, remeron  Food Allergy:  (nkfa)  Food Intolerance:  (no known food intolerance)       Anthropometrics:  Height: 167.6 cm (5' 6\")   Weight: 51.3 kg (113 lb 1.5 oz)   BMI (Calculated): 18.26  IBW/kg (Dietitian Calculated): 64.41 kg  Percent of IBW: 79.65 %                      Weight History:   Wt Readings from Last 25 Encounters:   05/10/25 51.3 kg (113 lb 1.5 oz)   04/30/25 45.4 kg (100 lb)   04/14/25 48.8 kg (107 lb 9.6 oz)   03/27/25 48.5 kg (107 lb)   03/24/25 48.4 kg (106 lb 11.2 oz)   03/20/25 48.2 kg (106 lb 6 oz)   03/03/25 47.2 kg (104 lb)   02/20/25 46.5 kg (102 lb 8.2 oz)   02/12/25 49.2 kg (108 lb 6.4 oz)   02/10/25 46.9 kg (103 lb 6.4 oz)   01/30/25 45.8 kg (100 lb 15.5 oz)   01/23/25 48.4 kg (106 lb 11.2 oz)   03/26/25 48.1 kg (106 lb) "   01/21/25 47.7 kg (105 lb 2.6 oz)   01/20/25 48.2 kg (106 lb 4.2 oz)   12/30/24 47.4 kg (104 lb 9.6 oz)   12/19/24 48.1 kg (106 lb)   12/09/24 50.1 kg (110 lb 8 oz)   11/25/24 52.2 kg (115 lb)   11/20/24 54 kg (119 lb)   11/18/24 54.2 kg (119 lb 8 oz)   11/11/24 55.8 kg (123 lb)   11/04/24 51.7 kg (114 lb)   11/04/24 51.8 kg (114 lb 3.2 oz)   10/28/24 53 kg (116 lb 11.7 oz)         Weight Change %:  Weight History / % Weight Change: Pt with 13# weight gain over 10 days? Suggest to reweigh. Spouse believes wt is around 100# right now.  Significant Weight Loss: No    Nutrition Focused Physical Exam Findings:    Subcutaneous Fat Loss:   Orbital Fat Pads: Severe (dark circles, hollowing and loose skin)  Buccal Fat Pads: Severe (hollow, sunken and narrow face)  Triceps: Severe (negligible fat tissue)  Ribs: Defer  Muscle Wasting:  Temporalis: Severe (hollowed scooping depression)  Pectoralis (Clavicular Region): Severe (protruding prominent clavicle)  Deltoid/Trapezius: Severe (squared shoulders, acromion process prominent)  Trapezius/Infraspinatus/Supraspinatus (Scapular Region): Defer  Quadriceps: Defer  Gastrocnemius: Defer  Edema:  Edema: +1 trace  Physical Findings:  Respiratory : Positive (on HFNC)  Digestive System Findings: Early satiety, Anorexia  Mouth Findings: Xerostomia    Nutrition Significant Labs:  CBC Trend:   Results from last 7 days   Lab Units 05/10/25  0606 05/09/25  2220 05/07/25  0518 05/06/25  0611   WBC AUTO x10*3/uL 10.1 12.7* 8.5 7.8   RBC AUTO x10*6/uL 3.34* 3.11* 2.99* 3.04*   HEMOGLOBIN g/dL 9.1* 8.3* 7.9* 8.2*   HEMATOCRIT % 28.3* 26.4* 25.8* 26.3*   MCV fL 85 85 86 87   PLATELETS AUTO x10*3/uL 232 198 265 267    , BMP Trend:   Results from last 7 days   Lab Units 05/10/25  0606 05/09/25  2220 05/07/25  0518 05/06/25  0611   GLUCOSE mg/dL 137* 138* 106* 111*   CALCIUM mg/dL 8.6 8.6 9.0 9.2   SODIUM mmol/L 125* 124* 128* 129*   POTASSIUM mmol/L 3.5 3.6 3.8 3.4*   CO2 mmol/L 27 28 29 30    CHLORIDE mmol/L 87* 87* 90* 89*   BUN mg/dL 12 13 10 11   CREATININE mg/dL 0.35* 0.34* 0.35* 0.39*    , BG POCT trend:        Nutrition Specific Medications:  Scheduled medications  Scheduled Medications[2]  Continuous medications  Continuous Medications[3]  PRN medications  PRN Medications[4]      I/O:    ;      Dietary Orders (From admission, onward)       Start     Ordered    05/10/25 0447  NPO Diet; Effective now  Diet effective now         05/10/25 0446                     Estimated Needs:   Total Energy Estimated Needs in 24 hours (kCal):  (1826-9225)  Method for Estimating Needs: 30-35kcal/kg  Total Protein Estimated Needs in 24 Hours (g):  (61-76)  Method for Estimating 24 Hour Protein Needs: 1.2-1.5g/kg     Method for Estimating 24 Hour Fluid Needs: 1mL/kcal or per team  Patient on Order Fluid Restriction: No        Nutrition Diagnosis   Malnutrition Diagnosis  Patient has Malnutrition Diagnosis: Yes  Diagnosis Status: Active  Malnutrition Diagnosis: Severe malnutrition related to acute disease or injury  Related to: acute on chronic respiratory failure  As Evidenced by: NFPE indicating severe muscle wasting and subcutaneous fat loss, along with decreased oral intake and suspected intake of <75% of estimated needs            Nutrition Interventions/Recommendations   Nutrition prescription for oral nutrition    Nutrition Recommendations:  Individualized Nutrition Prescription Provided for : Advance diet to regular diet as medically appropriate. When diet is advanced, please add oral supplements - Magic cup, Gelatein, Ensure plus HP, Ensure clear.    Nutrition Interventions/Goals:   Meals and Snacks: General healthful diet  Goal: Encourage smaller, more frequent meals and snacks  Medical Food Supplement: Commercial beverage medical food supplement therapy  Goal: Please consider: Ensure + HP qd (350kcal, 20g pro), Ensure Clear qd (24-kcal, 8g pro), Magic cup qd (290kcal,9gpro), Gelatein plus qd (160kcal,20g  pro) with meals as tolerated.  Coordination of Care with Providers: Nursing  Goal: Mark      Education Documentation  No documentation found.            Nutrition Monitoring and Evaluation   Food/Nutrient Related History Monitoring  Monitoring and Evaluation Plan: Intake / amount of food  Intake / Amount of food: Consumes at least 75% or more of meals/snacks/supplements    Anthropometric Measurements  Monitoring and Evaluation Plan: Body weight  Body Weight: Body weight - Promote weight restoration    Biochemical Data, Medical Tests and Procedures  Monitoring and Evaluation Plan: Electrolyte/renal panel, Glucose/endocrine profile  Electrolyte and Renal Panel: Sodium  Criteria: WNL  Glucose/Endocrine Profile: Glucose within normal limits - ICU (140-180 mg/dL)    Physical Exam Findings  Monitoring and Evaluation Plan: Digestive System  Digestive System Finding: Early satiety  Criteria: to improve    Goal Status: New goal(s) identified    Time Spent (min): 45 minutes              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    Hilar mass 09/15/2024    Juvenile rheumatoid arthritis (Multi) 09/15/2024    Lung cancer (Multi) 09 17 2024    Rheumatoid arthritis 10 1974   [2] azithromycin, 500 mg, intravenous, Daily  gabapentin, 300 mg, oral, TID  ipratropium-albuteroL, 3 mL, nebulization, Once  lactated Ringer's, 1,000 mL, intravenous, Once  mirtazapine, 15 mg, oral, Nightly  morphine CR, 30 mg, oral, BID   And  morphine CR, 60 mg, oral, Nightly  pantoprazole, 40 mg, oral, Daily before breakfast  piperacillin-tazobactam, 3.375 g, intravenous, q8h  polyethylene glycol, 17 g, oral, Daily  predniSONE, 20 mg, oral, Daily   Followed by  [START ON 5/12/2025] predniSONE, 10 mg, oral, Daily     [3] heparin, 0-4,500 Units/hr, Last Rate: 900 Units/hr (05/10/25 1127)     [4] PRN medications: acetaminophen, docusate sodium,  heparin, hydrOXYzine HCL, ipratropium-albuteroL, magnesium sulfate, magnesium sulfate, ondansetron, oxyCODONE, oxygen, pilocarpine, potassium chloride CR **OR** potassium chloride, potassium chloride CR **OR** potassium chloride, potassium chloride

## 2025-05-10 NOTE — ED PROVIDER NOTES
"EMERGENCY DEPARTMENT ENCOUNTER      Pt Name: Jovon Mccartney  MRN: [id number]  Birthdate 1975  Date of evaluation: 5/9/2025  Provider: Torsten Mitchell DO    CHIEF COMPLAINT       Chief Complaint   Patient presents with    Shortness of Breath     Per EMS, patient called from home with wife with shortness of breath. Patient hx of stage IV lung CA, patient was at home in tripod position per EMS, was just discharged from the hospital yesterday with pnemonia. Patient was given 1 breathing treatment by EMS. Patient states yesterday he \"popped\" his right shoulder and it has now been hurting.     Hyperventilating    Shoulder Pain     right     HISTORY OF PRESENT ILLNESS    Jovon Mccartney is a 50 y.o. year old male who presents to the ER for shortness of breath. Reports he was panicking, could not catch his breath. Had his wife increase his concentrator to 9 without improvement. Was just discharged yesterday from [facility] after admission for PNA.   No nausea, vomiting, abdominal pain, changes to urine or stool. Does report chest tightness since this morning. At home pulse ox was dropping to 60's-70's.     Currently has 2 doses of his antibiotics left    Yesterday after returning home tried rolling his shoulder, felt a pop, since then cannot move his right arm  as well.     Baseline home O2 4L  PMH NSCLC on immunotherapy s/p chemo and radiation, juvenile RA, CHF  Oncologist Dr. Brooks  PS none  NKDA     PAST MEDICAL HISTORY   Medical History[1]  CURRENT MEDICATIONS       Current Discharge Medication List        CONTINUE these medications which have NOT CHANGED    Details   acetaminophen (Tylenol) 500 mg tablet Take 1 tablet (500 mg) by mouth every 6 hours if needed for mild pain (1 - 3).      amoxicillin-clavulanate (Augmentin) 875-125 mg tablet Take 1 tablet (875 mg) by mouth 2 times a day for 2 days.  Qty: 4 tablet, Refills: 0    Associated Diagnoses: Acute on chronic respiratory failure with hypoxia      b complex 0.4 mg " tablet Take 1 tablet by mouth once daily.      budesonide-glycopyr-formoterol (BREZTRI) 160-9-4.8 mcg/actuation HFA aerosol inhaler Inhale 2 puffs 2 times a day.  Qty: 10.7 g, Refills: 3    Associated Diagnoses: Chronic obstructive pulmonary disease, unspecified COPD type (Multi)      dexAMETHasone (Decadron) 4 mg tablet Take 4 mg (1 tablet) by mouth twice daily the day before treatment, once the evening of treatment, and twice daily the day after treatment.  Qty: 5 tablet, Refills: 5    Associated Diagnoses: NSCLC metastatic to bone (Multi)      docusate sodium (Colace) 100 mg capsule Take 1 capsule (100 mg) by mouth 2 times a day as needed for constipation (for hard stools).  Qty: 60 capsule, Refills: 0    Associated Diagnoses: Constipation, unspecified constipation type      !! folic acid (Folvite) 1 mg tablet Take 1 tablet (1,000 mcg) by mouth once daily. Do not start before October 27, 2024.  Qty: 30 tablet, Refills: 11    Associated Diagnoses: NSCLC metastatic to bone (Multi)      !! folic acid (Folvite) 1 mg tablet Take 1 tablet (1,000 mcg) by mouth once daily.  Qty: 30 tablet, Refills: 11    Associated Diagnoses: NSCLC metastatic to bone (Multi)      gabapentin (Neurontin) 300 mg capsule Take 1 capsule (300 mg) by mouth 3 times a day.  Qty: 90 capsule, Refills: 3    Associated Diagnoses: Lesion of bone of cervical spine      ipratropium-albuteroL (Combivent Respimat)  mcg/actuation inhaler Inhale 2 puffs 4 times a day as needed for shortness of breath or wheezing.    Comments: Meds to beds -Z ADOD 9/19  Associated Diagnoses: Lung cancer metastatic to brain (Multi)      !! ipratropium-albuteroL (Duo-Neb) 0.5-2.5 mg/3 mL nebulizer solution Take 3 mL by nebulization 4 times a day as needed for wheezing or shortness of breath.  Qty: 90 mL, Refills: 1    Associated Diagnoses: COPD exacerbation (Multi)      !! ipratropium-albuteroL (Duo-Neb) 0.5-2.5 mg/3 mL nebulizer solution Take 3 mL by nebulization 4  times a day as needed for wheezing or shortness of breath.  Qty: 90 mL, Refills: 1    Associated Diagnoses: COPD exacerbation (Multi)      mirtazapine (Remeron) 15 mg tablet Take 1 tablet (15 mg) by mouth once daily at bedtime.  Qty: 30 tablet, Refills: 2    Associated Diagnoses: Decreased appetite      morphine CR (MS Contin) 30 mg 12 hr tablet Take 1 tablet (30 mg) by mouth 2 times a day AND 2 tablets (60 mg) once daily at bedtime. Do not crush, chew, or split. Take 30 MG AM, 30 MG MID DAY and 60 MG at BEDTIME.    Associated Diagnoses: Cancer related pain      naloxone (Narcan) 4 mg/0.1 mL nasal spray Administer 1 spray (4 mg) into affected nostril(s) if needed for opioid reversal. May repeat every 2-3 minutes if needed, alternating nostrils, until medical assistance becomes available.  Qty: 2 each, Refills: 3    Associated Diagnoses: Opiate use      ondansetron (Zofran) 8 mg tablet Take 1 tablet (8 mg) by mouth every 8 hours if needed for nausea or vomiting.  Qty: 30 tablet, Refills: 5    Associated Diagnoses: NSCLC metastatic to bone (Multi)      oxyCODONE (Roxicodone) 10 mg immediate release tablet Take 1-1.5 tablets (10-15 mg) by mouth every 4 hours if needed for moderate pain (4 - 6) or severe pain (7 - 10). MAX 9 TABS PER DAY    Associated Diagnoses: Lesion of bone of cervical spine      oxygen (O2) gas therapy Inhale 1 each once every 24 hours.    Associated Diagnoses: Hypoxia      pantoprazole (ProtoNix) 40 mg EC tablet Take 1 tablet (40 mg) by mouth once daily in the morning. Take before meals. Do not crush, chew, or split.  Qty: 30 tablet, Refills: 3    Associated Diagnoses: Lesion of bone of cervical spine      pilocarpine (Salagen, pilocarpine,) 5 mg tablet Take 1 tablet (5 mg) by mouth 3 times a day as needed (for dry mouth).  Qty: 90 tablet, Refills: 11    Associated Diagnoses: Dry mouth      polyethylene glycol (Glycolax, Miralax) 17 gram/dose powder Mix 17 g of powder and drink once daily.     Comments: Meds to beds -B ADOD 9/19  Associated Diagnoses: Lesion of bone of cervical spine      predniSONE (Deltasone) 10 mg tablet Take 2 tablets (20 mg) by mouth once daily for 3 days, THEN 1 tablet (10 mg) once daily for 3 days.  Qty: 9 tablet, Refills: 0    Associated Diagnoses: Acute on chronic respiratory failure with hypoxia      prochlorperazine (Compazine) 10 mg tablet Take 1 tablet (10 mg) by mouth every 6 hours if needed for nausea or vomiting. Do not fill before October 27, 2024.  Qty: 30 tablet, Refills: 5    Associated Diagnoses: NSCLC metastatic to bone (Multi)       !! - Potential duplicate medications found. Please discuss with provider.        SURGICAL HISTORY     Surgical History[2]  ALLERGIES     Patient has no known allergies.  FAMILY HISTORY     Family History[3]  SOCIAL HISTORY     Social History[4]  PHYSICAL EXAM  (up to 7 for level 4, 8 or more for level 5)     ED Triage Vitals   Temperature Heart Rate Respirations BP   05/09/25 2146 05/09/25 2145 05/09/25 2145 05/09/25 2145   36.6 °C (97.9 °F) (!) 112 (!) 37 (!) 149/100      Pulse Ox Temp Source Heart Rate Source Patient Position   05/09/25 2145 05/09/25 2146 05/09/25 2146 05/09/25 2146   99 % Temporal Monitor Lying      BP Location FiO2 (%)     05/09/25 2146 --     Left arm        Physical Exam  Vitals and nursing note reviewed.   Constitutional:       General: He is not in acute distress.     Appearance: Normal appearance. He is not ill-appearing.   HENT:      Head: Normocephalic and atraumatic. No raccoon eyes, Jo's sign, contusion or laceration.      Jaw: No trismus or malocclusion.      Right Ear: External ear normal.      Left Ear: External ear normal.      Mouth/Throat:      Mouth: Mucous membranes are moist.      Pharynx: Oropharynx is clear.   Eyes:      Extraocular Movements: Extraocular movements intact.      Pupils: Pupils are equal, round, and reactive to light.   Neck:      Trachea: No tracheal deviation.    Cardiovascular:      Rate and Rhythm: Regular rhythm. Tachycardia present.      Pulses: Normal pulses.   Pulmonary:      Effort: Tachypnea and accessory muscle usage present.      Breath sounds: No stridor. No wheezing, rhonchi or rales.   Chest:      Chest wall: No tenderness.   Abdominal:      General: Abdomen is flat.      Palpations: Abdomen is soft. There is no mass.      Tenderness: There is no abdominal tenderness.   Musculoskeletal:         General: No signs of injury.      Right shoulder: Tenderness present. Decreased range of motion.      Right lower leg: Edema present.      Left lower leg: Edema present.      Comments: B/l hand swelling   Skin:     Coloration: Skin is not jaundiced or pale.      Findings: No petechiae, rash or wound.   Neurological:      Mental Status: He is alert.      Sensory: No sensory deficit.      Motor: No weakness.        DIAGNOSTIC RESULTS   LABS:  Labs Reviewed   CBC WITH AUTO DIFFERENTIAL - Abnormal       Result Value    WBC 12.7 (*)     nRBC 0.0      RBC 3.11 (*)     Hemoglobin 8.3 (*)     Hematocrit 26.4 (*)     MCV 85      MCH 26.7      MCHC 31.4 (*)     RDW 17.3 (*)     Platelets 198      Neutrophils % 87.9      Immature Granulocytes %, Automated 0.6      Lymphocytes % 5.3      Monocytes % 5.9      Eosinophils % 0.2      Basophils % 0.1      Neutrophils Absolute 11.20 (*)     Immature Granulocytes Absolute, Automated 0.08      Lymphocytes Absolute 0.67 (*)     Monocytes Absolute 0.75      Eosinophils Absolute 0.03      Basophils Absolute 0.01     COMPREHENSIVE METABOLIC PANEL - Abnormal    Glucose 138 (*)     Sodium 124 (*)     Potassium 3.6      Chloride 87 (*)     Bicarbonate 28      Anion Gap 13      Urea Nitrogen 13      Creatinine 0.34 (*)     eGFR >90      Calcium 8.6      Albumin 3.1 (*)     Alkaline Phosphatase 101      Total Protein 6.5      AST 12      Bilirubin, Total 0.6      ALT 8 (*)    PROTIME-INR - Abnormal    Protime 14.4 (*)     INR 1.3 (*)    SERIAL  TROPONIN, 1 HOUR - Abnormal    Troponin I, High Sensitivity 28 (*)     Narrative:     Less than 99th percentile of normal range cutoff-  Female and children under 18 years old <14 ng/L; Male <21 ng/L: Negative  Repeat testing should be performed if clinically indicated.     Female and children under 18 years old 14-50 ng/L; Male 21-50 ng/L:  Consistent with possible cardiac damage and possible increased clinical   risk. Serial measurements may help to assess extent of myocardial damage.     >50 ng/L: Consistent with cardiac damage, increased clinical risk and  myocardial infarction. Serial measurements may help assess extent of   myocardial damage.      NOTE: Children less than 1 year old may have higher baseline troponin   levels and results should be interpreted in conjunction with the overall   clinical context.     NOTE: Troponin I testing is performed using a different   testing methodology at [facility] than at other   [facility]. Direct result comparisons should only   be made within the same method.   TROPONIN I, HIGH SENSITIVITY - Abnormal    Troponin I, High Sensitivity 44 (*)     Narrative:     Less than 99th percentile of normal range cutoff-  Female and children under 18 years old <14 ng/L; Male <21 ng/L: Negative  Repeat testing should be performed if clinically indicated.     Female and children under 18 years old 14-50 ng/L; Male 21-50 ng/L:  Consistent with possible cardiac damage and possible increased clinical   risk. Serial measurements may help to assess extent of myocardial damage.     >50 ng/L: Consistent with cardiac damage, increased clinical risk and  myocardial infarction. Serial measurements may help assess extent of   myocardial damage.      NOTE: Children less than 1 year old may have higher baseline troponin   levels and results should be interpreted in conjunction with the overall   clinical context.     NOTE: Troponin I testing is performed using a different    testing methodology at [facility] than at other   [facility]. Direct result comparisons should only   be made within the same method.   CBC WITH AUTO DIFFERENTIAL - Abnormal    WBC 10.1      nRBC 0.0      RBC 3.34 (*)     Hemoglobin 9.1 (*)     Hematocrit 28.3 (*)     MCV 85      MCH 27.2      MCHC 32.2      RDW 17.3 (*)     Platelets 232      Neutrophils % 85.5      Immature Granulocytes %, Automated 0.5      Lymphocytes % 6.4      Monocytes % 7.5      Eosinophils % 0.0      Basophils % 0.1      Neutrophils Absolute 8.60 (*)     Immature Granulocytes Absolute, Automated 0.05      Lymphocytes Absolute 0.64 (*)     Monocytes Absolute 0.75      Eosinophils Absolute 0.00      Basophils Absolute 0.01     RENAL FUNCTION PANEL - Abnormal    Glucose 137 (*)     Sodium 125 (*)     Potassium 3.5      Chloride 87 (*)     Bicarbonate 27      Anion Gap 15      Urea Nitrogen 12      Creatinine 0.35 (*)     eGFR >90      Calcium 8.6      Phosphorus 4.2      Albumin 3.2 (*)    MRSA SURVEILLANCE FOR VANCOMYCIN DE-ESCALATION, PCR - Normal    MRSA PCR Not Detected      Narrative:     This assay is an FDA-approved in vitro diagnostic nucleic acid amplification test for the detection of methicillin-resistant Staphylococcus aureus (MRSA) DNA directly from nasal swabs in patients at risk for nasal colonization. MRSA NxG is intended to aid in the prevention and control of MRSA infections in healthcare settings. This assay is NOT intended to diagnose, guide, or monitor treatment for MRSA infections, or provide results of susceptibility to methicillin. A negative result does not preclude MRSA nasal colonization. Test performance has not been evaluated in patients less than two years of age.   MAGNESIUM - Normal    Magnesium 1.74     SERIAL TROPONIN-INITIAL - Normal    Troponin I, High Sensitivity 16      Narrative:     Less than 99th percentile of normal range cutoff-  Female and children under 18 years old <14 ng/L;  Male <21 ng/L: Negative  Repeat testing should be performed if clinically indicated.     Female and children under 18 years old 14-50 ng/L; Male 21-50 ng/L:  Consistent with possible cardiac damage and possible increased clinical   risk. Serial measurements may help to assess extent of myocardial damage.     >50 ng/L: Consistent with cardiac damage, increased clinical risk and  myocardial infarction. Serial measurements may help assess extent of   myocardial damage.      NOTE: Children less than 1 year old may have higher baseline troponin   levels and results should be interpreted in conjunction with the overall   clinical context.     NOTE: Troponin I testing is performed using a different   testing methodology at [facility] than at other   [facility]. Direct result comparisons should only   be made within the same method.   B-TYPE NATRIURETIC PEPTIDE - Normal    BNP 38      Narrative:        <100 pg/mL - Heart failure unlikely  100-299 pg/mL - Intermediate probability of acute heart                  failure exacerbation. Correlate with clinical                  context and patient history.    >=300 pg/mL - Heart Failure likely. Correlate with clinical                  context and patient history.    BNP testing is performed using different testing methodology at [facility] than at other [facility]. Direct result comparisons should only be made within the same method.      SARS-COV-2, INFLUENZA A/B AND RSV PCR - Normal    Coronavirus 2019, PCR Not Detected      Flu A Result Not Detected      Flu B Result Not Detected      RSV PCR Not Detected      Narrative:     This assay is an FDA-cleared, in vitro diagnostic nucleic acid amplification test for the qualitative detection and differentiation of SARS CoV-2/ Influenza A/B/ RSV from nasopharyngeal specimens collected from individuals with signs and symptoms of respiratory tract infections, and has been validated for use at  [facility]. Negative results do not preclude COVID-19/ Influenza A/B/ RSV infections and should not be used as the sole basis for diagnosis, treatment, or other management decisions. Testing for SARS CoV-2 is recommended only for patients who meet current clinical and/or epidemiological criteria defined by federal, state, or local public health directives.   LACTATE - Normal    Lactate 1.3      Narrative:     Venipuncture immediately after or during the administration of Metamizole may lead to falsely low results. Testing should be performed immediately prior to Metamizole dosing.   URINALYSIS WITH REFLEX CULTURE AND MICROSCOPIC - Normal    Color, Urine Light-Yellow      Appearance, Urine Clear      Specific Gravity, Urine 1.009      pH, Urine 6.5      Protein, Urine NEGATIVE      Glucose, Urine Normal      Blood, Urine NEGATIVE      Ketones, Urine NEGATIVE      Bilirubin, Urine NEGATIVE      Urobilinogen, Urine Normal      Nitrite, Urine NEGATIVE      Leukocyte Esterase, Urine NEGATIVE     MAGNESIUM - Normal    Magnesium 1.70     PROTEIN, TOTAL - Normal    Total Protein 6.8     LACTATE DEHYDROGENASE - Normal         BLOOD CULTURE   BLOOD CULTURE   MRSA SURVEILLANCE FOR VANCOMYCIN DE-ESCALATION, PCR   STERILE FLUID CULTURE/SMEAR   AFB CULTURE/SMEAR   TROPONIN SERIES- (INITIAL, 1 HR)    Narrative:     The following orders were created for panel order Troponin I Series, High Sensitivity (0, 1 HR).  Procedure                               Abnormality         Status                     ---------                               -----------         ------                     Troponin I, High Sensiti...[[id number]]  Normal              Final result               Troponin, High Sensitivi...[[id number]]  Abnormal            Final result                 Please view results for these tests on the individual orders.   URINALYSIS WITH REFLEX CULTURE AND MICROSCOPIC    Narrative:     The following  orders were created for panel order Urinalysis with Reflex Culture and Microscopic.  Procedure                               Abnormality         Status                     ---------                               -----------         ------                     Urinalysis with Reflex C...[[id number]]  Normal              Final result               Extra Urine Gray Tube[[id number]]                            In process                   Please view results for these tests on the individual orders.   EXTRA URINE GRAY TUBE   PH, BODY FLUID   LACTATE DEHYDROGENASE, BODY FLUID    Narrative:     The following orders were created for panel order Lactate Dehydrogenase, Fluid.  Procedure                               Abnormality         Status                     ---------                               -----------         ------                     Lactate Dehydrogenase, B...[[id number]]                      In process                   Please view results for these tests on the individual orders.   GLUCOSE, BODY FLUID    Narrative:     The following orders were created for panel order Glucose, Fluid.  Procedure                               Abnormality         Status                     ---------                               -----------         ------                     Glucose, Body Fluid[[id number]]                              In process                   Please view results for these tests on the individual orders.   PROTEIN, TOTAL, BODY FLUID    Narrative:     The following orders were created for panel order Protein, Total Fluid.  Procedure                               Abnormality         Status                     ---------                               -----------         ------                     Protein, Total, Body Fluid[[id number]]                       In process                   Please view results for these tests on the individual orders.   BODY FLUID CELL COUNT WITH DIFFERENTIAL    Narrative:     The following  orders were created for panel order Body Fluid Cell Count With Differential.  Procedure                               Abnormality         Status                     ---------                               -----------         ------                     Body Fluid Cell Count[[id number]]                            In process                 Body Fluid Differential[[id number]]                          In process                   Please view results for these tests on the individual orders.   LACTATE DEHYDROGENASE, BODY FLUID   GLUCOSE, BODY FLUID   PROTEIN, TOTAL, BODY FLUID   BODY FLUID CELL COUNT   PROCALCITONIN   CYTOLOGY CONSULTATION (NON-GYNECOLOGIC)   BODY FLUID CELL DIFFERENTIAL     All other labs were within normal range or not returned as of this dictation.  Imaging  XR chest 1 view   Final Result   1. Loculated right basilar pneumothorax status post chest tube   placement. This likely represents an ex vacuo pneumothorax.   2. Diffuse interstitial infiltrates and right basilar airspace   consolidation, as above. Clinical correlation and continued follow-up   until clearing is recommended.        MACRO:   None.        Signed by: Otis Rodrigues 5/10/2025 7:07 AM   Dictation workstation:   [id number]      XR chest 1 view   Final Result   1. Interval development of right basilar pneumothorax.   2. Interval placement of a right-sided pigtail pleural catheter with   resolution of the right pleural effusion.   3. Bilateral interstitial thickening with diffuse bilateral airspace   opacities, may be secondary to pulmonary edema and/or pneumonia.   4. Emphysema.                  MACRO:   Anjelica Morejon discussed the significance and urgency of this   critical finding by telephone with  IDALIA LUGO on 5/10/2025 at   5:22 am.  (**-RCF-**) Findings:  See findings.             Signed by: Anjelica Morejon 5/10/2025 5:29 AM   Dictation workstation:   [id number]      CT angio chest for pulmonary embolism   Final Result    Interval development of left upper lobe pulmonary embolism and   segmental/subsegmental branches. Interval development of enlargement   of the right ventricle compared to the left which may be seen with   right heart strain.        Redemonstrated diffuse bronchial wall thickening and right perihilar   soft tissue. Right lower lobe consolidation again noted which may be   related to mass, postobstructive atelectasis and/or pneumonia.        Moderate to large right pleural effusion, small left pleural effusion   and moderate pericardial effusion again noted.        Diffuse interstitial prominence with hazy parenchymal opacities   suggestive of interstitial/parenchymal edema. Slightly consolidative   airspace opacity anomaly and left upper lobe suggestive of   superimposed infection, similar to prior imaging.        Extensive osseous metastasis with destructive lytic lesions most   significant in the bilateral scapula and humerus. Findings are   similar to prior imaging with concern for pathologic right humeral   shaft fracture.        Diffuse bone marrow edema and additional findings as detailed.        MACRO:   Ana Diaz discussed the significance and urgency of this   critical finding [employer] with confirmation of receipt   with  IDALIA LUGO on 5/10/2025 at 3:01 am.  (**-RCF-**)   Findings:  See findings.        Signed by: Ana Diaz 5/10/2025 3:02 AM   Dictation workstation:   [id number]      XR chest 1 view   Final Result   Increasing patchy airspace opacity left mid lung may reflect   worsening pneumonia compared to 05/04/2025. Otherwise similar/stable   exam.        MACRO:   None        Signed by: Emre Keith 5/9/2025 11:20 PM   Dictation workstation:   [id number]      XR shoulder right 2+ views   Final Result   New nondisplaced pathologic fracture of the right mid humeral   diaphysis through a 10 cm lytic lesion.        Lytic lesion of the right glenoid neck, coracoid process  and scapular   body.        Loculated right pleural effusion.        MACRO:   None.        Signed by: Joe Gray 5/9/2025 11:25 PM   Dictation workstation:   [id number]      Vascular US lower extremity venous duplex bilateral    (Results Pending)   Transthoracic Echo Complete    (Results Pending)      Procedure  Chest Tube Insertion    Performed by: Torsten Mitchell DO  Authorized by: Kash Napier DO    Consent:     Consent obtained:  Written    Consent given by:  Patient    Risks, benefits, and alternatives were discussed: yes      Risks discussed:  Bleeding, incomplete drainage, nerve damage, pain, damage to surrounding structures and infection    Alternatives discussed:  Delayed treatment, alternative treatment, observation, no treatment and referral  Universal protocol:     Procedure explained and questions answered to patient or proxy's satisfaction: yes      Relevant documents present and verified: yes      Test results available: yes      Imaging studies available: yes      Required blood products, implants, devices, and special equipment available: yes      Site/side marked: yes      Immediately prior to procedure, a time out was called: yes      Patient identity confirmed:  Verbally with patient, arm band, provided demographic data and hospital-assigned identification number  Pre-procedure details:     Skin preparation:  Chlorhexidine    Preparation: Patient was prepped and draped in the usual sterile fashion    Sedation:     Sedation type:  None  Anesthesia:     Anesthesia method:  Local infiltration    Local anesthetic:  Lidocaine 1% w/o epi  Procedure details:     Placement location:  R lateral    Scalpel size:  11    Tube size (Serbian): 14.    Ultrasound guidance: yes      Tension pneumothorax: no      Tube connected to:  Suction and water seal    Drainage characteristics:  Serosanguinous    Suture material: 3-0 silk.    Dressing:  4x4 sterile gauze  Post-procedure details:      "Post-insertion x-ray findings: tube in good position      Procedure completion:  Tolerated well, no immediate complications  Critical Care    Performed by: Kash Napier DO  Authorized by: Kash Napier DO    Critical care provider statement:     Critical care time (minutes):  40  Comments:      40 minutes critical care time for complex medical decision making, evaluation with reevaluations of multiple lung evaluations and titration of oxygen in respiratory distress with effusion.  Reevaluation post effusion drainage with chest x-ray, repeat chest x-ray for worsening shortness of breath, discussion with the admitting team, admission to the hospital as well as obtain with the family.    EMERGENCY DEPARTMENT COURSE/MDM:   Medical Decision Making    Vitals:    Vitals:    05/10/25 0600 05/10/25 0630 05/10/25 0700 05/10/25 0800   BP: (!) 185/108 122/86 105/83 99/74   BP Location:  Left arm     Patient Position:  Lying     Pulse: (!) 114 110 (!) 112 (!) 112   Resp: (!) 40 21 18 14   Temp:  36.6 °C (97.9 °F) 36.8 °C (98.2 °F)    TempSrc:  Temporal Temporal    SpO2: (!) 90% 96% 96% 96%   Weight:  51.3 kg (113 lb 1.5 oz)     Height:  1.676 m (5' 6\")       Jovon Mccartney is a male 50 y.o. who presents to the ER for worsening shortness of breath as well as right arm pain.. On arrival the patients vital signs were: Afebrile, Tachycardic, Normotensive, Tachypneic, and Normoxic on supplemental oxygen. History obtained from: patient and Spouse.  Given history of cancer, acute on chronic hypoxic respiratory failure, decreased range of motion of right arm plan for cardiac workup as well as CT PE, x-ray of right shoulder.  Given tachycardia and tachypnea, recent pneumonia, Hoovre Zosyn azithromycin started for septic coverage.  Given hypoxia, lower extremity edema, and concern for potential fluid overload, will only provide 1 L of NS.  ED Course as of 05/10/25 0810   Fri May 09, 2025   2248 CBC and Auto " Differential(!)  Leukocytosis, redemonstration of anemia, no acute thrombosis or thrombocytopenia [CB]   Sat May 10, 2025   0038 Coronavirus 2019, PCR: Not Detected [CB]   0038 Flu A Result: Not Detected [CB]   0038 Flu B Result: Not Detected [CB]   0038 RSV PCR: Not Detected [CB]   0038 Comprehensive Metabolic Panel(!)  Hyponatremia, otherwise normoglycemic, no acute electrolyte or hepatorenal abnormality [CB]   0038 Lactate: 1.3  Not elevated doubt septic shock [CB]   0038 On reassessment tachycardia improved [CB]   0039 Troponin I, High Sensitivity(!): 28  Not elevated up to 15 points relative to initial troponin, doubt doubt acs or myopericarditis [CB]   0039 Troponin I, High Sensitivity: 16  Not elevated doubt acs or myopericarditis [CB]   0039 BNP: 38  Not elevated doubt acute heart failure exacerbation [CB]   0039 XR shoulder right 2+ views  New nondisplaced pathologic fracture of the right mid humeral  diaphysis through a 10 cm lytic lesion.      Lytic lesion of the right glenoid neck, coracoid process and scapular  body.      Loculated right pleural effusion   [CB]   0039 XR chest 1 view  Increasing patchy airspace opacity left mid lung may reflect  worsening pneumonia compared to 05/04/2025. Otherwise similar/stable  exam.   [CB]   0116 Discussed pathologic fracture with Dr. Dolan who recommends coaptation splint, will ultimately need orthopedic oncology, may follow up outpatient. Agreeable to be on consult.  [CB]   0255 PESI 171 (age, sex, CHF, cancer, NSCLC,  Hx, HR, RR, hypoxia) [CB]   0258 [facility], who will call back [CB]   0400 Given worsening hypoxia, moderate to large pleural effusion, discussed chest tube with family and patient who consented to proceed with the procedure. [CB]   [facility], thrombectomy not indicated, limit to medical therapy. Echo, BLE doppler, heparin, admit at [facility]. No transfer.  [CB]   0530 XR chest 1  view  1. Interval development of right basilar pneumothorax.  2. Interval placement of a right-sided pigtail pleural catheter with  resolution of the right pleural effusion.  3. Bilateral interstitial thickening with diffuse bilateral airspace  opacities, may be secondary to pulmonary edema and/or pneumonia.  4. Emphysema.   [CB]   0615 Oxygenation worsening, will repeat chest x-ray to assess for worsening of pneumothorax [CB]   0707 XR chest 1 view  1. Loculated right basilar pneumothorax status post chest tube  placement. This likely represents an ex vacuo pneumothorax.  2. Diffuse interstitial infiltrates and right basilar airspace  consolidation, as above. Clinical correlation and continued follow-up  until clearing is recommended.   [CB]      ED Course User Index  [CB] Tortsen Mitchell DO         Diagnoses as of 05/10/25 0810   Hyponatremia   Other closed fracture of shaft of right humerus, initial encounter   Pneumonia of right lower lobe due to infectious organism   Acute hypoxic respiratory failure   Pleural effusion   Pneumothorax ex vacuo     External Records Reviewed: I reviewed recent and relevant outside records including inpatient notes, outpatient records    Shared decision making for disposition  Patient and/or patient´s representative was counseled regarding labs, imaging, likely diagnosis. All questions were answered. Recommendation was made   for Admission given the need for further escalation of care to inpatient management. Patient agreed and was admitted in stable condition. Admitting team was notified of any pending labs or imaging to ensure continuity of care.     ED Medications administered this visit:    Medications   oxygen (O2) therapy (60 percent inhalation Start 5/10/25 0630)   heparin 25,000 Units in dextrose 5% 250 mL (100 Units/mL) infusion (premix) (800 Units/hr intravenous New Bag 5/10/25 0510)   heparin bolus from bag 2,000-4,000 Units (has no administration in time range)    piperacillin-tazobactam (Zosyn) 3.375 g in dextrose (iso) IV 50 mL (3.375 g intravenous New Bag 5/10/25 0737)   azithromycin (Zithromax) 500 mg in dextrose 5%  mL (has no administration in time range)   potassium chloride CR (Klor-Con M20) ER tablet 20 mEq (has no administration in time range)     Or   potassium chloride (Klor-Con) packet 20 mEq (has no administration in time range)   potassium chloride CR (Klor-Con M20) ER tablet 40 mEq (has no administration in time range)     Or   potassium chloride (Klor-Con) packet 40 mEq (has no administration in time range)   potassium chloride 20 mEq in sterile water for injection 100 mL (has no administration in time range)   magnesium sulfate 2 g in sterile water for injection 50 mL (has no administration in time range)   magnesium sulfate 4 g in sterile water for injection 100 mL (has no administration in time range)   gabapentin (Neurontin) capsule 300 mg (has no administration in time range)   mirtazapine (Remeron) tablet 15 mg (has no administration in time range)   morphine CR (MS Contin) 12 hr tablet 30 mg (has no administration in time range)     And   morphine CR (MS Contin) 12 hr tablet 60 mg (has no administration in time range)   pantoprazole (ProtoNix) EC tablet 40 mg (40 mg oral Not Given 5/10/25 0809)   predniSONE (Deltasone) tablet 20 mg (has no administration in time range)     Followed by   predniSONE (Deltasone) tablet 10 mg (has no administration in time range)   ipratropium-albuteroL (Duo-Neb) 0.5-2.5 mg/3 mL nebulizer solution 3 mL (3 mL nebulization Given 5/10/25 6103)   ondansetron (Zofran) injection 8 mg (has no administration in time range)   pilocarpine (Salagen) tablet 5 mg (has no administration in time range)   polyethylene glycol (Glycolax, Miralax) packet 17 g (has no administration in time range)   acetaminophen (Tylenol) tablet 487.5 mg (has no administration in time range)   docusate sodium (Colace) capsule 100 mg (has no  administration in time range)   oxyCODONE (Roxicodone) immediate release tablet 10 mg (has no administration in time range)   ipratropium-albuteroL (Duo-Neb) 0.5-2.5 mg/3 mL nebulizer solution 3 mL (3 mL nebulization Not Given 5/10/25 0626)   piperacillin-tazobactam (Zosyn) 4.5 g in dextrose (iso)  mL (0 g intravenous Stopped 5/10/25 0039)   azithromycin (Zithromax) 500 mg in dextrose 5%  mL (0 mg intravenous Stopped 5/10/25 0230)   vancomycin (Vancocin) 1 g in dextrose 5%  mL (0 g intravenous Stopped 5/10/25 0347)   sodium chloride 0.9 % bolus 1,000 mL (0 mL intravenous Stopped 5/10/25 0140)   HYDROmorphone (Dilaudid) injection 0.5 mg (0.5 mg intravenous Given 5/10/25 0054)   LORazepam (Ativan) injection 0.5 mg (0.5 mg intravenous Given 5/10/25 0148)   iohexol (OMNIPaque) 350 mg iodine/mL solution 60 mL (60 mL intravenous Given 5/10/25 0217)   HYDROmorphone (Dilaudid) injection 0.5 mg (0.5 mg intravenous Given 5/10/25 0238)   heparin bolus from bag 3,600 Units (3,600 Units intravenous Bolus from Bag 5/10/25 0513)   acetaminophen (Tylenol) tablet 650 mg (650 mg oral Given 5/10/25 0519)   furosemide (Lasix) injection 60 mg (60 mg intravenous Given 5/10/25 0524)       New Prescriptions from this visit:    Current Discharge Medication List          Follow-up:  No follow-up provider specified.      Final Impression:   1. Acute hypoxic respiratory failure    2. Hyponatremia    3. Other closed fracture of shaft of right humerus, initial encounter    4. Pneumonia of right lower lobe due to infectious organism    5. Fever, unspecified fever cause    6. Pleural effusion    7. Pneumothorax ex vacuo          Please excuse any misspellings or unintended errors related to the Dragon speech recognition software used to dictate this note.    I reviewed the case with the attending ED physician. The attending ED physician agrees with the plan.        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    Hilar mass 09/15/2024    Juvenile rheumatoid arthritis (Multi) 09/15/2024    Lung cancer (Multi) 2024    Rheumatoid arthritis 10 1974   [2]   Past Surgical History:  Procedure Laterality Date    BRONCHOSCOPY  2024    LUNG BIOPSY  2024   [3]   Family History  Problem Relation Name Age of Onset    Breast cancer Mother JORDY CHURCH     Cancer Mother JORDY CHURCH     Miscarriages / Stillbirths Mother JORDY CHURCH     Cancer Maternal Grandfather HUBER PAL     Stroke Maternal Grandfather HUBER PAL    [4]   Social History  Tobacco Use    Smoking status: Former     Current packs/day: 0.00     Average packs/day: 1.5 packs/day for 39.0 years (58.5 ttl pk-yrs)     Types: Cigarettes     Quit date: 2024     Years since quittin.6     Passive exposure: Past    Smokeless tobacco: Never   Substance Use Topics    Alcohol use: Not Currently     Comment: Stopped alcohol use.    Drug use: Not Currently        Kash Napier DO  25

## 2025-05-10 NOTE — NURSING NOTE
Lower Extremity U/S completed at bedside, 0840    Wife at bedside throughout morning.    Echo completed, 0915    Reported off to Mark LEMUS.

## 2025-05-10 NOTE — SIGNIFICANT EVENT
"PULMONARY EMBOLISM RESPONSE TEAM (PERT) NOTE    This note represents a summary of a virtual evaluation by the pulmonary embolism response team requested by Dr. Mitchell.  Suggestions and impression are summarized from the initial virtual encounter and discussion with the referring medical team. These suggestions supplement but should not be a substitute for bedside evaluation and management by the attending of record.     PERT Members on the Call:  Critical Care Attending: Dr. Antione VAZQUEZ Interventional Cardiology Attending: Dr. Bowie  Critical Care Fellow: Dr. Barnes    Brief Clinical Summary:  Jovon Mccartney is a 50 y.o. male presenting with PE.  Patient has history of stage IV NSCLC on treatment, was  recently hospitalized for acute on chronic hypoxemic respiratory failure due to pneumonia and COPD exacerbation, was discharged on 5/8 on 4L of oxygen. Came bacl to the ED yesterday with worsening SOB and hypoxia requiring 12L of oxygen. CT PE was done that showed acute PE in TIMI segmental and subsegmental branches. Patient was already started on heparin gtt.      Vital Signs  /66   Pulse (!) 110   Temp 36.6 °C (97.9 °F) (Temporal)   Resp (!) 23   Ht 1.676 m (5' 6\")   Wt 45.4 kg (100 lb)   SpO2 97%   BMI 16.14 kg/m²      Laboratory  Recent Labs     05/09/25  2337 05/09/25  2300 05/09/25  2220 04/30/25  0747 04/30/25  0558 04/30/25  0558 09/09/24  0938   TROPHS 28*  --  16 4   < > 6  --    BNP  --   --  38  --   --  26  --    LACTATE  --  1.3  --   --   --  1.9 1.5    < > = values in this interval not displayed.         PESI Score Olvin SANTIAGO Et al. Arch Intern Med. 2010;[id number].           PESI Score:  121, consistent with JLPESIRISK: Class IV, or High risk (4-11.4% 30-day mortality risk) PE.    CT Scan reviewed:  Clot burden segmental and subsegmental, present in TIMI  RV/LV ratio 1.3 by CT scan    TTE reviewed (if available):  NA    Venous duplex (if available):  NA    Contraindications to " anticoagulation: no  Contraindications to thrombolytics: no    Initial Impressions:  ERS/ESC Pulmonary Embolism Risk Category: JLPECLASS: Intermediate-low risk.  Small segmental and subsegmental PE. With slightly elevated troponin and normal BNP.     Initial Suggestions/Plans:  No indications for thrombectomy at the moment.  Admit to [facility].  Initial therapy suggested: Heparin gtt.  Additional testing recommended: TTE, LE doppler US.  Consults suggested: pulmonary or vascular medicine if help is needed.  Additional suggestions for re-evaluation/reconference or retesting: changes in hemodynamics due to PE.    To re conference the PERT team please call the  Transfer Center at [phone].

## 2025-05-10 NOTE — NURSING NOTE
RN called to bedside by PCNA patient stated he couldn't breath, was in visible distress and pulse ox 88% respiratory paged, resident called to bedside, patient placed on non rebreather, pulse ox up to 100%. Lungs wheezing and crackly throughout. Stat CXR and breathing treatment ordered.   1522- patient off non rebreather and back to high flow. Resp at bedside for breathing tx, ICU attending also at bedside. Pulse ox 96% on 60% high flow 02.

## 2025-05-10 NOTE — PROGRESS NOTES
Medical critical care progress note and updates 5/10    Interval history: Mr. Mccartney is a 50-year-old male who is hospitalized for multifocal pneumonia and pathologic right humeral fracture.  Overnight, he remained on high flow nasal cannula.  CT in place in the right lateral chest wall with 24-hour drainage approximately 1500 cc.  He remains on antibiotics with Zosyn and azithromycin given concern for multifocal pneumonia.  On rounds, imaging reviewed demonstrating concern for trapped lung given his malignancy history.  CT transitioned from intermittent suction to waterseal.  He was diuresed overnight, however significantly collapsible IVC demonstrated on echo and given 1 L LR bolus for volume support.  Throughout the day on 5/10, was managed well from an oxygenation standpoint on HFNC at 50% 30 L.  Acute hypoxia likely in the setting of mild COPD flare given he had not received bronchodilators in the setting of a severe COPD history with emphysema and significant pleural blebs.  Stat CXR demonstrating no worsening of an ex vacuo pneumothorax and no worsening of pulmonary  vascular congestion or pulmonary edema.  He was given a breathing treatment and oxygen saturations improved and clinical status improved.    Objective:    VITALS: I have reviewed the triage vital signs.   GENERAL: Appears older than stated age.  Chronically ill-appearing.  Cachectic and severe protein malnutrition with temporal wasting  NEURO: Alert and oriented. Moves all extremities. Face is symmetric and expressive.   EYES:  No scleral icterus or conjunctival injection. No discharge.   HENT: Normocephalic, atraumatic. Hearing is grossly intact. Nares grossly patent and without discharge. Mucous membranes moist.   NECK: No JVD. Patient moves neck without restriction.   CARDIO: Rhythm regular.  Tachycardic rate.    PULM: Symmetric chest rise bilaterally.  Patient does not accessory muscle use, tachypnea, increased work of breathing.  Diminished  lung sounds on the right.  GI/: Abdomen nondistended, soft and nontender.  EXTREMITIES: Symmetric muscle bulk.  SKIN: Warm and dry. Normal turgor. No rash or lesions appreciated.    Assessment: 50-year-old male presenting with acute hypoxemic respiratory failure 2/2 hospital-acquired pneumonia in addition to pathologic right humeral fracture in the setting of known NSCLC    Plan:    Neuro:  # No acute concerns  -CAM ICU  -Continue home oxycodone, morphine pain regimen  -Tylenol as needed  -Zofran as needed for nausea  -Continue home gabapentin.  -Continue home Remeron     Cardio:  # PE  -CT demonstrating left upper lobe PE.  PERT team contacted given concern for right heart strain recommending TTE, venous duplex ultrasound and heparin gtt.  - TTE completed, pending read  -BL duplex venous ultrasound lower extremity: Negative for acute DVT  -EKG shows sinus tachycardia, no acute injury pattern.  QT within normal limits.  -K above 4, mag above 2.     Pulm:  # AHRF 2/2 hospital-acquired pneumonia  # COPD, previously on 2 L NC  # Parapneumonic versus malignant effusion  # PE  -Currently on HFNC at 50% 30 L (goal SpO2 greater than 90%)  - Small bore CT placed 5/9: Daily x-ray.  Concern for ex vacuo pneumothorax given concern for trapped lung.  Continue CT at [facility]  - Pleural fluid: Exudative with 2% unclassified cells, 53% neutrophils, 41% lymphocytes  - Current antibiotics: Zosyn and azithromycin (5/10--)  - Pending blood cultures  - Continuing home prednisone taper from prior COPD  - Continue scheduled and as needed DuoNebs    GI:  # No acute concerns  -Continue home bowel regimen polyethylene glycol  -Will keep patient n.p.o. while on high flow with concern for increasing oxygen requirements.  -Pantoprazole daily, home medication     :  # Hyponatremia, likely chronic  - cc  - Received 1 L IVF in the ED followed by 60 mg IV Lasix.  On 5/10, received 1 L LR  - Hyponatremia persists, pending urine and  serum osmolality as well as urine electrolytes  - High likelihood that hyponatremia is chronic in nature 2/2 SIADH 2/2 malignancy    Heme/onc:  # History of an NSCLC C/B bony metastasis  - Continue to monitor for signs of bleeding.  Platelets within normal limits, patient is on heparin for pulmonary embolism.  Daily CBC.  -Patient has a history of NSCLC, on immunotherapy.  Every 21 days.  Consider oncology consult however patient states he had immunotherapy on Saturday, not due for another 3 weeks.     Endo:  # No acute concerns  -Goal blood sugar less than 180     ID:  # Hospital-acquired pneumonia  # Parapneumonic effusion  -MRSA negative    -Follow blood cultures, urine cultures.  Treating with Zosyn, azithromycin at this time for pneumonia.  MRSA swab was negative, discontinued vancomycin.  -Pleural fluid studies pending  -Procalcitonin pending    Skin/MSK:  # Pathologic fracture of the right midshaft humerus  - Orthopedic surgery on consult, in coaptation splint  -Pathological fracture of the right midshaft humerus       Dispo: Patient on high flow nasal cannula, worsening oxygen requirement, unstable vital signs, requiring ICU care      Patient seen and discussed with attending physician    Elian Veliz, DO  Internal Medicine, PGY-III

## 2025-05-10 NOTE — H&P
50-year-old, history of non-small cell lung cancer on immunotherapy status post chemoradiation, juvenile rheumatoid arthritis, CHF, comes to the emergency room for shortness of breath.  He was just discharged for pneumonia, on oxygen.  He was having worsening shortness of breath and chest tightness since this morning, pulse ox was dropping at home, his wife uptitrated his oxygen to 9 L.  He did try rolling his shoulder when he went home yesterday, felt a pop and has not been able to move his right arm.  He gets immunotherapy every 21 days, said he had it on Saturday.  Patient is no fevers or chills, no cough.  No abdominal pain.    ED course:    Patient presented to the ER he was tachycardic, tachypneic, hypoxic requiring oxygen.  CBC did show slight leukocytosis, otherwise showed a stable hemoglobin, normal platelets.  Chemistry showed normal kidney, liver function, did show an acute on chronic hyponatremia.  Troponin was elevated 16, up trended to 28, 44.  UA negative, viral swab negative, lactic negative.  X-ray of the right shoulder did show a nondisplaced pathologic right mid humeral diaphysis fracture through a 10 cm lytic lesion.  Orthopedics consulted, recommended coaptation splint, said the patient would likely need outpatient orthopedic oncology follow-up.  Orthopedics will be on consult here.  CT PE study did show a segmental pulmonary embolus.  This was in the left upper lobe.  There was concern for possible right heart strain.  CT also showed concern for interstitial and parenchymal edema, consolidative airspace opacity concerning for infection.  Patient received broad-spectrum antibiotic coverage in the ER.  There was also a moderate to large right pleural effusion.  There is a moderate pericardial effusion as well which does appear to be chronic.  Patient received a chest tube as well, pleural fluid samples were sent in the emergency room as well. Patient received vancomycin, Zosyn, azithromycin, a  bolus of saline.    Physical Exam:    VITALS: I have reviewed the triage vital signs.   GENERAL: Appears older than stated age.  Chronically ill-appearing  NEURO: Alert and oriented. Moves all extremities. Face is symmetric and expressive.   EYES:  No scleral icterus or conjunctival injection. No discharge.   HENT: Normocephalic, atraumatic. Hearing is grossly intact. Nares grossly patent and without discharge. Mucous membranes moist.   NECK: No JVD. Patient moves neck without restriction.   CARDIO: Rhythm regular.  Tachycardic rate.    PULM: Symmetric chest rise bilaterally.  Patient does not accessory muscle use, tachypnea, increased work of breathing.  Diminished lung sounds on the right.  GI/: Abdomen nondistended, soft and nontender.  EXTREMITIES: Symmetric muscle bulk.  SKIN: Warm and dry. Normal turgor. No rash or lesions appreciated.    Assessment:    Acute hypoxic respiratory failure-multifactorial  Pneumonia  Pulmonary embolism  Pathologic right midshaft humerus fracture  Acute on chronic hyponatremia  Pleural effusion  Pericardial effusion  Elevated troponin  Malnutrition  Chronic anemia  Non-small cell lung cancer  CHF  Juvenile RA    Plan:    Neuro:  -CAM ICU  -Continue home oxycodone, morphine pain regimen  -Tylenol as needed  -Zofran as needed for nausea  -Continue home gabapentin.  -Continue home Remeron    Cardio:  -Concern for right heart strain on CT scan, echo ordered and pending  -duplex ultrasound bilateral lower extremity ordered and pending  -EKG shows sinus tachycardia, no acute injury pattern.  QT within normal limits.  -K above 4, mag above 2.    Pulm:  -Acute hypoxic respiratory failure likely secondary to large pleural effusion versus pulmonary edema versus pneumonia and pulmonary embolism.  Likely multifactorial in nature  -Patient is on heparin drip for pulmonary embolism.  [place] [facility] [place] recommended duplex ultrasound of bilateral lower extremities, not a  candidate for thrombectomy.  -Pleural fluid studies sent off by ER for testing.  Patient received chest tube in ER.  -Continue antibiotics for pneumonia  -Continue prednisone taper given patient was discharged on prednisone taper for possible COPD exacerbation during last hospitalization  -Patient placed on high flow nasal cannula, wean oxygen as tolerated.  Goal saturation greater than 88%.  - DuoNebs as needed  - Repeat chest x-ray after chest tube placement shows worsening interstitial infiltrates concerning for worsening pulmonary edema.  Patient did have 850 cc of urine output so far in ER.  Ordered 60 mg IV Lasix.    GI:  -Continue home bowel regimen polyethylene glycol  -Will keep patient n.p.o. while on high flow with concern for increasing oxygen requirements.  -Pantoprazole daily, home medication.    :  -Monitor urine output.  - No acute issues otherwise  -hyponatremia acute on chronic in nature, received 1L NS in ED. Will reassess after AM labs.    Heme/onc  - Continue to monitor for signs of bleeding.  Platelets within normal limits, patient is on heparin for pulmonary embolism.  Daily CBC.  -Patient has a history of NSCLC, on immunotherapy.  Every 21 days.  Consider oncology consult however patient states he had immunotherapy on Saturday, not due for another 3 weeks.    Endo:  -Goal blood sugar less than 180    ID:  -Follow blood cultures, urine cultures.  Treating with Zosyn, azithromycin at this time for pneumonia.  MRSA swab was negative, discontinued vancomycin.  -Pleural fluid studies pending  -Procalcitonin pending    Skin:  - Standard wound care orders per nursing    MSK:  -Pathological fracture of the right midshaft humerus  -In coaptation splint  -Orthopedics consulted, recs appreciated    Dispo: Patient on high flow nasal cannula, worsening oxygen requirement, unstable vital signs, requiring ICU care    Jose Cruz Mas DO  PGY-4  CCM

## 2025-05-11 NOTE — CARE PLAN
Problem: Pain - Adult  Goal: Verbalizes/displays adequate comfort level or baseline comfort level  Outcome: Progressing     Problem: Safety - Adult  Goal: Free from fall injury  Outcome: Progressing     Problem: Discharge Planning  Goal: Discharge to home or other facility with appropriate resources  Outcome: Progressing     Problem: Chronic Conditions and Co-morbidities  Goal: Patient's chronic conditions and co-morbidity symptoms are monitored and maintained or improved  Outcome: Progressing     Problem: Nutrition  Goal: Nutrient intake appropriate for maintaining nutritional needs  Outcome: Progressing     Problem: Fall/Injury  Goal: Not fall by end of shift  Outcome: Progressing  Goal: Be free from injury by end of the shift  Outcome: Progressing  Goal: Verbalize understanding of personal risk factors for fall in the hospital  Outcome: Progressing  Goal: Verbalize understanding of risk factor reduction measures to prevent injury from fall in the home  Outcome: Progressing  Goal: Use assistive devices by end of the shift  Outcome: Progressing  Goal: Pace activities to prevent fatigue by end of the shift  Outcome: Progressing     Problem: Pain  Goal: Takes deep breaths with improved pain control throughout the shift  Outcome: Progressing  Goal: Turns in bed with improved pain control throughout the shift  Outcome: Progressing  Goal: Walks with improved pain control throughout the shift  Outcome: Progressing  Goal: Performs ADL's with improved pain control throughout shift  Outcome: Progressing  Goal: Participates in PT with improved pain control throughout the shift  Outcome: Progressing  Goal: Free from opioid side effects throughout the shift  Outcome: Progressing  Goal: Free from acute confusion related to pain meds throughout the shift  Outcome: Progressing     Problem: Skin  Goal: Prevent/manage excess moisture  Outcome: Progressing  Flowsheets (Taken 5/10/2025 1115)  Prevent/manage excess moisture: Cleanse  incontinence/protect with barrier cream  Goal: Prevent/minimize sheer/friction injuries  Outcome: Progressing  Flowsheets (Taken 5/10/2025 2243)  Prevent/minimize sheer/friction injuries: Use pull sheet  Goal: Promote skin healing  Outcome: Progressing  Flowsheets (Taken 5/10/2025 2243)  Promote skin healing: Turn/reposition every 2 hours/use positioning/transfer devices   The patient's goals for the shift include      The clinical goals for the shift include to manage anxiety and subsequent increased WOB during brief episodes of anxiety.

## 2025-05-11 NOTE — CARE PLAN
Problem: Pain - Adult  Goal: Verbalizes/displays adequate comfort level or baseline comfort level  Outcome: Progressing     Problem: Safety - Adult  Goal: Free from fall injury  Outcome: Progressing     Problem: Discharge Planning  Goal: Discharge to home or other facility with appropriate resources  Outcome: Progressing     Problem: Chronic Conditions and Co-morbidities  Goal: Patient's chronic conditions and co-morbidity symptoms are monitored and maintained or improved  Outcome: Progressing     Problem: Nutrition  Goal: Nutrient intake appropriate for maintaining nutritional needs  Outcome: Progressing     Problem: Fall/Injury  Goal: Not fall by end of shift  Outcome: Progressing  Goal: Be free from injury by end of the shift  Outcome: Progressing  Goal: Verbalize understanding of personal risk factors for fall in the hospital  Outcome: Progressing  Goal: Verbalize understanding of risk factor reduction measures to prevent injury from fall in the home  Outcome: Progressing  Goal: Use assistive devices by end of the shift  Outcome: Progressing  Goal: Pace activities to prevent fatigue by end of the shift  Outcome: Progressing     Problem: Pain  Goal: Takes deep breaths with improved pain control throughout the shift  Outcome: Progressing  Goal: Turns in bed with improved pain control throughout the shift  Outcome: Progressing  Goal: Walks with improved pain control throughout the shift  Outcome: Progressing  Goal: Performs ADL's with improved pain control throughout shift  Outcome: Progressing  Goal: Participates in PT with improved pain control throughout the shift  Outcome: Progressing  Goal: Free from opioid side effects throughout the shift  Outcome: Progressing  Goal: Free from acute confusion related to pain meds throughout the shift  Outcome: Progressing     Problem: Skin  Goal: Prevent/manage excess moisture  Outcome: Progressing  Goal: Prevent/minimize sheer/friction injuries  Outcome:  Progressing  Goal: Promote skin healing  Outcome: Progressing

## 2025-05-12 ENCOUNTER — APPOINTMENT (OUTPATIENT)
Facility: CLINIC | Age: 50
End: 2025-05-12
Payer: COMMERCIAL

## 2025-05-12 NOTE — PROGRESS NOTES
Occupational Therapy                 Therapy Communication Note    Patient Name: Jovon Mccartney  MRN: [id number]  Department: [facility] ICU  Room: 2125/2125-A  Today's Date: 5/12/2025     Discipline: Occupational Therapy          Missed Visit Reason:  OT orders received and chart reviewed. Pt with BP in 80s/30s and demos increased work of breathing at rest. Pt on 60L 60% HFNC. Will hold and attempt again as medically appropriate.     Missed Time: Attempt    Comment:

## 2025-05-12 NOTE — PROGRESS NOTES
Physical Therapy                 Therapy Communication Note    Patient Name: Jovon Mccartney  MRN: [id number]  Department: [facility] ICU  Room: 2125/2125-A  Today's Date: 5/12/2025     Discipline: Physical Therapy    PT Missed Visit: Yes     Missed Visit Reason: Missed Visit Reason: Other (Comment) (Orders received and chart review completed. Attempted to work with pt at 1126. Pt with BP in 80s/30s and demos increased work of breathing at rest. Pt on 60L 60% HFNC. Will continue to follow up with pt.)    Missed Time: Attempt    Comment:

## 2025-05-12 NOTE — PROGRESS NOTES
"Medical Intensive Care - Daily Progress Note   Subjective    Jovon Mccartney is a 50 y.o. year old male patient admitted on 5/9/2025 with following ICU needs: AHRF 2/2 hospital-acquired pneumonia plus pathologic fracture of the right humerus in the setting of known NSCLC    Interval History:  Palliative care consulted  Continue azithromycin and Zosyn x 7 days for aspiration pneumonia (stop date 5/17)  1 L IVF given dehydration on labs  Pulmicort and formoterol added as patient takes Breztri at home  Remains on HFNC, currently at 50L 50%    Meds    Scheduled medications  Scheduled Medications[1]  Continuous medications  Continuous Medications[2]  PRN medications  PRN Medications[3]     Objective    Blood pressure 95/64, pulse 102, temperature 36.4 °C (97.5 °F), resp. rate 20, height 1.676 m (5' 6\"), weight 50 kg (110 lb 3.7 oz), SpO2 93%.     Physical Exam   VITALS: I have reviewed the triage vital signs.   GENERAL: Appears older than stated age.  Chronically ill-appearing.  Cachectic and severe protein malnutrition with temporal wasting.  On HFNC 50/50  NEURO: Alert and oriented. Moves all extremities. Face is symmetric and expressive.   EYES:  No scleral icterus or conjunctival injection. No discharge.   HENT: Normocephalic, atraumatic. Hearing is grossly intact. Nares grossly patent and without discharge. Mucous membranes moist.   NECK: No JVD. Patient moves neck without restriction.   CARDIO: Rhythm regular.  Tachycardic rate.    PULM: Symmetric chest rise bilaterally.  Mild increased WOB.  Mild to moderate conversational dyspnea.  Positive for accessory muscle use. Diminished lung sounds   GI/: Abdomen nondistended, soft and nontender.  EXTREMITIES: Symmetric muscle bulk.  SKIN: Warm and dry. Normal turgor. No rash or lesions appreciated.    Intake/Output Summary (Last 24 hours) at 5/12/2025 1313  Last data filed at 5/12/2025 1200  Gross per 24 hour   Intake 1520 ml   Output 1575 ml   Net -55 ml     Labs: "   Results from last 72 hours   Lab Units 05/12/25  0502 05/11/25  0425 05/10/25  0606   SODIUM mmol/L 129* 131* 125*   POTASSIUM mmol/L 3.8 3.2* 3.5   CHLORIDE mmol/L 91* 90* 87*   CO2 mmol/L 34* 31 27   BUN mg/dL 10 10 12   CREATININE mg/dL 0.35* 0.40* 0.35*   GLUCOSE mg/dL 102* 89 137*   CALCIUM mg/dL 8.5* 8.4* 8.6   ANION GAP mmol/L 8* 13 15   EGFR mL/min/1.73m*2 >90 >90 >90   PHOSPHORUS mg/dL 3.0 3.2 4.2      Results from last 72 hours   Lab Units 05/12/25  0502 05/11/25  0425 05/10/25  0606   WBC AUTO x10*3/uL 8.5 9.3 10.1   HEMOGLOBIN g/dL 7.8* 8.0* 9.1*   HEMATOCRIT % 25.0* 25.3* 28.3*   PLATELETS AUTO x10*3/uL 229 239 232   NEUTROS PCT AUTO % 79.4 82.2 85.5   LYMPHS PCT AUTO % 12.5 10.2 6.4   MONOS PCT AUTO % 7.3 7.0 7.5   EOS PCT AUTO % 0.4 0.1 0.0      Micro/ID:     Lab Results   Component Value Date    BLOODCULT No growth at 2 days 05/09/2025    BLOODCULT No growth at 2 days 05/09/2025       Summary of key imaging results from the last 24 hours  CXR 5/12: Stable right pneumothorax, ex vacuo.  Unchanged differential bilateral interstitial thickening, severe emphysema.  Trace left pleural effusion    Assessment and Plan     Assessment: Jovon Mccartney is a 50 y.o. year old male patient admitted on 5/9/2025 with AHRF 2/2 hospital-acquired pneumonia plus pathologic right humeral fracture in the setting of known NSCLC    Mechanical Ventilation: none  Sedation/Analgesia:  none  Restraints: no    Summary for 05/12/25  :  Palliative care consulted  Continue azithromycin and Zosyn x 7 days for aspiration pneumonia  1 L IVF  Pulmicort and formoterol added as patient takes Breztri at home    Plan:  Neuro:  # Anxiety  # Chronic pain 2/2 malignancy  -CAM ICU  -As needed Atarax for anxiety  -Continue home oxycodone, morphine pain regimen  -Tylenol as needed  -Zofran as needed for nausea  -Continue home gabapentin and Remeron     Cardio:  # PE  -CT demonstrating left upper lobe PE.  PERT team contacted given concern for  right heart strain recommending TTE, venous duplex ultrasound and heparin gtt.  - TTE 5/10: EF 66%, grade 1 diastolic dysfunction, normal RV SF, moderate pericardial effusion, moderate TR  -BL duplex venous ultrasound lower extremity: Negative for acute DVT  -EKG shows sinus tachycardia, no acute injury pattern.  QT within normal limits.  -K above 4, mag above 2.     Pulm:  # AHRF 2/2 hospital-acquired pneumonia  # COPD, previously on 2 L NC  # Parapneumonic versus malignant effusion  # PE  # Pneumothorax ex vacu   -Currently on HFNC at 50L 50% (goal SpO2 greater than 90%)  -Keep chest tube to water seal.  24-hour output 450 cc.  Consider clamping trial 5/13.  To continue discussing if patient would benefit from Pleurx, however would require close monitoring outpatient and would involve home oncology team  - Current antibiotics: Zosyn and azithromycin (5/10--5/17)  - Blood and pleural fluids negative.  Fluid studies positive for exudative effusion  - Continue scheduled and as needed DuoNebs  - Added Pulmicort and formoterol, patient's home Breztri held given not on formulary  -Repeat CXR 5/12: Stable ex vacuo pneumothorax.  Daily CXR  - Palliative care consulted     GI:  # Constipation  -Continue home bowel regimen polyethylene glycol  - Adding Senokot given no bowel movement since admission.  On chronic opiates  -Will keep patient n.p.o. while on high flow with concern for increasing oxygen requirements, can feed when flow rate less than/= 40 L  -Pantoprazole daily, home medication     Renal/:  - Renal function stable  - Low serum osmolality.  S/p 1L IVF 5/12    Endo:  # No acute concerns  -Goal blood sugar less than 180     ID:  # Hospital-acquired pneumonia  # Parapneumonic effusion  -MRSA negative. Blood, pleural fluid cultures negative.  - Continue azithromycin and Zosyn (5/10-5/17)  -Procalcitonin 0.31    Skin/MSK:  # Pathologic fracture of the right midshaft humerus  - Orthopedic surgery on consult, in  coaptation splint  -Pathological fracture of the right midshaft humerus  -Per rad-onc team, follow-up appointment with rad onc for palliative RT s/p discharge     Heme/Onc:  #NSCLC with bony metastasis  -Contacted home oncologist, Dr. Vargas, regarding patient's current hospitalization. Oncology team recommending CT A/P w/ IV contrast to complete restaging. Will entertain when patient is more clinically stable and able to lie flat    ICU Check List       ICU Liberation: Intervention:   Assess, Prevent, Manage Pain 5 - Moderate pain Assessed   Both SAT and SBT [] SAT  [] SBT 30-60 min On HFNC 50/50     Choice of analgesia and sedation Jaime Agitation Sedation Scale (RASS): Alert and calm none     Delirium: Assess, prevent and manage  CAM ICU Negative Negative   Early Mobility and Exercise Proceed with mobilization - No exclusion criteria met [x] PT /OT consult   Family Engagement and Empowerment [x]Family updated []SW consult     FEN  Fluids: As needed  Electrolytes: As needed  Nutrition: N.p.o. while on high flow on HFNC  Prophylaxis:  DVT ppx: Therapeutic heparin for PE  GI ppx: Protonix  Bowel care: MiraLAX, added Senokot  Hardware:         Chest Tube Right Pleural  (Active)   Placement Date/Time: 05/10/25 0415   Placed by: Torsten Mitchell  Chest Tube Orientation: Right  Chest Tube Location: (c) Pleural  Chest Tube Drain Tube Size (Fr): (c)    Number of days: 2       External Urinary Catheter (Active)   Placement Date/Time: 05/10/25 2000     Number of days: 1       Social:  Code: Full Code    HPOA: Wife  Disposition: To remain in the ICU while requiring HFNC. Patient on high flow nasal cannula, worsening oxygen requirement, unstable vital signs, requiring ICU care      Malnutrition Diagnosis Status: Active  Malnutrition Diagnosis: Severe malnutrition related to acute disease or injury  Related to: acute on chronic respiratory failure  As Evidenced by: NFPE indicating severe muscle wasting and subcutaneous fat loss,  along with decreased oral intake and suspected intake of <75% of estimated needs  I agree with the dietitian's malnutrition diagnosis.    Elian Veliz DO   05/12/25 at 1:13 PM     Disclaimer: Documentation completed with the information available at the time of input. The times in the chart may not be reflective of actual patient care times, interventions, or procedures. Documentation occurs after the physical care of the patient.          [1] azithromycin, 500 mg, intravenous, Daily  budesonide, 0.5 mg, nebulization, BID  formoterol, 20 mcg, nebulization, q12h  gabapentin, 300 mg, oral, TID  ipratropium-albuteroL, 3 mL, nebulization, q4h BALTAZAR  lactated Ringer's, 1,000 mL, intravenous, Once  mirtazapine, 15 mg, oral, Nightly  morphine CR, 30 mg, oral, BID   And  morphine CR, 60 mg, oral, Nightly  pantoprazole, 40 mg, oral, Daily before breakfast  piperacillin-tazobactam, 3.375 g, intravenous, q8h  polyethylene glycol, 17 g, oral, Daily  predniSONE, 10 mg, oral, Daily  [2] heparin, 0-4,500 Units/hr, Last Rate: 1,200 Units/hr (05/11/25 1904)  [3] PRN medications: acetaminophen **OR** acetaminophen, docusate sodium, heparin, hydrOXYzine HCL, ipratropium-albuteroL, magnesium sulfate, magnesium sulfate, ondansetron, oxyCODONE, oxygen, pilocarpine, potassium chloride CR **OR** potassium chloride, potassium chloride CR **OR** potassium chloride, potassium chloride

## 2025-05-12 NOTE — PROGRESS NOTES
Department of Medicine  Division of Pulmonary, Critical Care, and Sleep Medicine    Jovon Mccartney is a 50 y.o. male on day 2 of admission presenting with Hyponatremia.    Subjective   No acute events overnight.        Objective     Vital Signs      5/11/2025     9:00 PM 5/11/2025    10:00 PM 5/11/2025    10:15 PM 5/11/2025    11:00 PM 5/11/2025    11:10 PM 5/11/2025    11:55 PM 5/12/2025    12:00 AM   Vitals   Systolic 102  105   91 87   Diastolic 71  57   65 64   BP Location       Left arm   Heart Rate 96 88 108 102 102 98 102   Temp       36.9 °C (98.4 °F)   Resp 24 27 22 9 11 12 12        Physical Exam  Vitals and nursing note reviewed.   Constitutional:       General: No acute distress. Chronically ill appearing   HENT:      Cachectic and severe protein calorie malnutrition. Temporal wasting  Eyes:      Conjunctiva/sclera: Conjunctivae normal.   Cardiovascular:      Rate and Rhythm: Normal rate and regular rhythm.   Pulmonary:      Effort: Pulmonary effort is normal. No respiratory distress.  Neurological:      General: No focal deficit present.   Psychiatric:         Mood and Affect: Mood normal.          Labs:  Lab Results   Component Value Date    WBC 9.3 05/11/2025    HGB 8.0 (L) 05/11/2025    HCT 25.3 (L) 05/11/2025    MCV 86 05/11/2025     05/11/2025      Lab Results   Component Value Date    GLUCOSE 89 05/11/2025    CALCIUM 8.4 (L) 05/11/2025     (L) 05/11/2025    K 3.2 (L) 05/11/2025    CO2 31 05/11/2025    CL 90 (L) 05/11/2025    BUN 10 05/11/2025    CREATININE 0.40 (L) 05/11/2025      Lab Results   Component Value Date    ALT 8 (L) 05/09/2025    AST 12 05/09/2025    ALKPHOS 101 05/09/2025    BILITOT 0.6 05/09/2025        Oxygen Therapy  SpO2: 96 %  Medical Gas Therapy: Supplemental oxygen  Medical Gas Delivery Method: High flow nasal cannula  FiO2 (%):  [60 %] 60 %    Intake/Output last 3 Shifts:  I/O last 3 completed shifts:  In: 2576.7 (48.8 mL/kg) [P.O.:120; I.V.:240 (4.5 mL/kg); IV  Piggyback:2216.7]  Out: 3370 (63.8 mL/kg) [Urine:3100 (1.6 mL/kg/hr); Chest Tube:270]  Weight: 52.8 kg       Medications   Scheduled medications  Scheduled Medications[1]  Continuous medications  Continuous Medications[2]  PRN medications  PRN Medications[3]     Allergies  Patient has no known allergies.      Chest Radiograph   CT angio chest for pulmonary embolism 05/10/2025    Narrative  Interpreted By:  Ana Diaz,  STUDY:  CT ANGIO CHEST FOR PULMONARY EMBOLISM;  5/10/2025 2:16 am    INDICATION:  Signs/Symptoms:Chest pain, hypoxia, tachycardia lung CA with mets -  eval for PE.    COMPARISON:  04/30/2024    ACCESSION NUMBER(S):  [id number]    ORDERING CLINICIAN:  IDALIA LUGO    TECHNIQUE:  Contiguous axial images of the chest were obtained after the  intravenous administration of contrast using angiographic PE  protocol. Coronal and sagittal reformatted images were reconstructed  from the axial data. MIP images were created and reviewed.    FINDINGS:  MEDIASTINUM AND LYMPH NODES: No enlarged intrathoracic or axillary  lymph nodes.  No pneumomediastinum.    VESSELS:  Normal caliber aorta without significant aortic  atherosclerosis. Main pulmonary artery measures up to 3.1 cm  suggestive of pulmonary hypertension. Evaluation of the pulmonary  artery is partially limited due to respiratory motion artifact. There  is a new filling defect noted in the left upper lobe  segmental/subsegmental branches.    HEART: Enlargement of the right ventricle compared to the left  ventricle with straightening of the interventricular septum comment  new from prior imaging.  No significant coronary artery  calcifications. Moderate pericardial effusion.    LUNG, AIRWAYS, AND PLEURA: Extensive upper lobe predominant  emphysematous changes with apical blebs/bulla. There is bilateral  significant bronchial wall thickening with a right lower lobe  consolidative opacity similar to prior imaging. Right perihilar soft  tissue  again noted. Additional ground-glass scratch in opacities are  noted throughout the remainder of the lung parenchyma, with a  slightly consolidative opacities most pronounced in the posterior  left lower lobe, similar to prior. Small left and at least moderate  right pleural effusion. No sizable pneumothorax.    OSSEOUS STRUCTURES/CHEST WALL: Extension lytic lesions involving the  bilateral scapula and visualized upper extremities noted similar to  prior imaging. Lytic lesion in C2 vertebral body again noted.  Findings concerning for pathologic fracture of the right humerus.  Sclerotic lesion in the fluid lower vertebral body noted. Evaluation  of the ribs change in limited due to patient motion. Cage Wei right  9th rib lytic lesion again noted.    UPPER ABDOMEN/OTHER: Possible density is anasarca there is evaluation  of the upper abdomen is limited due to patient motion.    Impression  Interval development of left upper lobe pulmonary embolism and  segmental/subsegmental branches. Interval development of enlargement  of the right ventricle compared to the left which may be seen with  right heart strain.    Redemonstrated diffuse bronchial wall thickening and right perihilar  soft tissue. Right lower lobe consolidation again noted which may be  related to mass, postobstructive atelectasis and/or pneumonia.    Moderate to large right pleural effusion, small left pleural effusion  and moderate pericardial effusion again noted.    Diffuse interstitial prominence with hazy parenchymal opacities  suggestive of interstitial/parenchymal edema. Slightly consolidative  airspace opacity anomaly and left upper lobe suggestive of  superimposed infection, similar to prior imaging.    Extensive osseous metastasis with destructive lytic lesions most  significant in the bilateral scapula and humerus. Findings are  similar to prior imaging with concern for pathologic right humeral  shaft fracture.    Diffuse bone marrow edema and  additional findings as detailed.    MACRO:  Ana Diaz discussed the significance and urgency of this  critical finding Via secure [employer] with confirmation of receipt  with  IDALIA LUGO on 5/10/2025 at 3:01 am.  (**-RCF-**)  Findings:  See findings.    Signed by: Ana Diaz 5/10/2025 3:02 AM  Dictation workstation:   [id number]       XR chest 1 view 05/10/2025    Narrative  Interpreted By:  Otis Rodrigues,  STUDY:  XR CHEST 1 VIEW  5/10/2025 1:39 pm    INDICATION:  Signs/Symptoms:increased WOB    COMPARISON:  05/10/2025    ACCESSION NUMBER(S):  [id number]    ORDERING CLINICIAN:  LELO MATT    TECHNIQUE:  A single AP portable radiograph of the chest was obtained.    FINDINGS:  Multiple cardiac monitoring leads are seen over the chest.  The right  basilar chest tube is unchanged in position. The right basilar  loculated pneumothorax is unchanged. Dense airspace consolidation is  again seen at the right lung base, similar to the prior study.  Moderate to severe diffuse interstitial infiltrates are seen  bilaterally, increased from the prior study, and may represent edema  and/or pneumonia. The cardiac silhouette is within normal limits for  size.    Impression  1. Right basilar pneumothorax, similar to the prior study.  2. Diffuse interstitial infiltrates bilaterally, as above. Clinical  correlation and continued follow-up until clearing is recommended.    MACRO:  None.    Signed by: Otis Rodrigues 5/10/2025 1:44 PM  Dictation workstation:   [id number]      XR chest 1 view 05/10/2025    Narrative  Interpreted By:  Otis Rodrigues,  STUDY:  XR CHEST 1 VIEW  5/10/2025 5:58 am    INDICATION:  Signs/Symptoms:worsening hypoxia, PTX monitoring    COMPARISON:  05/10/2025    ACCESSION NUMBER(S):  [id number]    ORDERING CLINICIAN:  IDALIA LUGO    TECHNIQUE:  A single AP portable radiograph of the chest was obtained.    FINDINGS:  Multiple cardiac monitoring leads are seen over the chest.  A  right  basilar chest tube is again seen. A loculated pneumothorax is again  seen at the right lung base, similar to the prior study. A trace  left-sided pleural effusion is again seen. Moderate to severe diffuse  interstitial infiltrates are seen bilaterally, and may represent  fibrosis, edema and/or pneumonia. Dense airspace consolidation is  again seen at the right lung base, and may represent pneumonia. The  cardiac silhouette is within normal limits for size.    Impression  1. Loculated right basilar pneumothorax status post chest tube  placement. This likely represents an ex vacuo pneumothorax.  2. Diffuse interstitial infiltrates and right basilar airspace  consolidation, as above. Clinical correlation and continued follow-up  until clearing is recommended.    MACRO:  None.    Signed by: Otis Rodrigues 5/10/2025 7:07 AM  Dictation workstation:   [id number]      XR chest 1 view 05/10/2025    Narrative  Interpreted By:  Anjelica Morejon,  STUDY:  XR CHEST 1 VIEW;  5/10/2025 5:08 am    INDICATION:  Signs/Symptoms:Chest drain placed right sided      COMPARISON:  Chest x-ray 05/09/2025. CT angiogram chest 05/10/2025.    ACCESSION NUMBER(S):  [id number]    ORDERING CLINICIAN:  IDALIA LUGO    TECHNIQUE:  Portable upright frontal view of the chest was obtained .    FINDINGS:  Monitoring leads are overlying the patient.    Interval placement of pigtail pleural catheter at the right lung base  with complete resolution of the right pleural effusion. Interval  development of right basilar pneumothorax measuring 5.5 cm in  thickness.    There is bilateral interstitial thickening with diffuse bilateral  airspace opacities. There are bilateral emphysematous changes.    Mottled appearance of the right scapula and proximal right humerus  and lytic lesion at the left scapula, consistent with the previously  described osseous metastatic disease.    Impression  1. Interval development of right basilar pneumothorax.  2.  Interval placement of a right-sided pigtail pleural catheter with  resolution of the right pleural effusion.  3. Bilateral interstitial thickening with diffuse bilateral airspace  opacities, may be secondary to pulmonary edema and/or pneumonia.  4. Emphysema.        MACRO:  Anjelica Morejon discussed the significance and urgency of this  critical finding by telephone with  IDALIA LUGO on 5/10/2025 at  5:22 am.  (**-RCF-**) Findings:  See findings.      Signed by: Anjelica Morejon 5/10/2025 5:29 AM  Dictation workstation:   [id number]         Assessment and Plan / Recommendations   Assessment/Plan   Assessment: 50-year-old male presenting with acute hypoxemic respiratory failure 2/2 hospital-acquired pneumonia in addition to pathologic right humeral fracture in the setting of known NSCLC     Plan:     Neuro:  # No acute concerns  -[facility] ICU  -Continue home oxycodone, morphine pain regimen  -Tylenol as needed  -Zofran as needed for nausea  -Continue home gabapentin.  -Continue home Remeron     Cardio:  # PE  -CT demonstrating left upper lobe PE.  PERT team contacted given concern for right heart strain recommending TTE, venous duplex ultrasound and heparin gtt.  - TTE completed, pending read  -BL duplex venous ultrasound lower extremity: Negative for acute DVT  -EKG shows sinus tachycardia, no acute injury pattern.  QT within normal limits.  -K above 4, mag above 2.     Pulm:  # AHRF 2/2 hospital-acquired pneumonia  # COPD, previously on 2 L NC  # Parapneumonic versus malignant effusion  # PE  # Pneumothorax ex vacu     -Currently on HFNC at 50% 30 L (goal SpO2 greater than 90%)  -Keep chest tube to water seal.   - Current antibiotics: Zosyn and azithromycin (5/10--)  - Pending blood cultures  - Continue scheduled and as needed DuoNebs     GI:  # No acute concerns  -Continue home bowel regimen polyethylene glycol  -Will keep patient n.p.o. while on high flow with concern for increasing oxygen  requirements.  -Pantoprazole daily, home medication    Endo:  # No acute concerns  -Goal blood sugar less than 180     ID:  # Hospital-acquired pneumonia  # Parapneumonic effusion  -MRSA negative     -Follow blood cultures, urine cultures.  Treating with Zosyn, azithromycin at this time for pneumonia.  MRSA swab was negative, discontinued vancomycin.  -Pleural fluid studies pending  -Procalcitonin pending     Skin/MSK:  # Pathologic fracture of the right midshaft humerus  - Orthopedic surgery on consult, in coaptation splint  -Pathological fracture of the right midshaft humerus        Dispo: Patient on high flow nasal cannula, worsening oxygen requirement, unstable vital signs, requiring ICU care       Anahi Soler MD    Please excuse any misspellings or unintended errors related to the Dragon speech recognition software used to dictate this note.     I have reviewed and evaluated the most recent data and results, personally examined the patient, and formulated the plan of care as presented above. This patient was critically ill and required continued critical care treatment. Teaching and any separately billable procedures are not included in the time calculation.    Billing Provider Critical Care Time: 35 minutes         [1] azithromycin, 500 mg, intravenous, Daily  gabapentin, 300 mg, oral, TID  ipratropium-albuteroL, 3 mL, nebulization, Once  ipratropium-albuteroL, 3 mL, nebulization, q4h BALTAZAR  mirtazapine, 15 mg, oral, Nightly  morphine CR, 30 mg, oral, BID   And  morphine CR, 60 mg, oral, Nightly  pantoprazole, 40 mg, oral, Daily before breakfast  piperacillin-tazobactam, 3.375 g, intravenous, q8h  polyethylene glycol, 17 g, oral, Daily  predniSONE, 10 mg, oral, Daily  [2] heparin, 0-4,500 Units/hr, Last Rate: 1,200 Units/hr (05/11/25 1904)  [3] PRN medications: acetaminophen **OR** acetaminophen, docusate sodium, heparin, hydrOXYzine HCL, ipratropium-albuteroL, magnesium sulfate, magnesium sulfate,  ondansetron, oxyCODONE, oxygen, pilocarpine, potassium chloride CR **OR** potassium chloride, potassium chloride CR **OR** potassium chloride, potassium chloride

## 2025-05-13 PROBLEM — Z51.5 HOSPICE CARE PATIENT: Status: ACTIVE | Noted: 2025-01-01

## 2025-05-13 NOTE — DISCHARGE SUMMARY
Discharge Diagnosis  #AHRF 2/2 hospital-acquired pneumonia  #Parapneumonic effusion s/p small bore   #Pathologic right humeral fracture in the setting of known NSCLC c/b bony metastasis    Issues Requiring Follow-Up  Patient signed GIP w/ hospice    Discharge Meds     Medication List      ASK your doctor about these medications     acetaminophen 500 mg tablet; Commonly known as: Tylenol   amoxicillin-clavulanate 875-125 mg tablet; Commonly known as: Augmentin;   Take 1 tablet (875 mg) by mouth 2 times a day for 2 days.; Ask about:   Should I take this medication?   budesonide-glycopyr-formoterol 160-9-4.8 mcg/actuation HFA aerosol   inhaler; Commonly known as: BREZTRI; Inhale 2 puffs 2 times a day.   cyanocobalamin (vitamin B-12) 500 mcg tablet,chewable   dexAMETHasone 4 mg tablet; Commonly known as: Decadron; Take 4 mg (1   tablet) by mouth twice daily the day before treatment, once the evening of   treatment, and twice daily the day after treatment.   docusate sodium 100 mg capsule; Commonly known as: Colace; Take 1   capsule (100 mg) by mouth 2 times a day as needed for constipation (for   hard stools).   * folic acid 1 mg tablet; Commonly known as: Folvite; Take 1 tablet   (1,000 mcg) by mouth once daily. Do not start before October 27, 2024.   * folic acid 1 mg tablet; Commonly known as: Folvite; Take 1 tablet   (1,000 mcg) by mouth once daily.   gabapentin 300 mg capsule; Commonly known as: Neurontin; Take 1 capsule   (300 mg) by mouth 3 times a day.   * ipratropium-albuteroL  mcg/actuation inhaler; Commonly known as:   Combivent Respimat; Inhale 2 puffs 4 times a day as needed for shortness   of breath or wheezing.   * ipratropium-albuteroL 0.5-2.5 mg/3 mL nebulizer solution; Commonly   known as: Duo-Neb; Take 3 mL by nebulization 4 times a day as needed for   wheezing or shortness of breath.   * ipratropium-albuteroL 0.5-2.5 mg/3 mL nebulizer solution; Commonly   known as: Duo-Neb; Take 3 mL by  nebulization 4 times a day as needed for   wheezing or shortness of breath.   mirtazapine 15 mg tablet; Commonly known as: Remeron; Take 1 tablet (15   mg) by mouth once daily at bedtime.   morphine CR 30 mg 12 hr tablet; Commonly known as: MS Contin; Take 1   tablet (30 mg) by mouth 2 times a day AND 2 tablets (60 mg) once daily at   bedtime. Do not crush, chew, or split. Take 30 MG AM, 30 MG MID DAY and 60   MG at BEDTIME.   naloxone 4 mg/0.1 mL nasal spray; Commonly known as: Narcan; Administer   1 spray (4 mg) into affected nostril(s) if needed for opioid reversal. May   repeat every 2-3 minutes if needed, alternating nostrils, until medical   assistance becomes available.   ondansetron 8 mg tablet; Commonly known as: Zofran; Take 1 tablet (8 mg)   by mouth every 8 hours if needed for nausea or vomiting.   oxyCODONE 10 mg immediate release tablet; Commonly known as: Roxicodone;   Take 1-1.5 tablets (10-15 mg) by mouth every 4 hours if needed for   moderate pain (4 - 6) or severe pain (7 - 10). MAX 9 TABS PER DAY   oxygen gas therapy; Commonly known as: O2; Inhale 1 each once every 24   hours.   pantoprazole 40 mg EC tablet; Commonly known as: ProtoNix; Take 1 tablet   (40 mg) by mouth once daily in the morning. Take before meals. Do not   crush, chew, or split.   pilocarpine 5 mg tablet; Commonly known as: Salagen (pilocarpine); Take   1 tablet (5 mg) by mouth 3 times a day as needed (for dry mouth).   polyethylene glycol 17 gram/dose powder; Commonly known as: Glycolax,   Miralax; Mix 17 g of powder and drink once daily.   predniSONE 10 mg tablet; Commonly known as: Deltasone; Take 2 tablets   (20 mg) by mouth once daily for 3 days, THEN 1 tablet (10 mg) once daily   for 3 days.; Start taking on: May 9, 2025   prochlorperazine 10 mg tablet; Commonly known as: Compazine; Take 1   tablet (10 mg) by mouth every 6 hours if needed for nausea or vomiting. Do   not fill before October 27, 2024.  * This list has 5  medication(s) that are the same as other medications   prescribed for you. Read the directions carefully, and ask your doctor or   other care provider to review them with you.       Test Results Pending At Discharge  Pending Labs       No current pending labs.          Hospital Course  Patient is a 50-year-old male who is currently admitted to the ICU for hypoxemic respiratory failure 2/2 hospital-acquired pneumonia and parapneumonic effusion s/p chest tube, small bore, with ex vacuo PTX 2/2 trapped lung in the setting of his known NSCLC with bony mets, and PE in the LUE with mild right heart strain.  2/2 heart strain seen on CT imaging, PERT team at [facility] contacted who recommended continue management [facility] with TTE, heparin infusion and bilateral ultrasound duplex of the lower extremities. He also presented with pathologic fracture of the right humerus, evaluated by orthopedic surgery without acute indication for surgical intervention while inpatient and recommended follow-up outpatient.    During his hospitalization, he was managed with IV antibiotics including Zosyn and azithromycin to cover HAP.  He is also managed with heparin gtt for an acute PE in the left upper lobe with mild right heart strain.  DVT US BL LE negative for any acute occlusive disease.  TTE without acute evidence of significant heart strain and stable EF.      Hospital course has been complicated by prolonged down titration of oxygen requirements, currently on HFNC at 40/70. His home oncologic and radiation oncologic team has been updated on his clinical course. On 5/13, after successful clamping trial, small bore CT successfully removed.     On 5/13, after discussing with palliative care, the patient entertains wishes to speak with hospice regarding hospice services.  Initially, he had request to be hospice at home, however the patient is agreeable to entertaining [facility] and [facility], with eventual transition to inpatient hospice at [place] versus home.  On 5/13, patient signed with hospice and is is now GIP and stable for SDU. Per hospice, continuing all medications.     Pertinent Physical Exam At Time of Discharge  Physical Exam  VITALS: I have reviewed the triage vital signs.   GENERAL: Appears older than stated age.  Chronically ill-appearing.  Cachectic and severe protein malnutrition with temporal wasting.  On HFNC 50/70, weaning as tolerated  NEURO: Alert and oriented. Moves all extremities. Face is symmetric and expressive   EYES: No scleral icterus or conjunctival injection. No discharge.   HENT: Normocephalic, atraumatic. Hearing is grossly intact. Nares grossly patent and without discharge. Mucous membranes moist.   NECK: No JVD. Patient moves neck without restriction.   CARDIO: Rhythm regular.  Tachycardic rate.    PULM: Symmetric chest rise bilaterally.  Mild increased WOB.  Mild to moderate conversational dyspnea.  Positive for accessory muscle use. Diminished lung sounds   GI/: Abdomen nondistended, soft and nontender.  EXTREMITIES: Symmetric muscle bulk.  SKIN: Warm and dry. Normal turgor. No rash or lesions appreciated  Outpatient Follow-Up  Future Appointments   Date Time Provider Department Center   5/27/2025  8:00 AM ADELINA Agrawal [id number] [facility]   5/27/2025  9:15 AM INF 07 EFRAIN [id number] [place]   5/27/2025 10:00 AM ADELINA Jacobo [id number] [place]   6/26/2025  1:00 PM PAUL HernandezCNP [id number] [place]   9/25/2025  1:00 PM PAUL HernandezCNP [id number] [place]   9/29/2025  1:40 PM MUNDO Hernandez-CNP [id number] [place]   12/19/2025  1:00 PM MUNDO Hernandez-CNP [id number] [place]         Elian Veliz DO

## 2025-05-13 NOTE — CARE PLAN
Problem: Pain - Adult  Goal: Verbalizes/displays adequate comfort level or baseline comfort level  Outcome: Progressing     Problem: Safety - Adult  Goal: Free from fall injury  Outcome: Progressing     Problem: Discharge Planning  Goal: Discharge to home or other facility with appropriate resources  Outcome: Progressing     Problem: Chronic Conditions and Co-morbidities  Goal: Patient's chronic conditions and co-morbidity symptoms are monitored and maintained or improved  Outcome: Progressing     Problem: Nutrition  Goal: Nutrient intake appropriate for maintaining nutritional needs  Outcome: Progressing     Problem: Fall/Injury  Goal: Not fall by end of shift  Outcome: Progressing  Goal: Be free from injury by end of the shift  Outcome: Progressing  Goal: Verbalize understanding of personal risk factors for fall in the hospital  Outcome: Progressing  Goal: Verbalize understanding of risk factor reduction measures to prevent injury from fall in the home  Outcome: Progressing  Goal: Use assistive devices by end of the shift  Outcome: Progressing  Goal: Pace activities to prevent fatigue by end of the shift  Outcome: Progressing     Problem: Pain  Goal: Takes deep breaths with improved pain control throughout the shift  Outcome: Progressing  Goal: Turns in bed with improved pain control throughout the shift  Outcome: Progressing  Goal: Walks with improved pain control throughout the shift  Outcome: Progressing  Goal: Performs ADL's with improved pain control throughout shift  Outcome: Progressing  Goal: Participates in PT with improved pain control throughout the shift  Outcome: Progressing  Goal: Free from opioid side effects throughout the shift  Outcome: Progressing  Goal: Free from acute confusion related to pain meds throughout the shift  Outcome: Progressing     Problem: Skin  Goal: Prevent/manage excess moisture  Outcome: Progressing  Flowsheets (Taken 5/10/2025 2243 by Daryl Washington RN)  Prevent/manage  excess moisture: Cleanse incontinence/protect with barrier cream  Goal: Prevent/minimize sheer/friction injuries  Outcome: Progressing  Flowsheets (Taken 5/10/2025 2243 by Daryl Washington RN)  Prevent/minimize sheer/friction injuries: Use pull sheet  Goal: Promote skin healing  Outcome: Progressing  Flowsheets (Taken 5/10/2025 2243 by Daryl Washington RN)  Promote skin healing: Turn/reposition every 2 hours/use positioning/transfer devices

## 2025-05-13 NOTE — DISCHARGE SUMMARY
Discharge Diagnosis  #AHRF 2/2 hospital-acquired pneumonia  #Parapneumonic effusion s/p small bore   #Pathologic right humeral fracture in the setting of known NSCLC c/b bony metastasis    Issues Requiring Follow-Up  Patient signed GIP w/ hospice    Discharge Meds     Medication List      ASK your doctor about these medications     acetaminophen 500 mg tablet; Commonly known as: Tylenol   amoxicillin-clavulanate 875-125 mg tablet; Commonly known as: Augmentin;   Take 1 tablet (875 mg) by mouth 2 times a day for 2 days.; Ask about:   Should I take this medication?   budesonide-glycopyr-formoterol 160-9-4.8 mcg/actuation HFA aerosol   inhaler; Commonly known as: BREZTRI; Inhale 2 puffs 2 times a day.   cyanocobalamin (vitamin B-12) 500 mcg tablet,chewable   dexAMETHasone 4 mg tablet; Commonly known as: Decadron; Take 4 mg (1   tablet) by mouth twice daily the day before treatment, once the evening of   treatment, and twice daily the day after treatment.   docusate sodium 100 mg capsule; Commonly known as: Colace; Take 1   capsule (100 mg) by mouth 2 times a day as needed for constipation (for   hard stools).   * folic acid 1 mg tablet; Commonly known as: Folvite; Take 1 tablet   (1,000 mcg) by mouth once daily. Do not start before October 27, 2024.   * folic acid 1 mg tablet; Commonly known as: Folvite; Take 1 tablet   (1,000 mcg) by mouth once daily.   gabapentin 300 mg capsule; Commonly known as: Neurontin; Take 1 capsule   (300 mg) by mouth 3 times a day.   * ipratropium-albuteroL  mcg/actuation inhaler; Commonly known as:   Combivent Respimat; Inhale 2 puffs 4 times a day as needed for shortness   of breath or wheezing.   * ipratropium-albuteroL 0.5-2.5 mg/3 mL nebulizer solution; Commonly   known as: Duo-Neb; Take 3 mL by nebulization 4 times a day as needed for   wheezing or shortness of breath.   * ipratropium-albuteroL 0.5-2.5 mg/3 mL nebulizer solution; Commonly   known as: Duo-Neb; Take 3 mL by  nebulization 4 times a day as needed for   wheezing or shortness of breath.   mirtazapine 15 mg tablet; Commonly known as: Remeron; Take 1 tablet (15   mg) by mouth once daily at bedtime.   morphine CR 30 mg 12 hr tablet; Commonly known as: MS Contin; Take 1   tablet (30 mg) by mouth 2 times a day AND 2 tablets (60 mg) once daily at   bedtime. Do not crush, chew, or split. Take 30 MG AM, 30 MG MID DAY and 60   MG at BEDTIME.   naloxone 4 mg/0.1 mL nasal spray; Commonly known as: Narcan; Administer   1 spray (4 mg) into affected nostril(s) if needed for opioid reversal. May   repeat every 2-3 minutes if needed, alternating nostrils, until medical   assistance becomes available.   ondansetron 8 mg tablet; Commonly known as: Zofran; Take 1 tablet (8 mg)   by mouth every 8 hours if needed for nausea or vomiting.   oxyCODONE 10 mg immediate release tablet; Commonly known as: Roxicodone;   Take 1-1.5 tablets (10-15 mg) by mouth every 4 hours if needed for   moderate pain (4 - 6) or severe pain (7 - 10). MAX 9 TABS PER DAY   oxygen gas therapy; Commonly known as: O2; Inhale 1 each once every 24   hours.   pantoprazole 40 mg EC tablet; Commonly known as: ProtoNix; Take 1 tablet   (40 mg) by mouth once daily in the morning. Take before meals. Do not   crush, chew, or split.   pilocarpine 5 mg tablet; Commonly known as: Salagen (pilocarpine); Take   1 tablet (5 mg) by mouth 3 times a day as needed (for dry mouth).   polyethylene glycol 17 gram/dose powder; Commonly known as: Glycolax,   Miralax; Mix 17 g of powder and drink once daily.   predniSONE 10 mg tablet; Commonly known as: Deltasone; Take 2 tablets   (20 mg) by mouth once daily for 3 days, THEN 1 tablet (10 mg) once daily   for 3 days.; Start taking on: May 9, 2025   prochlorperazine 10 mg tablet; Commonly known as: Compazine; Take 1   tablet (10 mg) by mouth every 6 hours if needed for nausea or vomiting. Do   not fill before October 27, 2024.  * This list has 5  medication(s) that are the same as other medications   prescribed for you. Read the directions carefully, and ask your doctor or   other care provider to review them with you.       Test Results Pending At Discharge  Pending Labs       No current pending labs.          Hospital Course  Patient is a 50-year-old male who is currently admitted to the ICU for hypoxemic respiratory failure 2/2 hospital-acquired pneumonia and parapneumonic effusion s/p chest tube, small bore, with ex vacuo PTX 2/2 trapped lung in the setting of his known NSCLC with bony mets, and PE in the LUE with mild right heart strain.  2/2 heart strain seen on CT imaging, PERT team at [facility] contacted who recommended continue management [facility] with TTE, heparin infusion and bilateral ultrasound duplex of the lower extremities. He also presented with pathologic fracture of the right humerus, evaluated by orthopedic surgery without acute indication for surgical intervention while inpatient and recommended follow-up outpatient.    During his hospitalization, he was managed with IV antibiotics including Zosyn and azithromycin to cover HAP.  He is also managed with heparin gtt for an acute PE in the left upper lobe with mild right heart strain.  DVT US BL LE negative for any acute occlusive disease.  TTE without acute evidence of significant heart strain and stable EF.      Hospital course has been complicated by prolonged down titration of oxygen requirements, currently on HFNC at 40/70. His home oncologic and radiation oncologic team has been updated on his clinical course. On 5/13, after successful clamping trial, small bore CT successfully removed.     On 5/13, after discussing with palliative care, the patient entertains wishes to speak with hospice regarding hospice services.  Initially, he had request to be hospice at home, however the patient is agreeable to entertaining [facility] and [facility], with eventual transition to inpatient hospice at [place] versus home.  On 5/13, patient signed with hospice and is is now GIP and stable for SDU. Per hospice, continuing all medications.     Pertinent Physical Exam At Time of Discharge  Physical Exam  VITALS: I have reviewed the triage vital signs.   GENERAL: Appears older than stated age.  Chronically ill-appearing.  Cachectic and severe protein malnutrition with temporal wasting.  On HFNC 50/70, weaning as tolerated  NEURO: Alert and oriented. Moves all extremities. Face is symmetric and expressive   EYES: No scleral icterus or conjunctival injection. No discharge.   HENT: Normocephalic, atraumatic. Hearing is grossly intact. Nares grossly patent and without discharge. Mucous membranes moist.   NECK: No JVD. Patient moves neck without restriction.   CARDIO: Rhythm regular.  Tachycardic rate.    PULM: Symmetric chest rise bilaterally.  Mild increased WOB.  Mild to moderate conversational dyspnea.  Positive for accessory muscle use. Diminished lung sounds   GI/: Abdomen nondistended, soft and nontender.  EXTREMITIES: Symmetric muscle bulk.  SKIN: Warm and dry. Normal turgor. No rash or lesions appreciated  Outpatient Follow-Up  Future Appointments   Date Time Provider Department Center   5/27/2025  8:00 AM ADELINA Agrawal [id number] [facility]   5/27/2025  9:15 AM INF 07 EFRAIN [id number] [facility]   5/27/2025 10:00 AM ADELINA Jacobo [id number] [facility]   6/26/2025  1:00 PM PAUL HernandezCNP [id number] [facility]   9/25/2025  1:00 PM PAUL HernandezCNP [id number] [facility]   9/29/2025  1:40 PM MUNDO Hernandez-CNP [id number] [facility]   12/19/2025  1:00 PM MUNDO Hernandez-CNP [id number] [facility]         Elian Veliz DO

## 2025-05-13 NOTE — HOSPITAL COURSE
Patient is a 50-year-old male who is currently admitted to the ICU for hypoxemic respiratory failure 2/2 hospital-acquired pneumonia and parapneumonic effusion s/p chest tube, small bore, with ex vacuo PTX 2/2 trapped lung in the setting of his known NSCLC with bony mets, and PE in the LUE with mild right heart strain.  2/2 heart strain seen on CT imaging, PERT team at [facility] contacted who recommended continue management [facility] with TTE, heparin infusion and bilateral ultrasound duplex of the lower extremities. He also presented with pathologic fracture of the right humerus, evaluated by orthopedic surgery without acute indication for surgical intervention while inpatient and recommended follow-up outpatient.    During his hospitalization, he was managed with IV antibiotics including Zosyn and azithromycin to cover HAP.  He is also managed with heparin gtt for an acute PE in the left upper lobe with mild right heart strain.  DVT US BL LE negative for any acute occlusive disease.  TTE without acute evidence of significant heart strain and stable EF.      Hospital course has been complicated by prolonged down titration of oxygen requirements, currently on HFNC at 40/70. His home oncologic and radiation oncologic team has been updated on his clinical course. On 5/13, after successful clamping trial, small bore CT successfully removed.     On 5/13, after discussing with palliative care, the patient entertains wishes to speak with hospice regarding hospice services.  Initially, he had request to be hospice at home, however the patient is agreeable to entertaining [facility] and [facility], with eventual transition to inpatient hospice at [place] versus home. On 5/13, patient signed with hospice and is is now GIP and stable for SDU. Per hospice, continuing all medications.

## 2025-05-13 NOTE — NURSING NOTE
RN Hospice Note    Joovn Mccartney is a Hospice Patient.   Hospice terminal diagnosis: Acute on chronic respiratory failure  Physician: Dr. Taveras  Visit type: GIP admission    Comments/recommendations: This RN met with wife Judith at bedside and children. [facility]WR services and POC were introduced and discussed.  Although plan that was wanted was to DC home but O2 requirements at this time HFNC 50/20 is to high for home or our IPU. Plan is to transition to hospice inpatient here for SOB and medication management.  Consents were signed. Recommendations form our NP Jennifer Taveras were relayed to Dr. Veliz and orders were placed.  Will evaluate patient daily in hopes for a DC plan next couple of days.    Discharge Planning:  Patient to be discharged to Home vs. IPU      Please notify [facility] of any changes in condition. Thank you.  Office: [phone] (8 am-6:30 pm M-F and 8 am-4:30 pm weekends and holidays)   [phone] (6:30 pm-8 am M-F and 4:30 pm-8 am weekends and holidays)    JUDITH JADE, RN

## 2025-05-13 NOTE — PROGRESS NOTES
Jovon Mccartney is a 50 y.o. male on day 3 of admission presenting with Hyponatremia.    Spoke with spouse at length, discussed Hospice plan of care, and options. Family would like to take pt home on Hospice. Spouse requests family mtg at 6p this evening, so her sons can join in.[facility], await call back on mtg time confirmation. Support provided. Will follow.      10a  Pt is decompensating and family wishes to meet with [facility] earlier, request made to [facility]. Await mtg time confirmation, will follow.      [facility] to meet bedside today at 230/3p, await results of Hospice mtg , will follow.    3:45p  Family has signed consents for Hospice. Plan for GIP to wean down 02 and remove Chest tube. [facility] RN will follow and work on discharge to home vs [facility] on Weds. Palliative Care Team to follow as needed.     Guerline Abrams, AAYUSHW

## 2025-05-13 NOTE — CONSULTS
Consults    Reason For Consult  Reason for Consult: communication / medical decision making, patient/family support, and other     History Of Present Illness  Jovon Mccartney is a 50 y.o. male with past medical history of Cancer is presenting with chest tightness and shortness of breath.      Symptoms (0 - 10, Best to Worst)  [place] Symptom Assessment System  0-10 (Numeric) Pain Score: 0 - No pain    BM in last 48 hours? yes         Personal/Social History   He reports that he quit smoking about 8 months ago. His smoking use included cigarettes. He has a 58.5 pack-year smoking history. He has been exposed to tobacco smoke. He has never used smokeless tobacco. He reports that he does not currently use alcohol. He reports that he does not currently use drugs.    Functional Status        Caregiving/Caregiver Support  Does the patient require assistance in some or all components of his care, including coordination of medical care? Yes  If Yes, which person serves that role?  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    Surgical History  He has a past surgical history that includes Lung biopsy (9 16 2024) and Bronchoscopy (9 16 2024).     Family History  Family History[1]  Allergies  Patient has no known allergies.    Review of Systems   Unable to perform ROS: Acuity of condition        Physical Exam  Constitutional:       General: He is in acute distress.      Appearance: He is ill-appearing and toxic-appearing.   HENT:      Head: Normocephalic.      Right Ear: External ear normal.      Left Ear: External ear normal.      Nose: Nose normal.      Mouth/Throat:      Mouth: Mucous membranes are dry.     "  Pharynx: Oropharynx is clear.   Eyes:      Conjunctiva/sclera: Conjunctivae normal.   Cardiovascular:      Rate and Rhythm: Tachycardia present.      Pulses: Normal pulses.   Pulmonary:      Effort: Respiratory distress present.      Breath sounds: Rhonchi present.   Musculoskeletal:         General: Deformity present.      Cervical back: Normal range of motion.      Right lower leg: Edema present.      Left lower leg: Edema present.   Skin:     General: Skin is dry.      Capillary Refill: Capillary refill takes 2 to 3 seconds.      Coloration: Skin is pale.      Findings: Bruising and lesion present.   Neurological:      Mental Status: He is alert and oriented to person, place, and time.      Motor: Weakness present.         Last Recorded Vitals  Blood pressure 127/71, pulse 108, temperature 36.5 °C (97.7 °F), temperature source Temporal, resp. rate 25, height 1.676 m (5' 6\"), weight 50 kg (110 lb 3.7 oz), SpO2 94%.    Relevant Results  Scheduled Medications[2]   Results for orders placed or performed during the hospital encounter of 05/09/25 (from the past 24 hours)   CBC and Auto Differential   Result Value Ref Range    WBC 8.5 4.4 - 11.3 x10*3/uL    nRBC 0.0 0.0 - 0.0 /100 WBCs    RBC 2.87 (L) 4.50 - 5.90 x10*6/uL    Hemoglobin 7.8 (L) 13.5 - 17.5 g/dL    Hematocrit 25.0 (L) 41.0 - 52.0 %    MCV 87 80 - 100 fL    MCH 27.2 26.0 - 34.0 pg    MCHC 31.2 (L) 32.0 - 36.0 g/dL    RDW 17.6 (H) 11.5 - 14.5 %    Platelets 229 150 - 450 x10*3/uL    Neutrophils % 79.4 40.0 - 80.0 %    Immature Granulocytes %, Automated 0.2 0.0 - 0.9 %    Lymphocytes % 12.5 13.0 - 44.0 %    Monocytes % 7.3 2.0 - 10.0 %    Eosinophils % 0.4 0.0 - 6.0 %    Basophils % 0.2 0.0 - 2.0 %    Neutrophils Absolute 6.78 1.20 - 7.70 x10*3/uL    Immature Granulocytes Absolute, Automated 0.02 0.00 - 0.70 x10*3/uL    Lymphocytes Absolute 1.07 (L) 1.20 - 4.80 x10*3/uL    Monocytes Absolute 0.62 0.10 - 1.00 x10*3/uL    Eosinophils Absolute 0.03 0.00 - " "0.70 x10*3/uL    Basophils Absolute 0.02 0.00 - 0.10 x10*3/uL   Renal function panel   Result Value Ref Range    Glucose 102 (H) 74 - 99 mg/dL    Sodium 129 (L) 136 - 145 mmol/L    Potassium 3.8 3.5 - 5.3 mmol/L    Chloride 91 (L) 98 - 107 mmol/L    Bicarbonate 34 (H) 21 - 32 mmol/L    Anion Gap 8 (L) 10 - 20 mmol/L    Urea Nitrogen 10 6 - 23 mg/dL    Creatinine 0.35 (L) 0.50 - 1.30 mg/dL    eGFR >90 >60 mL/min/1.73m*2    Calcium 8.5 (L) 8.6 - 10.3 mg/dL    Phosphorus 3.0 2.5 - 4.9 mg/dL    Albumin 2.7 (L) 3.4 - 5.0 g/dL   Magnesium   Result Value Ref Range    Magnesium 2.13 1.60 - 2.40 mg/dL   Heparin Assay, UFH   Result Value Ref Range    Heparin Unfractionated 0.4 See Comment Below for Therapeutic Ranges IU/mL         Assessment/Plan   IMP:  Met with patient , his wife and son in the patient's room. Patient is a very ill appearing gentleman with Stage IV lung CA with multiple areas of metastatic disease. He has been undergoing Palliative radiation and immunotherapy but then developed pneumonia. In addition to the cancer he has severe protein malnutrition and is suffering with severe anxiety from feeling air starved. His right lung is almost non-existent with the exception of a small area of his middle lobe, which is contributing to the air starved feeling he is experiencing. After serious conversation- patient stated he wishes to be made a DNR/DNI and meet with [facility] tomorrow and he wishes to go home.It is the strong desire of his wife that he be at home as well. He clearly asked her \"Are you OK with me dying at home? And she stated YES!. They have been together all of their adult lives. They have 2 adult sons and three grandchildren.  Wife states she has the support to care for him in their home. Referral sent to [facility] with a plan to meet tomorrow evening as they wish both sons to be present. Patient's code status was changed.    Symptom Management  Patient is having a lot of anxiety and " as soon as Hospice is involved we will be able to better manage these symptoms.   Provider estimate of survival: days to weeks  Patient Prognostic Awareness: Patient seems to be aware but exact timeframe was not given.     Patient/proxy preference for information  Prefers full information    Goals of Care  Comfort  Home Hospice- discharge tomorrow.     Is the patient hospice-eligible?   Yes  Was a discussion held re hospice services?   yes  Was a decision made re hospice services?  Meeting tomorrow.    Other Palliative Support  Emotional support for family  Comfort medications for discharge.     I spent 60 minutes in the review, planning and care of this patient.         Umu Michel, APRN-CNS       [1]   Family History  Problem Relation Name Age of Onset    Breast cancer Mother JORDY CHURCH     Cancer Mother JORDY CHURCH     Miscarriages / Stillbirths Mother JORDY CHURCH     Cancer Maternal Grandfather HUBER DEMARCO     Stroke Maternal Grandfather HUBER PAL    [2] azithromycin, 500 mg, intravenous, Daily  budesonide, 0.5 mg, nebulization, BID  formoterol, 20 mcg, nebulization, q12h  gabapentin, 300 mg, oral, TID  ipratropium-albuteroL, 3 mL, nebulization, q4h BALTAZAR  mirtazapine, 15 mg, oral, Nightly  morphine CR, 30 mg, oral, BID   And  morphine CR, 60 mg, oral, Nightly  pantoprazole, 40 mg, oral, Daily before breakfast  piperacillin-tazobactam, 3.375 g, intravenous, q8h  polyethylene glycol, 17 g, oral, Daily  predniSONE, 10 mg, oral, Daily  sennosides, 1 tablet, oral, Nightly

## 2025-05-13 NOTE — PROGRESS NOTES
Occupational Therapy                 Therapy Communication Note    Patient Name: Jovon Mccartney  MRN: [id number]  Department: [facility] ICU  Room: 2125/2125-A  Today's Date: 5/13/2025     Discipline: Occupational Therapy    OT Missed Visit: Yes     Missed Visit Reason: Missed Visit Reason:  (Patient plans to meet with hospice today.  Will hold OT evaluation and await plan/goals of care prior to initiating OT.)    Missed Time: Attempt 0908    Comment:

## 2025-05-13 NOTE — PROGRESS NOTES
"Medical Intensive Care - Daily Progress Note   Subjective    Jovon Mccartney is a 50 y.o. year old male patient admitted on 5/9/2025 with following ICU needs: AHRF 2/2 hospital-acquired pneumonia plus pathologic fracture of the right humerus in the setting of known NSCLC     Interval History:  - Increased O2 requirements to HFNC 50/70, continue to wean as tolerated (goal SPO2 >88%)  - Having increased panic attacks and fear of swallowing 2/2 aspiration and fear: Gentle redirection and as needed Atarax  -24-hour CT output: 100 cc: Clamped at 7:50 AM, low residuals: Plan to remove  -Repeat CXR prior to CT removal  -Palliative evaluated the patient 5/12 -- plan for [facility] meeting 5/13; patient elects to go home with hospice  - Patient's home hematology oncology team updated  -Continuing on heparin gtt. pending hospice meeting and final decisions and disposition  -Patient struggling with sleep at night, as needed melatonin added  -Dehydration suspected given continued adequate UOP.  1 L LR  -Continuing antibiotics with Zosyn and azithromycin (total antibiotic course 7 days given immunosuppression)    Meds    Scheduled medications  Scheduled Medications[1]  Continuous medications  Continuous Medications[2]  PRN medications  PRN Medications[3]     Objective    Blood pressure 151/89, pulse (!) 112, temperature 36.5 °C (97.7 °F), temperature source Temporal, resp. rate (!) 39, height 1.676 m (5' 6\"), weight 50.3 kg (110 lb 14.3 oz), SpO2 93%.     Physical Exam   VITALS: I have reviewed the triage vital signs.   GENERAL: Appears older than stated age.  Chronically ill-appearing.  Cachectic and severe protein malnutrition with temporal wasting.  On HFNC 50/70, weaning as tolerated  NEURO: Alert and oriented. Moves all extremities. Face is symmetric and expressive   EYES: No scleral icterus or conjunctival injection. No discharge.   HENT: Normocephalic, atraumatic. Hearing is grossly intact. Nares grossly patent and without " discharge. Mucous membranes moist.   NECK: No JVD. Patient moves neck without restriction.   CARDIO: Rhythm regular.  Tachycardic rate.    PULM: Symmetric chest rise bilaterally.  Mild increased WOB.  Mild to moderate conversational dyspnea.  Positive for accessory muscle use. Diminished lung sounds   GI/: Abdomen nondistended, soft and nontender.  EXTREMITIES: Symmetric muscle bulk.  SKIN: Warm and dry. Normal turgor. No rash or lesions appreciated    Intake/Output Summary (Last 24 hours) at 5/13/2025 1328  Last data filed at 5/13/2025 1100  Gross per 24 hour   Intake 704 ml   Output 1400 ml   Net -696 ml     Labs:   Results from last 72 hours   Lab Units 05/13/25  0412 05/12/25  0502 05/11/25  0425   SODIUM mmol/L 129* 129* 131*   POTASSIUM mmol/L 4.0 3.8 3.2*   CHLORIDE mmol/L 92* 91* 90*   CO2 mmol/L 28 34* 31   BUN mg/dL 10 10 10   CREATININE mg/dL 0.31* 0.35* 0.40*   GLUCOSE mg/dL 100* 102* 89   CALCIUM mg/dL 8.8 8.5* 8.4*   ANION GAP mmol/L 13 8* 13   EGFR mL/min/1.73m*2 >90 >90 >90   PHOSPHORUS mg/dL 3.6 3.0 3.2      Results from last 72 hours   Lab Units 05/13/25  0412 05/12/25  0502 05/11/25  0425   WBC AUTO x10*3/uL 9.5 8.5 9.3   HEMOGLOBIN g/dL 8.0* 7.8* 8.0*   HEMATOCRIT % 25.8* 25.0* 25.3*   PLATELETS AUTO x10*3/uL 246 229 239   NEUTROS PCT AUTO % 83.9 79.4 82.2   LYMPHS PCT AUTO % 9.2 12.5 10.2   MONOS PCT AUTO % 6.0 7.3 7.0   EOS PCT AUTO % 0.2 0.4 0.1      Micro/ID:     Lab Results   Component Value Date    BLOODCULT No growth at 3 days 05/09/2025    BLOODCULT No growth at 3 days 05/09/2025       Summary of key imaging results from the last 24 hours  CXR 5/13 in the a.m.: Stable ex-vacuo pneumothorax 2/2 trapped lung  Pending repeat CXR 5/13 s/p clamped tube to assess for CT removal    Assessment and Plan     Assessment: Jovon Mccartney is a 50 y.o. year old male patient admitted on 5/9/2025 with AHRF 2/2 hospital-acquired pneumonia plus pathologic fracture of the right humerus in the setting of  known NSCLC     Mechanical Ventilation: none  Sedation/Analgesia:  none  Restraints: no    Summary for 05/13/25 :  - Increased O2 requirements to HFNC 50/70, continue to wean as tolerated  - Having increased panic attacks and fear of swallowing 2/2 aspiration and fear: Gentle redirection and as needed Atarax  -24-hour CT output: 100 cc: Clamped at 7:50 AM, low residuals: Plan to remove  -Repeat CXR prior to CT removal  -Palliative evaluated the patient 5/12 -- plan for [facility] meeting 5/13; patient elects to go home with hospice  -Patient's home hematology oncology team updated  -Continuing on heparin gtt. pending hospice meeting and final decisions and disposition  -Patient struggling with sleep at night, as needed melatonin added  -Dehydration suspected given continued adequate UOP.  1 L LR  -Continuing antibiotics with Zosyn and azithromycin (total antibiotic course 7 days given immunosuppression)    Plan:    Neuro:  # Anxiety with globus sensation  # Chronic pain 2/2 malignancy  -CAM ICU  -As needed Atarax for anxiety. Continue gentle redirection and slow breathing techniques  -Continue home oxycodone, morphine pain regimen  -Tylenol as needed  -Zofran as needed for nausea  -Continue home gabapentin and Remeron     Cardio:  # PE  -CT demonstrating left upper lobe PE.  PERT team contacted given concern for right heart strain  -TTE 5/10: EF 66%, grade 1 diastolic dysfunction, normal RV SF, moderate pericardial effusion, moderate TR  -BL duplex venous ultrasound lower extremity: Negative for acute DVT  -Continuing on heparin gtt at this time -- plan to transition to PO soon vs hospice   -EKG shows sinus tachycardia, no acute injury pattern.  QT within normal limits.  -K above 4, mag above 2.     Pulm:  # AHRF 2/2 hospital-acquired pneumonia  # COPD, previously on 2 L NC  # Parapneumonic versus malignant effusion  # PE  # Pneumothorax ex vacu   -Currently on HFNC at 50/70 (goal SpO2 greater than 90%), wean as  tolerated, goal SPO2 >88%  -Keep chest tube to water seal. 24-hour output 100 cc. Clamping trial and repeat CXR with plans for removal  -Current antibiotics: Zosyn and azithromycin (5/10--5/17)  -Blood and pleural fluids negative.  Fluid studies positive for exudative effusion, likely 2/2 PNA  -Continue scheduled and as needed DuoNebs, Pulmicort and formoterol  -Patient's home Breztri held given not on formulary  -Repeat CXR 5/13: Stable ex vacuo pneumothorax.  Daily CXR  -Palliative care consulted; pending hospice meeting 5/13 -- patient wishes to go home with hospice but considering GIP     GI:  # Constipation  -Continue bowel regimen polyethylene glycol and senokot. On chronic opiates  -Will keep patient n.p.o. while on high flow with concern for increasing oxygen requirements, can feed when flow rate less than/= 40 L  -Pantoprazole daily, home medication     Renal/:  - Renal function stable  - 24hr UOP 2.1L. Additional L IVF 5/13     Endo:  # No acute concerns  -Goal blood sugar less than 180     ID:  # Hospital-acquired pneumonia  # Parapneumonic effusion  -MRSA negative. Blood, pleural fluid cultures negative.  -Continue azithromycin and Zosyn (5/10-5/17)  -Procalcitonin 0.31     Skin/MSK:  # Pathologic fracture of the right midshaft humerus  - Orthopedic surgery on consult, in coaptation splint  -Pathological fracture of the right midshaft humerus  -Per rad-onc team, follow-up appointment with rad onc for palliative RT s/p discharge      Heme/Onc:  #NSCLC with bony metastasis  -Contacted home oncologist, Dr. Vargas, regarding patient's current hospitalization. Oncology team recommending CT A/P w/ IV contrast to complete restaging. Will entertain when patient is more clinically stable and able to lie flat  -Oncology team made aware of hospice meeting    ICU Check List       ICU Liberation: Intervention:   Assess, Prevent, Manage Pain 0 - No pain Assessed   Both SAT and SBT [] SAT  [] SBT 30-60 min On HFNC  50/70     Choice of analgesia and sedation Jaime Agitation Sedation Scale (RASS): Alert and calm none     Delirium: Assess, prevent and manage  CAM ICU Negative Assessed   Early Mobility and Exercise Proceed with mobilization - No exclusion criteria met [x] PT /OT consult   Family Engagement and Empowerment [x]Family updated []SW consult     FEN  Fluids: Additional liter LR bolus today  Electrolytes: As needed  Nutrition: N.p.o. while on 50/70  Prophylaxis:  DVT ppx: Therapeutic heparin drip  GI ppx: Protonix  Bowel care: MiraLAX and Senokot  Hardware:         Chest Tube Right Pleural  (Active)   Placement Date/Time: 05/10/25 0415   Placed by: Torsten Mitchell  Chest Tube Orientation: Right  Chest Tube Location: (c) Pleural  Chest Tube Drain Tube Size (Fr): (c)    Number of days: 3       External Urinary Catheter (Active)   Placement Date/Time: 05/10/25 2000     Number of days: 2       Social:  Code: DNR and No Intubation    HPOA: Wife  Disposition: Pending meeting with [facility].  Tentative discharge home today    Elian Veliz DO   05/13/25 at 1:28 PM     Disclaimer: Documentation completed with the information available at the time of input. The times in the chart may not be reflective of actual patient care times, interventions, or procedures. Documentation occurs after the physical care of the patient.          [1] azithromycin, 500 mg, intravenous, Daily  budesonide, 0.5 mg, nebulization, BID  formoterol, 20 mcg, nebulization, q12h  gabapentin, 300 mg, oral, TID  ipratropium-albuteroL, 3 mL, nebulization, q4h BALTAZAR  lactated Ringer's, 1,000 mL, intravenous, Once  mirtazapine, 15 mg, oral, Nightly  morphine CR, 30 mg, oral, BID   And  morphine CR, 60 mg, oral, Nightly  pantoprazole, 40 mg, oral, Daily before breakfast  piperacillin-tazobactam, 3.375 g, intravenous, q8h  polyethylene glycol, 17 g, oral, Daily  predniSONE, 10 mg, oral, Daily  sennosides, 1 tablet, oral, Nightly  [2] heparin, 0-4,500 Units/hr, Last  Rate: 1,200 Units/hr (05/13/25 1119)  [3] PRN medications: acetaminophen **OR** acetaminophen, docusate sodium, heparin, hydrOXYzine HCL, ipratropium-albuteroL, magnesium sulfate, magnesium sulfate, ondansetron, oxyCODONE, oxygen, pilocarpine, potassium chloride CR **OR** potassium chloride, potassium chloride CR **OR** potassium chloride, potassium chloride

## 2025-05-13 NOTE — CARE PLAN
Pt readmitted to [facility] with [facility]. Hospice RN to be in again tomorrow to coordinate discharge. DNRCC on chart. Comfort meds on board. Plan to remove Chest Tube and wean 02. Pt/family aware and agreeable to Hospice plan of care.Palliative Care Team to continue to follow.

## 2025-05-13 NOTE — H&P
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  Patient had chest tube placement which was removed on 5/13/2025.  Patient had a prolonged hospital stay and was treated for hospital-acquired pneumonia as well as acute PE with mild heart strain.  Patient is transition to [facility] hospice on a high flow nasal cannula and goal is to focus on comfort care and get his symptoms under control to potentially transition to hospice at home or inpatient hospice unit.  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    Surgical History  He has a past surgical history that includes Lung biopsy (9 16 2024) and Bronchoscopy (9 16 2024).     Social History  He reports that he quit smoking about 8 months ago. His smoking use included cigarettes. He has a 58.5 pack-year smoking history. He has been exposed to tobacco smoke. He has never used smokeless tobacco. He reports that he does not currently use alcohol. He reports that he does not currently use drugs.    Family History  Family History[1]     Allergies  Patient has no known allergies.    Scheduled medications  Scheduled Medications[2]  Continuous medications  Continuous Medications[3]  PRN medications  PRN Medications[4]      Physical Exam:  General: Frail male on high flow nasal cannula.  Cachectic.  Appears a lot older than his stated age  HEENT: Unable to swallow any man seated  Neck: Normal to  inspection  Lungs: Clear to auscultation, work of breathing within normal limit  Cardiac: Regular rate and rhythm  Abdomen: Soft nontender, positive bowel sounds  : Exam deferred  Skin: Intact  Hematology: No petechia or excessive ecchymosis  Musculoskeletal: Significant muscle wasting  Neurological: Alert awake oriented, no focal deficit, cranial nerves grossly intact  Psych: No suicidal ideation or homicidal ideation     Last Recorded Vitals  Pulse (!) 112   Resp 23   SpO2 93%     Relevant Results  Imaging  XR chest 1 view  Result Date: 5/13/2025  1.  No significant interval change.     Signed by: Marek Cortes 5/13/2025 8:30 AM Dictation workstation:   [id number]    XR chest 1 view  Result Date: 5/12/2025  1.  See above     Signed by: Marek Cortes 5/12/2025 8:13 AM Dictation workstation:   [id number]    XR chest 1 view  Result Date: 5/12/2025  1.  No significant interval change.     Signed by: Marek Cortes 5/12/2025 8:11 AM Dictation workstation:   [id number]    XR chest 1 view  Result Date: 5/10/2025  1. Right basilar pneumothorax, similar to the prior study. 2. Diffuse interstitial infiltrates bilaterally, as above. Clinical correlation and continued follow-up until clearing is recommended.   MACRO: None.   Signed by: Otis Rodrigues 5/10/2025 1:44 PM Dictation workstation:   [id number]    XR humerus left  Result Date: 5/10/2025  Large osteolytic focus in the mid humeral diaphysis most compatible with metastatic disease. The size of the lesion along with a prominent anterior cortical loss may place this patient at increased risk for pathologic fracturing.   Signed by: Vu Murillo 5/10/2025 12:24 PM Dictation workstation:   [id number]    XR chest 1 view  Result Date: 5/10/2025  1. Loculated right basilar pneumothorax status post chest tube placement. This likely represents an ex vacuo pneumothorax. 2. Diffuse interstitial infiltrates and right basilar airspace consolidation, as above.  Clinical correlation and continued follow-up until clearing is recommended.   MACRO: None.   Signed by: Otis Rodrigues 5/10/2025 7:07 AM Dictation workstation:   [id number]    XR chest 1 view  Result Date: 5/10/2025  1. Interval development of right basilar pneumothorax. 2. Interval placement of a right-sided pigtail pleural catheter with resolution of the right pleural effusion. 3. Bilateral interstitial thickening with diffuse bilateral airspace opacities, may be secondary to pulmonary edema and/or pneumonia. 4. Emphysema.       MACRO: Anjelica Morejon discussed the significance and urgency of this critical finding by telephone with  IADLIA LUGO on 5/10/2025 at 5:22 am.  (**-RCF-**) Findings:  See findings.     Signed by: Anjelica Morejon 5/10/2025 5:29 AM Dictation workstation:   [id number]    CT angio chest for pulmonary embolism  Result Date: 5/10/2025  Interval development of left upper lobe pulmonary embolism and segmental/subsegmental branches. Interval development of enlargement of the right ventricle compared to the left which may be seen with right heart strain.   Redemonstrated diffuse bronchial wall thickening and right perihilar soft tissue. Right lower lobe consolidation again noted which may be related to mass, postobstructive atelectasis and/or pneumonia.   Moderate to large right pleural effusion, small left pleural effusion and moderate pericardial effusion again noted.   Diffuse interstitial prominence with hazy parenchymal opacities suggestive of interstitial/parenchymal edema. Slightly consolidative airspace opacity anomaly and left upper lobe suggestive of superimposed infection, similar to prior imaging.   Extensive osseous metastasis with destructive lytic lesions most significant in the bilateral scapula and humerus. Findings are similar to prior imaging with concern for pathologic right humeral shaft fracture.   Diffuse bone marrow edema and additional findings as detailed.   MACRO:  Ana Diaz discussed the significance and urgency of this critical finding Via [employer] with confirmation of receipt with  IDALIA LUGO on 5/10/2025 at 3:01 am.  (**-RCF-**) Findings:  See findings.   Signed by: Ana Diaz 5/10/2025 3:02 AM Dictation workstation:   [id number]    XR shoulder right 2+ views  Result Date: 5/9/2025  New nondisplaced pathologic fracture of the right mid humeral diaphysis through a 10 cm lytic lesion.   Lytic lesion of the right glenoid neck, coracoid process and scapular body.   Loculated right pleural effusion.   MACRO: None.   Signed by: oJe Gray 5/9/2025 11:25 PM Dictation workstation:   [id number]    XR chest 1 view  Result Date: 5/9/2025  Increasing patchy airspace opacity left mid lung may reflect worsening pneumonia compared to 05/04/2025. Otherwise similar/stable exam.   MACRO: None   Signed by: Emre Keith 5/9/2025 11:20 PM Dictation workstation:   [id number]      Cardiology, Vascular, and Other Imaging  ECG 12 lead  Result Date: 5/12/2025  Sinus tachycardia Incomplete right bundle branch block Possible Right ventricular hypertrophy Abnormal ECG When compared with ECG of 09-MAY-2025 22:18, (unconfirmed) No significant change was found Confirmed by Santos Ramos ([id number]) on 5/12/2025 7:39:21 AM    ECG 12 lead  Result Date: 5/12/2025  Sinus tachycardia Rightward axis Incomplete right bundle branch block Borderline ECG When compared with ECG of 30-APR-2025 05:10, No significant change was found Confirmed by Santos Ramos ([id number]) on 5/12/2025 7:38:43 AM    Transthoracic Echo Complete  Result Date: 5/10/2025            [facility] [place] [address] [address]    Tel [phone] Fax [phone] TRANSTHORACIC ECHOCARDIOGRAM REPORT Patient Name:       YADIEL MELANI SHEPHERD        Fisher Physician:    [id number] Mary Hay MD Study Date:         5/10/2025             Ordering Provider:    [id number] IDALIA LUGO MRN/PID:            [id number]             Fellow: Accession#:         [id number]         Nurse: Date of Birth/Age:  1975 / 50 years Sonographer:          Ines Bella RDCS Gender Assigned at  M                    Additional Staff: Birth: Height:             167.64 cm            Admit Date: Weight:             45.36 kg             Admission Status:     Inpatient - STAT BSA / BMI:          1.49 m2 / 16.14      Department Location:  [facility] ICU Front                     kg/m2                                      (19-26) Blood Pressure: 122 /86 mmHg Study Type:    TRANSTHORACIC ECHO (TTE) COMPLETE Diagnosis/ICD: Unspecified systolic (congestive) heart failure (CHF)-I50.20 Indication:    CHF, possible right heart strain CPT Codes:     Echo Complete w Full Doppler-43753 Patient History: Pertinent History: Cancer. Study Detail: The following Echo studies were performed: 2D, M-Mode, Doppler and               color flow. Technically challenging study due to patient lying in               supine position and prominent lung artifact.  PHYSICIAN INTERPRETATION: Left Ventricle: Left ventricular ejection fraction is normal, calculated by Madsen's biplane at 66%. The left ventricular cavity size is normal. There is mild increased septal and normal posterior left ventricular wall thickness. There is left ventricular concentric remodeling. Spectral Doppler shows a Grade I (impaired relaxation pattern) of left ventricular diastolic filling with normal left atrial filling pressure. Left Atrium: The left atrial size is normal. Right Ventricle: The right ventricle is normal in size. There is normal right ventricular global systolic function. Subcostal view with some RA inversion. Right Atrium: The right atrial size is upper limits of normal. Aortic Valve: The  aortic valve is trileaflet. There is mild aortic valve cusp calcification. There is mild to moderate aortic valve thickening. There is no evidence of aortic valve regurgitation. The peak instantaneous gradient of the aortic valve is 8 mmHg. Mitral Valve: The mitral valve is mildly thickened. There is no evidence of mitral valve stenosis. There is mild mitral valve regurgitation. Tricuspid Valve: The tricuspid valve is abnormal. There is mild thickening of the tricuspid valve leaflets. There is moderate tricuspid regurgitation. Pulmonic Valve: The pulmonic valve is not well visualized. There is no indication of pulmonic valve regurgitation. Pericardium: Moderate pericardial effusion. The effusion is circumferential. The pericardial effusion appears to contain fibrinous material. Aorta: The aortic root is normal. In comparison to the previous echocardiogram(s): There are no prior studies on this patient for comparison purposes.  CONCLUSIONS:  1. Left ventricular ejection fraction is normal, calculated by Madsen's biplane at 66%.  2. Spectral Doppler shows a Grade I (impaired relaxation pattern) of left ventricular diastolic filling with normal left atrial filling pressure.  3. There is normal right ventricular global systolic function.  4. Moderate pericardial effusion.  5. Moderate tricuspid regurgitation.  6. Pericardial effusion 1.2-1.7 cm posterior to LV in parasternal view. 1.2 cm between R and liver edge in subcostal view.  7. < 5 mm Hg with respiratory variation. QUANTITATIVE DATA SUMMARY:  2D MEASUREMENTS:             Normal Ranges: LAs:             2.32 cm     (2.7-4.0cm) IVSd:            1.07 cm     (0.6-1.1cm) LVPWd:           0.99 cm     (0.6-1.1cm) LVIDd:           3.71 cm     (3.9-5.9cm) LVIDs:           2.53 cm LV Mass Index:   79.2 g/m2 LVEDV Index:     41.27 ml/m2 LV % FS          31.9 %  LEFT ATRIUM:                Normal Ranges: LA Area A4C:      5.9 cm2 LA Area A2C:      7.6 cm2 LA Volume Index:   7.4 ml/m2 LA Vol A4C:       7.7 ml LA Vol A2C:       13.9 ml LA Vol Index BSA: 7.2 ml/m2  M-MODE MEASUREMENTS:         Normal Ranges: Ao Root:             3.50 cm (2.0-3.7cm) AoV Exc:             1.79 cm (1.5-2.5cm)  AORTA MEASUREMENTS:         Normal Ranges: AoV Exc:            1.79 cm (1.5-2.5cm) Ao Sinus, d:        3.30 cm (2.1-3.5cm) Ao STJ, d:          2.60 cm (1.7-3.4cm) Asc Ao, d:          2.70 cm (2.1-3.4cm)  LV SYSTOLIC FUNCTION:                      Normal Ranges: EF-A4C View:    65 % (>=55%) EF-A2C View:    63 % EF-Biplane:     66 % LV EF Reported: 66 %  LV DIASTOLIC FUNCTION:           Normal Ranges: MV Peak E:             0.54 m/s  (0.7-1.2 m/s) MV Peak A:             0.84 m/s  (0.42-0.7 m/s) E/A Ratio:             0.64      (1.0-2.2) MV e'                  0.107 m/s (>8.0) MV lateral e'          0.11 m/s MV medial e'           0.10 m/s E/e' Ratio:            5.05      (<8.0)  MITRAL VALVE:          Normal Ranges: MV DT:        126 msec (150-240msec)  AORTIC VALVE:           Normal Ranges: AoV Vmax:      1.37 m/s (<=1.7m/s) AoV Peak P.5 mmHg (<20mmHg) LVOT Max Steven:  1.09 m/s (<=1.1m/s) LVOT VTI:      15.15 cm LVOT Diameter: 1.90 cm  (1.8-2.4cm) AoV Area,Vmax: 2.25 cm2 (2.5-4.5cm2)  RIGHT VENTRICLE: RV Basal 3.70 cm RV Mid   2.80 cm RV Major 7.1 cm RV s'    0.26 m/s  TRICUSPID VALVE/RVSP:          Normal Ranges: Peak TR Velocity:     4.21 m/s RV Syst Pressure:     74 mmHg  (< 30mmHg) IVC Diam:             1.75 cm  PULMONIC VALVE:         Normal Ranges: PV Accel Time:  60 msec (>120ms)  AORTA: Asc Ao Diam 2.66 cm  [id number] Mary Hay MD Electronically signed on 5/10/2025 at 11:45:29 PM  ** Final **     Vascular US lower extremity venous duplex bilateral  Result Date: 5/10/2025            [facility] [address] [place] [address]     Tel [phone] Fax [phone]  Vascular Lab Report  VASC US LOWER EXTREMITY VENOUS DUPLEX BILATERAL Patient Name:     YADIEL POLO CORA        Reading           [id number] Annette Loyd                                       Physician:        MD Study Date:       5/10/2025           Ordering          [id number] IDALIA BRITT                                       Provider:         PARISH MRN/PID:          [id number]            Fellow: Accession#:       [id number]        Technologist:     Mark VIZCARRA, RVT Date of           1975 / 50      Technologist 2: Birth/Age:        years Gender:           M                   Encounter#:       [id number] Admission Status: Emergency           Location          [facility]                                       Performed:  Diagnosis/ICD: Localized (leg) edema-R60.0 Indication:    Limb edema CPT Codes:     87665 Peripheral venous duplex scan for DVT complete  Pertinent History: PE and COPD.  CONCLUSIONS: Right Lower Venous: No evidence of acute deep vein thrombus visualized in the right lower extremity. Additional Findings; Cystic Structure with hyperechoic shadows 4.9 x 2.1 x 1.4cm right distal thigh anteromedial bursitis vs fluid collection. Left Lower Venous: No evidence of acute deep vein thrombus visualized in the left lower extremity. Additional Findings; Lymph nodes. Additional Findings: Limited exam due to dressings.  Imaging & Doppler Findings:  Right Compress Thrombus SFJ     Yes      None  Left Compress Thrombus SFJ    Yes      None  Right                 Compressible Thrombus        Flow Distal External Iliac                None   Spontaneous/Phasic CFV                       Yes        None   Spontaneous/Phasic PFV                       Yes        None FV Proximal               Yes        None   Spontaneous/Phasic FV Mid                    Yes        None FV Distal                 Yes        None Popliteal                 Yes        None   Spontaneous/Phasic Peroneal                  Yes        None PTV                       Yes        None  Left                  Compress Thrombus        Flow Distal  External Iliac            None   Spontaneous/Phasic CFV                     Yes      None   Spontaneous/Phasic PFV                     Yes      None FV Proximal             Yes      None   Spontaneous/Phasic FV Mid                  Yes      None   Spontaneous/Phasic FV Distal               Yes      None   Spontaneous/Phasic Popliteal               Yes      None   Spontaneous/Phasic Peroneal                Yes      None PTV                     Yes      None  [id number] Annette Loyd MD Electronically signed by [id number] Annette Loyd MD on 5/10/2025 at 5:43:16 PM  ** Final **        Results for orders placed or performed during the hospital encounter of 05/09/25 (from the past 24 hours)   CBC and Auto Differential   Result Value Ref Range    WBC 9.5 4.4 - 11.3 x10*3/uL    nRBC 0.0 0.0 - 0.0 /100 WBCs    RBC 2.96 (L) 4.50 - 5.90 x10*6/uL    Hemoglobin 8.0 (L) 13.5 - 17.5 g/dL    Hematocrit 25.8 (L) 41.0 - 52.0 %    MCV 87 80 - 100 fL    MCH 27.0 26.0 - 34.0 pg    MCHC 31.0 (L) 32.0 - 36.0 g/dL    RDW 17.8 (H) 11.5 - 14.5 %    Platelets 246 150 - 450 x10*3/uL    Neutrophils % 83.9 40.0 - 80.0 %    Immature Granulocytes %, Automated 0.5 0.0 - 0.9 %    Lymphocytes % 9.2 13.0 - 44.0 %    Monocytes % 6.0 2.0 - 10.0 %    Eosinophils % 0.2 0.0 - 6.0 %    Basophils % 0.2 0.0 - 2.0 %    Neutrophils Absolute 8.00 (H) 1.20 - 7.70 x10*3/uL    Immature Granulocytes Absolute, Automated 0.05 0.00 - 0.70 x10*3/uL    Lymphocytes Absolute 0.88 (L) 1.20 - 4.80 x10*3/uL    Monocytes Absolute 0.57 0.10 - 1.00 x10*3/uL    Eosinophils Absolute 0.02 0.00 - 0.70 x10*3/uL    Basophils Absolute 0.02 0.00 - 0.10 x10*3/uL   Comprehensive metabolic panel   Result Value Ref Range    Glucose 100 (H) 74 - 99 mg/dL    Sodium 129 (L) 136 - 145 mmol/L    Potassium 4.0 3.5 - 5.3 mmol/L    Chloride 92 (L) 98 - 107 mmol/L    Bicarbonate 28 21 - 32 mmol/L    Anion Gap 13 10 - 20 mmol/L    Urea Nitrogen 10 6 - 23 mg/dL    Creatinine 0.31 (L) 0.50 - 1.30 mg/dL     eGFR >90 >60 mL/min/1.73m*2    Calcium 8.8 8.6 - 10.3 mg/dL    Albumin 2.6 (L) 3.4 - 5.0 g/dL    Alkaline Phosphatase 90 33 - 120 U/L    Total Protein 6.0 (L) 6.4 - 8.2 g/dL    AST 14 9 - 39 U/L    Bilirubin, Total 0.6 0.0 - 1.2 mg/dL    ALT 6 (L) 10 - 52 U/L   Magnesium   Result Value Ref Range    Magnesium 1.87 1.60 - 2.40 mg/dL   Phosphorus   Result Value Ref Range    Phosphorus 3.6 2.5 - 4.9 mg/dL   Heparin Assay, UFH   Result Value Ref Range    Heparin Unfractionated 0.3 See Comment Below for Therapeutic Ranges IU/mL        Assessment/Plan   Jovon Mccartney 50-year-old male with 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  Patient had chest tube placement which was removed on 5/13/2025.  Patient had a prolonged hospital stay and was treated for hospital-acquired pneumonia as well as acute PE with mild heart strain.  Patient is transition to [facility] hospice on a high flow nasal cannula and goal is to focus on comfort care and get his symptoms under control to potentially transition to hospice at home or inpatient hospice unit.  Patient is excepted from ICU to hospitalist service  Assessment & Plan  Hospice care patient  Continue with [facility] hospice  Patient on high flow nasal cannula which will maintain  Multimodality pain control with IV morphine as frequently as needed  Hospice is consulted  Will work with them  Will stop heparin drip and antibiotics  Ativan as needed  CODE STATUS is DNR CCA  Will maintain patient in stepdown unit for high flow needs and try to wean down as tolerated       Plan discussed with patient and multiple family members at bedside in ICU  DNR CC  Moderate level of MDM based on above issue and discussing plan    This note is created using voice recognition software. All efforts are made to  minimize errors, if there are errors there due to transcription.    Chas Piña  Hospitalist           [1]   Family History  Problem Relation Name Age of Onset    Breast cancer Mother JORDY CHURCH     Cancer Mother JORDY CHURCH     Miscarriages / Stillbirths Mother JORDY CHURCH     Cancer Maternal Grandfather HUBER DEMARCO     Stroke Maternal Grandfather HUBER PAL    [2] ipratropium-albuteroL, 3 mL, nebulization, q4h BALTAZAR     [3]    [4] PRN medications: glycopyrrolate, ipratropium-albuteroL, LORazepam, melatonin, morphine, morphine, ondansetron, oxygen, pilocarpine

## 2025-05-13 NOTE — ASSESSMENT & PLAN NOTE
Continue with [facility] hospice  Patient on high flow nasal cannula which will maintain  Multimodality pain control with IV morphine as frequently as needed  Hospice is consulted  Will work with them  Will stop heparin drip and antibiotics  Ativan as needed  CODE STATUS is DNR CCA  Will maintain patient in stepdown unit for high flow needs and try to wean down as tolerated

## 2025-05-13 NOTE — PROGRESS NOTES
05/13/25 1005   Discharge Planning   Expected Discharge Disposition [facility]   Intensity of Service   Intensity of Service 0-30 min     Wife requested consult with [facility]. . Plan is for a meeting tonight at 6pm

## 2025-05-14 NOTE — PROGRESS NOTES
"Jovon Mccartney is a 50 y.o. male on day 1 of [facility] hospice admission.    Subjective   Patient to transfer to [facility] facility today. [facility] staff to coordinate discharge.    Update 1128:  Patient not medically stable for transfer to [facility] unit. Will remain [facility] at the hospital.        Objective     Physical Exam    Last Recorded Vitals  Blood pressure 114/60, pulse 106, temperature 36.6 °C (97.9 °F), temperature source Temporal, resp. rate 16, height 1.676 m (5' 5.98\"), weight 50.3 kg (110 lb 14.3 oz), SpO2 96%.  Intake/Output last 3 Shifts:  I/O last 3 completed shifts:  In: 1 (0 mL/kg) [I.V.:1 (0 mL/kg)]  Out: - (0 mL/kg)   Weight: 50.3 kg   Assessment & Plan    Patient too unstable to transfer to [facility]. Will remain [facility] in the hospital.       ELHAM Bryant    "

## 2025-05-14 NOTE — NURSING NOTE
Shift report given at 1900 5/14/2025. Pt DNR comfort measures only. 4mg IVP morphine given for 10/10 pain at 1953. Pt pain improved 3/10 when reassessed at 2030. Pt given 0.5 mg Ativan IVP at 2052 for anxiety prior to transport to regular medical floor room 4117. Pt transported with transporter and respiratory therapist on NRB.

## 2025-05-14 NOTE — PROGRESS NOTES
Spiritual Care Visit  Spiritual Care Request    Reason for Visit:  Routine Visit: Introduction     Request Received From:       Focus of Care:  Visited With: Patient and family together         Refer to :          Spiritual Care Assessment    Spiritual Assessment:                      Care Provided:  Intended Effects: Promote sense of peace, Preserve dignity and respect, Meaning-making  Methods: Offer spiritual/Judaism support  Interventions: Share words of hope and inspiration, [place], Provide Grief Processing Session    Sense of Community and or Restorationist Affiliation:  Pentecostal         Addressed Needs/Concerns and/or Mark Through:          Outcome:        Advance Directives:         Spiritual Care Annotation    Annotation:  Patient was surrounded by his family with his wife holding one hand and his son holding the other hand.   spoke about death and life and spoke comforting words to the family.   prayed at the family request.

## 2025-05-14 NOTE — ASSESSMENT & PLAN NOTE
Patient currently actively dying  Maintain morphine drip  As needed medication  Spoke with family regarding transfer and that he would be too unstable to transfer currently  Will transition him to regular nasal cannula and provide comfort medication do not prolong his suffering  Family in agreement

## 2025-05-14 NOTE — CARE PLAN
The patient's goals for the shift include      The clinical goals for the shift include Promote comfortfor EOLC; Met      Problem: Pain  Goal: Takes deep breaths with improved pain control throughout the shift  Outcome: Not Progressing  Goal: Turns in bed with improved pain control throughout the shift  Outcome: Not Progressing  Goal: Walks with improved pain control throughout the shift  Outcome: Not Progressing  Goal: Performs ADL's with improved pain control throughout shift  Outcome: Not Progressing  Goal: Participates in PT with improved pain control throughout the shift  Outcome: Not Progressing  Goal: Free from opioid side effects throughout the shift  Outcome: Not Progressing  Goal: Free from acute confusion related to pain meds throughout the shift  Outcome: Not Progressing     Problem: Skin  Goal: Prevent/manage excess moisture  Outcome: Not Progressing

## 2025-05-14 NOTE — PROGRESS NOTES
Jovon Mccartney is a 50 y.o. male on day 1 of admission presenting with Hospice care patient.      Subjective   Minimally responsive.  Overnight was a struggle and finally getting symptoms under control as partner with IV morphine drip.  Spoke with wife regarding high for potentially prolonging and being more uncomfortable and trying nasal cannula as he is actively dying       Objective     Last Recorded Vitals  /60 (BP Location: Left arm, Patient Position: Lying)   Pulse 106   Temp 36.6 °C (97.9 °F) (Temporal)   Resp 16   Wt 50.3 kg (110 lb 14.3 oz)   SpO2 96%   Intake/Output last 3 Shifts:    Intake/Output Summary (Last 24 hours) at 5/14/2025 1124  Last data filed at 5/14/2025 0800  Gross per 24 hour   Intake 1 ml   Output 200 ml   Net -199 ml       Admission Weight  Weight: 50.3 kg (110 lb 14.3 oz) (05/13/25 2323)    Daily Weight  05/13/25 : 50.3 kg (110 lb 14.3 oz)      Physical Exam:  General: Minimally responsive on high flow nasal cannula    Relevant Results  Scheduled medications  Scheduled Medications[1]  Continuous medications  Continuous Medications[2]  PRN medications  PRN Medications[3]   Labs  Results from last 7 days   Lab Units 05/13/25  0412 05/12/25  0502 05/11/25  0425   WBC AUTO x10*3/uL 9.5 8.5 9.3   HEMOGLOBIN g/dL 8.0* 7.8* 8.0*   HEMATOCRIT % 25.8* 25.0* 25.3*   PLATELETS AUTO x10*3/uL 246 229 239     Results from last 7 days   Lab Units 05/13/25  0412 05/12/25  0502 05/11/25  0425 05/10/25  0606 05/09/25  2220   SODIUM mmol/L 129* 129* 131* 125* 124*   POTASSIUM mmol/L 4.0 3.8 3.2* 3.5 3.6   CHLORIDE mmol/L 92* 91* 90* 87* 87*   CO2 mmol/L 28 34* 31 27 28   BUN mg/dL 10 10 10 12 13   CREATININE mg/dL 0.31* 0.35* 0.40* 0.35* 0.34*   CALCIUM mg/dL 8.8 8.5* 8.4* 8.6 8.6   PROTEIN TOTAL g/dL 6.0*  --   --  6.8 6.5   BILIRUBIN TOTAL mg/dL 0.6  --   --   --  0.6   ALK PHOS U/L 90  --   --   --  101   ALT U/L 6*  --   --   --  8*   AST U/L 14  --   --   --  12   GLUCOSE mg/dL 100* 102* 89  137* 138*       Imaging  XR chest 1 view  Result Date: 5/14/2025  1. Moderate right ex vacuo pneumothorax, with interval increase in fluid within the pleural space status post removal of the right chest pigtail catheter. 2. Though consolidative opacities could relate to pneumonia, given the lack of improvement after antibiotic treatment starting with the 04/30/2025 ED visit, immune check point inhibitor treatment related pneumonitis is a consideration (cryptogenic organizing pneumonia type pattern of pneumonitis), particularly in the posterior left upper lobe. Consolidative opacities in the right lower/middle lobes are more consistent with increased tumor burden with resulting collapse and possibly superimposed postobstructive infection. 3. Persistent interstitial edema. 4. Pleural effusions likely reflect nonmeasurable tumor burden.     Signed by: Stefania De Jesus 5/14/2025 7:51 AM Dictation workstation:   [id number]    XR chest 1 view  Result Date: 5/13/2025  1.  No significant interval change.     Signed by: Marek Cortes 5/13/2025 8:30 AM Dictation workstation:   [id number]    XR chest 1 view  Result Date: 5/12/2025  1.  See above     Signed by: Marek Cortes 5/12/2025 8:13 AM Dictation workstation:   [id number]    XR chest 1 view  Result Date: 5/12/2025  1.  No significant interval change.     Signed by: Marek Cortes 5/12/2025 8:11 AM Dictation workstation:   [id number]    XR chest 1 view  Result Date: 5/10/2025  1. Right basilar pneumothorax, similar to the prior study. 2. Diffuse interstitial infiltrates bilaterally, as above. Clinical correlation and continued follow-up until clearing is recommended.   MACRO: None.   Signed by: Otis Rodrigues 5/10/2025 1:44 PM Dictation workstation:   [id number]    XR humerus left  Result Date: 5/10/2025  Large osteolytic focus in the mid humeral diaphysis most compatible with metastatic disease. The size of the lesion along with a prominent anterior cortical  loss may place this patient at increased risk for pathologic fracturing.   Signed by: Vu Murillo 5/10/2025 12:24 PM Dictation workstation:   [id number]    XR chest 1 view  Result Date: 5/10/2025  1. Loculated right basilar pneumothorax status post chest tube placement. This likely represents an ex vacuo pneumothorax. 2. Diffuse interstitial infiltrates and right basilar airspace consolidation, as above. Clinical correlation and continued follow-up until clearing is recommended.   MACRO: None.   Signed by: Otis Rodrigues 5/10/2025 7:07 AM Dictation workstation:   [id number]    XR chest 1 view  Result Date: 5/10/2025  1. Interval development of right basilar pneumothorax. 2. Interval placement of a right-sided pigtail pleural catheter with resolution of the right pleural effusion. 3. Bilateral interstitial thickening with diffuse bilateral airspace opacities, may be secondary to pulmonary edema and/or pneumonia. 4. Emphysema.       MACRO: Anjelica Morejon discussed the significance and urgency of this critical finding by telephone with  IDALIA LUGO on 5/10/2025 at 5:22 am.  (**-RCF-**) Findings:  See findings.     Signed by: Anjelica Morejon 5/10/2025 5:29 AM Dictation workstation:   [id number]    CT angio chest for pulmonary embolism  Result Date: 5/10/2025  Interval development of left upper lobe pulmonary embolism and segmental/subsegmental branches. Interval development of enlargement of the right ventricle compared to the left which may be seen with right heart strain.   Redemonstrated diffuse bronchial wall thickening and right perihilar soft tissue. Right lower lobe consolidation again noted which may be related to mass, postobstructive atelectasis and/or pneumonia.   Moderate to large right pleural effusion, small left pleural effusion and moderate pericardial effusion again noted.   Diffuse interstitial prominence with hazy parenchymal opacities suggestive of interstitial/parenchymal edema.  Slightly consolidative airspace opacity anomaly and left upper lobe suggestive of superimposed infection, similar to prior imaging.   Extensive osseous metastasis with destructive lytic lesions most significant in the bilateral scapula and humerus. Findings are similar to prior imaging with concern for pathologic right humeral shaft fracture.   Diffuse bone marrow edema and additional findings as detailed.   MACRO: Ana Diaz discussed the significance and urgency of this critical finding Via secure [employer] with confirmation of receipt with  IDALIA LUGO on 5/10/2025 at 3:01 am.  (**-RCF-**) Findings:  See findings.   Signed by: Ana Diaz 5/10/2025 3:02 AM Dictation workstation:   [id number]    XR shoulder right 2+ views  Result Date: 5/9/2025  New nondisplaced pathologic fracture of the right mid humeral diaphysis through a 10 cm lytic lesion.   Lytic lesion of the right glenoid neck, coracoid process and scapular body.   Loculated right pleural effusion.   MACRO: None.   Signed by: Joe Gray 5/9/2025 11:25 PM Dictation workstation:   [id number]    XR chest 1 view  Result Date: 5/9/2025  Increasing patchy airspace opacity left mid lung may reflect worsening pneumonia compared to 05/04/2025. Otherwise similar/stable exam.   MACRO: None   Signed by: Emre Keith 5/9/2025 11:20 PM Dictation workstation:   [id number]      Cardiology, Vascular, and Other Imaging  ECG 12 lead  Result Date: 5/12/2025  Sinus tachycardia Incomplete right bundle branch block Possible Right ventricular hypertrophy Abnormal ECG When compared with ECG of 09-MAY-2025 22:18, (unconfirmed) No significant change was found Confirmed by Santos Ramos ([id number]) on 5/12/2025 7:39:21 AM    ECG 12 lead  Result Date: 5/12/2025  Sinus tachycardia Rightward axis Incomplete right bundle branch block Borderline ECG When compared with ECG of 30-APR-2025 05:10, No significant change was found Confirmed by Santos Ramos ([id number]) on  5/12/2025 7:38:43 AM    Transthoracic Echo Complete  Result Date: 5/10/2025            [facility] [place] [id number] [address] [facility], [place] [id number]    Tel [phone] Fax [phone] TRANSTHORACIC ECHOCARDIOGRAM REPORT Patient Name:       YADIEL SHEPHERD        Wei Physician:    [id number] Mary Hay MD Study Date:         5/10/2025            Ordering Provider:    [id number] IDALIA LUGO MRN/PID:            [id number]             Fellow: Accession#:         [id number]         Nurse: Date of Birth/Age:  1975 / 50 years Sonographer:          Ines Bella RDCS Gender Assigned at  M                    Additional Staff: Birth: Height:             167.64 cm            Admit Date: Weight:             45.36 kg             Admission Status:     Inpatient - STAT BSA / BMI:          1.49 m2 / 16.14      Department Location:  [facility] ICU Front                     kg/m2                                      (19-26) Blood Pressure: 122 /86 mmHg Study Type:    TRANSTHORACIC ECHO (TTE) COMPLETE Diagnosis/ICD: Unspecified systolic (congestive) heart failure (CHF)-I50.20 Indication:    CHF, possible right heart strain CPT Codes:     Echo Complete w Full Doppler-[id number] Patient History: Pertinent History: Cancer. Study Detail: The following Echo studies were performed: 2D, M-Mode, Doppler and               color flow. Technically challenging study due to patient lying in               supine position and prominent lung artifact.  PHYSICIAN INTERPRETATION: Left Ventricle: Left ventricular ejection fraction is normal, calculated by Madsen's biplane at 66%. The left ventricular cavity size is normal. There is mild increased septal and normal posterior left ventricular wall thickness. There is left ventricular concentric remodeling. Spectral  Doppler shows a Grade I (impaired relaxation pattern) of left ventricular diastolic filling with normal left atrial filling pressure. Left Atrium: The left atrial size is normal. Right Ventricle: The right ventricle is normal in size. There is normal right ventricular global systolic function. Subcostal view with some RA inversion. Right Atrium: The right atrial size is upper limits of normal. Aortic Valve: The aortic valve is trileaflet. There is mild aortic valve cusp calcification. There is mild to moderate aortic valve thickening. There is no evidence of aortic valve regurgitation. The peak instantaneous gradient of the aortic valve is 8 mmHg. Mitral Valve: The mitral valve is mildly thickened. There is no evidence of mitral valve stenosis. There is mild mitral valve regurgitation. Tricuspid Valve: The tricuspid valve is abnormal. There is mild thickening of the tricuspid valve leaflets. There is moderate tricuspid regurgitation. Pulmonic Valve: The pulmonic valve is not well visualized. There is no indication of pulmonic valve regurgitation. Pericardium: Moderate pericardial effusion. The effusion is circumferential. The pericardial effusion appears to contain fibrinous material. Aorta: The aortic root is normal. In comparison to the previous echocardiogram(s): There are no prior studies on this patient for comparison purposes.  CONCLUSIONS:  1. Left ventricular ejection fraction is normal, calculated by Madsen's biplane at 66%.  2. Spectral Doppler shows a Grade I (impaired relaxation pattern) of left ventricular diastolic filling with normal left atrial filling pressure.  3. There is normal right ventricular global systolic function.  4. Moderate pericardial effusion.  5. Moderate tricuspid regurgitation.  6. Pericardial effusion 1.2-1.7 cm posterior to LV in parasternal view. 1.2 cm between R and liver edge in subcostal view.  7. < 5 mm Hg with respiratory variation. QUANTITATIVE DATA SUMMARY:  2D  MEASUREMENTS:             Normal Ranges: LAs:             2.32 cm     (2.7-4.0cm) IVSd:            1.07 cm     (0.6-1.1cm) LVPWd:           0.99 cm     (0.6-1.1cm) LVIDd:           3.71 cm     (3.9-5.9cm) LVIDs:           2.53 cm LV Mass Index:   79.2 g/m2 LVEDV Index:     41.27 ml/m2 LV % FS          31.9 %  LEFT ATRIUM:                Normal Ranges: LA Area A4C:      5.9 cm2 LA Area A2C:      7.6 cm2 LA Volume Index:  7.4 ml/m2 LA Vol A4C:       7.7 ml LA Vol A2C:       13.9 ml LA Vol Index BSA: 7.2 ml/m2  M-MODE MEASUREMENTS:         Normal Ranges: Ao Root:             3.50 cm (2.0-3.7cm) AoV Exc:             1.79 cm (1.5-2.5cm)  AORTA MEASUREMENTS:         Normal Ranges: AoV Exc:            1.79 cm (1.5-2.5cm) Ao Sinus, d:        3.30 cm (2.1-3.5cm) Ao STJ, d:          2.60 cm (1.7-3.4cm) Asc Ao, d:          2.70 cm (2.1-3.4cm)  LV SYSTOLIC FUNCTION:                      Normal Ranges: EF-A4C View:    65 % (>=55%) EF-A2C View:    63 % EF-Biplane:     66 % LV EF Reported: 66 %  LV DIASTOLIC FUNCTION:           Normal Ranges: MV Peak E:             0.54 m/s  (0.7-1.2 m/s) MV Peak A:             0.84 m/s  (0.42-0.7 m/s) E/A Ratio:             0.64      (1.0-2.2) MV e'                  0.107 m/s (>8.0) MV lateral e'          0.11 m/s MV medial e'           0.10 m/s E/e' Ratio:            5.05      (<8.0)  MITRAL VALVE:          Normal Ranges: MV DT:        126 msec (150-240msec)  AORTIC VALVE:           Normal Ranges: AoV Vmax:      1.37 m/s (<=1.7m/s) AoV Peak P.5 mmHg (<20mmHg) LVOT Max Steven:  1.09 m/s (<=1.1m/s) LVOT VTI:      15.15 cm LVOT Diameter: 1.90 cm  (1.8-2.4cm) AoV Area,Vmax: 2.25 cm2 (2.5-4.5cm2)  RIGHT VENTRICLE: RV Basal 3.70 cm RV Mid   2.80 cm RV Major 7.1 cm RV s'    0.26 m/s  TRICUSPID VALVE/RVSP:          Normal Ranges: Peak TR Velocity:     4.21 m/s RV Syst Pressure:     74 mmHg  (< 30mmHg) IVC Diam:             1.75 cm  PULMONIC VALVE:         Normal Ranges: PV Accel Time:  60 msec  (>120ms)  AORTA: Asc Ao Diam 2.66 cm  [id number] Mary Hay MD Electronically signed on 5/10/2025 at 11:45:29 PM  ** Final **     Vascular US lower extremity venous duplex bilateral  Result Date: 5/10/2025            [facility] [address] [address] [address]     Tel [phone] Fax [phone]  Vascular Lab Report  VASC US LOWER EXTREMITY VENOUS DUPLEX BILATERAL Patient Name:     YADIEL POLO WAKE       Reading           [id number] Gregselena Loyd                                       Physician:        MD Study Date:       5/10/2025           Ordering          [id number] IDALIA BRITT                                       Provider:         PARISH MRN/PID:          [id number]            Fellow: Accession#:       [id number]        Technologist:     Mark VIZCARRA, RVT Date of           1975 / 50      Technologist 2: Birth/Age:        years Gender:           M                   Encounter#:       [id number] Admission Status: Emergency           Location          [facility]                                       Performed:  Diagnosis/ICD: Localized (leg) edema-R60.0 Indication:    Limb edema CPT Codes:     46312 Peripheral venous duplex scan for DVT complete  Pertinent History: PE and COPD.  CONCLUSIONS: Right Lower Venous: No evidence of acute deep vein thrombus visualized in the right lower extremity. Additional Findings; Cystic Structure with hyperechoic shadows 4.9 x 2.1 x 1.4cm right distal thigh anteromedial bursitis vs fluid collection. Left Lower Venous: No evidence of acute deep vein thrombus visualized in the left lower extremity. Additional Findings; Lymph nodes. Additional Findings: Limited exam due to dressings.  Imaging & Doppler Findings:  Right Compress Thrombus SFJ     Yes      None  Left Compress Thrombus SFJ    Yes      None  Right                 Compressible Thrombus        Flow Distal External Iliac                None   Spontaneous/Phasic CFV                       Yes         None   Spontaneous/Phasic PFV                       Yes        None FV Proximal               Yes        None   Spontaneous/Phasic FV Mid                    Yes        None FV Distal                 Yes        None Popliteal                 Yes        None   Spontaneous/Phasic Peroneal                  Yes        None PTV                       Yes        None  Left                  Compress Thrombus        Flow Distal External Iliac            None   Spontaneous/Phasic CFV                     Yes      None   Spontaneous/Phasic PFV                     Yes      None FV Proximal             Yes      None   Spontaneous/Phasic FV Mid                  Yes      None   Spontaneous/Phasic FV Distal               Yes      None   Spontaneous/Phasic Popliteal               Yes      None   Spontaneous/Phasic Peroneal                Yes      None PTV                     Yes      None  [id number] Annette Loyd MD Electronically signed by [id number] Annette Loyd MD on 5/10/2025 at 5:43:16 PM  ** Final **                       Assessment/Plan   Jovon Mccartney is a 50 y.o. male on day 1 of admission presenting with Hospice care patient.  Assessment & Plan  Hospice care patient  Patient currently actively dying  Maintain morphine drip  As needed medication  Spoke with family regarding transfer and that he would be too unstable to transfer currently  Will transition him to regular nasal cannula and provide comfort medication do not prolong his suffering  Family in agreement       Plan discussed with patient and family member at bedside    Moderate level of MDM based on above issue and discussing plan    This note is created using voice recognition software. All efforts are made to minimize errors, if there are errors there due to transcription.    Chas Piña  Hospitalist           [1] ipratropium-albuteroL, 3 mL, nebulization, q4h BALTAZAR     [2] morphine,      [3] PRN medications: glycopyrrolate, haloperidol lactate,  ipratropium-albuteroL, LORazepam, melatonin, morphine, naloxone, ondansetron, oxygen, pilocarpine

## 2025-05-14 NOTE — NURSING NOTE
Started PCA at 2356 Verified PCA infusion at 0032 rate of 4 mg/hr bolus lockout and hour dose limit with pharm.

## 2025-05-15 NOTE — CARE PLAN
Problem: Skin  Goal: Prevent/manage excess moisture  5/15/2025 1103 by Yamilet Lopez, RN  Flowsheets (Taken 5/15/2025 1103)  Prevent/manage excess moisture: Cleanse incontinence/protect with barrier cream  5/15/2025 1103 by Yamilet Lopez RN  Outcome: Progressing  Flowsheets (Taken 5/15/2025 1103)  Prevent/manage excess moisture: Cleanse incontinence/protect with barrier cream     The clinical goals for the shift include remain comfortable throughout shift

## 2025-05-15 NOTE — CARE PLAN
Pt remains in GIP with \[facility]\""R Hospice. Pt under comfort care, receiving comfort meds and 02, family bedside. [facility]R RN to be in to assess this afternoon. Support provided. Will follow.    9a  Spoke with Attending, plan for pt to remain in GIP , will reevaluate again tmrw, await Hospice RN visit, will follow.

## 2025-05-15 NOTE — CARE PLAN
The patient's goals for the shift include      The clinical goals for the shift include pt will remain comfortable throughout this shift    Over the shift, the patient did  make progress toward the following goals. .pt is unable to respond ,to verberal stimuli, will respond to pain, pt has r fx shoulder and l fx humerus, pt has been placed on hospice, has morphine basal pca. Which has allowed him to rest throughout this shift, pt has is on O2 at 5l nasal canula , pulse ox check at 2000 was 93% pt respirations are cheyne watts, with 3-5 secs of apnea, pt has received valium x 1 this shift, and has been turned [id number]  and provided with mouth care, (swab and moisturizer) as requested by family which is at bedside, pt has purewick in place and has voided 500ml,, pt has remained safe this shift

## 2025-05-15 NOTE — ASSESSMENT & PLAN NOTE
Appears comfortable on 4 L nasal cannula  Will maintain that  Continue with end-of-life morphine drip  As needed bolus as needed  With Ativan shortage will use Valium as needed  Spoke with family and I think patient should stay here for another 24 hours as he appears to be actively dying and we can reevaluate tomorrow for possible transition to inpatient hospice home if needed  Also discussed with palliative care and hospice regarding current recommendations and I think if we try to transfer patient currently it might lead to his demise.

## 2025-05-15 NOTE — PROGRESS NOTES
Jovon Mccartney is a 50 y.o. male on day 2 of admission presenting with Hospice care patient.      Subjective   Some increased work of breathing.  Minimally responsive and does not have any meaningful conversation.  Wife and family member at bedside.  Talked about keeping him comfortable and potentially seeing how he does over next 24 hours before considering       Objective     Last Recorded Vitals  /75 (BP Location: Left arm, Patient Position: Lying)   Pulse (!) 111   Temp 36.7 °C (98.1 °F) (Temporal)   Resp 20   Wt 50.3 kg (110 lb 14.3 oz)   SpO2 (!) 82%   Intake/Output last 3 Shifts:    Intake/Output Summary (Last 24 hours) at 5/15/2025 1008  Last data filed at 5/15/2025 0425  Gross per 24 hour   Intake 2 ml   Output 500 ml   Net -498 ml       Admission Weight  Weight: 50.3 kg (110 lb 14.3 oz) (05/13/25 2323)    Daily Weight  05/13/25 : 50.3 kg (110 lb 14.3 oz)      Physical Exam:  General: Minimally responsive on 4 L nasal cannula    Relevant Results  Scheduled medications  Scheduled Medications[1]  Continuous medications  Continuous Medications[2]  PRN medications  PRN Medications[3]   Labs  Results from last 7 days   Lab Units 05/13/25  0412 05/12/25  0502 05/11/25  0425   WBC AUTO x10*3/uL 9.5 8.5 9.3   HEMOGLOBIN g/dL 8.0* 7.8* 8.0*   HEMATOCRIT % 25.8* 25.0* 25.3*   PLATELETS AUTO x10*3/uL 246 229 239     Results from last 7 days   Lab Units 05/13/25  0412 05/12/25  0502 05/11/25  0425 05/10/25  0606 05/09/25  2220   SODIUM mmol/L 129* 129* 131* 125* 124*   POTASSIUM mmol/L 4.0 3.8 3.2* 3.5 3.6   CHLORIDE mmol/L 92* 91* 90* 87* 87*   CO2 mmol/L 28 34* 31 27 28   BUN mg/dL 10 10 10 12 13   CREATININE mg/dL 0.31* 0.35* 0.40* 0.35* 0.34*   CALCIUM mg/dL 8.8 8.5* 8.4* 8.6 8.6   PROTEIN TOTAL g/dL 6.0*  --   --  6.8 6.5   BILIRUBIN TOTAL mg/dL 0.6  --   --   --  0.6   ALK PHOS U/L 90  --   --   --  101   ALT U/L 6*  --   --   --  8*   AST U/L 14  --   --   --  12   GLUCOSE mg/dL 100* 102* 89 137* 138*        Imaging  XR chest 1 view  Result Date: 5/14/2025  1. Moderate right ex vacuo pneumothorax, with interval increase in fluid within the pleural space status post removal of the right chest pigtail catheter. 2. Though consolidative opacities could relate to pneumonia, given the lack of improvement after antibiotic treatment starting with the 04/30/2025 ED visit, immune check point inhibitor treatment related pneumonitis is a consideration (cryptogenic organizing pneumonia type pattern of pneumonitis), particularly in the posterior left upper lobe. Consolidative opacities in the right lower/middle lobes are more consistent with increased tumor burden with resulting collapse and possibly superimposed postobstructive infection. 3. Persistent interstitial edema. 4. Pleural effusions likely reflect nonmeasurable tumor burden.     Signed by: Stefania De Jesus 5/14/2025 7:51 AM Dictation workstation:   [id number]    XR chest 1 view  Result Date: 5/13/2025  1.  No significant interval change.     Signed by: Marek Cortes 5/13/2025 8:30 AM Dictation workstation:   [id number]    XR chest 1 view  Result Date: 5/12/2025  1.  See above     Signed by: Marek Cortes 5/12/2025 8:13 AM Dictation workstation:   [id number]    XR chest 1 view  Result Date: 5/12/2025  1.  No significant interval change.     Signed by: Marek Cortes 5/12/2025 8:11 AM Dictation workstation:   [id number]    XR chest 1 view  Result Date: 5/10/2025  1. Right basilar pneumothorax, similar to the prior study. 2. Diffuse interstitial infiltrates bilaterally, as above. Clinical correlation and continued follow-up until clearing is recommended.   MACRO: None.   Signed by: Otis Rodrigues 5/10/2025 1:44 PM Dictation workstation:   [id number]    XR humerus left  Result Date: 5/10/2025  Large osteolytic focus in the mid humeral diaphysis most compatible with metastatic disease. The size of the lesion along with a prominent anterior cortical loss may  place this patient at increased risk for pathologic fracturing.   Signed by: Vu Murillo 5/10/2025 12:24 PM Dictation workstation:   [id number]    XR chest 1 view  Result Date: 5/10/2025  1. Loculated right basilar pneumothorax status post chest tube placement. This likely represents an ex vacuo pneumothorax. 2. Diffuse interstitial infiltrates and right basilar airspace consolidation, as above. Clinical correlation and continued follow-up until clearing is recommended.   MACRO: None.   Signed by: Otis Rodrigues 5/10/2025 7:07 AM Dictation workstation:   [id number]    XR chest 1 view  Result Date: 5/10/2025  1. Interval development of right basilar pneumothorax. 2. Interval placement of a right-sided pigtail pleural catheter with resolution of the right pleural effusion. 3. Bilateral interstitial thickening with diffuse bilateral airspace opacities, may be secondary to pulmonary edema and/or pneumonia. 4. Emphysema.       MACRO: Anjelica Morejon discussed the significance and urgency of this critical finding by telephone with  IDALIA LUGO on 5/10/2025 at 5:22 am.  (**-RCF-**) Findings:  See findings.     Signed by: Anjelica Morejon 5/10/2025 5:29 AM Dictation workstation:   [id number]    CT angio chest for pulmonary embolism  Result Date: 5/10/2025  Interval development of left upper lobe pulmonary embolism and segmental/subsegmental branches. Interval development of enlargement of the right ventricle compared to the left which may be seen with right heart strain.   Redemonstrated diffuse bronchial wall thickening and right perihilar soft tissue. Right lower lobe consolidation again noted which may be related to mass, postobstructive atelectasis and/or pneumonia.   Moderate to large right pleural effusion, small left pleural effusion and moderate pericardial effusion again noted.   Diffuse interstitial prominence with hazy parenchymal opacities suggestive of interstitial/parenchymal edema. Slightly  consolidative airspace opacity anomaly and left upper lobe suggestive of superimposed infection, similar to prior imaging.   Extensive osseous metastasis with destructive lytic lesions most significant in the bilateral scapula and humerus. Findings are similar to prior imaging with concern for pathologic right humeral shaft fracture.   Diffuse bone marrow edema and additional findings as detailed.   MACRO: Ana Diaz discussed the significance and urgency of this critical finding Via secure [employer] with confirmation of receipt with  IDALIA LUGO on 5/10/2025 at 3:01 am.  (**-RCF-**) Findings:  See findings.   Signed by: Ana Diaz 5/10/2025 3:02 AM Dictation workstation:   [id number]    XR shoulder right 2+ views  Result Date: 5/9/2025  New nondisplaced pathologic fracture of the right mid humeral diaphysis through a 10 cm lytic lesion.   Lytic lesion of the right glenoid neck, coracoid process and scapular body.   Loculated right pleural effusion.   MACRO: None.   Signed by: Joe Gray 5/9/2025 11:25 PM Dictation workstation:   [id number]    XR chest 1 view  Result Date: 5/9/2025  Increasing patchy airspace opacity left mid lung may reflect worsening pneumonia compared to 05/04/2025. Otherwise similar/stable exam.   MACRO: None   Signed by: Emre Keith 5/9/2025 11:20 PM Dictation workstation:   [id number]      Cardiology, Vascular, and Other Imaging  ECG 12 lead  Result Date: 5/12/2025  Sinus tachycardia Incomplete right bundle branch block Possible Right ventricular hypertrophy Abnormal ECG When compared with ECG of 09-MAY-2025 22:18, (unconfirmed) No significant change was found Confirmed by Santos aRmos ([id number]) on 5/12/2025 7:39:21 AM    ECG 12 lead  Result Date: 5/12/2025  Sinus tachycardia Rightward axis Incomplete right bundle branch block Borderline ECG When compared with ECG of 30-APR-2025 05:10, No significant change was found Confirmed by Santos Ramos ([id number]) on 5/12/2025  7:38:43 AM    Transthoracic Echo Complete  Result Date: 5/10/2025            [facility] [address] [facility], [place] [id number]    Tel [phone] Fax [phone] TRANSTHORACIC ECHOCARDIOGRAM REPORT Patient Name:       YADIEL POLO CORA Carvajal Physician:    [id number] Mary Hay MD Study Date:         5/10/2025            Ordering Provider:    [id number] IDALIA LUGO MRN/PID:            [id number]             Fellow: Accession#:         [id number]         Nurse: Date of Birth/Age:  1975 / 50 years Sonographer:          Ines Bella RDCS Gender Assigned at                      Additional Staff: Birth: Height:             167.64 cm            Admit Date: Weight:             45.36 kg             Admission Status:     Inpatient - STAT BSA / BMI:          1.49 m2 / 16.14      Department Location:  [facility] ICU Front                     kg/m2                                      (19-26) Blood Pressure: 122 /86 mmHg Study Type:    TRANSTHORACIC ECHO (TTE) COMPLETE Diagnosis/ICD: Unspecified systolic (congestive) heart failure (CHF)-I50.20 Indication:    CHF, possible right heart strain CPT Codes:     Echo Complete w Full [id number] Patient History: Pertinent History: Cancer. Study Detail: The following Echo studies were performed: 2D, M-Mode, Doppler and               color flow. Technically challenging study due to patient lying in               supine position and prominent lung artifact.  PHYSICIAN INTERPRETATION: Left Ventricle: Left ventricular ejection fraction is normal, calculated by Madsen's biplane at 66%. The left ventricular cavity size is normal. There is mild increased septal and normal posterior left ventricular wall thickness. There is left ventricular concentric remodeling. Spectral Doppler  shows a Grade I (impaired relaxation pattern) of left ventricular diastolic filling with normal left atrial filling pressure. Left Atrium: The left atrial size is normal. Right Ventricle: The right ventricle is normal in size. There is normal right ventricular global systolic function. Subcostal view with some RA inversion. Right Atrium: The right atrial size is upper limits of normal. Aortic Valve: The aortic valve is trileaflet. There is mild aortic valve cusp calcification. There is mild to moderate aortic valve thickening. There is no evidence of aortic valve regurgitation. The peak instantaneous gradient of the aortic valve is 8 mmHg. Mitral Valve: The mitral valve is mildly thickened. There is no evidence of mitral valve stenosis. There is mild mitral valve regurgitation. Tricuspid Valve: The tricuspid valve is abnormal. There is mild thickening of the tricuspid valve leaflets. There is moderate tricuspid regurgitation. Pulmonic Valve: The pulmonic valve is not well visualized. There is no indication of pulmonic valve regurgitation. Pericardium: Moderate pericardial effusion. The effusion is circumferential. The pericardial effusion appears to contain fibrinous material. Aorta: The aortic root is normal. In comparison to the previous echocardiogram(s): There are no prior studies on this patient for comparison purposes.  CONCLUSIONS:  1. Left ventricular ejection fraction is normal, calculated by Madsen's biplane at 66%.  2. Spectral Doppler shows a Grade I (impaired relaxation pattern) of left ventricular diastolic filling with normal left atrial filling pressure.  3. There is normal right ventricular global systolic function.  4. Moderate pericardial effusion.  5. Moderate tricuspid regurgitation.  6. Pericardial effusion 1.2-1.7 cm posterior to LV in parasternal view. 1.2 cm between R and liver edge in subcostal view.  7. < 5 mm Hg with respiratory variation. QUANTITATIVE DATA SUMMARY:  2D MEASUREMENTS:              Normal Ranges: LAs:             2.32 cm     (2.7-4.0cm) IVSd:            1.07 cm     (0.6-1.1cm) LVPWd:           0.99 cm     (0.6-1.1cm) LVIDd:           3.71 cm     (3.9-5.9cm) LVIDs:           2.53 cm LV Mass Index:   79.2 g/m2 LVEDV Index:     41.27 ml/m2 LV % FS          31.9 %  LEFT ATRIUM:                Normal Ranges: LA Area A4C:      5.9 cm2 LA Area A2C:      7.6 cm2 LA Volume Index:  7.4 ml/m2 LA Vol A4C:       7.7 ml LA Vol A2C:       13.9 ml LA Vol Index BSA: 7.2 ml/m2  M-MODE MEASUREMENTS:         Normal Ranges: Ao Root:             3.50 cm (2.0-3.7cm) AoV Exc:             1.79 cm (1.5-2.5cm)  AORTA MEASUREMENTS:         Normal Ranges: AoV Exc:            1.79 cm (1.5-2.5cm) Ao Sinus, d:        3.30 cm (2.1-3.5cm) Ao STJ, d:          2.60 cm (1.7-3.4cm) Asc Ao, d:          2.70 cm (2.1-3.4cm)  LV SYSTOLIC FUNCTION:                      Normal Ranges: EF-A4C View:    65 % (>=55%) EF-A2C View:    63 % EF-Biplane:     66 % LV EF Reported: 66 %  LV DIASTOLIC FUNCTION:           Normal Ranges: MV Peak E:             0.54 m/s  (0.7-1.2 m/s) MV Peak A:             0.84 m/s  (0.42-0.7 m/s) E/A Ratio:             0.64      (1.0-2.2) MV e'                  0.107 m/s (>8.0) MV lateral e'          0.11 m/s MV medial e'           0.10 m/s E/e' Ratio:            5.05      (<8.0)  MITRAL VALVE:          Normal Ranges: MV DT:        126 msec (150-240msec)  AORTIC VALVE:           Normal Ranges: AoV Vmax:      1.37 m/s (<=1.7m/s) AoV Peak P.5 mmHg (<20mmHg) LVOT Max Steven:  1.09 m/s (<=1.1m/s) LVOT VTI:      15.15 cm LVOT Diameter: 1.90 cm  (1.8-2.4cm) AoV Area,Vmax: 2.25 cm2 (2.5-4.5cm2)  RIGHT VENTRICLE: RV Basal 3.70 cm RV Mid   2.80 cm RV Major 7.1 cm RV s'    0.26 m/s  TRICUSPID VALVE/RVSP:          Normal Ranges: Peak TR Velocity:     4.21 m/s RV Syst Pressure:     74 mmHg  (< 30mmHg) IVC Diam:             1.75 cm  PULMONIC VALVE:         Normal Ranges: PV Accel Time:  60 msec (>120ms)  AORTA: Asc Ao  Diam 2.66 cm  [id number] Mary Hay MD Electronically signed on 5/10/2025 at 11:45:29 PM  ** Final **     Vascular US lower extremity venous duplex bilateral  Result Date: 5/10/2025            [facility] [address] [place] [address]     Tel [phone] Fax [phone]  Vascular Lab Report  [facility] US LOWER EXTREMITY VENOUS DUPLEX BILATERAL Patient Name:     YADIEL SHEPHERD       Reading           [id number] Gregselena Loyd                                       Physician:        MD Study Date:       5/10/2025           Ordering          [id number] IDALIA LORENZA                                       Provider:         PARISH MRN/PID:          [id number]            Fellow: Accession#:       [id number]        Technologist:     Mark VIZCARRA, RVT Date of           1975 / 50      Technologist 2: Birth/Age:        years Gender:           M                   Encounter#:       [id number] Admission Status: Emergency           Location          [facility]                                       Performed:  Diagnosis/ICD: Localized (leg) edema-R60.0 Indication:    Limb edema CPT Codes:     53752 Peripheral venous duplex scan for DVT complete  Pertinent History: PE and COPD.  CONCLUSIONS: Right Lower Venous: No evidence of acute deep vein thrombus visualized in the right lower extremity. Additional Findings; Cystic Structure with hyperechoic shadows 4.9 x 2.1 x 1.4cm right distal thigh anteromedial bursitis vs fluid collection. Left Lower Venous: No evidence of acute deep vein thrombus visualized in the left lower extremity. Additional Findings; Lymph nodes. Additional Findings: Limited exam due to dressings.  Imaging & Doppler Findings:  Right Compress Thrombus SFJ     Yes      None  Left Compress Thrombus SFJ    Yes      None  Right                 Compressible Thrombus        Flow Distal External Iliac                None   Spontaneous/Phasic CFV                       Yes        None    Spontaneous/Phasic PFV                       Yes        None FV Proximal               Yes        None   Spontaneous/Phasic FV Mid                    Yes        None FV Distal                 Yes        None Popliteal                 Yes        None   Spontaneous/Phasic Peroneal                  Yes        None PTV                       Yes        None  Left                  Compress Thrombus        Flow Distal External Iliac            None   Spontaneous/Phasic CFV                     Yes      None   Spontaneous/Phasic PFV                     Yes      None FV Proximal             Yes      None   Spontaneous/Phasic FV Mid                  Yes      None   Spontaneous/Phasic FV Distal               Yes      None   Spontaneous/Phasic Popliteal               Yes      None   Spontaneous/Phasic Peroneal                Yes      None PTV                     Yes      None  [id number] Annette Loyd MD Electronically signed by [id number] Annette Loyd MD on 5/10/2025 at 5:43:16 PM  ** Final **                       Assessment/Plan   Jovon Mccartney is a 50 y.o. male on day 2 of admission presenting with Hospice care patient.  Assessment & Plan  Hospice care patient  Appears comfortable on 4 L nasal cannula  Will maintain that  Continue with end-of-life morphine drip  As needed bolus as needed  With Ativan shortage will use Valium as needed  Spoke with family and I think patient should stay here for another 24 hours as he appears to be actively dying and we can reevaluate tomorrow for possible transition to inpatient hospice home if needed  Also discussed with palliative care and hospice regarding current recommendations and I think if we try to transfer patient currently it might lead to his demise.       Plan discussed with patient and family member at bedside    Moderate level of MDM based on above issue and discussing plan    This note is created using voice recognition software. All efforts are made to minimize errors, if there  are errors there due to transcription.    Chas Piña  Hospitalist           [1]    [2] morphine,      [3] PRN medications: diazePAM, glycopyrrolate, haloperidol lactate, ipratropium-albuteroL, melatonin, morphine, naloxone, ondansetron, oxygen, pilocarpine

## 2025-05-15 NOTE — PROGRESS NOTES
Spiritual Care Visit  Spiritual Care Request    Reason for Visit:  Routine Visit: Follow-up     Request Received From:       Focus of Care:  Visited With: Patient and family together         Refer to :          Spiritual Care Assessment    Spiritual Assessment:                      Care Provided:  Intended Effects: Promote sense of peace, Preserve dignity and respect, Meaning-making  Methods: Offer spiritual/Faith support  Interventions: [facility]    Sense of Community and or [place] Affiliation:  Gnosticist         Addressed Needs/Concerns and/or Mark Through:          Outcome:        Advance Directives:         Spiritual Care Annotation    Annotation:   prayed.  Patient's family is surrounding him and not leaving him.   showed kindness and was a supportive presence.

## 2025-05-16 NOTE — DISCHARGE SUMMARY
Discharge Diagnosis  Hospice care patient           Issues Requiring Follow-Up  N/A    Discharge Meds     Medication List      ASK your doctor about these medications     acetaminophen 500 mg tablet; Commonly known as: Tylenol   amoxicillin-clavulanate 875-125 mg tablet; Commonly known as: Augmentin;   Take 1 tablet (875 mg) by mouth 2 times a day for 2 days.; Ask about:   Should I take this medication?   budesonide-glycopyr-formoterol 160-9-4.8 mcg/actuation HFA aerosol   inhaler; Commonly known as: BREZTRI; Inhale 2 puffs 2 times a day.   cyanocobalamin (vitamin B-12) 500 mcg tablet,chewable   dexAMETHasone 4 mg tablet; Commonly known as: Decadron; Take 4 mg (1   tablet) by mouth twice daily the day before treatment, once the evening of   treatment, and twice daily the day after treatment.   docusate sodium 100 mg capsule; Commonly known as: Colace; Take 1   capsule (100 mg) by mouth 2 times a day as needed for constipation (for   hard stools).   * folic acid 1 mg tablet; Commonly known as: Folvite; Take 1 tablet   (1,000 mcg) by mouth once daily. Do not start before October 27, 2024.   * folic acid 1 mg tablet; Commonly known as: Folvite; Take 1 tablet   (1,000 mcg) by mouth once daily.   gabapentin 300 mg capsule; Commonly known as: Neurontin; Take 1 capsule   (300 mg) by mouth 3 times a day.   * ipratropium-albuteroL  mcg/actuation inhaler; Commonly known as:   Combivent Respimat; Inhale 2 puffs 4 times a day as needed for shortness   of breath or wheezing.   * ipratropium-albuteroL 0.5-2.5 mg/3 mL nebulizer solution; Commonly   known as: Duo-Neb; Take 3 mL by nebulization 4 times a day as needed for   wheezing or shortness of breath.   * ipratropium-albuteroL 0.5-2.5 mg/3 mL nebulizer solution; Commonly   known as: Duo-Neb; Take 3 mL by nebulization 4 times a day as needed for   wheezing or shortness of breath.   mirtazapine 15 mg tablet; Commonly known as: Remeron; Take 1 tablet (15   mg) by mouth once  daily at bedtime.   morphine CR 30 mg 12 hr tablet; Commonly known as: MS Contin; Take 1   tablet (30 mg) by mouth 2 times a day AND 2 tablets (60 mg) once daily at   bedtime. Do not crush, chew, or split. Take 30 MG AM, 30 MG MID DAY and 60   MG at BEDTIME.   naloxone 4 mg/0.1 mL nasal spray; Commonly known as: Narcan; Administer   1 spray (4 mg) into affected nostril(s) if needed for opioid reversal. May   repeat every 2-3 minutes if needed, alternating nostrils, until medical   assistance becomes available.   ondansetron 8 mg tablet; Commonly known as: Zofran; Take 1 tablet (8 mg)   by mouth every 8 hours if needed for nausea or vomiting.   oxyCODONE 10 mg immediate release tablet; Commonly known as: Roxicodone;   Take 1-1.5 tablets (10-15 mg) by mouth every 4 hours if needed for   moderate pain (4 - 6) or severe pain (7 - 10). MAX 9 TABS PER DAY   oxygen gas therapy; Commonly known as: O2; Inhale 1 each once every 24   hours.   pantoprazole 40 mg EC tablet; Commonly known as: ProtoNix; Take 1 tablet   (40 mg) by mouth once daily in the morning. Take before meals. Do not   crush, chew, or split.   pilocarpine 5 mg tablet; Commonly known as: Salagen (pilocarpine); Take   1 tablet (5 mg) by mouth 3 times a day as needed (for dry mouth).   polyethylene glycol 17 gram/dose powder; Commonly known as: Glycolax,   Miralax; Mix 17 g of powder and drink once daily.   predniSONE 10 mg tablet; Commonly known as: Deltasone; Take 2 tablets   (20 mg) by mouth once daily for 3 days, THEN 1 tablet (10 mg) once daily   for 3 days.; Start taking on: May 9, 2025; Ask about: Should I take this   medication?   prochlorperazine 10 mg tablet; Commonly known as: Compazine; Take 1   tablet (10 mg) by mouth every 6 hours if needed for nausea or vomiting. Do   not fill before October 27, 2024.  * This list has 5 medication(s) that are the same as other medications   prescribed for you. Read the directions carefully, and ask your doctor or    other care provider to review them with you.       Test Results Pending At Discharge  Pending Labs       No current pending labs.            Hospital Course  Patient passed away under Hospice care.       Pertinent Physical Exam At Time of Discharge  Physical Exam  See death note  Outpatient Follow-Up  Future Appointments   Date Time Provider Department [place]   5/27/2025  8:00 AM MUNDO Agrawal-CNP [id number] [facility]   5/27/2025  9:15 AM INF 07 EFRAIN [id number] [place]   5/27/2025 10:00 AM Tati Rosa APRN-CNP [id number] [facility]   6/26/2025  1:00 PM [facility] [id number] [place]   9/25/2025  1:00 PM Matilda MCCORMACK Astrab, APRN-CNP [id number] [facility]   9/29/2025  1:40 PM Matilda KSENIA Astrab, APRN-CNP [id number] [facility]   12/19/2025  1:00 PM [facility] [id number] [place]         Marialuisa Serrano MD

## 2025-05-16 NOTE — PROGRESS NOTES
Jovon Mccartney is a 50 y.o. male on day 3 of admission presenting with Hospice care patient.      Subjective   Patient accompanied by family at bedside who report that he has mostly been obtunded. No obvious signs of distress       Objective     Last Recorded Vitals  BP 71/51 (BP Location: Left arm, Patient Position: Lying)   Pulse 102   Temp 36.4 °C (97.5 °F) (Temporal)   Resp 20   Wt 50.3 kg (110 lb 14.3 oz)   SpO2 (!) 85%   Intake/Output last 3 Shifts:    Intake/Output Summary (Last 24 hours) at 5/16/2025 1401  Last data filed at 5/16/2025 0400  Gross per 24 hour   Intake 4 ml   Output --   Net 4 ml       Admission Weight  Weight: 50.3 kg (110 lb 14.3 oz) (05/13/25 2323)    Daily Weight  05/13/25 : 50.3 kg (110 lb 14.3 oz)      Physical Exam  Gen: cachectic, obtunded  Lungs: no respiratory distress    Assessment and Plan:  Mr Mccartney is a 49yo man with lung cancer, respiratory failure admitted for hospice care under GIP    -continue comfort care order set  -patient is on morphine gtt - OK to continue while GIP  -haldol, diazepam, pilocarpine PRN  -DNR-comfort care  -patient appears too unstable for transfer. Will continue inpatient care under GIP.           Marialuisa Serrano MD

## 2025-05-16 NOTE — CARE PLAN
Pt remains in GIP with [facility]. Pt under comfort care, receiving comfort meds and 02, family bedside. [facility] RN to be in to assess today. Support provided. Await results of Hospice RN visit. Will follow.     9:30a  bp 71/51 spo2 sat 85% on 5l  Spoke with Medical Team, pt remains too unstable for discharge to [place]. Plan remains GIP, notified [facility]. Palliative Care Team to continue to follow.    10:00a  [facility] RN onsite, recommends pt stay in [facility]IP as well. [facility] to follow, please call [facility]  with a change in pt's condition. Will follow.

## 2025-05-16 NOTE — PROGRESS NOTES
Spiritual Care Visit  Spiritual Care Request    Reason for Visit:  Routine Visit: Follow-up     Request Received From:       Focus of Care:  Visited With: Patient and family together         Refer to :          Spiritual Care Assessment    Spiritual Assessment:                      Care Provided:  Intended Effects: Promote sense of peace, Preserve dignity and respect, Meaning-making  Methods: Offer spiritual/Jainism support  Interventions: Share words of hope and inspiration, [place]    Sense of Community and or Alevism Affiliation:  [place]         Addressed Needs/Concerns and/or Mark Through:          Outcome:        Advance Directives:         Spiritual Care Annotation    Annotation:   made a follow up visit and spoke to the patients wife and son.   prayed and was a supportive presence.

## 2025-05-16 NOTE — CARE PLAN
The patient's goals for the shift include      The clinical goals for the shift include PT WILL REMAIN COMFORTABLE THROUGHOUT THIS SHIFT    Over the shift, the patient did  make progress toward the following goals..  Pt has been comfortable throughout this shift, pt has been reposition q 4 hour per family request, mouth care and lip moisturizer applied, pt has received valium  x1 this shift for slight restlessness, which was effective, morphine pca is continous, pt has family at bedside, has remained safe this shift

## 2025-05-16 NOTE — SIGNIFICANT EVENT
I was notified by RN that this hospice patient had passed away. On my exam, patient unresponsive to verbal and physical stimuli. Pupils fixed and dilated. There were no heart or lung sounds. Patient pronounced dead at 15:20. Family at bedside and notified. Condolences offered.

## 2025-05-19 LAB
LAB AP ASR DISCLAIMER: NORMAL
LABORATORY COMMENT REPORT: NORMAL
LABORATORY COMMENT REPORT: NORMAL
PATH REPORT.COMMENTS IMP SPEC: NORMAL
PATH REPORT.FINAL DX SPEC: NORMAL
PATH REPORT.GROSS SPEC: NORMAL
PATH REPORT.RELEVANT HX SPEC: NORMAL
PATH REPORT.TOTAL CANCER: NORMAL

## 2025-05-21 LAB
ACID FAST STN SPEC: NORMAL
MYCOBACTERIUM SPEC CULT: NORMAL

## 2025-05-27 ENCOUNTER — APPOINTMENT (OUTPATIENT)
Dept: PALLIATIVE MEDICINE | Facility: CLINIC | Age: 50
End: 2025-05-27
Payer: OTHER MISCELLANEOUS

## 2025-05-27 ENCOUNTER — APPOINTMENT (OUTPATIENT)
Dept: HEMATOLOGY/ONCOLOGY | Facility: CLINIC | Age: 50
End: 2025-05-27
Payer: OTHER MISCELLANEOUS

## 2025-05-27 ENCOUNTER — APPOINTMENT (OUTPATIENT)
Dept: HEMATOLOGY/ONCOLOGY | Facility: HOSPITAL | Age: 50
End: 2025-05-27
Payer: OTHER MISCELLANEOUS

## 2025-05-28 LAB
ACID FAST STN SPEC: NORMAL
MYCOBACTERIUM SPEC CULT: NORMAL

## 2025-06-04 LAB
ACID FAST STN SPEC: NORMAL
MYCOBACTERIUM SPEC CULT: NORMAL

## 2025-06-11 LAB
ACID FAST STN SPEC: NORMAL
MYCOBACTERIUM SPEC CULT: NORMAL

## 2025-06-18 LAB
ACID FAST STN SPEC: NORMAL
MYCOBACTERIUM SPEC CULT: NORMAL

## 2025-06-25 LAB
ACID FAST STN SPEC: NORMAL
MYCOBACTERIUM SPEC CULT: NORMAL

## 2025-06-26 ENCOUNTER — APPOINTMENT (OUTPATIENT)
Dept: PRIMARY CARE | Facility: CLINIC | Age: 50
End: 2025-06-26
Payer: COMMERCIAL

## 2025-07-02 LAB
ACID FAST STN SPEC: NORMAL
MYCOBACTERIUM SPEC CULT: NORMAL

## 2025-09-25 ENCOUNTER — APPOINTMENT (OUTPATIENT)
Dept: PRIMARY CARE | Facility: CLINIC | Age: 50
End: 2025-09-25
Payer: COMMERCIAL

## 2025-09-29 ENCOUNTER — APPOINTMENT (OUTPATIENT)
Dept: PRIMARY CARE | Facility: CLINIC | Age: 50
End: 2025-09-29
Payer: COMMERCIAL

## 2025-12-19 ENCOUNTER — APPOINTMENT (OUTPATIENT)
Dept: PRIMARY CARE | Facility: CLINIC | Age: 50
End: 2025-12-19
Payer: COMMERCIAL